# Patient Record
Sex: FEMALE | Race: WHITE
[De-identification: names, ages, dates, MRNs, and addresses within clinical notes are randomized per-mention and may not be internally consistent; named-entity substitution may affect disease eponyms.]

---

## 2017-04-24 PROBLEM — Z98.891 HISTORY OF C-SECTION: Status: ACTIVE | Noted: 2017-04-24

## 2017-04-24 PROBLEM — Z87.798 HISTORY OF CONGENITAL ABNORMALITY: Status: ACTIVE | Noted: 2017-04-24

## 2017-04-24 PROBLEM — F79 MENTAL DEFICIENCY: Chronic | Status: ACTIVE | Noted: 2017-04-24

## 2017-04-24 PROBLEM — O09.32 LATE PRENATAL CARE AFFECTING PREGNANCY IN SECOND TRIMESTER: Status: ACTIVE | Noted: 2017-04-24

## 2017-06-15 PROBLEM — O34.219 PREVIOUS CESAREAN DELIVERY AFFECTING PREGNANCY: Status: ACTIVE | Noted: 2017-04-24

## 2017-06-28 PROBLEM — O36.8130 DECREASED FETUS MOVEMENTS AFFECTING MANAGEMENT OF MOTHER IN THIRD TRIMESTER: Status: ACTIVE | Noted: 2017-06-28

## 2022-11-15 ENCOUNTER — HOSPITAL ENCOUNTER (EMERGENCY)
Dept: HOSPITAL 67 - ED | Age: 27
LOS: 1 days | Discharge: ANOTHER HOSPITAL | End: 2022-11-16
Payer: COMMERCIAL

## 2022-11-15 VITALS — WEIGHT: 210 LBS | BODY MASS INDEX: 35.85 KG/M2 | HEIGHT: 64 IN

## 2022-11-15 VITALS — TEMPERATURE: 98.2 F

## 2022-11-15 DIAGNOSIS — G40.901: Primary | ICD-10-CM

## 2022-11-15 LAB
APTT BLD: 29.5 SECONDS (ref 24.5–33.6)
PLATELET # BLD: 230 K/UL (ref 152–353)
POTASSIUM SERPL-SCNC: 3.8 MMOL/L (ref 3.6–5.2)
SODIUM SERPL-SCNC: 136 MMOL/L (ref 136–145)

## 2022-11-15 PROCEDURE — 93005 ELECTROCARDIOGRAM TRACING: CPT

## 2022-11-15 PROCEDURE — 0T9B70Z DRAINAGE OF BLADDER WITH DRAINAGE DEVICE, VIA NATURAL OR ARTIFICIAL OPENING: ICD-10-PCS | Performed by: EMERGENCY MEDICINE

## 2022-11-15 PROCEDURE — 36415 COLL VENOUS BLD VENIPUNCTURE: CPT

## 2022-11-15 PROCEDURE — 85610 PROTHROMBIN TIME: CPT

## 2022-11-15 PROCEDURE — 96376 TX/PRO/DX INJ SAME DRUG ADON: CPT

## 2022-11-15 PROCEDURE — 80320 DRUG SCREEN QUANTALCOHOLS: CPT

## 2022-11-15 PROCEDURE — 96365 THER/PROPH/DIAG IV INF INIT: CPT

## 2022-11-15 PROCEDURE — 85027 COMPLETE CBC AUTOMATED: CPT

## 2022-11-15 PROCEDURE — 96361 HYDRATE IV INFUSION ADD-ON: CPT

## 2022-11-15 PROCEDURE — 81000 URINALYSIS NONAUTO W/SCOPE: CPT

## 2022-11-15 PROCEDURE — 99285 EMERGENCY DEPT VISIT HI MDM: CPT

## 2022-11-15 PROCEDURE — 80053 COMPREHEN METABOLIC PANEL: CPT

## 2022-11-15 PROCEDURE — 96360 HYDRATION IV INFUSION INIT: CPT

## 2022-11-15 PROCEDURE — 85730 THROMBOPLASTIN TIME PARTIAL: CPT

## 2022-11-15 PROCEDURE — 51702 INSERT TEMP BLADDER CATH: CPT

## 2022-11-15 PROCEDURE — 96366 THER/PROPH/DIAG IV INF ADDON: CPT

## 2022-11-15 PROCEDURE — 96375 TX/PRO/DX INJ NEW DRUG ADDON: CPT

## 2022-11-15 PROCEDURE — 80307 DRUG TEST PRSMV CHEM ANLYZR: CPT

## 2022-11-16 VITALS — SYSTOLIC BLOOD PRESSURE: 121 MMHG | DIASTOLIC BLOOD PRESSURE: 60 MMHG

## 2023-01-06 ENCOUNTER — APPOINTMENT (OUTPATIENT)
Dept: GENERAL RADIOLOGY | Age: 28
End: 2023-01-06
Payer: COMMERCIAL

## 2023-01-06 ENCOUNTER — HOSPITAL ENCOUNTER (INPATIENT)
Age: 28
LOS: 8 days | Discharge: HOME | End: 2023-01-14
Attending: PEDIATRICS | Admitting: STUDENT IN AN ORGANIZED HEALTH CARE EDUCATION/TRAINING PROGRAM
Payer: COMMERCIAL

## 2023-01-06 ENCOUNTER — APPOINTMENT (OUTPATIENT)
Dept: CT IMAGING | Age: 28
End: 2023-01-06
Payer: COMMERCIAL

## 2023-01-06 DIAGNOSIS — G40.901 STATUS EPILEPTICUS (HCC): Primary | ICD-10-CM

## 2023-01-06 LAB
ALBUMIN SERPL-MCNC: 4.2 G/DL (ref 3.5–5.2)
ALP BLD-CCNC: 78 U/L (ref 35–104)
ALT SERPL-CCNC: 8 U/L (ref 5–33)
AMPHETAMINE SCREEN, URINE: NEGATIVE
ANION GAP SERPL CALCULATED.3IONS-SCNC: 9 MMOL/L (ref 7–19)
AST SERPL-CCNC: 12 U/L (ref 5–32)
BACTERIA: NEGATIVE /HPF
BARBITURATE SCREEN URINE: NEGATIVE
BASOPHILS ABSOLUTE: 0.1 K/UL (ref 0–0.2)
BASOPHILS RELATIVE PERCENT: 0.6 % (ref 0–1)
BENZODIAZEPINE SCREEN, URINE: NEGATIVE
BILIRUB SERPL-MCNC: <0.2 MG/DL (ref 0.2–1.2)
BILIRUBIN URINE: NEGATIVE
BLOOD, URINE: ABNORMAL
BUN BLDV-MCNC: 9 MG/DL (ref 6–20)
BUPRENORPHINE URINE: NEGATIVE
CALCIUM SERPL-MCNC: 9 MG/DL (ref 8.6–10)
CANNABINOID SCREEN URINE: NEGATIVE
CHLORIDE BLD-SCNC: 101 MMOL/L (ref 98–111)
CLARITY: CLEAR
CO2: 26 MMOL/L (ref 22–29)
COCAINE METABOLITE SCREEN URINE: NEGATIVE
COLOR: YELLOW
CREAT SERPL-MCNC: 0.7 MG/DL (ref 0.5–0.9)
CRYSTALS, UA: ABNORMAL /HPF
EOSINOPHILS ABSOLUTE: 0 K/UL (ref 0–0.6)
EOSINOPHILS RELATIVE PERCENT: 0.2 % (ref 0–5)
EPITHELIAL CELLS, UA: 1 /HPF (ref 0–5)
ETHANOL: <10 MG/DL (ref 0–0.08)
GFR SERPL CREATININE-BSD FRML MDRD: >60 ML/MIN/{1.73_M2}
GLUCOSE BLD-MCNC: 90 MG/DL (ref 74–109)
GLUCOSE URINE: NEGATIVE MG/DL
HCG QUALITATIVE: NEGATIVE
HCT VFR BLD CALC: 35.4 % (ref 37–47)
HEMOGLOBIN: 11 G/DL (ref 12–16)
HYALINE CASTS: 1 /HPF (ref 0–8)
IMMATURE GRANULOCYTES #: 0 K/UL
KETONES, URINE: NEGATIVE MG/DL
LEUKOCYTE ESTERASE, URINE: NEGATIVE
LYMPHOCYTES ABSOLUTE: 2 K/UL (ref 1.1–4.5)
LYMPHOCYTES RELATIVE PERCENT: 21.1 % (ref 20–40)
Lab: NORMAL
MAGNESIUM: 1.9 MG/DL (ref 1.6–2.6)
MAGNESIUM: 1.9 MG/DL (ref 1.6–2.6)
MCH RBC QN AUTO: 25.6 PG (ref 27–31)
MCHC RBC AUTO-ENTMCNC: 31.1 G/DL (ref 33–37)
MCV RBC AUTO: 82.5 FL (ref 81–99)
METHADONE SCREEN, URINE: NEGATIVE
METHAMPHETAMINE, URINE: NEGATIVE
MONOCYTES ABSOLUTE: 0.7 K/UL (ref 0–0.9)
MONOCYTES RELATIVE PERCENT: 7.1 % (ref 0–10)
NEUTROPHILS ABSOLUTE: 6.6 K/UL (ref 1.5–7.5)
NEUTROPHILS RELATIVE PERCENT: 70.6 % (ref 50–65)
NITRITE, URINE: NEGATIVE
OPIATE SCREEN URINE: NEGATIVE
OXYCODONE URINE: NEGATIVE
PDW BLD-RTO: 14.6 % (ref 11.5–14.5)
PH UA: 6.5 (ref 5–8)
PHENCYCLIDINE SCREEN URINE: NEGATIVE
PLATELET # BLD: 225 K/UL (ref 130–400)
PMV BLD AUTO: 12 FL (ref 9.4–12.3)
POTASSIUM SERPL-SCNC: 4 MMOL/L (ref 3.5–5)
PROLACTIN: 28.22 NG/ML (ref 4.79–23.3)
PROPOXYPHENE SCREEN: NEGATIVE
PROTEIN UA: NEGATIVE MG/DL
RBC # BLD: 4.29 M/UL (ref 4.2–5.4)
RBC UA: 53 /HPF (ref 0–4)
SARS-COV-2, NAAT: NOT DETECTED
SODIUM BLD-SCNC: 136 MMOL/L (ref 136–145)
SPECIFIC GRAVITY UA: 1.01 (ref 1–1.03)
TOTAL PROTEIN: 7.1 G/DL (ref 6.6–8.7)
TRICYCLIC, URINE: NEGATIVE
TROPONIN: <0.01 NG/ML (ref 0–0.03)
UROBILINOGEN, URINE: 0.2 E.U./DL
WBC # BLD: 9.3 K/UL (ref 4.8–10.8)
WBC UA: 0 /HPF (ref 0–5)

## 2023-01-06 PROCEDURE — 93005 ELECTROCARDIOGRAM TRACING: CPT | Performed by: PEDIATRICS

## 2023-01-06 PROCEDURE — 6360000002 HC RX W HCPCS: Performed by: HOSPITALIST

## 2023-01-06 PROCEDURE — 80306 DRUG TEST PRSMV INSTRMNT: CPT

## 2023-01-06 PROCEDURE — 85025 COMPLETE CBC W/AUTO DIFF WBC: CPT

## 2023-01-06 PROCEDURE — 2700000000 HC OXYGEN THERAPY PER DAY

## 2023-01-06 PROCEDURE — 71045 X-RAY EXAM CHEST 1 VIEW: CPT | Performed by: RADIOLOGY

## 2023-01-06 PROCEDURE — 81001 URINALYSIS AUTO W/SCOPE: CPT

## 2023-01-06 PROCEDURE — 99285 EMERGENCY DEPT VISIT HI MDM: CPT

## 2023-01-06 PROCEDURE — 71045 X-RAY EXAM CHEST 1 VIEW: CPT

## 2023-01-06 PROCEDURE — 6360000002 HC RX W HCPCS: Performed by: PEDIATRICS

## 2023-01-06 PROCEDURE — 84703 CHORIONIC GONADOTROPIN ASSAY: CPT

## 2023-01-06 PROCEDURE — 36415 COLL VENOUS BLD VENIPUNCTURE: CPT

## 2023-01-06 PROCEDURE — 70450 CT HEAD/BRAIN W/O DYE: CPT | Performed by: RADIOLOGY

## 2023-01-06 PROCEDURE — 6360000002 HC RX W HCPCS

## 2023-01-06 PROCEDURE — 87150 DNA/RNA AMPLIFIED PROBE: CPT

## 2023-01-06 PROCEDURE — 2580000003 HC RX 258: Performed by: HOSPITALIST

## 2023-01-06 PROCEDURE — 83735 ASSAY OF MAGNESIUM: CPT

## 2023-01-06 PROCEDURE — 87635 SARS-COV-2 COVID-19 AMP PRB: CPT

## 2023-01-06 PROCEDURE — 80053 COMPREHEN METABOLIC PANEL: CPT

## 2023-01-06 PROCEDURE — 87040 BLOOD CULTURE FOR BACTERIA: CPT

## 2023-01-06 PROCEDURE — 82077 ASSAY SPEC XCP UR&BREATH IA: CPT

## 2023-01-06 PROCEDURE — 84484 ASSAY OF TROPONIN QUANT: CPT

## 2023-01-06 PROCEDURE — 2100000000 HC CCU R&B

## 2023-01-06 PROCEDURE — 2580000003 HC RX 258: Performed by: PEDIATRICS

## 2023-01-06 PROCEDURE — 96374 THER/PROPH/DIAG INJ IV PUSH: CPT

## 2023-01-06 PROCEDURE — 84146 ASSAY OF PROLACTIN: CPT

## 2023-01-06 PROCEDURE — 80177 DRUG SCRN QUAN LEVETIRACETAM: CPT

## 2023-01-06 PROCEDURE — 2500000003 HC RX 250 WO HCPCS: Performed by: PEDIATRICS

## 2023-01-06 PROCEDURE — 70450 CT HEAD/BRAIN W/O DYE: CPT

## 2023-01-06 RX ORDER — ACETAMINOPHEN 325 MG/1
650 TABLET ORAL EVERY 4 HOURS PRN
Status: DISCONTINUED | OUTPATIENT
Start: 2023-01-06 | End: 2023-01-14 | Stop reason: HOSPADM

## 2023-01-06 RX ORDER — ACETAMINOPHEN 650 MG/1
650 SUPPOSITORY RECTAL EVERY 4 HOURS PRN
Status: DISCONTINUED | OUTPATIENT
Start: 2023-01-06 | End: 2023-01-14 | Stop reason: HOSPADM

## 2023-01-06 RX ORDER — LORAZEPAM 2 MG/ML
2 INJECTION INTRAMUSCULAR ONCE
Status: COMPLETED | OUTPATIENT
Start: 2023-01-06 | End: 2023-01-06

## 2023-01-06 RX ORDER — LEVETIRACETAM 500 MG/1
1500 TABLET ORAL 2 TIMES DAILY
Status: ON HOLD | COMMUNITY
End: 2023-01-10 | Stop reason: HOSPADM

## 2023-01-06 RX ORDER — METRONIDAZOLE 500 MG/100ML
500 INJECTION, SOLUTION INTRAVENOUS ONCE
Status: COMPLETED | OUTPATIENT
Start: 2023-01-06 | End: 2023-01-07

## 2023-01-06 RX ORDER — METRONIDAZOLE 500 MG/100ML
500 INJECTION, SOLUTION INTRAVENOUS EVERY 8 HOURS
Status: DISCONTINUED | OUTPATIENT
Start: 2023-01-06 | End: 2023-01-07

## 2023-01-06 RX ORDER — MECOBALAMIN 5000 MCG
5 TABLET,DISINTEGRATING ORAL NIGHTLY PRN
Status: DISCONTINUED | OUTPATIENT
Start: 2023-01-06 | End: 2023-01-14 | Stop reason: HOSPADM

## 2023-01-06 RX ORDER — ONDANSETRON 2 MG/ML
4 INJECTION INTRAMUSCULAR; INTRAVENOUS EVERY 6 HOURS PRN
Status: DISCONTINUED | OUTPATIENT
Start: 2023-01-06 | End: 2023-01-14 | Stop reason: HOSPADM

## 2023-01-06 RX ORDER — POTASSIUM CHLORIDE 29.8 MG/ML
20 INJECTION INTRAVENOUS PRN
Status: DISCONTINUED | OUTPATIENT
Start: 2023-01-06 | End: 2023-01-11

## 2023-01-06 RX ORDER — LEVOFLOXACIN 5 MG/ML
750 INJECTION, SOLUTION INTRAVENOUS ONCE
Status: COMPLETED | OUTPATIENT
Start: 2023-01-06 | End: 2023-01-07

## 2023-01-06 RX ORDER — FLUOXETINE HYDROCHLORIDE 40 MG/1
80 CAPSULE ORAL DAILY
COMMUNITY

## 2023-01-06 RX ORDER — ALBUTEROL SULFATE 90 UG/1
2 AEROSOL, METERED RESPIRATORY (INHALATION) EVERY 4 HOURS PRN
Status: DISCONTINUED | OUTPATIENT
Start: 2023-01-06 | End: 2023-01-14 | Stop reason: HOSPADM

## 2023-01-06 RX ORDER — LEVETIRACETAM 10 MG/ML
1000 INJECTION INTRAVASCULAR ONCE
Status: DISCONTINUED | OUTPATIENT
Start: 2023-01-06 | End: 2023-01-10 | Stop reason: ALTCHOICE

## 2023-01-06 RX ORDER — LAMOTRIGINE 100 MG/1
200 TABLET ORAL EVERY MORNING
Status: ON HOLD | COMMUNITY
End: 2023-01-10 | Stop reason: HOSPADM

## 2023-01-06 RX ORDER — ONDANSETRON 4 MG/1
4 TABLET, ORALLY DISINTEGRATING ORAL EVERY 8 HOURS PRN
Status: DISCONTINUED | OUTPATIENT
Start: 2023-01-06 | End: 2023-01-14 | Stop reason: HOSPADM

## 2023-01-06 RX ORDER — LORAZEPAM 2 MG/ML
1 INJECTION INTRAMUSCULAR ONCE
Status: COMPLETED | OUTPATIENT
Start: 2023-01-06 | End: 2023-01-06

## 2023-01-06 RX ORDER — MAGNESIUM SULFATE IN WATER 40 MG/ML
2000 INJECTION, SOLUTION INTRAVENOUS PRN
Status: DISCONTINUED | OUTPATIENT
Start: 2023-01-06 | End: 2023-01-14 | Stop reason: HOSPADM

## 2023-01-06 RX ORDER — ENOXAPARIN SODIUM 100 MG/ML
40 INJECTION SUBCUTANEOUS DAILY
Status: DISCONTINUED | OUTPATIENT
Start: 2023-01-07 | End: 2023-01-07

## 2023-01-06 RX ORDER — LORAZEPAM 2 MG/ML
1 INJECTION INTRAMUSCULAR EVERY 5 MIN PRN
Status: DISCONTINUED | OUTPATIENT
Start: 2023-01-06 | End: 2023-01-14 | Stop reason: HOSPADM

## 2023-01-06 RX ORDER — TRAZODONE HYDROCHLORIDE 150 MG/1
150 TABLET ORAL NIGHTLY
Status: ON HOLD | COMMUNITY
End: 2023-01-10 | Stop reason: HOSPADM

## 2023-01-06 RX ORDER — LEVETIRACETAM 10 MG/ML
1000 INJECTION INTRAVASCULAR ONCE
Status: COMPLETED | OUTPATIENT
Start: 2023-01-06 | End: 2023-01-06

## 2023-01-06 RX ORDER — CALCIUM CARBONATE 200(500)MG
500 TABLET,CHEWABLE ORAL 3 TIMES DAILY PRN
Status: DISCONTINUED | OUTPATIENT
Start: 2023-01-06 | End: 2023-01-14 | Stop reason: HOSPADM

## 2023-01-06 RX ORDER — SODIUM CHLORIDE 0.9 % (FLUSH) 0.9 %
5-40 SYRINGE (ML) INJECTION EVERY 12 HOURS SCHEDULED
Status: DISCONTINUED | OUTPATIENT
Start: 2023-01-06 | End: 2023-01-14 | Stop reason: HOSPADM

## 2023-01-06 RX ORDER — POLYETHYLENE GLYCOL 3350 17 G/17G
17 POWDER, FOR SOLUTION ORAL DAILY PRN
Status: DISCONTINUED | OUTPATIENT
Start: 2023-01-06 | End: 2023-01-14 | Stop reason: HOSPADM

## 2023-01-06 RX ORDER — LEVOFLOXACIN 5 MG/ML
750 INJECTION, SOLUTION INTRAVENOUS EVERY 24 HOURS
Status: DISCONTINUED | OUTPATIENT
Start: 2023-01-06 | End: 2023-01-07

## 2023-01-06 RX ORDER — SODIUM CHLORIDE 0.9 % (FLUSH) 0.9 %
5-40 SYRINGE (ML) INJECTION PRN
Status: DISCONTINUED | OUTPATIENT
Start: 2023-01-06 | End: 2023-01-14 | Stop reason: HOSPADM

## 2023-01-06 RX ORDER — LEVETIRACETAM 15 MG/ML
1500 INJECTION INTRAVASCULAR EVERY 12 HOURS
Status: DISCONTINUED | OUTPATIENT
Start: 2023-01-07 | End: 2023-01-10

## 2023-01-06 RX ORDER — LORAZEPAM 1 MG/1
1 TABLET ORAL ONCE
Status: DISCONTINUED | OUTPATIENT
Start: 2023-01-06 | End: 2023-01-06

## 2023-01-06 RX ORDER — SODIUM CHLORIDE 9 MG/ML
INJECTION, SOLUTION INTRAVENOUS PRN
Status: DISCONTINUED | OUTPATIENT
Start: 2023-01-06 | End: 2023-01-14 | Stop reason: HOSPADM

## 2023-01-06 RX ORDER — POTASSIUM CHLORIDE 7.45 MG/ML
10 INJECTION INTRAVENOUS PRN
Status: DISCONTINUED | OUTPATIENT
Start: 2023-01-06 | End: 2023-01-11

## 2023-01-06 RX ORDER — DIAZEPAM 5 MG/ML
5 INJECTION, SOLUTION INTRAMUSCULAR; INTRAVENOUS ONCE
Status: COMPLETED | OUTPATIENT
Start: 2023-01-06 | End: 2023-01-06

## 2023-01-06 RX ORDER — LORAZEPAM 2 MG/ML
INJECTION INTRAMUSCULAR
Status: COMPLETED
Start: 2023-01-06 | End: 2023-01-06

## 2023-01-06 RX ORDER — ALBUTEROL SULFATE 2.5 MG/3ML
2.5 SOLUTION RESPIRATORY (INHALATION) EVERY 4 HOURS PRN
Status: DISCONTINUED | OUTPATIENT
Start: 2023-01-06 | End: 2023-01-06 | Stop reason: CLARIF

## 2023-01-06 RX ADMIN — LORAZEPAM 1 MG: 2 INJECTION INTRAMUSCULAR; INTRAVENOUS at 19:46

## 2023-01-06 RX ADMIN — LEVETIRACETAM 1000 MG: 10 INJECTION INTRAVENOUS at 19:25

## 2023-01-06 RX ADMIN — LORAZEPAM 2 MG: 2 INJECTION INTRAMUSCULAR; INTRAVENOUS at 21:55

## 2023-01-06 RX ADMIN — METRONIDAZOLE 500 MG: 500 INJECTION, SOLUTION INTRAVENOUS at 23:38

## 2023-01-06 RX ADMIN — DIAZEPAM 5 MG: 5 INJECTION, SOLUTION INTRAMUSCULAR; INTRAVENOUS at 21:15

## 2023-01-06 RX ADMIN — LORAZEPAM 1 MG: 2 INJECTION INTRAMUSCULAR at 19:46

## 2023-01-06 RX ADMIN — LORAZEPAM 2 MG: 2 INJECTION INTRAMUSCULAR; INTRAVENOUS at 19:36

## 2023-01-06 RX ADMIN — DEXTROSE MONOHYDRATE 1540 MG PE: 50 INJECTION, SOLUTION INTRAVENOUS at 22:08

## 2023-01-06 RX ADMIN — SODIUM CHLORIDE, PRESERVATIVE FREE 10 ML: 5 INJECTION INTRAVENOUS at 23:22

## 2023-01-06 RX ADMIN — LORAZEPAM 2 MG: 2 INJECTION INTRAMUSCULAR; INTRAVENOUS at 21:48

## 2023-01-06 RX ADMIN — SODIUM CHLORIDE 3000 MG: 9 INJECTION, SOLUTION INTRAVENOUS at 21:40

## 2023-01-06 RX ADMIN — LORAZEPAM 1 MG: 2 INJECTION INTRAMUSCULAR; INTRAVENOUS at 23:16

## 2023-01-07 ENCOUNTER — APPOINTMENT (OUTPATIENT)
Dept: MRI IMAGING | Age: 28
End: 2023-01-07
Payer: COMMERCIAL

## 2023-01-07 LAB
ANION GAP SERPL CALCULATED.3IONS-SCNC: 9 MMOL/L (ref 7–19)
BASOPHILS ABSOLUTE: 0 K/UL (ref 0–0.2)
BASOPHILS RELATIVE PERCENT: 0.5 % (ref 0–1)
BUN BLDV-MCNC: 6 MG/DL (ref 6–20)
CALCIUM SERPL-MCNC: 8.6 MG/DL (ref 8.6–10)
CHLORIDE BLD-SCNC: 105 MMOL/L (ref 98–111)
CO2: 24 MMOL/L (ref 22–29)
CREAT SERPL-MCNC: 0.6 MG/DL (ref 0.5–0.9)
EOSINOPHILS ABSOLUTE: 0 K/UL (ref 0–0.6)
EOSINOPHILS RELATIVE PERCENT: 0.6 % (ref 0–5)
GFR SERPL CREATININE-BSD FRML MDRD: >60 ML/MIN/{1.73_M2}
GLUCOSE BLD-MCNC: 84 MG/DL (ref 74–109)
HCT VFR BLD CALC: 36.5 % (ref 37–47)
HEMOGLOBIN: 11.6 G/DL (ref 12–16)
IMMATURE GRANULOCYTES #: 0 K/UL
LYMPHOCYTES ABSOLUTE: 1.7 K/UL (ref 1.1–4.5)
LYMPHOCYTES RELATIVE PERCENT: 26 % (ref 20–40)
MCH RBC QN AUTO: 25.7 PG (ref 27–31)
MCHC RBC AUTO-ENTMCNC: 31.8 G/DL (ref 33–37)
MCV RBC AUTO: 80.8 FL (ref 81–99)
MONOCYTES ABSOLUTE: 0.6 K/UL (ref 0–0.9)
MONOCYTES RELATIVE PERCENT: 8.8 % (ref 0–10)
NEUTROPHILS ABSOLUTE: 4.2 K/UL (ref 1.5–7.5)
NEUTROPHILS RELATIVE PERCENT: 63.6 % (ref 50–65)
PDW BLD-RTO: 14.6 % (ref 11.5–14.5)
PHENYTOIN LEVEL: 14.7 UG/ML (ref 10–20)
PLATELET # BLD: 186 K/UL (ref 130–400)
PMV BLD AUTO: 11.8 FL (ref 9.4–12.3)
POTASSIUM REFLEX MAGNESIUM: 4 MMOL/L (ref 3.5–5)
RBC # BLD: 4.52 M/UL (ref 4.2–5.4)
SODIUM BLD-SCNC: 138 MMOL/L (ref 136–145)
WBC # BLD: 6.5 K/UL (ref 4.8–10.8)

## 2023-01-07 PROCEDURE — 2500000003 HC RX 250 WO HCPCS: Performed by: PEDIATRICS

## 2023-01-07 PROCEDURE — 94760 N-INVAS EAR/PLS OXIMETRY 1: CPT

## 2023-01-07 PROCEDURE — 70553 MRI BRAIN STEM W/O & W/DYE: CPT

## 2023-01-07 PROCEDURE — 80048 BASIC METABOLIC PNL TOTAL CA: CPT

## 2023-01-07 PROCEDURE — A9577 INJ MULTIHANCE: HCPCS | Performed by: PSYCHIATRY & NEUROLOGY

## 2023-01-07 PROCEDURE — 80185 ASSAY OF PHENYTOIN TOTAL: CPT

## 2023-01-07 PROCEDURE — 85025 COMPLETE CBC W/AUTO DIFF WBC: CPT

## 2023-01-07 PROCEDURE — 36415 COLL VENOUS BLD VENIPUNCTURE: CPT

## 2023-01-07 PROCEDURE — 2700000000 HC OXYGEN THERAPY PER DAY

## 2023-01-07 PROCEDURE — 70553 MRI BRAIN STEM W/O & W/DYE: CPT | Performed by: RADIOLOGY

## 2023-01-07 PROCEDURE — 95708 EEG WO VID EA 12-26HR UNMNTR: CPT

## 2023-01-07 PROCEDURE — 6360000002 HC RX W HCPCS: Performed by: HOSPITALIST

## 2023-01-07 PROCEDURE — 2100000000 HC CCU R&B

## 2023-01-07 PROCEDURE — 6370000000 HC RX 637 (ALT 250 FOR IP): Performed by: PSYCHIATRY & NEUROLOGY

## 2023-01-07 PROCEDURE — 99223 1ST HOSP IP/OBS HIGH 75: CPT | Performed by: PSYCHIATRY & NEUROLOGY

## 2023-01-07 PROCEDURE — 6360000002 HC RX W HCPCS: Performed by: STUDENT IN AN ORGANIZED HEALTH CARE EDUCATION/TRAINING PROGRAM

## 2023-01-07 PROCEDURE — 6360000002 HC RX W HCPCS: Performed by: PSYCHIATRY & NEUROLOGY

## 2023-01-07 PROCEDURE — 95718 EEG PHYS/QHP 2-12 HR W/VEEG: CPT | Performed by: PSYCHIATRY & NEUROLOGY

## 2023-01-07 PROCEDURE — 6360000002 HC RX W HCPCS: Performed by: PEDIATRICS

## 2023-01-07 PROCEDURE — 6360000004 HC RX CONTRAST MEDICATION: Performed by: PSYCHIATRY & NEUROLOGY

## 2023-01-07 PROCEDURE — 2580000003 HC RX 258: Performed by: HOSPITALIST

## 2023-01-07 RX ORDER — LORAZEPAM 2 MG/ML
2 INJECTION INTRAMUSCULAR ONCE
Status: COMPLETED | OUTPATIENT
Start: 2023-01-07 | End: 2023-01-07

## 2023-01-07 RX ORDER — LEVOFLOXACIN 5 MG/ML
750 INJECTION, SOLUTION INTRAVENOUS EVERY 24 HOURS
Status: DISCONTINUED | OUTPATIENT
Start: 2023-01-08 | End: 2023-01-07

## 2023-01-07 RX ORDER — LAMOTRIGINE 200 MG/1
200 TABLET ORAL DAILY
Status: DISCONTINUED | OUTPATIENT
Start: 2023-01-07 | End: 2023-01-14 | Stop reason: HOSPADM

## 2023-01-07 RX ORDER — FOSPHENYTOIN SODIUM 50 MG/ML
100 INJECTION, SOLUTION INTRAMUSCULAR; INTRAVENOUS EVERY 8 HOURS
Status: DISCONTINUED | OUTPATIENT
Start: 2023-01-07 | End: 2023-01-10

## 2023-01-07 RX ADMIN — LORAZEPAM 1 MG: 2 INJECTION INTRAMUSCULAR; INTRAVENOUS at 09:36

## 2023-01-07 RX ADMIN — ENOXAPARIN SODIUM 40 MG: 100 INJECTION SUBCUTANEOUS at 08:30

## 2023-01-07 RX ADMIN — LEVOFLOXACIN 750 MG: 5 INJECTION, SOLUTION INTRAVENOUS at 01:56

## 2023-01-07 RX ADMIN — LORAZEPAM 1 MG: 2 INJECTION INTRAMUSCULAR; INTRAVENOUS at 04:38

## 2023-01-07 RX ADMIN — GADOBENATE DIMEGLUMINE 16 ML: 529 INJECTION, SOLUTION INTRAVENOUS at 14:37

## 2023-01-07 RX ADMIN — LEVETIRACETAM 1500 MG: 15 INJECTION, SOLUTION INTRAVENOUS at 20:24

## 2023-01-07 RX ADMIN — LORAZEPAM 2 MG: 2 INJECTION INTRAMUSCULAR; INTRAVENOUS at 10:13

## 2023-01-07 RX ADMIN — FOSPHENYTOIN SODIUM 100 MG PE: 50 INJECTION, SOLUTION INTRAMUSCULAR; INTRAVENOUS at 19:47

## 2023-01-07 RX ADMIN — SODIUM CHLORIDE, PRESERVATIVE FREE 10 ML: 5 INJECTION INTRAVENOUS at 20:25

## 2023-01-07 RX ADMIN — SODIUM CHLORIDE: 9 INJECTION, SOLUTION INTRAVENOUS at 19:14

## 2023-01-07 RX ADMIN — LORAZEPAM 2 MG: 2 INJECTION INTRAMUSCULAR; INTRAVENOUS at 10:09

## 2023-01-07 RX ADMIN — LEVETIRACETAM 1500 MG: 15 INJECTION, SOLUTION INTRAVENOUS at 10:05

## 2023-01-07 RX ADMIN — METRONIDAZOLE 500 MG: 500 INJECTION, SOLUTION INTRAVENOUS at 08:30

## 2023-01-07 RX ADMIN — FOSPHENYTOIN SODIUM 100 MG PE: 50 INJECTION, SOLUTION INTRAMUSCULAR; INTRAVENOUS at 12:38

## 2023-01-07 RX ADMIN — LAMOTRIGINE 200 MG: 100 TABLET ORAL at 15:02

## 2023-01-07 NOTE — ED NOTES
Seizure activity improved, pt now tracking nurse with her eyes, purposeful movement noted     Kendy Rubio, RN  01/06/23 2052

## 2023-01-07 NOTE — PROGRESS NOTES
Patient had a seizure lasting approximately 5 minutes, lasting until 0941. Seizure event caused HR to increase into 130-150s, O2 sat decreased to 70's. Patient placed on NRB at 15L, with subsequent increase to %. Ativan administered per PRN orders. Patient now resting in bed, tearful concerned that she is going to be alone and have a seizure and no one knows about it. Dr. Coleman Section notified.   Electronically signed by Carol Fregoso RN on 1/7/2023 at 9:50 AM

## 2023-01-07 NOTE — PROGRESS NOTES
Seizure at 2340 for 29 sec. Seizure at 2342 fir 15 sec. Pt heart rate went up to 155 when she was seizing and came back right away.  Will continue to monitor

## 2023-01-07 NOTE — H&P
Manny Vilchis - History & Physical      PCP: None Provider    Date of Admission: 2023    Date of Service: 2023    Chief Complaint:  Seizure    History Of Present Illness: The patient is a 33 y.o. female who presented to [facility] ED for evaluation of seizure activity. Pt is said to be visiting family in area having  history of seizures with report of recently being out of keppra. History of present illness is otherwise limited. No family is currently present. Pt has had prolonged, continuous seizure activity since arrival per ED physician. Pt has received keppra 1g bolus, 3mg of ativan and has since been able to track nurse with eyes with purposeful movement noted. UDS negative, etoh negative, CT of head-Unremarkable CT scan of the head. Cxr-Subtle interstitial opacities in the right basilar region may represent atelectasis or aspiration. Sodium 136, creatinine 0.7/bun 9, glucose 90, troponin less than 0.01, pregnancy test negative, wbc 9k, hgb 11, platelets 224L. Pt is admitted to ICU for close observation with further evaluation and treatment. Past Medical History:        Diagnosis Date    Anxiety     Autism     Depression     Epilepsy ([facility]     Seizures ([facility] [facility]        Past Surgical History:        Procedure Laterality Date     SECTION      CHOLECYSTECTOMY         Home Medications:  Prior to Admission medications    Not on File       Allergies:    Patient has no known allergies. Social History:    The patient currently lives with family  Tobacco:   has no history on file for tobacco use. Alcohol:   has no history on file for alcohol use. Illicit Drugs: not known    Family History:  History reviewed. No pertinent family history. Review of Systems:   Review of Systems   Unable to perform ROS: Patient nonverbal      14 point review of systems is negative except as specifically addressed above.     Physical Examination:  /80   Pulse 82   Resp 26   Wt 170 lb (77.1 kg)   SpO2 100%   Physical Exam  Vitals reviewed. Constitutional:       General: She is in acute distress. Appearance: She is ill-appearing. Comments: 33 yr old female initially able to make eye contact however developed return of seizure activity, 100 NRB mask applied and patient was given valium   HENT:      Right Ear: External ear normal.      Left Ear: External ear normal.      Mouth/Throat:      Mouth: Mucous membranes are dry. Cardiovascular:      Rate and Rhythm: Regular rhythm. Tachycardia present. Pulmonary:      Effort: Pulmonary effort is normal.   Abdominal:      Tenderness: There is no abdominal tenderness. Musculoskeletal:      Cervical back: Neck supple. Right lower leg: No edema. Left lower leg: No edema. Skin:     General: Skin is warm and dry. Diagnostic Data:  CBC:  Recent Labs     01/06/23 1929   WBC 9.3   HGB 11.0*   HCT 35.4*        BMP:  Recent Labs     01/06/23 1929      K 4.0      CO2 26   BUN 9   CREATININE 0.7   CALCIUM 9.0     Recent Labs     01/06/23 1929   AST 12   ALT 8   BILITOT <0.2   ALKPHOS 78     Cardiac Enzymes:   Recent Labs     01/06/23  Parisa Lack <0.01     Urinalysis:  Lab Results   Component Value Date/Time    NITRU Negative 01/06/2023 07:35 PM    WBCUA 0 01/06/2023 07:35 PM    BACTERIA NEGATIVE 01/06/2023 07:35 PM    RBCUA 53 01/06/2023 07:35 PM    BLOODU LARGE 01/06/2023 07:35 PM    SPECGRAV 1.009 01/06/2023 07:35 PM    GLUCOSEU Negative 01/06/2023 07:35 PM     CT Head W/O Contrast    Result Date: 1/7/2023  PROCEDURE: CT HEAD WO CONTRAST CLINICAL INDICATION: Status epilepticus. COMPARISON: No relevant prior imaging is available for comparison. FINDINGS: There is no acute intracranial hemorrhage, extra-axial fluid collection, evidence of mass or mass effect. Gray-white matter differentiation is preserved. The ventricular system and cisternal spaces demonstrates normal configuration.  There is no hydrocephalus. The paranasal sinuses and mastoid air cells are clear. The soft tissues are unremarkable. The orbits are also unremarkable in appearance. There are no suspicious osseous lesions. Unremarkable CT scan of the head. XR CHEST PORTABLE    Result Date: 1/7/2023  PROCEDURE: XR CHEST PORTABLE CLINICAL INDICATION: Status epilepticus. COMPARISON: No prior similar images available for comparison. FINDINGS: Subtle interstitial opacities are identified in the right basilar region. There is no pneumonic consolidation. Heart is not enlarged. There are no pulmonary vascular congestion changes. There is no pneumothorax. Bilateral costophrenic angles are clear. Visualized osseous structures are intact. Subtle interstitial opacities in the right basilar region may represent atelectasis or aspiration. Assessment/Plan:  Principal Problem:    Status epilepticus ([address]  Resolved Problems:    * No resolved hospital problems. *     Principal Problem:    Status epilepticus   -ativan prn  -keppra 1g iv given in ED  -seizure precautions   -follow lytes/glucose  -keppra level  -seizure precautions  -neuro consult  -telemetry   -additional keppra bolus of 3g iv then  -keppra 1500mg iv q12hrs  -consider ett, propofol vs versed gtt vs pentobarbital, continuous eeg  Resolved Problems:    * No resolved hospital problems.  *  Signed:  MARBELLA Lemus - CNP, 1/6/2023 9:12 PM

## 2023-01-07 NOTE — PROGRESS NOTES
Gretchen Davalos arrived to room # 740. Presented with: seizure  Mental Status: Patient is oriented and alert. Vitals:    01/06/23 2320   BP:    Pulse: (!) 117   Resp: 23   Temp:    SpO2: 100%     Patient safety contract and falls prevention contract reviewed with patient Yes. Oriented Patient to room. Call light within reach. Yes. Needs, issues or concerns expressed at this time: yes.       Electronically signed by Jamari Jimenez RN on 1/6/2023 at 11:40 PM

## 2023-01-07 NOTE — PROGRESS NOTES
Daily Progress Note    Date:2023  Patient: Darian Hunt  : 1995  AYN:[id number]  CODE:Full Code No additional code details  PCP:None Provider    Admit Date: 2023  7:16 PM   LOS: 1 day       Subjective:   Patient seen and examined this AM at bedside with nurse. Patient having multiple seizures with generalized tonic-clonic activity, lasting minutes at a time, at least 1 episode lasting over 5 minutes. Patient given several doses of Ativan. Tachycardic, relatively hypotensive after sedative medications given. Otherwise has been responsive, arousable. She is anxious and tearful in between episodes. Summary: 60-year-old female with history of seizures since childhood, depression, anxiety, and autism. Patient/family recently moved here, previously followed by neurology in [facility]. Has normally been on Keppra and Lamictal.  Patient presented following seizures and reportedly recently out of [address]. She was noted to have prolonged continuous seizure activity concerning for status epilepticus, loaded with Keppra and fosphenytoin. Also received several doses of Ativan in the ED. Neurology was consulted. The following a.m. she again had multiple prolonged seizure episodes despite multiple doses of Ativan. She was subsequently maintained on Keppra and fosphenytoin. EEG obtained with neurology following.       Review of Systems  Unable to complete ROS due to acuity of condition        Objective:      Vital signs in last 24 hours:  Patient Vitals for the past 24 hrs:   BP Temp Temp src Pulse Resp SpO2 Weight   23 1400 -- -- -- -- -- 99 % --   23 1330 (!) 92/57 -- -- 68 19 98 % --   23 1300 (!) 107/59 -- -- 74 22 -- --   23 1231 (!) 88/54 -- -- 72 19 98 % --   23 1215 (!) 91/51 -- -- 70 19 98 % --   23 1200 (!) 93/58 -- -- 66 19 97 % --   23 1158 -- 98.4 °F (36.9 °C) Temporal -- -- -- --   23 1030 117/79 -- -- (!) 127 20 -- --   23 0930 (!) 109/53 -- -- (!) 108 18 -- --   01/07/23 0900 104/68 -- -- (!) 102 19 -- --   01/07/23 0830 (!) 89/53 -- -- 68 20 -- --   01/07/23 0807 (!) 90/50 98.2 °F (36.8 °C) Temporal 71 18 -- --   01/07/23 0800 (!) 90/50 -- -- 71 18 97 % --   01/07/23 0730 (!) 87/50 -- -- 71 21 -- --   01/07/23 0700 (!) 90/48 -- -- 64 17 -- --   01/07/23 0610 -- -- -- -- -- 97 % --   01/06/23 2320 -- -- -- (!) 117 23 100 % --   01/06/23 2245 -- 96.9 °F (36.1 °C) Temporal -- -- -- --   01/06/23 2232 (!) 104/52 -- -- 79 21 100 % --   01/06/23 2157 (!) 119/93 -- -- 96 (!) 39 100 % --   01/06/23 2151 (!) 121/100 -- -- (!) 112 23 90 % --   01/06/23 2116 124/81 -- -- (!) 110 22 100 % --   01/06/23 2007 -- -- -- 82 26 100 % --   01/06/23 2005 123/80 -- -- -- -- -- --   01/06/23 2002 -- -- -- 74 12 100 % --   01/06/23 1921 -- -- -- -- -- -- 170 lb (77.1 kg)   01/06/23 1918 -- -- -- 94 -- 100 % --     Physical exam    General: In distress having generalized tonic-clonic activity  Psych: Anxious and upset between seizure episodes  Neck: No swelling  HEENT: NC/AT, no scleral icterus  Cardiac: Tachycardic with regular rhythm, S1-S2 present  Respiratory: No wheezes rales or rhonchi, no use of accessory muscles  Abdomen: Soft, nontender, nondistended, bowel sounds present  Musculoskeletal: No deformities  Extremities: No clubbing, cyanosis or edema  Skin: Warm and dry  Neurologic: Observed generalized tonic-clonic seizure activity      Lab Review   Recent Results (from the past 24 hour(s))   CBC with Auto Differential    Collection Time: 01/06/23  7:29 PM   Result Value Ref Range    WBC 9.3 4.8 - 10.8 K/uL    RBC 4.29 4. 20 - 5.40 M/uL    Hemoglobin 11.0 (L) 12.0 - 16.0 g/dL    Hematocrit 35.4 (L) 37.0 - 47.0 %    MCV 82.5 81.0 - 99.0 fL    MCH 25.6 (L) 27.0 - 31.0 pg    MCHC 31.1 (L) 33.0 - 37.0 g/dL    RDW 14.6 (H) 11.5 - 14.5 %    Platelets 309 905 - 841 K/uL    MPV 12.0 9.4 - 12.3 fL    Neutrophils % 70.6 (H) 50.0 - 65.0 %    Lymphocytes % 21.1 20.0 - 40.0 %    Monocytes % 7.1 0.0 - 10.0 %    Eosinophils % 0.2 0.0 - 5.0 %    Basophils % 0.6 0.0 - 1.0 %    Neutrophils Absolute 6.6 1.5 - 7.5 K/uL    Immature Granulocytes # 0.0 K/uL    Lymphocytes Absolute 2.0 1.1 - 4.5 K/uL    Monocytes Absolute 0.70 0.00 - 0.90 K/uL    Eosinophils Absolute 0.00 0.00 - 0.60 K/uL    Basophils Absolute 0.10 0.00 - 0.20 K/uL   CMP    Collection Time: 01/06/23  7:29 PM   Result Value Ref Range    Sodium 136 136 - 145 mmol/L    Potassium 4.0 3.5 - 5.0 mmol/L    Chloride 101 98 - 111 mmol/L    CO2 26 22 - 29 mmol/L    Anion Gap 9 7 - 19 mmol/L    Glucose 90 74 - 109 mg/dL    BUN 9 6 - 20 mg/dL    Creatinine 0.7 0.5 - 0.9 mg/dL    Est, Glom Filt Rate >60 >60    Calcium 9.0 8.6 - 10.0 mg/dL    Total Protein 7.1 6.6 - 8.7 g/dL    Albumin 4.2 3.5 - 5.2 g/dL    Total Bilirubin <0.2 0.2 - 1.2 mg/dL    Alkaline Phosphatase 78 35 - 104 U/L    ALT 8 5 - 33 U/L    AST 12 5 - 32 U/L   COVID-19, Rapid    Collection Time: 01/06/23  7:29 PM    Specimen: Nasopharyngeal Swab   Result Value Ref Range    SARS-CoV-2, NAAT Not Detected Not Detected   HCG Qualitative, Serum    Collection Time: 01/06/23  7:29 PM   Result Value Ref Range    hCG Qual Negative    ETOH    Collection Time: 01/06/23  7:29 PM   Result Value Ref Range    Ethanol Lvl <10 mg/dL   Magnesium    Collection Time: 01/06/23  7:29 PM   Result Value Ref Range    Magnesium 1.9 1.6 - 2.6 mg/dL   Troponin    Collection Time: 01/06/23  7:29 PM   Result Value Ref Range    Troponin <0.01 0.00 - 0.03 ng/mL   Prolactin    Collection Time: 01/06/23  7:29 PM   Result Value Ref Range    Prolactin 28.22 (H) 4.79 - 23.30 ng/mL   Magnesium    Collection Time: 01/06/23  7:29 PM   Result Value Ref Range    Magnesium 1.9 1.6 - 2.6 mg/dL   Drug SCRN, Buprenorphine    Collection Time: 01/06/23  7:35 PM   Result Value Ref Range    Amphetamine Screen, Urine Negative Negative <500 ng/mL    Barbiturate Screen, Ur Negative Negative < 200 ng/mL    Benzodiazepine Screen, Urine Negative Negative <150 ng/mL    Cannabinoid Scrn, Ur Negative Negative <50 ng/mL    Cocaine Metabolite Screen, Urine Negative Negative <150 ng/mL    Opiate Scrn, Ur Negative Negative < 100 ng/mL    PCP Screen, Urine Negative Negative <25 ng/mL    Methadone Screen, Urine Negative Negative <200 ng/mL    Propoxyphene Scrn, Ur Negative Negative <172 ng/mL    Tricyclic Negative Negative <300 ng/mL    Oxycodone Urine Negative Negative <100 ng/mL    Buprenorphine Urine Negative Negative <10 ng/mL    Methamphetamine, Urine Negative Negative <500 ng/mL    Drug Screen Comment: see below    Urinalysis with Reflex to Culture    Collection Time: 01/06/23  7:35 PM    Specimen: Urine   Result Value Ref Range    Color, UA YELLOW Straw/Yellow    Clarity, UA Clear Clear    Glucose, Ur Negative Negative mg/dL    Bilirubin Urine Negative Negative    Ketones, Urine Negative Negative mg/dL    Specific Gravity, UA 1.009 1.005 - 1.030    Blood, Urine LARGE (A) Negative    pH, UA 6.5 5.0 - 8.0    Protein, UA Negative Negative mg/dL    Urobilinogen, Urine 0.2 <2.0 E.U./dL    Nitrite, Urine Negative Negative    Leukocyte Esterase, Urine Negative Negative   Microscopic Urinalysis    Collection Time: 01/06/23  7:35 PM   Result Value Ref Range    Bacteria, UA NEGATIVE (A) None Seen /HPF    Crystals, UA NEG (A) None Seen /HPF    Hyaline Casts, UA 1 0 - 8 /HPF    WBC, UA 0 0 - 5 /HPF    RBC, UA 53 (H) 0 - 4 /HPF    Epithelial Cells, UA 1 0 - 5 /HPF   Basic Metabolic Panel w/ Reflex to MG    Collection Time: 01/07/23  3:54 AM   Result Value Ref Range    Sodium 138 136 - 145 mmol/L    Potassium reflex Magnesium 4.0 3.5 - 5.0 mmol/L    Chloride 105 98 - 111 mmol/L    CO2 24 22 - 29 mmol/L    Anion Gap 9 7 - 19 mmol/L    Glucose 84 74 - 109 mg/dL    BUN 6 6 - 20 mg/dL    Creatinine 0.6 0.5 - 0.9 mg/dL    Est, Glom Filt Rate >60 >60    Calcium 8.6 8.6 - 10.0 mg/dL   CBC with Auto Differential    Collection Time: 01/07/23  3:54 AM   Result Value Ref Range    WBC 6.5 4.8 - 10.8 K/uL    RBC 4.52 4.20 - 5.40 M/uL    Hemoglobin 11.6 (L) 12.0 - 16.0 g/dL    Hematocrit 36.5 (L) 37.0 - 47.0 %    MCV 80.8 (L) 81.0 - 99.0 fL    MCH 25.7 (L) 27.0 - 31.0 pg    MCHC 31.8 (L) 33.0 - 37.0 g/dL    RDW 14.6 (H) 11.5 - 14.5 %    Platelets 500 979 - 590 K/uL    MPV 11.8 9.4 - 12.3 fL    Neutrophils % 63.6 50.0 - 65.0 %    Lymphocytes % 26.0 20.0 - 40.0 %    Monocytes % 8.8 0.0 - 10.0 %    Eosinophils % 0.6 0.0 - 5.0 %    Basophils % 0.5 0.0 - 1.0 %    Neutrophils Absolute 4.2 1.5 - 7.5 K/uL    Immature Granulocytes # 0.0 K/uL    Lymphocytes Absolute 1.7 1.1 - 4.5 K/uL    Monocytes Absolute 0.60 0.00 - 0.90 K/uL    Eosinophils Absolute 0.00 0.00 - 0.60 K/uL    Basophils Absolute 0.00 0.00 - 0.20 K/uL   Phenytoin Level, Total    Collection Time: 01/07/23  3:54 AM   Result Value Ref Range    Phenytoin Lvl 14.7 10.0 - 20.0 ug/mL       I/O last 3 completed shifts:  In: -   Out: 1400 [GXLMZ:3290]  I/O this shift:  In: 1975.1 [I.V.:1225; IV Piggyback:750.1]  Out: 1375 [Urine:1375]      Current Facility-Administered Medications:     fosphenytoin (CEREBYX) injection 100 mg PE, 100 mg PE, IntraVENous, Q8H, Koffi Crowell DO, 100 mg PE at 01/07/23 1238    lamoTRIgine (LAMICTAL) tablet 200 mg, 200 mg, Oral, Daily, Koffi Crowell DO    LORazepam (ATIVAN) injection 1 mg, 1 mg, IntraVENous, Q5 Min PRN, Jennyfer Wadsworth MD, 1 mg at 01/07/23 0936    sodium chloride flush 0.9 % injection 5-40 mL, 5-40 mL, IntraVENous, 2 times per day, Jennyfer Wadsworth MD, 10 mL at 01/06/23 2322    sodium chloride flush 0.9 % injection 5-40 mL, 5-40 mL, IntraVENous, PRN, Jennyfer Wadsworth MD    0.9 % sodium chloride infusion, , IntraVENous, PRN, Jennyfer Wadsworth MD, Last Rate: 250 mL/hr at 01/07/23 1000, Rate Change at 01/07/23 1000    ondansetron (ZOFRAN-ODT) disintegrating tablet 4 mg, 4 mg, Oral, Q8H PRN **OR** ondansetron (ZOFRAN) injection 4 mg, 4 mg, IntraVENous, Q6H PRN, Jennyfer Wadsworth MD    acetaminophen (TYLENOL) tablet 650 mg, 650 mg, Oral, Q4H PRN **OR** acetaminophen (TYLENOL) suppository 650 mg, 650 mg, Rectal, Q4H PRN, Odilon Meza MD    potassium chloride 20 mEq/50 mL IVPB (Central Line), 20 mEq, IntraVENous, PRN **OR** potassium chloride 10 mEq/100 mL IVPB (Peripheral Line), 10 mEq, IntraVENous, PRN, Odilon Meza MD    sodium phosphate 10 mmol in sodium chloride 0.9 % 250 mL IVPB, 10 mmol, IntraVENous, PRN **OR** sodium phosphate 15 mmol in sodium chloride 0.9 % 250 mL IVPB, 15 mmol, IntraVENous, PRN **OR** sodium phosphate 20 mmol in sodium chloride 0.9 % 500 mL IVPB, 20 mmol, IntraVENous, PRN, Odilon Meza MD    magnesium sulfate 2000 mg in 50 mL IVPB premix, 2,000 mg, IntraVENous, PRN, Odilon Meza MD    polyethylene glycol (GLYCOLAX) packet 17 g, 17 g, Oral, Daily PRN, Odilon Meza MD    melatonin disintegrating tablet 5 mg, 5 mg, Oral, Nightly PRN, Odilon Meza MD    calcium carbonate (TUMS) chewable tablet 500 mg, 500 mg, Oral, TID PRN, Odilon Meza MD    levETIRAcetam (KEPPRA) 1500 mg/100 mL IVPB, 1,500 mg, IntraVENous, Q12H, Odilon Meza MD, Stopped at 01/07/23 1020    levETIRAcetam (KEPPRA) 1000 mg/100 mL IVPB, 1,000 mg, IntraVENous, Once, Fiona Henley MD    albuterol sulfate HFA (PROVENTIL;VENTOLIN;PROAIR) 108 (90 Base) MCG/ACT inhaler 2 puff, 2 puff, Inhalation, Q4H PRN, Odilon Meza MD      Reviewed chart, history, vitals, labs, diagnostics and medications      Assessment/plan  Principal Problem:    Status epilepticus ([address]  Resolved Problems:    * No resolved hospital problems.  *    Status epilepticus  --CT head reviewed, no acute findings  - Neurology consult  - Continuous EEG monitoring  - MRI brain when stable  - Continue neurochecks  - Seizure precautions  --Continue IV Keppra and fosphenytoin, oral Lamictal  - Cautious use of additional as needed Ativan, high doses of Ativan given since admission, including this a.m., very high risk for respiratory depression, monitor GCS, continue monitoring in intensive care, could require intubation    Right basilar atelectasis versus possible aspiration pneumonitis on chest x-ray, reviewed chest x-ray  Follow clinically, doubt bacterial pneumonia, observe off antibiotics      Total critical care time of 53 minutes.   Time exclusive of any procedures    Mirna Gomes MD 1/7/2023 3:02 PM

## 2023-01-07 NOTE — PROGRESS NOTES
First seizre observed at 21  for 1 minute. Second seizure at 2319 for 30 sec. Third one at  for 32sec.

## 2023-01-07 NOTE — PROGRESS NOTES
Pt is alert and oriented. Pt states that she recently moved from [place] and pt mom takes care of her. Pt states that she is having seizure from the past 7 years and she has a hx of epilepsy. Pt also states that she recently  from her .

## 2023-01-07 NOTE — PROGRESS NOTES
4 Eyes Skin Assessment    Luke Nguyen is being assessed upon: Admission    I agree that I, Lauri Mac RN, along with Jack Cuellar (either 2 RN's or 1 LPN and 1 RN) have performed a thorough Head to Toe Skin Assessment on the patient. ALL assessment sites listed below have been assessed. Areas assessed by both nurses:     [x]   Head, Face, and Ears   [x]   Shoulders, Back, and Chest  [x]   Arms, Elbows, and Hands   [x]   Coccyx, Sacrum, and Ischium  [x]   Legs, Feet, and Heels    Does the Patient have Skin Breakdown?  No    Junior Prevention initiated: No  Wound Care Orders initiated: No    WOC nurse consulted for Pressure Injury (Stage 3,4, Unstageable, DTI, NWPT, and Complex wounds) and New or Established Ostomies: No        Primary Nurse eSignature: Lauri Mac RN on 1/6/2023 at 11:39 PM      Co-Signer eSignature: Electronically signed by Jack Cuellar RN on 1/7/23 at 12:07 AM CST Jack Cuellar

## 2023-01-07 NOTE — PROGRESS NOTES
Mustapha seized in [place]. On room air now satting in the mid 90s. Patient had attempted to climb OOB because \"the catheter was hurting\"    I gave her the option that we could remove the catheter but we would not be getting up to use the bathroom. Any kind of bodily elimination must be done using a bedpan. Instructed her that this was her optimal safety as to reduce the risk of an inadvertent fall.  She is on strict bedrest.    Electronically signed by Ninfa Rios RN on 1/7/2023 at 3:00 AM

## 2023-01-07 NOTE — ED NOTES
Contacted pt mother Shelby Engle ([phone]) and updated on pt status and plan of care.        Clair Lebron RN  01/06/23 2030

## 2023-01-07 NOTE — CONSULTS
[facility] Neurology Consult      Patient:   Rai Tirado  MR#:    [id number]  Account Number:                   [id number]      Room:    [facility]   YOB: 1995  Date of Progress Note: 1/7/2023  Time of Note                           10:39 AM  Attending Physician:  Kalie Encarnacion MD  Consulting Physician:  Chante Zamora DO       CHIEF COMPLAINT:  Seizures     HISTORY OF PRESENT ILLNESS:   This is a 33 y.o. female who was admitted with multiple seizures. She has a past medical history of depression, anxiety and autism. Family recently moved to this area from [facility]. She has been followed by neurology there. She states that her seizures started during childhood. She describes both staring episodes with a loss of awareness as well as generalized tonic-clonic events. She is currently on Keppra 1500 twice daily and Lamictal 200 mg daily. She had multiple seizures in the emergency department and with EMS yesterday. She was given Versed as well as loaded with Keppra and fosphenytoin. Her seizures have continued this morning though she has been able to regain consciousness in between events. I was able to witness an episode this morning. It was somewhat unclear clinically whether it was epileptic or nonepileptic. She denies traumatic brain injury meningitis or encephalitis. She does endorse family history of seizures. She denies drug or alcohol use. REVIEW OF SYSTEMS:  Constitutional - No fever or chills. HENT -  No Scalp tenderness. No tinnitus or significant hearing loss. No nose bleeding, no sore throat. Eyes - No sudden vision change or eye pain  Respiratory - No significant shortness of breath or cough  Cardiovascular - No chest pain. No palpitations or significant leg swelling  Gastrointestinal - No abdominal swelling or pain. Genitourinary - No difficulty urinating, dysuria  Musculoskeletal - No back pain or myalgia.   Skin - No color change or rash  Neurologic - Seizures. No lateralizing weakness. No numbness. Hematologic - No easy bruising or spontaneous bleeding. Psychiatric - Anxiety. PAST MEDICAL HISTORY:      Diagnosis Date    Anxiety     Autism     Depression     Epilepsy ([facility] [address]     Seizures ([facility] [address]        PAST SURGICAL HISTORY:      Procedure Laterality Date     SECTION      CHOLECYSTECTOMY         SOCIAL HISTORY:   TOBACCO:   has no history on file for tobacco use. ETOH:   reports no history of alcohol use. DRUG:    Social History     Substance and Sexual Activity   Drug Use Not on file       FAMILY HISTORY:   History reviewed. No pertinent family history.     MEDICATIONS:      Current Facility-Administered Medications:     LORazepam (ATIVAN) injection 1 mg, 1 mg, IntraVENous, Q5 Min PRN, Jennyfer Wadsworth MD, 1 mg at 23 0936    sodium chloride flush 0.9 % injection 5-40 mL, 5-40 mL, IntraVENous, 2 times per day, Jennyfer Wadsworth MD, 10 mL at 23 2322    sodium chloride flush 0.9 % injection 5-40 mL, 5-40 mL, IntraVENous, PRN, Jennyfer Wadsworth MD    0.9 % sodium chloride infusion, , IntraVENous, PRN, Jennyfer Wadsworth MD, Last Rate: 75 mL/hr at 23 0700, Rate Change at 23 0700    ondansetron (ZOFRAN-ODT) disintegrating tablet 4 mg, 4 mg, Oral, Q8H PRN **OR** ondansetron (ZOFRAN) injection 4 mg, 4 mg, IntraVENous, Q6H PRN, Jennyfer Wadsworth MD    acetaminophen (TYLENOL) tablet 650 mg, 650 mg, Oral, Q4H PRN **OR** acetaminophen (TYLENOL) suppository 650 mg, 650 mg, Rectal, Q4H PRN, Jennyfer Wadsworth MD    potassium chloride 20 mEq/50 mL IVPB (Central Line), 20 mEq, IntraVENous, PRN **OR** potassium chloride 10 mEq/100 mL IVPB (Peripheral Line), 10 mEq, IntraVENous, PRN, Jennyfer Wadsworth MD    sodium phosphate 10 mmol in sodium chloride 0.9 % 250 mL IVPB, 10 mmol, IntraVENous, PRN **OR** sodium phosphate 15 mmol in sodium chloride 0.9 % 250 mL IVPB, 15 mmol, IntraVENous, PRN **OR** sodium phosphate 20 mmol in sodium chloride 0.9 % 500 mL IVPB, 20 mmol, IntraVENous, PRN, Timothy Merlos MD    magnesium sulfate 2000 mg in 50 mL IVPB premix, 2,000 mg, IntraVENous, PRN, iTmothy Merlos MD    polyethylene glycol (GLYCOLAX) packet 17 g, 17 g, Oral, Daily PRN, Timothy Merlos MD    melatonin disintegrating tablet 5 mg, 5 mg, Oral, Nightly PRN, Timothy Merlos MD    calcium carbonate (TUMS) chewable tablet 500 mg, 500 mg, Oral, TID PRN, Timothy Merlos MD    levETIRAcetam (KEPPRA) 1500 mg/100 mL IVPB, 1,500 mg, IntraVENous, Q12H, Timothy Merlos MD, Last Rate: 400 mL/hr at 01/07/23 1005, 1,500 mg at 01/07/23 1005    levETIRAcetam (KEPPRA) 1000 mg/100 mL IVPB, 1,000 mg, IntraVENous, Once, Cheko Vasquez MD    albuterol sulfate HFA (PROVENTIL;VENTOLIN;PROAIR) 108 (90 Base) MCG/ACT inhaler 2 puff, 2 puff, Inhalation, Q4H PRN, Timothy Merlos MD    ALLERGIES:    Patient has no known allergies. PHYSICAL EXAM:    Constitutional -   BP (!) 109/53   Pulse (!) 108   Temp 98.2 °F (36.8 °C) (Temporal)   Resp 18   Wt 170 lb (77.1 kg)   SpO2 97%   General appearance: No acute distress   EYES -   Conjunctiva normal  Pupillary exam as below, see CN exam in the neurologic exam  ENT-    No scars, masses, or lesions over external nose or ears  Oropharynx without erythema, palate midline  Cardiovascular -   No clubbing, cyanosis, or edema   Pulmonary-   Good expansion, normal effort without use of accessory muscles  Musculoskeletal -   No significant wasting of muscles noted  Gait as below, see gait exam in the neurologic exam  Muscle strength, tone, stability as below see the motor exam in the neurologic exam.   No bony deformities  Skin -   Warm, dry, and intact to inspection and palpation.     No rash, erythema, or pallor  Psychiatric -   Mood, affect, and behavior appear normal    Memory as below see mental status examination in the neurologic exam      NEUROLOGICAL EXAM    Mental status   [x] Awake, alert, oriented   [x] Affect attention and concentration appear appropriate  [x] Recent and remote memory appears unremarkable  [x] Speech normal without dysarthria or aphasia, comprehension and repetition intact. COMMENTS:   Cranial Nerves [x] No VF deficit to confrontation  [x] PERRLA, EOMI, no nystagmus, conjugate eye movements, no ptosis  [x] Face symmetric  [x] Facial sensation intact  [x] Tongue midline no atrophy or fasciculations present  [x] Palate midline, hearing to finger rub normal  [x] Shoulder shrug and SCM testing normal  COMMENTS:   Motor   [x] 5/5 strength x 4 extremities  [x] Normal bulk and tone  [x] No tremor present  [x] No rigidity or bradykinesia noted  COMMENTS:   Sensory  [x] Sensation intact to light touch, pin prick, vibration, and proprioception BLE  [] Sensation intact to light touch, pin prick, vibration, and proprioception BUE  COMMENTS:   Coordination [x] FTN normal bilaterally   [] HTS normal bilaterally  [] YEVGENIY normal.   COMMENTS:   Reflexes  [x] Symmetric and non-pathological  [x] Toes downgoing bilaterally  [x] No clonus present  COMMENTS:   Gait                  [] Normal steady gait    [] Ataxic    [] Spastic     [] Magnetic     [] Shuffling  [x] Not assessed  COMMENTS:       LABS/IMAGING:    As below and per HPI    CT Head W/O Contrast    Result Date: 1/7/2023  PROCEDURE: CT HEAD WO CONTRAST CLINICAL INDICATION: Status epilepticus. COMPARISON: No relevant prior imaging is available for comparison. FINDINGS: There is no acute intracranial hemorrhage, extra-axial fluid collection, evidence of mass or mass effect. Gray-white matter differentiation is preserved. The ventricular system and cisternal spaces demonstrates normal configuration. There is no hydrocephalus. The paranasal sinuses and mastoid air cells are clear. The soft tissues are unremarkable. The orbits are also unremarkable in appearance. There are no suspicious osseous lesions. Unremarkable CT scan of the head.     XR CHEST PORTABLE    Result Date: 1/7/2023  PROCEDURE: XR CHEST PORTABLE CLINICAL INDICATION: Status epilepticus. COMPARISON: No prior similar images available for comparison. FINDINGS: Subtle interstitial opacities are identified in the right basilar region. There is no pneumonic consolidation. Heart is not enlarged. There are no pulmonary vascular congestion changes. There is no pneumothorax. Bilateral costophrenic angles are clear. Visualized osseous structures are intact. Subtle interstitial opacities in the right basilar region may represent atelectasis or aspiration. Recent Labs     01/06/23 1929 01/07/23  0354   WBC 9.3 6.5   HGB 11.0* 11.6*    186     Recent Labs     01/06/23 1929 01/07/23  0354    138   K 4.0 4.0    105   CO2 26 24   BUN 9 6   CREATININE 0.7 0.6   GLUCOSE 90 84     Recent Labs     01/06/23 1929   AST 12   ALT 8   BILITOT <0.2   ALKPHOS 66       ASSESSMENT:  33 y.o. admitted with multiple seizures. History of underlying possible autism, anxiety depression. Long history of seizure disorder. Currently on multiple antiepileptic medications. PLAN:   Continue Keppra 1500 mg q12 hours   Continue Lamictal 200 mg daily. Continue fosphenytoin at 100 mg q8h, check level. cEEG monitoring   MRI brain   Ativan prn, seizure precautions. Please feel free to call with any questions. [phone] (cell phone).     Yandel Phan DO  Board Certified Neurology

## 2023-01-07 NOTE — ED NOTES
Pt to CT with x2 nurses and suction ready in [facility] Myranda Wan RN  01/06/23 2004

## 2023-01-07 NOTE — ED PROVIDER NOTES
[facility]  eMERGENCY dEPARTMENT eNCOUnter      Pt Name: Sharon Schwartz  MRN: [id number]  [place] 1995  Date of evaluation: 2023  Provider: Ashley Walls MD    CHIEF COMPLAINT       Chief Complaint   Patient presents with    Seizures     4 seizures with EMS, 8mg versed given pta; pt has hx epilepsy and ran out of keppra yesterday         HISTORY OF PRESENT ILLNESS   (Location/Symptom, Timing/Onset,Context/Setting, Quality, Duration, Modifying Factors, Severity)  Note limiting factors. Sharon Schwartz is a 33 y.o. female with history of seizure disorder, depression, anxiety, and autism presents to the emergency department with seizures. Patient arrives per EMS with persistent seizures. Family has recently moved to this area and ran out of [employer] yesterday. Patient has had a history of longstanding seizure disorder and autism. Patient currently takes Keppra 1500 mg twice daily, lamotrigine 200 mg every morning, fluoxetine 80 mg once daily, and trazodone 150 mg at bedtime. Patient is unable to give history and history is provided by EMS and parent. EMS states that patient had 5 seizures per family before their arrival and had 4 seizures in the ambulance in route. Patient was given Versed 8 mg IV. Patient had been well the day prior and had no known illness. HPI    NursingNotes were reviewed. REVIEW OF SYSTEMS    (2-9 systems for level 4, 10 or more for level 5)     Review of Systems   Unable to perform ROS: Acuity of condition (Status epilepticus)   Neurological:  Positive for seizures.           PAST MEDICALHISTORY     Past Medical History:   Diagnosis Date    Anxiety     Autism     Depression     Epilepsy ([facility] [address]     Seizures ([facility] [address]          SURGICAL HISTORY       Past Surgical History:   Procedure Laterality Date     SECTION      CHOLECYSTECTOMY           CURRENT MEDICATIONS     Previous Medications    No medications on file       ALLERGIES     Patient has no known allergies. FAMILY HISTORY     History reviewed. No pertinent family history. SOCIAL HISTORY       Social History     Socioeconomic History    Marital status:      Spouse name: None    Number of children: None    Years of education: None    Highest education level: None   Tobacco Use    Smoking status: Unknown       SCREENINGS    Jose Coma Scale  Eye Opening: None  Best Verbal Response: None  Best Motor Response: None  Jose Coma Scale Score: 3        PHYSICAL EXAM    (up to 7 for level 4, 8 or more for level 5)     ED Triage Vitals   BP Temp Temp src Heart Rate Resp SpO2 Height Weight   01/06/23 2005 -- -- 01/06/23 1918 01/06/23 2002 01/06/23 1918 -- 01/06/23 1921   123/80   94 12 100 %  170 lb (77.1 kg)       Physical Exam  Vitals and nursing note reviewed. Constitutional:       Appearance: She is not diaphoretic. Comments: Patient has intermittent seizure activity but does not return to mental baseline in between. Patient seizes every few minutes. Patient's eyes are deviated to the left. HENT:      Head: Normocephalic and atraumatic. Right Ear: External ear normal.      Left Ear: External ear normal.      Nose: Nose normal. No congestion. Mouth/Throat:      Mouth: Mucous membranes are moist.      Pharynx: Oropharynx is clear. No oropharyngeal exudate. Eyes:      General: No scleral icterus. Right eye: No discharge. Left eye: No discharge. Conjunctiva/sclera: Conjunctivae normal.      Pupils: Pupils are equal, round, and reactive to light. Cardiovascular:      Rate and Rhythm: Regular rhythm. Tachycardia present. Pulses: Normal pulses. Heart sounds: Normal heart sounds. Pulmonary:      Effort: Pulmonary effort is normal. No respiratory distress. Breath sounds: Normal breath sounds. No stridor. No wheezing, rhonchi or rales. Abdominal:      General: Bowel sounds are normal. There is no distension. Palpations: Abdomen is soft. Tenderness: There is no abdominal tenderness. There is no guarding or rebound. Musculoskeletal:         General: No tenderness or deformity. Cervical back: Neck supple. No rigidity. Right lower leg: No edema. Left lower leg: No edema. Skin:     General: Skin is warm and dry. Capillary Refill: Capillary refill takes less than 2 seconds. Coloration: Skin is not jaundiced. Neurological:      Motor: Seizure activity present. Comments: Recurrent seizures without restoration to normal mental status. Saturations remain   Psychiatric:         Mood and Affect: Mood normal.         Behavior: Behavior normal.       DIAGNOSTIC RESULTS     EKG: All EKG's areinterpreted by the Emergency Department Physician who either signs or Co-signs this chart in the absence of a cardiologist.    EKG dated 1/7/2023: Normal sinus rhythm, rate 81. Artifact present. Poor R wave progression V1 through V3.   QRS 99 QTc 430. RADIOLOGY:  Non-plain film images such as CT, Ultrasound and MRI are read by the radiologist. Plain radiographic images are visualized and preliminarily interpreted bythe emergency physician with the below findings:      XR CHEST PORTABLE   Final Result   Subtle interstitial opacities in the right basilar region may represent   atelectasis or aspiration. CT Head W/O Contrast   Final Result   Unremarkable CT scan of the head.               LABS:  Labs Reviewed   CBC WITH AUTO DIFFERENTIAL - Abnormal; Notable for the following components:       Result Value    Hemoglobin 11.0 (*)     Hematocrit 35.4 (*)     MCH 25.6 (*)     MCHC 31.1 (*)     RDW 14.6 (*)     Neutrophils % 70.6 (*)     All other components within normal limits   URINALYSIS WITH REFLEX TO CULTURE - Abnormal; Notable for the following components:    Blood, Urine LARGE (*)     All other components within normal limits   MICROSCOPIC URINALYSIS - Abnormal; Notable for the following components:    Bacteria, UA NEGATIVE (*)     Crystals, UA NEG (*)     RBC, UA 53 (*)     All other components within normal limits   PROLACTIN - Abnormal; Notable for the following components:    Prolactin 28.22 (*)     All other components within normal limits   COVID-19, RAPID   COMPREHENSIVE METABOLIC PANEL   HCG, SERUM, QUALITATIVE   ETHANOL   DRUG SCRN, BUPRENORPHINE   MAGNESIUM   TROPONIN   MAGNESIUM   LEVETIRACETAM LEVEL       All other labs were within normal range or not returned as of this dictation. EMERGENCY DEPARTMENT COURSE and DIFFERENTIAL DIAGNOSIS/MDM:   Vitals:    Vitals:    01/06/23 2007 01/06/23 2116 01/06/23 2151 01/06/23 2157   BP:  124/81 (!) 121/100 (!) 119/93   Pulse: 82 (!) 110 (!) 112 96   Resp: 26 22 23 (!) 39   SpO2: 100% 100% 90% 100%   Weight:           MDM     Amount and/or Complexity of Data Reviewed  Clinical lab tests: reviewed  Tests in the radiology section of CPT®: reviewed    60-year-old female with history of epilepsy and autism presents to the emergency department in status epilepticus. Patient takes Keppra and Lamictal but ran out of [facility] yesterday. Patient is loaded with 1 g Keppra followed by second dose. Patient receives Ativan 8 mg total for persistent seizure activity. Patient is also loaded on fosphenytoin while in the emergency department. Ongoing discussions with Dr. Teodoro Rodríguez, neurologist, have aided in the treatment of this patient. Patient appears to be able to respond to nursing but is somnolent prior to going to the ICU. Dr. Jessica Fan, hospitalist accepts patient to the ICU for further evaluation treatment. Due to the immediate potential for life-threatening deterioration due to status epilepticus, I spent 60 minutes providing critical care. This time is excluding time spent performing procedures. CONSULTS:  IP CONSULT TO NEUROLOGY  IP CONSULT TO NEUROSURGERY    PROCEDURES:  Unless otherwise noted below, none     Procedures    FINAL IMPRESSION      1.  Status epilepticus [facility])          [address] Admitted 01/06/2023 08:49:37 PM           (Please note that portions of this note were completed with a voice recognition program.  Efforts were made to edit thedictations but occasionally words are mis-transcribed.)    Miguel Gonzalez MD (electronically signed)  Attending Emergency Physician          Miguel Gonzalez MD  01/07/23 9026

## 2023-01-07 NOTE — PROGRESS NOTES
Arrived on nonrebreather at 15L. Maintaining sats in high 90s. .    Electronically signed by Kapil Zeng RN on 1/7/2023 at 12:10 AM

## 2023-01-07 NOTE — ED NOTES
Attempted to call report, nurse unavailable for report at this time     Concha Payton RN  01/06/23 0908

## 2023-01-07 NOTE — ED NOTES
Advised to use Duoneb's  via nose.      Give ok to use mucinex OTC Seizure activity noted, Dr. Radha Fisher notified     Danuta Tejeda RN  01/06/23 2120

## 2023-01-07 NOTE — PROGRESS NOTES
Patient just had about a 4 minute, 45 second seizure. Starting at about 4:35am    Had been on RA because she had been seizure free for a couple of hours. High/Low monitor alarms sounded off and O2 was dropping into the 80's. NRB at 15L applied with subsequent rise in pt's O2. HR pre seizure was mid 90's and got as high as 145. Ativan was given as ordered and seizing eventually stopped. Patient now resting comfortably. HR in high 80's, RR 16-20.  O2 sats high 90's on RA (patient keeps self removing NRB)    Electronically signed by Diana Monet RN on 1/7/2023 at 4:50 AM

## 2023-01-07 NOTE — ED NOTES
Seizure activity occurring roughly every 1-2 minutes--during seizure activity heart rate elevates to 140s, sats remain 90%+, pt displays full body contractions with extensive arm and leg tremors.        Kenna Warren RN  01/06/23 2004

## 2023-01-08 LAB
ACINETOBACTER CALCOAC BAUMANNII COMPLEX BY PCR: NOT DETECTED
BACTEROIDES FRAGILIS BY PCR: NOT DETECTED
CANDIDA ALBICANS BY PCR: NOT DETECTED
CANDIDA AURIS BY PCR: NOT DETECTED
CANDIDA GLABRATA BY PCR: NOT DETECTED
CANDIDA KRUSEI BY PCR: NOT DETECTED
CANDIDA PARAPSILOSIS BY PCR: NOT DETECTED
CANDIDA TROPICALIS BY PCR: NOT DETECTED
CRYPTOCOCCUS NEOFORMANS/GATTII BY PCR: NOT DETECTED
ENTEROBACTER CLOACAE COMPLEX BY PCR: NOT DETECTED
ENTEROBACTERALES BY PCR: NOT DETECTED
ENTEROCOCCUS FAECALIS BY PCR: NOT DETECTED
ENTEROCOCCUS FAECIUM BY PCR: NOT DETECTED
ESCHERICHIA COLI BY PCR: NOT DETECTED
HAEMOPHILUS INFLUENZAE BY PCR: NOT DETECTED
KLEBSIELLA AEROGENES BY PCR: NOT DETECTED
KLEBSIELLA OXYTOCA BY PCR: NOT DETECTED
KLEBSIELLA PNEUMONIAE GROUP BY PCR: NOT DETECTED
LISTERIA MONOCYTOGENES BY PCR: NOT DETECTED
METHICILLIN RESISTANCE MECA/C  BY PCR: NOT DETECTED
NEISSERIA MENINGITIDIS BY PCR: NOT DETECTED
PHENYTOIN LEVEL: 14.9 UG/ML (ref 10–20)
PROTEUS SPECIES BY PCR: NOT DETECTED
PSEUDOMONAS AERUGINOSA BY PCR: NOT DETECTED
SALMONELLA SPECIES BY PCR: NOT DETECTED
SERRATIA MARCESCENS BY PCR: NOT DETECTED
STAPHYLOCOCCUS AUREUS BY PCR: NOT DETECTED
STAPHYLOCOCCUS EPIDERMIDIS BY PCR: DETECTED
STAPHYLOCOCCUS LUGDUNENSIS BY PCR: NOT DETECTED
STAPHYLOCOCCUS SPECIES BY PCR: DETECTED
STENOTROPHOMONAS MALTOPHILIA BY PCR: NOT DETECTED
STREPTOCOCCUS AGALACTIAE BY PCR: NOT DETECTED
STREPTOCOCCUS PNEUMONIAE BY PCR: NOT DETECTED
STREPTOCOCCUS PYOGENES  BY PCR: NOT DETECTED
STREPTOCOCCUS SPECIES BY PCR: NOT DETECTED

## 2023-01-08 PROCEDURE — 6360000002 HC RX W HCPCS: Performed by: HOSPITALIST

## 2023-01-08 PROCEDURE — 94760 N-INVAS EAR/PLS OXIMETRY 1: CPT

## 2023-01-08 PROCEDURE — 2580000003 HC RX 258: Performed by: HOSPITALIST

## 2023-01-08 PROCEDURE — 6360000002 HC RX W HCPCS: Performed by: PSYCHIATRY & NEUROLOGY

## 2023-01-08 PROCEDURE — 80185 ASSAY OF PHENYTOIN TOTAL: CPT

## 2023-01-08 PROCEDURE — 94150 VITAL CAPACITY TEST: CPT

## 2023-01-08 PROCEDURE — 2700000000 HC OXYGEN THERAPY PER DAY

## 2023-01-08 PROCEDURE — 95720 EEG PHY/QHP EA INCR W/VEEG: CPT | Performed by: PSYCHIATRY & NEUROLOGY

## 2023-01-08 PROCEDURE — 99232 SBSQ HOSP IP/OBS MODERATE 35: CPT | Performed by: PSYCHIATRY & NEUROLOGY

## 2023-01-08 PROCEDURE — 1210000000 HC MED SURG R&B

## 2023-01-08 PROCEDURE — 36415 COLL VENOUS BLD VENIPUNCTURE: CPT

## 2023-01-08 PROCEDURE — 6370000000 HC RX 637 (ALT 250 FOR IP): Performed by: PSYCHIATRY & NEUROLOGY

## 2023-01-08 RX ADMIN — LEVETIRACETAM 1500 MG: 15 INJECTION, SOLUTION INTRAVENOUS at 09:04

## 2023-01-08 RX ADMIN — FOSPHENYTOIN SODIUM 100 MG PE: 50 INJECTION, SOLUTION INTRAMUSCULAR; INTRAVENOUS at 20:17

## 2023-01-08 RX ADMIN — SODIUM CHLORIDE, PRESERVATIVE FREE 10 ML: 5 INJECTION INTRAVENOUS at 20:20

## 2023-01-08 RX ADMIN — SODIUM CHLORIDE, PRESERVATIVE FREE 10 ML: 5 INJECTION INTRAVENOUS at 09:05

## 2023-01-08 RX ADMIN — FOSPHENYTOIN SODIUM 100 MG PE: 50 INJECTION, SOLUTION INTRAMUSCULAR; INTRAVENOUS at 03:20

## 2023-01-08 RX ADMIN — FOSPHENYTOIN SODIUM 100 MG PE: 50 INJECTION, SOLUTION INTRAMUSCULAR; INTRAVENOUS at 12:36

## 2023-01-08 RX ADMIN — LEVETIRACETAM 1500 MG: 15 INJECTION, SOLUTION INTRAVENOUS at 21:21

## 2023-01-08 RX ADMIN — LAMOTRIGINE 200 MG: 100 TABLET ORAL at 09:05

## 2023-01-08 RX ADMIN — LORAZEPAM 1 MG: 2 INJECTION INTRAMUSCULAR; INTRAVENOUS at 23:14

## 2023-01-08 NOTE — PROGRESS NOTES
Patient had 2 more occurences of seizure activity this afternoon, lasting for approximately 20-30 seconds. Psychmotor symptoms include fearful behavior at times. Motor components include twitching, jerking, teeth and feet clenching. Postictal response was  tearful, somnolence, however able to follow commands. Will continue to monitor and administer Ativan PRN per orders. Dr. Filomena Knox notified.   Electronically signed by Carlo Bentley RN on 1/7/2023 at 6:45 PM

## 2023-01-08 NOTE — PROGRESS NOTES
Daily Progress Note    Date:2023  Patient: Rai Tirado  : 1995  [facility]:[id number]  CODE:Full Code No additional code details  PCP:None Provider    Admit Date: 2023  7:16 PM   LOS: 2 days       Subjective:   No acute events noted overnight. No further seizure activity noted. Patient resting comfortably. No complaints. Summary: 25-year-old female with history of seizures since childhood, depression, anxiety, and autism. Patient/family recently moved here, previously followed by neurology in [facility]. Has normally been on Keppra and Lamictal.  Patient presented following seizures and reportedly recently out of [employer]. She was noted to have prolonged continuous seizure activity concerning for status epilepticus, loaded with Keppra and fosphenytoin. Also received several doses of Ativan in the ED. Neurology was consulted. The following a.m. she again had multiple prolonged seizure episodes despite multiple doses of Ativan. She was subsequently maintained on Keppra and fosphenytoin. EEG obtained with neurology following. No overt epileptiform activity noted on continuous EEG.         Review of Systems  14 point review of systems completed and is negative except as otherwise noted        Objective:      Vital signs in last 24 hours:  Patient Vitals for the past 24 hrs:   BP Temp Temp src Pulse Resp SpO2 Weight   23 1400 (!) 118/58 -- -- 88 13 96 % --   23 1200 121/75 99.5 °F (37.5 °C) Temporal 90 20 95 % --   23 1000 118/71 -- -- 80 20 94 % --   23 0930 -- 99.8 °F (37.7 °C) Temporal -- -- -- --   23 0900 132/70 -- -- (!) 113 20 95 % --   23 0800 116/64 -- -- 71 18 94 % --   23 0700 (!) 118/58 -- -- 69 21 94 % --   23 0605 -- -- -- 70 21 94 % --   23 0600 109/60 -- -- 88 19 94 % 180 lb (81.6 kg)   23 0500 (!) 110/53 -- -- 68 20 96 % --   23 0302 115/68 -- -- 96 18 93 % --   23 0300 -- -- -- (!) 105 16 96 % --   23 0100 114/68 -- -- 90 16 94 % --   01/08/23 0000 105/63 98.1 °F (36.7 °C) -- 75 21 90 % --   01/07/23 2300 -- 98.3 °F (36.8 °C) Temporal -- -- -- --   01/07/23 2200 98/61 -- -- 74 21 94 % --   01/07/23 2100 (!) 101/54 -- -- 78 21 93 % --   01/07/23 2000 107/72 -- -- 69 19 94 % --   01/07/23 1900 (!) 98/55 -- -- 76 19 94 % --   01/07/23 1800 108/74 -- -- 71 19 94 % --   01/07/23 1730 99/65 -- -- 77 25 -- --   01/07/23 1700 116/73 -- -- 77 20 95 % --   01/07/23 1630 107/71 -- -- 68 19 -- --   01/07/23 1618 116/67 -- -- 68 21 (!) 84 % --   01/07/23 1600 96/61 99 °F (37.2 °C) Temporal 67 19 90 % --   01/07/23 1553 -- 99.8 °F (37.7 °C) Temporal -- -- -- --       Physical exam    General: Resting comfortably, no acute distress  Psych: Calm and cooperative  Neck: No swelling  HEENT: NC/AT, no scleral icterus  Cardiac: Normal rate, regular rhythm  Respiratory: No wheezes rales or rhonchi, no use of accessory muscles  Abdomen: Soft, nontender, nondistended, bowel sounds present  Musculoskeletal: No deformities  Extremities: No clubbing, cyanosis or edema  Skin: Warm and dry  Neurologic: Alert, follows commands, normal speech      Lab Review   Recent Results (from the past 24 hour(s))   Phenytoin Level, Total    Collection Time: 01/08/23  2:59 AM   Result Value Ref Range    Phenytoin Lvl 14.9 10.0 - 20.0 ug/mL       I/O last 3 completed shifts:   In: 2526.8 [I.V.:1676.7; IV Piggyback:850.1]  Out: 8034 [Urine:4615]  I/O this shift:  In: -   Out: 450 [Urine:450]      Current Facility-Administered Medications:     fosphenytoin (CEREBYX) injection 100 mg PE, 100 mg PE, IntraVENous, Q8H, Koffi Crowell DO, 100 mg PE at 01/08/23 1236    lamoTRIgine (LAMICTAL) tablet 200 mg, 200 mg, Oral, Daily, Koffi Crowell DO, 200 mg at 01/08/23 0905    LORazepam (ATIVAN) injection 1 mg, 1 mg, IntraVENous, Q5 Min PRN, Damion Gtz MD, 1 mg at 01/07/23 0936    sodium chloride flush 0.9 % injection 5-40 mL, 5-40 mL, IntraVENous, 2 times per day, Jenifer Velasquez MD, 10 mL at 01/08/23 0905    sodium chloride flush 0.9 % injection 5-40 mL, 5-40 mL, IntraVENous, PRN, Jenifer Velasquez MD    0.9 % sodium chloride infusion, , IntraVENous, PRN, Jenifer Velasquez MD, Last Rate: 20 mL/hr at 01/07/23 1914, New Bag at 01/07/23 1914    ondansetron (ZOFRAN-ODT) disintegrating tablet 4 mg, 4 mg, Oral, Q8H PRN **OR** ondansetron (ZOFRAN) injection 4 mg, 4 mg, IntraVENous, Q6H PRN, Jenifer Velasquez MD    acetaminophen (TYLENOL) tablet 650 mg, 650 mg, Oral, Q4H PRN **OR** acetaminophen (TYLENOL) suppository 650 mg, 650 mg, Rectal, Q4H PRN, Jenifer Velasquez MD    potassium chloride 20 mEq/50 mL IVPB (Central Line), 20 mEq, IntraVENous, PRN **OR** potassium chloride 10 mEq/100 mL IVPB (Peripheral Line), 10 mEq, IntraVENous, PRN, Jenifer Velasquez MD    sodium phosphate 10 mmol in sodium chloride 0.9 % 250 mL IVPB, 10 mmol, IntraVENous, PRN **OR** sodium phosphate 15 mmol in sodium chloride 0.9 % 250 mL IVPB, 15 mmol, IntraVENous, PRN **OR** sodium phosphate 20 mmol in sodium chloride 0.9 % 500 mL IVPB, 20 mmol, IntraVENous, PRN, Jenifer Velasquez MD    magnesium sulfate 2000 mg in 50 mL IVPB premix, 2,000 mg, IntraVENous, PRN, Jenifer Velasquez MD    polyethylene glycol (GLYCOLAX) packet 17 g, 17 g, Oral, Daily PRN, Jenifer Velasquez MD    melatonin disintegrating tablet 5 mg, 5 mg, Oral, Nightly PRN, Jenifer Velasquez MD    calcium carbonate (TUMS) chewable tablet 500 mg, 500 mg, Oral, TID PRN, Jenifer Velasquez MD    levETIRAcetam (KEPPRA) 1500 mg/100 mL IVPB, 1,500 mg, IntraVENous, Q12H, Jenifer Velasquez MD, Stopped at 01/08/23 0919    levETIRAcetam (KEPPRA) 1000 mg/100 mL IVPB, 1,000 mg, IntraVENous, Once, Warden Izzy MD    albuterol sulfate HFA (PROVENTIL;VENTOLIN;PROAIR) 108 (90 Base) MCG/ACT inhaler 2 puff, 2 puff, Inhalation, Q4H PRN, Jenifer Velasquez MD      Reviewed chart, history, vitals, labs, diagnostics and medications      Assessment/plan  Principal Problem:    Status epilepticus [facility])  Resolved Problems:    * No resolved hospital problems.  *    Status epilepticus--resolved  Multiple seizures  - Neurology following  - EEG monitoring  --MRI brain reviewed-negative  - Continue neurochecks  - Seizure precautions  --Continue Keppra, fosphenytoin, and Lamictal  --Follow up repeat phenytoin level  - As needed Ativan    Right basilar atelectasis versus possible aspiration pneumonitis   reviewed chest x-ray  --observe off antibiotics  --Incentive spirometry    1 out of 2 blood cultures positive for staph epidermidis  --Reviewed blood culture PCR  - Represents contaminant  - No need for antibiotics    Hematuria on urinalysis  - Follow clinically  - Follow-up outpatient      Homero Lawler MD 1/8/2023 3:44 PM

## 2023-01-08 NOTE — PROCEDURES
DAILY ADULT INPATIENT VIDEO-EEG EVENT MONITORING REPORT    Patient:   Marina Spatz  MR#:    [id number]  Room #:    INPATIENT   YOB: 1995  Study Date:    January 7, 2023  Referring Physician:   Robin Reynolds DO      CLINICAL INFORMATION:     This patient is a 33 y.o. female with a history of possible seizures. The specific reason we are doing this study is for event clarification and to evaluate for an underlying seizure focus. MEDICATIONS:     See MAR    RECORDING CONDITIONS:     Continuous video-EEG monitoring was performed with [employer] equiptment. The recorded period occurred on January 7, 2023. Electrodes were applied according to the International 10-20 System. Data were obtained, stored, and interpreted according to ACNS guidelines (J Clin Neurophysiol 2006;23(2):) utilizing referential montage recording, with reformatting to longitudinal, transverse bipolar, and referential montages as necessary for interpretation, along with digital/automated EEG analysis. Physician access and review of the EEG and Video data occurred actively during the recording. RECORDING DURATION:   6 hours, 13 min      DESCRIPTION OF BASELINE RECORD:    During the waking state, the predominant posterior resting frequency was rhythmic, symmetric, 9-10 Hz, 30-40 uV rhythm with reactivity to eye opening and closure demonstrated. Drowsiness was demonstrated by slow rolling eye movements followed by loss of background waking activities. Onset of Stage I sleep was demonstrated by gradual disappearance of background waking rhythms and the onset of gradual symmetric mixed-frequency 4-7 Hz slowing. Stage II sleep was characterized by vertex transients, sleep-spindles, and K-complexes, at times with shifting asymmetry demonstrated. Stage III and IV of sleep were characterized by progressive proportion of high voltage slowing in the delta frequency. Muscle, motion, and eye movement artifacts were occasionally noted. DESCRIPTION OF EVENTS:    January 7, 2023  The Interictal files were as described in the baseline recording. Digital spike and event analysis reviewed and showed no abnormalities. No events were recorded. E.E.G. INTERPRETATION:    1. Interictal Abnormalities: None. 2. Ictal Events: None. 3. Non-Epileptic Events: None. CLINICAL CORRELATION:    Continue video EEG monitoring.        Olya Lubin DO  Board Certified Neurologist

## 2023-01-08 NOTE — PROGRESS NOTES
[id number] [facility] Neurology Progress Note      Patient:   Felix Garcia  MR#:    [id number]   Room:    [address] [place] [address]   YOB: 1995  Date of Progress Note: 1/8/2023  Time of Note                           12:05 PM  Consulting Physician:  Sukh Merchant DO  Attending Physician:  Ruby Gallagher MD      INTERVAL HISTORY:  Doing about the same, no new focal complaints. Patient had a couple further events yesterday, none overnight. No overt epileptiform or electrographic seizure activity noted on cEEG. REVIEW OF SYSTEMS:  Constitutional: No fevers No chills  Neck:No stiffness  Respiratory: No shortness of breath  Cardiovascular: No chest pain No palpitations  Gastrointestinal: No abdominal pain    Genitourinary: No Dysuria  Neurological: No headache, no confusion      PHYSICAL EXAM:    Constitutional -   /71   Pulse 80   Temp 99.8 °F (37.7 °C) (Temporal)   Resp 20   Wt 180 lb (81.6 kg)   SpO2 94%   BMI 29.95 kg/m²   General appearance: No acute distress   EYES -   Conjunctiva normal  Pupillary exam as below, see CN exam in the neurologic exam  ENT-    No scars, masses, or lesions over external nose or ears  Hearing normal bilaterally to finger rub  Neck-   No neck masses noted  Thyroid normal   No jugular vein distension  Cardiovascular -   No clubbing, cyanosis, or edema   Pulmonary-   Good expansion, normal effort without use of accessory muscles  Musculoskeletal -   No significant wasting of muscles noted  Gait as below, see gait exam in the neurologic exam  Muscle strength, tone, stability as below see the motor exam in the neurologic exam.   No bony deformities  Skin -   Warm, dry, and intact to inspection and palpation.     No rash, erythema, or pallor  Psychiatric -   Mood, affect, and behavior appear normal    Memory as below see mental status examination in the neurologic exam      NEUROLOGICAL EXAM    Mental status   [x] Awake, alert, oriented   [x] Affect attention and concentration appear appropriate  [x] Recent and remote memory appears unremarkable  [x] Speech normal without dysarthria or aphasia, comprehension and repetition intact. COMMENTS:   Cranial Nerves [x] No VF deficit to confrontation  [x] PERRLA, EOMI, no nystagmus, conjugate eye movements, no ptosis  [x] Face symmetric  [x] Facial sensation intact  [x] Tongue midline no atrophy or fasciculations present  [x] Palate midline, hearing to finger rub normal  [x] Shoulder shrug and SCM testing normal  COMMENTS:   Motor   [x] 5/5 strength x 4 extremities  [x] Normal bulk and tone  [x] No tremor present  [x] No rigidity or bradykinesia noted  COMMENTS:   Sensory  [x] Sensation intact to light touch, pin prick, vibration, and proprioception BLE  [] Sensation intact to light touch, pin prick, vibration, and proprioception BUE  COMMENTS:   Coordination [x] FTN normal bilaterally   [] HTS normal bilaterally  [] YEVGENIY normal.   COMMENTS:   Reflexes  [x] Symmetric and non-pathological  [x] Toes downgoing bilaterally  [x] No clonus present  COMMENTS:   Gait                  [x] Normal steady gait    [] Ataxic    [] Spastic     [] Magnetic     [] Shuffling  [] Not assessed  COMMENTS:       LABS/IMAGING:    CT Head W/O Contrast    Result Date: 1/7/2023  PROCEDURE: CT HEAD WO CONTRAST CLINICAL INDICATION: Status epilepticus. COMPARISON: No relevant prior imaging is available for comparison. FINDINGS: There is no acute intracranial hemorrhage, extra-axial fluid collection, evidence of mass or mass effect. Gray-white matter differentiation is preserved. The ventricular system and cisternal spaces demonstrates normal configuration. There is no hydrocephalus. The paranasal sinuses and mastoid air cells are clear. The soft tissues are unremarkable. The orbits are also unremarkable in appearance. There are no suspicious osseous lesions. Unremarkable CT scan of the head.     XR CHEST PORTABLE    Result Date: 1/7/2023  PROCEDURE: XR CHEST PORTABLE CLINICAL INDICATION: Status epilepticus. COMPARISON: No prior similar images available for comparison. FINDINGS: Subtle interstitial opacities are identified in the right basilar region. There is no pneumonic consolidation. Heart is not enlarged. There are no pulmonary vascular congestion changes. There is no pneumothorax. Bilateral costophrenic angles are clear. Visualized osseous structures are intact. Subtle interstitial opacities in the right basilar region may represent atelectasis or aspiration. MRI BRAIN W WO CONTRAST    Result Date: 1/7/2023  PROCEDURE: MRI BRAIN W WO CONTRAST CLINICAL INDICATION: Seizures. COMPARISON: CT head of January 6, 2023. FINDINGS: There is motion artifact on many sequences limiting evaluation. Diffusion-weighted images demonstrate no restricted diffusion to suggest a recent infarction. There is no acute intracranial hemorrhage, extra-axial fluid collection, evidence of mass or mass effect. No abnormal gradient susceptibility is present. There is no abnormal ependymal, leptomeningeal or intraparenchymal enhancement. The ventricular system and cisternal spaces demonstrates normal configuration and size. There is no hydrocephalus. Vascular flow voids are preserved. Midline structures demonstrate normal morphology and symmetry. The paranasal sinuses and mastoid air cells are clear. The soft tissues are unremarkable as visualized. The orbits are also unremarkable in appearance. There is no abnormal marrow signal.    No MR evidence of acute intracranial finding; given limitations due to motion artifact.       Lab Results   Component Value Date    WBC 6.5 01/07/2023    HGB 11.6 (L) 01/07/2023    HCT 36.5 (L) 01/07/2023    MCV 80.8 (L) 01/07/2023     01/07/2023     Lab Results   Component Value Date     01/07/2023    K 4.0 01/07/2023     01/07/2023    CO2 24 01/07/2023    BUN 6 01/07/2023    CREATININE 0.6 01/07/2023    GLUCOSE 84 01/07/2023    CALCIUM 8.6 01/07/2023    PROT 7.1 01/06/2023    LABALBU 4.2 01/06/2023    BILITOT <0.2 01/06/2023    ALKPHOS 78 01/06/2023    AST 12 01/06/2023    ALT 8 01/06/2023    LABGLOM >60 01/07/2023   No results found for: INR, PROTIME    RECORD REVIEW:   Previous medical records, medications were reviewed at today's visit. Nursing/physician notes, imaging, labs and vitals reviewed. PT,OT and/or speech notes reviewed    ASSESSMENT:  33 y.o. admitted with multiple seizures. History of underlying possible autism, anxiety depression. Long history of seizure disorder. Currently on multiple antiepileptic medications. cEEG with nothing epileptiform, no seizures captured, hooked up after yesterdays events, none overnight. MRI negative. Dilantin level therapeutic. PLAN:   Continue Keppra 1500 mg  q12h    Continue Lamictal 200 mg daily. Continue fosphenytoin at 100 mg q8h, level therapeutic, re-check level in the   am.    Continue cEEG monitoring   Ativan prn, seizure precautions. Please feel free to call with any questions. [phone] (cell phone).       Marie Hoover DO  Board Certified Neurology

## 2023-01-09 LAB
CULTURE, BLOOD 2: ABNORMAL
EKG P AXIS: 65 DEGREES
EKG P-R INTERVAL: 136 MS
EKG Q-T INTERVAL: 376 MS
EKG QRS DURATION: 90 MS
EKG QTC CALCULATION (BAZETT): 411 MS
EKG T AXIS: 52 DEGREES
KEPPRA: 6 UG/ML (ref 10–40)
ORGANISM: ABNORMAL
PHENYTOIN LEVEL: 14.6 UG/ML (ref 10–20)

## 2023-01-09 PROCEDURE — 95714 VEEG EA 12-26 HR UNMNTR: CPT

## 2023-01-09 PROCEDURE — 36415 COLL VENOUS BLD VENIPUNCTURE: CPT

## 2023-01-09 PROCEDURE — 2580000003 HC RX 258: Performed by: HOSPITALIST

## 2023-01-09 PROCEDURE — 1210000000 HC MED SURG R&B

## 2023-01-09 PROCEDURE — 6360000002 HC RX W HCPCS: Performed by: PSYCHIATRY & NEUROLOGY

## 2023-01-09 PROCEDURE — 6360000002 HC RX W HCPCS: Performed by: HOSPITALIST

## 2023-01-09 PROCEDURE — 94760 N-INVAS EAR/PLS OXIMETRY 1: CPT

## 2023-01-09 PROCEDURE — 80185 ASSAY OF PHENYTOIN TOTAL: CPT

## 2023-01-09 PROCEDURE — 95720 EEG PHY/QHP EA INCR W/VEEG: CPT | Performed by: PSYCHIATRY & NEUROLOGY

## 2023-01-09 PROCEDURE — 93010 ELECTROCARDIOGRAM REPORT: CPT | Performed by: INTERNAL MEDICINE

## 2023-01-09 PROCEDURE — 99232 SBSQ HOSP IP/OBS MODERATE 35: CPT | Performed by: PSYCHIATRY & NEUROLOGY

## 2023-01-09 RX ORDER — ENOXAPARIN SODIUM 100 MG/ML
40 INJECTION SUBCUTANEOUS DAILY
Status: DISCONTINUED | OUTPATIENT
Start: 2023-01-09 | End: 2023-01-14 | Stop reason: HOSPADM

## 2023-01-09 RX ADMIN — LORAZEPAM 1 MG: 2 INJECTION INTRAMUSCULAR; INTRAVENOUS at 08:49

## 2023-01-09 RX ADMIN — SODIUM CHLORIDE, PRESERVATIVE FREE 10 ML: 5 INJECTION INTRAVENOUS at 21:34

## 2023-01-09 RX ADMIN — FOSPHENYTOIN SODIUM 100 MG PE: 50 INJECTION, SOLUTION INTRAMUSCULAR; INTRAVENOUS at 02:33

## 2023-01-09 RX ADMIN — LEVETIRACETAM 1500 MG: 15 INJECTION, SOLUTION INTRAVENOUS at 21:33

## 2023-01-09 RX ADMIN — FOSPHENYTOIN SODIUM 100 MG PE: 50 INJECTION, SOLUTION INTRAMUSCULAR; INTRAVENOUS at 13:42

## 2023-01-09 RX ADMIN — LEVETIRACETAM 1500 MG: 15 INJECTION, SOLUTION INTRAVENOUS at 08:35

## 2023-01-09 RX ADMIN — LORAZEPAM 1 MG: 2 INJECTION INTRAMUSCULAR; INTRAVENOUS at 02:33

## 2023-01-09 RX ADMIN — FOSPHENYTOIN SODIUM 100 MG PE: 50 INJECTION, SOLUTION INTRAMUSCULAR; INTRAVENOUS at 21:34

## 2023-01-09 RX ADMIN — SODIUM CHLORIDE, PRESERVATIVE FREE 10 ML: 5 INJECTION INTRAVENOUS at 09:26

## 2023-01-09 RX ADMIN — LORAZEPAM 1 MG: 2 INJECTION INTRAMUSCULAR; INTRAVENOUS at 08:42

## 2023-01-09 ASSESSMENT — ENCOUNTER SYMPTOMS
SHORTNESS OF BREATH: 0
COUGH: 0
ABDOMINAL PAIN: 0
NAUSEA: 0

## 2023-01-09 NOTE — PROGRESS NOTES
Last noc at shift change this pt refused telemetry and pulled EEG leads off. Began crying staes \"I can't stand those things on my head. \" Notified MD. This am night shift reported pt would call them and report she had an aura and then have a seizure. They would give her ativan and she would sleep. They said the siezure was stiffening, and slight shaking for 1-2 minutes then they would give her ativan.

## 2023-01-09 NOTE — PROGRESS NOTES
Patient laying with eyes closed. Respirations even non labored.  No further episodes of seizure activity at this time

## 2023-01-09 NOTE — PROGRESS NOTES
[facility] Neurology Progress Note      Patient:   Marsha Goncalves  MR#:    [id number]   Room:    [facility]/317[facility]02   YOB: 1995  Date of Progress Note: 1/9/2023  Time of Note                           11:16 AM  Consulting Physician:  Cornelia Alexandra DO  Attending Physician:  Cheri Gupta MD      INTERVAL HISTORY:  Doing about the same, no new focal complaints. Patient had a couple further events this am, off cEEG, had pulled off electrodes this am. No overt epileptiform or electrographic seizure activity noted on what we have captured on cEEG. REVIEW OF SYSTEMS:  Constitutional: No fevers No chills  Neck:No stiffness  Respiratory: No shortness of breath  Cardiovascular: No chest pain No palpitations  Gastrointestinal: No abdominal pain    Genitourinary: No Dysuria  Neurological: No headache, no confusion      PHYSICAL EXAM:    Constitutional -   /65   Pulse 88   Temp 98.8 °F (37.1 °C) (Oral)   Resp 16   Wt 186 lb 8 oz (84.6 kg)   SpO2 97%   BMI 31.04 kg/m²   General appearance: No acute distress   EYES -   Conjunctiva normal  Pupillary exam as below, see CN exam in the neurologic exam  ENT-    No scars, masses, or lesions over external nose or ears  Hearing normal bilaterally to finger rub  Neck-   No neck masses noted  Thyroid normal   No jugular vein distension  Cardiovascular -   No clubbing, cyanosis, or edema   Pulmonary-   Good expansion, normal effort without use of accessory muscles  Musculoskeletal -   No significant wasting of muscles noted  Gait as below, see gait exam in the neurologic exam  Muscle strength, tone, stability as below see the motor exam in the neurologic exam.   No bony deformities  Skin -   Warm, dry, and intact to inspection and palpation.     No rash, erythema, or pallor  Psychiatric -   Mood, affect, and behavior appear normal    Memory as below see mental status examination in the neurologic exam      NEUROLOGICAL EXAM    Mental status   [x] Awake, alert, oriented   [x] Affect attention and concentration appear appropriate  [x] Recent and remote memory appears unremarkable  [x] Speech normal without dysarthria or aphasia, comprehension and repetition intact. COMMENTS:   Cranial Nerves [x] No VF deficit to confrontation  [x] PERRLA, EOMI, no nystagmus, conjugate eye movements, no ptosis  [x] Face symmetric  [x] Facial sensation intact  [x] Tongue midline no atrophy or fasciculations present  [x] Palate midline, hearing to finger rub normal  [x] Shoulder shrug and SCM testing normal  COMMENTS:   Motor   [x] 5/5 strength x 4 extremities  [x] Normal bulk and tone  [x] No tremor present  [x] No rigidity or bradykinesia noted  COMMENTS:   Sensory  [x] Sensation intact to light touch, pin prick, vibration, and proprioception BLE  [] Sensation intact to light touch, pin prick, vibration, and proprioception BUE  COMMENTS:   Coordination [x] FTN normal bilaterally   [] HTS normal bilaterally  [] YEVGENIY normal.   COMMENTS:   Reflexes  [x] Symmetric and non-pathological  [x] Toes downgoing bilaterally  [x] No clonus present  COMMENTS:   Gait                  [x] Normal steady gait    [] Ataxic    [] Spastic     [] Magnetic     [] Shuffling  [] Not assessed  COMMENTS:       LABS/IMAGING:    CT Head W/O Contrast    Result Date: 1/7/2023  PROCEDURE: CT HEAD WO CONTRAST CLINICAL INDICATION: Status epilepticus. COMPARISON: No relevant prior imaging is available for comparison. FINDINGS: There is no acute intracranial hemorrhage, extra-axial fluid collection, evidence of mass or mass effect. Gray-white matter differentiation is preserved. The ventricular system and cisternal spaces demonstrates normal configuration. There is no hydrocephalus. The paranasal sinuses and mastoid air cells are clear. The soft tissues are unremarkable. The orbits are also unremarkable in appearance. There are no suspicious osseous lesions. Unremarkable CT scan of the head.     XR CHEST PORTABLE    Result Date: 1/7/2023  PROCEDURE: XR CHEST PORTABLE CLINICAL INDICATION: Status epilepticus. COMPARISON: No prior similar images available for comparison. FINDINGS: Subtle interstitial opacities are identified in the right basilar region. There is no pneumonic consolidation. Heart is not enlarged. There are no pulmonary vascular congestion changes. There is no pneumothorax. Bilateral costophrenic angles are clear. Visualized osseous structures are intact. Subtle interstitial opacities in the right basilar region may represent atelectasis or aspiration. MRI BRAIN W WO CONTRAST    Result Date: 1/7/2023  PROCEDURE: MRI BRAIN W WO CONTRAST CLINICAL INDICATION: Seizures. COMPARISON: CT head of January 6, 2023. FINDINGS: There is motion artifact on many sequences limiting evaluation. Diffusion-weighted images demonstrate no restricted diffusion to suggest a recent infarction. There is no acute intracranial hemorrhage, extra-axial fluid collection, evidence of mass or mass effect. No abnormal gradient susceptibility is present. There is no abnormal ependymal, leptomeningeal or intraparenchymal enhancement. The ventricular system and cisternal spaces demonstrates normal configuration and size. There is no hydrocephalus. Vascular flow voids are preserved. Midline structures demonstrate normal morphology and symmetry. The paranasal sinuses and mastoid air cells are clear. The soft tissues are unremarkable as visualized. The orbits are also unremarkable in appearance. There is no abnormal marrow signal.    No MR evidence of acute intracranial finding; given limitations due to motion artifact.       Lab Results   Component Value Date    WBC 6.5 01/07/2023    HGB 11.6 (L) 01/07/2023    HCT 36.5 (L) 01/07/2023    MCV 80.8 (L) 01/07/2023     01/07/2023     Lab Results   Component Value Date     01/07/2023    K 4.0 01/07/2023     01/07/2023    CO2 24 01/07/2023    BUN 6 01/07/2023    CREATININE 0.6 01/07/2023    GLUCOSE 84 01/07/2023    CALCIUM 8.6 01/07/2023    PROT 7.1 01/06/2023    LABALBU 4.2 01/06/2023    BILITOT <0.2 01/06/2023    ALKPHOS 78 01/06/2023    AST 12 01/06/2023    ALT 8 01/06/2023    LABGLOM >60 01/07/2023   No results found for: INR, PROTIME    RECORD REVIEW:   Previous medical records, medications were reviewed at today's visit. Nursing/physician notes, imaging, labs and vitals reviewed. PT,OT and/or speech notes reviewed    ASSESSMENT:  33 y.o. admitted with multiple seizures. History of underlying possible autism, anxiety depression. Long history of seizure disorder. Currently on multiple antiepileptic medications. cEEG with nothing epileptiform, no seizures captured, hooked up after yesterdays events, and had pulled leads off with this morning episodes. Noting clearly captured on cEEG. MRI negative. Dilantin level therapeutic. Continues to have events, suspect non-epileptic episodes. Will get back on cEEG. PLAN:   Continue Keppra 1500 mg  q12h    Continue Lamictal 200 mg daily. Continue fosphenytoin at 100 mg q8h, level therapeutic, re-check level in the   am.    Continue cEEG monitoring   Ativan prn, seizure precautions. Please feel free to call with any questions. [phone] (cell phone).       Henry Galan DO  Board Certified Neurology

## 2023-01-09 NOTE — PROGRESS NOTES
Pt rec'd to room 317. Talked with Pt. Very emotional. Calmed and asked for something to eat. Given several snacks. Denies any discomfort. No seizure activity noted. HR 82, Resp 18.

## 2023-01-09 NOTE — PROGRESS NOTES
Patient noted to have seizure lasting several minutes. Ativan given per doctors orders. Patient tolerated well.

## 2023-01-09 NOTE — PROGRESS NOTES
Daily Progress Note    Date:2023  Patient: Darian Hunt  : 1995  UKD:[id number]  CODE:Full Code No additional code details  PCP:None Provider    Admit Date: 2023  7:16 PM   LOS: 3 days     Chief Complaint   Patient presents with    Seizures     4 seizures with EMS, 8mg versed given pta; pt has hx epilepsy and ran out of keppra yesterday         Subjective: Had a couple episodes of seizure-like activity overnight that resolved with Ativan. She had removed electrodes prior to these episodes. Pt currently having EEG electrodes replaced. She denies any other acute issues or complaints. Hospital Summary: 79-year-old female with history of seizures since childhood, depression, anxiety, and autism. Patient/family recently moved here, previously followed by neurology in [facility]. Has normally been on Keppra and Lamictal.  Patient presented following seizures and reportedly recently out of [employer]. She was noted to have prolonged continuous seizure activity concerning for status epilepticus, loaded with Keppra and fosphenytoin. Also received several doses of Ativan in the ED. Neurology was consulted. The following a.m. she again had multiple prolonged seizure episodes despite multiple doses of Ativan. She was subsequently maintained on Keppra and fosphenytoin. EEG obtained with neurology following. No overt epileptiform activity noted on continuous EEG. Review of Systems   Constitutional:  Negative for chills and fever. Respiratory:  Negative for cough and shortness of breath. Cardiovascular:  Negative for chest pain and palpitations. Gastrointestinal:  Negative for abdominal pain and nausea.      Objective:      Vital signs in last 24 hours:  Patient Vitals for the past 24 hrs:   BP Temp Temp src Pulse Resp SpO2 Weight   23 1044 -- -- -- -- -- 97 % --   23 0530 109/65 98.8 °F (37.1 °C) Oral 88 16 99 % 186 lb 8 oz (84.6 kg)   23 0005 108/74 99.3 °F (37.4 °C) Oral 98 16 96 % --   01/08/23 1400 (!) 118/58 -- -- 88 13 96 % --   01/08/23 1200 121/75 99.5 °F (37.5 °C) Temporal 90 20 95 % --       I/O last 3 completed shifts: In: 591.7 [P.O.:240; I.V.:251.7; IV Piggyback:100]  Out: 3200 [Urine:3200]  I/O this shift:  In: 360 [P.O.:360]  Out: -     Physical Exam  Constitutional:       General: She is not in acute distress. Appearance: She is not toxic-appearing. Comments: Tired appearing, not fully cooperative with interview   Cardiovascular:      Rate and Rhythm: Normal rate and regular rhythm. Pulses: Normal pulses. Heart sounds: Normal heart sounds. Pulmonary:      Effort: Pulmonary effort is normal. No respiratory distress. Breath sounds: Normal breath sounds. Abdominal:      General: Abdomen is flat. There is no distension. Palpations: Abdomen is soft. Tenderness: There is no abdominal tenderness.            Lab Review   Recent Results (from the past 24 hour(s))   Phenytoin Level, Total    Collection Time: 01/09/23  1:58 AM   Result Value Ref Range    Phenytoin Lvl 14.6 10.0 - 20.0 ug/mL             Current Facility-Administered Medications:     fosphenytoin (CEREBYX) injection 100 mg PE, 100 mg PE, IntraVENous, Q8H, Koffi Crowell DO, 100 mg PE at 01/09/23 0233    lamoTRIgine (LAMICTAL) tablet 200 mg, 200 mg, Oral, Daily, Koffi Crowell DO, 200 mg at 01/08/23 0905    LORazepam (ATIVAN) injection 1 mg, 1 mg, IntraVENous, Q5 Min PRN, Victor M Medley MD, 1 mg at 01/09/23 0849    sodium chloride flush 0.9 % injection 5-40 mL, 5-40 mL, IntraVENous, 2 times per day, Victor M Medley MD, 10 mL at 01/09/23 0926    sodium chloride flush 0.9 % injection 5-40 mL, 5-40 mL, IntraVENous, PRN, Victor M Medley MD    0.9 % sodium chloride infusion, , IntraVENous, PRN, Victor M Medley MD, Last Rate: 20 mL/hr at 01/07/23 1914, New Bag at 01/07/23 1914    ondansetron (ZOFRAN-ODT) disintegrating tablet 4 mg, 4 mg, Oral, Q8H PRN **OR** ondansetron (ZOFRAN) injection 4 mg, 4 mg, IntraVENous, Q6H PRN, Katey Adams MD    acetaminophen (TYLENOL) tablet 650 mg, 650 mg, Oral, Q4H PRN **OR** acetaminophen (TYLENOL) suppository 650 mg, 650 mg, Rectal, Q4H PRN, Katey Adams MD    potassium chloride 20 mEq/50 mL IVPB (Central Line), 20 mEq, IntraVENous, PRN **OR** potassium chloride 10 mEq/100 mL IVPB (Peripheral Line), 10 mEq, IntraVENous, PRN, Katey Adams MD    sodium phosphate 10 mmol in sodium chloride 0.9 % 250 mL IVPB, 10 mmol, IntraVENous, PRN **OR** sodium phosphate 15 mmol in sodium chloride 0.9 % 250 mL IVPB, 15 mmol, IntraVENous, PRN **OR** sodium phosphate 20 mmol in sodium chloride 0.9 % 500 mL IVPB, 20 mmol, IntraVENous, PRN, Katey Adams MD    magnesium sulfate 2000 mg in 50 mL IVPB premix, 2,000 mg, IntraVENous, PRN, Katey Adams MD    polyethylene glycol (GLYCOLAX) packet 17 g, 17 g, Oral, Daily PRN, Katey Adams MD    melatonin disintegrating tablet 5 mg, 5 mg, Oral, Nightly PRN, Katey Adams MD    calcium carbonate (TUMS) chewable tablet 500 mg, 500 mg, Oral, TID PRN, Katey Adams MD    levETIRAcetam (KEPPRA) 1500 mg/100 mL IVPB, 1,500 mg, IntraVENous, Q12H, Katey Adams MD, Stopped at 01/09/23 0926    levETIRAcetam (KEPPRA) 1000 mg/100 mL IVPB, 1,000 mg, IntraVENous, Once, Ruiz Mullins MD    albuterol sulfate HFA (PROVENTIL;VENTOLIN;PROAIR) 108 (90 Base) MCG/ACT inhaler 2 puff, 2 puff, Inhalation, Q4H PRN, Katey Adams MD      Imaging:  CT Head W/O Contrast    Result Date: 1/7/2023  Unremarkable CT scan of the head. XR CHEST PORTABLE    Result Date: 1/7/2023  Subtle interstitial opacities in the right basilar region may represent atelectasis or aspiration. MRI BRAIN W WO CONTRAST    Result Date: 1/7/2023  No MR evidence of acute intracranial finding; given limitations due to motion artifact. Assessment/Plan  Principal Problem:    Status epilepticus ([address]  Resolved Problems:    * No resolved hospital problems.  * I have reviewed the patient's daily labs, including BMP and CBC with pertinent results discussed below.     Status epilepticus--resolved  Multiple seizure-like events  - Neurology following, progress and EEG note reviewed  - EEG monitoring, electrodes replaced this AM  --MRI brain reviewed-negative  - Continue neurochecks  - Seizure precautions  -- Continue Keppra, fosphenytoin, and Lamictal  -- Follow up repeat phenytoin level  - As needed Ativan  -- These recent recurrent episodes may be non-epileptic spells     Right basilar atelectasis versus possible aspiration pneumonitis   reviewed chest x-ray  -- observe off antibiotics  -- Incentive spirometry     1 out of 2 blood cultures positive for staph epidermidis  -- Reviewed blood culture PCR  - Represents contaminant  - No need for antibiotics     Hematuria on urinalysis  - Follow clinically  - Follow-up outpatient      DVT prophylaxis: Started Lovenox    DISPOSITION:  Home once stable for discharge from neurological standpoint    Full Code No additional code details        Suyapa Lord MD 1/9/2023 11:43 AM

## 2023-01-09 NOTE — PROCEDURES
DAILY ADULT INPATIENT VIDEO-EEG EVENT MONITORING REPORT    Patient:   Rissa Soria  MR#:    [id number]  Room #:    INPATIENT   YOB: 1995  Study Date:    January 8, 2023  Referring Physician:   Eva Aguayo DO      CLINICAL INFORMATION:     This patient is a 33 y.o. female with a history of possible seizures. The specific reason we are doing this study is for event clarification and to evaluate for an underlying seizure focus. MEDICATIONS:     See MAR    RECORDING CONDITIONS:     Continuous video-EEG monitoring was performed with [employer] equiptment. The recorded period occurred on January 8, 2023. Electrodes were applied according to the International 10-20 System. Data were obtained, stored, and interpreted according to ACNS guidelines (J Clin Neurophysiol 2006;23(2):) utilizing referential montage recording, with reformatting to longitudinal, transverse bipolar, and referential montages as necessary for interpretation, along with digital/automated EEG analysis. Physician access and review of the EEG and Video data occurred actively during the recording. RECORDING DURATION:   24 hours. DESCRIPTION OF BASELINE RECORD:    During the waking state, the predominant posterior resting frequency was rhythmic, symmetric, 9-10 Hz, 30-40 uV rhythm with reactivity to eye opening and closure demonstrated. Drowsiness was demonstrated by slow rolling eye movements followed by loss of background waking activities. Onset of Stage I sleep was demonstrated by gradual disappearance of background waking rhythms and the onset of gradual symmetric mixed-frequency 4-7 Hz slowing. Stage II sleep was characterized by vertex transients, sleep-spindles, and K-complexes, at times with shifting asymmetry demonstrated. Stage III and IV of sleep were characterized by progressive proportion of high voltage slowing in the delta frequency. Muscle, motion, and eye movement artifacts were occasionally noted. DESCRIPTION OF EVENTS:    January 8, 2023  The Interictal files were as described in the baseline recording. Digital spike and event analysis reviewed and showed no abnormalities. No events were recorded. Study limited as multiple electrodes lost due to patient pulling them off.       E.E.G. INTERPRETATION:    1. Interictal Abnormalities: None. 2. Ictal Events: None. 3. Non-Epileptic Events: None. CLINICAL CORRELATION:    Continue video EEG monitoring.        Dorian Huber DO  Board Certified Neurologist

## 2023-01-09 NOTE — PROGRESS NOTES
8983 at bedside giving IV medication and pt stated \"I'm having a Aura and held phone out to nurse to show her s/s os seizure. Pt stiffened and fell out of bed into nurses arms. Nurse lowered pt to floor, placed on left side with pillow to protect head. Called for assist. Monitored activity. Staff lifted pt back into bed. Pt clinched fists, stiffened body, head remained relaxed, feet relaxed. When pupils checked eyes rolled downward but at one check noted eyes flinched to quick movement. Administered several doses of Ativan. Dr Zachary Lancaster arrived and spoke with this nurse. EEG here to replace leads. Pt appears to be sleeping.

## 2023-01-09 NOTE — PROGRESS NOTES
Was called to patients room by patient. She stated that she felt like she was going to have a seizure. This was at [address] At 0220 patient started shaking stiffening arms an legs. Patient continued to shake after 2 minutes. Ativan was given at [address]. After 3 minutes patients shaking lessened then stopped.

## 2023-01-09 NOTE — PLAN OF CARE
Problem: Discharge Planning  Goal: Discharge to home or other facility with appropriate resources  1/9/2023 0136 by Gustavo Nunes LPN  Outcome: Progressing  1/9/2023 0136 by Gustavo Nunes LPN  Outcome: Saraiforrest Spivey (Taken 1/8/2023 2017)  Discharge to home or other facility with appropriate resources: Identify barriers to discharge with patient and caregiver     Problem: Safety - Adult  Goal: Free from fall injury  1/9/2023 0136 by Gustavo Nunes LPN  Outcome: Progressing  1/9/2023 0136 by Gustavo Nunes LPN  Outcome: Progressing  Flowsheets (Taken 1/9/2023 0134)  Free From Fall Injury: Instruct family/caregiver on patient safety     Problem: Chronic Conditions and Co-morbidities  Goal: Patient's chronic conditions and co-morbidity symptoms are monitored and maintained or improved  Outcome: Progressing

## 2023-01-10 ENCOUNTER — APPOINTMENT (OUTPATIENT)
Dept: GENERAL RADIOLOGY | Age: 28
End: 2023-01-10
Payer: COMMERCIAL

## 2023-01-10 LAB
ALLENS TEST: ABNORMAL
ANION GAP SERPL CALCULATED.3IONS-SCNC: 14 MMOL/L (ref 7–19)
ANION GAP SERPL CALCULATED.3IONS-SCNC: 14 MMOL/L (ref 7–19)
BASE EXCESS ARTERIAL: 2.7 MMOL/L (ref -2–2)
BASOPHILS ABSOLUTE: 0.1 K/UL (ref 0–0.2)
BASOPHILS RELATIVE PERCENT: 0.6 % (ref 0–1)
BUN BLDV-MCNC: 6 MG/DL (ref 6–20)
BUN BLDV-MCNC: 7 MG/DL (ref 6–20)
CALCIUM SERPL-MCNC: 8.9 MG/DL (ref 8.6–10)
CALCIUM SERPL-MCNC: 9.5 MG/DL (ref 8.6–10)
CARBOXYHEMOGLOBIN ARTERIAL: 1.8 % (ref 0–5)
CHLORIDE BLD-SCNC: 101 MMOL/L (ref 98–111)
CHLORIDE BLD-SCNC: 102 MMOL/L (ref 98–111)
CO2: 17 MMOL/L (ref 22–29)
CO2: 23 MMOL/L (ref 22–29)
CREAT SERPL-MCNC: 0.6 MG/DL (ref 0.5–0.9)
CREAT SERPL-MCNC: 0.7 MG/DL (ref 0.5–0.9)
EOSINOPHILS ABSOLUTE: 0.1 K/UL (ref 0–0.6)
EOSINOPHILS RELATIVE PERCENT: 0.6 % (ref 0–5)
FIO2: 40 %
GFR SERPL CREATININE-BSD FRML MDRD: >60 ML/MIN/{1.73_M2}
GFR SERPL CREATININE-BSD FRML MDRD: >60 ML/MIN/{1.73_M2}
GLUCOSE BLD-MCNC: 103 MG/DL (ref 74–109)
GLUCOSE BLD-MCNC: 92 MG/DL (ref 74–109)
HCO3 ARTERIAL: 25.9 MMOL/L (ref 22–26)
HCT VFR BLD CALC: 37 % (ref 37–47)
HEMOGLOBIN, ART, EXTENDED: 11.5 G/DL (ref 12–16)
HEMOGLOBIN: 11.9 G/DL (ref 12–16)
IMMATURE GRANULOCYTES #: 0 K/UL
LYMPHOCYTES ABSOLUTE: 2.4 K/UL (ref 1.1–4.5)
LYMPHOCYTES RELATIVE PERCENT: 22.2 % (ref 20–40)
MCH RBC QN AUTO: 25.8 PG (ref 27–31)
MCHC RBC AUTO-ENTMCNC: 32.2 G/DL (ref 33–37)
MCV RBC AUTO: 80.1 FL (ref 81–99)
MECHANICAL RATE IN BPM: 16
METHEMOGLOBIN ARTERIAL: 1 %
MODE: ABNORMAL
MONOCYTES ABSOLUTE: 0.9 K/UL (ref 0–0.9)
MONOCYTES RELATIVE PERCENT: 8.6 % (ref 0–10)
NEUTROPHILS ABSOLUTE: 7.3 K/UL (ref 1.5–7.5)
NEUTROPHILS RELATIVE PERCENT: 67.6 % (ref 50–65)
O2 CONTENT ARTERIAL: 15.8 ML/DL
O2 SAT, ARTERIAL: 96.8 %
O2 THERAPY: ABNORMAL
PCO2 ARTERIAL: 34 MMHG (ref 35–45)
PDW BLD-RTO: 14.8 % (ref 11.5–14.5)
PH ARTERIAL: 7.49 (ref 7.35–7.45)
PHENYTOIN LEVEL: 13 UG/ML (ref 10–20)
PLATELET # BLD: 251 K/UL (ref 130–400)
PMV BLD AUTO: 12.3 FL (ref 9.4–12.3)
PO2 ARTERIAL: 100 MMHG (ref 80–100)
POSITIVE END EXP PRESS: 5
POTASSIUM REFLEX MAGNESIUM: 3.7 MMOL/L (ref 3.5–5)
POTASSIUM REFLEX MAGNESIUM: 4.1 MMOL/L (ref 3.5–5)
POTASSIUM, WHOLE BLOOD: 3.5
PROLACTIN: 23.77 NG/ML (ref 4.79–23.3)
RBC # BLD: 4.62 M/UL (ref 4.2–5.4)
REASON FOR REJECTION: NORMAL
REJECTED TEST: NORMAL
SAMPLE SOURCE: ABNORMAL
SODIUM BLD-SCNC: 133 MMOL/L (ref 136–145)
SODIUM BLD-SCNC: 138 MMOL/L (ref 136–145)
VT MECHANICAL: 450 %
WBC # BLD: 10.8 K/UL (ref 4.8–10.8)

## 2023-01-10 PROCEDURE — 6360000002 HC RX W HCPCS: Performed by: STUDENT IN AN ORGANIZED HEALTH CARE EDUCATION/TRAINING PROGRAM

## 2023-01-10 PROCEDURE — 36600 WITHDRAWAL OF ARTERIAL BLOOD: CPT

## 2023-01-10 PROCEDURE — 36415 COLL VENOUS BLD VENIPUNCTURE: CPT

## 2023-01-10 PROCEDURE — C9254 INJECTION, LACOSAMIDE: HCPCS | Performed by: PSYCHIATRY & NEUROLOGY

## 2023-01-10 PROCEDURE — 99232 SBSQ HOSP IP/OBS MODERATE 35: CPT | Performed by: PSYCHIATRY & NEUROLOGY

## 2023-01-10 PROCEDURE — 80185 ASSAY OF PHENYTOIN TOTAL: CPT

## 2023-01-10 PROCEDURE — 6360000002 HC RX W HCPCS: Performed by: HOSPITALIST

## 2023-01-10 PROCEDURE — 84146 ASSAY OF PROLACTIN: CPT

## 2023-01-10 PROCEDURE — 95720 EEG PHY/QHP EA INCR W/VEEG: CPT | Performed by: PSYCHIATRY & NEUROLOGY

## 2023-01-10 PROCEDURE — 2580000003 HC RX 258: Performed by: PSYCHIATRY & NEUROLOGY

## 2023-01-10 PROCEDURE — 6360000002 HC RX W HCPCS: Performed by: PSYCHIATRY & NEUROLOGY

## 2023-01-10 PROCEDURE — 85025 COMPLETE CBC W/AUTO DIFF WBC: CPT

## 2023-01-10 PROCEDURE — 71045 X-RAY EXAM CHEST 1 VIEW: CPT

## 2023-01-10 PROCEDURE — 2000000000 HC ICU R&B

## 2023-01-10 PROCEDURE — 31500 INSERT EMERGENCY AIRWAY: CPT

## 2023-01-10 PROCEDURE — 2500000003 HC RX 250 WO HCPCS

## 2023-01-10 PROCEDURE — 2700000000 HC OXYGEN THERAPY PER DAY

## 2023-01-10 PROCEDURE — 6360000002 HC RX W HCPCS

## 2023-01-10 PROCEDURE — 6370000000 HC RX 637 (ALT 250 FOR IP): Performed by: PSYCHIATRY & NEUROLOGY

## 2023-01-10 PROCEDURE — 82803 BLOOD GASES ANY COMBINATION: CPT

## 2023-01-10 PROCEDURE — 2580000003 HC RX 258: Performed by: HOSPITALIST

## 2023-01-10 PROCEDURE — 95714 VEEG EA 12-26 HR UNMNTR: CPT

## 2023-01-10 PROCEDURE — 80048 BASIC METABOLIC PNL TOTAL CA: CPT

## 2023-01-10 PROCEDURE — 6370000000 HC RX 637 (ALT 250 FOR IP): Performed by: HOSPITALIST

## 2023-01-10 RX ORDER — LEVETIRACETAM 750 MG/1
1500 TABLET ORAL 2 TIMES DAILY
Qty: 60 TABLET | Refills: 3 | Status: SHIPPED | OUTPATIENT
Start: 2023-01-10 | End: 2023-01-14 | Stop reason: HOSPADM

## 2023-01-10 RX ORDER — DEXMEDETOMIDINE HYDROCHLORIDE 4 UG/ML
INJECTION, SOLUTION INTRAVENOUS
Status: COMPLETED
Start: 2023-01-10 | End: 2023-01-10

## 2023-01-10 RX ORDER — PHENYTOIN SODIUM 100 MG/1
100 CAPSULE, EXTENDED RELEASE ORAL 3 TIMES DAILY
Qty: 60 CAPSULE | Refills: 3 | Status: SHIPPED | OUTPATIENT
Start: 2023-01-10 | End: 2023-01-14 | Stop reason: HOSPADM

## 2023-01-10 RX ORDER — PROPOFOL 10 MG/ML
INJECTION, EMULSION INTRAVENOUS
Status: COMPLETED
Start: 2023-01-10 | End: 2023-01-10

## 2023-01-10 RX ORDER — LEVETIRACETAM 500 MG/1
1500 TABLET ORAL 2 TIMES DAILY
Status: DISCONTINUED | OUTPATIENT
Start: 2023-01-10 | End: 2023-01-11

## 2023-01-10 RX ORDER — PHENYTOIN SODIUM 100 MG/1
100 CAPSULE, EXTENDED RELEASE ORAL 3 TIMES DAILY
Status: DISCONTINUED | OUTPATIENT
Start: 2023-01-10 | End: 2023-01-11 | Stop reason: ALTCHOICE

## 2023-01-10 RX ORDER — LAMOTRIGINE 200 MG/1
200 TABLET ORAL DAILY
Qty: 30 TABLET | Refills: 3 | Status: SHIPPED | OUTPATIENT
Start: 2023-01-11 | End: 2023-01-14 | Stop reason: SDUPTHER

## 2023-01-10 RX ORDER — DEXMEDETOMIDINE HYDROCHLORIDE 4 UG/ML
.1-1.5 INJECTION, SOLUTION INTRAVENOUS CONTINUOUS
Status: DISCONTINUED | OUTPATIENT
Start: 2023-01-10 | End: 2023-01-13

## 2023-01-10 RX ORDER — PROPOFOL 10 MG/ML
5-50 INJECTION, EMULSION INTRAVENOUS CONTINUOUS
Status: DISCONTINUED | OUTPATIENT
Start: 2023-01-10 | End: 2023-01-13

## 2023-01-10 RX ORDER — MIDAZOLAM HYDROCHLORIDE 1 MG/ML
1-10 INJECTION, SOLUTION INTRAVENOUS CONTINUOUS
Status: DISCONTINUED | OUTPATIENT
Start: 2023-01-10 | End: 2023-01-13

## 2023-01-10 RX ADMIN — ACETAMINOPHEN 650 MG: 325 TABLET ORAL at 01:42

## 2023-01-10 RX ADMIN — LORAZEPAM 1 MG: 2 INJECTION INTRAMUSCULAR; INTRAVENOUS at 14:17

## 2023-01-10 RX ADMIN — LORAZEPAM 1 MG: 2 INJECTION INTRAMUSCULAR; INTRAVENOUS at 11:20

## 2023-01-10 RX ADMIN — LEVETIRACETAM 1500 MG: 15 INJECTION, SOLUTION INTRAVENOUS at 09:47

## 2023-01-10 RX ADMIN — DEXMEDETOMIDINE HYDROCHLORIDE IN 0.9% SODIUM CHLORIDE 0.2 MCG/KG/HR: 4 INJECTION INTRAVENOUS at 14:55

## 2023-01-10 RX ADMIN — LORAZEPAM 1 MG: 2 INJECTION INTRAMUSCULAR; INTRAVENOUS at 13:20

## 2023-01-10 RX ADMIN — PROPOFOL 40 MCG/KG/MIN: 10 INJECTION, EMULSION INTRAVENOUS at 17:44

## 2023-01-10 RX ADMIN — LEVETIRACETAM 1500 MG: 500 TABLET, FILM COATED ORAL at 20:11

## 2023-01-10 RX ADMIN — LORAZEPAM 1 MG: 2 INJECTION INTRAMUSCULAR; INTRAVENOUS at 00:45

## 2023-01-10 RX ADMIN — MIDAZOLAM HYDROCHLORIDE 2 MG/HR: 1 INJECTION, SOLUTION INTRAVENOUS at 15:46

## 2023-01-10 RX ADMIN — LORAZEPAM 1 MG: 2 INJECTION INTRAMUSCULAR; INTRAVENOUS at 13:35

## 2023-01-10 RX ADMIN — LORAZEPAM 1 MG: 2 INJECTION INTRAMUSCULAR; INTRAVENOUS at 11:28

## 2023-01-10 RX ADMIN — LORAZEPAM 1 MG: 2 INJECTION INTRAMUSCULAR; INTRAVENOUS at 11:15

## 2023-01-10 RX ADMIN — LORAZEPAM 1 MG: 2 INJECTION INTRAMUSCULAR; INTRAVENOUS at 13:30

## 2023-01-10 RX ADMIN — PHENYTOIN SODIUM 100 MG: 100 CAPSULE ORAL at 20:10

## 2023-01-10 RX ADMIN — LACOSAMIDE 100 MG: 10 INJECTION, SOLUTION INTRAVENOUS at 13:47

## 2023-01-10 RX ADMIN — LAMOTRIGINE 200 MG: 100 TABLET ORAL at 09:44

## 2023-01-10 RX ADMIN — FOSPHENYTOIN SODIUM 100 MG PE: 50 INJECTION, SOLUTION INTRAMUSCULAR; INTRAVENOUS at 04:02

## 2023-01-10 RX ADMIN — PROPOFOL 20 MCG/KG/MIN: 10 INJECTION, EMULSION INTRAVENOUS at 15:39

## 2023-01-10 RX ADMIN — Medication 5 MG: at 01:42

## 2023-01-10 RX ADMIN — LORAZEPAM 1 MG: 2 INJECTION INTRAMUSCULAR; INTRAVENOUS at 00:51

## 2023-01-10 RX ADMIN — LACOSAMIDE 100 MG: 10 INJECTION, SOLUTION INTRAVENOUS at 21:20

## 2023-01-10 RX ADMIN — LORAZEPAM 1 MG: 2 INJECTION INTRAMUSCULAR; INTRAVENOUS at 12:43

## 2023-01-10 RX ADMIN — PROPOFOL 40 MCG/KG/MIN: 10 INJECTION, EMULSION INTRAVENOUS at 22:19

## 2023-01-10 RX ADMIN — DEXMEDETOMIDINE HYDROCHLORIDE 0.2 MCG/KG/HR: 4 INJECTION, SOLUTION INTRAVENOUS at 14:55

## 2023-01-10 RX ADMIN — LORAZEPAM 1 MG: 2 INJECTION INTRAMUSCULAR; INTRAVENOUS at 12:48

## 2023-01-10 RX ADMIN — LORAZEPAM 1 MG: 2 INJECTION INTRAMUSCULAR; INTRAVENOUS at 13:15

## 2023-01-10 RX ADMIN — SODIUM CHLORIDE, PRESERVATIVE FREE 10 ML: 5 INJECTION INTRAVENOUS at 20:11

## 2023-01-10 RX ADMIN — ONDANSETRON 4 MG: 4 TABLET, ORALLY DISINTEGRATING ORAL at 01:42

## 2023-01-10 RX ADMIN — ENOXAPARIN SODIUM 40 MG: 100 INJECTION SUBCUTANEOUS at 09:43

## 2023-01-10 RX ADMIN — PHENYTOIN SODIUM 100 MG: 100 CAPSULE ORAL at 16:29

## 2023-01-10 ASSESSMENT — PULMONARY FUNCTION TESTS
PIF_VALUE: 18
PIF_VALUE: 17
PIF_VALUE: 19
PIF_VALUE: 21
PIF_VALUE: 21
PIF_VALUE: 17
PIF_VALUE: 18

## 2023-01-10 ASSESSMENT — PAIN - FUNCTIONAL ASSESSMENT: PAIN_FUNCTIONAL_ASSESSMENT: ACTIVITIES ARE NOT PREVENTED

## 2023-01-10 ASSESSMENT — PAIN DESCRIPTION - DESCRIPTORS: DESCRIPTORS: ACHING;CRAMPING

## 2023-01-10 ASSESSMENT — ENCOUNTER SYMPTOMS
ABDOMINAL PAIN: 0
COUGH: 0
NAUSEA: 0
SHORTNESS OF BREATH: 0

## 2023-01-10 ASSESSMENT — PAIN SCALES - GENERAL
PAINLEVEL_OUTOF10: 0
PAINLEVEL_OUTOF10: 8

## 2023-01-10 ASSESSMENT — PAIN DESCRIPTION - LOCATION: LOCATION: OTHER (COMMENT)

## 2023-01-10 ASSESSMENT — PAIN DESCRIPTION - ORIENTATION: ORIENTATION: OTHER (COMMENT)

## 2023-01-10 ASSESSMENT — PAIN SCALES - WONG BAKER: WONGBAKER_NUMERICALRESPONSE: 0

## 2023-01-10 NOTE — PROGRESS NOTES
Intubation not done, Donnell spoke with Mayte Lux, will continue to monitor    Electronically signed by Keesha Harrington RN on 1/10/2023 at 2:48 PM

## 2023-01-10 NOTE — PROGRESS NOTES
Copy made of patient's appointment of guardianship and placed in soft chart    Electronically signed by Nilo Vivar RN on 1/10/2023 at 4:04 PM

## 2023-01-10 NOTE — PLAN OF CARE
Problem: Discharge Planning  Goal: Discharge to home or other facility with appropriate resources  1/10/2023 1128 by Debi Brito RN  Outcome: Progressing  1/10/2023 0316 by Silvester Schaumann, RN  Outcome: Progressing  1/10/2023 0316 by Silvester Schaumann, RN  Outcome: Progressing     Problem: Safety - Adult  Goal: Free from fall injury  1/10/2023 1128 by Debi Brito RN  Outcome: Progressing  1/10/2023 0316 by Silvester Schaumann, RN  Outcome: Progressing  1/10/2023 0316 by Silvester Schaumann, RN  Outcome: Progressing     Problem: Chronic Conditions and Co-morbidities  Goal: Patient's chronic conditions and co-morbidity symptoms are monitored and maintained or improved  1/10/2023 1128 by Debi Brito RN  Outcome: Progressing  1/10/2023 0316 by Silvester Schaumann, RN  Outcome: Progressing  1/10/2023 0316 by Silvester Schaumann, RN  Outcome: Progressing     Problem: ABCDS Injury Assessment  Goal: Absence of physical injury  Outcome: Progressing

## 2023-01-10 NOTE — PROCEDURES
Endotracheal intubation      Indication: Status epilepticus, need for airway protection    Continuous seizures despite receiving total of 8 mg IV Ativan. Proceeded with rapid sequence intubation with respiratory therapy and nursing at bedside. Sedation with 20 mg of etomidate. Paralytic given with 100 mg of succinylcholine. Used video laryngoscopy. 7.5 ET tube passed through vocal cords. Placement confirmed with visualization, end-tidal CO2 colorimetry and good bilateral breath sounds. 22 cm at the lip.     Chest x-ray ordered for confirmation      Maurisio Hammonds MD

## 2023-01-10 NOTE — PLAN OF CARE
Problem: Discharge Planning  Goal: Discharge to home or other facility with appropriate resources  1/10/2023 0316 by Mildred Hoang RN  Outcome: Progressing  1/10/2023 0316 by Mildred Hoang RN  Outcome: Progressing     Problem: Safety - Adult  Goal: Free from fall injury  1/10/2023 0316 by Mildred Hoang RN  Outcome: Progressing  1/10/2023 0316 by Mildred Hoang RN  Outcome: Progressing     Problem: Chronic Conditions and Co-morbidities  Goal: Patient's chronic conditions and co-morbidity symptoms are monitored and maintained or improved  1/10/2023 0316 by Mlidred Hoang RN  Outcome: Progressing  1/10/2023 0316 by Mildred Hoang RN  Outcome: Progressing

## 2023-01-10 NOTE — PROGRESS NOTES
Patient convulsing again more severely than before. Incontinent again. Labored breathing. 2 mg total ativan given. Convulsions stopped post administration.  Electronically signed by Janessa Morales RN on 1/10/2023 at 12:47 PM

## 2023-01-10 NOTE — PROGRESS NOTES
Daily Progress Note    Date:1/10/2023  Patient: Gretchen Davalos  : 1995  SZR:[id number]  CODE:Full Code No additional code details  PCP:None Provider    Admit Date: 2023  7:16 PM   LOS: 4 days     Chief Complaint   Patient presents with    Seizures     4 seizures with EMS, 8mg versed given pta; pt has hx epilepsy and ran out of keppra yesterday         Subjective: No significant episodes of seizure-like activity documented overnight. Plan was for patient to be discharged home. Within 30 minutes of informing the patient she was going home, she had seizure-like activity w/ incontinence and foaming at the mouth lasting 10 minutes and received 3 mg of Ativan. She was able to speak in between episodes. Episodes became more and more frequent despite Ativan and she was transferred to the ICU. She was tachycardic during the episodes, but other vitals and respiratory status remained stable. She had several more episodes in the ICU despite Ativan and was eventually intubated and placed on a Propofol gtt. Hospital Summary: 78-year-old female with history of seizures since childhood, depression, anxiety, and autism. Patient/family recently moved here, previously followed by neurology in [facility]. Has normally been on Keppra and Lamictal.  Patient presented following seizures and reportedly recently out of [address]. She was noted to have prolonged continuous seizure activity concerning for status epilepticus, loaded with Keppra and fosphenytoin. Also received several doses of Ativan in the ED. Neurology was consulted. The following a.m. she again had multiple prolonged seizure episodes despite multiple doses of Ativan. She was subsequently maintained on Keppra and fosphenytoin. EEG obtained with neurology following. No overt epileptiform activity noted on continuous EEG. Review of Systems   Constitutional:  Negative for chills and fever. Respiratory:  Negative for cough and shortness of breath. Cardiovascular:  Negative for chest pain and palpitations. Gastrointestinal:  Negative for abdominal pain and nausea. Objective:      Vital signs in last 24 hours:  Patient Vitals for the past 24 hrs:   BP Temp Temp src Pulse Resp SpO2 Height Weight   01/10/23 1536 -- -- -- (!) 110 16 97 % -- --   01/10/23 1415 (!) 128/112 -- -- (!) 153 27 94 % -- --   01/10/23 1400 125/74 -- -- (!) 106 10 96 % -- --   01/10/23 1345 138/68 -- -- (!) 128 17 95 % -- --   01/10/23 1206 (!) 116/93 -- -- (!) 117 20 99 % -- --   01/10/23 0622 111/69 98.4 °F (36.9 °C) Oral 93 20 97 % -- --   01/10/23 0600 -- -- -- -- -- -- -- 186 lb (84.4 kg)   01/10/23 0057 122/81 -- -- (!) 149 -- 95 % -- --   01/10/23 0045 -- -- -- -- -- 96 % -- --   01/09/23 2157 110/71 99 °F (37.2 °C) Oral 97 20 96 % -- --   01/09/23 2007 -- -- -- -- -- -- 5' 5\" (1.651 m) --   01/09/23 1640 123/83 98.1 °F (36.7 °C) Oral 100 18 97 % -- --         I/O last 3 completed shifts: In: 1080 [P.O.:1080]  Out: 4785 [Urine:1550]  I/O this shift:  In: 360 [P.O.:360]  Out: -     Physical Exam  Constitutional:       General: She is not in acute distress. Appearance: She is not toxic-appearing. Comments: Tired appearing, not fully cooperative with interview   Cardiovascular:      Rate and Rhythm: Normal rate and regular rhythm. Pulses: Normal pulses. Heart sounds: Normal heart sounds. Pulmonary:      Effort: Pulmonary effort is normal. No respiratory distress. Breath sounds: Normal breath sounds. Abdominal:      General: Abdomen is flat. There is no distension. Palpations: Abdomen is soft. Tenderness: There is no abdominal tenderness.            Lab Review   Recent Results (from the past 24 hour(s))   Phenytoin Level, Total    Collection Time: 01/10/23  2:37 AM   Result Value Ref Range    Phenytoin Lvl 13.0 10.0 - 20.0 ug/mL   Basic Metabolic Panel w/ Reflex to MG    Collection Time: 01/10/23  2:37 AM   Result Value Ref Range    Sodium 133 (L) 136 - 145 mmol/L    Potassium reflex Magnesium 4.1 3.5 - 5.0 mmol/L    Chloride 102 98 - 111 mmol/L    CO2 17 (L) 22 - 29 mmol/L    Anion Gap 14 7 - 19 mmol/L    Glucose 92 74 - 109 mg/dL    BUN 7 6 - 20 mg/dL    Creatinine 0.6 0.5 - 0.9 mg/dL    Est, Glom Filt Rate >60 >60    Calcium 8.9 8.6 - 10.0 mg/dL   SPECIMEN REJECTION    Collection Time: 01/10/23  3:10 AM   Result Value Ref Range    Rejected Test CBCWD     Reason for Rejection see below    CBC with Auto Differential    Collection Time: 01/10/23  3:23 AM   Result Value Ref Range    WBC 10.8 4.8 - 10.8 K/uL    RBC 4.62 4.20 - 5.40 M/uL    Hemoglobin 11.9 (L) 12.0 - 16.0 g/dL    Hematocrit 37.0 37.0 - 47.0 %    MCV 80.1 (L) 81.0 - 99.0 fL    MCH 25.8 (L) 27.0 - 31.0 pg    MCHC 32.2 (L) 33.0 - 37.0 g/dL    RDW 14.8 (H) 11.5 - 14.5 %    Platelets 998 423 - 336 K/uL    MPV 12.3 9.4 - 12.3 fL    Neutrophils % 67.6 (H) 50.0 - 65.0 %    Lymphocytes % 22.2 20.0 - 40.0 %    Monocytes % 8.6 0.0 - 10.0 %    Eosinophils % 0.6 0.0 - 5.0 %    Basophils % 0.6 0.0 - 1.0 %    Neutrophils Absolute 7.3 1.5 - 7.5 K/uL    Immature Granulocytes # 0.0 K/uL    Lymphocytes Absolute 2.4 1.1 - 4.5 K/uL    Monocytes Absolute 0.90 0.00 - 0.90 K/uL    Eosinophils Absolute 0.10 0.00 - 0.60 K/uL    Basophils Absolute 0.10 0.00 - 0.20 K/uL   Prolactin    Collection Time: 01/10/23 12:08 PM   Result Value Ref Range    Prolactin 23.77 (H) 4.79 - 23.30 ng/mL   Basic Metabolic Panel w/ Reflex to MG    Collection Time: 01/10/23 12:08 PM   Result Value Ref Range    Sodium 138 136 - 145 mmol/L    Potassium reflex Magnesium 3.7 3.5 - 5.0 mmol/L    Chloride 101 98 - 111 mmol/L    CO2 23 22 - 29 mmol/L    Anion Gap 14 7 - 19 mmol/L    Glucose 103 74 - 109 mg/dL    BUN 6 6 - 20 mg/dL    Creatinine 0.7 0.5 - 0.9 mg/dL    Est, Glom Filt Rate >60 >60    Calcium 9.5 8.6 - 10.0 mg/dL             Current Facility-Administered Medications:     phenytoin (DILANTIN) ER capsule 100 mg, 100 mg, Oral, TID, Nura Jimenez MD    levETIRAcetam (KEPPRA) tablet 1,500 mg, 1,500 mg, Oral, BID, Nura Jimenez MD    lacosamide (VIMPAT) 100 mg in sodium chloride 0.9 % 60 mL IVPB, 100 mg, IntraVENous, BID, Nura Jimenez MD, Stopped at 01/10/23 1447    dexmedetomidine (PRECEDEX) 400 mcg in sodium chloride 0.9 % 100 mL infusion, 0.1-1.5 mcg/kg/hr, IntraVENous, Continuous, Nura Jimenez MD, Stopped at 01/10/23 1539    propofol injection, 5-50 mcg/kg/min, IntraVENous, Continuous, Nura Jimenez MD, Last Rate: 10.1 mL/hr at 01/10/23 1539, 20 mcg/kg/min at 01/10/23 1539    midazolam (VERSED) 1 mg/mL in NS infusion, 1-10 mg/hr, IntraVENous, Continuous, Sha Rosales MD    enoxaparin (LOVENOX) injection 40 mg, 40 mg, SubCUTAneous, Daily, Steven Morgan MD, 40 mg at 01/10/23 0943    lamoTRIgine (LAMICTAL) tablet 200 mg, 200 mg, Oral, Daily, Koffi Crowell DO, 200 mg at 01/10/23 0944    LORazepam (ATIVAN) injection 1 mg, 1 mg, IntraVENous, Q5 Min PRN, Smita Rivera MD, 1 mg at 01/10/23 1417    sodium chloride flush 0.9 % injection 5-40 mL, 5-40 mL, IntraVENous, 2 times per day, Smita Rivera MD, 10 mL at 01/09/23 2134    sodium chloride flush 0.9 % injection 5-40 mL, 5-40 mL, IntraVENous, PRN, Smita Rivera MD    0.9 % sodium chloride infusion, , IntraVENous, PRN, Smita Rivera MD, Last Rate: 20 mL/hr at 01/07/23 1914, New Bag at 01/07/23 1914    ondansetron (ZOFRAN-ODT) disintegrating tablet 4 mg, 4 mg, Oral, Q8H PRN, 4 mg at 01/10/23 0142 **OR** ondansetron (ZOFRAN) injection 4 mg, 4 mg, IntraVENous, Q6H PRN, Smita Rivera MD    acetaminophen (TYLENOL) tablet 650 mg, 650 mg, Oral, Q4H PRN, 650 mg at 01/10/23 0142 **OR** acetaminophen (TYLENOL) suppository 650 mg, 650 mg, Rectal, Q4H PRN, Smita Rivera MD    potassium chloride 20 mEq/50 mL IVPB (Central Line), 20 mEq, IntraVENous, PRN **OR** potassium chloride 10 mEq/100 mL IVPB (Peripheral Line), 10 mEq, IntraVENous, PRN, Smita Rivera MD    sodium phosphate 10 mmol in sodium chloride 0.9 % 250 mL IVPB, 10 mmol, IntraVENous, PRN **OR** sodium phosphate 15 mmol in sodium chloride 0.9 % 250 mL IVPB, 15 mmol, IntraVENous, PRN **OR** sodium phosphate 20 mmol in sodium chloride 0.9 % 500 mL IVPB, 20 mmol, IntraVENous, PRN, Smita Rivera MD    magnesium sulfate 2000 mg in 50 mL IVPB premix, 2,000 mg, IntraVENous, PRN, Smita Rivera MD    polyethylene glycol (GLYCOLAX) packet 17 g, 17 g, Oral, Daily PRN, Smita Rivera MD    melatonin disintegrating tablet 5 mg, 5 mg, Oral, Nightly PRN, Smita Rivera MD, 5 mg at 01/10/23 0142    calcium carbonate (TUMS) chewable tablet 500 mg, 500 mg, Oral, TID PRN, Smita Rivera MD    albuterol sulfate HFA (PROVENTIL;VENTOLIN;PROAIR) 108 (90 Base) MCG/ACT inhaler 2 puff, 2 puff, Inhalation, Q4H PRN, Smita Rivera MD      Imaging:  CT Head W/O Contrast    Result Date: 1/7/2023  Unremarkable CT scan of the head. XR CHEST PORTABLE    Result Date: 1/7/2023  Subtle interstitial opacities in the right basilar region may represent atelectasis or aspiration. MRI BRAIN W WO CONTRAST    Result Date: 1/7/2023  No MR evidence of acute intracranial finding; given limitations due to motion artifact. Assessment/Plan  Principal Problem:    Status epilepticus ([address]  Resolved Problems:    * No resolved hospital problems. *     I have reviewed the patient's daily labs, including BMP and CBC with pertinent results discussed below. Status epilepticus--resolved  Multiple seizure-like events  - Neurology following, progress and EEG note reviewed  -- MRI brain reviewed-negative  - Continue neurochecks  - Seizure precautions  -- Continue Keppra, fosphenytoin, and Lamictal  -- Phenytoin levels at goal  - As needed Ativan  -- 1/10 upgraded to ICU;  Intubated for airway protection and started on propofol gtt; BP dropped w/ induction but improved  -- Add Versed if seizures continue  -- Restart cEEG today 1/10  -- Minimal vent settings  -- Stat post intubation CXR  -- Will transition care to ICU hospitalist as today is my last day on service     Right basilar atelectasis versus possible aspiration pneumonitis   reviewed chest x-ray  -- observe off antibiotics     1 out of 2 blood cultures positive for staph epidermidis  -- Reviewed blood culture PCR  - Represents contaminant  - No need for antibiotics     Hematuria on urinalysis  - Follow clinically  - Follow-up outpatient      DVT prophylaxis: Lovenox    DISPOSITION:  TBD    Full Code No additional code details        Dung Powers MD 1/10/2023 3:41 PM

## 2023-01-10 NOTE — PROGRESS NOTES
Dano Josue arrived to room # 153. Presented with: in status  Mental Status: Patient is not alert, convulsing, foaming at mouth      Vitals:    01/10/23 1206   BP: (!) 116/93   Pulse: (!) 117   Resp: 20   Temp:    SpO2: 99%     Patient safety contract and falls prevention contract reviewed with patient No.  Oriented Patient to room. Call light within reach. Yes. Needs, issues or concerns expressed at this time: yes, needs seizure medication bc seizing.       Electronically signed by Yaz Richard RN on 1/10/2023 at 2:01 PM

## 2023-01-10 NOTE — PROGRESS NOTES
Patient convulsing again. Total of 2 mg of ativan given IV. Patient stiff, convulsing, and incontinent. Not responsive to verbal command.  Electronically signed by Wallace Lee RN on 1/10/2023 at 1:15 PM

## 2023-01-10 NOTE — PROGRESS NOTES
Patient convulsing. Hands clenched. Incontinent of bladder. Drooling at mouth. Notified Dr. Sam Gonzales and Dr. Salud Cook. Sitter ordered. PCA at bedside.  Electronically signed by Ely Blanco RN on 1/10/2023 at 12:42 PM

## 2023-01-10 NOTE — PROGRESS NOTES
[id number] [facility] Neurology Progress Note      Patient:   Liss Gamboa  MR#:    [id number]   Room:    [id number]   YOB: 1995  Date of Progress Note: 1/10/2023  Time of Note                           9:53 AM  Consulting Physician:  Pebbles Pederson DO  Attending Physician:  Shirley Welch MD      INTERVAL HISTORY:  Doing about the same, no new focal complaints. No further events. Patient looks baseline this morning. REVIEW OF SYSTEMS:  Constitutional: No fevers No chills  Neck:No stiffness  Respiratory: No shortness of breath  Cardiovascular: No chest pain No palpitations  Gastrointestinal: No abdominal pain    Genitourinary: No Dysuria  Neurological: No headache, no confusion      PHYSICAL EXAM:    Constitutional -   /69   Pulse 93   Temp 98.4 °F (36.9 °C) (Oral)   Resp 20   Ht 5' 5\" (1.651 m)   Wt 186 lb (84.4 kg)   SpO2 97%   BMI 30.95 kg/m²   General appearance: No acute distress   EYES -   Conjunctiva normal  Pupillary exam as below, see CN exam in the neurologic exam  ENT-    No scars, masses, or lesions over external nose or ears  Hearing normal bilaterally to finger rub  Neck-   No neck masses noted  Thyroid normal   No jugular vein distension  Cardiovascular -   No clubbing, cyanosis, or edema   Pulmonary-   Good expansion, normal effort without use of accessory muscles  Musculoskeletal -   No significant wasting of muscles noted  Gait as below, see gait exam in the neurologic exam  Muscle strength, tone, stability as below see the motor exam in the neurologic exam.   No bony deformities  Skin -   Warm, dry, and intact to inspection and palpation.     No rash, erythema, or pallor  Psychiatric -   Mood, affect, and behavior appear normal    Memory as below see mental status examination in the neurologic exam      NEUROLOGICAL EXAM    Mental status   [x] Awake, alert, oriented   [x] Affect attention and concentration appear appropriate  [x] Recent and remote memory appears unremarkable  [x] Speech normal without dysarthria or aphasia, comprehension and repetition intact. COMMENTS: Follows complex commands without hesitation   Cranial Nerves [x] No VF deficit to confrontation  [x] PERRLA, EOMI, no nystagmus, conjugate eye movements, no ptosis  [x] Face symmetric  [x] Facial sensation intact  [x] Tongue midline no atrophy or fasciculations present  [x] Palate midline, hearing to finger rub normal  [x] Shoulder shrug and SCM testing normal  COMMENTS:   Motor   [x] 5/5 strength x 4 extremities  [x] Normal bulk and tone  [x] No tremor present  [x] No rigidity or bradykinesia noted  COMMENTS:   Sensory  [x] Sensation intact to light touch, pin prick, vibration, and proprioception BLE  [] Sensation intact to light touch, pin prick, vibration, and proprioception BUE  COMMENTS:   Coordination [x] FTN normal bilaterally   [] HTS normal bilaterally  [] YEVGENIY normal.   COMMENTS:   Reflexes  [x] Symmetric and non-pathological  [x] Toes downgoing bilaterally  [x] No clonus present  COMMENTS:   Gait                  [x] Normal steady gait    [] Ataxic    [] Spastic     [] Magnetic     [] Shuffling  [] Not assessed  COMMENTS:       LABS/IMAGING:    CT Head W/O Contrast    Result Date: 1/7/2023  PROCEDURE: CT HEAD WO CONTRAST CLINICAL INDICATION: Status epilepticus. COMPARISON: No relevant prior imaging is available for comparison. FINDINGS: There is no acute intracranial hemorrhage, extra-axial fluid collection, evidence of mass or mass effect. Gray-white matter differentiation is preserved. The ventricular system and cisternal spaces demonstrates normal configuration. There is no hydrocephalus. The paranasal sinuses and mastoid air cells are clear. The soft tissues are unremarkable. The orbits are also unremarkable in appearance. There are no suspicious osseous lesions. Unremarkable CT scan of the head.     XR CHEST PORTABLE    Result Date: 1/7/2023  PROCEDURE: XR CHEST PORTABLE CLINICAL INDICATION: Status epilepticus. COMPARISON: No prior similar images available for comparison. FINDINGS: Subtle interstitial opacities are identified in the right basilar region. There is no pneumonic consolidation. Heart is not enlarged. There are no pulmonary vascular congestion changes. There is no pneumothorax. Bilateral costophrenic angles are clear. Visualized osseous structures are intact. Subtle interstitial opacities in the right basilar region may represent atelectasis or aspiration. MRI BRAIN W WO CONTRAST    Result Date: 1/7/2023  PROCEDURE: MRI BRAIN W WO CONTRAST CLINICAL INDICATION: Seizures. COMPARISON: CT head of January 6, 2023. FINDINGS: There is motion artifact on many sequences limiting evaluation. Diffusion-weighted images demonstrate no restricted diffusion to suggest a recent infarction. There is no acute intracranial hemorrhage, extra-axial fluid collection, evidence of mass or mass effect. No abnormal gradient susceptibility is present. There is no abnormal ependymal, leptomeningeal or intraparenchymal enhancement. The ventricular system and cisternal spaces demonstrates normal configuration and size. There is no hydrocephalus. Vascular flow voids are preserved. Midline structures demonstrate normal morphology and symmetry. The paranasal sinuses and mastoid air cells are clear. The soft tissues are unremarkable as visualized. The orbits are also unremarkable in appearance. There is no abnormal marrow signal.    No MR evidence of acute intracranial finding; given limitations due to motion artifact.       Lab Results   Component Value Date    WBC 10.8 01/10/2023    HGB 11.9 (L) 01/10/2023    HCT 37.0 01/10/2023    MCV 80.1 (L) 01/10/2023     01/10/2023     Lab Results   Component Value Date     (L) 01/10/2023    K 4.1 01/10/2023     01/10/2023    CO2 17 (L) 01/10/2023    BUN 7 01/10/2023    CREATININE 0.6 01/10/2023    GLUCOSE 92 01/10/2023    CALCIUM 8.9 01/10/2023    PROT 7.1 01/06/2023 LABALBU 4.2 01/06/2023    BILITOT <0.2 01/06/2023    ALKPHOS 78 01/06/2023    AST 12 01/06/2023    ALT 8 01/06/2023    LABGLOM >60 01/10/2023   No results found for: INR, PROTIME    RECORD REVIEW:   Previous medical records, medications were reviewed at today's visit. Nursing/physician notes, imaging, labs and vitals reviewed. PT,OT and/or speech notes reviewed    ASSESSMENT:  33 y.o. admitted with multiple seizures. History of underlying possible autism, anxiety depression. Long history of seizure disorder. Currently on multiple antiepileptic medications. cEEG with nothing epileptiform, no seizures captured, leads always pulled off when patient having clinical activity. Nothing clearly captured on cEEG. MRI negative. Dilantin level therapeutic. Nonepileptic events are highly suspected     PLAN:   Continue Keppra 1500 mg  q12h    Continue Lamictal 200 mg daily. Fosphenytoin changed to Dilantin oral   discontinue cEEG monitoring   Ativan prn, seizure precautions. Okay with neurology to go home when okay with others. Follow-up in office with Dr. Butler Leyden 1 to 2 weeks.       Shon Khan MD  Board Certified Neurology

## 2023-01-10 NOTE — PROGRESS NOTES
Updated patient's mother, she is unable to visit bc she is taking care of Riley's 3 children who are under 11years old. She states she has documentation that stating she is Sheri legal guardian so she will be the decision maker in her care.     Electronically signed by Franchesca Ferguson RN on 1/10/2023 at 3:57 PM

## 2023-01-10 NOTE — PROGRESS NOTES
Dr. Marita Felipe at bedside to intubate. 1517 20 mg etomidate  1518 1 mg versed  1518 100 mg succinylcholine  1520 1 mg ativan  1522 intubated with 7.5, 22 at the lip  Good color change on CO2 detector. Bilateral breath sounds. Order for stat chest xray placed.     Electronically signed by Carl Dubon RN on 1/10/2023 at 3:26 PM

## 2023-01-10 NOTE — PROGRESS NOTES
Witnessed patient having a seizure. Administered Ativan 1mg every 5 minutes for 3mg. Notified Hospitalist and Neuro.

## 2023-01-10 NOTE — PROGRESS NOTES
Decision made to intubate patient to protect airway and break seizures, patient has had 5 seizures in 35 minutes with the longest being 3.5 minutes, patient has received a total of 8mg Ativan IV.     Mc[place]aurine, respiratory, woeltz all aware    Electronically signed by Taj Ba RN on 1/10/2023 at 2:33 PM

## 2023-01-11 LAB
ANION GAP SERPL CALCULATED.3IONS-SCNC: 11 MMOL/L (ref 7–19)
BASOPHILS ABSOLUTE: 0.1 K/UL (ref 0–0.2)
BASOPHILS RELATIVE PERCENT: 0.7 % (ref 0–1)
BUN BLDV-MCNC: 7 MG/DL (ref 6–20)
CALCIUM SERPL-MCNC: 8.7 MG/DL (ref 8.6–10)
CHLORIDE BLD-SCNC: 103 MMOL/L (ref 98–111)
CO2: 23 MMOL/L (ref 22–29)
CREAT SERPL-MCNC: 0.7 MG/DL (ref 0.5–0.9)
EOSINOPHILS ABSOLUTE: 0.1 K/UL (ref 0–0.6)
EOSINOPHILS RELATIVE PERCENT: 1.2 % (ref 0–5)
GFR SERPL CREATININE-BSD FRML MDRD: >60 ML/MIN/{1.73_M2}
GLUCOSE BLD-MCNC: 85 MG/DL (ref 74–109)
HCT VFR BLD CALC: 33.8 % (ref 37–47)
HEMOGLOBIN: 10.9 G/DL (ref 12–16)
IMMATURE GRANULOCYTES #: 0 K/UL
LYMPHOCYTES ABSOLUTE: 2.7 K/UL (ref 1.1–4.5)
LYMPHOCYTES RELATIVE PERCENT: 32.9 % (ref 20–40)
MAGNESIUM: 2.1 MG/DL (ref 1.6–2.6)
MCH RBC QN AUTO: 25.5 PG (ref 27–31)
MCHC RBC AUTO-ENTMCNC: 32.2 G/DL (ref 33–37)
MCV RBC AUTO: 79 FL (ref 81–99)
MONOCYTES ABSOLUTE: 0.8 K/UL (ref 0–0.9)
MONOCYTES RELATIVE PERCENT: 9.4 % (ref 0–10)
NEUTROPHILS ABSOLUTE: 4.6 K/UL (ref 1.5–7.5)
NEUTROPHILS RELATIVE PERCENT: 55.4 % (ref 50–65)
PDW BLD-RTO: 15.4 % (ref 11.5–14.5)
PHENYTOIN LEVEL: 11.4 UG/ML (ref 10–20)
PLATELET # BLD: 211 K/UL (ref 130–400)
PMV BLD AUTO: 12.4 FL (ref 9.4–12.3)
POTASSIUM REFLEX MAGNESIUM: 3.4 MMOL/L (ref 3.5–5)
RBC # BLD: 4.28 M/UL (ref 4.2–5.4)
SODIUM BLD-SCNC: 137 MMOL/L (ref 136–145)
WBC # BLD: 8.3 K/UL (ref 4.8–10.8)

## 2023-01-11 PROCEDURE — 2700000000 HC OXYGEN THERAPY PER DAY

## 2023-01-11 PROCEDURE — 6370000000 HC RX 637 (ALT 250 FOR IP): Performed by: PSYCHIATRY & NEUROLOGY

## 2023-01-11 PROCEDURE — 2580000003 HC RX 258: Performed by: HOSPITALIST

## 2023-01-11 PROCEDURE — 83735 ASSAY OF MAGNESIUM: CPT

## 2023-01-11 PROCEDURE — 2580000003 HC RX 258: Performed by: PSYCHIATRY & NEUROLOGY

## 2023-01-11 PROCEDURE — 2500000003 HC RX 250 WO HCPCS: Performed by: PSYCHIATRY & NEUROLOGY

## 2023-01-11 PROCEDURE — 6360000002 HC RX W HCPCS: Performed by: PSYCHIATRY & NEUROLOGY

## 2023-01-11 PROCEDURE — 0BH17EZ INSERTION OF ENDOTRACHEAL AIRWAY INTO TRACHEA, VIA NATURAL OR ARTIFICIAL OPENING: ICD-10-PCS | Performed by: INTERNAL MEDICINE

## 2023-01-11 PROCEDURE — 6360000002 HC RX W HCPCS: Performed by: STUDENT IN AN ORGANIZED HEALTH CARE EDUCATION/TRAINING PROGRAM

## 2023-01-11 PROCEDURE — 94760 N-INVAS EAR/PLS OXIMETRY 1: CPT

## 2023-01-11 PROCEDURE — 6370000000 HC RX 637 (ALT 250 FOR IP): Performed by: HOSPITALIST

## 2023-01-11 PROCEDURE — 85025 COMPLETE CBC W/AUTO DIFF WBC: CPT

## 2023-01-11 PROCEDURE — 5A1935Z RESPIRATORY VENTILATION, LESS THAN 24 CONSECUTIVE HOURS: ICD-10-PCS | Performed by: INTERNAL MEDICINE

## 2023-01-11 PROCEDURE — 6360000002 HC RX W HCPCS: Performed by: HOSPITALIST

## 2023-01-11 PROCEDURE — 36415 COLL VENOUS BLD VENIPUNCTURE: CPT

## 2023-01-11 PROCEDURE — 80048 BASIC METABOLIC PNL TOTAL CA: CPT

## 2023-01-11 PROCEDURE — C9254 INJECTION, LACOSAMIDE: HCPCS | Performed by: PSYCHIATRY & NEUROLOGY

## 2023-01-11 PROCEDURE — 2000000000 HC ICU R&B

## 2023-01-11 PROCEDURE — 80185 ASSAY OF PHENYTOIN TOTAL: CPT

## 2023-01-11 PROCEDURE — 95714 VEEG EA 12-26 HR UNMNTR: CPT

## 2023-01-11 PROCEDURE — 94002 VENT MGMT INPAT INIT DAY: CPT

## 2023-01-11 PROCEDURE — 99233 SBSQ HOSP IP/OBS HIGH 50: CPT | Performed by: PSYCHIATRY & NEUROLOGY

## 2023-01-11 RX ORDER — POTASSIUM CHLORIDE 20 MEQ/1
40 TABLET, EXTENDED RELEASE ORAL PRN
Status: DISCONTINUED | OUTPATIENT
Start: 2023-01-11 | End: 2023-01-14 | Stop reason: HOSPADM

## 2023-01-11 RX ORDER — POTASSIUM CHLORIDE 7.45 MG/ML
10 INJECTION INTRAVENOUS PRN
Status: DISCONTINUED | OUTPATIENT
Start: 2023-01-11 | End: 2023-01-14 | Stop reason: HOSPADM

## 2023-01-11 RX ORDER — SODIUM CHLORIDE 9 MG/ML
INJECTION, SOLUTION INTRAVENOUS CONTINUOUS
Status: DISCONTINUED | OUTPATIENT
Start: 2023-01-11 | End: 2023-01-12

## 2023-01-11 RX ORDER — LEVETIRACETAM 15 MG/ML
1500 INJECTION INTRAVASCULAR EVERY 12 HOURS
Status: DISCONTINUED | OUTPATIENT
Start: 2023-01-11 | End: 2023-01-14

## 2023-01-11 RX ORDER — PHENYTOIN SODIUM 50 MG/ML
100 INJECTION, SOLUTION INTRAMUSCULAR; INTRAVENOUS EVERY 8 HOURS
Status: DISCONTINUED | OUTPATIENT
Start: 2023-01-11 | End: 2023-01-14

## 2023-01-11 RX ADMIN — SODIUM CHLORIDE, PRESERVATIVE FREE 10 ML: 5 INJECTION INTRAVENOUS at 07:24

## 2023-01-11 RX ADMIN — SODIUM CHLORIDE: 9 INJECTION, SOLUTION INTRAVENOUS at 09:00

## 2023-01-11 RX ADMIN — DEXMEDETOMIDINE HYDROCHLORIDE IN 0.9% SODIUM CHLORIDE 0.4 MCG/KG/HR: 4 INJECTION INTRAVENOUS at 13:10

## 2023-01-11 RX ADMIN — MIDAZOLAM HYDROCHLORIDE 5 MG/HR: 1 INJECTION, SOLUTION INTRAVENOUS at 12:22

## 2023-01-11 RX ADMIN — PROPOFOL 40 MCG/KG/MIN: 10 INJECTION, EMULSION INTRAVENOUS at 04:04

## 2023-01-11 RX ADMIN — PROPOFOL 30 MCG/KG/MIN: 10 INJECTION, EMULSION INTRAVENOUS at 17:27

## 2023-01-11 RX ADMIN — LACOSAMIDE 100 MG: 10 INJECTION, SOLUTION INTRAVENOUS at 20:00

## 2023-01-11 RX ADMIN — LACOSAMIDE 100 MG: 10 INJECTION, SOLUTION INTRAVENOUS at 09:06

## 2023-01-11 RX ADMIN — LAMOTRIGINE 200 MG: 100 TABLET ORAL at 07:23

## 2023-01-11 RX ADMIN — LEVETIRACETAM 1500 MG: 15 INJECTION INTRAVENOUS at 19:35

## 2023-01-11 RX ADMIN — POTASSIUM BICARBONATE 40 MEQ: 782 TABLET, EFFERVESCENT ORAL at 09:00

## 2023-01-11 RX ADMIN — PROPOFOL 50 MCG/KG/MIN: 10 INJECTION, EMULSION INTRAVENOUS at 07:22

## 2023-01-11 RX ADMIN — DEXMEDETOMIDINE HYDROCHLORIDE IN 0.9% SODIUM CHLORIDE 0.2 MCG/KG/HR: 4 INJECTION INTRAVENOUS at 23:47

## 2023-01-11 RX ADMIN — SODIUM CHLORIDE, PRESERVATIVE FREE 10 ML: 5 INJECTION INTRAVENOUS at 21:04

## 2023-01-11 RX ADMIN — PHENYTOIN SODIUM 100 MG: 50 INJECTION INTRAMUSCULAR; INTRAVENOUS at 21:06

## 2023-01-11 RX ADMIN — PHENYTOIN SODIUM 100 MG: 100 CAPSULE ORAL at 14:10

## 2023-01-11 RX ADMIN — PROPOFOL 50 MCG/KG/MIN: 10 INJECTION, EMULSION INTRAVENOUS at 10:31

## 2023-01-11 RX ADMIN — ENOXAPARIN SODIUM 40 MG: 100 INJECTION SUBCUTANEOUS at 07:23

## 2023-01-11 RX ADMIN — LEVETIRACETAM 1500 MG: 500 TABLET, FILM COATED ORAL at 07:23

## 2023-01-11 RX ADMIN — PHENYTOIN SODIUM 100 MG: 100 CAPSULE ORAL at 07:23

## 2023-01-11 ASSESSMENT — PULMONARY FUNCTION TESTS
PIF_VALUE: 26
PIF_VALUE: 26
PIF_VALUE: 20
PIF_VALUE: 23
PIF_VALUE: 18
PIF_VALUE: 18
PIF_VALUE: 19
PIF_VALUE: 50
PIF_VALUE: 18
PIF_VALUE: 21
PIF_VALUE: 19
PIF_VALUE: 21
PIF_VALUE: 19
PIF_VALUE: 19
PIF_VALUE: 21
PIF_VALUE: 21
PIF_VALUE: 17
PIF_VALUE: 18
PIF_VALUE: 22
PIF_VALUE: 18
PIF_VALUE: 19
PIF_VALUE: 23
PIF_VALUE: 19
PIF_VALUE: 18
PIF_VALUE: 19

## 2023-01-11 ASSESSMENT — PAIN SCALES - GENERAL
PAINLEVEL_OUTOF10: 0
PAINLEVEL_OUTOF10: 0

## 2023-01-11 NOTE — PROGRESS NOTES
[id number] [facility] Neurology Progress Note      Patient:   Maralyn Nissen  MR#:    [id number]   Room:    [address] [place] [address]   YOB: 1995  Date of Progress Note: 1/11/2023  Time of Note                           8:52 AM  Consulting Physician:  Melanie Barrios DO  Attending Physician:  Stephon Knox, *      INTERVAL HISTORY: After she heard she was going home yesterday she began to have numerous tonic-clonic episodes with foaming at the mouth. Intensive review of EEG shows no clear epileptiform activity however there is copious movement artifact. Patient was ultimately intubated and is now on a propofol drip. Continuous EEG ongoing    REVIEW OF SYSTEMS:  Unable to obtain as patient is sedated and intubated      PHYSICAL EXAM:    Constitutional -   BP 95/62   Pulse 65   Temp 97.2 °F (36.2 °C) (Temporal)   Resp 16   Ht 5' 5\" (1.651 m)   Wt 186 lb (84.4 kg)   SpO2 100%   BMI 30.95 kg/m²   General appearance: No acute distress   EYES -   Conjunctiva normal  Pupillary exam as below, see CN exam in the neurologic exam  ENT-    No scars, masses, or lesions over external nose or ears  Hearing normal bilaterally to finger rub  Neck-   No neck masses noted  Thyroid normal   No jugular vein distension  Cardiovascular -   No clubbing, cyanosis, or edema   Pulmonary-   Good expansion, normal effort without use of accessory muscles  Musculoskeletal -   No significant wasting of muscles noted  Gait as below, see gait exam in the neurologic exam  Muscle strength, tone, stability as below see the motor exam in the neurologic exam.   No bony deformities  Skin -   Warm, dry, and intact to inspection and palpation.     No rash, erythema, or pallor  Psychiatric -   Mood, affect, and behavior appear normal    Memory as below see mental status examination in the neurologic exam      NEUROLOGICAL EXAM    Mental status   [] Awake, alert, oriented   [] Affect attention and concentration appear appropriate  [] Recent and remote memory appears unremarkable  [] Speech normal without dysarthria or aphasia, comprehension and repetition intact. COMMENTS: Patient is sedated and intubated   Cranial Nerves [] No VF deficit to confrontation  [] PERRLA, EOMI, no nystagmus, conjugate eye movements, no ptosis  [] Face symmetric  [] Facial sensation intact  [] Tongue midline no atrophy or fasciculations present  [] Palate midline, hearing to finger rub normal  [] Shoulder shrug and SCM testing normal  COMMENTS: Patient is sedated and intubated   Motor   [x] 5/5 strength x 4 extremities  [x] Normal bulk and tone  [x] No tremor present  [x] No rigidity or bradykinesia noted  COMMENTS: Patient is sedated and intubated   Sensory  [] Sensation intact to light touch, pin prick, vibration, and proprioception BLE  [] Sensation intact to light touch, pin prick, vibration, and proprioception BUE  COMMENTS:   Coordination [] FTN normal bilaterally   [] HTS normal bilaterally  [] YEVGENIY normal.   COMMENTS: Unable to check   Reflexes  [x] Symmetric and non-pathological  [x] Toes downgoing bilaterally  [x] No clonus present  COMMENTS:   Gait                  [] Normal steady gait    [] Ataxic    [] Spastic     [] Magnetic     [] Shuffling  [] Not assessed  COMMENTS: Unable to assess       LABS/IMAGING:    CT Head W/O Contrast    Result Date: 1/7/2023  PROCEDURE: CT HEAD WO CONTRAST CLINICAL INDICATION: Status epilepticus. COMPARISON: No relevant prior imaging is available for comparison. FINDINGS: There is no acute intracranial hemorrhage, extra-axial fluid collection, evidence of mass or mass effect. Gray-white matter differentiation is preserved. The ventricular system and cisternal spaces demonstrates normal configuration. There is no hydrocephalus. The paranasal sinuses and mastoid air cells are clear. The soft tissues are unremarkable. The orbits are also unremarkable in appearance. There are no suspicious osseous lesions.     Unremarkable CT scan of the head.    XR CHEST PORTABLE    Result Date: 1/7/2023  PROCEDURE: XR CHEST PORTABLE CLINICAL INDICATION: Status epilepticus. COMPARISON: No prior similar images available for comparison. FINDINGS: Subtle interstitial opacities are identified in the right basilar region. There is no pneumonic consolidation. Heart is not enlarged. There are no pulmonary vascular congestion changes. There is no pneumothorax. Bilateral costophrenic angles are clear. Visualized osseous structures are intact. Subtle interstitial opacities in the right basilar region may represent atelectasis or aspiration. MRI BRAIN W WO CONTRAST    Result Date: 1/7/2023  PROCEDURE: MRI BRAIN W WO CONTRAST CLINICAL INDICATION: Seizures. COMPARISON: CT head of January 6, 2023. FINDINGS: There is motion artifact on many sequences limiting evaluation. Diffusion-weighted images demonstrate no restricted diffusion to suggest a recent infarction. There is no acute intracranial hemorrhage, extra-axial fluid collection, evidence of mass or mass effect. No abnormal gradient susceptibility is present. There is no abnormal ependymal, leptomeningeal or intraparenchymal enhancement. The ventricular system and cisternal spaces demonstrates normal configuration and size. There is no hydrocephalus. Vascular flow voids are preserved. Midline structures demonstrate normal morphology and symmetry. The paranasal sinuses and mastoid air cells are clear. The soft tissues are unremarkable as visualized. The orbits are also unremarkable in appearance. There is no abnormal marrow signal.    No MR evidence of acute intracranial finding; given limitations due to motion artifact.       Lab Results   Component Value Date    WBC 8.3 01/11/2023    HGB 10.9 (L) 01/11/2023    HCT 33.8 (L) 01/11/2023    MCV 79.0 (L) 01/11/2023     01/11/2023     Lab Results   Component Value Date     01/11/2023    K 3.4 (L) 01/11/2023     01/11/2023    CO2 23 01/11/2023    BUN 7 01/11/2023 CREATININE 0.7 01/11/2023    GLUCOSE 85 01/11/2023    CALCIUM 8.7 01/11/2023    PROT 7.1 01/06/2023    LABALBU 4.2 01/06/2023    BILITOT <0.2 01/06/2023    ALKPHOS 78 01/06/2023    AST 12 01/06/2023    ALT 8 01/06/2023    LABGLOM >60 01/11/2023   No results found for: INR, PROTIME    RECORD REVIEW:   Previous medical records, medications were reviewed at today's visit. Nursing/physician notes, imaging, labs and vitals reviewed. PT,OT and/or speech notes reviewed    ASSESSMENT:  33 y.o. admitted with multiple seizures. History of underlying possible autism, anxiety depression. Long history of seizure disorder. Currently on multiple antiepileptic medications. cEEG been normal interictally and the patient typically would pull off her leads prior to having a clinical episode. Episode caught 1/10 over a 10 to 15-minute. She did not start this activity until about 20 minutes after she was told she was going home. EEG showing artifact and no clear epileptic seizure. Patient having numerous episodes over several hours and ultimately was intubated and is now on propofol. Interictal EEG looks fine. Dilantin level therapeutic. Continue other anticonvulsants with propofol for at least another 24 hours and try to wean tomorrow. Suspicions are that many of these are nonepileptic but it is difficult to be certain given her persistence if that is the case. PLAN:   Continue Keppra 1500 mg  q12h    Continue Lamictal 200 mg daily. Fosphenytoin continued with therapeutic level   cEEG monitoring   Ativan prn, seizure precautions. More than 35 minutes were spent reviewing the patient's records, examination and and counseling and coordination of care.   More than 50% spent on the latter    Ken Hickman MD  Board Certified Neurology

## 2023-01-11 NOTE — CARE COORDINATION
BRYCE following for needs; Pt intubated today;   Electronically signed by LIBBY Walker on 1/10/2023 at 6:05 PM      01/10/23 1600  [facility]     Complete  Discontinue     Provider: (Not yet assigned)   Question: Reason for Consult?  Answer: 33 yr old vented patient who recently moved here, hx of seizures was unable to get medicine, now on vent

## 2023-01-11 NOTE — PROCEDURES
[facility] [address] [facility]                          ELECTROENCEPHALOGRAM REPORT    PATIENT NAME: Georgiana Foster                     :        1995  MED REC NO:   [id number]                              ROOM:       [facility]  ACCOUNT NO:   [de-identified]                           ADMIT DATE: 2023  PROVIDER:     Balwinder Rahman MD    DATE OF EE/10/2023    CONTINUOUS VIDEO EEG    Beginning 01/10 through . DURATION OF RECORDIN hours. INDICATION FOR TEST:  Seizure-like activity. DESCRIPTION:  The patient appears to be sedated throughout the tracing. Alpha rhythm is admixed theta and delta noted at times. No focal  slowing nor any epileptiform activity is seen. IMPRESSION:  Normal sleep/drug-induced continuous video EEG tracing. Correlate clinically.         Jesus sAh MD    D: 2023 13:26:33      T: 2023 13:30:34     [email or web address]  Job#: [id number]     Doc#: [id number]    CC:

## 2023-01-11 NOTE — PROGRESS NOTES
Patient transported by nurse to  from [facility]. Patient has clothing and shoes and phone with  at bedside. Bedside report given to Marcia Denton RN. Patients mother aware of transfer and updated on condition. Hospitalist and neurology aware. Patient began seizing once arrival to room and given another 2 mg of ativan for a total of 6 mg of ativan at this time. Patient continues to not respond to nurse.  Electronically signed by Daniel Gonzales RN on 1/10/2023 at 7:17 PM

## 2023-01-11 NOTE — CARE COORDINATION
Case Management Assessment  Initial Evaluation    Date/Time of Evaluation: 1/11/2023 5:49 PM  Assessment Completed by: LIBBY Pinto    If patient is discharged prior to next notation, then this note serves as note for discharge by case management. Patient Name: Sharon Schwartz                   YOB: 1995  Diagnosis: Status epilepticus [facility]) Jessenia Preez                   Date / Time: 1/6/2023  7:16 PM    Patient Admission Status: Inpatient   Readmission Risk (Low < 19, Mod (19-27), High > 27): Readmission Risk Score: 5.1    Current PCP: None Provider  PCP verified by CM?  (Pt's mother stated that she had a scheduled appt for today to est new PCP at the [facility] affiliated with [facility]; but is hospitalized, so this will have to be rescheduled;)    Chart Reviewed: Yes      History Provided by:    Patient Orientation: Sedated, Other (see comment) (ventilated/sedated)    Patient Cognition: Other (see comment) (unable to assess; Pt is intubated)    Hospitalization in the last 30 days (Readmission):  No    If yes, Readmission Assessment in CM Navigator will be completed. Advance Directives:      Code Status: Full Code   Patient's Primary Decision Maker is:  (Pt's mother is legal guardian; paperwork in soft chart)    Primary Decision Maker: [address] [place] [address] [phone]    Discharge Planning:    Patient lives with: Children, Family Members, Parent Type of Home: House  Primary Care Giver: Family (mother)  Patient Support Systems include: Parent   Current Financial resources: Other (Comment) (commercial insurance)  Current community resources: None  Current services prior to admission: None            Current DME:              Type of Home Care services:  None    ADLS  Prior functional level: Independent in ADLs/IADLs, Other (see comment) (mom stated Pt was mostly IND with ADLs and some IADLs; but needed some help/oversight r/t her autism)  Current functional level:  Other (see comment) (intubated/sedated)    PT AM-PAC:   /24  OT AM-PAC:   /24    Family can provide assistance at DC: Yes  Would you like Case Management to discuss the discharge plan with any other family members/significant others, and if so, who? Plans to Return to Present Housing: Unknown at present  Other Identified Issues/Barriers to RETURNING to current housing: no; as long as Pt is able to come off vent and progress  Potential Assistance needed at discharge:  (unknown as Pt on vent)            Potential DME:    Patient expects to discharge to: Unknown (likely home if recovers fully)  Plan for transportation at discharge: Family    Financial    Payor: BCBS / Plan: [address] [place] [address] / Product Type: *No Product type* /     Does insurance require precert for SNF: Yes    Potential assistance Purchasing Medications: Yes  Meds-to-Beds request: Not [address], [address] [facility] [phone]  [address] [place] [address]  Phone: [phone] Fax: [phone]      Notes:    Factors facilitating achievement of predicted outcomes: Family support    Barriers to discharge: Impulsivity, Limited safety awareness, and Limited insight into deficits    Additional Case Management Notes: SW placed call to Pt's mother/guardian re: d/c planning; guardianship paperwork provided by Pt's mother; placed into soft chart by Flor; Pt and spouse are in the process of possible divorce; Pt has autism which can also affect her abilities re: parenting and completing tasks; this is why parent/mother was appointed guardianship in 2014 and also has Pt's three children and 3 dogs; spouse is a ; family/Pt new to [facility] from [facility]; following to help with any needs; she also asked that we remove her spouse from her contacts.      The Plan for Transition of Care is related to the following treatment goals of Status epilepticus ([facility] [address] [B10.001]    IF APPLICABLE: The Patient and/or patient representative Ann Marie Sims and her family were provided with a choice of provider and agrees with the discharge plan. Freedom of choice list with basic dialogue that supports the patient's individualized plan of care/goals and shares the quality data associated with the providers was provided to:     Patient Representative Name:       The Patient and/or Patient Representative Agree with the Discharge Plan? Shaniqua Miranda [facility]  Case Management Department  Ph: [phone] Fax: [phone]  Electronically signed by LIBBY Iverson on 1/11/2023 at 5:54 PM        [place] Coma Scale  Eye Opening: To pain  Best Verbal Response: None  Best Motor Response:  Withdraws from pain  [place] Coma Scale Score: 7

## 2023-01-11 NOTE — PROGRESS NOTES
HOSPITAL MEDICINE  - PROGRESS NOTE    Admit Date: 1/6/2023         CC: seizure     Subjective: pt intubated and sedated     Objective: pt intubated, sedated, in no distress, good urine output, EEG in progress     Diet: Diet NPO  Pain is:None  Nausea:None  Bowel Movement/Flatus no    Data:   Scheduled Meds: Reviewed  Current Facility-Administered Medications   Medication Dose Route Frequency Provider Last Rate Last Admin    potassium chloride (KLOR-CON M) extended release tablet 40 mEq  40 mEq Oral PRN Annie Beach MD        Or    potassium bicarb-citric acid (EFFER-K) effervescent tablet 40 mEq  40 mEq Oral PRN Annie Beach MD   40 mEq at 01/11/23 0900    Or    potassium chloride 10 mEq/100 mL IVPB (Peripheral Line)  10 mEq IntraVENous PRN Annie Beach MD        potassium chloride 10 mEq/100 mL IVPB (Peripheral Line)  10 mEq IntraVENous PRN Annie Beach MD        0.9 % sodium chloride infusion   IntraVENous Continuous Annie Beach  mL/hr at 01/11/23 0900 New Bag at 01/11/23 0900    phenytoin (DILANTIN) injection 100 mg  100 mg IntraVENous Q8H Annie Beach MD        levETIRAcetam (KEPPRA) 1500 mg/100 mL IVPB  1,500 mg IntraVENous Q12H Annie Beach MD        lacosamide (VIMPAT) 100 mg in sodium chloride 0.9 % 60 mL IVPB  100 mg IntraVENous BID Judy Ron MD   Stopped at 01/11/23 1046    dexmedetomidine (PRECEDEX) 400 mcg in sodium chloride 0.9 % 100 mL infusion  0.1-1.5 mcg/kg/hr IntraVENous Continuous Judy Ron MD 8.4 mL/hr at 01/11/23 1310 0.4 mcg/kg/hr at 01/11/23 1310    propofol injection  5-50 mcg/kg/min IntraVENous Continuous Judy Ron MD 15.2 mL/hr at 01/11/23 1727 30 mcg/kg/min at 01/11/23 1727    midazolam (VERSED) 1 mg/mL in NS infusion  1-10 mg/hr IntraVENous Continuous Selina New MD 5 mL/hr at 01/11/23 1222 5 mg/hr at 01/11/23 1222    enoxaparin (LOVENOX) injection 40 mg  40 mg SubCUTAneous Daily Cheri Gupta MD   40 mg at 01/11/23 0723    lamoTRIgine (LAMICTAL) tablet 200 mg  200 mg Oral Daily Tru Cardona DO   200 mg at 01/11/23 0723    LORazepam (ATIVAN) injection 1 mg  1 mg IntraVENous Q5 Min PRN Annika Lopez MD   1 mg at 01/10/23 1417    sodium chloride flush 0.9 % injection 5-40 mL  5-40 mL IntraVENous 2 times per day Annika Lopez MD   10 mL at 01/11/23 0724    sodium chloride flush 0.9 % injection 5-40 mL  5-40 mL IntraVENous PRN Annika Lopez MD        0.9 % sodium chloride infusion   IntraVENous PRN Annika Lopez MD 20 mL/hr at 01/07/23 1914 New Bag at 01/07/23 1914    ondansetron (ZOFRAN-ODT) disintegrating tablet 4 mg  4 mg Oral Q8H PRN Annika Lopez MD   4 mg at 01/10/23 0142    Or    ondansetron (ZOFRAN) injection 4 mg  4 mg IntraVENous Q6H PRN Annika Lopez MD        acetaminophen (TYLENOL) tablet 650 mg  650 mg Oral Q4H PRN Annika Lopez MD   650 mg at 01/10/23 0142    Or    acetaminophen (TYLENOL) suppository 650 mg  650 mg Rectal Q4H PRN Annika Lopez MD        sodium phosphate 10 mmol in sodium chloride 0.9 % 250 mL IVPB  10 mmol IntraVENous PRDAVID Lopez MD        Or    sodium phosphate 15 mmol in sodium chloride 0.9 % 250 mL IVPB  15 mmol IntraVENous PRN Annika Lopez MD        Or    sodium phosphate 20 mmol in sodium chloride 0.9 % 500 mL IVPB  20 mmol IntraVENous PRN Annika Lopez MD        magnesium sulfate 2000 mg in 50 mL IVPB premix  2,000 mg IntraVENous PRN Annika Lopez MD        polyethylene glycol (GLYCOLAX) packet 17 g  17 g Oral Daily PRN Annika Lopez MD        melatonin disintegrating tablet 5 mg  5 mg Oral Nightly PRN Annika Lopez MD   5 mg at 01/10/23 0142    calcium carbonate (TUMS) chewable tablet 500 mg  500 mg Oral TID PRN Annika Lopez MD        albuterol sulfate HFA (PROVENTIL;VENTOLIN;PROAIR) 108 (90 Base) MCG/ACT inhaler 2 puff  2 puff Inhalation Q4H PRN Annika Lopez MD         DVT Prophylaxis: Lovenox 40 mg sq daily    Continuous Infusions:   sodium chloride 125 mL/hr at 01/11/23 0900    dexmedetomidine HCl in NaCl 0.4 mcg/kg/hr (01/11/23 1310)    propofol 30 mcg/kg/min (01/11/23 1727)    midazolam 5 mg/hr (01/11/23 1222)    sodium chloride 20 mL/hr at 01/07/23 1914       Intake/Output Summary (Last 24 hours) at 1/11/2023 1749  Last data filed at 1/11/2023 1600  Gross per 24 hour   Intake 1989.98 ml   Output 1395 ml   Net 594.98 ml     CBC:   Recent Labs     01/10/23  0323 01/11/23  0128   WBC 10.8 8.3   HGB 11.9* 10.9*    211     BMP:  Recent Labs     01/10/23  0237 01/10/23  1208 01/10/23  1658 01/11/23  0128   * 138  --  137   K 4.1 3.7 3.5 3.4*    101  --  103   CO2 17* 23  --  23   BUN 7 6  --  7   CREATININE 0.6 0.7  --  0.7   GLUCOSE 92 103  --  85     ABGs:   Lab Results   Component Value Date/Time    PHART 7.490 01/10/2023 04:58 PM    PO2ART 100.0 01/10/2023 04:58 PM    HIU9YAT 34.0 01/10/2023 04:58 PM     INR: No results for input(s): INR in the last 72 hours. Objective:   Vitals: /66   Pulse 60   Temp 97.4 °F (36.3 °C) (Temporal)   Resp 16   Ht 5' 5\" (1.651 m)   Wt 186 lb (84.4 kg)   SpO2 100%   BMI 30.95 kg/m²   General appearance: intubated and sedated   Skin: Skin color, texture, turgor normal.   HEENT: Head: Normocephalic, no lesions, without obvious abnormality.   Neck: no adenopathy, no carotid bruit, no JVD, and supple, symmetrical, trachea midline  Lungs: clear to auscultation bilaterally  Heart: regular rate and rhythm, S1, S2 normal, no murmur, click, rub or gallop  Abdomen: soft, non-tender; bowel sounds normal; no masses,  no organomegaly  Extremities: extremities normal, atraumatic, no cyanosis or edema  Lymphatic: No significant lymph node enlargement papable  Neurologic: Mental status: sedated        Assessment & Plan:    Status epilepticus--EEG in progress, cont current meds- pt intubated currently - on propofol    Disposition: TBD    Salo Roberts MD

## 2023-01-11 NOTE — PROGRESS NOTES
Comprehensive Nutrition Assessment    Type and Reason for Visit:  Initial    Nutrition Recommendations/Plan:   Continue POC. Consider initiating EN to meet nutritional needs. Malnutrition Assessment:  Malnutrition Status:  No malnutrition (01/11/23 0908)    Context:  Acute Illness     Findings of the 6 clinical characteristics of malnutrition:  Energy Intake:  No significant decrease in energy intake  Weight Loss:  No significant weight loss     Body Fat Loss:  No significant body fat loss     Muscle Mass Loss:  No significant muscle mass loss    Fluid Accumulation:  No significant fluid accumulation     Strength:  Not Performed    Nutrition Assessment:    Pt is nutritionally compromised AEB mechanical ventilation and NPO. Propofol currently provides 27.83 non-protein kcals/day. Please consider initiating alternate means of nutrition if pt is unable to extubate and progress to an oral diet within the next 24 hrs. Nutrition Related Findings:      Wound Type: None       Current Nutrition Intake & Therapies:    Average Meal Intake: NPO  Diet NPO    Anthropometric Measures:  Height: 5' 5\" (165.1 cm)  Ideal Body Weight (IBW): 125 lbs (57 kg)    Current Body Weight: 186 lb (84.4 kg)  Current BMI (kg/m2): 31  BMI Categories: Obese Class 1 (BMI 30.0-34. 9)    Estimated Daily Nutrient Needs:  Energy Requirements Based On: Kcal/kg  Weight Used for Energy Requirements: Current  Energy (kcal/day): 320-6935 kcals/day  Weight Used for Protein Requirements: Ideal  Protein (g/day): 114 g/protein/day  Method Used for Fluid Requirements: 1 ml/kcal  Fluid (ml/day): 924-1176 mL/day    Nutrition Diagnosis:   Inadequate oral intake related to impaired respiratory function as evidenced by NPO or clear liquid status due to medical condition, intubation    Nutrition Interventions:   Food and/or Nutrient Delivery: Continue NPO  Coordination of Nutrition Care: Continue to monitor while inpatient    Goals:  Goals: Initiate PO diet    Nutrition Monitoring and Evaluation:   Food/Nutrient Intake Outcomes: Diet Advancement/Tolerance  Physical Signs/Symptoms Outcomes: Biochemical Data, Nutrition Focused Physical Findings, Weight, Fluid Status or Edema    Rachel Marquez, MS, RD, LD  Contact: [phone]

## 2023-01-11 NOTE — PROCEDURES
STEVEN [facility] [employer] [facility] [address], [address] [facility]                          ELECTROENCEPHALOGRAM REPORT    PATIENT NAME: Scott Hawkins                     :        1995  MED REC NO:   [id number]                              ROOM:       [facility]  ACCOUNT NO:   [de-identified]                           ADMIT DATE: 2023  PROVIDER:     Bere Hinds MD    DATE OF EE2023    CONTINUOUS VIDEO EEG    DURATION:  25 hours. INDICATION FOR TEST:  Possible seizure. DESCRIPTION:  The waking background consists of a rhythmic and symmetric  well-formed and well-regulated posterior dominant 8 to 9 Hz activity. During drowsiness, background frequency decreased by 2 to 3 Hz. Stage  II sleep is characterized by vertex sharp transients and sleep spindles. There is much movement artifact throughout the tracing as the patient is  active on her cell phone. Towards the end of the tracing on 01/10, the  patient began to have clinical activity with stiffening of the arms and  legs and flossing at the mouth. Movement artifact is seen throughout  the majority of this with no clear epileptiform activity noted. Between  these episodes, there appears to be a normal background even though the  patient was given Ativan. IMPRESSION:  No clear epileptiform activity noted including at the end  of the tracing when the patient is having clinical seizure-like  activity, although, background obscured by movement artifact for the  most part. Correlate clinically and consider followup testing if  clinically indicated.         Derrell Anderson MD    D: 2023 13:22:50      T: 2023 13:26:35     [email or web address]  Job#: [id number]     Doc#: [id number]    CC:

## 2023-01-11 NOTE — CARE COORDINATION
01/11/23 1379   Service Assessment   Patient Orientation Sedated; Other (see comment)  (ventilated/sedated)   Cognition Other (see comment)  (unable to assess; Pt is intubated)   Primary Caregiver Family  (mother)   Owen Mas is:   (Pt's mother is legal guardian; paperwork in soft chart)   PCP Verified by CM   (Pt's mother stated that she had a scheduled appt for today to est new PCP at the [facility] with [facility]; but is hospitalized, so this will have to be rescheduled;)   Prior Functional Level Independent in ADLs/IADLs; Other (see comment)  (mom stated Pt was mostly IND with ADLs and some IADLs; but needed some help/oversight r/t her autism)   Current Functional Level Other (see comment)  (intubated/sedated)   Can patient return to prior living arrangement Unknown at present   Ability to make needs known: Unable   Family able to assist with home care needs: Yes   Financial Resources Other (Comment)  (commercial insurance)   Community Resources None   CM/SW Referral No PCP; Other (see comment)  (discharge planning; Pt and family new to area from [place]; has insurance; but her doctors in [place] wouldn't write scripts for her medications since she was crossing state lines/moving and wouldn't be their Pt any longer)   Social/Functional History   Lives With Parent;Son;Daughter;Family   Type of [address] [place] [address] One level   Bathroom Shower/Tub Tub/Shower unit   [employer] None   Receives Help From Family   ADL Assistance Independent  (mostly IND per parent prior to admission)   [address] Needs assistance   Ambulation Assistance Independent   Transfer Assistance Independent   Active  No   Discharge Planning   Type of [facility] Children;Family Members;Parent   Current Services Prior To Admission None   Potential Assistance Needed   (unknown as Pt on vent)   [facility] Medications Yes   Patient expects to be discharged to: Unknown  (likely home if recovers fully)   One/Two Story Residence One story   History of falls?  0   Services At/After Discharge   Services At/After Discharge Community Resources   Mode of Transport at Discharge Other (see comment)  (family)   Confirm Follow Up Transport Family   Electronically signed by LIBBY Hull on 1/11/2023 at 5:48 PM

## 2023-01-12 LAB
ANION GAP SERPL CALCULATED.3IONS-SCNC: 16 MMOL/L (ref 7–19)
BASOPHILS ABSOLUTE: 0 K/UL (ref 0–0.2)
BASOPHILS RELATIVE PERCENT: 0.4 % (ref 0–1)
BLOOD CULTURE, ROUTINE: NORMAL
BUN BLDV-MCNC: 4 MG/DL (ref 6–20)
CALCIUM SERPL-MCNC: 8.8 MG/DL (ref 8.6–10)
CHLORIDE BLD-SCNC: 106 MMOL/L (ref 98–111)
CO2: 15 MMOL/L (ref 22–29)
CREAT SERPL-MCNC: 0.5 MG/DL (ref 0.5–0.9)
EOSINOPHILS ABSOLUTE: 0.3 K/UL (ref 0–0.6)
EOSINOPHILS RELATIVE PERCENT: 3.5 % (ref 0–5)
FOLATE: 8.1 NG/ML (ref 4.8–37.3)
GFR SERPL CREATININE-BSD FRML MDRD: >60 ML/MIN/{1.73_M2}
GLUCOSE BLD-MCNC: 71 MG/DL (ref 70–99)
GLUCOSE BLD-MCNC: 72 MG/DL (ref 74–109)
HCT VFR BLD CALC: 42.6 % (ref 37–47)
HEMOGLOBIN: 13.1 G/DL (ref 12–16)
IMMATURE GRANULOCYTES #: 0 K/UL
LACTIC ACID: 1.1 MMOL/L (ref 0.5–1.9)
LYMPHOCYTES ABSOLUTE: 2.4 K/UL (ref 1.1–4.5)
LYMPHOCYTES RELATIVE PERCENT: 33.8 % (ref 20–40)
MCH RBC QN AUTO: 25.2 PG (ref 27–31)
MCHC RBC AUTO-ENTMCNC: 30.8 G/DL (ref 33–37)
MCV RBC AUTO: 81.9 FL (ref 81–99)
MONOCYTES ABSOLUTE: 0.7 K/UL (ref 0–0.9)
MONOCYTES RELATIVE PERCENT: 9 % (ref 0–10)
NEUTROPHILS ABSOLUTE: 3.8 K/UL (ref 1.5–7.5)
NEUTROPHILS RELATIVE PERCENT: 53.2 % (ref 50–65)
PDW BLD-RTO: 15.5 % (ref 11.5–14.5)
PERFORMED ON: NORMAL
PHENYTOIN LEVEL: 13.2 UG/ML (ref 10–20)
PLATELET # BLD: 164 K/UL (ref 130–400)
PMV BLD AUTO: 11.6 FL (ref 9.4–12.3)
POTASSIUM REFLEX MAGNESIUM: 3.8 MMOL/L (ref 3.5–5)
RBC # BLD: 5.2 M/UL (ref 4.2–5.4)
SODIUM BLD-SCNC: 137 MMOL/L (ref 136–145)
VITAMIN B-12: 680 PG/ML (ref 211–946)
WBC # BLD: 7.2 K/UL (ref 4.8–10.8)

## 2023-01-12 PROCEDURE — 6360000002 HC RX W HCPCS: Performed by: PSYCHIATRY & NEUROLOGY

## 2023-01-12 PROCEDURE — 2700000000 HC OXYGEN THERAPY PER DAY

## 2023-01-12 PROCEDURE — 36415 COLL VENOUS BLD VENIPUNCTURE: CPT

## 2023-01-12 PROCEDURE — 2500000003 HC RX 250 WO HCPCS: Performed by: PSYCHIATRY & NEUROLOGY

## 2023-01-12 PROCEDURE — 6360000002 HC RX W HCPCS: Performed by: HOSPITALIST

## 2023-01-12 PROCEDURE — 6360000002 HC RX W HCPCS: Performed by: STUDENT IN AN ORGANIZED HEALTH CARE EDUCATION/TRAINING PROGRAM

## 2023-01-12 PROCEDURE — 2000000000 HC ICU R&B

## 2023-01-12 PROCEDURE — 2580000003 HC RX 258: Performed by: PSYCHIATRY & NEUROLOGY

## 2023-01-12 PROCEDURE — 2500000003 HC RX 250 WO HCPCS: Performed by: HOSPITALIST

## 2023-01-12 PROCEDURE — 95714 VEEG EA 12-26 HR UNMNTR: CPT

## 2023-01-12 PROCEDURE — 82947 ASSAY GLUCOSE BLOOD QUANT: CPT

## 2023-01-12 PROCEDURE — 99233 SBSQ HOSP IP/OBS HIGH 50: CPT | Performed by: PSYCHIATRY & NEUROLOGY

## 2023-01-12 PROCEDURE — 2580000003 HC RX 258: Performed by: HOSPITALIST

## 2023-01-12 PROCEDURE — C9254 INJECTION, LACOSAMIDE: HCPCS | Performed by: PSYCHIATRY & NEUROLOGY

## 2023-01-12 PROCEDURE — 82746 ASSAY OF FOLIC ACID SERUM: CPT

## 2023-01-12 PROCEDURE — 80048 BASIC METABOLIC PNL TOTAL CA: CPT

## 2023-01-12 PROCEDURE — 85025 COMPLETE CBC W/AUTO DIFF WBC: CPT

## 2023-01-12 PROCEDURE — C1751 CATH, INF, PER/CENT/MIDLINE: HCPCS

## 2023-01-12 PROCEDURE — 83605 ASSAY OF LACTIC ACID: CPT

## 2023-01-12 PROCEDURE — 82607 VITAMIN B-12: CPT

## 2023-01-12 PROCEDURE — 80185 ASSAY OF PHENYTOIN TOTAL: CPT

## 2023-01-12 PROCEDURE — 95720 EEG PHY/QHP EA INCR W/VEEG: CPT | Performed by: PSYCHIATRY & NEUROLOGY

## 2023-01-12 PROCEDURE — 36410 VNPNXR 3YR/> PHY/QHP DX/THER: CPT

## 2023-01-12 PROCEDURE — 94003 VENT MGMT INPAT SUBQ DAY: CPT

## 2023-01-12 PROCEDURE — 6370000000 HC RX 637 (ALT 250 FOR IP): Performed by: PSYCHIATRY & NEUROLOGY

## 2023-01-12 PROCEDURE — 76937 US GUIDE VASCULAR ACCESS: CPT

## 2023-01-12 RX ADMIN — PROPOFOL 45 MCG/KG/MIN: 10 INJECTION, EMULSION INTRAVENOUS at 16:34

## 2023-01-12 RX ADMIN — PROPOFOL 45 MCG/KG/MIN: 10 INJECTION, EMULSION INTRAVENOUS at 20:25

## 2023-01-12 RX ADMIN — DEXMEDETOMIDINE HYDROCHLORIDE IN 0.9% SODIUM CHLORIDE 0.4 MCG/KG/HR: 4 INJECTION INTRAVENOUS at 13:03

## 2023-01-12 RX ADMIN — ENOXAPARIN SODIUM 40 MG: 100 INJECTION SUBCUTANEOUS at 08:40

## 2023-01-12 RX ADMIN — PROPOFOL 45 MCG/KG/MIN: 10 INJECTION, EMULSION INTRAVENOUS at 01:45

## 2023-01-12 RX ADMIN — SODIUM CHLORIDE: 9 INJECTION, SOLUTION INTRAVENOUS at 05:27

## 2023-01-12 RX ADMIN — PROPOFOL 45 MCG/KG/MIN: 10 INJECTION, EMULSION INTRAVENOUS at 09:25

## 2023-01-12 RX ADMIN — LACOSAMIDE 100 MG: 10 INJECTION, SOLUTION INTRAVENOUS at 21:04

## 2023-01-12 RX ADMIN — LEVETIRACETAM 1500 MG: 15 INJECTION INTRAVENOUS at 19:37

## 2023-01-12 RX ADMIN — PHENYTOIN SODIUM 100 MG: 50 INJECTION INTRAMUSCULAR; INTRAVENOUS at 15:10

## 2023-01-12 RX ADMIN — PHENYTOIN SODIUM 100 MG: 50 INJECTION INTRAMUSCULAR; INTRAVENOUS at 20:54

## 2023-01-12 RX ADMIN — LEVETIRACETAM 1500 MG: 15 INJECTION INTRAVENOUS at 08:38

## 2023-01-12 RX ADMIN — SODIUM CHLORIDE, PRESERVATIVE FREE 10 ML: 5 INJECTION INTRAVENOUS at 08:41

## 2023-01-12 RX ADMIN — PROPOFOL 45 MCG/KG/MIN: 10 INJECTION, EMULSION INTRAVENOUS at 05:27

## 2023-01-12 RX ADMIN — PROPOFOL 45 MCG/KG/MIN: 10 INJECTION, EMULSION INTRAVENOUS at 13:44

## 2023-01-12 RX ADMIN — LAMOTRIGINE 200 MG: 100 TABLET ORAL at 08:41

## 2023-01-12 RX ADMIN — MIDAZOLAM HYDROCHLORIDE 8 MG/HR: 1 INJECTION, SOLUTION INTRAVENOUS at 13:04

## 2023-01-12 RX ADMIN — LACOSAMIDE 100 MG: 10 INJECTION, SOLUTION INTRAVENOUS at 08:28

## 2023-01-12 RX ADMIN — SODIUM BICARBONATE: 84 INJECTION, SOLUTION INTRAVENOUS at 23:03

## 2023-01-12 RX ADMIN — SODIUM BICARBONATE: 84 INJECTION, SOLUTION INTRAVENOUS at 09:49

## 2023-01-12 ASSESSMENT — PULMONARY FUNCTION TESTS
PIF_VALUE: 18
PIF_VALUE: 17
PIF_VALUE: 17
PIF_VALUE: 18
PIF_VALUE: 18
PIF_VALUE: 17
PIF_VALUE: 18
PIF_VALUE: 18
PIF_VALUE: 17
PIF_VALUE: 19
PIF_VALUE: 17
PIF_VALUE: 18
PIF_VALUE: 17
PIF_VALUE: 18
PIF_VALUE: 17
PIF_VALUE: 18
PIF_VALUE: 18
PIF_VALUE: 19
PIF_VALUE: 23
PIF_VALUE: 17
PIF_VALUE: 17
PIF_VALUE: 21

## 2023-01-12 ASSESSMENT — PAIN SCALES - WONG BAKER: WONGBAKER_NUMERICALRESPONSE: 0

## 2023-01-12 ASSESSMENT — PAIN SCALES - GENERAL: PAINLEVEL_OUTOF10: 0

## 2023-01-12 NOTE — CONSULTS
Comprehensive Nutrition Assessment    Type and Reason for Visit:  Reassess    Nutrition Recommendations/Plan:   Start Vital AF @ 6ml/hr with 4 Proteinex as tf flush and 20ml free water flush. Adjust TF rate as Propofol's rate is modified. Malnutrition Assessment:  Malnutrition Status: At risk for malnutrition (Comment) (01/12/23 6981)    Context:  Acute Illness     Findings of the 6 clinical characteristics of malnutrition:  Energy Intake:  Mild decrease in energy intake (Comment)  Weight Loss:  No significant weight loss     Body Fat Loss:  No significant body fat loss     Muscle Mass Loss:  No significant muscle mass loss    Fluid Accumulation:  No significant fluid accumulation Extremities   Strength:  Not Performed    Nutrition Assessment:    Received consult for TF orders and management. To start Vital AF @ 6ml with 4 Proteinex and 20ml free water flush. No new wt    Nutrition Related Findings:    on vent. Wound Type: None       Current Nutrition Intake & Therapies:    Average Meal Intake: NPO  Average Supplements Intake: NPO  Current Tube Feeding (TF) Orders:  Feeding Route: Orogastric  Formula: Peptide AF 1.2  Schedule: Continuous  Feeding Regimen: Vital AF 1.2Goal rate 6ml/hr with 4 Proteinex as tf flush  Additives/Modulars: Protein  Water Flushes: 20ml hourly  Current TF & Flush Orders Provides: 0  Goal TF & Flush Orders Provides: Vital AF 1.2 @ 6 ml/hr = 172 kcals with 10g protein, 15g CHO and 116ml free water. 480ml free water from flush. Proteinex adds another 416 kcals with 104g protein. Propofol adds 601 NP kcals    Anthropometric Measures:  Height: 5' 5\" (165.1 cm)  Ideal Body Weight (IBW): 125 lbs (57 kg)       Current Body Weight: 186 lb (84.4 kg),   IBW. Current BMI (kg/m2): 31  BMI Categories: Obese Class 1 (BMI 30.0-34. 9)    Estimated Daily Nutrient Needs:  Energy Requirements Based On: Kcal/kg  Weight Used for Energy Requirements: Current  Energy (kcal/day): 548-9952 kcals/day  Weight Used for Protein Requirements: Ideal  Protein (g/day): 114 g/protein/day  Method Used for Fluid Requirements: 1 ml/kcal  Fluid (ml/day): 924-1176 mL/day    Nutrition Diagnosis:   Inadequate oral intake related to acute injury/trauma, impaired respiratory function as evidenced by NPO or clear liquid status due to medical condition, intubation, nutrition support - enteral nutrition    Nutrition Interventions:   Food and/or Nutrient Delivery: Continue NPO, Start Tube Feeding  Nutrition Education/Counseling: No recommendation at this time  Coordination of Nutrition Care: Continue to monitor while inpatient  Plan of Care discussed with: nursing    Goals:  Previous Goal Met: No Progress toward Goal(s)  Goals: Meet at least 75% of estimated needs       Nutrition Monitoring and Evaluation:   Behavioral-Environmental Outcomes: None Identified  Food/Nutrient Intake Outcomes: Enteral Nutrition Intake/Tolerance  Physical Signs/Symptoms Outcomes: Biochemical Data, Fluid Status or Edema, Weight, Skin    Discharge Planning:     Too soon to determine     Orin Patel MS, RD, LD  Contact: [phone]

## 2023-01-12 NOTE — PROGRESS NOTES
Upon coming on to shift, pt was awake with current level of sedation and attempting to pull et tube despite being in restraints. Pt was moving about frequently, sedation increase over the next hour to keep patient comfortable.

## 2023-01-12 NOTE — CONSULTS
[facility] Vascular Access Team:  Consult Note    Patient: Shaquille Nichols  YOB: 1995   MRN: [id number]  Room: [address] [place] [address]     Attending Physician: Henry Aldridge, *  Ordering Physician: Henry Aldridge, *    Diagnosis:   Status epilepticus ([facility] [G40.901]    Active LDAs:  Midline Double Lumen 95/37/15 Left Basilic (Active)   Number of days: 0       Peripheral IV 01/10/23 Left;Ventral Hand (Active)   Number of days: 2       Peripheral IV 01/10/23 Distal;Right;Ventral Forearm (Active)   Number of days: 1       Urinary Catheter 01/10/23 (Active)   Number of days: 1       Reason for Consult:  [facility] vascular access team consulted for placement of reliable IV access. Indication(s):  Shaquille Nichols is a 33 y.o. female admitted to [address] [place] [address] for Status epilepticus ([facility] Shaquille Nichols currently has one functioning IV that has several critically important IV medications infusing. Needs reliable IV access to deliver meds safely and obtain labwork. Findings:  A 5.5 Citizen of [place] Dual Lumen Midline was determined to be the most appropriate line following MAGIC guidelines for labwork and medication delivery. With patient's poor vasculature, there were no suitable veins in forearms. Veins too deep in upper arm for traditional IV. See procedural note. Past Medical History:   Diagnosis Date    Anxiety     Autism     Depression     Epilepsy ([facility]     Seizures ([facility]        Recent Labs     Units 01/12/23  0205   WBC K/uL 7.2   PLT K/uL 164   CREATININE mg/dL 0.5       Impression/Plan:  1. Midline is ready to be used    Thank you for your time and consult.      Electronically Signed By: Eleno Cuba RN on 1/12/2023 at 2:30 PM

## 2023-01-12 NOTE — PROCEDURES
[facility] Vascular Access Team:  MidLine Insertion Procedure Note      Patient: Bruno Gleason  YOB: 1995   MRN: [id number]  Room: [address] [place] [address]     Attending Physician - George Noel, *      Diagnosis:   Status epilepticus ([facility] [F14.314]       Procedure(s): Insertion of 5.5 [place] Dual Lumen Power Midline    Indication(s):   Poor Venous Access    Date of Procedure: 1/12/2023   Start Time: 1340  End Time: 1415    Proceduralist: Imer Urbina RN    Anesthesia: Local Injection <=5 mL 1% Lidocaine without Epinephrine    Estimated Blood Loss (mL): Minimal    Complications: None    Midline Double Lumen 24/39/12 Left Basilic (Active)   Criteria for Appropriate Use Limited/no vessel suitable for conventional peripheral access 01/12/23 1415   Site Assessment Clean, dry & intact 01/12/23 1415   Phlebitis Assessment No symptoms 01/12/23 1415   Infiltration Assessment 0 01/12/23 1415   External Catheter Length (cm) 1 cm 01/12/23 1415   Proximal Lumen Color/Status White;Flushed;Normal saline locked; Blood return noted; Alcohol cap applied 01/12/23 1415   Distal Lumen Color/Status Yellow; Flushed;Normal saline locked; Blood return noted; Alcohol cap applied 01/12/23 1415   Alcohol Cap Used Yes 01/12/23 1415   Date of Last Dressing Change 01/12/23 01/12/23 1415   Dressing Type Transparent; Antimicrobial;Securing device 01/12/23 1415   Dressing Status New dressing applied;Clean, dry & intact 01/12/23 1415   Dressing Intervention New 01/12/23 1415         Findings:   1. Successful insertion of Midline. 2. Midline is ready to be used. Please change tubing prior to using the new Midline. Make sure to label, date and use alcohol caps on new tubing and alcohol caps on unused ports. Detailed Description of Procedure:   Patient lost IV access and only has one PIV left for multiple IV drips/medications. Difficult venous access as phlebotomy is unable to obtain labwork and nursing is unable to establish new IV. Asked to start Midline for lab draws and access for medications as soon as possible. The Left Basilic vein was visualized using the ultrasound and deemed a suitable vessel. The area was prepped with Chlorhexidine and draped in sterile fashion using full max barrier draping. The area was anesthetized with 3 cc's of 1% Lidocaine. The vein was accessed using US guidance. The guidewire was advanced through the needle to secure the vein. The needle was removed and a peel-a-way Sheath was placed over the guidewire. Guidewire was removed with tip intact. The 5.5 [place] Dual Lumen Power Midline was left untrimmed at 15 cm and inserted into the Sheath. The peel-a-way sheath was removed. Approximately 1 cm exposed. All lumens had brisk blood return and was flushed with 30 cc of NS. The Midline was secured using a securement device and a Biopatch was placed over the insertion site. A Tegaderm was placed over the securement device and insertion site. Dressing was dated and initialed with external measurement marked. Patient did tolerate procedure well.         Electronically signed by Eleno Cuba RN on 1/12/2023 at 2:26 PM

## 2023-01-12 NOTE — PROGRESS NOTES
[facility] Neurology Progress Note      Patient:   Odilia Liang  MR#:    [id number]   Room:    9548/709-46   YOB: 1995  Date of Progress Note: 1/12/2023  Time of Note                           12:11 PM  Consulting Physician:  Quinton Casillas DO  Attending Physician:  Mirian Flaherty, *      INTERVAL HISTORY: Patient agitated at times pulling at her tubes. Sedation was increased overnight. Long talk with nurse. Continuous EEG ongoing    REVIEW OF SYSTEMS:  Unable to obtain as patient is sedated and intubated      PHYSICAL EXAM:    Constitutional -   /80   Pulse 54   Temp 97.2 °F (36.2 °C) (Rectal)   Resp 16   Ht 5' 5\" (1.651 m)   Wt 186 lb (84.4 kg)   SpO2 98%   BMI 30.95 kg/m²   General appearance: No acute distress   EYES -   Conjunctiva normal  Pupillary exam as below, see CN exam in the neurologic exam  ENT-    No scars, masses, or lesions over external nose or ears  Hearing normal bilaterally to finger rub  Neck-   No neck masses noted  Thyroid normal   No jugular vein distension  Cardiovascular -   No clubbing, cyanosis, or edema   Pulmonary-   Good expansion, normal effort without use of accessory muscles  Musculoskeletal -   No significant wasting of muscles noted  Gait as below, see gait exam in the neurologic exam  Muscle strength, tone, stability as below see the motor exam in the neurologic exam.   No bony deformities  Skin -   Warm, dry, and intact to inspection and palpation. No rash, erythema, or pallor  Psychiatric -   Mood, affect, and behavior appear normal    Memory as below see mental status examination in the neurologic exam      NEUROLOGICAL EXAM    Mental status   [] Awake, alert, oriented   [] Affect attention and concentration appear appropriate  [] Recent and remote memory appears unremarkable  [] Speech normal without dysarthria or aphasia, comprehension and repetition intact.    COMMENTS: Patient is sedated and intubated   Cranial Nerves [] No VF deficit to confrontation  [] PERRLA, EOMI, no nystagmus, conjugate eye movements, no ptosis  [] Face symmetric  [] Facial sensation intact  [] Tongue midline no atrophy or fasciculations present  [] Palate midline, hearing to finger rub normal  [] Shoulder shrug and SCM testing normal  COMMENTS: Patient is sedated and intubated   Motor   [x] 5/5 strength x 4 extremities  [x] Normal bulk and tone  [x] No tremor present  [x] No rigidity or bradykinesia noted  COMMENTS: Patient is sedated and intubated   Sensory  [] Sensation intact to light touch, pin prick, vibration, and proprioception BLE  [] Sensation intact to light touch, pin prick, vibration, and proprioception BUE  COMMENTS:   Coordination [] FTN normal bilaterally   [] HTS normal bilaterally  [] YEVGENIY normal.   COMMENTS: Unable to check   Reflexes  [x] Symmetric and non-pathological  [x] Toes downgoing bilaterally  [x] No clonus present  COMMENTS:   Gait                  [] Normal steady gait    [] Ataxic    [] Spastic     [] Magnetic     [] Shuffling  [] Not assessed  COMMENTS: Unable to assess       LABS/IMAGING:    CT Head W/O Contrast    Result Date: 1/7/2023  PROCEDURE: CT HEAD WO CONTRAST CLINICAL INDICATION: Status epilepticus. COMPARISON: No relevant prior imaging is available for comparison. FINDINGS: There is no acute intracranial hemorrhage, extra-axial fluid collection, evidence of mass or mass effect. Gray-white matter differentiation is preserved. The ventricular system and cisternal spaces demonstrates normal configuration. There is no hydrocephalus. The paranasal sinuses and mastoid air cells are clear. The soft tissues are unremarkable. The orbits are also unremarkable in appearance. There are no suspicious osseous lesions. Unremarkable CT scan of the head. XR CHEST PORTABLE    Result Date: 1/7/2023  PROCEDURE: XR CHEST PORTABLE CLINICAL INDICATION: Status epilepticus. COMPARISON: No prior similar images available for comparison.  FINDINGS: Subtle interstitial opacities are identified in the right basilar region. There is no pneumonic consolidation. Heart is not enlarged. There are no pulmonary vascular congestion changes. There is no pneumothorax. Bilateral costophrenic angles are clear. Visualized osseous structures are intact. Subtle interstitial opacities in the right basilar region may represent atelectasis or aspiration. MRI BRAIN W WO CONTRAST    Result Date: 1/7/2023  PROCEDURE: MRI BRAIN W WO CONTRAST CLINICAL INDICATION: Seizures. COMPARISON: CT head of January 6, 2023. FINDINGS: There is motion artifact on many sequences limiting evaluation. Diffusion-weighted images demonstrate no restricted diffusion to suggest a recent infarction. There is no acute intracranial hemorrhage, extra-axial fluid collection, evidence of mass or mass effect. No abnormal gradient susceptibility is present. There is no abnormal ependymal, leptomeningeal or intraparenchymal enhancement. The ventricular system and cisternal spaces demonstrates normal configuration and size. There is no hydrocephalus. Vascular flow voids are preserved. Midline structures demonstrate normal morphology and symmetry. The paranasal sinuses and mastoid air cells are clear. The soft tissues are unremarkable as visualized. The orbits are also unremarkable in appearance. There is no abnormal marrow signal.    No MR evidence of acute intracranial finding; given limitations due to motion artifact.       Lab Results   Component Value Date    WBC 7.2 01/12/2023    HGB 13.1 01/12/2023    HCT 42.6 01/12/2023    MCV 81.9 01/12/2023     01/12/2023     Lab Results   Component Value Date     01/12/2023    K 3.8 01/12/2023     01/12/2023    CO2 15 (L) 01/12/2023    BUN 4 (L) 01/12/2023    CREATININE 0.5 01/12/2023    GLUCOSE 72 (L) 01/12/2023    CALCIUM 8.8 01/12/2023    PROT 7.1 01/06/2023    LABALBU 4.2 01/06/2023    BILITOT <0.2 01/06/2023    ALKPHOS 78 01/06/2023    AST 12 01/06/2023 ALT 8 01/06/2023    LABGLOM >60 01/12/2023   No results found for: INR, PROTIME    RECORD REVIEW:   Previous medical records, medications were reviewed at today's visit. Nursing/physician notes, imaging, labs and vitals reviewed. PT,OT and/or speech notes reviewed    ASSESSMENT:  33 y.o. admitted with multiple seizures. History of underlying possible autism, anxiety depression. Long history of seizure disorder. Currently on multiple antiepileptic medications. cEEG been normal interictally and the patient typically would pull off her leads prior to having a clinical episode. Episode caught 1/10 over a 10 to 15-minute. She did not start this activity until about 20 minutes after she was told she was going home. EEG showing artifact and no clear epileptic seizure. Patient having numerous episodes over several hours and ultimately was intubated and is now on propofol. Interictal EEG looks fine. Dilantin level therapeutic. Suspicions are that many of these are nonepileptic but it is difficult to be certain given her persistence if that is the case. Normal-appearing EEG over the past 24 hours     PLAN:   Continue Keppra 1500 mg  q12h    Continue Lamictal 200 mg daily. Fosphenytoin continued with therapeutic level   cEEG monitoring with consideration for withdrawing sedation and extubation when able. Ativan prn, seizure precautions. More than 35 minutes were spent reviewing the patient's records, examination and and counseling and coordination of care.   More than 50% spent on the latter    Brandan Brown MD  Board Certified Neurology

## 2023-01-12 NOTE — PROCEDURES
[facility] [facility] [address], [address] [facility]                          ELECTROENCEPHALOGRAM REPORT    PATIENT NAME: Laura Argueta                     :        1995  MED REC NO:   [id number]                              ROOM:       [facility]  ACCOUNT NO:   [de-identified]                           ADMIT DATE: 2023  PROVIDER:     Steffany Stark MD    DATE OF EE2023    CONTINUOUS VIDEO EEG    REASON FOR TEST:  Recent seizures. DESCRIPTION:  This is a continuous video EEG beginning on 01/10/2023 and  ending on 2023. During the majority of the tracing, the patient  is asleep. Sleep spindles with vertex sharp transients and anterior  beta activity are noted. No focal slowing nor any epileptiform activity  is noted. IMPRESSION:  Normal asleep/sedation-appearing tracing. Correlate  clinically.         Pastor Ramos MD    D: 2023 13:20:56      T: 2023 14:31:20     [id number]  Job#: [id number]     Doc#: [id number]    CC:

## 2023-01-12 NOTE — CARE COORDINATION
[facility] for medical records at 10:00. Rn Jeannene Riedel called after lunch to inquire and was given a number for an outside agency to call about records. I called [phone] to again check on progress of records. I was told the facility had not sent a request for the records and I could check back on Monday. I told them it was emergent that we receive the records. I was told to call Neurology Specialist and tell them records are needed stat. [facility] was called back and I was given the same 21  number and told that is who handles medical records requests. I told the gentleman I needed records stat and I had already called this number and was told it would be a week before they could get me the records and that I should contact the Neurology Specialists. He replied that is not how they do things and I could speak to his supervisor. I was transferred to a .

## 2023-01-13 LAB
ALLENS TEST: ABNORMAL
ALLENS TEST: ABNORMAL
ANION GAP SERPL CALCULATED.3IONS-SCNC: 10 MMOL/L (ref 7–19)
BASE EXCESS ARTERIAL: 1.5 MMOL/L (ref -2–2)
BASE EXCESS ARTERIAL: 2.1 MMOL/L (ref -2–2)
BUN BLDV-MCNC: 3 MG/DL (ref 6–20)
CALCIUM SERPL-MCNC: 8.7 MG/DL (ref 8.6–10)
CARBOXYHEMOGLOBIN ARTERIAL: 1.7 % (ref 0–5)
CARBOXYHEMOGLOBIN ARTERIAL: 1.7 % (ref 0–5)
CHLORIDE BLD-SCNC: 105 MMOL/L (ref 98–111)
CO2: 26 MMOL/L (ref 22–29)
CREAT SERPL-MCNC: 0.6 MG/DL (ref 0.5–0.9)
FERRITIN: 11.8 NG/ML (ref 13–150)
FIO2: 30 %
GFR SERPL CREATININE-BSD FRML MDRD: >60 ML/MIN/{1.73_M2}
GLUCOSE BLD-MCNC: 110 MG/DL (ref 74–109)
HCO3 ARTERIAL: 24.7 MMOL/L (ref 22–26)
HCO3 ARTERIAL: 25.9 MMOL/L (ref 22–26)
HCT VFR BLD CALC: 33.3 % (ref 37–47)
HEMOGLOBIN, ART, EXTENDED: 10.7 G/DL (ref 12–16)
HEMOGLOBIN, ART, EXTENDED: 10.8 G/DL (ref 12–16)
HEMOGLOBIN: 10.5 G/DL (ref 12–16)
IRON SATURATION: 6 % (ref 14–50)
IRON: 18 UG/DL (ref 37–145)
MAGNESIUM: 2 MG/DL (ref 1.6–2.6)
MCH RBC QN AUTO: 25.1 PG (ref 27–31)
MCHC RBC AUTO-ENTMCNC: 31.5 G/DL (ref 33–37)
MCV RBC AUTO: 79.5 FL (ref 81–99)
MECHANICAL RATE IN BPM: 16
METHEMOGLOBIN ARTERIAL: 1 %
METHEMOGLOBIN ARTERIAL: 1.2 %
MODE: ABNORMAL
O2 CONTENT ARTERIAL: 14.8 ML/DL
O2 CONTENT ARTERIAL: 14.9 ML/DL
O2 DELIVERY DEVICE: ABNORMAL
O2 SAT, ARTERIAL: 96.6 %
O2 SAT, ARTERIAL: 97 %
O2 THERAPY: ABNORMAL
O2 THERAPY: ABNORMAL
OXYGEN FLOW: 4
PCO2 ARTERIAL: 31 MMHG (ref 35–45)
PCO2 ARTERIAL: 39 MMHG (ref 35–45)
PDW BLD-RTO: 15.3 % (ref 11.5–14.5)
PH ARTERIAL: 7.43 (ref 7.35–7.45)
PH ARTERIAL: 7.51 (ref 7.35–7.45)
PHENYTOIN LEVEL: 11.2 UG/ML (ref 10–20)
PLATELET # BLD: 180 K/UL (ref 130–400)
PMV BLD AUTO: 12.4 FL (ref 9.4–12.3)
PO2 ARTERIAL: 139 MMHG (ref 80–100)
PO2 ARTERIAL: 99 MMHG (ref 80–100)
POSITIVE END EXP PRESS: 5
POTASSIUM SERPL-SCNC: 2.9 MMOL/L (ref 3.5–5)
POTASSIUM, WHOLE BLOOD: 3.2
POTASSIUM, WHOLE BLOOD: 3.7
RBC # BLD: 4.19 M/UL (ref 4.2–5.4)
SAMPLE SOURCE: ABNORMAL
SAMPLE SOURCE: ABNORMAL
SODIUM BLD-SCNC: 141 MMOL/L (ref 136–145)
TOTAL IRON BINDING CAPACITY: 284 UG/DL (ref 250–400)
VT MECHANICAL: 450 %
WBC # BLD: 6.2 K/UL (ref 4.8–10.8)

## 2023-01-13 PROCEDURE — 6360000002 HC RX W HCPCS: Performed by: STUDENT IN AN ORGANIZED HEALTH CARE EDUCATION/TRAINING PROGRAM

## 2023-01-13 PROCEDURE — 6360000002 HC RX W HCPCS: Performed by: HOSPITALIST

## 2023-01-13 PROCEDURE — 2500000003 HC RX 250 WO HCPCS: Performed by: PSYCHIATRY & NEUROLOGY

## 2023-01-13 PROCEDURE — 2000000000 HC ICU R&B

## 2023-01-13 PROCEDURE — 83735 ASSAY OF MAGNESIUM: CPT

## 2023-01-13 PROCEDURE — 2700000000 HC OXYGEN THERAPY PER DAY

## 2023-01-13 PROCEDURE — 94760 N-INVAS EAR/PLS OXIMETRY 1: CPT

## 2023-01-13 PROCEDURE — 80048 BASIC METABOLIC PNL TOTAL CA: CPT

## 2023-01-13 PROCEDURE — 95708 EEG WO VID EA 12-26HR UNMNTR: CPT

## 2023-01-13 PROCEDURE — 2500000003 HC RX 250 WO HCPCS: Performed by: HOSPITALIST

## 2023-01-13 PROCEDURE — 2580000003 HC RX 258: Performed by: PSYCHIATRY & NEUROLOGY

## 2023-01-13 PROCEDURE — C9254 INJECTION, LACOSAMIDE: HCPCS | Performed by: PSYCHIATRY & NEUROLOGY

## 2023-01-13 PROCEDURE — 6370000000 HC RX 637 (ALT 250 FOR IP): Performed by: PSYCHIATRY & NEUROLOGY

## 2023-01-13 PROCEDURE — 83540 ASSAY OF IRON: CPT

## 2023-01-13 PROCEDURE — 6360000002 HC RX W HCPCS: Performed by: PSYCHIATRY & NEUROLOGY

## 2023-01-13 PROCEDURE — 6370000000 HC RX 637 (ALT 250 FOR IP): Performed by: HOSPITALIST

## 2023-01-13 PROCEDURE — 99232 SBSQ HOSP IP/OBS MODERATE 35: CPT | Performed by: PSYCHIATRY & NEUROLOGY

## 2023-01-13 PROCEDURE — 83550 IRON BINDING TEST: CPT

## 2023-01-13 PROCEDURE — 36600 WITHDRAWAL OF ARTERIAL BLOOD: CPT

## 2023-01-13 PROCEDURE — 2580000003 HC RX 258: Performed by: HOSPITALIST

## 2023-01-13 PROCEDURE — 99291 CRITICAL CARE FIRST HOUR: CPT | Performed by: INTERNAL MEDICINE

## 2023-01-13 PROCEDURE — 80185 ASSAY OF PHENYTOIN TOTAL: CPT

## 2023-01-13 PROCEDURE — 94003 VENT MGMT INPAT SUBQ DAY: CPT

## 2023-01-13 PROCEDURE — 82728 ASSAY OF FERRITIN: CPT

## 2023-01-13 PROCEDURE — 36415 COLL VENOUS BLD VENIPUNCTURE: CPT

## 2023-01-13 PROCEDURE — 85027 COMPLETE CBC AUTOMATED: CPT

## 2023-01-13 PROCEDURE — 82803 BLOOD GASES ANY COMBINATION: CPT

## 2023-01-13 RX ORDER — LORAZEPAM 1 MG/1
1 TABLET ORAL EVERY 6 HOURS PRN
Status: DISCONTINUED | OUTPATIENT
Start: 2023-01-13 | End: 2023-01-13

## 2023-01-13 RX ORDER — LEVETIRACETAM 500 MG/1
500 TABLET ORAL 3 TIMES DAILY
Status: ON HOLD | COMMUNITY
End: 2023-01-14 | Stop reason: HOSPADM

## 2023-01-13 RX ORDER — FLUOXETINE HYDROCHLORIDE 20 MG/1
80 CAPSULE ORAL DAILY
Status: DISCONTINUED | OUTPATIENT
Start: 2023-01-13 | End: 2023-01-14 | Stop reason: HOSPADM

## 2023-01-13 RX ORDER — LAMOTRIGINE 100 MG/1
200 TABLET ORAL DAILY
Status: ON HOLD | COMMUNITY
End: 2023-01-14 | Stop reason: HOSPADM

## 2023-01-13 RX ORDER — LORAZEPAM 2 MG/ML
1 INJECTION INTRAMUSCULAR EVERY 4 HOURS PRN
Status: DISCONTINUED | OUTPATIENT
Start: 2023-01-13 | End: 2023-01-14 | Stop reason: HOSPADM

## 2023-01-13 RX ORDER — 0.9 % SODIUM CHLORIDE 0.9 %
500 INTRAVENOUS SOLUTION INTRAVENOUS ONCE
Status: COMPLETED | OUTPATIENT
Start: 2023-01-13 | End: 2023-01-13

## 2023-01-13 RX ORDER — TRAZODONE HYDROCHLORIDE 150 MG/1
150 TABLET ORAL NIGHTLY
Status: ON HOLD | COMMUNITY
End: 2023-01-14 | Stop reason: HOSPADM

## 2023-01-13 RX ADMIN — PROPOFOL 45 MCG/KG/MIN: 10 INJECTION, EMULSION INTRAVENOUS at 00:39

## 2023-01-13 RX ADMIN — PROPOFOL 50 MCG/KG/MIN: 10 INJECTION, EMULSION INTRAVENOUS at 05:00

## 2023-01-13 RX ADMIN — LACOSAMIDE 100 MG: 10 INJECTION, SOLUTION INTRAVENOUS at 20:29

## 2023-01-13 RX ADMIN — SODIUM BICARBONATE: 84 INJECTION, SOLUTION INTRAVENOUS at 08:02

## 2023-01-13 RX ADMIN — DEXMEDETOMIDINE HYDROCHLORIDE IN 0.9% SODIUM CHLORIDE 0.8 MCG/KG/HR: 4 INJECTION INTRAVENOUS at 18:02

## 2023-01-13 RX ADMIN — POTASSIUM CHLORIDE 10 MEQ: 7.46 INJECTION, SOLUTION INTRAVENOUS at 15:45

## 2023-01-13 RX ADMIN — DEXMEDETOMIDINE HYDROCHLORIDE IN 0.9% SODIUM CHLORIDE 0.4 MCG/KG/HR: 4 INJECTION INTRAVENOUS at 00:38

## 2023-01-13 RX ADMIN — TRAZODONE HYDROCHLORIDE 150 MG: 50 TABLET ORAL at 19:52

## 2023-01-13 RX ADMIN — POTASSIUM CHLORIDE 10 MEQ: 7.46 INJECTION, SOLUTION INTRAVENOUS at 14:41

## 2023-01-13 RX ADMIN — SODIUM CHLORIDE 500 ML: 9 INJECTION, SOLUTION INTRAVENOUS at 10:26

## 2023-01-13 RX ADMIN — LEVETIRACETAM 1500 MG: 15 INJECTION INTRAVENOUS at 19:53

## 2023-01-13 RX ADMIN — PHENYTOIN SODIUM 100 MG: 50 INJECTION INTRAMUSCULAR; INTRAVENOUS at 13:18

## 2023-01-13 RX ADMIN — LACOSAMIDE 100 MG: 10 INJECTION, SOLUTION INTRAVENOUS at 09:15

## 2023-01-13 RX ADMIN — LEVETIRACETAM 1500 MG: 15 INJECTION INTRAVENOUS at 08:06

## 2023-01-13 RX ADMIN — MIDAZOLAM HYDROCHLORIDE 8 MG/HR: 1 INJECTION, SOLUTION INTRAVENOUS at 00:37

## 2023-01-13 RX ADMIN — PHENYTOIN SODIUM 100 MG: 50 INJECTION INTRAMUSCULAR; INTRAVENOUS at 05:28

## 2023-01-13 RX ADMIN — DEXMEDETOMIDINE HYDROCHLORIDE IN 0.9% SODIUM CHLORIDE 0.8 MCG/KG/HR: 4 INJECTION INTRAVENOUS at 06:28

## 2023-01-13 RX ADMIN — LAMOTRIGINE 200 MG: 100 TABLET ORAL at 08:33

## 2023-01-13 RX ADMIN — POTASSIUM CHLORIDE 10 MEQ: 7.46 INJECTION, SOLUTION INTRAVENOUS at 11:28

## 2023-01-13 RX ADMIN — FLUOXETINE HYDROCHLORIDE 80 MG: 20 CAPSULE ORAL at 19:52

## 2023-01-13 RX ADMIN — LORAZEPAM 1 MG: 2 INJECTION INTRAMUSCULAR; INTRAVENOUS at 19:52

## 2023-01-13 RX ADMIN — ONDANSETRON 4 MG: 2 INJECTION INTRAMUSCULAR; INTRAVENOUS at 12:24

## 2023-01-13 RX ADMIN — SODIUM BICARBONATE: 84 INJECTION, SOLUTION INTRAVENOUS at 18:02

## 2023-01-13 RX ADMIN — DEXMEDETOMIDINE HYDROCHLORIDE IN 0.9% SODIUM CHLORIDE 1 MCG/KG/HR: 4 INJECTION INTRAVENOUS at 13:19

## 2023-01-13 RX ADMIN — ENOXAPARIN SODIUM 40 MG: 100 INJECTION SUBCUTANEOUS at 08:33

## 2023-01-13 RX ADMIN — POTASSIUM CHLORIDE 10 MEQ: 7.46 INJECTION, SOLUTION INTRAVENOUS at 13:36

## 2023-01-13 RX ADMIN — PROPOFOL 40 MCG/KG/MIN: 10 INJECTION, EMULSION INTRAVENOUS at 09:13

## 2023-01-13 RX ADMIN — POTASSIUM CHLORIDE 10 MEQ: 7.46 INJECTION, SOLUTION INTRAVENOUS at 12:25

## 2023-01-13 RX ADMIN — POTASSIUM CHLORIDE 10 MEQ: 7.46 INJECTION, SOLUTION INTRAVENOUS at 10:35

## 2023-01-13 RX ADMIN — PHENYTOIN SODIUM 100 MG: 50 INJECTION INTRAMUSCULAR; INTRAVENOUS at 22:01

## 2023-01-13 RX ADMIN — SODIUM CHLORIDE, PRESERVATIVE FREE 10 ML: 5 INJECTION INTRAVENOUS at 19:53

## 2023-01-13 ASSESSMENT — PULMONARY FUNCTION TESTS
PIF_VALUE: 16
PIF_VALUE: 17
PIF_VALUE: 22
PIF_VALUE: 17
PIF_VALUE: 17
PIF_VALUE: 11
PIF_VALUE: 17
PIF_VALUE: 18
PIF_VALUE: 16
PIF_VALUE: 24
PIF_VALUE: 21
PIF_VALUE: 18
PIF_VALUE: 17
PIF_VALUE: 17
PIF_VALUE: 18
PIF_VALUE: 18
PIF_VALUE: 23

## 2023-01-13 ASSESSMENT — PAIN SCALES - GENERAL
PAINLEVEL_OUTOF10: 0

## 2023-01-13 ASSESSMENT — PAIN SCALES - WONG BAKER
WONGBAKER_NUMERICALRESPONSE: 0
WONGBAKER_NUMERICALRESPONSE: 0

## 2023-01-13 NOTE — PROGRESS NOTES
HOSPITAL MEDICINE  - PROGRESS NOTE    Admit Date: 1/6/2023         CC: seizure     Subjective:  very anxious     Objective: pt extubated, very anxious, screening, does not answer any questions, seems confused, in moderate distress    Diet: Diet NPO  ADULT TUBE FEEDING; Orogastric; Peptide Based; Continuous; 6; No; 20; Q 1 hour; Protein; 4 Proteinex as tf flush in 24 hours  Pain is:None  Nausea:None  Bowel Movement/Flatus yes    Data:   Scheduled Meds: Reviewed  Current Facility-Administered Medications   Medication Dose Route Frequency Provider Last Rate Last Admin    sodium bicarbonate 50 mEq in dextrose 5 % 1,000 mL infusion   IntraVENous Continuous John Olivo MD   Paused at 01/12/23 1230    potassium chloride (KLOR-CON M) extended release tablet 40 mEq  40 mEq Oral PRN John Olivo MD        Or    potassium bicarb-citric acid (EFFER-K) effervescent tablet 40 mEq  40 mEq Oral PRN John Olivo MD   40 mEq at 01/11/23 0900    Or    potassium chloride 10 mEq/100 mL IVPB (Peripheral Line)  10 mEq IntraVENous PRN John Olivo MD        potassium chloride 10 mEq/100 mL IVPB (Peripheral Line)  10 mEq IntraVENous PRN John Olivo MD        phenytoin (DILANTIN) injection 100 mg  100 mg IntraVENous Q8H John Olivo MD   100 mg at 01/12/23 1510    levETIRAcetam (KEPPRA) 1500 mg/100 mL IVPB  1,500 mg IntraVENous Q12H John Olivo MD   Stopped at 01/12/23 0853    lacosamide (VIMPAT) 100 mg in sodium chloride 0.9 % 60 mL IVPB  100 mg IntraVENous BID Paula Zimmer MD   Stopped at 01/12/23 0858    dexmedetomidine (PRECEDEX) 400 mcg in sodium chloride 0.9 % 100 mL infusion  0.1-1.5 mcg/kg/hr IntraVENous Continuous Paula Zimmer MD 8.4 mL/hr at 01/12/23 1303 0.4 mcg/kg/hr at 01/12/23 1303    propofol injection  5-50 mcg/kg/min IntraVENous Continuous Paula Zimmer MD 22.8 mL/hr at 01/12/23 1634 45 mcg/kg/min at 01/12/23 1634    midazolam (VERSED) 1 mg/mL in NS infusion  1-10 mg/hr IntraVENous Continuous Evcathryn Encarnacion MD 8 mL/hr at 01/12/23 1304 8 mg/hr at 01/12/23 1304    enoxaparin (LOVENOX) injection 40 mg  40 mg SubCUTAneous Daily Robert Parker MD   40 mg at 01/12/23 0840    lamoTRIgine (LAMICTAL) tablet 200 mg  200 mg Oral Daily Koffi Crowell DO   200 mg at 01/12/23 0841    LORazepam (ATIVAN) injection 1 mg  1 mg IntraVENous Q5 Min PRN Jennyfer Wadsworth MD   1 mg at 01/10/23 1417    sodium chloride flush 0.9 % injection 5-40 mL  5-40 mL IntraVENous 2 times per day Jennyfer Wadsworth MD   10 mL at 01/12/23 0841    sodium chloride flush 0.9 % injection 5-40 mL  5-40 mL IntraVENous PRN Jennyfer Wadsworth MD        0.9 % sodium chloride infusion   IntraVENous PRN Jennyfer Wadsworth MD 20 mL/hr at 01/07/23 1914 New Bag at 01/07/23 1914    ondansetron (ZOFRAN-ODT) disintegrating tablet 4 mg  4 mg Oral Q8H PRN Jennyfer Wadsworth MD   4 mg at 01/10/23 0142    Or    ondansetron (ZOFRAN) injection 4 mg  4 mg IntraVENous Q6H PRN Jennyfer Wadsworth MD        acetaminophen (TYLENOL) tablet 650 mg  650 mg Oral Q4H PRN Jennyfer Wadsworth MD   650 mg at 01/10/23 0142    Or    acetaminophen (TYLENOL) suppository 650 mg  650 mg Rectal Q4H PRN Jennyfer Wadsworth MD        sodium phosphate 10 mmol in sodium chloride 0.9 % 250 mL IVPB  10 mmol IntraVENous PRN Jennyfer Wadsworth MD        Or    sodium phosphate 15 mmol in sodium chloride 0.9 % 250 mL IVPB  15 mmol IntraVENous PRN Jennyfer Wadsworth MD        Or    sodium phosphate 20 mmol in sodium chloride 0.9 % 500 mL IVPB  20 mmol IntraVENous PRN Jennyfer Wadsworth MD        magnesium sulfate 2000 mg in 50 mL IVPB premix  2,000 mg IntraVENous PRN Jennyfer Wadsworth MD        polyethylene glycol (GLYCOLAX) packet 17 g  17 g Oral Daily PRN Jennyfer Wadsworth MD        melatonin disintegrating tablet 5 mg  5 mg Oral Nightly PRN Jennyfer Wadsworth MD   5 mg at 01/10/23 0142    calcium carbonate (TUMS) chewable tablet 500 mg 500 mg Oral TID PRN Yaneth Austin MD        albuterol sulfate HFA (PROVENTIL;VENTOLIN;PROAIR) 108 (90 Base) MCG/ACT inhaler 2 puff  2 puff Inhalation Q4H PRN Yaneth Austin MD         DVT Prophylaxis: Lovenox 40 mg sq daily    Continuous Infusions:   IV infusion builder Stopped (01/12/23 1230)    dexmedetomidine HCl in NaCl 0.4 mcg/kg/hr (01/12/23 1303)    propofol 45 mcg/kg/min (01/12/23 1634)    midazolam 8 mg/hr (01/12/23 1304)    sodium chloride 20 mL/hr at 01/07/23 1914       Intake/Output Summary (Last 24 hours) at 1/12/2023 1809  Last data filed at 1/12/2023 1512  Gross per 24 hour   Intake 2412.26 ml   Output 2830 ml   Net -417.74 ml       CBC:   Recent Labs     01/10/23  0323 01/11/23  0128 01/12/23  0205   WBC 10.8 8.3 7.2   HGB 11.9* 10.9* 13.1    211 164       BMP:  Recent Labs     01/10/23  1208 01/10/23  1658 01/11/23  0128 01/12/23  0205     --  137 137   K 3.7 3.5 3.4* 3.8     --  103 106   CO2 23  --  23 15*   BUN 6  --  7 4*   CREATININE 0.7  --  0.7 0.5   GLUCOSE 103  --  85 72*       ABGs:   Lab Results   Component Value Date/Time    PHART 7.490 01/10/2023 04:58 PM    PO2ART 100.0 01/10/2023 04:58 PM    ZLA5OMP 34.0 01/10/2023 04:58 PM     INR: No results for input(s): INR in the last 72 hours. Objective:   Vitals: /80   Pulse 59   Temp 97 °F (36.1 °C) (Temporal)   Resp 16   Ht 5' 5\" (1.651 m)   Wt 186 lb (84.4 kg)   SpO2 99%   BMI 30.95 kg/m²   General appearance: intubated and sedated   Skin: Skin color, texture, turgor normal.   HEENT: Head: Normocephalic, no lesions, without obvious abnormality.   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

## 2023-01-13 NOTE — PROCEDURES
STEVEN PALACIOS [facility] ESTHER [address], [address] [facility]                          ELECTROENCEPHALOGRAM REPORT    PATIENT NAME: Juan Pratt                     :        1995  MED REC NO:   [id number]                              ROOM:       [facility]  ACCOUNT NO:   [de-identified]                           ADMIT DATE: 2023  PROVIDER:     Serenity Khoury MD    DATE OF EE2023    CONTINUOUS VIDEO EEG    RECORDING TIME:  17 hours. INDICATION FOR TEST:  Seizure activity. DESCRIPTION:  The patient is asleep throughout the tracing characterized  by vertex sharp transients and sleep spindles. Admixed delta and theta  noted for the most part. No epileptiform activity noted. Correlate  clinically.         Lavonne Parikh MD    D: 2023 14:03:33      T: 2023 14:38:41     [email or web address]  Job#: [id number]     Doc#: [id number]    CC:

## 2023-01-13 NOTE — PROGRESS NOTES
ABG's done. Sent to Dr. Eliza Urbina. Placed patient on cpap at 1153. Volumes appropriate ranging from 500-700. SBI < 100. VSS. Order to extubate at [phone].      Electronically signed by Maxime Lyman RN on 1/13/2023 at 12:33 PM

## 2023-01-13 NOTE — CONSULTS
Pulmonary and Critical Care Consult Note    THE [facility] Duane Harman    MRN# [id number]    Acct# [de-identified]  2023   12:53 PM CST    Referring Brandan Ravi, *      Chief Complaint: Respiratory failure on mechanical ventilation    Requesting physician: Dr. Patrick Baires. Reason for consult: mechanical vent. HPI: We have been consulted to see this 33y.o. year old female born on 1995. The patient presented to the hospital with the manifestations of possible seizure. 2 days ago the patient developed what was perceived as status epilepticus. She was foaming at the mouth and her nose. She was intubated prophylactically for airway protection. The patient is currently awake while on the ventilator following commands. She had a spontaneous breathing trial and she did very well. No underlying pulmonary disease. I was asked to see her regarding the above. Past Medical History      Past Medical History:   Diagnosis Date    Anxiety     Autism     Depression     Epilepsy ([facility] [address]     Seizures ([facility] [address]      SurgicalHistory  Past Surgical History:   Procedure Laterality Date     SECTION      CHOLECYSTECTOMY       Allergies  No Known Allergies  Medications    phenytoin, 100 mg, IntraVENous, Q8H    levETIRAcetam, 1,500 mg, IntraVENous, Q12H    lacosamide (VIMPAT) IVPB, 100 mg, IntraVENous, BID    enoxaparin, 40 mg, SubCUTAneous, Daily    lamoTRIgine, 200 mg, Oral, Daily    sodium chloride flush, 5-40 mL, IntraVENous, 2 times per day   Social History   reports that she does not drink alcohol. Family History  family history is not on file.     Review of Systems:  12 point review of systems is negative except as below:  Review of systems not obtained due to patient factors - intubation    Physical Exam:  BP 98/60   Pulse 74   Temp 98 °F (36.7 °C) (Temporal)   Resp 15    5' 5\" (1.651 m)   Wt 186 lb (84.4 kg)   SpO2 100%   BMI 30.95 kg/m²   Intake/Output Summary (Last 24 hours) at 1/13/2023 1253  Last data filed at 1/13/2023 1000  Gross per 24 hour   Intake 5170.98 ml   Output 2830 ml   Net 2340.98 ml       General appearance: Young female in no distress intubated on mechanical ventilation   HEENT: Normocephalic atraumatic  Heart: S1-S2 distant sounds no murmurs  Lungs: Diminished bilaterally no rubs or tenderness on dullness to percussion  Abdomen: Soft nontender no organomegalies normal bowel sounds  Extremities: No clubbing cyanosis or edema  Neuro: No focal findings  Skin: Intact        Labs:  Recent Labs     01/11/23 0128 01/12/23  0205 01/13/23  0950   WBC 8.3 7.2 6.2   RBC 4.28 5.20 4.19*   HGB 10.9* 13.1 10.5*   HCT 33.8* 42.6 33.3*    164 180   MCV 79.0* 81.9 79.5*   MCH 25.5* 25.2* 25.1*   MCHC 32.2* 30.8* 31.5*   RDW 15.4* 15.5* 15.3*      Recent Labs     01/10/23  1658 01/11/23  0128 01/12/23  0205 01/13/23  0950 01/13/23  1147   NA  --  137 137 141  --    K 3.5 3.4* 3.8 2.9* 3.2   CL  --  103 106 105  --    CO2  --  23 15* 26  --    BUN  --  7 4* 3*  --    CREATININE  --  0.7 0.5 0.6  --    CALCIUM  --  8.7 8.8 8.7  --    GLUCOSE  --  85 72* 110*  --       Recent Labs     01/10/23  1658 01/13/23  1147   PHART 7.490* 7.510*   [id number] 34.0* 31.0*   PO2ART 100.0 99.0   [id number] 25.9 24.7   J9SQUIJI 96.8 96.6   BEART 2.7* 2.1*     Recent Labs     01/12/23  1139 01/13/23  0150 01/13/23  0950   MG  --  2.0  --    CALCIUM  --   --  8.7   LACTA 1.1  --   --      No results for input(s): BC, LABGRAM, CULTRESP, BFCX in the last 72 hours. Radiograph: CT Head W/O Contrast    Result Date: 1/7/2023  Unremarkable CT scan of the head. XR CHEST PORTABLE    Result Date: 1/10/2023  No acute abnormality. Support devices as above.     XR CHEST PORTABLE    Result Date: 1/7/2023  Subtle interstitial opacities in the right basilar region may represent atelectasis or aspiration. MRI BRAIN W WO CONTRAST    Result Date: 1/7/2023  No MR evidence of acute intracranial finding; given limitations due to motion artifact. My radiograph interpretation/independent review of imaging:     Problem list generated by [facility]:  Hospital Problems             Last Modified POA    * (Principal) Status epilepticus ([facility] [address] 1/6/2023 Yes          Pulmonary Assessment/plan:    Respiratory failure on mechanical ventilation for prophylactic airway protection. Patient tolerated spontaneous breathing trial.  We will extubate. Possible status epilepticus. Neurology following. DVT and GI prophylaxis. Discussed with attending physician and nursing staff at the bedside    Critical care time 36 min       Nayana Campos MD, FCCP, [facility]    The above note was generated using voice recognition software. Inadvertent typographical errors in transcription may have occurred.     Electronically signed by Berto Rouse MD on 01/13/23 at 12:53 PM

## 2023-01-13 NOTE — PROGRESS NOTES
[facility] Neurology Progress Note      Patient:   Marsha Goncalves  MR#:    [id number]   Room:    96/316-47   YOB: 1995  Date of Progress Note: 1/13/2023  Time of Note                           8:11 AM  Consulting Physician:  Tru Cardona DO  Attending Physician:  Jessica Vargas, *      INTERVAL HISTORY: No changes noted in the past 24 hours. She remains sedated and intubated. No seizures noted. Continuous EEG ongoing    REVIEW OF SYSTEMS:  Unable to obtain as patient is sedated and intubated      PHYSICAL EXAM:    Constitutional -   BP (!) 90/52   Pulse 67   Temp 98 °F (36.7 °C) (Temporal)   Resp 16   Ht 5' 5\" (1.651 m)   Wt 186 lb (84.4 kg)   SpO2 97%   BMI 30.95 kg/m²   General appearance: No acute distress   EYES -   Conjunctiva normal  Pupillary exam as below, see CN exam in the neurologic exam  ENT-    No scars, masses, or lesions over external nose or ears  Hearing normal bilaterally to finger rub  Neck-   No neck masses noted  Thyroid normal   No jugular vein distension  Cardiovascular -   No clubbing, cyanosis, or edema   Pulmonary-   Good expansion, normal effort without use of accessory muscles  Musculoskeletal -   No significant wasting of muscles noted  Gait as below, see gait exam in the neurologic exam  Muscle strength, tone, stability as below see the motor exam in the neurologic exam.   No bony deformities  Skin -   Warm, dry, and intact to inspection and palpation. No rash, erythema, or pallor  Psychiatric -   Mood, affect, and behavior appear normal    Memory as below see mental status examination in the neurologic exam      NEUROLOGICAL EXAM    Mental status   [] Awake, alert, oriented   [] Affect attention and concentration appear appropriate  [] Recent and remote memory appears unremarkable  [] Speech normal without dysarthria or aphasia, comprehension and repetition intact.    COMMENTS: Patient is sedated and intubated   Cranial Nerves [] No VF deficit to confrontation  [] PERRLA, EOMI, no nystagmus, conjugate eye movements, no ptosis  [] Face symmetric  [] Facial sensation intact  [] Tongue midline no atrophy or fasciculations present  [] Palate midline, hearing to finger rub normal  [] Shoulder shrug and SCM testing normal  COMMENTS: Patient is sedated and intubated   Motor   [x] 5/5 strength x 4 extremities  [x] Normal bulk and tone  [x] No tremor present  [x] No rigidity or bradykinesia noted  COMMENTS: Patient is sedated and intubated   Sensory  [] Sensation intact to light touch, pin prick, vibration, and proprioception BLE  [] Sensation intact to light touch, pin prick, vibration, and proprioception BUE  COMMENTS:   Coordination [] FTN normal bilaterally   [] HTS normal bilaterally  [] YEVGENIY normal.   COMMENTS: Unable to check   Reflexes  [x] Symmetric and non-pathological  [x] Toes downgoing bilaterally  [x] No clonus present  COMMENTS:   Gait                  [] Normal steady gait    [] Ataxic    [] Spastic     [] Magnetic     [] Shuffling  [] Not assessed  COMMENTS: Unable to assess       LABS/IMAGING:    CT Head W/O Contrast    Result Date: 1/7/2023  PROCEDURE: CT HEAD WO CONTRAST CLINICAL INDICATION: Status epilepticus. COMPARISON: No relevant prior imaging is available for comparison. FINDINGS: There is no acute intracranial hemorrhage, extra-axial fluid collection, evidence of mass or mass effect. Gray-white matter differentiation is preserved. The ventricular system and cisternal spaces demonstrates normal configuration. There is no hydrocephalus. The paranasal sinuses and mastoid air cells are clear. The soft tissues are unremarkable. The orbits are also unremarkable in appearance. There are no suspicious osseous lesions. Unremarkable CT scan of the head. XR CHEST PORTABLE    Result Date: 1/7/2023  PROCEDURE: XR CHEST PORTABLE CLINICAL INDICATION: Status epilepticus. COMPARISON: No prior similar images available for comparison.  FINDINGS: Subtle interstitial opacities are identified in the right basilar region. There is no pneumonic consolidation. Heart is not enlarged. There are no pulmonary vascular congestion changes. There is no pneumothorax. Bilateral costophrenic angles are clear. Visualized osseous structures are intact. Subtle interstitial opacities in the right basilar region may represent atelectasis or aspiration. MRI BRAIN W WO CONTRAST    Result Date: 1/7/2023  PROCEDURE: MRI BRAIN W WO CONTRAST CLINICAL INDICATION: Seizures. COMPARISON: CT head of January 6, 2023. FINDINGS: There is motion artifact on many sequences limiting evaluation. Diffusion-weighted images demonstrate no restricted diffusion to suggest a recent infarction. There is no acute intracranial hemorrhage, extra-axial fluid collection, evidence of mass or mass effect. No abnormal gradient susceptibility is present. There is no abnormal ependymal, leptomeningeal or intraparenchymal enhancement. The ventricular system and cisternal spaces demonstrates normal configuration and size. There is no hydrocephalus. Vascular flow voids are preserved. Midline structures demonstrate normal morphology and symmetry. The paranasal sinuses and mastoid air cells are clear. The soft tissues are unremarkable as visualized. The orbits are also unremarkable in appearance. There is no abnormal marrow signal.    No MR evidence of acute intracranial finding; given limitations due to motion artifact.       Lab Results   Component Value Date    WBC 7.2 01/12/2023    HGB 13.1 01/12/2023    HCT 42.6 01/12/2023    MCV 81.9 01/12/2023     01/12/2023     Lab Results   Component Value Date     01/12/2023    K 3.8 01/12/2023     01/12/2023    CO2 15 (L) 01/12/2023    BUN 4 (L) 01/12/2023    CREATININE 0.5 01/12/2023    GLUCOSE 72 (L) 01/12/2023    CALCIUM 8.8 01/12/2023    PROT 7.1 01/06/2023    LABALBU 4.2 01/06/2023    BILITOT <0.2 01/06/2023    ALKPHOS 78 01/06/2023    AST 12 01/06/2023    ALT 8 01/06/2023    LABGLOM >60 01/12/2023   No results found for: INR, PROTIME    RECORD REVIEW:   Previous medical records, medications were reviewed at today's visit. Nursing/physician notes, imaging, labs and vitals reviewed. PT,OT and/or speech notes reviewed    ASSESSMENT:  33 y.o. admitted with multiple seizures. History of underlying possible autism, anxiety depression. Long history of seizure disorder. No old records available. Currently on multiple antiepileptic medications. cEEG been normal interictally and the patient typically would pull off her leads prior to having a clinical episode. Episode caught 1/10 over a 10 to 15-minute. She did not start this activity until about 20 minutes after she was told she was going home. EEG showing artifact and no clear epileptic seizure. Patient having numerous episodes over several hours and ultimately was intubated and is now on propofol. Interictal EEG looks fine. Dilantin level therapeutic. Suspicions are that many of these are nonepileptic but it is difficult to be certain given her persistence if that is the case. Normal-appearing EEG over the past 24 hours. Long discussion with charge nurse. Try to extubate today. PLAN:   Continue Keppra 1500 mg  q12h    Continue Lamictal 200 mg daily. Fosphenytoin continued with therapeutic level   cEEG monitoring and will try to discontinue sedation and extubate today. Ativan prn, seizure precautions. More than 35 minutes were spent reviewing the patient's records, examination and and counseling and coordination of care.   More than 50% spent on the latter    Miroslava Fay MD  Board Certified Neurology

## 2023-01-13 NOTE — PROGRESS NOTES
Comprehensive Nutrition Assessment    Type and Reason for Visit:  Reassess    Nutrition Recommendations/Plan:   Follow for advancing diet or alternte source of nutrition     Malnutrition Assessment:  Malnutrition Status: At risk for malnutrition (Comment) (01/13/23 9754)    Context:  Acute Illness     Findings of the 6 clinical characteristics of malnutrition:  Energy Intake:  Mild decrease in energy intake (Comment)  Weight Loss:  No significant weight loss     Body Fat Loss:  No significant body fat loss     Muscle Mass Loss:  No significant muscle mass loss    Fluid Accumulation:  No significant fluid accumulation Extremities   Strength:  Not Performed    Nutrition Assessment:    Pt has been extubated. TF has been discontinued    Nutrition Related Findings:    extubated Wound Type: None       Current Nutrition Intake & Therapies:    Average Meal Intake: NPO  Average Supplements Intake: NPO  Diet NPO    Anthropometric Measures:  Height: 5' 5\" (165.1 cm)  Ideal Body Weight (IBW): 125 lbs (57 kg)       Current Body Weight: 186 lb (84.4 kg),   IBW. Current BMI (kg/m2): 31  BMI Categories: Obese Class 1 (BMI 30.0-34. 9)    Estimated Daily Nutrient Needs:  Energy Requirements Based On: Kcal/kg  Weight Used for Energy Requirements: Current  Energy (kcal/day): 272-6298 kcals/day  Weight Used for Protein Requirements: Ideal  Protein (g/day): 114 g/protein/day  Method Used for Fluid Requirements: 1 ml/kcal  Fluid (ml/day): 924-1176 mL/day    Nutrition Diagnosis:   Inadequate oral intake related to acute injury/trauma as evidenced by NPO or clear liquid status due to medical condition    Nutrition Interventions:   Food and/or Nutrient Delivery: Continue NPO  Nutrition Education/Counseling: No recommendation at this time  Coordination of Nutrition Care: Continue to monitor while inpatient  Plan of Care discussed with: nursing    Goals:  Previous Goal Met: No Progress toward Goal(s)  Goals: Meet at least 75% of estimated needs       Nutrition Monitoring and Evaluation:   Behavioral-Environmental Outcomes: None Identified  Food/Nutrient Intake Outcomes: Diet Advancement/Tolerance  Physical Signs/Symptoms Outcomes: Biochemical Data, Fluid Status or Edema, Weight, Skin    Discharge Planning:     Too soon to determine     Jacklyn Dorsey MS, RD, LD  Contact: [phone]

## 2023-01-13 NOTE — PLAN OF CARE
Problem: Discharge Planning  Goal: Discharge to home or other facility with appropriate resources  Outcome: Not Progressing     Problem: Safety - Adult  Goal: Free from fall injury  Outcome: Not Progressing     Problem: Chronic Conditions and Co-morbidities  Goal: Patient's chronic conditions and co-morbidity symptoms are monitored and maintained or improved  Outcome: Not Progressing     Problem: ABCDS Injury Assessment  Goal: Absence of physical injury  Outcome: Not Progressing     Problem: Nutrition Deficit:  Goal: Optimize nutritional status  Outcome: Not Progressing     Problem: Pain  Goal: Verbalizes/displays adequate comfort level or baseline comfort level  Outcome: Not Progressing     Problem: Skin/Tissue Integrity  Goal: Absence of new skin breakdown  Description: 1. Monitor for areas of redness and/or skin breakdown  2. Assess vascular access sites hourly  3. Every 4-6 hours minimum:  Change oxygen saturation probe site  4. Every 4-6 hours:  If on nasal continuous positive airway pressure, respiratory therapy assess nares and determine need for appliance change or resting period. Outcome: Not Progressing     Problem: Safety - Medical Restraint  Goal: Remains free of injury from restraints (Restraint for Interference with Medical Device)  Description: INTERVENTIONS:  1. Determine that other, less restrictive measures have been tried or would not be effective before applying the restraint  2. Evaluate the patient's condition at the time of restraint application  3. Inform patient/family regarding the reason for restraint  4.  Q2H: Monitor safety, psychosocial status, comfort, nutrition and hydration  Outcome: Not Progressing

## 2023-01-14 VITALS
TEMPERATURE: 98.8 F | DIASTOLIC BLOOD PRESSURE: 76 MMHG | SYSTOLIC BLOOD PRESSURE: 106 MMHG | WEIGHT: 152 LBS | RESPIRATION RATE: 18 BRPM | HEIGHT: 65 IN | BODY MASS INDEX: 25.33 KG/M2 | OXYGEN SATURATION: 95 % | HEART RATE: 98 BPM

## 2023-01-14 LAB
ANION GAP SERPL CALCULATED.3IONS-SCNC: 11 MMOL/L (ref 7–19)
BUN BLDV-MCNC: 1 MG/DL (ref 6–20)
CALCIUM SERPL-MCNC: 8.4 MG/DL (ref 8.6–10)
CHLORIDE BLD-SCNC: 103 MMOL/L (ref 98–111)
CO2: 25 MMOL/L (ref 22–29)
CREAT SERPL-MCNC: 0.5 MG/DL (ref 0.5–0.9)
GFR SERPL CREATININE-BSD FRML MDRD: >60 ML/MIN/{1.73_M2}
GLUCOSE BLD-MCNC: 95 MG/DL (ref 74–109)
HCT VFR BLD CALC: 32.8 % (ref 37–47)
HEMOGLOBIN: 10.5 G/DL (ref 12–16)
MCH RBC QN AUTO: 25.4 PG (ref 27–31)
MCHC RBC AUTO-ENTMCNC: 32 G/DL (ref 33–37)
MCV RBC AUTO: 79.2 FL (ref 81–99)
PDW BLD-RTO: 15.1 % (ref 11.5–14.5)
PLATELET # BLD: 186 K/UL (ref 130–400)
PMV BLD AUTO: 12.2 FL (ref 9.4–12.3)
POTASSIUM SERPL-SCNC: 3.5 MMOL/L (ref 3.5–5)
PRO-BNP: 479 PG/ML (ref 0–450)
RBC # BLD: 4.14 M/UL (ref 4.2–5.4)
SODIUM BLD-SCNC: 139 MMOL/L (ref 136–145)
WBC # BLD: 7.6 K/UL (ref 4.8–10.8)

## 2023-01-14 PROCEDURE — 94761 N-INVAS EAR/PLS OXIMETRY MLT: CPT

## 2023-01-14 PROCEDURE — 99233 SBSQ HOSP IP/OBS HIGH 50: CPT | Performed by: PSYCHIATRY & NEUROLOGY

## 2023-01-14 PROCEDURE — 6370000000 HC RX 637 (ALT 250 FOR IP): Performed by: PSYCHIATRY & NEUROLOGY

## 2023-01-14 PROCEDURE — 2580000003 HC RX 258: Performed by: PSYCHIATRY & NEUROLOGY

## 2023-01-14 PROCEDURE — 6360000002 HC RX W HCPCS: Performed by: HOSPITALIST

## 2023-01-14 PROCEDURE — 2500000003 HC RX 250 WO HCPCS: Performed by: HOSPITALIST

## 2023-01-14 PROCEDURE — 80048 BASIC METABOLIC PNL TOTAL CA: CPT

## 2023-01-14 PROCEDURE — 2580000003 HC RX 258: Performed by: HOSPITALIST

## 2023-01-14 PROCEDURE — 94760 N-INVAS EAR/PLS OXIMETRY 1: CPT

## 2023-01-14 PROCEDURE — 83880 ASSAY OF NATRIURETIC PEPTIDE: CPT

## 2023-01-14 PROCEDURE — 6370000000 HC RX 637 (ALT 250 FOR IP): Performed by: HOSPITALIST

## 2023-01-14 PROCEDURE — 6360000002 HC RX W HCPCS: Performed by: STUDENT IN AN ORGANIZED HEALTH CARE EDUCATION/TRAINING PROGRAM

## 2023-01-14 PROCEDURE — 2700000000 HC OXYGEN THERAPY PER DAY

## 2023-01-14 PROCEDURE — 36415 COLL VENOUS BLD VENIPUNCTURE: CPT

## 2023-01-14 PROCEDURE — 85027 COMPLETE CBC AUTOMATED: CPT

## 2023-01-14 PROCEDURE — C9254 INJECTION, LACOSAMIDE: HCPCS | Performed by: PSYCHIATRY & NEUROLOGY

## 2023-01-14 PROCEDURE — 6360000002 HC RX W HCPCS: Performed by: PSYCHIATRY & NEUROLOGY

## 2023-01-14 RX ORDER — LEVETIRACETAM 750 MG/1
1500 TABLET ORAL 2 TIMES DAILY
Qty: 120 TABLET | Refills: 0 | Status: SHIPPED | OUTPATIENT
Start: 2023-01-14 | End: 2023-02-13

## 2023-01-14 RX ORDER — PHENYTOIN SODIUM 100 MG/1
100 CAPSULE, EXTENDED RELEASE ORAL 3 TIMES DAILY
Status: DISCONTINUED | OUTPATIENT
Start: 2023-01-14 | End: 2023-01-14 | Stop reason: HOSPADM

## 2023-01-14 RX ORDER — POTASSIUM CHLORIDE 20 MEQ/1
40 TABLET, EXTENDED RELEASE ORAL ONCE
Status: DISCONTINUED | OUTPATIENT
Start: 2023-01-14 | End: 2023-01-14 | Stop reason: HOSPADM

## 2023-01-14 RX ORDER — LACOSAMIDE 50 MG/1
100 TABLET ORAL 2 TIMES DAILY
Status: DISCONTINUED | OUTPATIENT
Start: 2023-01-14 | End: 2023-01-14 | Stop reason: HOSPADM

## 2023-01-14 RX ORDER — FERROUS SULFATE 325(65) MG
325 TABLET ORAL 2 TIMES DAILY WITH MEALS
Qty: 30 TABLET | Refills: 3 | Status: SHIPPED | OUTPATIENT
Start: 2023-01-14

## 2023-01-14 RX ORDER — FERROUS SULFATE 325(65) MG
325 TABLET ORAL 2 TIMES DAILY WITH MEALS
Status: DISCONTINUED | OUTPATIENT
Start: 2023-01-14 | End: 2023-01-14 | Stop reason: HOSPADM

## 2023-01-14 RX ORDER — PHENYTOIN SODIUM 100 MG/1
100 CAPSULE, EXTENDED RELEASE ORAL 3 TIMES DAILY
Qty: 90 CAPSULE | Refills: 0 | Status: SHIPPED | OUTPATIENT
Start: 2023-01-14 | End: 2023-02-13

## 2023-01-14 RX ORDER — LAMOTRIGINE 200 MG/1
200 TABLET ORAL DAILY
Qty: 30 TABLET | Refills: 0 | Status: SHIPPED | OUTPATIENT
Start: 2023-01-14

## 2023-01-14 RX ORDER — LEVETIRACETAM 500 MG/1
1500 TABLET ORAL 2 TIMES DAILY
Status: DISCONTINUED | OUTPATIENT
Start: 2023-01-14 | End: 2023-01-14 | Stop reason: HOSPADM

## 2023-01-14 RX ORDER — FUROSEMIDE 20 MG/1
20 TABLET ORAL DAILY
Status: DISCONTINUED | OUTPATIENT
Start: 2023-01-14 | End: 2023-01-14 | Stop reason: HOSPADM

## 2023-01-14 RX ORDER — LACOSAMIDE 100 MG/1
100 TABLET ORAL 2 TIMES DAILY
Qty: 60 TABLET | Refills: 0 | Status: SHIPPED | OUTPATIENT
Start: 2023-01-14 | End: 2023-02-13

## 2023-01-14 RX ADMIN — PHENYTOIN SODIUM 100 MG: 50 INJECTION INTRAMUSCULAR; INTRAVENOUS at 05:52

## 2023-01-14 RX ADMIN — ENOXAPARIN SODIUM 40 MG: 100 INJECTION SUBCUTANEOUS at 07:28

## 2023-01-14 RX ADMIN — ONDANSETRON 4 MG: 2 INJECTION INTRAMUSCULAR; INTRAVENOUS at 07:55

## 2023-01-14 RX ADMIN — IRON SUCROSE 200 MG: 20 INJECTION, SOLUTION INTRAVENOUS at 08:52

## 2023-01-14 RX ADMIN — SODIUM CHLORIDE, PRESERVATIVE FREE 10 ML: 5 INJECTION INTRAVENOUS at 07:55

## 2023-01-14 RX ADMIN — LEVETIRACETAM 1500 MG: 15 INJECTION INTRAVENOUS at 07:21

## 2023-01-14 RX ADMIN — LORAZEPAM 1 MG: 2 INJECTION INTRAMUSCULAR; INTRAVENOUS at 05:56

## 2023-01-14 RX ADMIN — SODIUM BICARBONATE: 84 INJECTION, SOLUTION INTRAVENOUS at 07:23

## 2023-01-14 RX ADMIN — ONDANSETRON 4 MG: 2 INJECTION INTRAMUSCULAR; INTRAVENOUS at 00:15

## 2023-01-14 RX ADMIN — ACETAMINOPHEN 650 MG: 325 TABLET ORAL at 12:32

## 2023-01-14 RX ADMIN — PHENYTOIN SODIUM 100 MG: 100 CAPSULE ORAL at 15:06

## 2023-01-14 RX ADMIN — LACOSAMIDE 100 MG: 10 INJECTION, SOLUTION INTRAVENOUS at 11:09

## 2023-01-14 RX ADMIN — ACETAMINOPHEN 650 MG: 325 TABLET ORAL at 07:28

## 2023-01-14 RX ADMIN — FLUOXETINE HYDROCHLORIDE 80 MG: 20 CAPSULE ORAL at 07:27

## 2023-01-14 RX ADMIN — LAMOTRIGINE 200 MG: 100 TABLET ORAL at 07:28

## 2023-01-14 ASSESSMENT — PAIN DESCRIPTION - LOCATION
LOCATION: HEAD
LOCATION: HEAD

## 2023-01-14 ASSESSMENT — PAIN SCALES - GENERAL: PAINLEVEL_OUTOF10: 3

## 2023-01-14 ASSESSMENT — PAIN DESCRIPTION - DESCRIPTORS: DESCRIPTORS: ACHING

## 2023-01-14 NOTE — PROGRESS NOTES
Patients home medications are in her personal belongings bag. Updated the list in the computer. Notified Dr. Rad Paz that the list has been updated. Patient is crying, anxious, and un-consolable at times. Have updated patients mom via phone today on her condition. Sitter is at bedside.      Electronically signed by Renny Masters RN on 1/13/2023 at 6:19 PM

## 2023-01-14 NOTE — PROGRESS NOTES
The patients midline IV has been removed and is intact. Petroleum gauze as well as 2 4 x 4 gauze have been applied to the insertion site. Pressure has been held for 5 minutes and a tegaderm has been applied over the gauze. The patient has been assisted dressing and her discharge instructions have been provided. The patient remains in bed waiting for transport to take her to the main entrance where her mother will be picking her up.

## 2023-01-14 NOTE — DISCHARGE INSTRUCTIONS
Follow up with your primary doctor in 3 days. Call on Monday to schedule a follow-up appointment with Dr. Anny Nieves within one week. Take medications as directed. Resume activity as tolerated.

## 2023-01-14 NOTE — PROGRESS NOTES
Alex cath pulled at 0920. Patient due to void. Asking when she can go home.     Electronically signed by Trini Sims RN on 1/14/2023 at 9:25 AM

## 2023-01-14 NOTE — DISCHARGE SUMMARY
Physician Discharge Summary     Patient ID:  Luke Nguyen  [id number]  83 y.o.  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 She was taking Keppra 1500 mg BID and Lamictal 200 mg daily before this admission. She had several seizures in the ER and had been treated with IV Versed and Fosphenytoin load. UDS negative, etoh negative, CT of head unremarkable, Cxr subtle interstitial opacities in the right basilar region may represent atelectasis or aspiration. Sodium 136, creatinine 0.7/bun 9, glucose 90, troponin less than 0.01, pregnancy test negative, wbc 9k, hgb 11, platelets 710Y. Pt was admitted to ICU. EEGs and continuous video EEGs have failed to identify epileptiform abnormalities. She had recurrent episodes on 1/10 requiring Versed and eventually propofol and intubation. Vimpat was started. Again, no epileptiform EEG abnormalities have been recorded despite recording multiple convulsive episodes. She was extubated yesterday 1/13 and moved to the 5th floor with a sitter. Today pt is on RA, she had no further episodes of seizures, she is cleared for DC home by neurology on Keppra, Lamictal and in addition lacosamide and phenytoin. Consults: neurology    Significant Diagnostic Studies:     XR CHEST PORTABLE [[id number]] Resulted: 01/10/23 2233      Order Status: Completed Updated: 01/10/23 1636     Narrative:       CHEST X-RAY, 1 view   INDICATION: SOB   COMPARISON: None.    TECHNIQUE: Single frontal view of the chest. FINDINGS:   Endotracheal tube terminates 3 to 4 cm above the thuy. Enteric tube tip   terminates in the region of the stomach. The lungs are clear. There is no pleural effusion. No pneumothorax. Cardiomediastinal silhouette is normal in size. No significant osseous findings. Impression:       No acute abnormality. Support devices as above. MRI BRAIN W WO CONTRAST [[id number]] Resulted: 01/07/23 2229     Order Status: Completed Updated: 01/07/23 1631     Narrative:       PROCEDURE: MRI BRAIN W WO CONTRAST   CLINICAL INDICATION: Seizures. COMPARISON: CT head of January 6, 2023. FINDINGS:   There is motion artifact on many sequences limiting evaluation. Diffusion-weighted images demonstrate no restricted diffusion to suggest a   recent infarction. There is no acute intracranial hemorrhage, extra-axial fluid collection,   evidence of mass or mass effect. No abnormal gradient susceptibility is present. There is no abnormal ependymal, leptomeningeal or intraparenchymal enhancement. The ventricular system and cisternal spaces demonstrates normal configuration   and size. There is no hydrocephalus. Vascular flow voids are preserved. Midline   structures demonstrate normal morphology and symmetry. The paranasal sinuses and mastoid air cells are clear. The soft tissues are   unremarkable as visualized. The orbits are also unremarkable in appearance. There is no abnormal marrow signal.     Impression:       No MR evidence of acute intracranial finding; given limitations due to motion   artifact. XR CHEST PORTABLE [[id number]] Resulted: 01/07/23 0233     Order Status: Completed Updated: 01/06/23 2036     Narrative:       PROCEDURE: XR CHEST PORTABLE   CLINICAL INDICATION: Status epilepticus. COMPARISON: No prior similar images available for comparison. FINDINGS:   Subtle interstitial opacities are identified in the right basilar region. There   is no pneumonic consolidation. Heart is not enlarged. There are no pulmonary   vascular congestion changes. There is no pneumothorax. Bilateral costophrenic angles are clear. Visualized osseous structures are intact. Impression:       Subtle interstitial opacities in the right basilar region may represent   atelectasis or aspiration. CT Head W/O Contrast [[id number]] Resulted: 01/07/23 0216     Order Status: Completed Updated: 01/06/23 2019     Narrative:       PROCEDURE: CT HEAD WO CONTRAST   CLINICAL INDICATION: Status epilepticus. COMPARISON: No relevant prior imaging is available for comparison. FINDINGS:   There is no acute intracranial hemorrhage, extra-axial fluid collection,   evidence of mass or mass effect. Gray-white matter differentiation is preserved. The ventricular system and cisternal spaces demonstrates normal configuration. There is no hydrocephalus. The paranasal sinuses and mastoid air cells are clear. The soft tissues are   unremarkable. The orbits are also unremarkable in appearance. There are no suspicious osseous lesions. Impression:       Unremarkable CT scan of the head.            Discharge Exam:  /76   Pulse 98   Temp 98.8 °F (37.1 °C) (Temporal)   Resp 18   Ht 5' 5\" (1.651 m)   Wt 152 lb (68.9 kg)   SpO2 95%   BMI 25.29 kg/m²     General Appearance:    Alert, cooperative, no distress, appears stated age               Nose:   Nares normal, septum midline, mucosa normal, no drainage    or sinus tenderness       Neck:   Supple, symmetrical, trachea midline, no adenopathy;     thyroid:  no enlargement/tenderness/nodules; no carotid    bruit or JVD       Lungs:     Clear to auscultation bilaterally, respirations unlabored        Heart:    Regular rate and rhythm, S1 and S2 normal, no murmur, rub   or gallop       Abdomen:     Soft, non-tender, bowel sounds active all four quadrants,     no masses, no organomegaly           Extremities:   Extremities normal, atraumatic, no cyanosis or edema       Skin: Skin color, texture, turgor normal, no rashes or lesions       Neurologic:   Normal strength, sensation and reflexes     throughout       Discharge Medications:         Medication List        START taking these medications      ferrous sulfate 325 (65 Fe) MG tablet  Commonly known as: IRON 325  Take 1 tablet by mouth 2 times daily (with meals)     lacosamide 100 MG Tabs tablet  Commonly known as: VIMPAT  Take 1 tablet by mouth 2 times daily for 30 days. Max Daily Amount: 200 mg     phenytoin 100 MG ER capsule  Commonly known as: DILANTIN  Take 1 capsule by mouth 3 times daily            CHANGE how you take these medications      lamoTRIgine 200 MG tablet  Commonly known as: LAMICTAL  Take 1 tablet by mouth daily  What changed:   medication strength  when to take this  Another medication with the same name was removed. Continue taking this medication, and follow the directions you see here. levETIRAcetam 750 MG tablet  Commonly known as: KEPPRA  Take 2 tablets by mouth 2 times daily  What changed:   medication strength  Another medication with the same name was removed. Continue taking this medication, and follow the directions you see here. CONTINUE taking these medications      FLUoxetine 40 MG capsule  Commonly known as: PROzac            STOP taking these medications      traZODone 150 MG tablet  Commonly known as: DESYREL               Where to Get Your Medications        These medications were sent to [id number] [place] [address], [address] [place] [address]      Phone: [phone]   ferrous sulfate 325 (65 Fe) MG tablet  lacosamide 100 MG Tabs tablet  lamoTRIgine 200 MG tablet  levETIRAcetam 750 MG tablet  phenytoin 100 MG ER capsule           Discharge Instructions: Follow up with primary provider in 3 days, with Dr Gely Connors in one week. Take medications as directed. Resume activity as tolerated.     Diet: ADULT DIET; Regular     Disposition: Patient is medically stable and will be discharged home      Dc time 31 min

## 2023-01-14 NOTE — PROGRESS NOTES
[address], [address]  [facility], [facility]  Phone [phone]     Neurology Progress Note  2023 1:01 PM  Information:   Patient Name: Theo Cleaning  :   1995  Age:   33 y.o. MRN:   [id number]  Account #:  [de-identified]  Admit Date:   2023  Today:  23     ADMIT DX:   Status epilepticus ([facility]    Subjective:     Theo Cleaning is a 32 YO woman with autism, depression, anxiety, and seizures who was admitted  with recurrent seizures. She was taking Keppra 1500 mg BID and Lamictal 200 mg daily on admission. She had several seizures in the ER and had been treated with IV Versed and Fosphenytoin load. EEGs and continuous video EEGs have failed to identify epileptiform abnormalities. She had recurrent episodes on 1/10 requiring Versed and eventually propofol and intubation. Vimpat was started. Again, no epileptiform EEG abnormalities have been recorded despite recording multiple convulsive episodes. She was extubated yesterday and moved to the 5th floor with a sitter. Interval History:   She is drowsy but wakens to voice. She slowly answers questions in a childish manner and follows simple commands. Objective:     Past Medical History:  Past Medical History:   Diagnosis Date    Anxiety     Autism     Depression     Epilepsy ([facility]     Seizures ([facility]        Past Surgical History:   Procedure Laterality Date     SECTION      CHOLECYSTECTOMY         History reviewed. No pertinent family history.     Social History     Socioeconomic History    Marital status: Legally      Spouse name: Not on file    Number of children: Not on file    Years of education: Not on file    Highest education level: Not on file   Occupational History    Not on file   Tobacco Use    Smoking status: Unknown    Smokeless tobacco: Not on file   Substance and Sexual Activity    Alcohol use: Never    Drug use: Not on file    Sexual activity: Not on file   Other Topics Concern Not on file   Social History Narrative    Not on file     Social Determinants of Health     Financial Resource Strain: Not on file   Food Insecurity: Not on file   Transportation Needs: Not on file   Physical Activity: Not on file   Stress: Not on file   Social Connections: Not on file   Intimate Partner Violence: Not on file   Housing Stability: Not on file       Medications:   IV infusion builder 100 mL/hr at 01/14/23 0723    sodium chloride 20 mL/hr at 01/07/23 1914      iron sucrose  200 mg IntraVENous Daily    lacosamide  100 mg Oral BID    levETIRAcetam  1,500 mg Oral BID    phenytoin  100 mg Oral TID    FLUoxetine  80 mg Oral Daily    traZODone  150 mg Oral Nightly    enoxaparin  40 mg SubCUTAneous Daily    lamoTRIgine  200 mg Oral Daily    sodium chloride flush  5-40 mL IntraVENous 2 times per day       Diagnostic Studies:  Reviewed all labs/diagnositcs since last 24hrs:  Recent Results (from the past 24 hour(s))   Blood Gas, Arterial    Collection Time: 01/13/23  2:16 PM   Result Value Ref Range    pH, Arterial 7.430 7.350 - 7.450    pCO2, Arterial 39.0 35.0 - 45.0 mmHg    pO2, Arterial 139.0 (H) 80.0 - 100.0 mmHg    HCO3, Arterial 25.9 22.0 - 26.0 mmol/L    Base Excess, Arterial 1.5 -2.0 - 2.0 mmol/L    Hemoglobin, Art, Extended 10.7 (L) 12.0 - 16.0 g/dL    O2 Sat, Arterial 97.0 >92 %    Carboxyhgb, Arterial 1.7 0.0 - 5.0 %    Methemoglobin, Arterial 1.2 <1.5 %    O2 Content, Arterial 14.9 Not Established mL/dL    O2 Therapy Unknown     Oxygen Flow 4.0     O2 Delivery Device Cannula     Farrukh Test POS     Sample Source RR     Potassium, Whole Blood 3.7    Basic Metabolic Panel    Collection Time: 01/14/23  2:45 AM   Result Value Ref Range    Sodium 139 136 - 145 mmol/L    Potassium 3.5 3.5 - 5.0 mmol/L    Chloride 103 98 - 111 mmol/L    CO2 25 22 - 29 mmol/L    Anion Gap 11 7 - 19 mmol/L    Glucose 95 74 - 109 mg/dL    BUN 1 (L) 6 - 20 mg/dL    Creatinine 0.5 0.5 - 0.9 mg/dL    Est, Glom Filt Rate >60 >60 Calcium 8.4 (L) 8.6 - 10.0 mg/dL   CBC    Collection Time: 01/14/23  2:45 AM   Result Value Ref Range    WBC 7.6 4.8 - 10.8 K/uL    RBC 4.14 (L) 4.20 - 5.40 M/uL    Hemoglobin 10.5 (L) 12.0 - 16.0 g/dL    Hematocrit 32.8 (L) 37.0 - 47.0 %    MCV 79.2 (L) 81.0 - 99.0 fL    MCH 25.4 (L) 27.0 - 31.0 pg    MCHC 32.0 (L) 33.0 - 37.0 g/dL    RDW 15.1 (H) 11.5 - 14.5 %    Platelets 776 405 - 760 K/uL    MPV 12.2 9.4 - 12.3 fL   Brain Natriuretic Peptide    Collection Time: 01/14/23 10:05 AM   Result Value Ref Range    Pro- (H) 0 - 450 pg/mL       Diet:  ADULT DIET; Regular    Examination:  Vitals: /76   Pulse 98   Temp 98.8 °F (37.1 °C) (Temporal)   Resp 18   Ht 5' 5\" (1.651 m)   Wt 152 lb (68.9 kg)   SpO2 95%   BMI 25.29 kg/m²    Intake/Output Summary (Last 24 hours) at 1/14/2023 1301  Last data filed at 1/14/2023 0900  Gross per 24 hour   Intake 1921.92 ml   Output 3625 ml   Net -1703.08 ml     General appearance: She is drowsy but in no distress and cooperative  HEENT: Exam; heent: Head: Normal, normocephalic, atraumatic. Lungs: clear to auscultation bilaterally  Heart: regular rate and rhythm, S1, S2 normal, no murmur, click, rub or gallop  Abdomen: soft, non-tender; bowel sounds normal; no masses,  no organomegaly  Extremities: extremities normal, atraumatic, no cyanosis or edema  Neurologic:  Drowsy, wakens to voice. mild dysarthria. She does not talk enough to determine fluency. PERRL. She blinks to threat. No facial weakness. Limb strength is grossly normal.  No pronator drift. Assessment:   1. Status epilepticus. At least some of the events, if not all appear to be pseudoseizures  2. Baseline static encephalopathy with autism, depression, anxiety      Plan:   1. Continue Keppra 1500 mg PO BID, Lamictal 200 mg q am, Dilantin 100 mg TID, Vimpat 100 mg BID  2. OK neurologically to go home with mother  3.  Follow up with Dr. Timmy Miguel in 1 to 2 weeks    Discharge planning:   Home    I spent 45 total minutes in face to face interaction with patient and coordination of care.    I spent > 50% of the total time discussing the diagnoses, evaluation, test results, treatment options, plans; counseling and advising with the patient and/or family and/or caregiver as well as with the care team.    Electronically signed by Shana Forrest M.D.

## 2023-01-14 NOTE — PROGRESS NOTES
Felix Olesther transferred to [phone] from [phone] via wheelchair. Reason for transfer: lower level of care   Explained reason for transfer to Patient and Family. Belongings:  2 bags with personal belongings (clothes, medications) cell phone and . with patient at bedside . Soft chart transferred with patient: Yes. Telemetry box number N/A transferred with patient: N/A. Report given to: Ruby Gallagher RN, via telephone.       Electronically signed by López Russ RN on 1/14/2023 at 7:51 AM

## 2023-01-14 NOTE — PLAN OF CARE
Problem: Nutrition Deficit:  Goal: Optimize nutritional status  Outcome: Progressing  Flowsheets (Taken 1/14/2023 3126)  Nutrient intake appropriate for improving, restoring, or maintaining nutritional needs:   Assess nutritional status and recommend course of action   Monitor oral intake, labs, and treatment plans   Recommend appropriate diets, oral nutritional supplements, and vitamin/mineral supplements

## 2023-01-14 NOTE — PROGRESS NOTES
The patient called out for pain medication stating that she had a headache. Upon entering the room the patient appeared to be asleep in bed. I asked the patient if she was having a headache and she stated yes. Approximately 1-2 minutes later the patients arms folded to the level of her head at her elbows and her legs began to shake. The patient was observed to shake for approximately 2 minutes. Upon completion of shaking the patient was alert and able to answer simple yes or no questions. The patients vitals were assessed and were as follows; /76 HR 98 Temp 98.8 Resp 18 and Oxygen saturation 95% on room air. The patient is resting in bed with her call light in reach. I have notified Dr. Wilmer Girard in person.

## 2023-01-16 ENCOUNTER — HOSPITAL ENCOUNTER (EMERGENCY)
Age: 28
Discharge: ANOTHER HOSPITAL | End: 2023-01-17
Payer: COMMERCIAL

## 2023-01-16 ENCOUNTER — APPOINTMENT (OUTPATIENT)
Dept: CT IMAGING | Age: 28
End: 2023-01-16
Payer: COMMERCIAL

## 2023-01-16 DIAGNOSIS — R56.9 SEIZURE (HCC): Primary | ICD-10-CM

## 2023-01-16 LAB
ALBUMIN SERPL-MCNC: 4.4 G/DL (ref 3.5–5.2)
ALP BLD-CCNC: 92 U/L (ref 35–104)
ALT SERPL-CCNC: 14 U/L (ref 5–33)
AMPHETAMINE SCREEN, URINE: NEGATIVE
ANION GAP SERPL CALCULATED.3IONS-SCNC: 14 MMOL/L (ref 7–19)
AST SERPL-CCNC: 15 U/L (ref 5–32)
BACTERIA: NEGATIVE /HPF
BARBITURATE SCREEN URINE: NEGATIVE
BASOPHILS ABSOLUTE: 0.1 K/UL (ref 0–0.2)
BASOPHILS RELATIVE PERCENT: 0.7 % (ref 0–1)
BENZODIAZEPINE SCREEN, URINE: POSITIVE
BILIRUB SERPL-MCNC: <0.2 MG/DL (ref 0.2–1.2)
BILIRUBIN URINE: NEGATIVE
BLOOD, URINE: ABNORMAL
BUN BLDV-MCNC: 8 MG/DL (ref 6–20)
BUPRENORPHINE URINE: NEGATIVE
CALCIUM SERPL-MCNC: 9.8 MG/DL (ref 8.6–10)
CANNABINOID SCREEN URINE: POSITIVE
CHLORIDE BLD-SCNC: 99 MMOL/L (ref 98–111)
CLARITY: ABNORMAL
CO2: 23 MMOL/L (ref 22–29)
COCAINE METABOLITE SCREEN URINE: NEGATIVE
COLOR: YELLOW
CREAT SERPL-MCNC: 0.6 MG/DL (ref 0.5–0.9)
CRYSTALS, UA: ABNORMAL /HPF
EOSINOPHILS ABSOLUTE: 0 K/UL (ref 0–0.6)
EOSINOPHILS RELATIVE PERCENT: 0.3 % (ref 0–5)
EPITHELIAL CELLS, UA: 3 /HPF (ref 0–5)
ETHANOL: <10 MG/DL (ref 0–0.08)
GFR SERPL CREATININE-BSD FRML MDRD: >60 ML/MIN/{1.73_M2}
GLUCOSE BLD-MCNC: 97 MG/DL (ref 74–109)
GLUCOSE URINE: NEGATIVE MG/DL
HCT VFR BLD CALC: 45.2 % (ref 37–47)
HEMOGLOBIN: 13.3 G/DL (ref 12–16)
HYALINE CASTS: 10 /HPF (ref 0–8)
IMMATURE GRANULOCYTES #: 0.1 K/UL
KETONES, URINE: NEGATIVE MG/DL
LEUKOCYTE ESTERASE, URINE: ABNORMAL
LYMPHOCYTES ABSOLUTE: 2.5 K/UL (ref 1.1–4.5)
LYMPHOCYTES RELATIVE PERCENT: 20 % (ref 20–40)
Lab: ABNORMAL
MAGNESIUM: 2 MG/DL (ref 1.6–2.6)
MCH RBC QN AUTO: 24.8 PG (ref 27–31)
MCHC RBC AUTO-ENTMCNC: 29.4 G/DL (ref 33–37)
MCV RBC AUTO: 84.3 FL (ref 81–99)
METHADONE SCREEN, URINE: NEGATIVE
METHAMPHETAMINE, URINE: NEGATIVE
MONOCYTES ABSOLUTE: 0.9 K/UL (ref 0–0.9)
MONOCYTES RELATIVE PERCENT: 7.3 % (ref 0–10)
NEUTROPHILS ABSOLUTE: 8.7 K/UL (ref 1.5–7.5)
NEUTROPHILS RELATIVE PERCENT: 71.2 % (ref 50–65)
NITRITE, URINE: NEGATIVE
OPIATE SCREEN URINE: POSITIVE
OXYCODONE URINE: NEGATIVE
PDW BLD-RTO: 15.7 % (ref 11.5–14.5)
PH UA: 5.5 (ref 5–8)
PHENCYCLIDINE SCREEN URINE: NEGATIVE
PHENYTOIN LEVEL: 8 UG/ML (ref 10–20)
PLATELET # BLD: 235 K/UL (ref 130–400)
PMV BLD AUTO: 12.5 FL (ref 9.4–12.3)
POTASSIUM SERPL-SCNC: 3.7 MMOL/L (ref 3.5–5)
PROPOXYPHENE SCREEN: NEGATIVE
PROTEIN UA: 30 MG/DL
RBC # BLD: 5.36 M/UL (ref 4.2–5.4)
RBC UA: 2 /HPF (ref 0–4)
SARS-COV-2, NAAT: NOT DETECTED
SODIUM BLD-SCNC: 136 MMOL/L (ref 136–145)
SPECIFIC GRAVITY UA: 1.01 (ref 1–1.03)
T4 FREE: 1.18 NG/DL (ref 0.93–1.7)
TOTAL PROTEIN: 7.7 G/DL (ref 6.6–8.7)
TRICYCLIC, URINE: POSITIVE
TSH SERPL DL<=0.05 MIU/L-ACNC: 1.34 UIU/ML (ref 0.27–4.2)
UROBILINOGEN, URINE: 0.2 E.U./DL
WBC # BLD: 12.2 K/UL (ref 4.8–10.8)
WBC UA: 4 /HPF (ref 0–5)

## 2023-01-16 PROCEDURE — 87635 SARS-COV-2 COVID-19 AMP PRB: CPT

## 2023-01-16 PROCEDURE — 96375 TX/PRO/DX INJ NEW DRUG ADDON: CPT

## 2023-01-16 PROCEDURE — 80185 ASSAY OF PHENYTOIN TOTAL: CPT

## 2023-01-16 PROCEDURE — 99285 EMERGENCY DEPT VISIT HI MDM: CPT

## 2023-01-16 PROCEDURE — 36415 COLL VENOUS BLD VENIPUNCTURE: CPT

## 2023-01-16 PROCEDURE — 80053 COMPREHEN METABOLIC PANEL: CPT

## 2023-01-16 PROCEDURE — 85025 COMPLETE CBC W/AUTO DIFF WBC: CPT

## 2023-01-16 PROCEDURE — 84439 ASSAY OF FREE THYROXINE: CPT

## 2023-01-16 PROCEDURE — 83735 ASSAY OF MAGNESIUM: CPT

## 2023-01-16 PROCEDURE — 70450 CT HEAD/BRAIN W/O DYE: CPT

## 2023-01-16 PROCEDURE — 96376 TX/PRO/DX INJ SAME DRUG ADON: CPT

## 2023-01-16 PROCEDURE — 2580000003 HC RX 258: Performed by: PHYSICIAN ASSISTANT

## 2023-01-16 PROCEDURE — 84443 ASSAY THYROID STIM HORMONE: CPT

## 2023-01-16 PROCEDURE — 6360000002 HC RX W HCPCS

## 2023-01-16 PROCEDURE — 70450 CT HEAD/BRAIN W/O DYE: CPT | Performed by: RADIOLOGY

## 2023-01-16 PROCEDURE — 93005 ELECTROCARDIOGRAM TRACING: CPT | Performed by: PHYSICIAN ASSISTANT

## 2023-01-16 PROCEDURE — 82077 ASSAY SPEC XCP UR&BREATH IA: CPT

## 2023-01-16 PROCEDURE — 81001 URINALYSIS AUTO W/SCOPE: CPT

## 2023-01-16 PROCEDURE — 96361 HYDRATE IV INFUSION ADD-ON: CPT

## 2023-01-16 PROCEDURE — 96374 THER/PROPH/DIAG INJ IV PUSH: CPT

## 2023-01-16 PROCEDURE — 80177 DRUG SCRN QUAN LEVETIRACETAM: CPT

## 2023-01-16 PROCEDURE — 6360000002 HC RX W HCPCS: Performed by: PHYSICIAN ASSISTANT

## 2023-01-16 PROCEDURE — 80306 DRUG TEST PRSMV INSTRMNT: CPT

## 2023-01-16 RX ORDER — MIDAZOLAM HYDROCHLORIDE 1 MG/ML
INJECTION INTRAMUSCULAR; INTRAVENOUS
Status: COMPLETED
Start: 2023-01-16 | End: 2023-01-16

## 2023-01-16 RX ORDER — LORAZEPAM 2 MG/ML
1 INJECTION INTRAMUSCULAR ONCE
Status: COMPLETED | OUTPATIENT
Start: 2023-01-16 | End: 2023-01-16

## 2023-01-16 RX ORDER — ONDANSETRON 2 MG/ML
4 INJECTION INTRAMUSCULAR; INTRAVENOUS ONCE
Status: COMPLETED | OUTPATIENT
Start: 2023-01-16 | End: 2023-01-16

## 2023-01-16 RX ORDER — 0.9 % SODIUM CHLORIDE 0.9 %
1000 INTRAVENOUS SOLUTION INTRAVENOUS ONCE
Status: COMPLETED | OUTPATIENT
Start: 2023-01-16 | End: 2023-01-16

## 2023-01-16 RX ORDER — MIDAZOLAM HYDROCHLORIDE 2 MG/2ML
2 INJECTION, SOLUTION INTRAMUSCULAR; INTRAVENOUS ONCE
Status: COMPLETED | OUTPATIENT
Start: 2023-01-16 | End: 2023-01-16

## 2023-01-16 RX ORDER — LORAZEPAM 2 MG/ML
INJECTION INTRAMUSCULAR
Status: DISPENSED
Start: 2023-01-16 | End: 2023-01-17

## 2023-01-16 RX ORDER — ONDANSETRON 2 MG/ML
INJECTION INTRAMUSCULAR; INTRAVENOUS
Status: COMPLETED
Start: 2023-01-16 | End: 2023-01-16

## 2023-01-16 RX ADMIN — LORAZEPAM 1 MG: 2 INJECTION INTRAMUSCULAR; INTRAVENOUS at 17:47

## 2023-01-16 RX ADMIN — MIDAZOLAM 2 MG: 1 INJECTION INTRAMUSCULAR; INTRAVENOUS at 19:06

## 2023-01-16 RX ADMIN — SODIUM CHLORIDE 1000 ML: 9 INJECTION, SOLUTION INTRAVENOUS at 18:18

## 2023-01-16 RX ADMIN — ONDANSETRON 4 MG: 2 INJECTION INTRAMUSCULAR; INTRAVENOUS at 18:45

## 2023-01-16 RX ADMIN — MIDAZOLAM 2 MG: 1 INJECTION INTRAMUSCULAR; INTRAVENOUS at 18:30

## 2023-01-16 NOTE — ED NOTES
Patient began having active seizure during triage.  Amelie Lopez at bedside, new orders obtained     Vladimir Benson RN  01/16/23 0205

## 2023-01-16 NOTE — ED PROVIDER NOTES
[facility] EMERGENCY DEPT  EMERGENCY DEPARTMENT ENCOUNTER      Pt Name: Cheri Tapia  MRN: [id number]  [place] 1995  Date of evaluation: 1/16/2023  Provider: Astrid Galdamez PA-C    CHIEF COMPLAINT       Chief Complaint   Patient presents with    Seizures         HISTORY OF PRESENT ILLNESS   (Location/Symptom, Timing/Onset, Context/Setting, Quality, Duration, Modifying Factors, Severity)  Note limiting factors. HPI      Cheri Tapia is a 33 y.o. female who presents to the emergency department via EMS with seizures. PMH significant for seizure disorder, autism, epilepsy, anxiety, Long-term use Vimpat, Lamictal, Keppra, Dilantin. Per EMS patient was seen at this facility 3 days ago for breakthrough seizures and today EMS was contacted when family reported patient had witnessed seizure events. Upon initial arrival patient is a poor historian as she is actively seizing.  is en route per EMS to provide additional history.  arrived later at bedside stating that since patient was discharged from the hospital she had been doing well.  states that they have been at  for couple hours outside and patient was well-appearing however in the car ride home she started to become unresponsive.  waited approximately 5 minutes when patient started seizing and after no improvement contacted EMS.  denies administering any medications until EMS arrived and gave patient 5 of diazepam.    Records reviewed show patient was seen in the ED on 1/6/2023 and admitted until 1/14/2023 for status epilepticus, seizure disorder, static encephalopathy, autism, depression, anxiety, iron deficiency anemia, hypokalemia. EEG using continuous video failed to identify epileptiform abnormalities and recorded multiple convulsive episodes. Patient was initially intubated and extubated on 1/13 and returned to her baseline at discharge. Nursing Notes were reviewed.     REVIEW OF SYSTEMS    (2-9 systems for level 4, 10 or more for level 5)     Review of Systems   Reason unable to perform ROS: Unable to obtain ROS due to patient's medical condition and active seizing.   Neurological:  Positive for seizures.     Except as noted above the remainder of the review of systems was reviewed and negative.       PAST MEDICAL HISTORY     Past Medical History:   Diagnosis Date    Anxiety     Autism     Depression     Epilepsy (HCC)     Seizures (HCC)          SURGICAL HISTORY       Past Surgical History:   Procedure Laterality Date     SECTION      CHOLECYSTECTOMY           CURRENT MEDICATIONS       Previous Medications    FERROUS SULFATE (IRON 325) 325 (65 FE) MG TABLET    Take 1 tablet by mouth 2 times daily (with meals)    FLUOXETINE (PROZAC) 40 MG CAPSULE    Take 80 mg by mouth daily    LACOSAMIDE (VIMPAT) 100 MG TABS TABLET    Take 1 tablet by mouth 2 times daily for 30 days. Max Daily Amount: 200 mg    LAMOTRIGINE (LAMICTAL) 200 MG TABLET    Take 1 tablet by mouth daily    LEVETIRACETAM (KEPPRA) 750 MG TABLET    Take 2 tablets by mouth 2 times daily    PHENYTOIN (DILANTIN) 100 MG ER CAPSULE    Take 1 capsule by mouth 3 times daily       ALLERGIES     Patient has no known allergies.    FAMILY HISTORY     History reviewed. No pertinent family history.       SOCIAL HISTORY       Social History     Socioeconomic History    Marital status:      Spouse name: None    Number of children: None    Years of education: None    Highest education level: None   Tobacco Use    Smoking status: Unknown   Substance and Sexual Activity    Alcohol use: Never       SCREENINGS                               [facility] Assessment  BP: 101/76  Heart Rate: (!) 115                 PHYSICAL EXAM    (up to 7 for level 4, 8 or more for level 5)     ED Triage Vitals   BP Temp Temp src Pulse Resp SpO2 Height Weight   -- -- -- -- -- -- -- --       Physical Exam  Vitals and nursing note reviewed.   Constitutional:       General: She is in acute  distress. Appearance: Normal appearance. She is well-developed, well-groomed and overweight. Comments: Patient actively seizing upon initial examination which resolved in under 1 minute and she is postictal   HENT:      Head: Normocephalic and atraumatic. Mouth/Throat:      Mouth: Mucous membranes are moist.      Pharynx: Oropharynx is clear. Comments: No tongue abnormalities  Eyes:      Pupils: Pupils are equal, round, and reactive to light. Cardiovascular:      Rate and Rhythm: Tachycardia present. Pulmonary:      Effort: Pulmonary effort is normal.      Breath sounds: Normal breath sounds. Abdominal:      Palpations: Abdomen is soft. Musculoskeletal:         General: No signs of injury. Normal range of motion. Cervical back: Neck supple. Skin:     General: Skin is warm and dry. Findings: No signs of injury. Neurological:      Mental Status: She is confused. Motor: Seizure activity present. DIAGNOSTIC RESULTS     EKG: All EKG's are interpreted by the Emergency Department Physician who either signs or Co-signs this chart in the absence of a cardiologist.        RADIOLOGY:   Non-plain film images such as CT, Ultrasound and MRI are read by the radiologist. Plain radiographic images are visualized and preliminarily interpreted by the emergency physician with the below findings:        Interpretation per the Radiologist below, if available at the time of this note:    [address] ([place] for new onset seizures or head trauma)   Final Result   1. No acute intracranial abnormality. Recommendation: Follow up as clinically indicated. All CT scans at this facility utilize dose modulation, iterative reconstruction, and/or weight based dosing when appropriate to reduce radiation dose to as low as reasonably achievable.    Dictated and Electronically Signed by Varun Singh MD, SA CERTIFIED at [id number] 09:19:35 PM EST                     LABS:  Labs Reviewed CBC WITH AUTO DIFFERENTIAL - Abnormal; Notable for the following components:       Result Value    WBC 12.2 (*)     MCH 24.8 (*)     MCHC 29.4 (*)     RDW 15.7 (*)     MPV 12.5 (*)     Neutrophils % 71.2 (*)     Neutrophils Absolute 8.7 (*)     All other components within normal limits   PHENYTOIN LEVEL, TOTAL - Abnormal; Notable for the following components:    Phenytoin Lvl 8.0 (*)     All other components within normal limits   COVID-19, RAPID   COMPREHENSIVE METABOLIC PANEL   MAGNESIUM   ETHANOL   T4, FREE   TSH   URINALYSIS   DRUG SCRN, BUPRENORPHINE   LEVETIRACETAM LEVEL       All other labs were within normal range or not returned as of this dictation. EMERGENCY DEPARTMENT COURSE and DIFFERENTIAL DIAGNOSIS/MDM:   Vitals:    Vitals:    01/16/23 1845 01/16/23 1900 01/16/23 1927 01/16/23 1935   BP: 104/74 117/86 94/65 101/76   Pulse: (!) 112 (!) 115 (!) 120 (!) 115   Resp: 17 26 21 (!) 34   Temp:       SpO2: 99% 97% 97% 96%   Weight:       Height:               MDM     Amount and/or Complexity of Data Reviewed  Clinical lab tests: ordered and reviewed  Tests in the radiology section of CPT®: reviewed and ordered  Tests in the medicine section of CPT®: reviewed and ordered  Decide to obtain previous medical records or to obtain history from someone other than the patient: yes  Obtain history from someone other than the patient: yes ()  Review and summarize past medical records: yes  Discuss the patient with other providers: yes (Dr. Osman Wang (attending)  Mrs. Guido Oliver PA-C)          [address]     ED Course as of 01/16/23 2026 Mon Jan 16, 2023   1824 Patient persistently with breakthrough seizures despite Ativan, will administer 2 of Versed. Case has been discussed with Dr. Osman Wang who is in agreement with continued treatment plan. [JS]   1900 Patient unable to break from seizures. Will order two more of versed. [JS]   1906 Phone discussion with Dr. Justo Kwon, neurology.   [facility] that during patient's recent admission she required to prevent an intubation for 2 days during which time she had no convulsions or seizure events and when she was extubated she was fine. Concerned that there is an underlying behavioral component. States no further recommendation at this time for medication management and feels that patient will require transfer to a tertiary facility for higher level of care. [JS]   2026 Case discussed at this time with Mrs. Slaughter VANDA Zimmerman who will be assuming care of patient. Pending completion of urinalysis and head CT Mrs. Isabella Mustafa will reevaluate and disposition or transfer patient accordingly. Please see Mrs. Baeza's note below for further ED course as well as final diagnosis. Working diagnosis: Seizures. [JS]      ED Course User Index  [JS] Denton Pretty PA-C       CONSULTS:  None    PROCEDURES:  Unless otherwise noted below, none     EKG 12 Lead    Date/Time: 1/16/2023 5:54 PM  Performed by: Denton Pretty PA-C  Authorized by: Denton Pretty PA-C     ECG reviewed by ED Physician in the absence of a cardiologist: yes (Dr. James Murguia (attending))    Rate:     ECG rate:  115    ECG rate assessment: tachycardic    Ectopy:     Ectopy: PAC    Other findings:     Other findings: ALPESH    Comments:      No STEMI  No acute changes            FINAL IMPRESSION      1. Seizure ([address]          DISPOSITION/PLAN   DISPOSITION        PATIENT REFERRED TO:  No follow-up provider specified. DISCHARGE MEDICATIONS:  New Prescriptions    No medications on file     Controlled Substances Monitoring:     No flowsheet data found.     (Please note that portions of this note were completed with a voice recognition program.  Efforts were made to edit the dictations but occasionally words are mis-transcribed.)    Denton Pretty PA-C (electronically signed)           Denton Pretty PA-C  01/16/23 2026

## 2023-01-17 VITALS
TEMPERATURE: 97.8 F | BODY MASS INDEX: 25.83 KG/M2 | OXYGEN SATURATION: 99 % | WEIGHT: 155 LBS | HEART RATE: 111 BPM | HEIGHT: 65 IN | RESPIRATION RATE: 18 BRPM | DIASTOLIC BLOOD PRESSURE: 74 MMHG | SYSTOLIC BLOOD PRESSURE: 114 MMHG

## 2023-01-17 LAB
EKG P AXIS: 62 DEGREES
EKG P-R INTERVAL: 126 MS
EKG Q-T INTERVAL: 304 MS
EKG QRS DURATION: 94 MS
EKG QTC CALCULATION (BAZETT): 402 MS
EKG T AXIS: 46 DEGREES

## 2023-01-17 ASSESSMENT — PAIN - FUNCTIONAL ASSESSMENT: PAIN_FUNCTIONAL_ASSESSMENT: NONE - DENIES PAIN

## 2023-01-17 NOTE — ED NOTES
Called [employer]. [employer] declined due to weather and said to check back at 0600.      Ronni Babb  01/17/23 9643

## 2023-01-17 NOTE — ED NOTES
Report given to Tere Perez at [facility] ICU   Report #[phone]     glenn [facility]  01/17/23 3414

## 2023-01-17 NOTE — ED NOTES
Updated Tawanna Morillo on plan of care       Randell Diaz [place], [facility]  01/17/23 2982

## 2023-01-17 NOTE — ED NOTES
Called [employer] for transport. [facility] declined due to weather in [facility]. They said to check back at 0600.      Petra South  01/17/23 5687

## 2023-01-17 NOTE — ED NOTES
PT accepted at [facility] in [place] [address] [facility]  01/16/23 9949

## 2023-01-17 NOTE — ED NOTES
PT had seizure-like activity x 2, approx 5 min apart, alsting approx 30 sec each      Gricel Vela RN  01/16/23 4391

## 2023-01-17 NOTE — ED NOTES
Report to [facility].       Leonid Zeng RN  01/17/23 0801       Leonid Zeng RN  01/17/23 7405

## 2023-01-17 NOTE — ED PROVIDER NOTES
[facility] EMERGENCY DEPT  eMERGENCYdEPARTMENT eNCOUnter      Pt Name: Dwayne Calero  MRN: [id number]  [place] [birth year]LH evaluation: 1/16/2023  [address] [facility], [address] [place] [address]    Emergency Department care of this patient was assumed at 2030 from [place], [place]. We have discussed the case and the plan of care. I have seen and evaluated patient and reviewed ED course. CHIEF COMPLAINT       Chief Complaint   Patient presents with    Seizures         PHYSICAL EXAM    (up to 7 for level 4, 8 or more for level 5)     ED Triage Vitals [01/16/23 1737]   BP Temp Temp src Heart Rate Resp SpO2 Height Weight   125/66 97.8 °F (36.6 °C) -- (!) 112 18 100 % 5' 5\" (1.651 m) 155 lb (70.3 kg)       DIAGNOSTIC RESULTS     RADIOLOGY:   Non-plain film imagessuch as CT, Ultrasound and MRI are read by the radiologist. Plain radiographic images are visualized and preliminarily interpreted by the emergency physician with the below findings:      [address] ([place] for new onset seizures or head trauma)   Final Result   1. No acute intracranial abnormality. Recommendation: Follow up as clinically indicated. All CT scans at this facility utilize dose modulation, iterative reconstruction, and/or weight based dosing when appropriate to reduce radiation dose to as low as reasonably achievable.    Dictated and Electronically Signed by Yeimi Kenny MD, MQSA CERTIFIED at [id number] 09:19:35 PM EST                       LABS:  Labs Reviewed   CBC WITH AUTO DIFFERENTIAL - Abnormal; Notable for the following components:       Result Value    WBC 12.2 (*)     MCH 24.8 (*)     MCHC 29.4 (*)     RDW 15.7 (*)     MPV 12.5 (*)     Neutrophils % 71.2 (*)     Neutrophils Absolute 8.7 (*)     All other components within normal limits   URINALYSIS - Abnormal; Notable for the following components:    Clarity, UA CLOUDY (*)     Blood, Urine SMALL (*)     Protein, UA 30 (*)     Leukocyte Esterase, Urine TRACE (*)     All other components within normal limits   DRUG SCRN, BUPRENORPHINE - Abnormal; Notable for the following components:    Benzodiazepine Screen, Urine POSITIVE (*)     Cannabinoid Scrn, Ur POSITIVE (*)     Opiate Scrn, Ur POSITIVE (*)     Tricyclic POSITIVE (*)     All other components within normal limits   PHENYTOIN LEVEL, TOTAL - Abnormal; Notable for the following components:    Phenytoin Lvl 8.0 (*)     All other components within normal limits   MICROSCOPIC URINALYSIS - Abnormal; Notable for the following components:    Bacteria, UA NEGATIVE (*)     Crystals, UA NEG (*)     Hyaline Casts, UA 10 (*)     All other components within normal limits   COVID-19, RAPID   COMPREHENSIVE METABOLIC PANEL   MAGNESIUM   ETHANOL   T4, FREE   TSH   LEVETIRACETAM LEVEL       All other labs were within normal range or not returned as of this dictation. EMERGENCY DEPARTMENT COURSE and DIFFERENTIAL DIAGNOSIS/MDM:   :    Vitals:    01/16/23 2056 01/16/23 2156 01/16/23 2211 01/16/23 2221   BP: 101/63 100/61 104/66 102/64   Pulse: 85 79 70 72   Resp: 21 21 21 19   Temp:       SpO2: 96% 97% 96% 96%   Weight:       Height:           MDM  Patient is a 67-year-old female who presented to the ER with seizures. Patient's had multiple breakthrough seizures. Please see note from Lainey Jacobs PA-C as she was the original provider to see and start work-up on patient. When I took over the patient's care, plan was to follow-up on imaging and then try to transfer the patient. Imaging was negative for any signs of acute intracranial process. Isabelle Apaircio had already spoken with Dr. Waqas Mane, neurologist regarding the patient. He is concerned that she may have some behavioral component to the seizure activity. He felt that he had exhausted his options here at [facility] and felt that she would benefit from tertiary level of care. I spoke with hospitalist at [facility] who is agreeable to this transfer.   Since taking over the patient's care, she has had no further seizure activity. She will continue to be monitored in the ER until transfer to [facility]. CONSULTS:  Dr Azra Spain, Hospitalist [place]    FINAL IMPRESSION      1.  Seizure [facility])          DISPOSITION/PLAN   DISPOSITION Decision To Transfer    (Please note that portions ofthis note were completed with a voice recognition program.  Efforts were made to edit the dictations but occasionally words are mis-transcribed.)    HEMANT Daniels(electronically signed)     Yulia Gavin, [address]  01/17/23 7095

## 2023-01-17 NOTE — ED NOTES
PT noted to have seizure-like activity lasting approx 30 seconds     [facility]  01/16/23 1935

## 2023-01-17 NOTE — ED NOTES
PT noted to have seizure-like activity for approx 15 seconds, Madie Villanueva NP aware     Burke Dunn RN  01/16/23 2502

## 2023-01-17 NOTE — ED NOTES
PT noted to have seizure-like activity again- verbal order from Mara Blake NP for 2 mg versed IV       Marlene Hollins RN  01/16/23 0647

## 2023-01-17 NOTE — ED NOTES
[facility] EMS states can take PT in approx 1.5 hours      Travis Kendy, [facility]  01/17/23 5319

## 2023-01-18 LAB — KEPPRA: 6 UG/ML (ref 10–40)

## 2023-01-20 LAB
EKG P AXIS: 41 DEGREES
EKG P-R INTERVAL: 148 MS
EKG Q-T INTERVAL: 340 MS
EKG QRS DURATION: 86 MS
EKG QTC CALCULATION (BAZETT): 392 MS
EKG T AXIS: 31 DEGREES

## 2023-01-24 ENCOUNTER — HOSPITAL ENCOUNTER (INPATIENT)
Facility: HOSPITAL | Age: 28
LOS: 1 days | Discharge: HOME | DRG: 101 | End: 2023-01-25
Attending: INTERNAL MEDICINE | Admitting: FAMILY MEDICINE

## 2023-01-24 ENCOUNTER — APPOINTMENT (OUTPATIENT)
Dept: CT IMAGING | Facility: HOSPITAL | Age: 28
DRG: 101 | End: 2023-01-24

## 2023-01-24 ENCOUNTER — APPOINTMENT (OUTPATIENT)
Dept: GENERAL RADIOLOGY | Facility: HOSPITAL | Age: 28
DRG: 101 | End: 2023-01-24

## 2023-01-24 DIAGNOSIS — R56.9 SEIZURE: Primary | ICD-10-CM

## 2023-01-24 LAB
ALBUMIN SERPL-MCNC: 4 G/DL (ref 3.5–5.2)
ALBUMIN/GLOB SERPL: 1.3 G/DL
ALP SERPL-CCNC: 77 U/L (ref 39–117)
ALT SERPL W P-5'-P-CCNC: 38 U/L (ref 1–33)
ANION GAP SERPL CALCULATED.3IONS-SCNC: 11 MMOL/L (ref 5–15)
ARTERIAL PATENCY WRIST A: ABNORMAL
AST SERPL-CCNC: 22 U/L (ref 1–32)
ATMOSPHERIC PRESS: 747 MMHG
BASE EXCESS BLDA CALC-SCNC: -0.4 MMOL/L (ref 0–2)
BASOPHILS # BLD AUTO: 0.06 10*3/MM3 (ref 0–0.2)
BASOPHILS NFR BLD AUTO: 0.6 % (ref 0–1.5)
BDY SITE: ABNORMAL
BILIRUB SERPL-MCNC: 0.3 MG/DL (ref 0–1.2)
BODY TEMPERATURE: 37 C
BUN SERPL-MCNC: 7 MG/DL (ref 6–20)
BUN/CREAT SERPL: 15.6 (ref 7–25)
CALCIUM SPEC-SCNC: 8.2 MG/DL (ref 8.6–10.5)
CHLORIDE SERPL-SCNC: 105 MMOL/L (ref 98–107)
CK SERPL-CCNC: 42 U/L (ref 20–180)
CO2 SERPL-SCNC: 22 MMOL/L (ref 22–29)
CREAT SERPL-MCNC: 0.45 MG/DL (ref 0.57–1)
DEPRECATED RDW RBC AUTO: 51.8 FL (ref 37–54)
EGFRCR SERPLBLD CKD-EPI 2021: 136.3 ML/MIN/1.73
EOSINOPHIL # BLD AUTO: 0.04 10*3/MM3 (ref 0–0.4)
EOSINOPHIL NFR BLD AUTO: 0.4 % (ref 0.3–6.2)
ERYTHROCYTE [DISTWIDTH] IN BLOOD BY AUTOMATED COUNT: 18 % (ref 12.3–15.4)
GLOBULIN UR ELPH-MCNC: 3 GM/DL
GLUCOSE SERPL-MCNC: 100 MG/DL (ref 65–99)
HCO3 BLDA-SCNC: 24.6 MMOL/L (ref 20–26)
HCT VFR BLD AUTO: 37.8 % (ref 34–46.6)
HGB BLD-MCNC: 11.4 G/DL (ref 12–15.9)
IMM GRANULOCYTES # BLD AUTO: 0.04 10*3/MM3 (ref 0–0.05)
IMM GRANULOCYTES NFR BLD AUTO: 0.4 % (ref 0–0.5)
INHALED O2 CONCENTRATION: 40 %
LYMPHOCYTES # BLD AUTO: 1.72 10*3/MM3 (ref 0.7–3.1)
LYMPHOCYTES NFR BLD AUTO: 16.5 % (ref 19.6–45.3)
Lab: ABNORMAL
MCH RBC QN AUTO: 24.8 PG (ref 26.6–33)
MCHC RBC AUTO-ENTMCNC: 30.2 G/DL (ref 31.5–35.7)
MCV RBC AUTO: 82.4 FL (ref 79–97)
MODALITY: ABNORMAL
MONOCYTES # BLD AUTO: 0.71 10*3/MM3 (ref 0.1–0.9)
MONOCYTES NFR BLD AUTO: 6.8 % (ref 5–12)
NEUTROPHILS NFR BLD AUTO: 7.85 10*3/MM3 (ref 1.7–7)
NEUTROPHILS NFR BLD AUTO: 75.3 % (ref 42.7–76)
NRBC BLD AUTO-RTO: 0 /100 WBC (ref 0–0.2)
PCO2 BLDA: 40.9 MM HG (ref 35–45)
PCO2 TEMP ADJ BLD: 40.9 MM HG (ref 35–45)
PEEP RESPIRATORY: 5 CM[H2O]
PH BLDA: 7.39 PH UNITS (ref 7.35–7.45)
PH, TEMP CORRECTED: 7.39 PH UNITS (ref 7.35–7.45)
PHENYTOIN SERPL-MCNC: 3.7 MCG/ML (ref 10–20)
PLATELET # BLD AUTO: 256 10*3/MM3 (ref 140–450)
PMV BLD AUTO: 12.5 FL (ref 6–12)
PO2 BLDA: 190 MM HG (ref 83–108)
PO2 TEMP ADJ BLD: 190 MM HG (ref 83–108)
POTASSIUM SERPL-SCNC: 3.8 MMOL/L (ref 3.5–5.2)
PROT SERPL-MCNC: 7 G/DL (ref 6–8.5)
RBC # BLD AUTO: 4.59 10*6/MM3 (ref 3.77–5.28)
SAO2 % BLDCOA: >100.1 % (ref 94–99)
SET MECH RESP RATE: 16
SODIUM SERPL-SCNC: 138 MMOL/L (ref 136–145)
VENTILATOR MODE: AC
VT ON VENT VENT: 450 ML
WBC NRBC COR # BLD: 10.42 10*3/MM3 (ref 3.4–10.8)

## 2023-01-24 PROCEDURE — 72125 CT NECK SPINE W/O DYE: CPT

## 2023-01-24 PROCEDURE — 36600 WITHDRAWAL OF ARTERIAL BLOOD: CPT

## 2023-01-24 PROCEDURE — 99291 CRITICAL CARE FIRST HOUR: CPT

## 2023-01-24 PROCEDURE — 80053 COMPREHEN METABOLIC PANEL: CPT | Performed by: INTERNAL MEDICINE

## 2023-01-24 PROCEDURE — 71045 X-RAY EXAM CHEST 1 VIEW: CPT

## 2023-01-24 PROCEDURE — 85025 COMPLETE CBC W/AUTO DIFF WBC: CPT | Performed by: INTERNAL MEDICINE

## 2023-01-24 PROCEDURE — 25010000002 LORAZEPAM PER 2 MG

## 2023-01-24 PROCEDURE — 0BH17EZ INSERTION OF ENDOTRACHEAL AIRWAY INTO TRACHEA, VIA NATURAL OR ARTIFICIAL OPENING: ICD-10-PCS | Performed by: INTERNAL MEDICINE

## 2023-01-24 PROCEDURE — 5A1935Z RESPIRATORY VENTILATION, LESS THAN 24 CONSECUTIVE HOURS: ICD-10-PCS | Performed by: INTERNAL MEDICINE

## 2023-01-24 PROCEDURE — 25010000002 FOSPHENYTOIN 500 MG PE/10ML SOLUTION 10 ML VIAL: Performed by: INTERNAL MEDICINE

## 2023-01-24 PROCEDURE — 94799 UNLISTED PULMONARY SVC/PX: CPT

## 2023-01-24 PROCEDURE — 94002 VENT MGMT INPAT INIT DAY: CPT

## 2023-01-24 PROCEDURE — 70450 CT HEAD/BRAIN W/O DYE: CPT

## 2023-01-24 PROCEDURE — 25010000002 LORAZEPAM PER 2 MG: Performed by: INTERNAL MEDICINE

## 2023-01-24 PROCEDURE — 82550 ASSAY OF CK (CPK): CPT | Performed by: EMERGENCY MEDICINE

## 2023-01-24 PROCEDURE — 25010000002 ONDANSETRON PER 1 MG

## 2023-01-24 PROCEDURE — 25010000002 SUCCINYLCHOLINE PER 20 MG: Performed by: INTERNAL MEDICINE

## 2023-01-24 PROCEDURE — 25010000002 PROPOFOL 1000 MG/100ML EMULSION

## 2023-01-24 PROCEDURE — 25010000002 PROPOFOL 10 MG/ML EMULSION: Performed by: INTERNAL MEDICINE

## 2023-01-24 PROCEDURE — 0 LEVETIRACETAM IN NACL 0.54% 1500 MG/100ML SOLUTION: Performed by: INTERNAL MEDICINE

## 2023-01-24 PROCEDURE — 25010000002 MIDAZOLAM 50 MG/10ML SOLUTION 10 ML VIAL: Performed by: INTERNAL MEDICINE

## 2023-01-24 PROCEDURE — 31500 INSERT EMERGENCY AIRWAY: CPT

## 2023-01-24 PROCEDURE — 80185 ASSAY OF PHENYTOIN TOTAL: CPT | Performed by: EMERGENCY MEDICINE

## 2023-01-24 PROCEDURE — 82803 BLOOD GASES ANY COMBINATION: CPT

## 2023-01-24 RX ORDER — ACETAMINOPHEN 325 MG/1
650 TABLET ORAL EVERY 6 HOURS PRN
Status: DISCONTINUED | OUTPATIENT
Start: 2023-01-24 | End: 2023-01-25 | Stop reason: HOSPADM

## 2023-01-24 RX ORDER — PROPOFOL 10 MG/ML
40 VIAL (ML) INTRAVENOUS ONCE
Status: COMPLETED | OUTPATIENT
Start: 2023-01-24 | End: 2023-01-24

## 2023-01-24 RX ORDER — ONDANSETRON 2 MG/ML
4 INJECTION INTRAMUSCULAR; INTRAVENOUS EVERY 6 HOURS PRN
Status: DISCONTINUED | OUTPATIENT
Start: 2023-01-24 | End: 2023-01-25 | Stop reason: HOSPADM

## 2023-01-24 RX ORDER — ETOMIDATE 2 MG/ML
24 INJECTION INTRAVENOUS ONCE
Status: COMPLETED | OUTPATIENT
Start: 2023-01-24 | End: 2023-01-24

## 2023-01-24 RX ORDER — LORAZEPAM 2 MG/ML
INJECTION INTRAMUSCULAR
Status: COMPLETED
Start: 2023-01-24 | End: 2023-01-24

## 2023-01-24 RX ORDER — LORAZEPAM 2 MG/ML
2 INJECTION INTRAMUSCULAR ONCE
Status: COMPLETED | OUTPATIENT
Start: 2023-01-24 | End: 2023-01-24

## 2023-01-24 RX ORDER — PANTOPRAZOLE SODIUM 40 MG/10ML
40 INJECTION, POWDER, LYOPHILIZED, FOR SOLUTION INTRAVENOUS
Status: DISCONTINUED | OUTPATIENT
Start: 2023-01-25 | End: 2023-01-25 | Stop reason: HOSPADM

## 2023-01-24 RX ORDER — CHLORHEXIDINE GLUCONATE 0.12 MG/ML
15 RINSE ORAL EVERY 12 HOURS SCHEDULED
Status: DISCONTINUED | OUTPATIENT
Start: 2023-01-25 | End: 2023-01-25 | Stop reason: HOSPADM

## 2023-01-24 RX ORDER — LEVETIRACETAM 15 MG/ML
1500 INJECTION INTRAVASCULAR ONCE
Status: COMPLETED | OUTPATIENT
Start: 2023-01-24 | End: 2023-01-24

## 2023-01-24 RX ORDER — LEVETIRACETAM 10 MG/ML
1000 INJECTION INTRAVASCULAR ONCE
Status: DISCONTINUED | OUTPATIENT
Start: 2023-01-24 | End: 2023-01-24

## 2023-01-24 RX ORDER — PROPOFOL 10 MG/ML
INJECTION, EMULSION INTRAVENOUS
Status: COMPLETED
Start: 2023-01-24 | End: 2023-01-24

## 2023-01-24 RX ORDER — SODIUM CHLORIDE 9 MG/ML
75 INJECTION, SOLUTION INTRAVENOUS CONTINUOUS
Status: DISCONTINUED | OUTPATIENT
Start: 2023-01-25 | End: 2023-01-25 | Stop reason: HOSPADM

## 2023-01-24 RX ORDER — SUCCINYLCHOLINE CHLORIDE 20 MG/ML
60 INJECTION INTRAMUSCULAR; INTRAVENOUS ONCE
Status: COMPLETED | OUTPATIENT
Start: 2023-01-24 | End: 2023-01-24

## 2023-01-24 RX ORDER — ONDANSETRON 2 MG/ML
4 INJECTION INTRAMUSCULAR; INTRAVENOUS ONCE
Status: COMPLETED | OUTPATIENT
Start: 2023-01-24 | End: 2023-01-24

## 2023-01-24 RX ORDER — ONDANSETRON 2 MG/ML
INJECTION INTRAMUSCULAR; INTRAVENOUS
Status: COMPLETED
Start: 2023-01-24 | End: 2023-01-24

## 2023-01-24 RX ORDER — ROCURONIUM BROMIDE 10 MG/ML
96 INJECTION, SOLUTION INTRAVENOUS ONCE
Status: COMPLETED | OUTPATIENT
Start: 2023-01-24 | End: 2023-01-24

## 2023-01-24 RX ADMIN — MIDAZOLAM 0.04 MG/KG/HR: 5 INJECTION INTRAMUSCULAR; INTRAVENOUS at 23:00

## 2023-01-24 RX ADMIN — LORAZEPAM 2 MG: 2 INJECTION INTRAMUSCULAR at 21:01

## 2023-01-24 RX ADMIN — ONDANSETRON 4 MG: 2 INJECTION INTRAMUSCULAR; INTRAVENOUS at 22:19

## 2023-01-24 RX ADMIN — LORAZEPAM 2 MG: 2 INJECTION INTRAMUSCULAR at 22:20

## 2023-01-24 RX ADMIN — LORAZEPAM 2 MG: 2 INJECTION INTRAMUSCULAR; INTRAVENOUS at 21:01

## 2023-01-24 RX ADMIN — PROPOFOL 10 MCG/KG/MIN: 10 INJECTION, EMULSION INTRAVENOUS at 21:19

## 2023-01-24 RX ADMIN — LORAZEPAM 2 MG: 2 INJECTION INTRAMUSCULAR; INTRAVENOUS at 21:06

## 2023-01-24 RX ADMIN — LEVETIRACETAM 1500 MG: 15 INJECTION INTRAVENOUS at 21:36

## 2023-01-24 RX ADMIN — ETOMIDATE 24 MG: 2 INJECTION, SOLUTION INTRAVENOUS at 20:58

## 2023-01-24 RX ADMIN — LORAZEPAM 2 MG: 2 INJECTION INTRAMUSCULAR; INTRAVENOUS at 22:20

## 2023-01-24 RX ADMIN — SODIUM CHLORIDE 1470 MG PE: 9 INJECTION, SOLUTION INTRAVENOUS at 21:50

## 2023-01-24 RX ADMIN — ROCURONIUM BROMIDE 96 MG: 10 INJECTION INTRAVENOUS at 21:08

## 2023-01-24 RX ADMIN — PROPOFOL 40 MG: 10 INJECTION, EMULSION INTRAVENOUS at 21:18

## 2023-01-24 RX ADMIN — SUCCINYLCHOLINE CHLORIDE 60 MG: 20 INJECTION, SOLUTION INTRAMUSCULAR; INTRAVENOUS at 20:58

## 2023-01-25 ENCOUNTER — APPOINTMENT (OUTPATIENT)
Dept: GENERAL RADIOLOGY | Facility: HOSPITAL | Age: 28
DRG: 101 | End: 2023-01-25

## 2023-01-25 VITALS
DIASTOLIC BLOOD PRESSURE: 70 MMHG | OXYGEN SATURATION: 100 % | TEMPERATURE: 97.6 F | HEART RATE: 79 BPM | HEIGHT: 68 IN | BODY MASS INDEX: 28 KG/M2 | WEIGHT: 184.75 LBS | SYSTOLIC BLOOD PRESSURE: 100 MMHG | RESPIRATION RATE: 19 BRPM

## 2023-01-25 PROBLEM — F44.5 PSEUDOSEIZURES: Status: ACTIVE | Noted: 2023-01-24

## 2023-01-25 LAB
ANION GAP SERPL CALCULATED.3IONS-SCNC: 11 MMOL/L (ref 5–15)
ARTERIAL PATENCY WRIST A: POSITIVE
ATMOSPHERIC PRESS: 738 MMHG
BASE EXCESS BLDA CALC-SCNC: 2 MMOL/L (ref 0–2)
BASOPHILS # BLD AUTO: 0.05 10*3/MM3 (ref 0–0.2)
BASOPHILS NFR BLD AUTO: 0.7 % (ref 0–1.5)
BDY SITE: ABNORMAL
BODY TEMPERATURE: 37 C
BUN SERPL-MCNC: 6 MG/DL (ref 6–20)
BUN/CREAT SERPL: 12.8 (ref 7–25)
CALCIUM SPEC-SCNC: 8 MG/DL (ref 8.6–10.5)
CHLORIDE SERPL-SCNC: 105 MMOL/L (ref 98–107)
CO2 SERPL-SCNC: 22 MMOL/L (ref 22–29)
CREAT SERPL-MCNC: 0.47 MG/DL (ref 0.57–1)
DEPRECATED RDW RBC AUTO: 50.2 FL (ref 37–54)
EGFRCR SERPLBLD CKD-EPI 2021: 134.8 ML/MIN/1.73
EOSINOPHIL # BLD AUTO: 0.09 10*3/MM3 (ref 0–0.4)
EOSINOPHIL NFR BLD AUTO: 1.3 % (ref 0.3–6.2)
ERYTHROCYTE [DISTWIDTH] IN BLOOD BY AUTOMATED COUNT: 17.9 % (ref 12.3–15.4)
GLUCOSE SERPL-MCNC: 83 MG/DL (ref 65–99)
HCO3 BLDA-SCNC: 26 MMOL/L (ref 20–26)
HCT VFR BLD AUTO: 32 % (ref 34–46.6)
HGB BLD-MCNC: 10 G/DL (ref 12–15.9)
IMM GRANULOCYTES # BLD AUTO: 0.06 10*3/MM3 (ref 0–0.05)
IMM GRANULOCYTES NFR BLD AUTO: 0.8 % (ref 0–0.5)
INHALED O2 CONCENTRATION: 30 %
LYMPHOCYTES # BLD AUTO: 2.69 10*3/MM3 (ref 0.7–3.1)
LYMPHOCYTES NFR BLD AUTO: 37.6 % (ref 19.6–45.3)
Lab: ABNORMAL
MCH RBC QN AUTO: 25.1 PG (ref 26.6–33)
MCHC RBC AUTO-ENTMCNC: 31.3 G/DL (ref 31.5–35.7)
MCV RBC AUTO: 80.4 FL (ref 79–97)
MODALITY: ABNORMAL
MONOCYTES # BLD AUTO: 0.64 10*3/MM3 (ref 0.1–0.9)
MONOCYTES NFR BLD AUTO: 8.9 % (ref 5–12)
NEUTROPHILS NFR BLD AUTO: 3.63 10*3/MM3 (ref 1.7–7)
NEUTROPHILS NFR BLD AUTO: 50.7 % (ref 42.7–76)
NRBC BLD AUTO-RTO: 0 /100 WBC (ref 0–0.2)
PCO2 BLDA: 37 MM HG (ref 35–45)
PCO2 TEMP ADJ BLD: 37 MM HG (ref 35–45)
PEEP RESPIRATORY: 5 CM[H2O]
PH BLDA: 7.45 PH UNITS (ref 7.35–7.45)
PH, TEMP CORRECTED: 7.45 PH UNITS (ref 7.35–7.45)
PLATELET # BLD AUTO: 203 10*3/MM3 (ref 140–450)
PMV BLD AUTO: 13 FL (ref 6–12)
PO2 BLDA: 134 MM HG (ref 83–108)
PO2 TEMP ADJ BLD: 134 MM HG (ref 83–108)
POTASSIUM SERPL-SCNC: 3.1 MMOL/L (ref 3.5–5.2)
RBC # BLD AUTO: 3.98 10*6/MM3 (ref 3.77–5.28)
SAO2 % BLDCOA: 99.7 % (ref 94–99)
SODIUM SERPL-SCNC: 138 MMOL/L (ref 136–145)
VENTILATOR MODE: AC
VT ON VENT VENT: 450 ML
WBC NRBC COR # BLD: 7.16 10*3/MM3 (ref 3.4–10.8)

## 2023-01-25 PROCEDURE — 74018 RADEX ABDOMEN 1 VIEW: CPT

## 2023-01-25 PROCEDURE — 94003 VENT MGMT INPAT SUBQ DAY: CPT

## 2023-01-25 PROCEDURE — 94799 UNLISTED PULMONARY SVC/PX: CPT

## 2023-01-25 PROCEDURE — 99291 CRITICAL CARE FIRST HOUR: CPT | Performed by: PSYCHIATRY & NEUROLOGY

## 2023-01-25 PROCEDURE — 25010000002 ONDANSETRON PER 1 MG: Performed by: FAMILY MEDICINE

## 2023-01-25 PROCEDURE — 25010000002 LORAZEPAM PER 2 MG

## 2023-01-25 PROCEDURE — 80048 BASIC METABOLIC PNL TOTAL CA: CPT | Performed by: INTERNAL MEDICINE

## 2023-01-25 PROCEDURE — 85025 COMPLETE CBC W/AUTO DIFF WBC: CPT | Performed by: INTERNAL MEDICINE

## 2023-01-25 PROCEDURE — 25010000002 PROPOFOL 10 MG/ML EMULSION: Performed by: INTERNAL MEDICINE

## 2023-01-25 PROCEDURE — 36600 WITHDRAWAL OF ARTERIAL BLOOD: CPT

## 2023-01-25 PROCEDURE — 82803 BLOOD GASES ANY COMBINATION: CPT

## 2023-01-25 PROCEDURE — 71045 X-RAY EXAM CHEST 1 VIEW: CPT

## 2023-01-25 RX ORDER — LEVETIRACETAM 500 MG/1
1500 TABLET ORAL 2 TIMES DAILY
Status: DISCONTINUED | OUTPATIENT
Start: 2023-01-25 | End: 2023-01-25 | Stop reason: HOSPADM

## 2023-01-25 RX ORDER — PHENYTOIN SODIUM 100 MG/1
100 CAPSULE, EXTENDED RELEASE ORAL 3 TIMES DAILY
COMMUNITY

## 2023-01-25 RX ORDER — LAMOTRIGINE 100 MG/1
200 TABLET ORAL DAILY
Status: DISCONTINUED | OUTPATIENT
Start: 2023-01-25 | End: 2023-01-25 | Stop reason: HOSPADM

## 2023-01-25 RX ORDER — CLONAZEPAM 0.5 MG/1
0.5 TABLET ORAL 2 TIMES DAILY PRN
Status: ON HOLD | COMMUNITY
End: 2023-02-01

## 2023-01-25 RX ORDER — CLONAZEPAM 0.5 MG/1
0.5 TABLET ORAL 2 TIMES DAILY PRN
Status: DISCONTINUED | OUTPATIENT
Start: 2023-01-25 | End: 2023-01-25 | Stop reason: HOSPADM

## 2023-01-25 RX ORDER — LAMOTRIGINE 200 MG/1
200 TABLET ORAL DAILY
COMMUNITY

## 2023-01-25 RX ORDER — LORAZEPAM 2 MG/ML
INJECTION INTRAMUSCULAR
Status: COMPLETED
Start: 2023-01-25 | End: 2023-01-25

## 2023-01-25 RX ORDER — PHENYTOIN SODIUM 100 MG/1
100 CAPSULE, EXTENDED RELEASE ORAL EVERY 8 HOURS SCHEDULED
Status: DISCONTINUED | OUTPATIENT
Start: 2023-01-25 | End: 2023-01-25 | Stop reason: HOSPADM

## 2023-01-25 RX ORDER — PHENYTOIN 125 MG/5ML
100 SUSPENSION ORAL 3 TIMES DAILY
Status: DISCONTINUED | OUTPATIENT
Start: 2023-01-25 | End: 2023-01-25

## 2023-01-25 RX ORDER — FERROUS SULFATE 325(65) MG
325 TABLET ORAL 2 TIMES DAILY
COMMUNITY

## 2023-01-25 RX ORDER — LACOSAMIDE 50 MG/1
100 TABLET ORAL 2 TIMES DAILY
Status: DISCONTINUED | OUTPATIENT
Start: 2023-01-25 | End: 2023-01-25 | Stop reason: HOSPADM

## 2023-01-25 RX ORDER — FLUOXETINE HYDROCHLORIDE 40 MG/1
80 CAPSULE ORAL DAILY
Status: ON HOLD | COMMUNITY
End: 2023-02-01

## 2023-01-25 RX ORDER — FLUOXETINE HYDROCHLORIDE 20 MG/1
80 CAPSULE ORAL DAILY
Status: DISCONTINUED | OUTPATIENT
Start: 2023-01-25 | End: 2023-01-25 | Stop reason: HOSPADM

## 2023-01-25 RX ORDER — POTASSIUM CHLORIDE 750 MG/1
40 CAPSULE, EXTENDED RELEASE ORAL AS NEEDED
Status: DISCONTINUED | OUTPATIENT
Start: 2023-01-25 | End: 2023-01-25 | Stop reason: HOSPADM

## 2023-01-25 RX ORDER — LEVETIRACETAM 750 MG/1
1500 TABLET ORAL 2 TIMES DAILY
COMMUNITY

## 2023-01-25 RX ORDER — POTASSIUM CHLORIDE 7.45 MG/ML
10 INJECTION INTRAVENOUS
Status: DISCONTINUED | OUTPATIENT
Start: 2023-01-25 | End: 2023-01-25 | Stop reason: HOSPADM

## 2023-01-25 RX ORDER — LORAZEPAM 2 MG/ML
2 INJECTION INTRAMUSCULAR ONCE
Status: COMPLETED | OUTPATIENT
Start: 2023-01-25 | End: 2023-01-25

## 2023-01-25 RX ORDER — FERROUS SULFATE 325(65) MG
325 TABLET ORAL 2 TIMES DAILY
Status: DISCONTINUED | OUTPATIENT
Start: 2023-01-25 | End: 2023-01-25 | Stop reason: HOSPADM

## 2023-01-25 RX ORDER — LACOSAMIDE 50 MG/1
100 TABLET ORAL 2 TIMES DAILY
Status: DISCONTINUED | OUTPATIENT
Start: 2023-01-25 | End: 2023-01-25

## 2023-01-25 RX ORDER — LEVETIRACETAM 500 MG/1
1500 TABLET ORAL 2 TIMES DAILY
Status: DISCONTINUED | OUTPATIENT
Start: 2023-01-25 | End: 2023-01-25

## 2023-01-25 RX ORDER — POTASSIUM CHLORIDE 1.5 G/1.77G
40 POWDER, FOR SOLUTION ORAL AS NEEDED
Status: DISCONTINUED | OUTPATIENT
Start: 2023-01-25 | End: 2023-01-25 | Stop reason: HOSPADM

## 2023-01-25 RX ORDER — LACOSAMIDE 100 MG/1
100 TABLET ORAL 2 TIMES DAILY
COMMUNITY

## 2023-01-25 RX ORDER — LAMOTRIGINE 100 MG/1
200 TABLET ORAL DAILY
Status: DISCONTINUED | OUTPATIENT
Start: 2023-01-25 | End: 2023-01-25

## 2023-01-25 RX ORDER — FERROUS SULFATE 325(65) MG
325 TABLET ORAL 2 TIMES DAILY
Status: DISCONTINUED | OUTPATIENT
Start: 2023-01-25 | End: 2023-01-25

## 2023-01-25 RX ADMIN — LORAZEPAM 2 MG: 2 INJECTION INTRAMUSCULAR at 13:05

## 2023-01-25 RX ADMIN — PROPOFOL 50 MCG/KG/MIN: 10 INJECTION, EMULSION INTRAVENOUS at 09:15

## 2023-01-25 RX ADMIN — POTASSIUM CHLORIDE 40 MEQ: 1.5 POWDER, FOR SOLUTION ORAL at 08:49

## 2023-01-25 RX ADMIN — PROPOFOL 50 MCG/KG/MIN: 10 INJECTION, EMULSION INTRAVENOUS at 00:38

## 2023-01-25 RX ADMIN — LACOSAMIDE 100 MG: 50 TABLET, FILM COATED ORAL at 12:31

## 2023-01-25 RX ADMIN — LAMOTRIGINE 200 MG: 100 TABLET ORAL at 12:32

## 2023-01-25 RX ADMIN — SODIUM CHLORIDE 75 ML/HR: 9 INJECTION, SOLUTION INTRAVENOUS at 13:39

## 2023-01-25 RX ADMIN — CHLORHEXIDINE GLUCONATE 15 ML: 1.2 RINSE ORAL at 00:14

## 2023-01-25 RX ADMIN — CHLORHEXIDINE GLUCONATE 15 ML: 1.2 RINSE ORAL at 08:00

## 2023-01-25 RX ADMIN — PANTOPRAZOLE SODIUM 40 MG: 40 INJECTION, POWDER, FOR SOLUTION INTRAVENOUS at 08:00

## 2023-01-25 RX ADMIN — FERROUS SULFATE TAB 325 MG (65 MG ELEMENTAL FE) 325 MG: 325 (65 FE) TAB at 12:32

## 2023-01-25 RX ADMIN — ONDANSETRON 4 MG: 2 INJECTION INTRAMUSCULAR; INTRAVENOUS at 10:49

## 2023-01-25 RX ADMIN — PROPOFOL 50 MCG/KG/MIN: 10 INJECTION, EMULSION INTRAVENOUS at 04:38

## 2023-01-25 RX ADMIN — LORAZEPAM 2 MG: 2 INJECTION INTRAMUSCULAR; INTRAVENOUS at 13:05

## 2023-01-25 RX ADMIN — POTASSIUM CHLORIDE 40 MEQ: 10 CAPSULE, COATED, EXTENDED RELEASE ORAL at 12:49

## 2023-01-25 RX ADMIN — LEVETIRACETAM 1500 MG: 500 TABLET, FILM COATED ORAL at 12:28

## 2023-01-25 RX ADMIN — FLUOXETINE HYDROCHLORIDE 80 MG: 20 CAPSULE ORAL at 12:31

## 2023-01-25 RX ADMIN — PHENYTOIN SODIUM 100 MG: 100 CAPSULE ORAL at 15:56

## 2023-01-25 RX ADMIN — SODIUM CHLORIDE 75 ML/HR: 9 INJECTION, SOLUTION INTRAVENOUS at 00:13

## 2023-01-26 ENCOUNTER — TELEPHONE (OUTPATIENT)
Dept: NEUROLOGY | Age: 28
End: 2023-01-26

## 2023-01-26 PROBLEM — F84.0 AUTISM: Status: ACTIVE | Noted: 2023-01-26

## 2023-01-26 PROBLEM — F41.9 ANXIETY DISORDER: Status: ACTIVE | Noted: 2023-01-26

## 2023-01-26 PROBLEM — E87.6 HYPOKALEMIA: Status: ACTIVE | Noted: 2023-01-26

## 2023-01-26 PROBLEM — F32.A DEPRESSION: Status: ACTIVE | Noted: 2023-01-26

## 2023-01-26 NOTE — TELEPHONE ENCOUNTER
Patient called to schedule a NP HFU with dr Chris rico. Patient was admitted to [facility]) on 01/06/2023 and discharged 01/16/23 for seizures. She was then admitted to the hospital in [address] [place] [address] for same diagnosis. [facility] instructed patient to follow up with dr Chris rico within a week. Please return patient's call.      Thank you

## 2023-02-01 ENCOUNTER — HOSPITAL ENCOUNTER (OUTPATIENT)
Facility: HOSPITAL | Age: 28
Setting detail: OBSERVATION
Discharge: HOME | End: 2023-02-01
Attending: INTERNAL MEDICINE | Admitting: FAMILY MEDICINE

## 2023-02-01 ENCOUNTER — APPOINTMENT (OUTPATIENT)
Dept: GENERAL RADIOLOGY | Facility: HOSPITAL | Age: 28
End: 2023-02-01

## 2023-02-01 VITALS
DIASTOLIC BLOOD PRESSURE: 70 MMHG | BODY MASS INDEX: 27.87 KG/M2 | OXYGEN SATURATION: 93 % | WEIGHT: 183.9 LBS | TEMPERATURE: 98.1 F | RESPIRATION RATE: 14 BRPM | HEIGHT: 68 IN | SYSTOLIC BLOOD PRESSURE: 120 MMHG | HEART RATE: 88 BPM

## 2023-02-01 PROBLEM — F44.5 PSEUDOSEIZURE: Status: ACTIVE | Noted: 2023-02-01

## 2023-02-01 LAB
ALBUMIN SERPL-MCNC: 4 G/DL (ref 3.5–5.2)
ALBUMIN/GLOB SERPL: 1.5 G/DL
ALP SERPL-CCNC: 82 U/L (ref 39–117)
ALT SERPL W P-5'-P-CCNC: 23 U/L (ref 1–33)
ANION GAP SERPL CALCULATED.3IONS-SCNC: 16 MMOL/L (ref 5–15)
ARTERIAL PATENCY WRIST A: POSITIVE
AST SERPL-CCNC: 24 U/L (ref 1–32)
ATMOSPHERIC PRESS: 759 MMHG
BASE EXCESS BLDA CALC-SCNC: 0.2 MMOL/L (ref 0–2)
BASOPHILS # BLD AUTO: 0.05 10*3/MM3 (ref 0–0.2)
BASOPHILS NFR BLD AUTO: 0.7 % (ref 0–1.5)
BDY SITE: ABNORMAL
BILIRUB SERPL-MCNC: 0.2 MG/DL (ref 0–1.2)
BODY TEMPERATURE: 37 C
BUN SERPL-MCNC: 5 MG/DL (ref 6–20)
BUN/CREAT SERPL: 10.6 (ref 7–25)
CALCIUM SPEC-SCNC: 8.7 MG/DL (ref 8.6–10.5)
CHLORIDE SERPL-SCNC: 108 MMOL/L (ref 98–107)
CO2 SERPL-SCNC: 18 MMOL/L (ref 22–29)
CREAT SERPL-MCNC: 0.47 MG/DL (ref 0.57–1)
DEPRECATED RDW RBC AUTO: 58.4 FL (ref 37–54)
EGFRCR SERPLBLD CKD-EPI 2021: 134.8 ML/MIN/1.73
EOSINOPHIL # BLD AUTO: 0.09 10*3/MM3 (ref 0–0.4)
EOSINOPHIL NFR BLD AUTO: 1.2 % (ref 0.3–6.2)
ERYTHROCYTE [DISTWIDTH] IN BLOOD BY AUTOMATED COUNT: 19.9 % (ref 12.3–15.4)
GLOBULIN UR ELPH-MCNC: 2.7 GM/DL
GLUCOSE SERPL-MCNC: 81 MG/DL (ref 65–99)
HCO3 BLDA-SCNC: 24.4 MMOL/L (ref 20–26)
HCT VFR BLD AUTO: 36.9 % (ref 34–46.6)
HGB BLD-MCNC: 11.4 G/DL (ref 12–15.9)
IMM GRANULOCYTES # BLD AUTO: 0.05 10*3/MM3 (ref 0–0.05)
IMM GRANULOCYTES NFR BLD AUTO: 0.7 % (ref 0–0.5)
INHALED O2 CONCENTRATION: 40 %
LYMPHOCYTES # BLD AUTO: 1.92 10*3/MM3 (ref 0.7–3.1)
LYMPHOCYTES NFR BLD AUTO: 26.4 % (ref 19.6–45.3)
Lab: ABNORMAL
MCH RBC QN AUTO: 25.6 PG (ref 26.6–33)
MCHC RBC AUTO-ENTMCNC: 30.9 G/DL (ref 31.5–35.7)
MCV RBC AUTO: 82.9 FL (ref 79–97)
MODALITY: ABNORMAL
MONOCYTES # BLD AUTO: 0.61 10*3/MM3 (ref 0.1–0.9)
MONOCYTES NFR BLD AUTO: 8.4 % (ref 5–12)
NEUTROPHILS NFR BLD AUTO: 4.55 10*3/MM3 (ref 1.7–7)
NEUTROPHILS NFR BLD AUTO: 62.6 % (ref 42.7–76)
NRBC BLD AUTO-RTO: 0 /100 WBC (ref 0–0.2)
PCO2 BLDA: 37.1 MM HG (ref 35–45)
PCO2 TEMP ADJ BLD: 37.1 MM HG (ref 35–45)
PEEP RESPIRATORY: 5 CM[H2O]
PH BLDA: 7.43 PH UNITS (ref 7.35–7.45)
PH, TEMP CORRECTED: 7.43 PH UNITS (ref 7.35–7.45)
PLATELET # BLD AUTO: 224 10*3/MM3 (ref 140–450)
PMV BLD AUTO: 12.6 FL (ref 6–12)
PO2 BLDA: 191 MM HG (ref 83–108)
PO2 TEMP ADJ BLD: 191 MM HG (ref 83–108)
POTASSIUM SERPL-SCNC: 5 MMOL/L (ref 3.5–5.2)
PROT SERPL-MCNC: 6.7 G/DL (ref 6–8.5)
RBC # BLD AUTO: 4.45 10*6/MM3 (ref 3.77–5.28)
SAO2 % BLDCOA: >100.1 % (ref 94–99)
SET MECH RESP RATE: 16
SODIUM SERPL-SCNC: 142 MMOL/L (ref 136–145)
VENTILATOR MODE: AC
VT ON VENT VENT: 480 ML
WBC NRBC COR # BLD: 7.27 10*3/MM3 (ref 3.4–10.8)

## 2023-02-01 PROCEDURE — G0378 HOSPITAL OBSERVATION PER HR: HCPCS

## 2023-02-01 PROCEDURE — 99214 OFFICE O/P EST MOD 30 MIN: CPT | Performed by: PSYCHIATRY & NEUROLOGY

## 2023-02-01 PROCEDURE — 36600 WITHDRAWAL OF ARTERIAL BLOOD: CPT

## 2023-02-01 PROCEDURE — 94799 UNLISTED PULMONARY SVC/PX: CPT

## 2023-02-01 PROCEDURE — G0379 DIRECT REFER HOSPITAL OBSERV: HCPCS

## 2023-02-01 PROCEDURE — 82803 BLOOD GASES ANY COMBINATION: CPT

## 2023-02-01 PROCEDURE — 25010000002 DIAZEPAM PER 5 MG: Performed by: INTERNAL MEDICINE

## 2023-02-01 PROCEDURE — 96365 THER/PROPH/DIAG IV INF INIT: CPT

## 2023-02-01 PROCEDURE — 85025 COMPLETE CBC W/AUTO DIFF WBC: CPT | Performed by: INTERNAL MEDICINE

## 2023-02-01 PROCEDURE — 94761 N-INVAS EAR/PLS OXIMETRY MLT: CPT

## 2023-02-01 PROCEDURE — 71045 X-RAY EXAM CHEST 1 VIEW: CPT

## 2023-02-01 PROCEDURE — 94002 VENT MGMT INPAT INIT DAY: CPT

## 2023-02-01 PROCEDURE — 80053 COMPREHEN METABOLIC PANEL: CPT | Performed by: INTERNAL MEDICINE

## 2023-02-01 PROCEDURE — 25010000002 PROPOFOL 10 MG/ML EMULSION: Performed by: PHYSICIAN ASSISTANT

## 2023-02-01 PROCEDURE — 96366 THER/PROPH/DIAG IV INF ADDON: CPT

## 2023-02-01 PROCEDURE — 96368 THER/DIAG CONCURRENT INF: CPT

## 2023-02-01 PROCEDURE — 96375 TX/PRO/DX INJ NEW DRUG ADDON: CPT

## 2023-02-01 PROCEDURE — 94003 VENT MGMT INPAT SUBQ DAY: CPT

## 2023-02-01 PROCEDURE — 25010000002 MIDAZOLAM 50 MG/10ML SOLUTION 10 ML VIAL: Performed by: PHYSICIAN ASSISTANT

## 2023-02-01 RX ORDER — FAMOTIDINE 10 MG/ML
20 INJECTION, SOLUTION INTRAVENOUS 2 TIMES DAILY
Status: DISCONTINUED | OUTPATIENT
Start: 2023-02-01 | End: 2023-02-01 | Stop reason: HOSPADM

## 2023-02-01 RX ORDER — CHLORHEXIDINE GLUCONATE 500 MG/1
1 CLOTH TOPICAL ONCE
Status: COMPLETED | OUTPATIENT
Start: 2023-02-01 | End: 2023-02-01

## 2023-02-01 RX ORDER — ACETAMINOPHEN 650 MG/1
650 SUPPOSITORY RECTAL EVERY 4 HOURS PRN
Status: DISCONTINUED | OUTPATIENT
Start: 2023-02-01 | End: 2023-02-01 | Stop reason: HOSPADM

## 2023-02-01 RX ORDER — DIAZEPAM 5 MG/ML
5 INJECTION, SOLUTION INTRAMUSCULAR; INTRAVENOUS EVERY 4 HOURS PRN
Status: DISCONTINUED | OUTPATIENT
Start: 2023-02-01 | End: 2023-02-01 | Stop reason: HOSPADM

## 2023-02-01 RX ORDER — LEVETIRACETAM 15 MG/ML
1500 INJECTION INTRAVASCULAR EVERY 12 HOURS SCHEDULED
Status: DISCONTINUED | OUTPATIENT
Start: 2023-02-01 | End: 2023-02-01

## 2023-02-01 RX ORDER — SODIUM CHLORIDE 9 MG/ML
40 INJECTION, SOLUTION INTRAVENOUS AS NEEDED
Status: DISCONTINUED | OUTPATIENT
Start: 2023-02-01 | End: 2023-02-01 | Stop reason: HOSPADM

## 2023-02-01 RX ORDER — CHLORHEXIDINE GLUCONATE 0.12 MG/ML
15 RINSE ORAL EVERY 12 HOURS SCHEDULED
Status: DISCONTINUED | OUTPATIENT
Start: 2023-02-01 | End: 2023-02-01 | Stop reason: HOSPADM

## 2023-02-01 RX ORDER — SODIUM CHLORIDE 0.9 % (FLUSH) 0.9 %
10 SYRINGE (ML) INJECTION EVERY 12 HOURS SCHEDULED
Status: DISCONTINUED | OUTPATIENT
Start: 2023-02-01 | End: 2023-02-01 | Stop reason: HOSPADM

## 2023-02-01 RX ORDER — CHLORHEXIDINE GLUCONATE 500 MG/1
1 CLOTH TOPICAL EVERY 24 HOURS
Status: DISCONTINUED | OUTPATIENT
Start: 2023-02-02 | End: 2023-02-01 | Stop reason: HOSPADM

## 2023-02-01 RX ORDER — LEVETIRACETAM 10 MG/ML
1000 INJECTION INTRAVASCULAR EVERY 12 HOURS SCHEDULED
Status: DISCONTINUED | OUTPATIENT
Start: 2023-02-01 | End: 2023-02-01 | Stop reason: HOSPADM

## 2023-02-01 RX ORDER — ONDANSETRON 2 MG/ML
4 INJECTION INTRAMUSCULAR; INTRAVENOUS EVERY 6 HOURS PRN
Status: DISCONTINUED | OUTPATIENT
Start: 2023-02-01 | End: 2023-02-01 | Stop reason: HOSPADM

## 2023-02-01 RX ORDER — ACETAMINOPHEN 325 MG/1
650 TABLET ORAL EVERY 4 HOURS PRN
Status: DISCONTINUED | OUTPATIENT
Start: 2023-02-01 | End: 2023-02-01 | Stop reason: HOSPADM

## 2023-02-01 RX ORDER — SODIUM CHLORIDE 0.9 % (FLUSH) 0.9 %
10 SYRINGE (ML) INJECTION AS NEEDED
Status: DISCONTINUED | OUTPATIENT
Start: 2023-02-01 | End: 2023-02-01 | Stop reason: HOSPADM

## 2023-02-01 RX ADMIN — MIDAZOLAM 10 MG/HR: 5 INJECTION INTRAMUSCULAR; INTRAVENOUS at 01:47

## 2023-02-01 RX ADMIN — PROPOFOL 50 MCG/KG/MIN: 10 INJECTION, EMULSION INTRAVENOUS at 01:19

## 2023-02-01 RX ADMIN — DIAZEPAM 5 MG: 5 INJECTION, SOLUTION INTRAMUSCULAR; INTRAVENOUS at 03:36

## 2023-02-01 RX ADMIN — CHLORHEXIDINE GLUCONATE 1 APPLICATION: 500 CLOTH TOPICAL at 04:00

## 2023-02-01 RX ADMIN — FAMOTIDINE 20 MG: 10 INJECTION, SOLUTION INTRAVENOUS at 09:20

## 2023-02-01 RX ADMIN — CHLORHEXIDINE GLUCONATE 15 ML: 1.2 SOLUTION ORAL at 09:21

## 2023-02-01 RX ADMIN — Medication 1 APPLICATION: at 05:07

## 2023-02-01 RX ADMIN — Medication 1 APPLICATION: at 09:21

## 2023-02-01 RX ADMIN — Medication 10 ML: at 09:23

## 2023-02-01 RX ADMIN — PROPOFOL 50 MCG/KG/MIN: 10 INJECTION, EMULSION INTRAVENOUS at 08:33

## 2023-02-01 RX ADMIN — PROPOFOL 50 MCG/KG/MIN: 10 INJECTION, EMULSION INTRAVENOUS at 04:52

## 2023-02-01 NOTE — H&P
[facility] Medicine Services  HISTORY AND PHYSICAL    Date of Admission: 2/1/2023  Primary Care Physician: Rosie Olvera MD    Subjective   Primary Historian: Transferring physician    Chief Complaint: Seizure    History of Present Illness  27-year-old female who was recently admitted to our facility through the emergency department for seizure disorder.  The patient has subsequently been seen at [facility].  She was seizing at the time.  She was given 30 mg of Valium and 10 mg doses.  Keppra, Versed, ketamine.  She was intubated.  She is on a propofol drip.  Patient is intubated on my exam and sedated, no one is present to give history and the patient is unable to give history.    Transferring lactic was 1.3 pH is 7.4 PCO2 is 40 PO2 is 110 bicarb is 25 O2 saturation 98% ammonia level was 11 blood cell count 8.9 hemoglobin 12.6 hematocrit 40.8 platelets 311 phenytoin level was 7.2 sodium 141 potassium 4.0 chloride 103 CO2 27.6 BUN 7 creatinine 0.77 alcohol less than 3.0 magnesium 2.1 TSH 2.2 Free T4 6.8 C-reactive protein was 1.4 D-dimer was negative salicylate negative urine drug screen was positive for benzodiazepine and barbiturate.    Prior admission a discharge diagnosis:  Date of Admission: 1/24/2023  Date of Discharge:  1/25/23  Primary Care Physician: Rosie Olvera MD     Presenting Problem/History of Present Illness:  Seizure-like activity     Final Discharge Diagnoses:       Active Hospital Problems     Diagnosis     • **Pseudoseizures (HCC)     • Anxiety disorder     • Depression     • Autism     • Hypokalemia           Consults:   1.  Neurology      Review of Systems   Otherwise complete ROS reviewed and negative except as mentioned in the HPI.    Past Medical History:   Past Medical History:   Diagnosis Date   • Seizures (HCC)      Past Surgical History:No past surgical history on file.     Social History:  She has 3  "children    Family History: Unable to obtain    Allergies:  No Known Allergies    Medications:  Prior to Admission medications    Medication Sig Start Date End Date Taking? Authorizing Provider   clonazePAM (KlonoPIN) 0.5 MG tablet Take 0.5 mg by mouth 2 (Two) Times a Day As Needed for Anxiety.    Rayray Jean MD   ferrous sulfate 325 (65 FE) MG tablet Take 325 mg by mouth 2 (Two) Times a Day.    Rayray Jean MD   FLUoxetine (PROzac) 40 MG capsule Take 80 mg by mouth Daily.    Rayray Jean MD   lacosamide (VIMPAT) 100 MG tablet tablet Take 100 mg by mouth 2 (Two) Times a Day.    Rayray Jean MD   lamoTRIgine (LaMICtal) 200 MG tablet Take 200 mg by mouth Daily.    Rayray Jean MD   levETIRAcetam (KEPPRA) 750 MG tablet Take 1,500 mg by mouth 2 (Two) Times a Day.    Rayray Jean MD   phenytoin ER (DILANTIN) 100 MG capsule Take 100 mg by mouth 3 (Three) Times a Day.    Rayray Jean MD     I have utilized all available immediate resources to obtain, update, or review the patient's current medications (including all prescriptions, over-the-counter products, herbals, cannabis/cannabidiol products, and vitamin/mineral/dietary (nutritional) supplements).    Objective     Vital Signs: /72   Pulse 53   Temp 97.8 °F (36.6 °C) (Axillary)   Ht 172.7 cm (68\")   Wt 83.4 kg (183 lb 14.4 oz)   LMP  (LMP Unknown)   SpO2 100%   BMI 27.96 kg/m²   Physical Exam  Vitals reviewed.   Constitutional:       Comments: Intubated and sedated   HENT:      Head: Normocephalic and atraumatic.      Right Ear: External ear normal.      Left Ear: External ear normal.      Nose: Nose normal.      Mouth/Throat:      Comments: ET tube in place  Eyes:      General: No scleral icterus.     Conjunctiva/sclera: Conjunctivae normal.   Cardiovascular:      Rate and Rhythm: Normal rate and regular rhythm.      Heart sounds: Normal heart sounds.   Pulmonary:      Effort: Pulmonary effort " is normal.      Breath sounds: Normal breath sounds. No stridor. No rhonchi.   Abdominal:      General: Bowel sounds are normal.      Palpations: Abdomen is soft.   Musculoskeletal:         General: No swelling or tenderness.      Cervical back: Normal range of motion and neck supple.   Skin:     General: Skin is warm and dry.       Results Reviewed:  Lab Results (last 24 hours)     ** No results found for the last 24 hours. **        Imaging Results (Last 24 Hours)     ** No results found for the last 24 hours. **        I have personally reviewed and interpreted the radiology studies and ECG obtained at time of admission.     Assessment / Plan   Assessment:   1.  Pseudoseizure  2.  Anxiety  3.  Depression  4.  Autism    Treatment Plan  1.  Admit to the critical care  2.  Vent orders  3.  Seizure precautions   4.  Consult neurology   5.  Follow-up labs in the morning       The patient will be admitted to my service here at [facility].  Primary team to take over the morning    Medical Decision Making  Number and Complexity of problems: Greater than 3, high  Differential Diagnosis: Seizure disorder    Conditions and Status        Condition is unchanged.     [facility] Data  External documents reviewed: Labs reviewed  Cardiac tracing (EKG, telemetry) interpretation: None  Radiology interpretation: None  Labs reviewed: All from transferring facility  Any tests that were considered but not ordered: None     Decision rules/scores evaluated (example [id number], [place], etc): None     Discussed with: Transiting physician and CCU staff     Care Planning  Shared decision making: Transfer physician and CCU staff  Code status and discussions: Full    Disposition  Social Determinants of Health that impact treatment or disposition: None  Estimated length of stay is overnight.     I confirmed that the patient's advanced care plan is present, code status is documented, and a surrogate decision maker is listed in the  patient's medical record.     The patient's surrogate decision maker is family.     The patient was seen and examined by me on 2/1/2023 at seen after minute.    Electronically signed by Josefa Wang DO, 02/01/23, 02:51 CST.

## 2023-02-01 NOTE — SIGNIFICANT NOTE
02/01/23 0616   Readings   PEEP Intrinsic (cm H2O) 4.8 cm H2O   Plateau Pressure (cm H2O) 15 cm H2O   Driving Pressure (cm H2O) 10 cm H2O   Static Compliance (L/cm H2O) 52   Dynamic Compliance (L/cm H2O) 59 L/cm H2O

## 2023-02-01 NOTE — PROGRESS NOTES
RT EQUIPMENT DEVICE RELATED - SKIN ASSESSMENT    RT Medical Equipment/Device:     ETT [place]/[employer]fast    Skin Assessment:      Cheek:  Intact  Neck:  Intact  Mouth:  Intact    Device Skin Pressure Protection:  Skin-to-device areas padded:  [employer]fast

## 2023-02-01 NOTE — PLAN OF CARE
Goal Outcome Evaluation:  Plan of Care Reviewed With: patient, mother        Progress: no change  Outcome Evaluation: pt signs of distress, agitation, restlessness. arrived on 50 mcg propofol and 15 mg versed drip. attempted to wean off propfol and versed, pt fought the vent and became very agitated and restless. propofol currently 50 mcg and versed 6 mg. pt in restraints, alert pts mother. mother stated that they were in the car driving from [place] to [place] when she had seizure episode, pulled over and called ems, stated pt had about 6 episodes before ems showed up. pt tj to nsr through shift. good uop. vss. pt resting comfortably

## 2023-02-01 NOTE — DISCHARGE SUMMARY
[facility] Medicine Services  DISCHARGE SUMMARY       Date of Admission: 2/1/2023  Date of Discharge:  2/1/2023  Primary Care Physician: Rosie Olvera MD    Presenting Problem/History of Present Illness:    Final Discharge Diagnoses:  Active Hospital Problems    Diagnosis    • **Pseudoseizure        Consults: Neurology    Pertinent Test Results:       Imaging Results (All)     Procedure Component Value Units Date/Time    XR Chest 1 View [[id number]] Collected: 02/01/23 0623     Updated: 02/01/23 0628    Narrative:      EXAMINATION: XR CHEST 1 VW-     2/1/2023 3:25 AM CST     HISTORY: ett placement     A frontal projection of the chest is compared with the previous study  dated 01/25/2023.     The lungs are poorly expanded.     There are ill-defined linear opacities in the lungs bilaterally probably  represent areas of discoid atelectasis.     There is no pleural effusion, pulmonary congestion or pneumothorax.     Heart size is not optimally evaluated due to AP projection.     No acute bony abnormality.     An endotracheal tube is seen with distal end 2.5 cm above the tracheal  bifurcation.       Impression:      1. Poor lung expansion. Atelectatic changes.  2. Endotracheal tube in place.  This report was finalized on 02/01/2023 06:25 by Dr. Medina Braun MD.        LAB RESULTS:      Lab 02/01/23  0440   WBC 7.27   HEMOGLOBIN 11.4*   HEMATOCRIT 36.9   PLATELETS 224   NEUTROS ABS 4.55   IMMATURE GRANS (ABS) 0.05   LYMPHS ABS 1.92   MONOS ABS 0.61   EOS ABS 0.09   MCV 82.9         Lab 02/01/23  0440   SODIUM 142   POTASSIUM 5.0   CHLORIDE 108*   CO2 18.0*   ANION GAP 16.0*   BUN 5*   CREATININE 0.47*   EGFR 134.8   GLUCOSE 81   CALCIUM 8.7         Lab 02/01/23  0440   TOTAL PROTEIN 6.7   ALBUMIN 4.0   GLOBULIN 2.7   ALT (SGPT) 23   AST (SGOT) 24   BILIRUBIN 0.2   ALK PHOS 82                     Lab 02/01/23  0304   PH, ARTERIAL 7.427   PCO2, ARTERIAL 37.1   PO2  .0*   O2 SATURATION ART >100.1*   FIO2 40   HCO3 ART 24.4   BASE EXCESS ART 0.2     Brief Urine Lab Results  (Last result in the past 365 days)      Color   Clarity   Blood   Leuk Est   Nitrite   Protein   CREAT   Urine HCG        01/16/23 2030 YELLOW   CLOUDY   SMALL   TRACE  Comment: Culture Urine under CMS guidelines and criteria will auto reflex on a sole  criteria that is based on manual microscopic count of more than 10/hpf for  WBC. If Culture Urine is warranted aside from this criteria, place a  requisition or order within 24 hours of collection.   Negative                 Microbiology Results (last 10 days)     ** No results found for the last 240 hours. **      HPI.  27-year-old female who was recently admitted to our facility through the emergency department for seizure disorder.  The patient has subsequently been seen at [facility].  She was seizing at the time.  She was given 30 mg of Valium and 10 mg doses.  Keppra, Versed, ketamine.  She was intubated.  She is on a propofol drip.  Patient is intubated on my exam and sedated, no one is present to give history and the patient is unable to give history.     Transferring lactic was 1.3 pH is 7.4 PCO2 is 40 PO2 is 110 bicarb is 25 O2 saturation 98% ammonia level was 11 blood cell count 8.9 hemoglobin 12.6 hematocrit 40.8 platelets 311 phenytoin level was 7.2 sodium 141 potassium 4.0 chloride 103 CO2 27.6 BUN 7 creatinine 0.77 alcohol less than 3.0 magnesium 2.1 TSH 2.2 Free T4 6.8 C-reactive protein was 1.4 D-dimer was negative salicylate negative urine drug screen was positive for benzodiazepine and barbiturate.    Hospital Course:     Intubated to protect airway/respiratory failure.    Chest x-ray-Poor lung expansion, Atelectatic changes, Endotracheal tube in place.  Ventilator-assist-control, FiO2 throughout 40, tidal volume 480, rate 16, PEEP 5.  Extubate this morning.  Patient's: Room air     Seizure disorder/pseudo seizure.  Sent  "from [facility].  Given 30 mg of Valium total at [facility] . patient was also given Keppra, Versed, ketamine there.  Neurology consult.  This discussed with neurologist, history of pseudoseizure.  Neurology has discussed this with family member.  This is patient's third intubation for pseudoseizure.  Neurology signed off, patient be discharged today.  Recommendation from neurology to follow-up with her neurologist at [facility] and start weaning off some of the seizure medications already on .     Anxiety/depression/autism.    Vital signs stable, afebrile.  Plan to discharge patient home today.  Patient follow-up PCP in 1 week.  Patient follow-up with her neurologist at [facility] as soon as able.     Physical Exam on Discharge:  BP 99/68   Pulse 82   Temp 98.1 °F (36.7 °C) (Axillary)   Resp 14   Ht 172.7 cm (68\")   Wt 83.4 kg (183 lb 14.4 oz)   LMP  (LMP Unknown)   SpO2 100%   BMI 27.96 kg/m²   Physical Exam  Vitals and nursing note reviewed.   HENT:      Head: Normocephalic.   Eyes:      Conjunctiva/sclera: Conjunctivae normal.      Pupils: Pupils are equal, round, and reactive to light.   Neck:      Vascular: No JVD.   Cardiovascular:      Rate and Rhythm: Normal rate and regular rhythm.      Heart sounds: Normal heart sounds.   Pulmonary:      Effort: No respiratory distress.      Breath sounds: No wheezing or rales.      Comments: Diminished breath sound bilateral, clear, on room air.  Chest:      Chest wall: No tenderness.   Abdominal:      General: Bowel sounds are normal. There is no distension.      Palpations: Abdomen is soft.      Tenderness: There is no abdominal tenderness.   Musculoskeletal:         General: No tenderness or deformity. Normal range of motion.      Cervical back: Neck supple.   Skin:     General: Skin is warm and dry.      Capillary Refill: Capillary refill takes 2 to 3 seconds.      Findings: No rash.   Neurological:      Mental Status: She is alert and " oriented to person, place, and time.      Cranial Nerves: No cranial nerve deficit.      Motor: Weakness present. No abnormal muscle tone.      Deep Tendon Reflexes: Reflexes normal.   Psychiatric:         Mood and Affect: Mood normal.         Behavior: Behavior normal.       Condition on Discharge: Stable.    Discharge Disposition: Home with family  Home or Self Care    Discharge Medications:     Discharge Medications      Continue These Medications      Instructions Start Date   ferrous sulfate 325 (65 FE) MG tablet   325 mg, Oral, 2 Times Daily      lacosamide 100 MG tablet tablet  Commonly known as: VIMPAT   100 mg, Oral, 2 Times Daily      lamoTRIgine 200 MG tablet  Commonly known as: LaMICtal   200 mg, Oral, Daily      levETIRAcetam 750 MG tablet  Commonly known as: KEPPRA   1,500 mg, Oral, 2 Times Daily      phenytoin  MG capsule  Commonly known as: DILANTIN   100 mg, Oral, 3 Times Daily             Discharge Diet:   Diet Instructions     Advance Diet As Tolerated            Activity at Discharge:   Activity Instructions     Activity as Tolerated            Follow-up Appointments:   Follow-up with PCP 1 week.  Follow with neurologist at [facility] soon as able    Electronically signed by Mesfin Hood MD, 02/01/23, 09:53 CST.    Time: Greater then 30  minutes.

## 2023-02-01 NOTE — PROGRESS NOTES
RT EQUIPMENT DEVICE RELATED - SKIN ASSESSMENT    RT Medical Equipment/Device:     ETT [place]/Anchorfast    Skin Assessment:      Cheek:  Intact    Device Skin Pressure Protection:  Skin-to-device areas padded:  Anchorfast

## 2023-02-01 NOTE — CASE MANAGEMENT/SOCIAL WORK
Continued Stay Note   Delia     Patient Name: Duane Cooley  MRN: [id number]  Today's Date: 2/1/2023    Admit Date: 2/1/2023    Plan: Home   Discharge Plan     Row Name 02/01/23 1126       Plan    Plan Home    Plan Comments Received consult to see patient regarding discharge planning. Patient has already discharged home before pt was seen.    Final Discharge Disposition Code 01 - home or self-care               Discharge Codes    No documentation.               Expected Discharge Date and Time     Expected Discharge Date Expected Discharge Time    Feb 1, 2023             Adrienne Rodriguez RN

## 2023-02-01 NOTE — SIGNIFICANT NOTE
02/01/23 0922   Readiness to Wean Daily Screen   Daily Screen Complete Y   Daily Screen Outcome pass   Spontaneous Breathing Trial   $ SBT Readiness to Wean yes   Vt Spontaneous (mL) 705 mL   Vital Capacity (mL) 1165   Effort (VC) fair   RSBI 31   Negative Inspiratory Force (cm H2O) -41   Effort (NIF) fair   Resp Rate (Obs) 20     Passing leak test able to hold head up off pillow and follow commands.

## 2023-02-01 NOTE — PLAN OF CARE
Goal Outcome Evaluation:      Pt awake alert. Vss. Plans for discharge home mother on her way updated per dr ojeda

## 2023-02-01 NOTE — CONSULTS
Neurology Consultation Note    Referring Provider:   Mesfin Hood MD    Reason for Consultation:    Pseudoseizure    Subjective     History of Present Illness:  This is a 27-year-old female was transferred from [facility] yesterday evening with complaints of status epilepticus.  The patient was apparently discovered potentially on the side of the road somewhere and was transported to the hospital via EMS.  In route, patient received extensive doses of Valium and Versed.  The emergency room physician contacted me through the transfer center yesterday evening, stating that she had a special needs patient with known history of seizure disorder who routinely takes Dilantin and Keppra and was requiring a Versed drip and still seizing despite this.  They made the decision to intubate the patient.  I requested that they be loaded with fosphenytoin prior to transfer.    On arrival, it is discovered that the patient was just seen on 1/25/2023 in consultation by neurology.  At that time, the patient was found to have pseudoseizures that also had resulted in intubation.  Indication was undertaken with [facility] neurology, who also had encountered similar difficulties with her earlier in the month where she was in possible status epilepticus and received extensive amounts of sedative and antiepileptic medication and despite this, still was felt to be having breakthrough seizures.  That hospitalization also resulted in intubation.  She was then transferred to [facility]note 3[facility] in [place] where she was found to have negative EEGs except for some bifrontal slowing and was evaluated by psychiatry with a final diagnosis of pseudoseizure.    This morning, the patient is sedated on the ventilator.  Versed has been discontinued.  We have held propofol.  Patient is now awake and very rapidly and following commands.  There is no focal neurologic deficit on examination.  Plans are being made to extubate  the patient this morning.      Past Medical History:   Diagnosis Date   • Seizures (HCC)        No Known Allergies  No current facility-administered medications on file prior to encounter.     Current Outpatient Medications on File Prior to Encounter   Medication Sig   • ferrous sulfate 325 (65 FE) MG tablet Take 325 mg by mouth 2 (Two) Times a Day.   • lacosamide (VIMPAT) 100 MG tablet tablet Take 100 mg by mouth 2 (Two) Times a Day.   • lamoTRIgine (LaMICtal) 200 MG tablet Take 200 mg by mouth Daily.   • levETIRAcetam (KEPPRA) 750 MG tablet Take 1,500 mg by mouth 2 (Two) Times a Day.   • phenytoin ER (DILANTIN) 100 MG capsule Take 100 mg by mouth 3 (Three) Times a Day.   • [DISCONTINUED] clonazePAM (KlonoPIN) 0.5 MG tablet Take 0.5 mg by mouth 2 (Two) Times a Day As Needed for Anxiety. (Patient not taking: Reported on 2/1/2023)   • [DISCONTINUED] FLUoxetine (PROzac) 40 MG capsule Take 80 mg by mouth Daily. (Patient not taking: Reported on 2/1/2023)       Social History     Socioeconomic History   • Marital status:    Tobacco Use   • Smoking status: Unknown   Vaping Use   • Vaping Use: Unknown   Substance and Sexual Activity   • Alcohol use: Defer   • Drug use: Defer   • Sexual activity: Defer     History reviewed. No pertinent family history.    Objective      Vital Signs  Temp:  [97.8 °F (36.6 °C)-98.1 °F (36.7 °C)] 98.1 °F (36.7 °C)  Heart Rate:  [50-78] 60  Resp:  [14-17] 14  BP: ()/(66-83) 99/68  FiO2 (%):  [30 %-40 %] 30 %    General Exam:  Head:  Normocephalic, atraumatic  HEENT:  Neck supple  CVS:  Regular rate and rhythm.    Lungs:  Clear.  No audible wheeze.  Abdomen:  Non-tender, Non-distended  Extremities:  No signs of peripheral edema  Skin:  No rashes      Neurologic Exam:  Mental Status:    -Sedation withheld.  Patient readily awakes.  She follows commands.  She remains intubated at the moment.  -Follows simple commands.     CN II:  Full visual fields with confrontation.  Pupils equally  reactive to light.  CN III, IV, VI:  Extraocular muscles function intact with no nystagmus.  CN V:  Facial sensory is symmetric.  CN VII:  Facial motor symmetric.  CN VIII:  Gross hearing intact bilaterally.  CN IX/X:  Palate elevates symmetrically.  CN XI:  Shoulder shrug symmetric.  CN XII:  Tongue is midline on protrusion.     Motor: (strength out of 5:  1= minimal movement, 2 = movement in plane of gravity, 3 = movement against gravity, 4 = movement against some resistance, 5 = full strength)     -Right Upper Ext: Proximal: 5 Distal: 5  -Left Upper Ext: Proximal: 5 Distal: 5    -Right Lower Ext: Proximal: 5 Distal: 5  -Left Lower Ext: Proximal: 5 Distal: 5     Deep Tendon Reflexes:  -Right              Biceps: 2+         Triceps: 2+      Brachioradialis: 2+              Patella: 2+       Ankle: 2+         Babinski:  negative  -Left              Biceps: 2+         Triceps: 2+      Brachioradialis: 2+              Patella: 2+       Ankle: 2+         Babinski:  negative    Tone (Modified Analilia Scale):  No appreciable increase in tone or rigidity noted.     Sensory:  -Intact to light touch, pinprick BUE (C5-T1) and BLE (L2-S1).     Coordination:  -Finger to nose intact BUEs  -Heel to shin intact BLEs  -No ataxia      Results Review:  Lab Results (last 24 hours)     Procedure Component Value Units Date/Time    Comprehensive Metabolic Panel [[id number]]  (Abnormal) Collected: 02/01/23 0440    Specimen: Blood Updated: 02/01/23 0537     Glucose 81 mg/dL      BUN 5 mg/dL      Creatinine 0.47 mg/dL      Sodium 142 mmol/L      Potassium 5.0 mmol/L      Comment: Slight hemolysis detected by analyzer. Results may be affected.        Chloride 108 mmol/L      CO2 18.0 mmol/L      Calcium 8.7 mg/dL      Total Protein 6.7 g/dL      Albumin 4.0 g/dL      ALT (SGPT) 23 U/L      AST (SGOT) 24 U/L      Comment: Slight hemolysis detected by analyzer. Results may be affected.        Alkaline Phosphatase 82 U/L      Total Bilirubin  0.2 mg/dL      Globulin 2.7 gm/dL      A/G Ratio 1.5 g/dL      BUN/Creatinine Ratio 10.6     Anion Gap 16.0 mmol/L      eGFR 134.8 mL/min/1.73     Narrative:      GFR Normal >60  Chronic Kidney Disease <60  Kidney Failure <15      CBC Auto Differential [[id number]]  (Abnormal) Collected: 02/01/23 0440    Specimen: Blood Updated: 02/01/23 0505     WBC 7.27 10*3/mm3      RBC 4.45 10*6/mm3      Hemoglobin 11.4 g/dL      Hematocrit 36.9 %      MCV 82.9 fL      MCH 25.6 pg      MCHC 30.9 g/dL      RDW 19.9 %      RDW-SD 58.4 fl      MPV 12.6 fL      Platelets 224 10*3/mm3      Neutrophil % 62.6 %      Lymphocyte % 26.4 %      Monocyte % 8.4 %      Eosinophil % 1.2 %      Basophil % 0.7 %      Immature Grans % 0.7 %      Neutrophils, Absolute 4.55 10*3/mm3      Lymphocytes, Absolute 1.92 10*3/mm3      Monocytes, Absolute 0.61 10*3/mm3      Eosinophils, Absolute 0.09 10*3/mm3      Basophils, Absolute 0.05 10*3/mm3      Immature Grans, Absolute 0.05 10*3/mm3      nRBC 0.0 /100 WBC     Blood Gas, Arterial - [[id number]]  (Abnormal) Collected: 02/01/23 0304    Specimen: Arterial Blood Updated: 02/01/23 0306     Site Left Radial     Ricky's Test Positive     pH, Arterial 7.427 pH units      pCO2, Arterial 37.1 mm Hg      pO2, Arterial 191.0 mm Hg      Comment: 83 Value above reference range        HCO3, Arterial 24.4 mmol/L      Base Excess, Arterial 0.2 mmol/L      O2 Saturation, Arterial >100.1 %      Comment: 93 Value above reportable range > 100.1        Temperature 37.0 C      Barometric Pressure for Blood Gas 759 mmHg      Modality Ventilator     FIO2 40 %      Ventilator Mode AC     Set Tidal Volume 480     Set Mech Resp Rate 16.0     PEEP 5.0     Collected by [id number]     Comment: Meter: [id number]     :  MAYUR        pCO2, Temperature Corrected 37.1 mm Hg      pH, Temp Corrected 7.427 pH Units      pO2, Temperature Corrected 191 mm Hg           .  Imaging Results (Last 24 Hours)     Procedure Component  "Value Units Date/Time    XR Chest 1 View [[id number]] Collected: 02/01/23 0623     Updated: 02/01/23 0628    Narrative:      EXAMINATION: XR CHEST 1 VW-     2/1/2023 3:25 AM CST     HISTORY: ett placement     A frontal projection of the chest is compared with the previous study  dated 01/25/2023.     The lungs are poorly expanded.     There are ill-defined linear opacities in the lungs bilaterally probably  represent areas of discoid atelectasis.     There is no pleural effusion, pulmonary congestion or pneumothorax.     Heart size is not optimally evaluated due to AP projection.     No acute bony abnormality.     An endotracheal tube is seen with distal end 2.5 cm above the tracheal  bifurcation.       Impression:      1. Poor lung expansion. Atelectatic changes.  2. Endotracheal tube in place.  This report was finalized on 02/01/2023 06:25 by Dr. Medina Braun MD.            Assessment/Plan     Hospital Problem List      Pseudoseizure      Impression    • Pseudoseizures  • History of mood disorder  • History of autism  • History of \"encephalopathy\"    Plan    • Hold sedation and make efforts to extubate as soon as possible.  • Cancel EEG and MRI there is no clinical indication for this.  • Telephone call with the patient's brother to discuss the risks of intubation.  Discussed pseudoseizure and how this does not pose any imminent risk to the patient.  Arrange for outpatient follow-up with epileptologist.  She has been scheduled previously with Declan Lira MD, epileptologist, at [facility].  Would recommend outpatient surveillance with her.  • Involve  to assist with the psychosocial aspects of discharge to assure close follow-up as an outpatient and to avoid further emergency room presentation in order admission with intubation.  • Dr. Martínez has recommended that she obtain a medical bracelet that lists pseudoseizures and avoid this if she presents elsewhere.  Her mother is going to work on " this.  • Neurology signing off.      Thank you, Dr. Hood, for the consultation and the opportunity to participate in the care of your patient.          Zachary Murry PA-C  02/01/23  09:14 CST      Medical Decision Making     Number/Complexity of Problems  1. Moderate  ? 1 undiagnosed new problem with uncertain prognosis -   ? 1 acute illness with systemic symptoms -   2. High  ? 1 acute or chronic illness that pose a threat to life/body function -   Potentially life-threatening and disabling seizure/status epilepticus.  Psychogenic overlay.  Pseudoseizure with intubation at outside hospital.     MDM Data  • Moderate - 1/3 categories  • Extensive - 2/3 categories     1. Category 1: 3 of the following  ? Review of external notes  ? Review of results  ? Ordering of each unique test  ? Independent historian  2. Category 2:  Independent interpretation of test (ex: imaging)  3. Category 3:  Discussion of management with another provider  I have reviewed outside records in Care Everywhere.     I have reviewed past medical records from [facility] in [place], [place].  From 01/17/2023.    Records from [facility] emergency room reviewed.    Diagnosis of pseudoseizure.  Coordination with hospitalist and  for disposition planning.  Coordination with nursing and respiratory therapy for extubation.    Dr. Martínez telephoned her mother to discuss her plan of care and to help assist with psychosocial needs/planning.     Treatment Plan  1. Moderate - Prescription Drug management  2. High  ? Drug therapy requiring intensive monitoring for toxicity  ? Decision regarding hospitalization or escalation of care  ? De-escalate care/DNR decisions  Decision to remove sedation and extubate and de-escalate care.

## 2023-02-14 ENCOUNTER — OFFICE VISIT (OUTPATIENT)
Dept: NEUROLOGY | Age: 28
End: 2023-02-14
Payer: COMMERCIAL

## 2023-02-14 VITALS
OXYGEN SATURATION: 98 % | HEIGHT: 65 IN | DIASTOLIC BLOOD PRESSURE: 66 MMHG | HEART RATE: 103 BPM | BODY MASS INDEX: 25.83 KG/M2 | SYSTOLIC BLOOD PRESSURE: 118 MMHG | WEIGHT: 155 LBS

## 2023-02-14 DIAGNOSIS — G40.909 SEIZURE DISORDER (HCC): Primary | ICD-10-CM

## 2023-02-14 PROBLEM — F98.8 ATTENTION DEFICIT DISORDER: Status: ACTIVE | Noted: 2023-02-14

## 2023-02-14 PROBLEM — R87.610 ATYPICAL SQUAMOUS CELLS OF UNDETERMINED SIGNIFICANCE (ASCUS) ON PAPANICOLAOU SMEAR OF CERVIX: Status: ACTIVE | Noted: 2023-02-14

## 2023-02-14 PROBLEM — L70.9 ACNE: Status: ACTIVE | Noted: 2023-02-14

## 2023-02-14 PROBLEM — N94.6 DYSMENORRHEA: Status: ACTIVE | Noted: 2023-02-14

## 2023-02-14 PROBLEM — F41.9 ANXIETY DISORDER: Status: ACTIVE | Noted: 2023-01-26

## 2023-02-14 PROBLEM — F84.0 ACTIVE AUTISTIC DISORDER: Status: ACTIVE | Noted: 2023-01-26

## 2023-02-14 PROBLEM — E87.6 HYPOKALEMIA: Status: ACTIVE | Noted: 2023-01-26

## 2023-02-14 PROBLEM — F88 SENSORY PROCESSING DIFFICULTY: Status: ACTIVE | Noted: 2023-02-14

## 2023-02-14 PROCEDURE — 99214 OFFICE O/P EST MOD 30 MIN: CPT | Performed by: PSYCHIATRY & NEUROLOGY

## 2023-02-14 RX ORDER — CLONAZEPAM 0.5 MG/1
1 TABLET ORAL 3 TIMES DAILY
Status: ON HOLD | COMMUNITY
Start: 2023-02-13

## 2023-02-16 ENCOUNTER — APPOINTMENT (OUTPATIENT)
Dept: GENERAL RADIOLOGY | Age: 28
DRG: 101 | End: 2023-02-16

## 2023-02-16 ENCOUNTER — HOSPITAL ENCOUNTER (INPATIENT)
Age: 28
LOS: 7 days | Discharge: HOME | DRG: 101 | End: 2023-02-27
Attending: EMERGENCY MEDICINE | Admitting: INTERNAL MEDICINE
Payer: COMMERCIAL

## 2023-02-16 ENCOUNTER — APPOINTMENT (OUTPATIENT)
Dept: CT IMAGING | Age: 28
DRG: 101 | End: 2023-02-16

## 2023-02-16 DIAGNOSIS — R56.9 SEIZURE-LIKE ACTIVITY (HCC): Primary | ICD-10-CM

## 2023-02-16 LAB
ACETAMINOPHEN LEVEL: <5 UG/ML (ref 10–30)
ALBUMIN SERPL-MCNC: 4.2 G/DL (ref 3.5–5.2)
ALP BLD-CCNC: 90 U/L (ref 35–104)
ALT SERPL-CCNC: 17 U/L (ref 5–33)
AMPHETAMINE SCREEN, URINE: NEGATIVE
ANION GAP SERPL CALCULATED.3IONS-SCNC: 11 MMOL/L (ref 7–19)
AST SERPL-CCNC: 15 U/L (ref 5–32)
BACTERIA: NEGATIVE /HPF
BARBITURATE SCREEN URINE: POSITIVE
BASOPHILS ABSOLUTE: 0.1 K/UL (ref 0–0.2)
BASOPHILS RELATIVE PERCENT: 0.7 % (ref 0–1)
BENZODIAZEPINE SCREEN, URINE: POSITIVE
BILIRUB SERPL-MCNC: <0.2 MG/DL (ref 0.2–1.2)
BILIRUBIN URINE: NEGATIVE
BLOOD, URINE: NEGATIVE
BUN BLDV-MCNC: 7 MG/DL (ref 6–20)
BUPRENORPHINE URINE: NEGATIVE
CALCIUM SERPL-MCNC: 9.4 MG/DL (ref 8.6–10)
CANNABINOID SCREEN URINE: POSITIVE
CHLORIDE BLD-SCNC: 102 MMOL/L (ref 98–111)
CLARITY: CLEAR
CO2: 22 MMOL/L (ref 22–29)
COCAINE METABOLITE SCREEN URINE: NEGATIVE
COLOR: YELLOW
CREAT SERPL-MCNC: 0.6 MG/DL (ref 0.5–0.9)
CRYSTALS, UA: ABNORMAL /HPF
EOSINOPHILS ABSOLUTE: 0 K/UL (ref 0–0.6)
EOSINOPHILS RELATIVE PERCENT: 0.2 % (ref 0–5)
EPITHELIAL CELLS, UA: 1 /HPF (ref 0–5)
ETHANOL: <10 MG/DL (ref 0–0.08)
GFR SERPL CREATININE-BSD FRML MDRD: >60 ML/MIN/{1.73_M2}
GLUCOSE BLD-MCNC: 97 MG/DL (ref 74–109)
GLUCOSE URINE: NEGATIVE MG/DL
HCG QUALITATIVE: NEGATIVE
HCT VFR BLD CALC: 41.7 % (ref 37–47)
HEMOGLOBIN: 13.1 G/DL (ref 12–16)
HYALINE CASTS: 3 /HPF (ref 0–8)
IMMATURE GRANULOCYTES #: 0 K/UL
KETONES, URINE: NEGATIVE MG/DL
LEUKOCYTE ESTERASE, URINE: NEGATIVE
LYMPHOCYTES ABSOLUTE: 2 K/UL (ref 1.1–4.5)
LYMPHOCYTES RELATIVE PERCENT: 21.9 % (ref 20–40)
Lab: ABNORMAL
MAGNESIUM: 2.2 MG/DL (ref 1.6–2.6)
MCH RBC QN AUTO: 27 PG (ref 27–31)
MCHC RBC AUTO-ENTMCNC: 31.4 G/DL (ref 33–37)
MCV RBC AUTO: 86 FL (ref 81–99)
METHADONE SCREEN, URINE: NEGATIVE
METHAMPHETAMINE, URINE: NEGATIVE
MONOCYTES ABSOLUTE: 0.7 K/UL (ref 0–0.9)
MONOCYTES RELATIVE PERCENT: 7.5 % (ref 0–10)
NEUTROPHILS ABSOLUTE: 6.2 K/UL (ref 1.5–7.5)
NEUTROPHILS RELATIVE PERCENT: 69.5 % (ref 50–65)
NITRITE, URINE: NEGATIVE
OPIATE SCREEN URINE: NEGATIVE
OXYCODONE URINE: NEGATIVE
PDW BLD-RTO: 20.4 % (ref 11.5–14.5)
PH UA: 7.5 (ref 5–8)
PHENCYCLIDINE SCREEN URINE: NEGATIVE
PHENYTOIN LEVEL: 1.7 UG/ML (ref 10–20)
PLATELET # BLD: 181 K/UL (ref 130–400)
PMV BLD AUTO: 11.7 FL (ref 9.4–12.3)
POTASSIUM SERPL-SCNC: 4.1 MMOL/L (ref 3.5–5)
PROPOXYPHENE SCREEN: NEGATIVE
PROTEIN UA: NEGATIVE MG/DL
RBC # BLD: 4.85 M/UL (ref 4.2–5.4)
RBC UA: 1 /HPF (ref 0–4)
SALICYLATE, SERUM: <0.3 MG/DL (ref 3–10)
SARS-COV-2, NAAT: NOT DETECTED
SODIUM BLD-SCNC: 135 MMOL/L (ref 136–145)
SPECIFIC GRAVITY UA: 1.01 (ref 1–1.03)
TOTAL PROTEIN: 7.1 G/DL (ref 6.6–8.7)
TRICYCLIC, URINE: NEGATIVE
TROPONIN: <0.01 NG/ML (ref 0–0.03)
UROBILINOGEN, URINE: 0.2 E.U./DL
WBC # BLD: 8.9 K/UL (ref 4.8–10.8)
WBC UA: 0 /HPF (ref 0–5)

## 2023-02-16 PROCEDURE — 82077 ASSAY SPEC XCP UR&BREATH IA: CPT

## 2023-02-16 PROCEDURE — 96374 THER/PROPH/DIAG INJ IV PUSH: CPT

## 2023-02-16 PROCEDURE — 85025 COMPLETE CBC W/AUTO DIFF WBC: CPT

## 2023-02-16 PROCEDURE — 93005 ELECTROCARDIOGRAM TRACING: CPT | Performed by: NURSE PRACTITIONER

## 2023-02-16 PROCEDURE — 80175 DRUG SCREEN QUAN LAMOTRIGINE: CPT

## 2023-02-16 PROCEDURE — 6360000002 HC RX W HCPCS

## 2023-02-16 PROCEDURE — 80179 DRUG ASSAY SALICYLATE: CPT

## 2023-02-16 PROCEDURE — 83735 ASSAY OF MAGNESIUM: CPT

## 2023-02-16 PROCEDURE — 80053 COMPREHEN METABOLIC PANEL: CPT

## 2023-02-16 PROCEDURE — 80143 DRUG ASSAY ACETAMINOPHEN: CPT

## 2023-02-16 PROCEDURE — 87635 SARS-COV-2 COVID-19 AMP PRB: CPT

## 2023-02-16 PROCEDURE — 6360000002 HC RX W HCPCS: Performed by: NURSE PRACTITIONER

## 2023-02-16 PROCEDURE — 80185 ASSAY OF PHENYTOIN TOTAL: CPT

## 2023-02-16 PROCEDURE — 84703 CHORIONIC GONADOTROPIN ASSAY: CPT

## 2023-02-16 PROCEDURE — 96376 TX/PRO/DX INJ SAME DRUG ADON: CPT

## 2023-02-16 PROCEDURE — 80306 DRUG TEST PRSMV INSTRMNT: CPT

## 2023-02-16 PROCEDURE — 96375 TX/PRO/DX INJ NEW DRUG ADDON: CPT

## 2023-02-16 PROCEDURE — G0378 HOSPITAL OBSERVATION PER HR: HCPCS

## 2023-02-16 PROCEDURE — 71045 X-RAY EXAM CHEST 1 VIEW: CPT

## 2023-02-16 PROCEDURE — 70450 CT HEAD/BRAIN W/O DYE: CPT

## 2023-02-16 PROCEDURE — 99285 EMERGENCY DEPT VISIT HI MDM: CPT

## 2023-02-16 PROCEDURE — 84484 ASSAY OF TROPONIN QUANT: CPT

## 2023-02-16 PROCEDURE — 80177 DRUG SCRN QUAN LEVETIRACETAM: CPT

## 2023-02-16 PROCEDURE — 36415 COLL VENOUS BLD VENIPUNCTURE: CPT

## 2023-02-16 PROCEDURE — 84146 ASSAY OF PROLACTIN: CPT

## 2023-02-16 PROCEDURE — 81001 URINALYSIS AUTO W/SCOPE: CPT

## 2023-02-16 PROCEDURE — 6360000002 HC RX W HCPCS: Performed by: EMERGENCY MEDICINE

## 2023-02-16 RX ORDER — FLUOXETINE HYDROCHLORIDE 20 MG/1
80 CAPSULE ORAL DAILY
Status: DISCONTINUED | OUTPATIENT
Start: 2023-02-17 | End: 2023-02-27 | Stop reason: HOSPADM

## 2023-02-16 RX ORDER — LEVETIRACETAM 10 MG/ML
1000 INJECTION INTRAVASCULAR ONCE
Status: COMPLETED | OUTPATIENT
Start: 2023-02-16 | End: 2023-02-16

## 2023-02-16 RX ORDER — LORAZEPAM 2 MG/ML
INJECTION INTRAMUSCULAR
Status: COMPLETED
Start: 2023-02-16 | End: 2023-02-16

## 2023-02-16 RX ORDER — FERROUS SULFATE 325(65) MG
325 TABLET ORAL 2 TIMES DAILY WITH MEALS
Status: DISCONTINUED | OUTPATIENT
Start: 2023-02-17 | End: 2023-02-27 | Stop reason: HOSPADM

## 2023-02-16 RX ORDER — LORAZEPAM 2 MG/ML
INJECTION INTRAMUSCULAR
Status: DISPENSED
Start: 2023-02-16 | End: 2023-02-17

## 2023-02-16 RX ORDER — ASCORBIC ACID 500 MG
500 TABLET ORAL 2 TIMES DAILY WITH MEALS
Status: DISCONTINUED | OUTPATIENT
Start: 2023-02-17 | End: 2023-02-27 | Stop reason: HOSPADM

## 2023-02-16 RX ORDER — LORAZEPAM 2 MG/ML
2 INJECTION INTRAMUSCULAR ONCE
Status: COMPLETED | OUTPATIENT
Start: 2023-02-16 | End: 2023-02-16

## 2023-02-16 RX ORDER — LORAZEPAM 2 MG/ML
1 INJECTION INTRAMUSCULAR ONCE
Status: COMPLETED | OUTPATIENT
Start: 2023-02-16 | End: 2023-02-16

## 2023-02-16 RX ADMIN — LORAZEPAM 2 MG: 2 INJECTION INTRAMUSCULAR; INTRAVENOUS at 22:11

## 2023-02-16 RX ADMIN — LORAZEPAM 2 MG: 2 INJECTION INTRAMUSCULAR; INTRAVENOUS at 20:19

## 2023-02-16 RX ADMIN — LEVETIRACETAM 1000 MG: 10 INJECTION INTRAVENOUS at 20:32

## 2023-02-16 RX ADMIN — LORAZEPAM 1 MG: 2 INJECTION INTRAMUSCULAR at 21:01

## 2023-02-16 RX ADMIN — LORAZEPAM 1 MG: 2 INJECTION INTRAMUSCULAR; INTRAVENOUS at 21:01

## 2023-02-16 ASSESSMENT — PAIN - FUNCTIONAL ASSESSMENT: PAIN_FUNCTIONAL_ASSESSMENT: NONE - DENIES PAIN

## 2023-02-17 ENCOUNTER — APPOINTMENT (OUTPATIENT)
Dept: CT IMAGING | Age: 28
DRG: 101 | End: 2023-02-17
Payer: COMMERCIAL

## 2023-02-17 ENCOUNTER — APPOINTMENT (OUTPATIENT)
Dept: GENERAL RADIOLOGY | Age: 28
DRG: 101 | End: 2023-02-17

## 2023-02-17 PROBLEM — G93.49 CHRONIC STATIC ENCEPHALOPATHY: Status: ACTIVE | Noted: 2023-02-17

## 2023-02-17 PROBLEM — G40.919 INTRACTABLE SEIZURE DISORDER (HCC): Status: ACTIVE | Noted: 2023-02-17

## 2023-02-17 PROBLEM — F84.0 AUTISM: Status: ACTIVE | Noted: 2023-02-17

## 2023-02-17 LAB — PROLACTIN: 20.75 NG/ML (ref 4.79–23.3)

## 2023-02-17 PROCEDURE — 99223 1ST HOSP IP/OBS HIGH 75: CPT | Performed by: PSYCHIATRY & NEUROLOGY

## 2023-02-17 PROCEDURE — 95819 EEG AWAKE AND ASLEEP: CPT | Performed by: PSYCHIATRY & NEUROLOGY

## 2023-02-17 PROCEDURE — G0378 HOSPITAL OBSERVATION PER HR: HCPCS

## 2023-02-17 PROCEDURE — 71250 CT THORAX DX C-: CPT

## 2023-02-17 PROCEDURE — 95816 EEG AWAKE AND DROWSY: CPT

## 2023-02-17 PROCEDURE — 6360000002 HC RX W HCPCS: Performed by: HOSPITALIST

## 2023-02-17 PROCEDURE — 96372 THER/PROPH/DIAG INJ SC/IM: CPT

## 2023-02-17 PROCEDURE — 70490 CT SOFT TISSUE NECK W/O DYE: CPT

## 2023-02-17 PROCEDURE — 70490 CT SOFT TISSUE NECK W/O DYE: CPT | Performed by: RADIOLOGY

## 2023-02-17 PROCEDURE — 93005 ELECTROCARDIOGRAM TRACING: CPT | Performed by: INTERNAL MEDICINE

## 2023-02-17 PROCEDURE — 6370000000 HC RX 637 (ALT 250 FOR IP): Performed by: HOSPITALIST

## 2023-02-17 PROCEDURE — 2580000003 HC RX 258: Performed by: HOSPITALIST

## 2023-02-17 PROCEDURE — 6370000000 HC RX 637 (ALT 250 FOR IP): Performed by: PSYCHIATRY & NEUROLOGY

## 2023-02-17 PROCEDURE — 74176 CT ABD & PELVIS W/O CONTRAST: CPT

## 2023-02-17 PROCEDURE — 6360000002 HC RX W HCPCS

## 2023-02-17 PROCEDURE — 71250 CT THORAX DX C-: CPT | Performed by: RADIOLOGY

## 2023-02-17 PROCEDURE — 74176 CT ABD & PELVIS W/O CONTRAST: CPT | Performed by: RADIOLOGY

## 2023-02-17 RX ORDER — SODIUM CHLORIDE 9 MG/ML
INJECTION, SOLUTION INTRAVENOUS PRN
Status: DISCONTINUED | OUTPATIENT
Start: 2023-02-17 | End: 2023-02-21 | Stop reason: SDUPTHER

## 2023-02-17 RX ORDER — LACOSAMIDE 50 MG/1
100 TABLET ORAL 2 TIMES DAILY
Status: DISCONTINUED | OUTPATIENT
Start: 2023-02-17 | End: 2023-02-18

## 2023-02-17 RX ORDER — DIPHENHYDRAMINE HYDROCHLORIDE 50 MG/ML
50 INJECTION INTRAMUSCULAR; INTRAVENOUS ONCE
Status: COMPLETED | OUTPATIENT
Start: 2023-02-17 | End: 2023-02-17

## 2023-02-17 RX ORDER — PHENYTOIN SODIUM 100 MG/1
100 CAPSULE, EXTENDED RELEASE ORAL 2 TIMES DAILY
Status: DISCONTINUED | OUTPATIENT
Start: 2023-02-17 | End: 2023-02-18

## 2023-02-17 RX ORDER — ONDANSETRON 2 MG/ML
4 INJECTION INTRAMUSCULAR; INTRAVENOUS EVERY 6 HOURS PRN
Status: DISCONTINUED | OUTPATIENT
Start: 2023-02-17 | End: 2023-02-27 | Stop reason: HOSPADM

## 2023-02-17 RX ORDER — ENOXAPARIN SODIUM 100 MG/ML
40 INJECTION SUBCUTANEOUS DAILY
Status: DISCONTINUED | OUTPATIENT
Start: 2023-02-17 | End: 2023-02-27 | Stop reason: HOSPADM

## 2023-02-17 RX ORDER — POLYETHYLENE GLYCOL 3350 17 G/17G
17 POWDER, FOR SOLUTION ORAL DAILY PRN
Status: DISCONTINUED | OUTPATIENT
Start: 2023-02-17 | End: 2023-02-27 | Stop reason: HOSPADM

## 2023-02-17 RX ORDER — LEVETIRACETAM 500 MG/1
1500 TABLET ORAL 2 TIMES DAILY
Status: DISCONTINUED | OUTPATIENT
Start: 2023-02-17 | End: 2023-02-23

## 2023-02-17 RX ORDER — ACETAMINOPHEN 325 MG/1
650 TABLET ORAL EVERY 4 HOURS PRN
Status: DISCONTINUED | OUTPATIENT
Start: 2023-02-17 | End: 2023-02-27 | Stop reason: HOSPADM

## 2023-02-17 RX ORDER — MAGNESIUM SULFATE IN WATER 40 MG/ML
2000 INJECTION, SOLUTION INTRAVENOUS PRN
Status: DISCONTINUED | OUTPATIENT
Start: 2023-02-17 | End: 2023-02-27 | Stop reason: HOSPADM

## 2023-02-17 RX ORDER — CALCIUM CARBONATE 200(500)MG
500 TABLET,CHEWABLE ORAL 3 TIMES DAILY PRN
Status: DISCONTINUED | OUTPATIENT
Start: 2023-02-17 | End: 2023-02-27 | Stop reason: HOSPADM

## 2023-02-17 RX ORDER — SODIUM CHLORIDE 0.9 % (FLUSH) 0.9 %
5-40 SYRINGE (ML) INJECTION EVERY 12 HOURS SCHEDULED
Status: DISCONTINUED | OUTPATIENT
Start: 2023-02-17 | End: 2023-02-21 | Stop reason: SDUPTHER

## 2023-02-17 RX ORDER — PROMETHAZINE HYDROCHLORIDE 25 MG/ML
25 INJECTION, SOLUTION INTRAMUSCULAR; INTRAVENOUS ONCE
Status: DISCONTINUED | OUTPATIENT
Start: 2023-02-18 | End: 2023-02-18 | Stop reason: SDUPTHER

## 2023-02-17 RX ORDER — POTASSIUM CHLORIDE 20 MEQ/1
40 TABLET, EXTENDED RELEASE ORAL PRN
Status: DISCONTINUED | OUTPATIENT
Start: 2023-02-17 | End: 2023-02-27 | Stop reason: HOSPADM

## 2023-02-17 RX ORDER — MECOBALAMIN 5000 MCG
5 TABLET,DISINTEGRATING ORAL NIGHTLY PRN
Status: DISCONTINUED | OUTPATIENT
Start: 2023-02-17 | End: 2023-02-27 | Stop reason: HOSPADM

## 2023-02-17 RX ORDER — CLONAZEPAM 1 MG/1
0.5 TABLET ORAL EVERY 12 HOURS
Status: DISCONTINUED | OUTPATIENT
Start: 2023-02-17 | End: 2023-02-27 | Stop reason: HOSPADM

## 2023-02-17 RX ORDER — POTASSIUM CHLORIDE 7.45 MG/ML
10 INJECTION INTRAVENOUS PRN
Status: DISCONTINUED | OUTPATIENT
Start: 2023-02-17 | End: 2023-02-27 | Stop reason: HOSPADM

## 2023-02-17 RX ORDER — ONDANSETRON 4 MG/1
4 TABLET, ORALLY DISINTEGRATING ORAL EVERY 8 HOURS PRN
Status: DISCONTINUED | OUTPATIENT
Start: 2023-02-17 | End: 2023-02-27 | Stop reason: HOSPADM

## 2023-02-17 RX ORDER — SODIUM CHLORIDE 0.9 % (FLUSH) 0.9 %
5-40 SYRINGE (ML) INJECTION PRN
Status: DISCONTINUED | OUTPATIENT
Start: 2023-02-17 | End: 2023-02-21 | Stop reason: SDUPTHER

## 2023-02-17 RX ORDER — PROMETHAZINE HYDROCHLORIDE 25 MG/ML
25 INJECTION, SOLUTION INTRAMUSCULAR; INTRAVENOUS EVERY 6 HOURS PRN
Status: DISCONTINUED | OUTPATIENT
Start: 2023-02-17 | End: 2023-02-17

## 2023-02-17 RX ORDER — ACETAMINOPHEN 650 MG/1
650 SUPPOSITORY RECTAL EVERY 4 HOURS PRN
Status: DISCONTINUED | OUTPATIENT
Start: 2023-02-17 | End: 2023-02-27 | Stop reason: HOSPADM

## 2023-02-17 RX ORDER — DIPHENHYDRAMINE HYDROCHLORIDE 50 MG/ML
INJECTION INTRAMUSCULAR; INTRAVENOUS
Status: COMPLETED
Start: 2023-02-17 | End: 2023-02-17

## 2023-02-17 RX ORDER — LAMOTRIGINE 100 MG/1
200 TABLET ORAL DAILY
Status: DISCONTINUED | OUTPATIENT
Start: 2023-02-17 | End: 2023-02-19

## 2023-02-17 RX ADMIN — LAMOTRIGINE 200 MG: 100 TABLET ORAL at 09:40

## 2023-02-17 RX ADMIN — DIPHENHYDRAMINE HYDROCHLORIDE 50 MG: 50 INJECTION, SOLUTION INTRAMUSCULAR; INTRAVENOUS at 23:22

## 2023-02-17 RX ADMIN — LEVETIRACETAM 1500 MG: 500 TABLET, FILM COATED ORAL at 09:40

## 2023-02-17 RX ADMIN — OXYCODONE HYDROCHLORIDE AND ACETAMINOPHEN 500 MG: 500 TABLET ORAL at 17:18

## 2023-02-17 RX ADMIN — LACOSAMIDE 100 MG: 50 TABLET, FILM COATED ORAL at 09:40

## 2023-02-17 RX ADMIN — DIPHENHYDRAMINE HYDROCHLORIDE 50 MG: 50 INJECTION INTRAMUSCULAR; INTRAVENOUS at 23:22

## 2023-02-17 RX ADMIN — ZIPRASIDONE MESYLATE 10 MG: 20 INJECTION, POWDER, LYOPHILIZED, FOR SOLUTION INTRAMUSCULAR at 23:21

## 2023-02-17 RX ADMIN — FERROUS SULFATE TAB 325 MG (65 MG ELEMENTAL FE) 325 MG: 325 (65 FE) TAB at 17:18

## 2023-02-17 RX ADMIN — PROMETHAZINE HYDROCHLORIDE 25 MG: 25 INJECTION INTRAMUSCULAR; INTRAVENOUS at 23:22

## 2023-02-17 RX ADMIN — OXYCODONE HYDROCHLORIDE AND ACETAMINOPHEN 500 MG: 500 TABLET ORAL at 09:40

## 2023-02-17 RX ADMIN — SODIUM CHLORIDE, PRESERVATIVE FREE 5 ML: 5 INJECTION INTRAVENOUS at 09:40

## 2023-02-17 RX ADMIN — ZIPRASIDONE MESYLATE 10 MG: 20 INJECTION, POWDER, LYOPHILIZED, FOR SOLUTION INTRAMUSCULAR at 23:06

## 2023-02-17 RX ADMIN — FLUOXETINE 80 MG: 20 CAPSULE ORAL at 09:40

## 2023-02-17 RX ADMIN — CLONAZEPAM 0.5 MG: 1 TABLET ORAL at 09:39

## 2023-02-17 RX ADMIN — PHENYTOIN SODIUM 100 MG: 100 CAPSULE ORAL at 09:39

## 2023-02-17 RX ADMIN — ENOXAPARIN SODIUM 40 MG: 100 INJECTION SUBCUTANEOUS at 09:40

## 2023-02-17 RX ADMIN — FERROUS SULFATE TAB 325 MG (65 MG ELEMENTAL FE) 325 MG: 325 (65 FE) TAB at 09:39

## 2023-02-17 ASSESSMENT — ENCOUNTER SYMPTOMS
COUGH: 0
PHOTOPHOBIA: 0
NAUSEA: 0
BACK PAIN: 0
SHORTNESS OF BREATH: 0
WHEEZING: 0
VOMITING: 0

## 2023-02-17 NOTE — ED PROVIDER NOTES
[facility] EMERGENCY DEPT  eMERGENCYdEPARTMENT eNCOUnter      Pt Name: Davi Montero  MRN: [id number]  [place] 1995  Date of evaluation: 2/16/2023  Samson Capellan MD    CHIEF COMPLAINT       Chief Complaint   Patient presents with    Seizures     2 seizures prior to EMS arrival 4 seizures with EMS     Patient's 67-year-old female presents due to seizures. Multiple seizures in route per EMS. Significant other arrived soon after patient's arrival and said the patient was sitting in the car began having seizure. Soon after had another seizure he called EMS. When told she was given Versed by EMS. Soon after arrival she had another seizure was given Ativan here. When I was examining the patient CC to have seizure like activity and was given additional Ativan. Immediately after seizure which lasted about 45 seconds patient returned to baseline and was not postictal.  Communicating by shaking her head up and down to indicate yes or no but otherwise is nonverbal.  She will follow commands. Again, she did not seem postictal at all right after her seizure. Her significant other said that she had no symptoms of any kind prior to the seizure she had tonight. No fevers chills or other constitutional symptoms. No complaints of any kind. He tells me she has longstanding history of seizures. PHYSICAL EXAM    (up to 7 for level 4, 8 or more for level 5)     ED Triage Vitals [02/16/23 1940]   BP Temp Temp src Heart Rate Resp SpO2 Height Weight   122/87 98.3 °F (36.8 °C) -- 100 20 97 % 5' 5\" (1.651 m) 155 lb (70.3 kg)       Physical Exam  Vitals reviewed. Constitutional:       General: She is not in acute distress. Appearance: She is well-developed. HENT:      Head: Normocephalic and atraumatic. Mouth/Throat:      Mouth: Mucous membranes are moist.      Pharynx: Oropharynx is clear. Eyes:      General: No scleral icterus. Pupils: Pupils are equal, round, and reactive to light.    Neck: Vascular: No JVD. Cardiovascular:      Rate and Rhythm: Normal rate and regular rhythm. Pulses: Normal pulses. Heart sounds: Normal heart sounds. Pulmonary:      Effort: Pulmonary effort is normal. No respiratory distress. Breath sounds: Normal breath sounds. Abdominal:      General: There is no distension. Palpations: Abdomen is soft. Tenderness: There is no abdominal tenderness. There is no guarding or rebound. Musculoskeletal:         General: No tenderness. Cervical back: Normal range of motion and neck supple. No rigidity. Right lower leg: No edema. Left lower leg: No edema. Lymphadenopathy:      Cervical: No cervical adenopathy. Skin:     General: Skin is warm and dry. Capillary Refill: Capillary refill takes less than 2 seconds. Neurological:      Mental Status: She is alert. GCS: GCS eye subscore is 4. GCS verbal subscore is 1. GCS motor subscore is 6. Cranial Nerves: Cranial nerves 2-12 are intact. Sensory: Sensation is intact. Coordination: Coordination is intact. Comments: Following her seizure did neuro exam and patient seemed to have some very mildly decreased  strength on the left but reexamine soon after and this was normal.  Questionable pronator drift on the left. Psychiatric:         Behavior: Behavior normal.       DIAGNOSTIC RESULTS     EKG: All EKG's are interpreted by the Emergency Department Physician who either signs orCo-signs this chart in the absence of a cardiologist.    Normal sinus rhythm. Normal QT. No signs of acute ischemia    RADIOLOGY:   Non-plain film images such as CT, Ultrasound and MRI are read by the radiologist. Plain radiographic images are visualized and preliminarily interpreted by the emergency physician with the below findings:          CT Head WO Contrast   Final Result   No acute intracranial process evident on noncontrast CT of the brain.       XR CHEST PORTABLE   Final Result No acute cardiopulmonary process identified. LABS:  Labs Reviewed   CBC WITH AUTO DIFFERENTIAL - Abnormal; Notable for the following components:       Result Value    MCHC 31.4 (*)     RDW 20.4 (*)     Neutrophils % 69.5 (*)     All other components within normal limits   COMPREHENSIVE METABOLIC PANEL - Abnormal; Notable for the following components:    Sodium 135 (*)     All other components within normal limits   PHENYTOIN LEVEL, TOTAL - Abnormal; Notable for the following components:    Phenytoin Lvl 1.7 (*)     All other components within normal limits   URINALYSIS WITH MICROSCOPIC - Abnormal; Notable for the following components:    Bacteria, UA NEGATIVE (*)     Crystals, UA NEG (*)     All other components within normal limits   DRUG SCRN, BUPRENORPHINE - Abnormal; Notable for the following components:    Barbiturate Screen, Ur POSITIVE (*)     Benzodiazepine Screen, Urine POSITIVE (*)     Cannabinoid Scrn, Ur POSITIVE (*)     All other components within normal limits   ACETAMINOPHEN LEVEL - Abnormal; Notable for the following components:    Acetaminophen Level <5 (*)     All other components within normal limits   SALICYLATE LEVEL - Abnormal; Notable for the following components:    Salicylate, Serum <1.1 (*)     All other components within normal limits   COVID-19, RAPID   TROPONIN   MAGNESIUM   HCG, SERUM, QUALITATIVE   ETHANOL   LEVETIRACETAM LEVEL   LAMOTRIGINE LEVEL   PROLACTIN       All other labs were within normal range or not returned as of this dictation. EMERGENCY DEPARTMENT COURSE and DIFFERENTIALDIAGNOSIS/MDM:   Vitals:    Vitals:    02/16/23 1940   BP: 122/87   Pulse: 100   Resp: 20   Temp: 98.3 °F (36.8 °C)   SpO2: 97%   Weight: 155 lb (70.3 kg)   Height: 5' 5\" (1.651 m)       MDM    Reviewed patient's results. In the ER patient has had several brief episodes of seizure-like activity. Typically lasting 30 seconds to a minute and then spontaneously resolve.   Patient does not seem postictal at all following these. Reviewed patient's note from neurology clinic visit with Dr. Lico Perrin 2 days ago. At that time his plan was to wean her Dilantin and then plan to wean her Vimpat in the near future. Dr. Ailyn Wilson note indicates the patient has had multiple admissions recently for seizure-like activity with negative work-ups including long-term EEG monitoring and MRI and suspects that patient symptoms are probably due to nonepileptic events and that there may be some behavioral component to this. I discussed patient with Dr. Crystal Shannon. He will see in consult. Discussed patient having slightly decreased  strength on the left following her seizure and my question of her possibly having some mild pronator drift on the left. He does not feel there is any indication for CTA for this. He recommend admission to the hospitalist and to try to avoid any further medications if possible. Patient currently resting comfortably at this time without any further seizure like activity. Patient's case discussed with Dr. Priscila Akhtar, hospitalist, agreeable plan of care and admission. CONSULTS:  IP CONSULT TO NEUROLOGY    PROCEDURES:  Unlessotherwise noted below, none      Procedures    FINAL IMPRESSION      1. Seizure-like activity ([address]          DISPOSITION/PLAN   DISPOSITION Admitted    PATIENT REFERRED TO:  No follow-up provider specified.     DISCHARGE MEDICATIONS:  New Prescriptions    No medications on file          (Please note that portions of this note were completed with a voice recognition program.  Efforts were made to edit the dictations but occasionally words are mis-transcribed.)    Sarah Bingham MD (electronically signed)  Attending Emergency Physician              Sarah Bingham MD  02/17/23 0040

## 2023-02-17 NOTE — PROGRESS NOTES
Patient noted to have seizure like activity, twitching and stiffness of upper extremities lasted 45 sec to 1 min.  Electronically signed by Hayley Martínez RN on 2/17/2023 at 2:01 AM

## 2023-02-17 NOTE — PROGRESS NOTES
Patient uses ipad to communicate as she is nonverbal at this time, pt's personal ipad is at bedside. Significant other reports pt will occasionally speak.  Electronically signed by Erika Richter RN on 2/17/2023 at 5:40 AM

## 2023-02-17 NOTE — PROGRESS NOTES
While in another room with a patient I was informed that the patient was having seizure like activity. Upon entering the room the patient was lying in bed with slight rhythmic movement all over her body as well as facial twitching and unable to respond. The patients  is at bedside and stated that she had been like that for roughly a minute. The patient was observed through the whole episode which lasted roughly 7 minutes. The patient is now resting in bed with her  at bedside. I have notified Dr. Dell Silverman.

## 2023-02-17 NOTE — PROGRESS NOTES
The patients  called out and stated that the patient is having another episode. Upon entering the room the patient was in bed with her service dog and  at bedside. The patient is twitching all over her body. I have notified Dr. Sophia Robles of the patients condition. The EEG tech is going into the room to place the monitor on the patient.

## 2023-02-17 NOTE — ED PROVIDER NOTES
[facility] EMERGENCY DEPT  EMERGENCY DEPARTMENT ENCOUNTER      Pt Name: Nicole Prince  MRN: [id number]  [place] 1995  Date of evaluation: 3/98/0003  Provider: MARBELLA Apple    CHIEF COMPLAINT       Chief Complaint   Patient presents with    Seizures     2 seizures prior to EMS arrival 4 seizures with EMS         HISTORY OF PRESENT ILLNESS   (Location/Symptom, Timing/Onset, Context/Setting, Quality, Duration, Modifying Factors, Severity)  Note limiting factors. Nicole Prince is a 33 y.o. female who presents to the emergency department via EMS for seizures x 4 en route and 2 prior. She has history of autism and epilepsy. Patient of Dr Barrett Marie. She had recent medication adjustment: dilantin and vimpat were being weaned; she was to remain on keppra/lamictal/klonopin. Change initiated 2 days ago per Dr Barrett Marie. Patient non-verbal.   She has had extensive work-up for seizure disorder with recent hospitalizations. She has had negative EEGs and MRI. She was given valium 2.5 mg IM x 3 doses via EMS. HPI    Nursing Notes were reviewed. REVIEW OF SYSTEMS    (2-9 systems for level 4, 10 or more for level 5)     Review of Systems    Except as noted above the remainder of the review of systems was reviewed and negative. PAST MEDICAL HISTORY     Past Medical History:   Diagnosis Date    Anxiety     Autism     Depression     Epile[address] ([facility] [address]     Seizures ([facility] [address]          SURGICAL HISTORY       Past Surgical History:   Procedure Laterality Date     SECTION      CHOLECYSTECTOMY           CURRENT MEDICATIONS       Previous Medications    CLONAZEPAM (KLONOPIN) 0.5 MG TABLET    Take 1 tablet by mouth three times daily. FERROUS SULFATE (IRON 325) 325 (65 FE) MG TABLET    Take 1 tablet by mouth 2 times daily (with meals)    FLUOXETINE (PROZAC) 40 MG CAPSULE    Take 80 mg by mouth daily    LACOSAMIDE (VIMPAT) 100 MG TABS TABLET    Take 1 tablet by mouth 2 times daily for 30 days.  Max Daily Amount: 200 mg    LAMOTRIGINE (LAMICTAL) 200 MG TABLET    Take 1 tablet by mouth daily    LEVETIRACETAM (KEPPRA) 750 MG TABLET    Take 2 tablets by mouth 2 times daily    PHENYTOIN (DILANTIN) 100 MG ER CAPSULE    Take 1 capsule by mouth 3 times daily       ALLERGIES     Patient has no known allergies. FAMILY HISTORY     History reviewed. No pertinent family history.        SOCIAL HISTORY       Social History     Socioeconomic History    Marital status:      Spouse name: None    Number of children: None    Years of education: None    Highest education level: None   Tobacco Use    Smoking status: Never    Smokeless tobacco: Never   Vaping Use    Vaping Use: Never used   Substance and Sexual Activity    Alcohol use: Never    Drug use: Yes     Types: Marijuana (Weed)     Comment: Delta 8    Sexual activity: Yes       SCREENINGS         Jose Coma Scale  Eye Opening: Spontaneous  Best Verbal Response: Inappropriate words  Best Motor Response: Localizes pain  Jose Coma Scale Score: 12                     CIWA Assessment  BP: 122/87  Heart Rate: 100                 PHYSICAL EXAM    (up to 7 for level 4, 8 or more for level 5)     ED Triage Vitals [02/16/23 1940]   BP Temp Temp src Heart Rate Resp SpO2 Height Weight   122/87 98.3 °F (36.8 °C) -- 100 20 97 % 5' 5\" (1.651 m) 155 lb (70.3 kg)       Physical Exam    DIAGNOSTIC RESULTS     EKG: All EKG's are interpreted by the Emergency Department Physician who either signs or Co-signs this chart in the absence of a cardiologist.    ***    RADIOLOGY:   Non-plain film images such as CT, Ultrasound and MRI are read by the radiologist. Plain radiographic images are visualized and preliminarily interpreted by the emergency physician with the below findings:    ***    Interpretation per the Radiologist below, if available at the time of this note:    CT Head WO Contrast    (Results Pending)   XR CHEST PORTABLE    (Results Pending)         ED BEDSIDE ULTRASOUND:   Performed by ED Physician - none    LABS:  Labs Reviewed   CBC WITH AUTO DIFFERENTIAL - Abnormal; Notable for the following components:       Result Value    MCHC 31.4 (*)     RDW 20.4 (*)     Neutrophils % 69.5 (*)     All other components within normal limits   COMPREHENSIVE METABOLIC PANEL - Abnormal; Notable for the following components:    Sodium 135 (*)     All other components within normal limits   PHENYTOIN LEVEL, TOTAL - Abnormal; Notable for the following components:    Phenytoin Lvl 1.7 (*)     All other components within normal limits   LEVETIRACETAM LEVEL   URINALYSIS WITH MICROSCOPIC   LAMOTRIGINE LEVEL   TROPONIN   MAGNESIUM   DRUG SCRN, BUPRENORPHINE       All other labs were within normal range or not returned as of this dictation.    EMERGENCY DEPARTMENT COURSE and DIFFERENTIAL DIAGNOSIS/MDM:   Vitals:    Vitals:    02/16/23 1940   BP: 122/87   Pulse: 100   Resp: 20   Temp: 98.3 °F (36.8 °C)   SpO2: 97%   Weight: 155 lb (70.3 kg)   Height: 5' 5\" (1.651 m)       ***    Medical Decision Making  Amount and/or Complexity of Data Reviewed  Labs: ordered.  Radiology: ordered.  ECG/medicine tests: ordered.    Risk  Prescription drug management.  Decision regarding hospitalization.            REASSESSMENT          CRITICAL CARE TIME   Total Critical Care time was *** minutes, excluding separately reportable procedures.  There was a high probability of clinically significant/life threatening deterioration in the patient's condition which required my urgent intervention.  ***    CONSULTS:  None    PROCEDURES:  Unless otherwise noted below, none     Procedures    No LOS Charge filed ***    FINAL IMPRESSION    No diagnosis found.      DISPOSITION/PLAN   DISPOSITION        PATIENT REFERRED TO:  No follow-up provider specified.    DISCHARGE MEDICATIONS:  New Prescriptions    No medications on file     Controlled Substances Monitoring:     No flowsheet data found.    (Please note that portions of this note  were completed with a voice recognition program.  Efforts were made to edit the dictations but occasionally words are mis-transcribed.)    MARBELLA Gibson (electronically signed)  Attending Emergency Physician

## 2023-02-17 NOTE — PROGRESS NOTES
The patients heart rate has been elevated in the 140's to 150's. The patient is in her room and is about to go on a walk. I have notified Dr. Krystina Benton and he has ordered a stat EKG. The patient is currently walking around the floor.

## 2023-02-17 NOTE — PROGRESS NOTES
[facility]    Patient:  Jackeline Alberto  YOB: 1995  Date of Service: 2/17/2023  MRN: [id number]   Acct: [de-identified]   Primary Care Physician: Dayana Castañeda  Advance Directive: Full Code  Admit Date: 2/16/2023       Hospital Day: 0        CHIEF COMPLAINT:     Chief Complaint   Patient presents with    Seizures     2 seizures prior to EMS arrival 4 seizures with EMS       2/17/2023 3:09 PM  Subjective / Interval History:   02/17/2023  No acute changes or acute overnight event reported. Patient seen and examined. No new complaints. No further seizure activities at this time. Denies any dizziness, lightheadedness or palpitations. Endorses some nausea but no vomiting. Laying comfortably in bed in no acute distress. Review of Systems:   Review of Systems  ROS: 14 point review of systems is negative except as specifically addressed above. ADULT DIET;  Regular    Intake/Output Summary (Last 24 hours) at 2/17/2023 1509  Last data filed at 2/16/2023 2047  Gross per 24 hour   Intake 99.89 ml   Output --   Net 99.89 ml       Medications:   sodium chloride       Current Facility-Administered Medications   Medication Dose Route Frequency Provider Last Rate Last Admin    sodium chloride flush 0.9 % injection 5-40 mL  5-40 mL IntraVENous 2 times per day Sebas Encarnacion MD   5 mL at 02/17/23 0940    sodium chloride flush 0.9 % injection 5-40 mL  5-40 mL IntraVENous PRN Sebas Encarnacion MD        0.9 % sodium chloride infusion   IntraVENous PRN Sebas Encarnacion MD        enoxaparin (LOVENOX) injection 40 mg  40 mg SubCUTAneous Daily Sebas Encarnacion MD   40 mg at 02/17/23 0940    ondansetron (ZOFRAN-ODT) disintegrating tablet 4 mg  4 mg Oral Q8H PRN Sebas Encarnacion MD        Or    ondansetron [facility]) injection 4 mg  4 mg IntraVENous Q6H PRN Sebas Encarnacion MD        acetaminophen (TYLENOL) tablet 650 mg  650 mg Oral Q4H PRN Sebas Encarnacion MD        Or    acetaminophen (TYLENOL) suppository 650 mg  650 mg Rectal Q4H PRN Ahmet Aguilar MD        potassium chloride (KLOR-CON M) extended release tablet 40 mEq  40 mEq Oral PRN Ahmet Aguilar MD        Or    potassium bicarb-citric acid (EFFER-K) effervescent tablet 40 mEq  40 mEq Oral PRN Ahmet Aguilar MD        Or    potassium chloride 10 mEq/100 mL IVPB (Peripheral Line)  10 mEq IntraVENous PRN Ahmet Aguilar MD        magnesium sulfate 2000 mg in 50 mL IVPB premix  2,000 mg IntraVENous PRN Ahmet Aguilar MD        sodium phosphate 22.5 mmol in sodium chloride 0.9 % 250 mL IVPB  0.32 mmol/kg IntraVENous PRN Ahmet Aguilar MD        polyethylene glycol (GLYCOLAX) packet 17 g  17 g Oral Daily PRN Ahmet Aguilar MD        melatonin disintegrating tablet 5 mg  5 mg Oral Nightly PRN Ahmet Aguilar MD        calcium carbonate (TUMS) chewable tablet 500 mg  500 mg Oral TID PRN Ahmet Aguilar MD        levETIRAcetam (KEPPRA) tablet 1,500 mg  1,500 mg Oral BID Heidi Rosado MD   1,500 mg at 02/17/23 0940    lamoTRIgine (LAMICTAL) tablet 200 mg  200 mg Oral Daily Heidi Rosado MD   200 mg at 02/17/23 0940    lacosamide (VIMPAT) tablet 100 mg  100 mg Oral BID Heidi Rosado MD   100 mg at 02/17/23 0940    phenytoin (DILANTIN) ER capsule 100 mg  100 mg Oral BID Heidi Rosado MD   100 mg at 02/17/23 0939    clonazePAM (KLONOPIN) tablet 0.5 mg  0.5 mg Oral Q12H Heidi Rosado MD   0.5 mg at 02/17/23 9885    ferrous sulfate (IRON 325) tablet 325 mg  325 mg Oral BID WC Ahmet Aguilar MD   325 mg at 02/17/23 0939    FLUoxetine (PROZAC) capsule 80 mg  80 mg Oral Daily Ahmet Aguilar MD   80 mg at 02/17/23 0940    ascorbic acid (VITAMIN C) tablet 500 mg  500 mg Oral BID with meals Ahmet Aguilar MD   500 mg at 02/17/23 0940         sodium chloride        sodium chloride flush  5-40 mL IntraVENous 2 times per day    enoxaparin  40 mg SubCUTAneous Daily    levETIRAcetam  1,500 mg Oral BID    lamoTRIgine  200 mg Oral Daily lacosamide  100 mg Oral BID    phenytoin  100 mg Oral BID    clonazePAM  0.5 mg Oral Q12H    ferrous sulfate  325 mg Oral BID WC    FLUoxetine  80 mg Oral Daily    ascorbic acid  500 mg Oral BID with meals     sodium chloride flush, sodium chloride, ondansetron **OR** ondansetron, acetaminophen **OR** acetaminophen, potassium chloride **OR** potassium alternative oral replacement **OR** potassium chloride, magnesium sulfate, sodium phosphate IVPB, polyethylene glycol, melatonin, calcium carbonate  ADULT DIET; Regular       Labs:   CBC with DIFF:  Recent Labs     02/16/23 1952   WBC 8.9   RBC 4.85   HGB 13.1   HCT 41.7   MCV 86.0   MCH 27.0   MCHC 31.4*   RDW 20.4*      MPV 11.7   NEUTOPHILPCT 69.5*   LYMPHOPCT 21.9   MONOPCT 7.5   EOSRELPCT 0.2   BASOPCT 0.7   NEUTROABS 6.2   LYMPHSABS 2.0   MONOSABS 0.70   EOSABS 0.00   BASOSABS 0.10       CMP/BMP:  Recent Labs     02/16/23 1952   *   K 4.1      CO2 22   ANIONGAP 11   GLUCOSE 97   BUN 7   CREATININE 0.6   LABGLOM >60   CALCIUM 9.4   PROT 7.1   LABALBU 4.2   BILITOT <0.2   ALKPHOS 90   ALT 17   AST 15         CRP:  No results for input(s): CRP in the last 72 hours. Sed Rate:  No results for input(s): SEDRATE in the last 72 hours. HgBA1c:  No components found for: HGBA1C  FLP:  No results found for: TRIG, HDL, LDLCALC, LDLDIRECT, LABVLDL  TSH:    Lab Results   Component Value Date/Time    TSH 1.340 01/16/2023 06:17 PM     Troponin T:   Recent Labs     02/16/23 2015   Adelene Abts <0.01     Pro-BNP: No results for input(s): BNP in the last 72 hours. INR: No results for input(s): INR in the last 72 hours.   ABGs:   Lab Results   Component Value Date/Time    PHART 7.430 01/13/2023 02:16 PM    PO2ART 139.0 01/13/2023 02:16 PM    ZCX4PKX 39.0 01/13/2023 02:16 PM     UA:  Recent Labs     02/16/23  2145   COLORU YELLOW   PHUR 7.5   WBCUA 0   RBCUA 1   BACTERIA NEGATIVE*   CLARITYU Clear   SPECGRAV 1.007   LEUKOCYTESUR Negative   UROBILINOGEN 0.2 BILIRUBINUR Negative   BLOODU Negative   GLUCOSEU Negative         Culture Results:    No results for input(s): CXSURG in the last 720 hours. Blood Culture Recent: No results for input(s): BC in the last 720 hours. No results for input(s): BC, BLOODCULT2, ORG in the last 72 hours. Cultures:   No results for input(s): CULTURE in the last 72 hours. No results for input(s): BC, Doyce Banner in the last 72 hours. No results for input(s): CXSURG in the last 72 hours. Recent Labs     02/16/23 2015   MG 2.2     Recent Labs     02/16/23 1952   AST 15   ALT 17   BILITOT <0.2   ALKPHOS 90         RAD:   CT Head WO Contrast    Result Date: 2/17/2023  Exam: Noncontrast CT of the brain Technique: Axial noncontrast images were obtained from the skull base to the vertex. Coronal and sagittal reconstructions were performed. Radiation dose optimization technique was utilized. Clinical information: Seizure Comparison: Noncontrast CT of the brain performed on January 16, 2023 Findings: No evidence of acute hemorrhage, mass, mass effect, or midline shift. The ventricular system and basal cisterns are within normal limits for the patients age. Gray-white differentiation is maintained. There is no evidence of an intra-axial or extra-axial fluid collection. Bone windows reveal no evidence of acute fracture. The visualized paranasal sinuses are within normal limits. The orbits are grossly unremarkable. No acute intracranial process evident on noncontrast CT of the brain. XR CHEST PORTABLE    Result Date: 2/17/2023  CLINICAL HISTORY: Seizure TECHNIQUE: Single AP view of the chest COMPARISON: CXR from 1/10/2023 FINDINGS: Lines and tubes: None. Cardiomediastinal: Normal size and configuration of the cardiomediastinal silhouette. No pneumomediastinum. Lungs and pleura: The lungs are grossly unremarkable. No pleural effusion. No pneumothorax. Musculoskeletal: No acute osseous abnormalities. Other: None.     No acute cardiopulmonary process identified. Objective:   Vitals:   /72   Pulse 95   Temp 99.1 °F (37.3 °C) (Temporal)   Resp 18   Ht 5' 5\" (1.651 m)   Wt 155 lb (70.3 kg)   SpO2 97%   BMI 25.79 kg/m²     Patient Vitals for the past 24 hrs:   BP Temp Temp src Pulse Resp SpO2 Height Weight   02/17/23 0837 120/72 99.1 °F (37.3 °C) Temporal 95 18 97 % -- --   02/17/23 0531 110/70 97.9 °F (36.6 °C) Temporal 77 18 97 % -- --   02/17/23 0106 103/70 97.2 °F (36.2 °C) Temporal 92 18 98 % -- --   02/17/23 0047 108/72 98.1 °F (36.7 °C) Temporal 78 20 95 % -- --   02/16/23 1940 122/87 98.3 °F (36.8 °C) -- 100 20 97 % 5' 5\" (1.651 m) 155 lb (70.3 kg)       24HR INTAKE/OUTPUT:    Intake/Output Summary (Last 24 hours) at 2/17/2023 1509  Last data filed at 2/16/2023 2047  Gross per 24 hour   Intake 99.89 ml   Output --   Net 99.89 ml       Physical Exam  Vitals and nursing note reviewed. Constitutional:       General: She is not in acute distress. Appearance: Normal appearance. She is not ill-appearing, toxic-appearing or diaphoretic. HENT:      Head: Normocephalic and atraumatic. Right Ear: External ear normal.      Left Ear: External ear normal.      Nose: Nose normal. No congestion or rhinorrhea. Mouth/Throat:      Mouth: Mucous membranes are moist.      Pharynx: Oropharynx is clear. No oropharyngeal exudate or posterior oropharyngeal erythema. Eyes:      General: No scleral icterus. Right eye: No discharge. Left eye: No discharge. Extraocular Movements: Extraocular movements intact. Conjunctiva/sclera: Conjunctivae normal.      Pupils: Pupils are equal, round, and reactive to light. Cardiovascular:      Rate and Rhythm: Normal rate and regular rhythm. Pulses: Normal pulses. Heart sounds: Normal heart sounds. No murmur heard. No friction rub. No gallop. Pulmonary:      Effort: Pulmonary effort is normal. No respiratory distress.       Breath sounds: Normal breath sounds. No stridor. No wheezing, rhonchi or rales. Chest:      Chest wall: No tenderness. Abdominal:      General: Bowel sounds are normal. There is no distension. Palpations: Abdomen is soft. Tenderness: There is no abdominal tenderness. There is no guarding or rebound. Musculoskeletal:         General: No swelling, tenderness, deformity or signs of injury. Normal range of motion. Cervical back: Normal range of motion and neck supple. No rigidity. No muscular tenderness. Right lower leg: No edema. Left lower leg: No edema. Skin:     General: Skin is warm and dry. Capillary Refill: Capillary refill takes less than 2 seconds. Coloration: Skin is not jaundiced or pale. Findings: No bruising, erythema, lesion or rash. Neurological:      General: No focal deficit present. Mental Status: She is alert and oriented to person, place, and time. Cranial Nerves: No cranial nerve deficit. Sensory: No sensory deficit. Motor: No weakness. Coordination: Coordination normal.   Psychiatric:         Mood and Affect: Mood normal.         Behavior: Behavior normal.         Thought Content:  Thought content normal.         Judgment: Judgment normal.         Assessment/plan:     Patient Active Problem List    Diagnosis Date Noted    Seizure-like activity ([facility] [address] 02/16/2023     Priority: Medium    Acne 02/14/2023     Priority: Medium    Attention deficit disorder 02/14/2023     Priority: Medium    Atypical squamous cells of undetermined significance (ASCUS) on Papanicolaou smear of cervix 02/14/2023     Priority: Medium    Dysmenorrhea 02/14/2023     Priority: Medium    Sensory processing difficulty 02/14/2023     Priority: Medium    Active autistic disorder 01/26/2023     Priority: Medium    Anxiety disorder 01/26/2023     Priority: Medium    Hypokalemia 01/26/2023     Priority: Medium    Status epilepticus ([facility] [address] 01/06/2023     Priority: Medium       Hospital Problems             Last Modified POA    * (Principal) Seizure-like activity (HCC) 2/16/2023 Yes       Principal Problem:    Seizure-like activity (HCC)  Resolved Problems:    * No resolved hospital problems. *          Brief History/Summary:    As per Initial admission HPI 2/16/2023, quoted below;  \"Mrs.Kylia Del Castillo is a pleasant 27 year old lady from home. She follows with Dr Crowell. She has had some of her medications decreased/tapered recently. She has a history of epilepsy with onset at age 24 years. She had family present at the bedside. The phenytoin and Vimpat were being tapered. She has had multiple seizures tonight here in the hospital. She had reportedly had but a single seizure after her last visit (end of January) prior to her medications being reduced last week.   She also has a history of Autism and is at times mute, but will still freely answer with head gestures.\"      Seizures  Suspected pseudoseizures  Seizure precautions  Aspiration precautions  Neuro check every 4 hours  Neurology consulted on admission  EEG (02/17/2023)  Deferred antiepileptic regiment to neurology if indicated.      Continue management of other chronic medical conditions - see above and orders.          Advance Directive: Full Code    ADULT DIET; Regular         Consults Made:   IP CONSULT TO NEUROLOGY      DVT prophylaxis: Enoxaparin        Discharge planning: tbd    Time Spent is  35 mins  in the examination, evaluation, counseling and review of medications, assessment and plan.     Electronically signed by   Eleno Kumar MD, MPH, MD,   Internal Medicine Hospitalist   2/17/2023 3:09 PM

## 2023-02-17 NOTE — H&P
[facility] History and Physical    Patient Information:  Patient: Narcisa Roa  MRN: [id number]   Acct: [de-identified]  YOB: 1995  Admit Date: 2/16/2023      Primary Care Physician: Idalia Avila  Advance Directive: Full Code      SUBJECTIVE:    Chief Complaint   Patient presents with    Seizures     2 seizures prior to EMS arrival 4 seizures with EMS     EP Sign Out:  Known psuedo seizures  Dr Rangel Yun stated he wants to see in AM  Multiple seizures in ED but not post ictal  Got versed from EMS  Had 5mg ativan in ED  -  Had another event in ED  Looked more typical with tongue biting, was given ativan  Post-ictal after (but had just got ativan)    HPI:  David Tomlinson is a pleasant 33year old lady from home. She follows with Dr Samuel Ro. She has had some of her medications decreased/tapered recently. She has a history of epilepsy with onset at age 22 years. She had family present at the bedside. The phenytoin and Vimpat were being tapered. She has had multiple seizures tonight here in the hospital. She had reportedly had but a single seizure after her last visit (end of January) prior to her medications being reduced last week. She also has a history of Autism and is at times mute, but will still freely answer with head gestures. Review of Systems:   Review of Systems   Constitutional:  Negative for chills, diaphoresis, fatigue and fever. Denied malaise   Respiratory:  Negative for shortness of breath and wheezing. Cardiovascular:  Positive for palpitations (sometimes). Negative for chest pain and leg swelling. Denied chest pressure   Gastrointestinal:  Negative for nausea. Neurological:  Negative for weakness and light-headedness. The following subjective sections, other than allergies & family history, are as per chart review in [facility].     Past Medical History:   Diagnosis Date    Anxiety     Autism     Depression     Epilepsy ([facility] [address]     Seizures [facility])      Past Psychiatric History:  As per above    Past Surgical History:   Procedure Laterality Date     SECTION      CHOLECYSTECTOMY       Social History       Tobacco History       Smoking Status  Never      Smokeless Tobacco Use  Never              Alcohol History       Alcohol Use Status  Never              Drug Use       Drug Use Status  Yes Types  Marijuana (Weed) Comment  Delta 8              Sexual Activity       Sexually Active  Yes             Family History   Problem Relation Age of Onset    Diabetes Mother     Migraines Mother     Alcohol Abuse Father     Drug Abuse Father     Migraines Brother     Other Son         hydrocephalus    Autism Son     Autism Son      Allergies:   No Known Allergies    Home Medications:  Prior to Admission medications    Medication Sig Start Date End Date Taking? Authorizing Provider   clonazePAM (KLONOPIN) 0.5 MG tablet Take 1 tablet by mouth three times daily. 23   Historical Provider, MD   lacosamide (VIMPAT) 100 MG TABS tablet Take 1 tablet by mouth 2 times daily for 30 days.  Max Daily Amount: 200 mg 23  Zuleima Conde MD   lamoTRIgine (LAMICTAL) 200 MG tablet Take 1 tablet by mouth daily 23   Zuleima Conde MD   levETIRAcetam (KEPPRA) 750 MG tablet Take 2 tablets by mouth 2 times daily 23  Zuleima Conde MD   phenytoin (DILANTIN) 100 MG ER capsule Take 1 capsule by mouth 3 times daily 23  Zuleima Conde MD   ferrous sulfate (IRON 325) 325 (65 Fe) MG tablet Take 1 tablet by mouth 2 times daily (with meals) 23   Zuleima Conde MD   FLUoxetine (PROZAC) 40 MG capsule Take 80 mg by mouth daily    Historical Provider, MD         OBJECTIVE:    Vitals:    23 0106   BP: 103/70   Pulse: 92   Resp: 18   Temp: 97.2 °F (36.2 °C)   SpO2: 98%     /70   Pulse 92   Temp 97.2 °F (36.2 °C) (Temporal)   Resp 18   Ht 5' 5\" (1.651 m)   Wt 155 lb (70.3 kg)   SpO2 98% BMI 25.79 kg/m²       Intake/Output Summary (Last 24 hours) at 2/17/2023 0516  Last data filed at 2/16/2023 2047  Gross per 24 hour   Intake 99.89 ml   Output --   Net 99.89 ml     Physical Exam  Vitals reviewed. Constitutional:       General: She is not in acute distress. Appearance: Normal appearance. She is normal weight. She is not ill-appearing or toxic-appearing. HENT:      Head: Normocephalic and atraumatic. Nose: No congestion or rhinorrhea. Eyes:      General:         Right eye: No discharge. Left eye: No discharge. Neck:      Comments: Supple, trache appears midline  Cardiovascular:      Rate and Rhythm: Normal rate and regular rhythm. Heart sounds: No murmur heard. No friction rub. No gallop. Pulmonary:      Effort: No respiratory distress. Breath sounds: No stridor. No wheezing, rhonchi or rales. Chest:      Chest wall: No tenderness. Abdominal:      Tenderness: There is no abdominal tenderness. There is no guarding or rebound. Skin:     General: Skin is warm. Comments: nondiaphoretic   Neurological:      Mental Status: She is alert. Motor: No tremor or seizure activity. Psychiatric:         Mood and Affect: Mood normal.         Behavior: Behavior normal. Behavior is cooperative.       Comments: Cooperative & polite but Does not speak with us        LABORATORY DATA:    CBC:   Recent Labs     02/16/23 1952   WBC 8.9   HGB 13.1   HCT 41.7        BMP:   Recent Labs     02/16/23 1952   *   K 4.1      CO2 22   BUN 7   CREATININE 0.6   CALCIUM 9.4     Hepatic Profile:   Recent Labs     02/16/23 1952   AST 15   ALT 17   BILITOT <0.2   ALKPHOS 90     Cardiac Enzymes:   Recent Labs     02/16/23 2015   TROPONINI <0.01     Urinalysis:   Lab Results   Component Value Date/Time    NITRU Negative 02/16/2023 09:45 PM    WBCUA 0 02/16/2023 09:45 PM    BACTERIA NEGATIVE 02/16/2023 09:45 PM    RBCUA 1 02/16/2023 09:45 PM    BLOODU Negative 02/16/2023 09:45 PM    SPECGRAV 1.007 02/16/2023 09:45 PM    GLUCOSEU Negative 02/16/2023 09:45 PM     IMAGING:  CT Head WO Contrast  Result Date: 2/17/2023  No acute intracranial process evident on noncontrast CT of the brain. XR CHEST PORTABLE  Result Date: 2/17/2023  No acute cardiopulmonary process identified. ASESSMENTS & PLANS:    Pseudo Seizures:  Place to medical tele  Neuro consultant already on case  Neuro said no more meds that might limit exam (unless absolutely needed)  Seiure precautions  Stat prolactin level ordered after last event in ED    Chronic Medical Problems:  Continue Home Regimen as indicated  ferrous sulfate  325 mg Oral BID WC   FLUoxetine  80 mg Oral Daily   ascorbic acid  500 mg Oral BID with meals     Supportive and Prophylactic Txx:  DVT PPx: Lovenox SQ  GI (PUD) PPx: not indicated  PT: not indicated  Diet NPO Exceptions are: Sips of Water with Meds  sodium chloride flush, sodium chloride, ondansetron **OR** ondansetron, acetaminophen **OR** acetaminophen, potassium chloride **OR** potassium alternative oral replacement **OR** potassium chloride, magnesium sulfate, sodium phosphate IVPB, polyethylene glycol, melatonin, calcium carbonate      Care time of >35 minutes  Pt seen/examined and placed to OBServation status. Inpatient status is used for patients with an expected LOS extending past two midnights due to medical therapy and or critical care needs, otherwise patients are placed to OBServation status. Signed:  Electronically signed by Mich Moy MD on 2/17/23 at 5:28 AM CST.

## 2023-02-17 NOTE — PROGRESS NOTES
Telemetry just called and stated that the patient is having episodes of tachycardia in the 140's. They also stated that the patients rhythm is sinus arrhythmia. I have notified Dr. Car Velázquez.

## 2023-02-17 NOTE — CONSULTS
[facility]  [address] [place] [address] [address], [address]  [place], [place] [address]  Phone [phone]     Neurology Consultation     Date of Admission: 2023  7:37 PM  Date of Consultation: 23    Attending Provider: Marybel Nguyen MD  Consulting Provider: Alfredo Restrepo M.D. Patient: Nicole Prince  :  1995  Age:  33 y.o. MRN:  [id number]    CHIEF COMPLAINT:  Recurrent seizures    History Source: History obtained from chart review, the patient, and mother. PCP: Sarah Leija    HISTORY OF PRESENT ILLNESS:   Nicole Prince is a 34 YO woman with autism, depression, anxiety, and seizures who was admitted  with recurrent seizures. She was admitted here 2023 to 2023 for seizures that resulted in sedation and intubation. Two days of continuous EEGs with numerous recorded spells showed no epileptic abnormalities. She came back to the ER the same days as discharged with recurrent seizures. She was transferred to [facility] in [place] where no medication changes were made. She then presented to [facility] ER with seizures where she was again treated with IV Ativan to the point of requiring intubation then transferred to \[facility]\"" 2023. EEG again was negative and she was extubated and sent home the following day. She saw Dr. Barrett Marie in the office on  and her Dilantin was started to taper. Yesterday, she had recurrent periods of loss of consciousness, staring variably associated with convulsions lasting 30 to 40 seconds. She was brought to the ER where she had several others. She was given Ativan and Versed but it did not seem to prevent her seizures. She seemed not to have post ictal periods. Since admission last night, she has had a few further seizures. Presently she is awake and alert.       PAST MEDICAL HISTORY:    Medical History:      Diagnosis Date    Anxiety     Autism     Depression     Epilepsy ([facility] [address]     Seizures ([facility] [address]        Surgical History:      Procedure Laterality Date     SECTION      CHOLECYSTECTOMY         Medications Prior to Admission:    Prior to Admission medications    Medication Sig Start Date End Date Taking? Authorizing Provider   clonazePAM (KLONOPIN) 0.5 MG tablet Take 1 tablet by mouth three times daily. 23   Historical Provider, MD   lacosamide (VIMPAT) 100 MG TABS tablet Take 1 tablet by mouth 2 times daily for 30 days.  Max Daily Amount: 200 mg 23  Aaliyah Wong MD   lamoTRIgine (LAMICTAL) 200 MG tablet Take 1 tablet by mouth daily 23   Aaliyah Wong MD   levETIRAcetam (KEPPRA) 750 MG tablet Take 2 tablets by mouth 2 times daily 23  Aaliyah Wong MD   phenytoin (DILANTIN) 100 MG ER capsule Take 1 capsule by mouth 3 times daily 23  Aaliyah Wong MD   ferrous sulfate (IRON 325) 325 (65 Fe) MG tablet Take 1 tablet by mouth 2 times daily (with meals) 23   Aaliyah Wong MD   FLUoxetine (PROZAC) 40 MG capsule Take 80 mg by mouth daily    Historical Provider, MD       Current Medications:  Current Facility-Administered Medications: sodium chloride flush 0.9 % injection 5-40 mL, 5-40 mL, IntraVENous, 2 times per day  sodium chloride flush 0.9 % injection 5-40 mL, 5-40 mL, IntraVENous, PRN  0.9 % sodium chloride infusion, , IntraVENous, PRN  enoxaparin (LOVENOX) injection 40 mg, 40 mg, SubCUTAneous, Daily  ondansetron (ZOFRAN-ODT) disintegrating tablet 4 mg, 4 mg, Oral, Q8H PRN **OR** ondansetron (ZOFRAN) injection 4 mg, 4 mg, IntraVENous, Q6H PRN  acetaminophen (TYLENOL) tablet 650 mg, 650 mg, Oral, Q4H PRN **OR** acetaminophen (TYLENOL) suppository 650 mg, 650 mg, Rectal, Q4H PRN  potassium chloride (KLOR-CON M) extended release tablet 40 mEq, 40 mEq, Oral, PRN **OR** potassium bicarb-citric acid (EFFER-K) effervescent tablet 40 mEq, 40 mEq, Oral, PRN **OR** potassium chloride 10 mEq/100 mL IVPB (Peripheral Line), 10 mEq, IntraVENous, PRN  magnesium sulfate 2000 mg in 50 mL IVPB premix, 2,000 mg, IntraVENous, PRN  sodium phosphate 22.5 mmol in sodium chloride 0.9 % 250 mL IVPB, 0.32 mmol/kg, IntraVENous, PRN  polyethylene glycol (GLYCOLAX) packet 17 g, 17 g, Oral, Daily PRN  melatonin disintegrating tablet 5 mg, 5 mg, Oral, Nightly PRN  calcium carbonate (TUMS) chewable tablet 500 mg, 500 mg, Oral, TID PRN  levETIRAcetam (KEPPRA) tablet 1,500 mg, 1,500 mg, Oral, BID  lamoTRIgine (LAMICTAL) tablet 200 mg, 200 mg, Oral, Daily  lacosamide (VIMPAT) tablet 100 mg, 100 mg, Oral, BID  phenytoin (DILANTIN) ER capsule 100 mg, 100 mg, Oral, BID  clonazePAM (KLONOPIN) tablet 0.5 mg, 0.5 mg, Oral, Q12H  ferrous sulfate (IRON 325) tablet 325 mg, 325 mg, Oral, BID WC  FLUoxetine (PROZAC) capsule 80 mg, 80 mg, Oral, Daily  ascorbic acid (VITAMIN C) tablet 500 mg, 500 mg, Oral, BID with meals    Allergies:  Patient has no known allergies. Habits:  TOBACCO:   reports that she has never smoked. She has never used smokeless tobacco.  ETOH:   reports no history of alcohol use. DRUGS:    Social History     Substance and Sexual Activity   Drug Use Yes    Types: Marijuana Pearly Daisha)    Comment: Delta 8       SOCIAL HISTORY:   Lionel Tay is , lives in [place], [place], and is unemployed. FAMILY HISTORY:       Problem Relation Age of Onset    Diabetes Mother     Migraines Mother     Alcohol Abuse Father     Drug Abuse Father     Migraines Brother     Other Son         hydrocephalus    Autism Son     Autism Son        REVIEW OF SYSTEMS:  Review of Systems   Constitutional:  Negative for chills and fever. HENT:  Negative for hearing loss and tinnitus. Eyes:  Negative for photophobia and visual disturbance. Respiratory:  Negative for cough and shortness of breath. Cardiovascular:  Negative for chest pain and palpitations. Gastrointestinal:  Negative for nausea and vomiting. Endocrine: Negative for polydipsia and polyuria. Genitourinary:  Negative for frequency and urgency. Musculoskeletal:  Negative for arthralgias and back pain. Skin:  Negative for rash and wound. Allergic/Immunologic: Negative for environmental allergies and food allergies. Neurological:  Positive for seizures. Negative for speech difficulty, weakness and headaches. Hematological:  Negative for adenopathy. Does not bruise/bleed easily. Psychiatric/Behavioral:  Positive for dysphoric mood. The patient is nervous/anxious. PHYSICAL EXAMINATION:  Vitals: /72   Pulse 95   Temp 99.1 °F (37.3 °C) (Temporal)   Resp 18   Ht 5' 5\" (1.651 m)   Wt 155 lb (70.3 kg)   SpO2 97%   BMI 25.79 kg/m²   General appearance: alert, appears stated age and cooperative  Skin: Skin color, texture, turgor normal. No rashes or lesions  HEENT: Head: Normal, normocephalic, atraumatic. Neck:no adenopathy, no carotid bruit, no JVD, supple, symmetrical, trachea midline, and thyroid not enlarged, symmetric, no tenderness/mass/nodules  Lungs: clear to auscultation bilaterally  Heart: regular rate and rhythm, S1, S2 normal, no murmur, click, rub or gallop  Abdomen: soft, non-tender; bowel sounds normal; no masses,  no organomegaly  Extremities: extremities normal, atraumatic, no cyanosis or edema      NEUROLOGIC EXAMINATION:  Neurologic Exam     Mental Status   Oriented to person, place, and time. Speech: speech is normal   Level of consciousness: alert  Speech is fluent with some stuttering. Cranial Nerves     CN II   Visual fields full to confrontation. CN III, IV, VI   Pupils are equal, round, and reactive to light. Extraocular motions are normal.     CN VII   Facial expression full, symmetric.      CN VIII   Hearing: intact    CN IX, X   Palate: symmetric    CN XI   Right sternocleidomastoid strength: normal  Left sternocleidomastoid strength: normal  Right trapezius strength: normal  Left trapezius strength: normal    CN XII   Tongue: not atrophic  Fasciculations: absent  Tongue deviation: none    Motor Exam   Muscle bulk: normal  Overall muscle tone: normal  Right arm pronator drift: absent  Left arm pronator drift: absent    Strength   Right neck flexion: 5/5  Left neck flexion: 5/5  Right neck extension: 5/5  Left neck extension: 5/5  Right deltoid: 5/5  Left deltoid: 5/5  Right biceps: 5/5  Left biceps: 5/5  Right triceps: 5/5  Left triceps: 5/5  Right wrist flexion: 5/5  Left wrist flexion: 5/5  Right wrist extension: 5/5  Left wrist extension: 5/5  Right interossei: 5/5  Left interossei: 5/5  Right iliopsoas: 5/5  Left iliopsoas: 5/5  Right quadriceps: 5/5  Left quadriceps: 5/5  Right glutei: 5/5  Left glutei: 5/5  Right anterior tibial: 5/5  Left anterior tibial: 5/5  Right posterior tibial: 5/5  Left posterior tibial: 5/5  Right peroneal: 5/5  Left peroneal: 5/5  Right gastroc: 5/5  Left gastroc: 5/5    Sensory Exam   Light touch normal.   Vibration normal.     Gait, Coordination, and Reflexes     Coordination   Finger to nose coordination: normal    Tremor   Resting tremor: absent  Intention tremor: absent  Action tremor: absent    Reflexes   Right brachioradialis: 2+  Left brachioradialis: 2+  Right biceps: 2+  Left biceps: 2+  Right triceps: 2+  Left triceps: 2+  Right patellar: 2+  Left patellar: 2+  Right achilles: 2+  Left achilles: 2+  Right plantar: normal  Left plantar: normal  Right Munson: absent  Left Munson: absent  Right ankle clonus: absent  Left ankle clonus: absent  Rapid alternating movements were normal.     ADDITIONAL REVIEW:  CBC:    Recent Labs     02/16/23 1952   WBC 8.9   HGB 13.1        BMP:     Recent Labs     02/16/23 1952   *   K 4.1      CO2 22   BUN 7   CREATININE 0.6   GLUCOSE 97     Hepatic:  Recent Labs     02/16/23 1952   AST 15   ALT 17   BILITOT <0.2   ALKPHOS 90     Troponin T:    Recent Labs     02/16/23 2015   TROPONINI <0.01       ABGs:    Lab Results   Component Value Date/Time    PHART 7.430 01/13/2023 02:16 PM    PO2ART 139.0 01/13/2023 02:16 PM    YSL5DMW 39.0 01/13/2023 02:16 PM     Narrative   Exam:   Noncontrast CT of the brain   Technique:   Axial noncontrast images were obtained from the skull base to the vertex. Coronal   and sagittal reconstructions were performed. Radiation dose optimization   technique was utilized. Clinical information:   Seizure   Comparison:   Noncontrast CT of the brain performed on January 16, 2023   Findings:   No evidence of acute hemorrhage, mass, mass effect, or midline shift. The   ventricular system and basal cisterns are within normal limits for the patients   age. Gray-white differentiation is maintained. There is no evidence of an   intra-axial or extra-axial fluid collection. Bone windows reveal no evidence of acute fracture. The visualized paranasal   sinuses are within normal limits. The orbits are grossly unremarkable. Impression   No acute intracranial process evident on noncontrast CT of the brain. IMPRESSION:  1. Intractable seizures. She has failed a great number of anticonvulsants. Evaluation at several places has not disclosed epileptic seizure. 2. Baseline static encephalopathy with autism, depression, anxiety  Unfortunately, her poor insight limit psychiatric/psychologic help. PLAN:  1. EEG  2. Continue present medication and plans for taper of Dilantin then Vimpat per Dr. Jadiel Amezcua. She is to follow up with Dr. Jadiel Amezcua as planned or sooner. 3. OK neurologically for her to go home later today after the EEG or tomorrow. Timmy Coker M.D. I spent 75 total minutes in face to face interaction with patient and coordination of care.    I spent > 50% of the total time discussing the diagnoses, evaluation, test results, treatment options, plans; counseling and advising with the patient and/or family and/or caregiver as well as with the care team.    Electronically signed by Timmy Coker M.D.

## 2023-02-17 NOTE — PROGRESS NOTES
The patient had another episode that lasted roughly 5 minutes. Her heart rate increased into the 150's. Her  is at bedside and assisted her back to bed once the episode resolved. The patient was on the couch in the room but has returned to bed.

## 2023-02-17 NOTE — PROGRESS NOTES
Kirsty Buitrago arrived to room # 520. Presented with: seizure-like activity. Mental Status: Patient is alert and nonverbal, will follow commands. Vitals:    02/17/23 0106   BP: 103/70   Pulse: 92   Resp: 18   Temp: 97.2 °F (36.2 °C)   SpO2: 98%     Seizure precautions initiated, padded side rails and suction at bedside. Patient safety contract and falls prevention contract reviewed with patient Yes. Oriented Patient and Family to room. Call light within reach. Yes.   Needs, issues or concerns expressed at this time: no.      Electronically signed by Javier Youssef RN on 2/17/2023 at 2:44 AM

## 2023-02-17 NOTE — PROGRESS NOTES
4 Eyes Skin Assessment    Natalia Dobson is being assessed upon: Admission    I agree that I, Eva Aparicio RN, along with Irean Dubin, RN (either 2 RN's or 1 LPN and 1 RN) have performed a thorough Head to Toe Skin Assessment on the patient. ALL assessment sites listed below have been assessed. Areas assessed by both nurses:     [x]   Head, Face, and Ears   [x]   Shoulders, Back, and Chest  [x]   Arms, Elbows, and Hands   [x]   Coccyx, Sacrum, and Ischium  [x]   Legs, Feet, and Heels    Does the Patient have Skin Breakdown?  No    Junior Prevention initiated: Yes  Wound Care Orders initiated: NA    [facility] nurse consulted for Pressure Injury (Stage 3,4, Unstageable, DTI, NWPT, and Complex wounds) and New or Established Ostomies: NA        Primary Nurse eSignature: Eva Aparicio RN on 2/17/2023 at 2:44 AM      Co-Signer eSignature: Electronically signed by Irean Dubin, RN on 2/17/23 at 5:35 AM CST

## 2023-02-17 NOTE — ED NOTES
Pt has had multiple seizure like activity that lasts between 20-45 seconds.      Joens Pelletier RN  02/16/23 7622

## 2023-02-18 ENCOUNTER — ANESTHESIA (OUTPATIENT)
Dept: ENDOSCOPY | Age: 28
End: 2023-02-18
Payer: COMMERCIAL

## 2023-02-18 ENCOUNTER — ANESTHESIA EVENT (OUTPATIENT)
Dept: ENDOSCOPY | Age: 28
End: 2023-02-18
Payer: COMMERCIAL

## 2023-02-18 ENCOUNTER — APPOINTMENT (OUTPATIENT)
Dept: GENERAL RADIOLOGY | Age: 28
DRG: 101 | End: 2023-02-18
Payer: COMMERCIAL

## 2023-02-18 PROBLEM — T18.9XXA SWALLOWED FOREIGN BODY: Status: ACTIVE | Noted: 2023-02-16

## 2023-02-18 PROBLEM — K26.9 DUODENAL EROSION: Status: ACTIVE | Noted: 2023-02-18

## 2023-02-18 PROBLEM — K25.9 GASTRIC EROSION: Status: ACTIVE | Noted: 2023-02-18

## 2023-02-18 LAB
ANION GAP SERPL CALCULATED.3IONS-SCNC: 12 MMOL/L (ref 7–19)
BASOPHILS ABSOLUTE: 0 K/UL (ref 0–0.2)
BASOPHILS RELATIVE PERCENT: 0.5 % (ref 0–1)
BUN BLDV-MCNC: 6 MG/DL (ref 6–20)
CALCIUM SERPL-MCNC: 8.7 MG/DL (ref 8.6–10)
CHLORIDE BLD-SCNC: 104 MMOL/L (ref 98–111)
CO2: 23 MMOL/L (ref 22–29)
CREAT SERPL-MCNC: 0.7 MG/DL (ref 0.5–0.9)
EKG P AXIS: 56 DEGREES
EKG P AXIS: 69 DEGREES
EKG P-R INTERVAL: 142 MS
EKG P-R INTERVAL: 146 MS
EKG Q-T INTERVAL: 358 MS
EKG Q-T INTERVAL: 368 MS
EKG QRS DURATION: 88 MS
EKG QRS DURATION: 90 MS
EKG QTC CALCULATION (BAZETT): 396 MS
EKG QTC CALCULATION (BAZETT): 417 MS
EKG T AXIS: 49 DEGREES
EKG T AXIS: 61 DEGREES
EOSINOPHILS ABSOLUTE: 0 K/UL (ref 0–0.6)
EOSINOPHILS RELATIVE PERCENT: 0.4 % (ref 0–5)
GFR SERPL CREATININE-BSD FRML MDRD: >60 ML/MIN/{1.73_M2}
GLUCOSE BLD-MCNC: 84 MG/DL (ref 74–109)
HCT VFR BLD CALC: 38.3 % (ref 37–47)
HEMOGLOBIN: 12.3 G/DL (ref 12–16)
IMMATURE GRANULOCYTES #: 0 K/UL
KEPPRA: 21 UG/ML (ref 10–40)
LAMOTRIGINE LEVEL: 2.1 UG/ML (ref 3–15)
LYMPHOCYTES ABSOLUTE: 2.4 K/UL (ref 1.1–4.5)
LYMPHOCYTES RELATIVE PERCENT: 30.2 % (ref 20–40)
MCH RBC QN AUTO: 27.7 PG (ref 27–31)
MCHC RBC AUTO-ENTMCNC: 32.1 G/DL (ref 33–37)
MCV RBC AUTO: 86.3 FL (ref 81–99)
MONOCYTES ABSOLUTE: 0.7 K/UL (ref 0–0.9)
MONOCYTES RELATIVE PERCENT: 8.5 % (ref 0–10)
NEUTROPHILS ABSOLUTE: 4.7 K/UL (ref 1.5–7.5)
NEUTROPHILS RELATIVE PERCENT: 60.1 % (ref 50–65)
PDW BLD-RTO: 20.1 % (ref 11.5–14.5)
PLATELET # BLD: 177 K/UL (ref 130–400)
PMV BLD AUTO: 11.9 FL (ref 9.4–12.3)
POTASSIUM REFLEX MAGNESIUM: 3.7 MMOL/L (ref 3.5–5)
RBC # BLD: 4.44 M/UL (ref 4.2–5.4)
SODIUM BLD-SCNC: 139 MMOL/L (ref 136–145)
WBC # BLD: 7.8 K/UL (ref 4.8–10.8)

## 2023-02-18 PROCEDURE — 80048 BASIC METABOLIC PNL TOTAL CA: CPT

## 2023-02-18 PROCEDURE — 0DJ08ZZ INSPECTION OF UPPER INTESTINAL TRACT, VIA NATURAL OR ARTIFICIAL OPENING ENDOSCOPIC: ICD-10-PCS | Performed by: SPECIALIST

## 2023-02-18 PROCEDURE — 36415 COLL VENOUS BLD VENIPUNCTURE: CPT

## 2023-02-18 PROCEDURE — 6370000000 HC RX 637 (ALT 250 FOR IP): Performed by: PSYCHIATRY & NEUROLOGY

## 2023-02-18 PROCEDURE — G0378 HOSPITAL OBSERVATION PER HR: HCPCS

## 2023-02-18 PROCEDURE — 44360 SMALL BOWEL ENDOSCOPY: CPT | Performed by: SPECIALIST

## 2023-02-18 PROCEDURE — 3700000001 HC ADD 15 MINUTES (ANESTHESIA): Performed by: SPECIALIST

## 2023-02-18 PROCEDURE — 74018 RADEX ABDOMEN 1 VIEW: CPT | Performed by: RADIOLOGY

## 2023-02-18 PROCEDURE — 6370000000 HC RX 637 (ALT 250 FOR IP): Performed by: HOSPITALIST

## 2023-02-18 PROCEDURE — 3700000000 HC ANESTHESIA ATTENDED CARE: Performed by: SPECIALIST

## 2023-02-18 PROCEDURE — 6360000002 HC RX W HCPCS: Performed by: HOSPITALIST

## 2023-02-18 PROCEDURE — 99233 SBSQ HOSP IP/OBS HIGH 50: CPT | Performed by: PSYCHIATRY & NEUROLOGY

## 2023-02-18 PROCEDURE — 2720000010 HC SURG SUPPLY STERILE: Performed by: SPECIALIST

## 2023-02-18 PROCEDURE — 2500000003 HC RX 250 WO HCPCS: Performed by: ANESTHESIOLOGY

## 2023-02-18 PROCEDURE — 3609012900 HC EGD FOREIGN BODY REMOVAL: Performed by: SPECIALIST

## 2023-02-18 PROCEDURE — 6360000002 HC RX W HCPCS: Performed by: ANESTHESIOLOGY

## 2023-02-18 PROCEDURE — 74018 RADEX ABDOMEN 1 VIEW: CPT

## 2023-02-18 PROCEDURE — 2709999900 HC NON-CHARGEABLE SUPPLY: Performed by: SPECIALIST

## 2023-02-18 PROCEDURE — 6360000002 HC RX W HCPCS

## 2023-02-18 PROCEDURE — 2580000003 HC RX 258: Performed by: HOSPITALIST

## 2023-02-18 PROCEDURE — 7100000001 HC PACU RECOVERY - ADDTL 15 MIN: Performed by: SPECIALIST

## 2023-02-18 PROCEDURE — 2100000000 HC CCU R&B

## 2023-02-18 PROCEDURE — 99221 1ST HOSP IP/OBS SF/LOW 40: CPT | Performed by: SPECIALIST

## 2023-02-18 PROCEDURE — 93010 ELECTROCARDIOGRAM REPORT: CPT | Performed by: INTERNAL MEDICINE

## 2023-02-18 PROCEDURE — 94760 N-INVAS EAR/PLS OXIMETRY 1: CPT

## 2023-02-18 PROCEDURE — 2580000003 HC RX 258: Performed by: ANESTHESIOLOGY

## 2023-02-18 PROCEDURE — 7100000000 HC PACU RECOVERY - FIRST 15 MIN: Performed by: SPECIALIST

## 2023-02-18 PROCEDURE — 85025 COMPLETE CBC W/AUTO DIFF WBC: CPT

## 2023-02-18 RX ORDER — HYDROMORPHONE HYDROCHLORIDE 1 MG/ML
0.5 INJECTION, SOLUTION INTRAMUSCULAR; INTRAVENOUS; SUBCUTANEOUS EVERY 5 MIN PRN
Status: DISCONTINUED | OUTPATIENT
Start: 2023-02-18 | End: 2023-02-23

## 2023-02-18 RX ORDER — SODIUM CHLORIDE, SODIUM LACTATE, POTASSIUM CHLORIDE, CALCIUM CHLORIDE 600; 310; 30; 20 MG/100ML; MG/100ML; MG/100ML; MG/100ML
INJECTION, SOLUTION INTRAVENOUS CONTINUOUS PRN
Status: DISCONTINUED | OUTPATIENT
Start: 2023-02-18 | End: 2023-02-18 | Stop reason: SDUPTHER

## 2023-02-18 RX ORDER — SODIUM CHLORIDE 0.9 % (FLUSH) 0.9 %
5-40 SYRINGE (ML) INJECTION PRN
Status: DISCONTINUED | OUTPATIENT
Start: 2023-02-18 | End: 2023-02-27 | Stop reason: HOSPADM

## 2023-02-18 RX ORDER — SUCCINYLCHOLINE CHLORIDE 20 MG/ML
INJECTION INTRAMUSCULAR; INTRAVENOUS PRN
Status: DISCONTINUED | OUTPATIENT
Start: 2023-02-18 | End: 2023-02-18 | Stop reason: SDUPTHER

## 2023-02-18 RX ORDER — LIDOCAINE HYDROCHLORIDE 10 MG/ML
INJECTION, SOLUTION INFILTRATION; PERINEURAL PRN
Status: DISCONTINUED | OUTPATIENT
Start: 2023-02-18 | End: 2023-02-18 | Stop reason: SDUPTHER

## 2023-02-18 RX ORDER — PROPOFOL 10 MG/ML
INJECTION, EMULSION INTRAVENOUS PRN
Status: DISCONTINUED | OUTPATIENT
Start: 2023-02-18 | End: 2023-02-18 | Stop reason: SDUPTHER

## 2023-02-18 RX ORDER — FENTANYL CITRATE 50 UG/ML
INJECTION, SOLUTION INTRAMUSCULAR; INTRAVENOUS PRN
Status: DISCONTINUED | OUTPATIENT
Start: 2023-02-18 | End: 2023-02-18 | Stop reason: SDUPTHER

## 2023-02-18 RX ORDER — LACOSAMIDE 50 MG/1
50 TABLET ORAL 2 TIMES DAILY
Status: DISCONTINUED | OUTPATIENT
Start: 2023-02-18 | End: 2023-02-23

## 2023-02-18 RX ORDER — SODIUM CHLORIDE 9 MG/ML
INJECTION, SOLUTION INTRAVENOUS PRN
Status: DISCONTINUED | OUTPATIENT
Start: 2023-02-18 | End: 2023-02-27 | Stop reason: HOSPADM

## 2023-02-18 RX ORDER — METOCLOPRAMIDE HYDROCHLORIDE 5 MG/ML
10 INJECTION INTRAMUSCULAR; INTRAVENOUS
Status: ACTIVE | OUTPATIENT
Start: 2023-02-18 | End: 2023-02-19

## 2023-02-18 RX ORDER — LORAZEPAM 2 MG/ML
2 INJECTION INTRAMUSCULAR ONCE
Status: COMPLETED | OUTPATIENT
Start: 2023-02-18 | End: 2023-02-18

## 2023-02-18 RX ORDER — HYDROMORPHONE HYDROCHLORIDE 1 MG/ML
0.25 INJECTION, SOLUTION INTRAMUSCULAR; INTRAVENOUS; SUBCUTANEOUS EVERY 5 MIN PRN
Status: DISCONTINUED | OUTPATIENT
Start: 2023-02-18 | End: 2023-02-23

## 2023-02-18 RX ORDER — ONDANSETRON 2 MG/ML
INJECTION INTRAMUSCULAR; INTRAVENOUS PRN
Status: DISCONTINUED | OUTPATIENT
Start: 2023-02-18 | End: 2023-02-18 | Stop reason: SDUPTHER

## 2023-02-18 RX ORDER — LORAZEPAM 2 MG/ML
INJECTION INTRAMUSCULAR
Status: COMPLETED
Start: 2023-02-18 | End: 2023-02-18

## 2023-02-18 RX ORDER — ROCURONIUM BROMIDE 10 MG/ML
INJECTION, SOLUTION INTRAVENOUS PRN
Status: DISCONTINUED | OUTPATIENT
Start: 2023-02-18 | End: 2023-02-18 | Stop reason: SDUPTHER

## 2023-02-18 RX ORDER — SODIUM CHLORIDE 0.9 % (FLUSH) 0.9 %
5-40 SYRINGE (ML) INJECTION EVERY 12 HOURS SCHEDULED
Status: DISCONTINUED | OUTPATIENT
Start: 2023-02-18 | End: 2023-02-27 | Stop reason: HOSPADM

## 2023-02-18 RX ORDER — DIPHENHYDRAMINE HYDROCHLORIDE 50 MG/ML
12.5 INJECTION INTRAMUSCULAR; INTRAVENOUS
Status: ACTIVE | OUTPATIENT
Start: 2023-02-18 | End: 2023-02-19

## 2023-02-18 RX ADMIN — SUCCINYLCHOLINE CHLORIDE 100 MG: 20 INJECTION, SOLUTION INTRAMUSCULAR; INTRAVENOUS at 03:07

## 2023-02-18 RX ADMIN — LAMOTRIGINE 200 MG: 100 TABLET ORAL at 09:45

## 2023-02-18 RX ADMIN — LORAZEPAM 2 MG: 2 INJECTION INTRAMUSCULAR at 16:07

## 2023-02-18 RX ADMIN — SUGAMMADEX 200 MG: 100 INJECTION, SOLUTION INTRAVENOUS at 03:31

## 2023-02-18 RX ADMIN — LIDOCAINE HYDROCHLORIDE 5 ML: 10 INJECTION, SOLUTION INFILTRATION; PERINEURAL at 03:07

## 2023-02-18 RX ADMIN — FERROUS SULFATE TAB 325 MG (65 MG ELEMENTAL FE) 325 MG: 325 (65 FE) TAB at 17:28

## 2023-02-18 RX ADMIN — OXYCODONE HYDROCHLORIDE AND ACETAMINOPHEN 500 MG: 500 TABLET ORAL at 17:28

## 2023-02-18 RX ADMIN — LACOSAMIDE 50 MG: 50 TABLET, FILM COATED ORAL at 20:14

## 2023-02-18 RX ADMIN — SODIUM CHLORIDE, PRESERVATIVE FREE 10 ML: 5 INJECTION INTRAVENOUS at 09:45

## 2023-02-18 RX ADMIN — CLONAZEPAM 0.5 MG: 1 TABLET ORAL at 09:44

## 2023-02-18 RX ADMIN — ENOXAPARIN SODIUM 40 MG: 100 INJECTION SUBCUTANEOUS at 09:45

## 2023-02-18 RX ADMIN — SODIUM CHLORIDE, SODIUM LACTATE, POTASSIUM CHLORIDE, AND CALCIUM CHLORIDE: 600; 310; 30; 20 INJECTION, SOLUTION INTRAVENOUS at 03:04

## 2023-02-18 RX ADMIN — ONDANSETRON 4 MG: 2 INJECTION INTRAMUSCULAR; INTRAVENOUS at 03:35

## 2023-02-18 RX ADMIN — LEVETIRACETAM 1500 MG: 500 TABLET, FILM COATED ORAL at 09:44

## 2023-02-18 RX ADMIN — ROCURONIUM BROMIDE 20 MG: 10 INJECTION, SOLUTION INTRAVENOUS at 03:22

## 2023-02-18 RX ADMIN — CLONAZEPAM 0.5 MG: 1 TABLET ORAL at 19:46

## 2023-02-18 RX ADMIN — FLUOXETINE 80 MG: 20 CAPSULE ORAL at 09:44

## 2023-02-18 RX ADMIN — FERROUS SULFATE TAB 325 MG (65 MG ELEMENTAL FE) 325 MG: 325 (65 FE) TAB at 09:45

## 2023-02-18 RX ADMIN — LEVETIRACETAM 1500 MG: 500 TABLET, FILM COATED ORAL at 19:46

## 2023-02-18 RX ADMIN — PHENYTOIN SODIUM 100 MG: 100 CAPSULE ORAL at 09:44

## 2023-02-18 RX ADMIN — FENTANYL CITRATE 50 MCG: 50 INJECTION INTRAMUSCULAR; INTRAVENOUS at 03:22

## 2023-02-18 RX ADMIN — LACOSAMIDE 100 MG: 50 TABLET, FILM COATED ORAL at 09:44

## 2023-02-18 RX ADMIN — OXYCODONE HYDROCHLORIDE AND ACETAMINOPHEN 500 MG: 500 TABLET ORAL at 09:44

## 2023-02-18 RX ADMIN — PROPOFOL 200 MG: 10 INJECTION, EMULSION INTRAVENOUS at 03:07

## 2023-02-18 ASSESSMENT — LIFESTYLE VARIABLES: SMOKING_STATUS: 0

## 2023-02-18 NOTE — SIGNIFICANT EVENT
SUBJECTIVE:    Contacted that pt behavior is agitated & aggressive @ 22:20    Geodon 10mg IM x 2 doses  Benadryl 50mg IM  And as she c/o nausea Phenergan 25mg IM x1 dose was administered     As per her size & agitation level took her to CT scan with her RN team       OBJECTIVE:    /78   Pulse 89   Temp 98.4 °F (36.9 °C) (Temporal)   Resp 18   Ht 5' 5\" (1.651 m)   Wt 155 lb (70.3 kg)   SpO2 97%   BMI 25.79 kg/m²     Physical Exam:  VS as per above  GA: Agitated at first then lightly sedated responding readily to stimuli then passing back out, NAD physically apparent on exam  Head: NC, AT  Neck: Supple, Trachea appears midline  PUL: CTA anterolaterally, No W/R/R nor rubs  COR: RRR, No M/R/G  ABD: Normal Bowel Sounds, No G/R/T  MSK: no wasting of fat or muscle stores, no pretibial edema  Skin: Warm, nondiaphoretic  PSY: agitated and anxious initially, lethargic afterwards    CT my read on standard definition monitor:  Batteries seen in distal stomach near pylorus    CT ABDOMEN PELVIS WO CONTRAST Additional Contrast? None [[id number]]    Resulted: 02/17/23 2351    Updated: 02/18/23 0052    Narrative:     Patient: Alma Rosales  Time Out: 00:51Exam(s): CT ABDOMEN + PELVIS Without Contrast EXAM:  CT Abdomen and Pelvis Without Intravenous ContrastCLINICAL HISTORY:   Reason for exam: abdomen. TECHNIQUE:  Axial computed tomography images of the abdomen and   pelvis without intravenous contrast.  CTDI is 26.55 mGy and DLP is 1882.36 mGy-cm. COMPARISON:  No relevant prior studies available. FINDINGS:  Lung bases:  Unremarkable. No mass. No consolidation. ABDOMEN:  Liver:  Unremarkable. Gallbladder and bile   ducts: Status post cholecystectomy. No ductal dilation. Pancreas:  Unremarkable. No ductal dilation. Spleen:  Unremarkable. No splenomegaly. Adrenals:  Unremarkable. No mass. Kidneys and ureters:  Unremarkable. No obstructing stones. No   hydronephrosis. Stomach and bowel:  There are 2 metallic foreign bodies within the stomach. No obstruction. No mucosal thickening. PELVIS:  Appendix:  No findings to suggest acute appendicitis. Bladder:  Unremarkable. No stones. Reproductive:     Unremarkable as visualized. ABDOMEN and PELVIS:  Intraperitoneal space:  Unremarkable. No free air. No significant fluid collection. Bones/joints:  No acute fracture. No dislocation. Soft tissues: There is a 6.5 mm fat-containing umbilical hernia. There is a small focus of subcutaneous air overlying the right anterior abdominal wall. Vasculature:  Unremarkable. No abdominal aortic aneurysm. Lymph nodes:  Unremarkable. No enlarged lymph nodes. Impression:     No evidence of acute intra-abdominal pathology. There are 2 metallic foreign bodies within the stomach. Status post cholecystectomy. Electronically signed by Garrick Davis MD on 02-18-23 at 0051   CT SOFT TISSUE NECK [address] [[id number]]    Resulted: 02/17/23 2344    Updated: 02/18/23 0046    Narrative:     Patient: Baldwin Pool  Time Out: 00:44Exam(s): CT NECK Without Contrast EXAM:  CT Neck Without Intravenous ContrastCLINICAL HISTORY:   Reason for exam: batteries. TECHNIQUE:  Axial computed tomography images of the neck without intravenous contrast.     CTDI is 53 mGy and DLP is 2651.76 mGy-cm. COMPARISON:  No relevant prior studies available. FINDINGS:  Oropharynx:  Unremarkable. No significant tonsillar enlargement. Hypopharynx:  Unremarkable. Larynx:  Unremarkable. Normal epiglottis. Trachea:     Unremarkable. Retropharyngeal space:  Unremarkable. Submandibular/parotid glands:  Unremarkable. Glands are normal in size. Thyroid:  Unremarkable. No enlarged or calcified nodules. Bones/joints:  No acute fracture. Soft tissues:  Unremarkable. Vasculature:  No acute findings. Lymph nodes: Prominent cervical lymph nodes. Lung apices:  Unremarkable as visualized.    Impression:     No evidence of acute cervical soft tissue pathology. Findings concerning for bronchitis, which may be infectious or inflammatory etiologies. Electronically signed by Vaughn Geiger MD on 02-18-23 at [address] [[id number]]    Resulted: 02/17/23 2317    Updated: 02/18/23 0018    Narrative:     Patient: Jose M Oliveira  Time Out: 00:17Exam(s): CT CHEST Without Contrast EXAM:  CT Chest Without Intravenous ContrastCLINICAL HISTORY:   Reason for exam: batteries. TECHNIQUE:  Axial computed tomography images of the chest without intravenous contrast.     CTDI is 26.55 mGy and DLP is 769.4 mGy-cm. COMPARISON:  No relevant prior studies available. FINDINGS:  Lungs:  Unremarkable. No airspace consolidation, pulmonary parenchymal mass, or central endobronchial lesion. Pleural space:  Unremarkable. No   pleural effusion or pneumothorax. Heart:  Cardiomegaly. No pericardial effusion. No significant coronary artery calcifications. Bones/joints:  Unremarkable. No acute fracture or dislocation. Soft tissues:  Unremarkable. Vasculature:  Thoracic   aorta and main pulmonary artery are normal in caliber. Lymph nodes:  Unremarkable. No adenopathy.    Impression:     No acute findings in the chest.Electronically signed by Opal Ny M.D. on 02-18-23 at [address]:    Swallowed foreign bodies: 2 disc batteries  GI called for advice and to notify now of regular priority consult - recommendations appreciated as well as accepted & acted upon  GI also concerned about disc batteries, so Now for FB removal, Endo team being contacted by GI via supervisor  Sitter 1:1  Removal of belongings (excepting communicative devices as they are too large for her to swallow, phone & iPad)  Offered to intubate and place central line in preparation for EGD, GI wishes to defer on this to Anesthesia - i will defer to the specialists      Critical Care time of >80 minutes

## 2023-02-18 NOTE — PROCEDURES
Kirsty Buitrago is a 33 y.o. female patient. 1. Seizure-like activity ([facility] [address]      Past Medical History:   Diagnosis Date    Anxiety     Autism     Depression     Epilepsy ([facility] [address]     Seizures ([facility] [address]      Blood pressure 110/68, pulse 76, temperature 97.3 °F (36.3 °C), temperature source Temporal, resp. rate 18, height 5' 5\" (1.651 m), weight 155 lb (70.3 kg), SpO2 96 %. Procedures          EGD Procedure Note    Patient: Kirsty Buitrago : 1995    Med Rec#: [id number] Acc#: [id number]     Primary Care Provider Haylie Morales    Referring Provider: Hospitalist.    Endoscopist: Billy Mccallum. Mario Alberto Hodgson MD    Service Date: 2023    Procedure Date:  2023    Procedure:  EGD and push  enteroscopy    Preoperative diagnosis:     Ingested 2 batteries. Postoperative diagnosis:    No batteries  found in the esophagus, stomach, duodenum and jejunum. We did  EGD and push enteroscopy. Few erosions in the gastric body, second part duodenum. Plan:    KUB in the morning. Check stools for batteries. Findings:    Past did EGD with GI after scope. Then we did push enteroscopy with PCF scope. Inserted 120 cm of PCF scope. The tip of PCF scope reached the jejunum. Esophagus: Normal.    Stomach: Few erosions in the gastric body. Duodenum: Few erosions in the second part duodenum. Exam Extent: Jejunum. We inserted PCF up to 120 cm. Estimated blood loss: None. Immediate complications:  None. Anesthesia / Sedation:   MAC  was administered by anesthesia who monitored the patient during the procedure. Procedure:     Consent was obtained prior to procedure. We discussed the procedure itself, and discussed the risks of EGD (including-- but not limited to-- pain, discomfort, bleeding potentially requiring second endoscopic procedure and/or blood transfusion, organ perforation requiring operative repair, damage to adjacent organs, infection, aspiration, cardiopulmonary/allergic reaction). Additionally, we discussed options other than EGD. The patient / health care client expressed understanding. The questions that were asked, were answered. See separate documentation about informed consent in the chart. A time out was performed. The patient was assessed again immediately before the procedure, the patient was placed in the left lateral position. After reviewing the patient's chart and obtaining informed consent, the patient was placed in the left lateral decubitus position. A forward-viewing lubricated endoscope was lubricated and inserted through the mouth into the oropharynx. Under direct visualization, the upper esophagus was intubated. The scope was advanced to the extent mentioned. Scope was slowly withdrawn into the stomach with careful inspection of the mucosal surfaces. The scope was retroflexed for inspection of the gastric body, incisura, gastric fundus and cardia. Findings and maneuvers are listed. The scope was then straightened out. The scope was then gradually withdrawn while examining the GE junction, esophagus and throat. Secretions were suctioned to prevent aspiration. The scope was then completely withdrawn from the patient. The patient tolerated the procedure well. There were no immediate complications.        Dennise Bosch MD    2/18/2023    3:03 AM        Disclaimer speech recognition software       (Please note that portions of this note were completed with a voice recognition program. Efforts were made to edit the dictations but occasionally words are mis-transcribed.)      Dennise Bosch MD  2/18/2023

## 2023-02-18 NOTE — PLAN OF CARE
Problem: Discharge Planning  Goal: Discharge to home or other facility with appropriate resources  Outcome: Progressing  Flowsheets (Taken 2/18/2023 0949)  Discharge to home or other facility with appropriate resources: Identify barriers to discharge with patient and caregiver     Problem: Safety - Adult  Goal: Free from fall injury  Outcome: Progressing     Problem: Skin/Tissue Integrity  Goal: Absence of new skin breakdown  Description: 1. Monitor for areas of redness and/or skin breakdown  2. Assess vascular access sites hourly  3. Every 4-6 hours minimum:  Change oxygen saturation probe site  4. Every 4-6 hours:  If on nasal continuous positive airway pressure, respiratory therapy assess nares and determine need for appliance change or resting period. Outcome: Progressing     Problem: ABCDS Injury Assessment  Goal: Absence of physical injury  Outcome: Progressing     Problem: Neurosensory - Adult  Goal: Absence of seizures  Outcome: Progressing  Flowsheets (Taken 2/18/2023 0949)  Absence of seizures: Monitor for seizure activity. If seizure occurs, document type and location of movements and any associated apnea  Goal: Remains free of injury related to seizures activity  Outcome: Progressing     Problem: Decision Making  Goal: Pt/Family able to effectively weigh alternatives and participate in decision making related to treatment and care  Description: INTERVENTIONS:  1. Determine when there are differences between patient's view, family's view, and healthcare provider's view of condition  2. Facilitate patient and family articulation of goals for care  3. Help patient and family identify pros/cons of alternative solutions  4. Provide information as requested by patient/family  5. Respect patient/family right to receive or not to receive information  6. Serve as a liaison between patient and family and health care team  7.  Initiate Consults from Ethics, Palliative Care or initiate [facility] as is appropriate  Outcome: Progressing     Problem: Musculoskeletal - Adult  Goal: Return mobility to safest level of function  Outcome: Progressing  Goal: Maintain proper alignment of affected body part  Outcome: Progressing  Goal: Return ADL status to a safe level of function  Outcome: Progressing     Problem: Genitourinary - Adult  Goal: Absence of urinary retention  Outcome: Progressing  Goal: Urinary catheter remains patent  Outcome: Progressing

## 2023-02-18 NOTE — PROGRESS NOTES
Staff assist call light pressed by sitter. Writer went into room and witnessed patient covering her mouth with her hands and sitter backed into corner behind her. Patient had gotten into sensory bucket and broke a toy and placed piece into mouth. Patient was placed into bed and writer with 3 other nurses encouraged patient to remove piece from mouth. Attempted to use suction to remove. Patient began thrashing around in bed while attempting to remove piece. Patient began grabbing writer and PCA attempting to push off of bed. Patient eventually removed piece from mouth and gave to Nevarez PCA. Nevarez placed piece in sensory bucket. Reinforced to patient that it is inappropriate to become physically aggressive with staff. Patients belongings (sensory bucket, chargers, emergency backpack, ect) were removed from room and kept at nurses station at this time. I Pad and patients shoes remain in room. Primary RN notified Dr. Jacquelyn Loredo @2908. Nelli Cintron notified @6677. Dicussed \"stripping\" patients room regarding belongings due to safety concerns. Patients guardian agrees with this and states,\" I would rather her be safe\".

## 2023-02-18 NOTE — PROGRESS NOTES
Called to pt room by sitter regarding seizure-like activity at 60 [phone], event lasted for 2 min. Pt then began responding. Vitals stable. Hamzah Perry MD notified. Called back into room at 1041 for another episode of seizure-like activity. Vitals remain stable. Event lasted for 3 min then pt began to respond. Now resting quietly in bed. 1:1 sitter remains at bedside. Hamzah Perry MD notified of this event as well.  Continue to monitor per MD.

## 2023-02-18 NOTE — ANESTHESIA PRE PROCEDURE
Department of Anesthesiology  Preprocedure Note       Name:  Chiquis Jackson   Age:  33 y.o.  :  1995                                          MRN:  [id number]         Date:  2023      Surgeon: Malika España):  Berto Gould MD    Procedure: Procedure(s):  EGD FOREIGN BODY REMOVAL    Medications prior to admission:   Prior to Admission medications    Medication Sig Start Date End Date Taking? Authorizing Provider   clonazePAM (KLONOPIN) 0.5 MG tablet Take 1 tablet by mouth three times daily. 23   Historical Provider, MD   lacosamide (VIMPAT) 100 MG TABS tablet Take 1 tablet by mouth 2 times daily for 30 days.  Max Daily Amount: 200 mg 23  Taqueria Samayoa MD   lamoTRIgine (LAMICTAL) 200 MG tablet Take 1 tablet by mouth daily 23   Taqueria Samayoa MD   levETIRAcetam (KEPPRA) 750 MG tablet Take 2 tablets by mouth 2 times daily 23  Taqueria Samayoa MD   phenytoin (DILANTIN) 100 MG ER capsule Take 1 capsule by mouth 3 times daily 23  Taqueria Samayoa MD   ferrous sulfate (IRON 325) 325 (65 Fe) MG tablet Take 1 tablet by mouth 2 times daily (with meals) 23   Taqueria Samayoa MD   FLUoxetine (PROZAC) 40 MG capsule Take 80 mg by mouth daily    Historical Provider, MD       Current medications:    Current Facility-Administered Medications   Medication Dose Route Frequency Provider Last Rate Last Admin    sodium chloride flush 0.9 % injection 5-40 mL  5-40 mL IntraVENous 2 times per day Zelda Davis MD   5 mL at 23 0940    sodium chloride flush 0.9 % injection 5-40 mL  5-40 mL IntraVENous PRN Zelda Davis MD        0.9 % sodium chloride infusion   IntraVENous PRN Zelda Davis MD        enoxaparin (LOVENOX) injection 40 mg  40 mg SubCUTAneous Daily Zelda Davis MD   40 mg at 23 0940    ondansetron (ZOFRAN-ODT) disintegrating tablet 4 mg  4 mg Oral Q8H PRN Zelda Davis MD        Or    ondansetron [facility] injection 4 mg  4 mg IntraVENous Q6H PRN Poly Avila MD        acetaminophen (TYLENOL) tablet 650 mg  650 mg Oral Q4H PRN Poly Avila MD        Or    acetaminophen (TYLENOL) suppository 650 mg  650 mg Rectal Q4H PRN Poly Avila MD        potassium chloride (KLOR-CON M) extended release tablet 40 mEq  40 mEq Oral PRN Poly Avial MD        Or    potassium bicarb-citric acid (EFFER-K) effervescent tablet 40 mEq  40 mEq Oral PRN Poly Avila MD        Or    potassium chloride 10 mEq/100 mL IVPB (Peripheral Line)  10 mEq IntraVENous PRN Poly Avila MD        magnesium sulfate 2000 mg in 50 mL IVPB premix  2,000 mg IntraVENous PRN Poly Avila MD        sodium phosphate 22.5 mmol in sodium chloride 0.9 % 250 mL IVPB  0.32 mmol/kg IntraVENous PRN Poly Avila MD        polyethylene glycol [facility]) packet 17 g  17 g Oral Daily PRN Poly Avila MD        melatonin disintegrating tablet 5 mg  5 mg Oral Nightly PRN Poly Avila MD        calcium carbonate (TUMS) chewable tablet 500 mg  500 mg Oral TID PRN Poly Avila MD        levETIRAcetam (KEPPRA) tablet 1,500 mg  1,500 mg Oral BID Aris Wray MD   1,500 mg at 02/17/23 0940    lamoTRIgine (LAMICTAL) tablet 200 mg  200 mg Oral Daily Aris Wray MD   200 mg at 02/17/23 0940    lacosamide (VIMPAT) tablet 100 mg  100 mg Oral BID Aris Wray MD   100 mg at 02/17/23 0940    phenytoin (DILANTIN) ER capsule 100 mg  100 mg Oral BID Aris Wray MD   100 mg at 02/17/23 3852    clonazePAM (KLONOPIN) tablet 0.5 mg  0.5 mg Oral Q12H Aris Wray MD   0.5 mg at 02/17/23 6980    ferrous sulfate (IRON 325) tablet 325 mg  325 mg Oral BID  Poly Avila MD   325 mg at 02/17/23 1718    FLUoxetine (PROZAC) capsule 80 mg  80 mg Oral Daily Poly Avila MD   80 mg at 02/17/23 0940    ascorbic acid (VITAMIN C) tablet 500 mg  500 mg Oral BID with meals Poly Avila MD   500 mg at 02/17/23 9028 Allergies:  No Known Allergies    Problem List:    Patient Active Problem List   Diagnosis Code    Status epilepticus ([facility] [address] G40.901    Acne L70.9    Active autistic disorder F84.0    Attention deficit disorder F98.8    Anxiety disorder F41.9    Atypical squamous cells of undetermined significance (ASCUS) on Papanicolaou smear of cervix R87.610    Dysmenorrhea N94.6    Hypokalemia E87.6    Sensory processing difficulty F88    Seizure-like activity (HCC) R56.9    Intractable seizure disorder (HCC) G40.919    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 9XXA       Past Medical History:        Diagnosis Date    Anxiety     Autism     Depression     Epilepsy ([facility] [address]     Seizures ([facility] [address]        Past Surgical History:        Procedure Laterality Date     SECTION      CHOLECYSTECTOMY         Social History:    Social History     Tobacco Use    Smoking status: Never    Smokeless tobacco: Never   Substance Use Topics    Alcohol use: Never                                Counseling given: Not Answered      Vital Signs (Current):   Vitals:    23 0531 23 0837 23 2134 23 0209   BP: 110/70 120/72 129/78 110/68   Pulse: 77 95 89 76   Resp: 18  18   Temp: 97.9 °F (36.6 °C) 99.1 °F (37.3 °C) 98.4 °F (36.9 °C) 97.3 °F (36.3 °C)   TempSrc: Temporal Temporal Temporal Temporal   SpO2: 97% 97% 97% 96%   Weight:       Height:                                                  BP Readings from Last 3 Encounters:   23 110/68   23 118/66   23 114/74       NPO Status:                                                                                 BMI:   Wt Readings from Last 3 Encounters:   23 155 lb (70.3 kg)   23 155 lb (70.3 kg)   23 155 lb (70.3 kg)     Body mass index is 25.79 kg/m².     CBC:   Lab Results   Component Value Date/Time    WBC 8.9 2023 07:52 PM    RBC 4.85 2023 07:52 PM    HGB 13.1 02/16/2023 07:52 PM    HCT 41.7 02/16/2023 07:52 PM    MCV 86.0 02/16/2023 07:52 PM    RDW 20.4 02/16/2023 07:52 PM     02/16/2023 07:52 PM       CMP:   Lab Results   Component Value Date/Time     02/16/2023 07:52 PM    K 4.1 02/16/2023 07:52 PM    K 3.8 01/12/2023 02:05 AM     02/16/2023 07:52 PM    CO2 22 02/16/2023 07:52 PM    BUN 7 02/16/2023 07:52 PM    CREATININE 0.6 02/16/2023 07:52 PM    LABGLOM >60 02/16/2023 07:52 PM    GLUCOSE 97 02/16/2023 07:52 PM    PROT 7.1 02/16/2023 07:52 PM    CALCIUM 9.4 02/16/2023 07:52 PM    BILITOT <0.2 02/16/2023 07:52 PM    ALKPHOS 90 02/16/2023 07:52 PM    AST 15 02/16/2023 07:52 PM    ALT 17 02/16/2023 07:52 PM       POC Tests: No results for input(s): POCGLU, POCNA, POCK, POCCL, POCBUN, POCHEMO, POCHCT in the last 72 hours.     Coags: No results found for: PROTIME, INR, APTT    HCG (If Applicable): No results found for: PREGTESTUR, PREGSERUM, HCG, HCGQUANT     ABGs:   Lab Results   Component Value Date/Time    PHART 7.430 01/13/2023 02:16 PM    PO2ART 139.0 01/13/2023 02:16 PM    XRM1WJH 39.0 01/13/2023 02:16 PM    QTZ7YAT 25.9 01/13/2023 02:16 PM    BEART 1.5 01/13/2023 02:16 PM    J5RKTNSL 97.0 01/13/2023 02:16 PM        Type & Screen (If Applicable):  No results found for: LABABO, LABRH    Drug/Infectious Status (If Applicable):  No results found for: HIV, HEPCAB    COVID-19 Screening (If Applicable):   Lab Results   Component Value Date/Time    COVID19 Not Detected 02/16/2023 09:52 PM           Anesthesia Evaluation  Patient summary reviewed and Nursing notes reviewed no history of anesthetic complications:   Airway: Mallampati: Unable to assess / NA          Dental:          Pulmonary:normal exam        (-) not a current smoker                           Cardiovascular:Negative CV ROS                      Neuro/Psych:   (+) seizures:, psychiatric history: stable with treatmentdepression/anxiety              ROS comment: Nonverbal, autistic GI/Hepatic/Renal: Neg GI/Hepatic/Renal ROS            Endo/Other: Negative Endo/Other ROS                    Abdominal:             Vascular: Other Findings:           Anesthesia Plan      general     ASA 3 - emergent       Induction: intravenous. Anesthetic plan and risks discussed with patient.                         Car Seth MD   2/18/2023

## 2023-02-18 NOTE — PROGRESS NOTES
[facility] Progress Note    Patient:  Kelli Emmanuel  YOB: 1995  Date of Service: 2/18/2023  MRN: [id number]   Acct: [de-identified]   Primary Care Physician: Dickson Sharma  Advance Directive: Full Code  Admit Date: 2/16/2023       Hospital Day: 0        CHIEF COMPLAINT:     Chief Complaint   Patient presents with    Seizures     2 seizures prior to EMS arrival 4 seizures with EMS       2/18/2023 11:41 AM  Subjective / Interval History:   02/18/2023  Patient seen and examined this AM.  Laying comfortably in bed in no acute distress. No new complaints. One-to-one sitter at bedside for safety. Patient reportedly with another episode of seizures lasting about 5 minutes, with heart rates rising up to 150s yesterday. Patient reportedly with agitation and aggressiveness around 22:20 PM yesterday, requiring Geodon as well as diphenhydramine IM. Patient was reportedly swallowed some foreign bodies (2 disc batteries). GI subsequently consulted for foreign body removal via endoscopy. 02/17/2023  No acute changes or acute overnight event reported. Patient seen and examined. No new complaints. No further seizure activities at this time. Denies any dizziness, lightheadedness or palpitations. Endorses some nausea but no vomiting. Laying comfortably in bed in no acute distress. Review of Systems:   Review of Systems  ROS: 14 point review of systems is negative except as specifically addressed above. ADULT DIET;  Regular  No intake or output data in the 24 hours ending 02/18/23 1141      Medications:   sodium chloride      sodium chloride       Current Facility-Administered Medications   Medication Dose Route Frequency Provider Last Rate Last Admin    sodium chloride flush 0.9 % injection 5-40 mL  5-40 mL IntraVENous 2 times per day Cheryl Diggs MD        sodium chloride flush 0.9 % injection 5-40 mL  5-40 mL IntraVENous PRN Cheryl Diggs MD        0.9 % sodium chloride infusion   IntraVENous PRN Christophe Villa MD        HYDROmorphone HCl PF (DILAUDID) injection 0.25 mg  0.25 mg IntraVENous Q5 Min PRN Christophe Villa MD        HYDROmorphone HCl PF (DILAUDID) injection 0.5 mg  0.5 mg IntraVENous Q5 Min PRN Christophe Villa MD        metoclopramide (REGLAN) injection 10 mg  10 mg IntraVENous Once PRN Christophe Villa MD        diphenhydrAMINE (BENADRYL) injection 12.5 mg  12.5 mg IntraVENous Once PRN Christophe Villa MD        sodium chloride flush 0.9 % injection 5-40 mL  5-40 mL IntraVENous 2 times per day William Bowling MD   10 mL at 02/18/23 0945    sodium chloride flush 0.9 % injection 5-40 mL  5-40 mL IntraVENous PRN William Bowling MD        0.9 % sodium chloride infusion   IntraVENous PRN William Bowling MD        enoxaparin (LOVENOX) injection 40 mg  40 mg SubCUTAneous Daily William Bowling MD   40 mg at 02/18/23 0945    ondansetron (ZOFRAN-ODT) disintegrating tablet 4 mg  4 mg Oral Q8H PRN William Bowling MD        Or    ondansetron [facility]) injection 4 mg  4 mg IntraVENous Q6H PRN William Bowling MD        acetaminophen (TYLENOL) tablet 650 mg  650 mg Oral Q4H PRN William Bowling MD        Or    acetaminophen (TYLENOL) suppository 650 mg  650 mg Rectal Q4H PRN William Bowling MD        potassium chloride (KLOR-CON M) extended release tablet 40 mEq  40 mEq Oral PRN William Bowling MD        Or    potassium bicarb-citric acid (EFFER-K) effervescent tablet 40 mEq  40 mEq Oral PRN William Bowling MD        Or    potassium chloride 10 mEq/100 mL IVPB (Peripheral Line)  10 mEq IntraVENous PRN William Bowling MD        magnesium sulfate 2000 mg in 50 mL IVPB premix  2,000 mg IntraVENous PRN William Bowling MD        sodium phosphate 22.5 mmol in sodium chloride 0.9 % 250 mL IVPB  0.32 mmol/kg IntraVENous PRN William Bowling MD        polyethylene glycol (GLYCOLAX) packet 17 g  17 g Oral Daily PRN William Bowling MD        melatonin disintegrating tablet 5 mg  5 mg Oral Nightly PRN Renetta Raymundo MD        calcium carbonate (TUMS) chewable tablet 500 mg  500 mg Oral TID PRN Renetta Raymundo MD        levETIRAcetam (KEPPRA) tablet 1,500 mg  1,500 mg Oral BID Castillo Hurtado MD   1,500 mg at 02/18/23 0944    lamoTRIgine (LAMICTAL) tablet 200 mg  200 mg Oral Daily Castillo Hurtado MD   200 mg at 02/18/23 0945    lacosamide (VIMPAT) tablet 100 mg  100 mg Oral BID Castillo Hurtado MD   100 mg at 02/18/23 0944    phenytoin (DILANTIN) ER capsule 100 mg  100 mg Oral BID Castillo Hurtado MD   100 mg at 02/18/23 0944    clonazePAM (KLONOPIN) tablet 0.5 mg  0.5 mg Oral Q12H Castillo Hurtado MD   0.5 mg at 02/18/23 0944    ferrous sulfate (IRON 325) tablet 325 mg  325 mg Oral BID  Renetta Raymundo MD   325 mg at 02/18/23 0945    FLUoxetine (PROZAC) capsule 80 mg  80 mg Oral Daily Renetta Raymundo MD   80 mg at 02/18/23 4984    ascorbic acid (VITAMIN C) tablet 500 mg  500 mg Oral BID with meals Renetta Raymundo MD   500 mg at 02/18/23 0944         sodium chloride      sodium chloride        sodium chloride flush  5-40 mL IntraVENous 2 times per day    sodium chloride flush  5-40 mL IntraVENous 2 times per day    enoxaparin  40 mg SubCUTAneous Daily    levETIRAcetam  1,500 mg Oral BID    lamoTRIgine  200 mg Oral Daily    lacosamide  100 mg Oral BID    phenytoin  100 mg Oral BID    clonazePAM  0.5 mg Oral Q12H    ferrous sulfate  325 mg Oral BID     FLUoxetine  80 mg Oral Daily    ascorbic acid  500 mg Oral BID with meals     sodium chloride flush, sodium chloride, HYDROmorphone, HYDROmorphone, metoclopramide, diphenhydrAMINE, sodium chloride flush, sodium chloride, ondansetron **OR** ondansetron, acetaminophen **OR** acetaminophen, potassium chloride **OR** potassium alternative oral replacement **OR** potassium chloride, magnesium sulfate, sodium phosphate IVPB, polyethylene glycol, melatonin, calcium carbonate  ADULT DIET;  Regular       Labs:   CBC with DIFF:  Recent Labs 02/16/23 1952 02/18/23 0419   WBC 8.9 7.8   RBC 4.85 4.44   HGB 13.1 12.3   HCT 41.7 38.3   MCV 86.0 86.3   MCH 27.0 27.7   MCHC 31.4* 32.1*   RDW 20.4* 20.1*    177   MPV 11.7 11.9   NEUTOPHILPCT 69.5* 60.1   LYMPHOPCT 21.9 30.2   MONOPCT 7.5 8.5   EOSRELPCT 0.2 0.4   BASOPCT 0.7 0.5   NEUTROABS 6.2 4.7   LYMPHSABS 2.0 2.4   MONOSABS 0.70 0.70   EOSABS 0.00 0.00   BASOSABS 0.10 0.00       CMP/BMP:  Recent Labs     02/16/23 1952 02/18/23 0419   * 139   K 4.1 3.7    104   CO2 22 23   ANIONGAP 11 12   GLUCOSE 97 84   BUN 7 6   CREATININE 0.6 0.7   LABGLOM >60 >60   CALCIUM 9.4 8.7   PROT 7.1  --    LABALBU 4.2  --    BILITOT <0.2  --    ALKPHOS 90  --    ALT 17  --    AST 15  --          CRP:  No results for input(s): CRP in the last 72 hours. Sed Rate:  No results for input(s): SEDRATE in the last 72 hours. HgBA1c:  No components found for: HGBA1C  FLP:  No results found for: TRIG, HDL, LDLCALC, LDLDIRECT, LABVLDL  TSH:    Lab Results   Component Value Date/Time    TSH 1.340 01/16/2023 06:17 PM     Troponin T:   Recent Labs     02/16/23  2015   Ethan Lizarraga <0.01     Pro-BNP: No results for input(s): BNP in the last 72 hours. INR: No results for input(s): INR in the last 72 hours. ABGs:   Lab Results   Component Value Date/Time    PHART 7.430 01/13/2023 02:16 PM    PO2ART 139.0 01/13/2023 02:16 PM    RWS2ZDW 39.0 01/13/2023 02:16 PM     UA:  Recent Labs     02/16/23  2145   COLORU YELLOW   PHUR 7.5   WBCUA 0   RBCUA 1   BACTERIA NEGATIVE*   CLARITYU Clear   SPECGRAV 1.007   LEUKOCYTESUR Negative   UROBILINOGEN 0.2   BILIRUBINUR Negative   BLOODU Negative   GLUCOSEU Negative         Culture Results:    No results for input(s): CXSURG in the last 720 hours. Blood Culture Recent: No results for input(s): BC in the last 720 hours. No results for input(s): BC, BLOODCULT2, ORG in the last 72 hours. Cultures:   No results for input(s): CULTURE in the last 72 hours.   No results for input(s): BC, BLOODCULT2 in the last 72 hours. No results for input(s): CXSURG in the last 72 hours. Recent Labs     02/16/23 2015   MG 2.2     Recent Labs     02/16/23 1952   AST 15   ALT 17   BILITOT <0.2   ALKPHOS 90         RAD:   CT Head WO Contrast    Result Date: 2/17/2023  Exam: Noncontrast CT of the brain Technique: Axial noncontrast images were obtained from the skull base to the vertex. Coronal and sagittal reconstructions were performed. Radiation dose optimization technique was utilized. Clinical information: Seizure Comparison: Noncontrast CT of the brain performed on January 16, 2023 Findings: No evidence of acute hemorrhage, mass, mass effect, or midline shift. The ventricular system and basal cisterns are within normal limits for the patients age. Gray-white differentiation is maintained. There is no evidence of an intra-axial or extra-axial fluid collection. Bone windows reveal no evidence of acute fracture. The visualized paranasal sinuses are within normal limits. The orbits are grossly unremarkable. No acute intracranial process evident on noncontrast CT of the brain. XR CHEST PORTABLE    Result Date: 2/17/2023  CLINICAL HISTORY: Seizure TECHNIQUE: Single AP view of the chest COMPARISON: CXR from 1/10/2023 FINDINGS: Lines and tubes: None. Cardiomediastinal: Normal size and configuration of the cardiomediastinal silhouette. No pneumomediastinum. Lungs and pleura: The lungs are grossly unremarkable. No pleural effusion. No pneumothorax. Musculoskeletal: No acute osseous abnormalities. Other: None. No acute cardiopulmonary process identified.           Objective:   Vitals:   BP (!) 141/74   Pulse (!) 105   Temp 97.7 °F (36.5 °C) (Temporal)   Resp 17   Ht 5' 5\" (1.651 m)   Wt 155 lb (70.3 kg)   SpO2 99%   BMI 25.79 kg/m²     Patient Vitals for the past 24 hrs:   BP Temp Temp src Pulse Resp SpO2   02/18/23 1046 (!) 141/74 97.7 °F (36.5 °C) Temporal (!) 105 -- 99 %   02/18/23 1029 105/65 -- -- (!) 101 17 97 %   02/18/23 0818 97/63 97.5 °F (36.4 °C) Temporal 75 -- 97 %   02/18/23 0649 -- -- -- -- -- 98 %   02/18/23 0511 (!) 102/54 97.7 °F (36.5 °C) Temporal 66 18 98 %   02/18/23 0426 114/67 97.3 °F (36.3 °C) -- 77 16 99 %   02/18/23 0405 -- -- -- 72 -- --   02/18/23 0355 93/63 -- -- 76 17 90 %   02/18/23 0354 93/63 97.4 °F (36.3 °C) -- 72 17 90 %   02/18/23 0350 96/62 97.4 °F (36.3 °C) Temporal 70 14 90 %   02/18/23 0209 110/68 97.3 °F (36.3 °C) Temporal 76 18 96 %   02/17/23 2134 129/78 98.4 °F (36.9 °C) Temporal 89 18 97 %       24HR INTAKE/OUTPUT:  No intake or output data in the 24 hours ending 02/18/23 1141      Physical Exam  Vitals and nursing note reviewed. Constitutional:       General: She is not in acute distress. Appearance: Normal appearance. She is not ill-appearing, toxic-appearing or diaphoretic. HENT:      Head: Normocephalic and atraumatic. Right Ear: External ear normal.      Left Ear: External ear normal.      Nose: Nose normal. No congestion or rhinorrhea. Mouth/Throat:      Mouth: Mucous membranes are moist.      Pharynx: Oropharynx is clear. No oropharyngeal exudate or posterior oropharyngeal erythema. Eyes:      General: No scleral icterus. Right eye: No discharge. Left eye: No discharge. Extraocular Movements: Extraocular movements intact. Conjunctiva/sclera: Conjunctivae normal.      Pupils: Pupils are equal, round, and reactive to light. Cardiovascular:      Rate and Rhythm: Normal rate and regular rhythm. Pulses: Normal pulses. Heart sounds: Normal heart sounds. No murmur heard. No friction rub. No gallop. Pulmonary:      Effort: Pulmonary effort is normal. No respiratory distress. Breath sounds: Normal breath sounds. No stridor. No wheezing, rhonchi or rales. Chest:      Chest wall: No tenderness. Abdominal:      General: Bowel sounds are normal. There is no distension.       Palpations: Abdomen is soft.      Tenderness: There is no abdominal tenderness. There is no guarding or rebound. Musculoskeletal:         General: No swelling, tenderness, deformity or signs of injury. Normal range of motion. Cervical back: Normal range of motion and neck supple. No rigidity. No muscular tenderness. Right lower leg: No edema. Left lower leg: No edema. Skin:     General: Skin is warm and dry. Capillary Refill: Capillary refill takes less than 2 seconds. Coloration: Skin is not jaundiced or pale. Findings: No bruising, erythema, lesion or rash. Neurological:      General: No focal deficit present. Mental Status: She is alert and oriented to person, place, and time. Cranial Nerves: No cranial nerve deficit. Sensory: No sensory deficit. Motor: No weakness. Coordination: Coordination normal.   Psychiatric:         Mood and Affect: Mood normal.         Behavior: Behavior normal.         Thought Content:  Thought content normal.         Judgment: Judgment normal.         Assessment/plan:     Patient Active Problem List    Diagnosis Date Noted    Gastric erosion 02/18/2023     Priority: Medium    Duodenal erosion 02/18/2023     Priority: Medium    Intractable seizure disorder ([facility] [address] 02/17/2023     Priority: Medium    Chronic static encephalopathy 02/17/2023     Priority: Medium    Autism 02/17/2023     Priority: Medium    Seizure-like activity ([facility] [address] 02/16/2023     Priority: Medium    Acne 02/14/2023     Priority: Medium    Attention deficit disorder 02/14/2023     Priority: Medium    Atypical squamous cells of undetermined significance (ASCUS) on Papanicolaou smear of cervix 02/14/2023     Priority: Medium    Dysmenorrhea 02/14/2023     Priority: Medium    Sensory processing difficulty 02/14/2023     Priority: Medium    Active autistic disorder 01/26/2023     Priority: Medium    Anxiety disorder 01/26/2023     Priority: Medium    Hypokalemia 01/26/2023     Priority: Medium    Status epilepticus ([address] 01/06/2023     Priority: Medium    Swallowed foreign body 02/16/2023     Added automatically from request for surgery [id number]         Hospital Problems             Last Modified POA    * (Principal) Seizure-like activity ([address] 2/16/2023 Yes    Intractable seizure disorder ([address] 2/17/2023 Yes    Chronic static encephalopathy 2/17/2023 Yes    Autism 2/17/2023 Yes    Gastric erosion 2/18/2023 Yes    Duodenal erosion 2/18/2023 Yes    Swallowed foreign body 2/18/2023 Unknown    Overview Signed 2/18/2023  2:27 AM by Parveen Simental MD     Added automatically from request for surgery [id number]          Principal Problem:    Seizure-like activity ([address]  Active Problems:    Intractable seizure disorder ([address]    Chronic static encephalopathy    Autism    Gastric erosion    Duodenal erosion    Swallowed foreign body  Resolved Problems:    * No resolved hospital problems. *          Brief History/Summary:    As per Initial admission HPI 2/16/2023, quoted below;  \"Mrs. Joan Weathers is a pleasant 33year old lady from home. She follows with Dr Arnulfo Berry. She has had some of her medications decreased/tapered recently. She has a history of epilepsy with onset at age 22 years. She had family present at the bedside. The phenytoin and Vimpat were being tapered. She has had multiple seizures tonight here in the hospital. She had reportedly had but a single seizure after her last visit (end of January) prior to her medications being reduced last week. She also has a history of Autism and is at times mute, but will still freely answer with head gestures. \"      Seizures  Agitation and aggressiveness  Suspected pseudoseizures  Seizure precautions  Aspiration precautions  Neuro check every 4 hours  Neurology consulted on admission  Normal awake and asleep EEG (02/17/2023)  Deferred antiepileptic regiment to neurology if indicated. Autism   Foreign body ingestion - ?   Intentional versus unintentional  MATT (02/18/2023): Impression: There are 2 round metallic foreign bodies in the small bowel of the left mid abdomen  GI consulted for removal of battery (02/18/2023)  One-to-one sitter at bedside for safety  Psych consult        Continue management of other chronic medical conditions - see above and orders. Advance Directive: Full Code    ADULT DIET; Regular         Consults Made:   IP CONSULT TO NEUROLOGY  IP CONSULT TO GI  IP CONSULT TO CASE MANAGEMENT  IP CONSULT TO PSYCHIATRY      DVT prophylaxis: Enoxaparin        Discharge planning:   Continue current management    Time Spent is  35 mins  in the examination, evaluation, counseling and review of medications, assessment and plan.      Electronically signed by   Leah Ingram MD, MPH, MD,   Internal Medicine Hospitalist   2/18/2023 11:41 AM

## 2023-02-18 NOTE — PROCEDURES
[facility]  Neurophysiology Department  [address] [place] [address]  Phone [phone]  Fax [phone]     ELECTROENCEPHALOGRAM  Patient Data  Patient Name Narcisa Roa Referring Provider Alberto Rosales M.D. Account Number [de-identified] Interpreting physician GIO Cage 1995 Technologist Sixto Bone   Age 27 Test Electroencephalography   Indications for the test intractable seizures Date of test 2/17/2023       HISTORY:   Narcisa Roa is a 33year old woman with a history of seizures who has had intractable seizures. SUMMARY OF THE RECORDING:   A symmetrical background rhythm of 11 Hertz was present in the posterior regions. This attenuates with eye opening. Occasional beta activity is noted. During the recording, the patient becomes drowsy and sleep is attained. Photic stimulation does not elicit a driving response. No lateralizing or epileptiform abnormalities were identified. IMPRESSION:   Normal awake and asleep EEG.                              Alberto Rosales M.D.

## 2023-02-18 NOTE — CONSULTS
Pt Name: Kami Rodríguez  MRN: [id number]  [id number]  YOB: 1995  Admit Date: 2/16/2023  7:37 PM  Date of evaluation: 2/18/2023  Primary Care Physician: Pantera Daley   [id number]       Requesting Provider: Hospitalist service. GI Consult chaperones patient's nurse Tova. Reason for consult: Swallowed 2 batteries      Assessment:    Patient swallowed 2 batteries. Batteries are in the stomach on the CAT scan and need to be removed emergently. On enoxaparin. Last dose yesterday at 9:40 AM.    Plan:    EGD  and removal of batteries from the stomach. Patient unable to give consent. Telephone consent obtained from patient's mother and caretaker. The mother would like us to proceed with EGD and removal of the batteries from the stomach. Opportunity is given to patient's mother to ask questions. The question that she asked were answered. She does not have any further questions and she would allow us to proceed with EGD and removal of varices from the stomach. GI History:    The patient is a 33 y.o. female. Patient swallowed 2 batteries. On CT scan batteries on the stomach and need to be removed emergently. I have contacted, house supervisor, Susie Altman to arrange endoscopy team and anesthesia. The patient  swallowed 2 flashlight batteries around 10:10 PM.  CT scan reveals the batteries are in the stomach. Patient has a history of autism. She is sedated. She is noncommunicative. She does not voice any complaints. She received Geodon, Phenergan and Benadryl. Last EGD: Unable to find any EGD report in chart. Last Colonoscopy: Unable to find an EGD report in the chart. 2/16/2023: Sodium 135. Other electrolytes and kidney function normal.  LFTs normal.    2/16/2023: Urine toxicology screen positive for barbiturates, benzodiazepines and cannabinoids.     1/16/2023: Urine toxicology screen positive for benzodiazepines, cannabinoids, opiates and tricyclics. 2023: WBC 8.9, hemoglobin 13.1, MCV 86, platelet count 945,040.    2023: CT head nothing acute. 2023: CT neck: Finding concerning for bronchitis. 2023: CT chest without contrast: No acute findings. 2023: CT abdomen and pelvis without IV contrast: 2 metallic foreign bodies within the stomach. Status post cholecystectomy. 2022: Hospitalist HPI: Chief complaint seizures. EP signed out: Known seizures. Got worse from EMS. Had 5 mg of Ativan. Had another event in ED. Looked more typical with tongue biting. HPI: 81-year-old lady from home.,  Other medications decrease/tapered recently. History of epilepsy at age 22. Family at bedside. Review of systems positive for palpitations sometimes. Current and plan was pseudoseizures. Chronic medical problems. Plan was observation. Diagnosis Date    Anxiety     Autism     Depression     Epilepsy ([facility] [address]     Seizures ([facility] [address]        Past Surgical History:            Procedure Laterality Date     SECTION      CHOLECYSTECTOMY           Allergies:  Patient has no known allergies. Prior to Admission medications    Medication Sig Start Date End Date Taking? Authorizing Provider   clonazePAM (KLONOPIN) 0.5 MG tablet Take 1 tablet by mouth three times daily. 23   Historical Provider, MD   lacosamide (VIMPAT) 100 MG TABS tablet Take 1 tablet by mouth 2 times daily for 30 days.  Max Daily Amount: 200 mg 23  Tong Turner MD   lamoTRIgine (LAMICTAL) 200 MG tablet Take 1 tablet by mouth daily 23   Tong Turner MD   levETIRAcetam (KEPPRA) 750 MG tablet Take 2 tablets by mouth 2 times daily 23  Tong Turner MD   phenytoin (DILANTIN) 100 MG ER capsule Take 1 capsule by mouth 3 times daily 23  Tong Turner MD   ferrous sulfate (IRON 325) 325 (65 Fe) MG tablet Take 1 tablet by mouth 2 times daily (with meals) 1/14/23   Alejandro Acosta MD   FLUoxetine (PROZAC) 40 MG capsule Take 80 mg by mouth daily    Historical Provider, MD   As above. Current Meds:           sodium chloride flush  5-40 mL IntraVENous 2 times per day    enoxaparin  40 mg SubCUTAneous Daily    levETIRAcetam  1,500 mg Oral BID    lamoTRIgine  200 mg Oral Daily    lacosamide  100 mg Oral BID    phenytoin  100 mg Oral BID    clonazePAM  0.5 mg Oral Q12H    ferrous sulfate  325 mg Oral BID WC    FLUoxetine  80 mg Oral Daily    ascorbic acid  500 mg Oral BID with meals   As above.   Enoxaparin.       sodium chloride           PRN Meds:      sodium chloride flush, sodium chloride, ondansetron **OR** ondansetron, acetaminophen **OR** acetaminophen, potassium chloride **OR** potassium alternative oral replacement **OR** potassium chloride, magnesium sulfate, sodium phosphate IVPB, polyethylene glycol, melatonin, calcium carbonate      Social History     Socioeconomic History    Marital status:      Spouse name: Not on file    Number of children: Not on file    Years of education: Not on file    Highest education level: Not on file   Occupational History    Not on file   Tobacco Use    Smoking status: Never    Smokeless tobacco: Never   Vaping Use    Vaping Use: Never used   Substance and Sexual Activity    Alcohol use: Never    Drug use: Yes     Types: Marijuana Sadaf Nuno     Comment: [employer] 8    Sexual activity: Yes   Other Topics Concern    Not on file   Social History Narrative    Not on file     Social Determinants of Health     Financial Resource Strain: Not on file   Food Insecurity: Not on file   Transportation Needs: Not on file   Physical Activity: Not on file   Stress: Not on file   Social Connections: Not on file   Intimate Partner Violence: Not on file   Housing Stability: Not on file         Family History   Problem Relation Age of Onset    Diabetes Mother     Migraines Mother     Alcohol Abuse Father     Drug Abuse Father     Migraines Brother     Other Son         hydrocephalus    Autism Son     Autism Son        ROS:     A pertinent review of all body system was done. Review of systems is as per HPI. Rest of the review of systems either negative, not pertinent or patient was unable to provide review of systems. Physical Exam:      Vitals:    02/17/23 0106 02/17/23 0531 02/17/23 0837 02/17/23 2134   BP: 103/70 110/70 120/72 129/78   Pulse: 92 77 95 89   Resp: 18 18 18 18   Temp: 97.2 °F (36.2 °C) 97.9 °F (36.6 °C) 99.1 °F (37.3 °C) 98.4 °F (36.9 °C)   TempSrc: Temporal Temporal Temporal Temporal   SpO2: 98% 97% 97% 97%   Weight:       Height:             General appearance: Sedated. Opens her eyes. In no acute physical distress. Head: normocephalic. No jaundice. Neck: Supple. Abdomen: Soft. Nontender. No gross organomegaly. No gross masses. No ascites. Extremities: No leg edema. Skin: No jaundice. Neurologic: Opens her eyes. Noncommunicative. Unable to cooperate with neuro exam.    Labs:       Recent Labs     02/16/23 1952   WBC 8.9   RBC 4.85   HGB 13.1   HCT 41.7   MCV 86.0   MCH 27.0   MCHC 31.4*          @LABRCNT(NA:3,K:3,ANIONGAP:3,CL:3,CO2:3,BUN:3,CREATININE:3,GLUCOSE:3,    GFR:3,CALCIUM:3)@    Recent Labs     02/16/23 2015   MG 2.2       Recent Labs     02/16/23 1952   AST 15   ALT 17   BILITOT <0.2   ALKPHOS 90       HgBA1c:  No components found for: HGBA1C    FLP:  No results found for: TRIG, HDL, LDLCALC, LDLDIRECT, LABVLDL    TSH:    Lab Results   Component Value Date/Time    TSH 1.340 01/16/2023 06:17 PM       Troponin T:   Recent Labs     02/16/23 2015   Niecy Torres <0.01       INR: No results for input(s): INR in the last 72 hours. No results for input(s): LIPASE, AMYLASE in the last 72 hours. Radiology:    Reviewed available results of the pertinent imaging studies. EGD consent / shared decision making :    Consent obtained from: Mother. Telephone consent. Mother would like us to proceed with EGD and removal of foreign bodies. Witness (es): Poli Waters RN. Obtained consent in layperson's language, previously and immediately prior to.procedure. Explained the need, benefits, risks, alternatives and limitations of the procedure. Need and benefits: Make diagnosis, provide therapy and plan treatment including feeding into the stomach. Risks: Bleeding, perforation of esophagus, stomach, duodenum, splenic injury / laceration, liver injury / laceration, aspiration pneumonia, respiratory arrest, cardiac arrhythmia, cardiac arrest, death. infection and peritoneum, infection in blood. Tumor seeding: In case of oral, laryngeal, esophageal malignancy and gastric malignancy: The tumor may seed into esophagus, stomach, gastrostomy site in the stomach and gastrostomy site on the skin    Alternatives: Barium swallow, upper GI, SBFT, CT chest, CT abdomen, no testing, wait and watch, colonoscopy, obtain second opinion. Missed diagnosis and limitations: Missed diagnosis, including that of varices, gastric ulcer, duodenal ulcer, cancer. Also inability to complete the procedure due to various reasons-technical including equipment failure and non-technical.    Complications of sedation/analgesia: Including aspiration pneumonia, respiratory arrest, cardiac arrhythmia, cardiac arrest, hypotension, hypertension, paralysis. Opportunity was given for questions. The questions that were asked, were answered. The healthcare client verbalized understanding. Joao Fraser MD  2/18/2023  2:27 AM      Disclaimer speech recognition software       (Please note that portions of this note were completed with a voice recognition program. Efforts were made to edit the dictations but occasionally words are mis-transcribed.)      Thank you for the consultation and allowing me to participate in this patient's care alongside with you!     Joao Fraser MD    2/18/2023

## 2023-02-18 NOTE — PROGRESS NOTES
1601: Called to pt room for seizure-like activity by sitter. This RN, Kaylah BRUMFIELD & Sera PCA to pt room. Pt noted to be laying on side visibly shaking. Per sitter pt was sitting up on side of bed having a conversation when she became silent, slumped over and fell to the ground. RN sitter unaware if pt hit head during fall.   1602: HR and BP obtained. 149/96, 84. Unable to obtain O2 sat at this time due to BUE & BLE contractions. Attempt made to arouse pt but unable at this time.   1604: Pt repositioned, non-rebreather applied at 15L and mouth suctioned. Continue unable to arouse pt.   1606: OCTAVIANO Tobin to room, states will contact Dr. Kumar. Dr. Flores in to consult on pt as well, witnessed seizure-like activity. Ativan 2mg IM ordered. OCTAVIANO Nixon also at bedside for assistance.  1607: OCTAVIANO Tobin administered Ativan 2 mg IM. CARROLL Mcdermott at bedside and contacted House Supervisor for possible unit bed for transfer.   1608: Pt BUE & BLE then began to relax, pt then rolled & transferred back into bed by multiple staff members. First call attempted to attending MD.   1609: Second attempt to attending MD. Pt began to clinch BUE & BLE and visibly shake again. This event lasted approx. 2 min.   1610: Third attempt to attending & overhead page.   1611: OCTAVIANO Tobin received call back from MD José to notify of pt status. Orders obtained to move pt to unit and notify neurology.   1612: Bed obtained in CCU, room 704. CARROLL Mcdermott notified MD Socrates with no new orders. Pt transferred to room 704, bedside report provided.   1647: Pt mother & legal guardian, Brittany, notified of event.

## 2023-02-18 NOTE — PROGRESS NOTES
Patient resting quietly at this time. Respirations even and unlabored. Patient briefly opens eyes to name. Turns self to right side. No s/s of discomfort noted at this time. I-pad with patient and sent to room with her.

## 2023-02-18 NOTE — ANESTHESIA POSTPROCEDURE EVALUATION
Department of Anesthesiology  Postprocedure Note    Patient: Lamar Zambrano  MRN: [id number]  YOB: 1995  Date of evaluation: 2/18/2023      Procedure Summary     Date: 02/18/23 Room / Location: [address]    Anesthesia Start: 0246 Anesthesia Stop: 0140    Procedure: EGD FOREIGN BODY REMOVAL Diagnosis:       Swallowed foreign body, initial encounter      (Ingestion of batteries. Batteries are in the stomach)    Surgeons: Favian Gonzales MD Responsible Provider: Coral Shen MD    Anesthesia Type: general ASA Status: 3 - Emergent          Anesthesia Type: No value filed.     Randy Phase I:      Randy Phase II:        Anesthesia Post Evaluation    Patient location during evaluation: PACU  Patient participation: waiting for patient participation  Level of consciousness: responsive to light touch  Pain score: 0  Airway patency: patent  Nausea & Vomiting: no nausea and no vomiting  Complications: no  Cardiovascular status: blood pressure returned to baseline  Respiratory status: acceptable  Hydration status: stable

## 2023-02-18 NOTE — PROGRESS NOTES
At approximately 2210 this nurse was notified by another staff member that the patient had swallowed 2 small batteries from her flashlight. Dr. Sanaz Harrington was notified. STAT chest x-ray and KUB ordered but unable to complete due to patient refusal. Sitter was ordered and room cleared of all items patient could potentially swallow. A GI consult was placed, and Dr. Dipti Cobb was called. CT of the neck, chest, and abdomen were ordered. This nurse, Chanelle Graves, Dr. Sanaz Harrington and 2 other staff members went to the patient's room and informed the patient she was going to have to get some scans done. Pt shook her head no and refused to cooperate with staff. IM medications were administered to safely take pt to get CT scans. While attempting to calm down pt, she typed on her phone that she \"didn't want to go because it would make Marifer mad. \" She also stated \"he doesn't let her have meds sometimes. \" She also said \"he told her to not talk to us. \" Provider and nursing supervisor were notified and case management consult was ordered.

## 2023-02-18 NOTE — PROGRESS NOTES
[facility] Neurology Progress Note      Patient:   Jurgen Avitia  MR#:    [id number]   Room:    20/520-01   YOB: 1995  Date of Progress Note: 2/18/2023  Time of Note                           1:47 PM  Consulting Physician:  Brooke Watson DO  Attending Physician:  Valeria Mcintosh MD      INTERVAL HISTORY:  Continues to have events, felt non-epileptic, patient swallowed 2 small batteries yesterday    REVIEW OF SYSTEMS:  Constitutional: No fevers No chills  Neck:No stiffness  Respiratory: No shortness of breath  Cardiovascular: No chest pain No palpitations  Gastrointestinal: No abdominal pain    Genitourinary: No Dysuria  Neurological: No headache, no confusion    PHYSICAL EXAM:    Constitutional -   BP (!) 141/74   Pulse (!) 105   Temp 97.7 °F (36.5 °C) (Temporal)   Resp 17   Ht 5' 5\" (1.651 m)   Wt 155 lb (70.3 kg)   SpO2 99%   BMI 25.79 kg/m²   General appearance: No acute distress   EYES -   Conjunctiva normal  Pupillary exam as below, see CN exam in the neurologic exam  ENT-    No scars, masses, or lesions over external nose or ears  Hearing normal bilaterally to finger rub  Neck-   No neck masses noted  Thyroid normal   No jugular vein distension  Cardiovascular -   No clubbing, cyanosis, or edema   Pulmonary-   Good expansion, normal effort without use of accessory muscles  Musculoskeletal -   No significant wasting of muscles noted  Gait as below, see gait exam in the neurologic exam  Muscle strength, tone, stability as below see the motor exam in the neurologic exam.   No bony deformities  Skin -   Warm, dry, and intact to inspection and palpation.     No rash, erythema, or pallor  Psychiatric -   Mood, affect, and behavior appear normal    Memory as below see mental status examination in the neurologic exam      NEUROLOGICAL EXAM    Mental status   [x] Awake, alert, oriented   [x] Affect attention and concentration appear appropriate  [x] Recent and remote memory appears unremarkable  [x] Speech normal without dysarthria or aphasia, comprehension and repetition intact. COMMENTS:   Cranial Nerves [x] No VF deficit to confrontation  [x] PERRLA, EOMI, no nystagmus, conjugate eye movements, no ptosis  [x] Face symmetric  [x] Facial sensation intact  [x] Tongue midline no atrophy or fasciculations present  [x] Palate midline, hearing to finger rub normal  [x] Shoulder shrug and SCM testing normal  COMMENTS:   Motor   [x] 5/5 strength x 4 extremities  [x] Normal bulk and tone  [x] No tremor present  [x] No rigidity or bradykinesia noted  COMMENTS:   Sensory  [x] Sensation intact to light touch, pin prick, vibration, and proprioception BLE  [] Sensation intact to light touch, pin prick, vibration, and proprioception BUE  COMMENTS:   Coordination [x] FTN normal bilaterally   [] HTS normal bilaterally  [] YEVGENIY normal.   COMMENTS:   Reflexes  [x] Symmetric and non-pathological  [] Toes downgoing bilaterally  [] No clonus present  COMMENTS:   Gait                  [] Normal steady gait    [] Ataxic    [] Spastic     [] Magnetic     [] Shuffling  [x] Not assessed  COMMENTS:       LABS/IMAGING:    CT Head WO Contrast    Result Date: 2/17/2023  Exam: Noncontrast CT of the brain Technique: Axial noncontrast images were obtained from the skull base to the vertex. Coronal and sagittal reconstructions were performed. Radiation dose optimization technique was utilized. Clinical information: Seizure Comparison: Noncontrast CT of the brain performed on January 16, 2023 Findings: No evidence of acute hemorrhage, mass, mass effect, or midline shift. The ventricular system and basal cisterns are within normal limits for the patients age. Gray-white differentiation is maintained. There is no evidence of an intra-axial or extra-axial fluid collection. Bone windows reveal no evidence of acute fracture. The visualized paranasal sinuses are within normal limits. The orbits are grossly unremarkable.     No acute intracranial process evident on noncontrast CT of the brain. XR CHEST PORTABLE    Result Date: 2/17/2023  CLINICAL HISTORY: Seizure TECHNIQUE: Single AP view of the chest COMPARISON: CXR from 1/10/2023 FINDINGS: Lines and tubes: None. Cardiomediastinal: Normal size and configuration of the cardiomediastinal silhouette. No pneumomediastinum. Lungs and pleura: The lungs are grossly unremarkable. No pleural effusion. No pneumothorax. Musculoskeletal: No acute osseous abnormalities. Other: None. No acute cardiopulmonary process identified. Lab Results   Component Value Date    WBC 7.8 02/18/2023    HGB 12.3 02/18/2023    HCT 38.3 02/18/2023    MCV 86.3 02/18/2023     02/18/2023     Lab Results   Component Value Date     02/18/2023    K 3.7 02/18/2023     02/18/2023    CO2 23 02/18/2023    BUN 6 02/18/2023    CREATININE 0.7 02/18/2023    GLUCOSE 84 02/18/2023    CALCIUM 8.7 02/18/2023    PROT 7.1 02/16/2023    LABALBU 4.2 02/16/2023    BILITOT <0.2 02/16/2023    ALKPHOS 90 02/16/2023    AST 15 02/16/2023    ALT 17 02/16/2023    LABGLOM >60 02/18/2023   No results found for: INR, PROTIME    RECORD REVIEW:   Previous medical records, medications were reviewed at today's visit. Nursing/physician notes, imaging, labs and vitals reviewed. PT,OT and/or speech notes reviewed      ASSESSMENT:   33 y.o. admitted with seizure like episodes. She has had a extensive recent work-up including long-term EEG monitoring both here and at [facility].  No overt epileptiform abnormalities were seen. Her events are felt nonepileptic. Yesterday she swallowed 2 small batteries and GI has been consulted. Prior MRI and repeat EEG from yesterday normal.     PLAN:    Psych evaluation    Will start to wean AEDs, will stop dilantin, start to wean vimpat, continue keppra for now and likely wean it next. Continue lamcital may help with mood stabilization.     GI consulted    Ativan prn, event precautions      Please feel free to call with any questions. [phone] (cell phone).       Phil Bernstein DO  Board Certified Neurology

## 2023-02-19 ENCOUNTER — APPOINTMENT (OUTPATIENT)
Dept: GENERAL RADIOLOGY | Age: 28
DRG: 101 | End: 2023-02-19

## 2023-02-19 ENCOUNTER — APPOINTMENT (OUTPATIENT)
Dept: CT IMAGING | Age: 28
DRG: 101 | End: 2023-02-19

## 2023-02-19 ENCOUNTER — APPOINTMENT (OUTPATIENT)
Dept: GENERAL RADIOLOGY | Age: 28
DRG: 101 | End: 2023-02-19
Payer: COMMERCIAL

## 2023-02-19 PROBLEM — F32.A DEPRESSION: Status: ACTIVE | Noted: 2023-02-19

## 2023-02-19 PROBLEM — F43.10 PTSD (POST-TRAUMATIC STRESS DISORDER): Status: ACTIVE | Noted: 2023-01-26

## 2023-02-19 PROCEDURE — G0378 HOSPITAL OBSERVATION PER HR: HCPCS

## 2023-02-19 PROCEDURE — 74018 RADEX ABDOMEN 1 VIEW: CPT

## 2023-02-19 PROCEDURE — 6370000000 HC RX 637 (ALT 250 FOR IP): Performed by: PSYCHIATRY & NEUROLOGY

## 2023-02-19 PROCEDURE — 99222 1ST HOSP IP/OBS MODERATE 55: CPT | Performed by: SURGERY

## 2023-02-19 PROCEDURE — 2580000003 HC RX 258: Performed by: HOSPITALIST

## 2023-02-19 PROCEDURE — 96372 THER/PROPH/DIAG INJ SC/IM: CPT

## 2023-02-19 PROCEDURE — 94760 N-INVAS EAR/PLS OXIMETRY 1: CPT

## 2023-02-19 PROCEDURE — 6360000002 HC RX W HCPCS: Performed by: HOSPITALIST

## 2023-02-19 PROCEDURE — 6360000002 HC RX W HCPCS

## 2023-02-19 PROCEDURE — 6370000000 HC RX 637 (ALT 250 FOR IP): Performed by: HOSPITALIST

## 2023-02-19 PROCEDURE — 99233 SBSQ HOSP IP/OBS HIGH 50: CPT | Performed by: PSYCHIATRY & NEUROLOGY

## 2023-02-19 PROCEDURE — 6360000002 HC RX W HCPCS: Performed by: PSYCHIATRY & NEUROLOGY

## 2023-02-19 PROCEDURE — 99232 SBSQ HOSP IP/OBS MODERATE 35: CPT | Performed by: SPECIALIST

## 2023-02-19 PROCEDURE — 74018 RADEX ABDOMEN 1 VIEW: CPT | Performed by: RADIOLOGY

## 2023-02-19 RX ORDER — MECOBALAMIN 5000 MCG
5 TABLET,DISINTEGRATING ORAL NIGHTLY
Status: DISCONTINUED | OUTPATIENT
Start: 2023-02-19 | End: 2023-02-27 | Stop reason: HOSPADM

## 2023-02-19 RX ORDER — RISPERIDONE 1 MG/1
2 TABLET, ORALLY DISINTEGRATING ORAL EVERY 6 HOURS PRN
Status: DISCONTINUED | OUTPATIENT
Start: 2023-02-19 | End: 2023-02-20

## 2023-02-19 RX ORDER — RISPERIDONE 0.25 MG/1
1 TABLET ORAL NIGHTLY
Status: DISCONTINUED | OUTPATIENT
Start: 2023-02-19 | End: 2023-02-20

## 2023-02-19 RX ORDER — LORAZEPAM 2 MG/ML
1 INJECTION INTRAMUSCULAR ONCE
Status: COMPLETED | OUTPATIENT
Start: 2023-02-19 | End: 2023-02-19

## 2023-02-19 RX ORDER — LORAZEPAM 2 MG/ML
INJECTION INTRAMUSCULAR
Status: COMPLETED
Start: 2023-02-19 | End: 2023-02-19

## 2023-02-19 RX ORDER — HALOPERIDOL 5 MG/ML
5 INJECTION INTRAMUSCULAR EVERY 6 HOURS PRN
Status: DISCONTINUED | OUTPATIENT
Start: 2023-02-19 | End: 2023-02-27 | Stop reason: HOSPADM

## 2023-02-19 RX ORDER — PRAZOSIN HYDROCHLORIDE 1 MG/1
1 CAPSULE ORAL NIGHTLY
Status: DISCONTINUED | OUTPATIENT
Start: 2023-02-19 | End: 2023-02-27 | Stop reason: HOSPADM

## 2023-02-19 RX ORDER — HALOPERIDOL 5 MG/ML
5 INJECTION INTRAMUSCULAR ONCE
Status: COMPLETED | OUTPATIENT
Start: 2023-02-19 | End: 2023-02-19

## 2023-02-19 RX ADMIN — FERROUS SULFATE TAB 325 MG (65 MG ELEMENTAL FE) 325 MG: 325 (65 FE) TAB at 07:31

## 2023-02-19 RX ADMIN — PRAZOSIN HYDROCHLORIDE 1 MG: 1 CAPSULE ORAL at 20:07

## 2023-02-19 RX ADMIN — HALOPERIDOL 5 MG: 5 INJECTION INTRAMUSCULAR at 16:12

## 2023-02-19 RX ADMIN — OXYCODONE HYDROCHLORIDE AND ACETAMINOPHEN 500 MG: 500 TABLET ORAL at 20:06

## 2023-02-19 RX ADMIN — LACOSAMIDE 50 MG: 50 TABLET, FILM COATED ORAL at 20:07

## 2023-02-19 RX ADMIN — LEVETIRACETAM 1500 MG: 500 TABLET, FILM COATED ORAL at 20:06

## 2023-02-19 RX ADMIN — CLONAZEPAM 0.5 MG: 1 TABLET ORAL at 20:07

## 2023-02-19 RX ADMIN — FLUOXETINE 80 MG: 20 CAPSULE ORAL at 07:30

## 2023-02-19 RX ADMIN — Medication 5 MG: at 20:07

## 2023-02-19 RX ADMIN — LAMOTRIGINE 200 MG: 100 TABLET ORAL at 07:31

## 2023-02-19 RX ADMIN — CLONAZEPAM 0.5 MG: 1 TABLET ORAL at 08:25

## 2023-02-19 RX ADMIN — LORAZEPAM 1 MG: 2 INJECTION INTRAMUSCULAR at 16:12

## 2023-02-19 RX ADMIN — RISPERIDONE 1 MG: 0.25 TABLET ORAL at 20:07

## 2023-02-19 RX ADMIN — LEVETIRACETAM 1500 MG: 500 TABLET, FILM COATED ORAL at 07:31

## 2023-02-19 RX ADMIN — OXYCODONE HYDROCHLORIDE AND ACETAMINOPHEN 500 MG: 500 TABLET ORAL at 07:32

## 2023-02-19 RX ADMIN — LACOSAMIDE 50 MG: 50 TABLET, FILM COATED ORAL at 07:31

## 2023-02-19 RX ADMIN — ZIPRASIDONE MESYLATE 20 MG: 20 INJECTION, POWDER, LYOPHILIZED, FOR SOLUTION INTRAMUSCULAR at 00:16

## 2023-02-19 NOTE — PROGRESS NOTES
GI  - PROGRESS NOTE    Subjective:   Admit Date: 2/16/2023  PCP: Kristie Cunningham    Patient being seen for: 2/18/2023: Patient ingested 2 batteries yesterday. Had EGD and push enteroscopy after the jejunum yesterday. No varices were found. There were erosion in the stomach and in the duodenum. 24hr events/Interval History: Patient's nurse at bedside. Patient communicates by typing on iPad. Talked with patient's nurse she denies patient having abdominal pain, nausea, vomiting, fever, chills. Abdominal films reveal foreign bodies are still on the right side of the abdomen. Radiologist does not mention whether they are in the small intestine or in the colon. Radiologist has mentioned 2 round density in the right mid abdomen and the right upper pelvis. No evidence of SBO.      Medications:    Scheduled Meds:   sodium chloride flush  5-40 mL IntraVENous 2 times per day    lacosamide  50 mg Oral BID    sodium chloride flush  5-40 mL IntraVENous 2 times per day    enoxaparin  40 mg SubCUTAneous Daily    levETIRAcetam  1,500 mg Oral BID    lamoTRIgine  200 mg Oral Daily    clonazePAM  0.5 mg Oral Q12H    ferrous sulfate  325 mg Oral BID WC    FLUoxetine  80 mg Oral Daily    ascorbic acid  500 mg Oral BID with meals       Continuous Infusions:   sodium chloride      sodium chloride         PRN Meds:.sodium chloride flush, sodium chloride, HYDROmorphone, HYDROmorphone, sodium chloride flush, sodium chloride, ondansetron **OR** ondansetron, acetaminophen **OR** acetaminophen, potassium chloride **OR** potassium alternative oral replacement **OR** potassium chloride, magnesium sulfate, sodium phosphate IVPB, polyethylene glycol, melatonin, calcium carbonate      Labs:     Recent Labs     02/16/23 1952 02/18/23  0419   WBC 8.9 7.8   RBC 4.85 4.44   HGB 13.1 12.3   HCT 41.7 38.3   MCV 86.0 86.3   MCH 27.0 27.7   MCHC 31.4* 32.1*    177       Recent Labs 02/16/23 1952 02/18/23  0419   * 139   K 4.1 3.7   ANIONGAP 11 12    104   CO2 22 23   BUN 7 6   CREATININE 0.6 0.7   GLUCOSE 97 84   CALCIUM 9.4 8.7       Recent Labs     02/16/23 2015   MG 2.2       Recent Labs     02/16/23 1952   AST 15   ALT 17   BILITOT <0.2   ALKPHOS 90       HgBA1c:  No components found for: HGBA1C    FLP:  No results found for: TRIG, HDL, LDLCALC, LDLDIRECT, LABVLDL    TSH:    Lab Results   Component Value Date/Time    TSH 1.340 01/16/2023 06:17 PM       Troponin T:   Recent Labs     02/16/23 2015   Dean Crews <0.01       INR: No results for input(s): INR in the last 72 hours. No results for input(s): LIPASE in the last 72 hours. -----------------------------------------------------------------      Physical Exam:     Vitals:    02/18/23 1900 02/18/23 2000 02/18/23 2100 02/19/23 0813   BP: 118/61  (!) 119/102 (!) 143/81   Pulse: 91 81 (!) 106    Resp: 21 20 21 22   Temp: 98.4 °F (36.9 °C)   98.6 °F (37 °C)   TempSrc: Temporal   Temporal   SpO2: 96% 98% 98%    Weight:       Height:           24HR INTAKE/OUTPUT:    Intake/Output Summary (Last 24 hours) at 2/19/2023 1047  Last data filed at 2/18/2023 1429  Gross per 24 hour   Intake 240 ml   Output --   Net 240 ml       General appearance: Alert. Does not talk. Appears stated age. Head: normal cephalic. No jaundice. Neck: Supple. Abdomen: Obese. Soft. Nontender. No gross organomegaly. No tense ascites. No peritoneal signs. Extremities: No leg edema. Skin: No jaundice. Neurologic: Alert. Does not talk. Impression:       Patient has not clear the batteries. Battery still in the right mid abdomen and right side of the pelvis. This foreign body ingestion. Needs CT scan of the abdomen and surgery consult. Plan:     Surgery consult. CT abdomen and pelvis to better localize the batteries    Discussed plan with patient and her nurse.     Sheela Chisholm MD    2/19/2023    10:47 AM        Disclaimer speech recognition software       (Please note that portions of this note were completed with a voice recognition program. Efforts were made to edit the dictations but occasionally words are mis-transcribed.)

## 2023-02-19 NOTE — PROGRESS NOTES
Notified [address] to obtain sitter, no sitters available at this time. Pt has reported to nurse she likes to chew and swallow things and has been doing it all day. Items removed that can easily be swallowed. Pt given some soft popits to chew on safely d/t her autism. POC reviewed with pt and family at 1400.   Family verbalized understanding.   Questions and concerns addressed.   See flowsheets for full assessment and VS info.     Trach/PEG tomorrow.  NPO.  LR discontinued.  Fentanyl gtt titrated for sedation.   PRN haldol for agitation.

## 2023-02-19 NOTE — PROGRESS NOTES
Sitter at bed side attempted to give patient a drink of 7 up when pt took the straw and bit pieces off and attempted to swallow. Was able to get straw away from pt and pieces out of mouth.

## 2023-02-19 NOTE — PROGRESS NOTES
Pt arrived to unit chewing on piece of her wrist band. Sitrivera Thomasmarissa Mcgraw confirmed pt had this in mouth on previous unit and was transported to our unit with chewing on piece.

## 2023-02-19 NOTE — PROGRESS NOTES
Called to patiño by monitor tech to assist sitter with patient. Sitter reported that while up walking in halls with patient, patient reached out and grabbed 2 thumbtacks from wall display, placed them in her mouth and began chewing them. Patient is very agitated and required diversional conversation and chewing substitution to remove thumbtacks from mouth. She was cooperative in returning back to room. She returned one thumbtack to nurse at bedside but then took the second and began scratching sharp point across her L forearm leaving superficial scratches to skin. At this time Patience Later RN from [facility] had entered room to bring activities for patient and he was able to talk her into stopping and relinquishing the second thumbtack to him. Both pins disposed of in a sharps bin outside of room. Patient remains agitated with strong rocking motions. She becomes aggressive towards herself, hitting face and chewing hand, at the mention of Dr. Keny Chacon name. Will reach out to see if can be seen by his partner per Dr. Deng Minor suggestion. Have attempted to complete CT of abdomen but unable to at this time due to continued agitation. Verified ok to give lunch with CT tech.

## 2023-02-19 NOTE — PROGRESS NOTES
When going to check on pt, pt found laying in floor on her right side. No \"fall\" sound heard when at the desk out side patients room. Pt picked up and placed in chair, pt was stiff all over and clinching hands. Pt stated she got up to look out window and had a \"pseudo\" and laid in floor. When attempting to get pt back to bed, discovered that bed was not working properly which is why bed alarm did not trigger. Personal alarm placed on pt for now. Pt has no signs of obvious injuries. Clinical house as well as physician notified of episode.

## 2023-02-19 NOTE — PROGRESS NOTES
Report called to 5th floor by [facility] RN.  Belongings collected and sent with patient and sitter to room 520

## 2023-02-19 NOTE — PROGRESS NOTES
[id number] [facility] Neurology Progress Note      Patient:   Joan Weathers  MR#:    [id number]   Room:    [address] [place] [address]   YOB: 1995  Date of Progress Note: 2/19/2023  Time of Note                           1:42 PM  Consulting Physician:  Mainor July, DO  Attending Physician:  Nabil Castro MD      INTERVAL HISTORY:  Continues to have events, still felt non-epileptic, patient swallowed 2 small batteries, GI following. REVIEW OF SYSTEMS:  Constitutional: No fevers No chills  Neck:No stiffness  Respiratory: No shortness of breath  Cardiovascular: No chest pain No palpitations  Gastrointestinal: No abdominal pain    Genitourinary: No Dysuria  Neurological: No headache    PHYSICAL EXAM:    Constitutional -   BP (!) 143/81   Pulse (!) 106 Comment: during anxiety time  Temp 98.6 °F (37 °C) (Temporal)   Resp 22   Ht 5' 5\" (1.651 m)   Wt 155 lb (70.3 kg)   SpO2 98%   BMI 25.79 kg/m²   General appearance: No acute distress   EYES -   Conjunctiva normal  Pupillary exam as below, see CN exam in the neurologic exam  ENT-    No scars, masses, or lesions over external nose or ears  Hearing normal bilaterally to finger rub  Neck-   No neck masses noted  Thyroid normal   No jugular vein distension  Cardiovascular -   No clubbing, cyanosis, or edema   Pulmonary-   Good expansion, normal effort without use of accessory muscles  Musculoskeletal -   No significant wasting of muscles noted  Gait as below, see gait exam in the neurologic exam  Muscle strength, tone, stability as below see the motor exam in the neurologic exam.   No bony deformities  Skin -   Warm, dry, and intact to inspection and palpation.     No rash, erythema, or pallor  Psychiatric -   Mood, affect, and behavior appear normal    Memory as below see mental status examination in the neurologic exam      NEUROLOGICAL EXAM    Mental status   [x] Awake, alert, oriented   [x] Affect attention and concentration appear appropriate  [x] Recent and remote memory appears unremarkable  [] Speech normal without dysarthria or aphasia, comprehension and repetition intact. COMMENTS: Speech limited    Cranial Nerves [x] No VF deficit to confrontation  [x] PERRLA, EOMI, no nystagmus, conjugate eye movements, no ptosis  [x] Face symmetric  [x] Facial sensation intact  [x] Tongue midline no atrophy or fasciculations present  [x] Palate midline, hearing to finger rub normal  [x] Shoulder shrug and SCM testing normal  COMMENTS:   Motor   [x] 5/5 strength x 4 extremities  [x] Normal bulk and tone  [x] No tremor present  [x] No rigidity or bradykinesia noted  COMMENTS:   Sensory  [x] Sensation intact to light touch, pin prick, vibration, and proprioception BLE  [] Sensation intact to light touch, pin prick, vibration, and proprioception BUE  COMMENTS:   Coordination [x] FTN normal bilaterally   [] HTS normal bilaterally  [] YEVGENIY normal.   COMMENTS:   Reflexes  [x] Symmetric and non-pathological  [] Toes downgoing bilaterally  [] No clonus present  COMMENTS:   Gait                  [] Normal steady gait    [] Ataxic    [] Spastic     [] Magnetic     [] Shuffling  [x] Not assessed  COMMENTS:       LABS/IMAGING:    CT Head WO Contrast    Result Date: 2/17/2023  Exam: Noncontrast CT of the brain Technique: Axial noncontrast images were obtained from the skull base to the vertex. Coronal and sagittal reconstructions were performed. Radiation dose optimization technique was utilized. Clinical information: Seizure Comparison: Noncontrast CT of the brain performed on January 16, 2023 Findings: No evidence of acute hemorrhage, mass, mass effect, or midline shift. The ventricular system and basal cisterns are within normal limits for the patients age. Gray-white differentiation is maintained. There is no evidence of an intra-axial or extra-axial fluid collection. Bone windows reveal no evidence of acute fracture. The visualized paranasal sinuses are within normal limits. The orbits are grossly unremarkable. No acute intracranial process evident on noncontrast CT of the brain. XR CHEST PORTABLE    Result Date: 2/17/2023  CLINICAL HISTORY: Seizure TECHNIQUE: Single AP view of the chest COMPARISON: CXR from 1/10/2023 FINDINGS: Lines and tubes: None. Cardiomediastinal: Normal size and configuration of the cardiomediastinal silhouette. No pneumomediastinum. Lungs and pleura: The lungs are grossly unremarkable. No pleural effusion. No pneumothorax. Musculoskeletal: No acute osseous abnormalities. Other: None. No acute cardiopulmonary process identified. Lab Results   Component Value Date    WBC 7.8 02/18/2023    HGB 12.3 02/18/2023    HCT 38.3 02/18/2023    MCV 86.3 02/18/2023     02/18/2023     Lab Results   Component Value Date     02/18/2023    K 3.7 02/18/2023     02/18/2023    CO2 23 02/18/2023    BUN 6 02/18/2023    CREATININE 0.7 02/18/2023    GLUCOSE 84 02/18/2023    CALCIUM 8.7 02/18/2023    PROT 7.1 02/16/2023    LABALBU 4.2 02/16/2023    BILITOT <0.2 02/16/2023    ALKPHOS 90 02/16/2023    AST 15 02/16/2023    ALT 17 02/16/2023    LABGLOM >60 02/18/2023   No results found for: INR, PROTIME    RECORD REVIEW:   Previous medical records, medications were reviewed at today's visit. Nursing/physician notes, imaging, labs and vitals reviewed. PT,OT and/or speech notes reviewed      ASSESSMENT:   33 y.o. admitted with seizure like episodes. She has had a extensive recent work-up including long-term EEG monitoring both here and at [facility].  No overt epileptiform abnormalities were seen. Her events are felt nonepileptic. Previously swallowed 2 small batteries and GI has been following. Prior MRI and repeat EEG from here normal.     PLAN:    Awaiting Psych evaluation    Starting to wean AEDs, stopped dilantin, started to wean vimpat, continue keppra for now and likely wean it next. Will continue lamcital may help with mood stabilization.     GI following     Ativan prn, event precautions      Please feel free to call with any questions. [phone] (cell phone).       Ravi Flannery DO  Board Certified Neurology

## 2023-02-19 NOTE — PROGRESS NOTES
Pt climbed out of bed and was wanting to walk. To keep pt calm and not increase agitation, I walked with pt and sitter around the unit and back patiño. We came back to her room and pt got into her bed and sit up. Pt is more calm now. Sitter at doorway to room.  Electronically signed by Esmeralda Spatz, RN on 2/19/2023 at 6:13 PM

## 2023-02-19 NOTE — PROGRESS NOTES
Talked with patient for a while about her medical condition and anxiety. Pt calmed down and resting well in bed.  Alarm set

## 2023-02-19 NOTE — PROGRESS NOTES
Pat CCU RN notified on face to face pt care handoff that obtained restraint order has not been placed in computer yet due to immediate transport of pt to CCU. RN would update documentation upon return to floor.    RN updated Francis in CCU order placed in flowsheets.

## 2023-02-19 NOTE — CONSULTS
Ms. Nicolle Stinson is a 33year old female who was admitted with seizure activity. General surgery is consulted for a complaint of ingested batteries. The patient had been admitted for seizures and then apparently started chewing on a flashlight in her hospital room, and swallowed two coin batteries. She was taken by GI for endoscopy, and the batteries were not visualized in the stomach or duodenum. KUB was done today showing the batteries on the right side of the abdomen. The patient has a history of autism diagnosis, and is mostly non-verbal. When I came to the room, the patient was sitting up in bed talking to the sitter, and as soon as I introduced myself, she stopped talking and started typing on a tablet. She replied that she did have some nausea yesterday, no further nausea today. She has had some mild pain around her umbilicus. Gastro ordered a follow up CT today to check progress of the batteries. However, on their way to radiology, the patient grabbed two pushpins off the wall and shoved them in her mouth. They were able to get them back before she swallowed them. The patient just ate some of a lunch tray with no difficulty.     Past Medical History:   Diagnosis Date    Anxiety     Autism     Depression     Epilepsy ([facility] [address]     Seizures ([facility] [address]      Past Surgical History:   Procedure Laterality Date     SECTION      CHOLECYSTECTOMY       Current Facility-Administered Medications   Medication Dose Route Frequency Provider Last Rate Last Admin    prazosin (MINIPRESS) capsule 1 mg  1 mg Oral Nightly Luis Gongora MD        risperiDONE (RISPERDAL) tablet 1 mg  1 mg Oral Nightly Luis Gongora MD        [START ON 2023] lamoTRIgine (LAMICTAL) tablet 225 mg  225 mg Oral Daily Luis Gongora MD        haloperidol lactate (HALDOL) injection 5 mg  5 mg IntraMUSCular Q6H PRN Luis Gongora MD        risperiDONE (RISPERDAL M-TABS) disintegrating tablet 2 mg  2 mg Oral Q6H PRN Luis Gongora MD melatonin disintegrating tablet 5 mg  5 mg Oral Nightly Tosha Flores MD        sodium chloride flush 0.9 % injection 5-40 mL  5-40 mL IntraVENous 2 times per day Parker Szymanski MD        sodium chloride flush 0.9 % injection 5-40 mL  5-40 mL IntraVENous PRN Parker Szymanski MD        0.9 % sodium chloride infusion   IntraVENous PRN Parker Szymanski MD        HYDROmorphone HCl PF (DILAUDID) injection 0.25 mg  0.25 mg IntraVENous Q5 Min PRN Parker Szymanski MD        HYDROmorphone HCl PF (DILAUDID) injection 0.5 mg  0.5 mg IntraVENous Q5 Min PRN Parker Szymanski MD        lacosamide (VIMPAT) tablet 50 mg  50 mg Oral BID Koffi Crowell DO   50 mg at 02/19/23 0731    sodium chloride flush 0.9 % injection 5-40 mL  5-40 mL IntraVENous 2 times per day Bello Watkins MD   10 mL at 02/18/23 0945    sodium chloride flush 0.9 % injection 5-40 mL  5-40 mL IntraVENous PRN Bello Watkins MD        0.9 % sodium chloride infusion   IntraVENous PRN Bello Watkins MD        enoxaparin (LOVENOX) injection 40 mg  40 mg SubCUTAneous Daily Bello Watkins MD   40 mg at 02/18/23 0945    ondansetron (ZOFRAN-ODT) disintegrating tablet 4 mg  4 mg Oral Q8H PRDAVID Watkins MD        Or    ondansetron [facility]) injection 4 mg  4 mg IntraVENous Q6H PRDAVID Watkins MD        acetaminophen (TYLENOL) tablet 650 mg  650 mg Oral Q4H BUCKY Watkins MD        Or    acetaminophen (TYLENOL) suppository 650 mg  650 mg Rectal Q4H PRDAVID Watkins MD        potassium chloride (KLOR-CON M) extended release tablet 40 mEq  40 mEq Oral BUCKY Watkins MD        Or    potassium bicarb-citric acid (EFFER-K) effervescent tablet 40 mEq  40 mEq Oral BUCKY Watkins MD        Or    potassium chloride 10 mEq/100 mL IVPB (Peripheral Line)  10 mEq IntraVENous PRDAVID Watkins MD        magnesium sulfate 2000 mg in 50 mL IVPB premix  2,000 mg IntraVENous PRDAVID Watkins MD        sodium phosphate 22.5 mmol in sodium chloride 0.9 % 250 mL IVPB 0.32 mmol/kg IntraVENous PRN Yuriy Bonilla MD        polyethylene glycol [facility]) packet 17 g  17 g Oral Daily PRN Yuriy Bonilla MD        melatonin disintegrating tablet 5 mg  5 mg Oral Nightly PRN Yuriy Bonilla MD        calcium carbonate (TUMS) chewable tablet 500 mg  500 mg Oral TID PRN Yuriy Bonilla MD        levETIRAcetam (KEPPRA) tablet 1,500 mg  1,500 mg Oral BID Fernando Mosher MD   1,500 mg at 02/19/23 0731    clonazePAM (KLONOPIN) tablet 0.5 mg  0.5 mg Oral Q12H Fernando Mosher MD   0.5 mg at 02/19/23 0825    ferrous sulfate (IRON 325) tablet 325 mg  325 mg Oral BID WC Yuriy Bonilla MD   325 mg at 02/19/23 0731    FLUoxetine (PROZAC) capsule 80 mg  80 mg Oral Daily Yuriy Bonilla MD   80 mg at 02/19/23 0730    ascorbic acid (VITAMIN C) tablet 500 mg  500 mg Oral BID with meals Yuriy Bonilla MD   500 mg at 02/19/23 0732     Allergies: Patient has no known allergies. Family History   Problem Relation Age of Onset    Diabetes Mother     Migraines Mother     Alcohol Abuse Father     Drug Abuse Father     Migraines Brother     Other Son         hydrocephalus    Autism Son     Autism Son        Social History     Tobacco Use    Smoking status: Never    Smokeless tobacco: Never   Substance Use Topics    Alcohol use: Never       Review of Systems   Unable to perform ROS: Patient nonverbal     Physical Exam  Vitals reviewed. Exam conducted with a chaperone present. Constitutional:       General: She is not in acute distress. Appearance: Normal appearance. HENT:      Head: Normocephalic. Nose: Nose normal.   Eyes:      General: No scleral icterus. Pupils: Pupils are equal, round, and reactive to light. Cardiovascular:      Rate and Rhythm: Normal rate and regular rhythm. Pulmonary:      Effort: No respiratory distress. Abdominal:      General: There is no distension. Palpations: Abdomen is soft. There is no mass. Tenderness:  There is abdominal tenderness (mild periumbilical). There is no guarding or rebound.      Hernia: No hernia is present.   Musculoskeletal:         General: No swelling or deformity.      Cervical back: Neck supple. No rigidity.   Skin:     General: Skin is warm and dry.   Neurological:      General: No focal deficit present.      Mental Status: She is alert. Mental status is at baseline.   Psychiatric:         Mood and Affect: Mood normal.         Behavior: Behavior normal.     Exam:X-RAY OF THE ABDOMEN, KUB   Clinical data:Follow-up on progression of two batteries and small bowel.   Technique: Single view of the abdomen is submitted.   Prior studies: No prior studies submitted.   Findings: Air in nondistended loops of small bowel and colon as well as the rectum, with a nonspecific nonobstructed bowel gas pattern.  Round densities in the right mid abdomen and the right upper pelvis, nonspecific but potentially smaller batteries.     Additional larger radiopaque densities project over the upper abdomen, likely external to the patient.. No abnormal calcifications or organomegaly are present.The soft tissues and bony structures are unremarkable.   Impression:     Round densities in the right mid abdomen and the right upper pelvis could represent the swallowed batteries.  No evidence of small-bowel obstruction.   Recommendation:  Follow-up as clinically warranted.   Dictated and Electronically Signed by MARGARITO SPARKS MD at 19-Feb-2023 11:25:11    KUB as well as previous CT images personally reviewed by me. KUB would be consistent with batteries in the small bowel, less likely the colon.    CBC:   Lab Results   Component Value Date/Time    WBC 7.8 02/18/2023 04:19 AM    RBC 4.44 02/18/2023 04:19 AM    HGB 12.3 02/18/2023 04:19 AM    HCT 38.3 02/18/2023 04:19 AM    MCV 86.3 02/18/2023 04:19 AM    MCH 27.7 02/18/2023 04:19 AM    MCHC 32.1 02/18/2023 04:19 AM    RDW 20.1 02/18/2023 04:19 AM     02/18/2023 04:19 AM    MPV 11.9 02/18/2023 04:19 AM  BMP:    Lab Results   Component Value Date/Time     02/18/2023 04:19 AM    K 3.7 02/18/2023 04:19 AM     02/18/2023 04:19 AM    CO2 23 02/18/2023 04:19 AM    BUN 6 02/18/2023 04:19 AM    LABALBU 4.2 02/16/2023 07:52 PM    CREATININE 0.7 02/18/2023 04:19 AM    CALCIUM 8.7 02/18/2023 04:19 AM    LABGLOM >60 02/18/2023 04:19 AM    GLUCOSE 84 02/18/2023 04:19 AM       Assessment and plan:  33year old female with autism and seizure disorder, with intentional foreign body ingestion  Patient has benign abdomen. Based on EGD and KUB, batteries have passed the stomach. With benign abdomen, normal WBC, no evidence of free air on KUB, would recommend continuing to monitor. Continue serial abdominal exams, okay for PO intake at this point. Other cares per the medicine teams and psych. Please call for any acute changes.     Shantal Flores MD  2/19/2023  1:47 PM

## 2023-02-19 NOTE — CARE COORDINATION
SW met with Pt nurse to discuss consult for \"Patient Autistic, making statements about \"not wanting to make her  mad. \" Which was from Pt nurse on the early morning of 2/18/2023. Pt nurse was unaware of the need for consult and as to why the consult was placed. Pt nurse was going to reach out to the nurse who put the consult in. If Pt nurse at the time felt like there was concerns at home then she needs to call APS and make the report.      Electronically signed by Mansoor Hurt on 2/19/2023 at 11:26 AM
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
## 2023-02-19 NOTE — PROGRESS NOTES
Pt having some anxiety, placed weighted blanket around pts shoulders and back as well as gave her safe chewable sensory toy to play with.
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
## 2023-02-19 NOTE — PROGRESS NOTES
Pt from 5th floor with security and RNs from 5th. Pt on bed, very agitated , rocking back and forth. Within 5 min of arrival pt had broke a soft wrist restraint off and was taking off the other wrist restraint. Both restraints were removed from room for safety. Sitter arrived with pt and is at bedside. I attempted to placed weighted blanket on pt and calm her , as we had had a good rapport. Pt was tearfull and kept repeating that she hated the nurse who took all her things away. I asked pt if I could put her weighted vest on to help calm her. She agreed and vest is placed. Pt also wants her beany and I will get from security. Pt was given her I-Pad , phone and glasses. Will let pt calm before too much more stimulation.  Electronically signed by Jd Leary RN on 2/19/2023 at 5:57 PM Jamaal is a 17mo male with motor and speech delays, and hx poor weight gain, admitted due to new onset diabetes as evidenced by his random Dstick of 370 mg/dL, HbA1c 11.4%, and his symptoms of polyuria, polydipsia, and weight loss.     Diabetes is a serious chronic disease that impairs the body's ability to use food for energy. The goal of effective diabetes management is to control blood glucose levels by keeping them within a target range which is determined for each child on an individual basis. Optimal blood glucose control helps to promote normal growth and development. Effective diabetes management is needed on an ongoing daily basis to prevent the immediate dangers of hypoglycemia and the long-term complications than can be delayed by preventing extended periods of hyperglycemia. The key to optimal blood glucose control is to carefully balance food,exercise, and insulin or medication. The type of patient's diabetes is not fully known at this time, however, type 1 is suspected.  DM related Ab levels will help determine the diagnosis but due to his significantly elevated HbA1c and mild DKA insulin is necessary at this time.  Reviewed basics of diabetes and gave introduction into what is expected of the patient and family in regards to Diabetes care.    Explained that he will need diluted Humlaog for correction and carb coverage. He should be continued on Lantus 2U in AM, CR 1:110, and CF 1:400, with a target of 150mg/dL. Blood sugars should be checked before meals and bedtime. He should only have corrections prior to meals (no sooner than every 3 hours) and can receive carbohydrate coverage for his snacks if enough carbohydrates consumed. Diabetes nursing and nutrition met with family while inpatient for diabetes education and survival skills. We explained to parents that they should be giving insulin and checking blood sugars while inpatient. Jamaal is a 17mo male with motor and speech delays, and hx poor weight gain, admitted due to new onset diabetes as evidenced by his random Dstick of 370 mg/dL, HbA1c 11.4%, and his symptoms of polyuria, polydipsia, and weight loss.     Diabetes is a serious chronic disease that impairs the body's ability to use food for energy. The goal of effective diabetes management is to control blood glucose levels by keeping them within a target range which is determined for each child on an individual basis. Optimal blood glucose control helps to promote normal growth and development. Effective diabetes management is needed on an ongoing daily basis to prevent the immediate dangers of hypoglycemia and the long-term complications than can be delayed by preventing extended periods of hyperglycemia. The key to optimal blood glucose control is to carefully balance food,exercise, and insulin or medication. The type of patient's diabetes is not fully known at this time, however, type 1 is suspected.  DM related Ab levels will help determine the diagnosis but due to his significantly elevated HbA1c and mild DKA insulin is necessary at this time.  Reviewed basics of diabetes and gave introduction into what is expected of the patient and family in regards to Diabetes care.    Explained that he will need diluted Humlaog for correction and carb coverage. He should be continued on Lantus 2U in AM, CR 1:110, and CF 1:400, with a target of 150mg/dL. Blood sugars should be checked before meals and bedtime. He should only have corrections prior to meals (no sooner than every 3 hours) and can receive carbohydrate coverage for his snacks if enough carbohydrates consumed. Advised not to give insulin at bedtime. Diabetes nursing and nutrition met with family while inpatient for diabetes education and survival skills. We explained to parents that they should be giving insulin and checking blood sugars while inpatient. Jamala is a 17mo male with motor and speech delays, and hx poor weight gain, admitted due to new onset diabetes as evidenced by his random Dstick of 370 mg/dL, HbA1c 11.4%, and his symptoms of polyuria, polydipsia, and weight loss.     Diabetes is a serious chronic disease that impairs the body's ability to use food for energy. The goal of effective diabetes management is to control blood glucose levels by keeping them within a target range which is determined for each child on an individual basis. Optimal blood glucose control helps to promote normal growth and development. Effective diabetes management is needed on an ongoing daily basis to prevent the immediate dangers of hypoglycemia and the long-term complications than can be delayed by preventing extended periods of hyperglycemia. The key to optimal blood glucose control is to carefully balance food,exercise, and insulin or medication. The type of patient's diabetes is not fully known at this time, however, type 1 is suspected.  DM related Ab levels will help determine the diagnosis but due to his significantly elevated HbA1c and mild DKA insulin is necessary at this time.  Reviewed basics of diabetes and gave introduction into what is expected of the patient and family in regards to Diabetes care.    Explained that he will need diluted Humlaog for correction and carb coverage. He should be continued on Lantus 2U in AM, CR 1:110, and CF 1:400, with a target of 150mg/dL. Blood sugars should be checked before meals and bedtime. He should only receive humalog with 'breakfast' , 'lunch' and 'dinner'.  We will not cover his snacks at this time. Diabetes nursing and nutrition met with family while inpatient for diabetes education and survival skills. We explained to parents that they should be giving insulin and checking blood sugars while inpatient.

## 2023-02-19 NOTE — PROGRESS NOTES
Pt told nurse why she gets so upset is that she is hearing voices and seeing things that are not there again. She said she did this as a kid but it went a way for a while but they are back now.

## 2023-02-20 ENCOUNTER — APPOINTMENT (OUTPATIENT)
Dept: GENERAL RADIOLOGY | Age: 28
DRG: 101 | End: 2023-02-20

## 2023-02-20 PROBLEM — R10.30 LOWER ABDOMINAL PAIN: Status: ACTIVE | Noted: 2023-02-20

## 2023-02-20 PROBLEM — R56.9 SEIZURE (HCC): Status: ACTIVE | Noted: 2023-02-20

## 2023-02-20 PROCEDURE — 2100000000 HC CCU R&B

## 2023-02-20 PROCEDURE — 6370000000 HC RX 637 (ALT 250 FOR IP): Performed by: HOSPITALIST

## 2023-02-20 PROCEDURE — 6370000000 HC RX 637 (ALT 250 FOR IP): Performed by: SURGERY

## 2023-02-20 PROCEDURE — 6370000000 HC RX 637 (ALT 250 FOR IP): Performed by: PSYCHIATRY & NEUROLOGY

## 2023-02-20 PROCEDURE — 74018 RADEX ABDOMEN 1 VIEW: CPT

## 2023-02-20 PROCEDURE — 99233 SBSQ HOSP IP/OBS HIGH 50: CPT | Performed by: PSYCHIATRY & NEUROLOGY

## 2023-02-20 PROCEDURE — 2140000000 HC CCU INTERMEDIATE R&B

## 2023-02-20 PROCEDURE — 99232 SBSQ HOSP IP/OBS MODERATE 35: CPT | Performed by: INTERNAL MEDICINE

## 2023-02-20 RX ORDER — NALTREXONE HYDROCHLORIDE 50 MG/1
50 TABLET, FILM COATED ORAL
Status: DISCONTINUED | OUTPATIENT
Start: 2023-02-20 | End: 2023-02-27 | Stop reason: HOSPADM

## 2023-02-20 RX ORDER — RISPERIDONE 1 MG/1
1 TABLET ORAL 2 TIMES DAILY
Status: DISCONTINUED | OUTPATIENT
Start: 2023-02-20 | End: 2023-02-21

## 2023-02-20 RX ORDER — RISPERIDONE 1 MG/1
2 TABLET ORAL EVERY 6 HOURS PRN
Status: DISCONTINUED | OUTPATIENT
Start: 2023-02-20 | End: 2023-02-27 | Stop reason: HOSPADM

## 2023-02-20 RX ORDER — NALTREXONE HYDROCHLORIDE 50 MG/1
50 TABLET, FILM COATED ORAL
Status: DISCONTINUED | OUTPATIENT
Start: 2023-02-21 | End: 2023-02-20

## 2023-02-20 RX ORDER — POLYETHYLENE GLYCOL 3350 17 G/17G
17 POWDER, FOR SOLUTION ORAL 2 TIMES DAILY
Status: DISCONTINUED | OUTPATIENT
Start: 2023-02-20 | End: 2023-02-27 | Stop reason: HOSPADM

## 2023-02-20 RX ADMIN — CLONAZEPAM 0.5 MG: 1 TABLET ORAL at 22:07

## 2023-02-20 RX ADMIN — POLYETHYLENE GLYCOL 3350 17 G: 17 POWDER, FOR SOLUTION ORAL at 22:08

## 2023-02-20 RX ADMIN — ACETAMINOPHEN 650 MG: 325 TABLET ORAL at 17:31

## 2023-02-20 RX ADMIN — LAMOTRIGINE 225 MG: 200 TABLET ORAL at 08:00

## 2023-02-20 RX ADMIN — CLONAZEPAM 0.5 MG: 1 TABLET ORAL at 08:00

## 2023-02-20 RX ADMIN — LEVETIRACETAM 1500 MG: 500 TABLET, FILM COATED ORAL at 08:00

## 2023-02-20 RX ADMIN — OXYCODONE HYDROCHLORIDE AND ACETAMINOPHEN 500 MG: 500 TABLET ORAL at 18:06

## 2023-02-20 RX ADMIN — RISPERIDONE 1 MG: 0.25 TABLET ORAL at 08:59

## 2023-02-20 RX ADMIN — RISPERIDONE 2 MG: 1 TABLET ORAL at 15:05

## 2023-02-20 RX ADMIN — NALTREXONE HYDROCHLORIDE 50 MG: 50 TABLET, FILM COATED ORAL at 16:07

## 2023-02-20 RX ADMIN — OXYCODONE HYDROCHLORIDE AND ACETAMINOPHEN 500 MG: 500 TABLET ORAL at 08:00

## 2023-02-20 RX ADMIN — FERROUS SULFATE TAB 325 MG (65 MG ELEMENTAL FE) 325 MG: 325 (65 FE) TAB at 17:18

## 2023-02-20 RX ADMIN — LACOSAMIDE 50 MG: 50 TABLET, FILM COATED ORAL at 08:00

## 2023-02-20 RX ADMIN — PRAZOSIN HYDROCHLORIDE 1 MG: 1 CAPSULE ORAL at 22:08

## 2023-02-20 RX ADMIN — ACETAMINOPHEN 650 MG: 325 TABLET ORAL at 08:00

## 2023-02-20 RX ADMIN — LACOSAMIDE 50 MG: 50 TABLET, FILM COATED ORAL at 22:07

## 2023-02-20 RX ADMIN — FERROUS SULFATE TAB 325 MG (65 MG ELEMENTAL FE) 325 MG: 325 (65 FE) TAB at 08:00

## 2023-02-20 RX ADMIN — FLUOXETINE 80 MG: 20 CAPSULE ORAL at 08:00

## 2023-02-20 RX ADMIN — RISPERIDONE 1 MG: 1 TABLET ORAL at 22:10

## 2023-02-20 RX ADMIN — LEVETIRACETAM 1500 MG: 500 TABLET, FILM COATED ORAL at 22:07

## 2023-02-20 RX ADMIN — Medication 5 MG: at 22:07

## 2023-02-20 RX ADMIN — POLYETHYLENE GLYCOL 3350 17 G: 17 POWDER, FOR SOLUTION ORAL at 15:23

## 2023-02-20 ASSESSMENT — PAIN SCALES - GENERAL
PAINLEVEL_OUTOF10: 0
PAINLEVEL_OUTOF10: 8
PAINLEVEL_OUTOF10: 0

## 2023-02-20 ASSESSMENT — PAIN - FUNCTIONAL ASSESSMENT: PAIN_FUNCTIONAL_ASSESSMENT: PREVENTS OR INTERFERES SOME ACTIVE ACTIVITIES AND ADLS

## 2023-02-20 ASSESSMENT — PAIN DESCRIPTION - LOCATION
LOCATION: HEAD
LOCATION: HEAD

## 2023-02-20 ASSESSMENT — PAIN DESCRIPTION - DESCRIPTORS: DESCRIPTORS: POUNDING;NAGGING

## 2023-02-20 ASSESSMENT — PAIN DESCRIPTION - ORIENTATION: ORIENTATION: OTHER (COMMENT)

## 2023-02-20 NOTE — PROGRESS NOTES
Pt's family here in hospital to visit with patient. RN talked with patient to determine if she felt comfortable with  visiting. Pt states that her mom isnt able to take care of all the kids and the dog by herself, adding that he would have to come, only if he agrees to bring them (mom and children) since he would be the one transporting them here. Pt nodded that she is agreeable for visitors at this time. Family members escorted to room for visit with patient, this RN remained in doorway as sitter. Family stayed for 15-20 mins and then left. Shortly after family left, pt continued to stay \"walk, need to walk\" and was persistent to leave unit on a walk. This RN and charge nurse talking to pt at door to unit; Dr. Beena Stephens came through door to unit to see patient. Dr Beena Stephens began to walk and talk with patient; was able to retrieve thumbtack successfully.

## 2023-02-20 NOTE — PROGRESS NOTES
I had pt call her mother to make sure she knew she was back in room 704. Partly to distract her and also so she could hear her mother's voice and this might possibly calm her. I have also spoken with pt's mother to update her on the situation. Pt's mother states she does not want her daughter to have Crista back as a nurse if she leaves [facility]. I will pass this on in shift report. Electronically signed by Neena Ang RN on 2/19/2023 at 6:19 PM

## 2023-02-20 NOTE — PROGRESS NOTES
GI  - PROGRESS NOTE    Subjective:   Admit Date: 2/16/2023  PCP: Marilia Hernandez    Patient being seen for: Foreign body ingestion. HPI: Patient with psychiatric history admitted to swallowed 2 batteries. Upper endoscopy did not see batteries in the upper GI tract. Plain x-ray of the abdomen shows that it is migrated to small bowel. She is awaiting CAT scan to see the location. She denies any acute GI symptoms at the time. Her pain is better now. She has mild pain. She is able to tolerate her diet. He did not have any bowel movement. No nausea no vomiting. No fever chills. No chest pain or palpitation.       Medications:  Scheduled Meds:   prazosin  1 mg Oral Nightly    risperiDONE  1 mg Oral Nightly    lamoTRIgine  225 mg Oral Daily    melatonin  5 mg Oral Nightly    sodium chloride flush  5-40 mL IntraVENous 2 times per day    lacosamide  50 mg Oral BID    sodium chloride flush  5-40 mL IntraVENous 2 times per day    enoxaparin  40 mg SubCUTAneous Daily    levETIRAcetam  1,500 mg Oral BID    clonazePAM  0.5 mg Oral Q12H    ferrous sulfate  325 mg Oral BID WC    FLUoxetine  80 mg Oral Daily    ascorbic acid  500 mg Oral BID with meals       Continuous Infusions:   sodium chloride      sodium chloride         PRN Meds:.haloperidol lactate, risperiDONE, sodium chloride flush, sodium chloride, HYDROmorphone, HYDROmorphone, sodium chloride flush, sodium chloride, ondansetron **OR** ondansetron, acetaminophen **OR** acetaminophen, potassium chloride **OR** potassium alternative oral replacement **OR** potassium chloride, magnesium sulfate, sodium phosphate IVPB, polyethylene glycol, melatonin, calcium carbonate      Labs:     Recent Labs     02/18/23 0419   WBC 7.8   RBC 4.44   HGB 12.3   HCT 38.3   MCV 86.3   MCH 27.7   MCHC 32.1*        Recent Labs     02/18/23 0419      K 3.7   ANIONGAP 12      CO2 23   BUN 6   CREATININE 0.7   GLUCOSE 84 CALCIUM 8.7     No results for input(s): MG, PHOS in the last 72 hours. No results for input(s): AST, ALT, ALB, BILITOT, ALKPHOS, ALB in the last 72 hours. HgBA1c:  No components found for: HGBA1C  FLP:  No results found for: TRIG, HDL, LDLCALC, LDLDIRECT, LABVLDL  TSH:    Lab Results   Component Value Date/Time    TSH 1.340 01/16/2023 06:17 PM     Troponin T: No results for input(s): TROPONINI in the last 72 hours. INR: No results for input(s): INR in the last 72 hours. No results for input(s): LIPASE in the last 72 hours. -----------------------------------------------------------------  RAD:     XR ABDOMEN (KUB) (SINGLE AP VIEW)    Result Date: 2/19/2023  Exam:X-RAY OF THE ABDOMEN, KUB Clinical data: Follow-up on progression of two batteries and small bowel. Technique: Single view of the abdomen is submitted. Prior studies: No prior studies submitted. Findings: Air in nondistended loops of small bowel and colon as well as the rectum, with a nonspecific nonobstructed bowel gas pattern. Round densities in the right mid abdomen and the right upper pelvis, nonspecific but potentially smaller batteries. Additional larger radiopaque densities project over the upper abdomen, likely external to the patient. . No abnormal calcifications or organomegaly are present. The soft tissues and bony structures are unremarkable. Round densities in the right mid abdomen and the right upper pelvis could represent the swallowed batteries. No evidence of small-bowel obstruction. Recommendation:  Follow-up as clinically warranted.  Dictated and Electronically Signed by Latasha Armas MD at 19-Feb-2023 11:25:11 AM EST                   Physical Exam:     /71   Pulse (!) 105   Temp 98.1 °F (36.7 °C) (Temporal)   Resp 18   Ht 5' 5\" (1.651 m)   Wt 199 lb 9.6 oz (90.5 kg)   SpO2 96%   BMI 33.22 kg/m²     General appearance: alert and cooperative with exam, appears stated age, no acute distress   Head: normal cephalic, atraumatic. EOMI bilaterally, no neck lymphadenopathy appreciated, no carotid bruits noted  Lungs: Clear to percussion and auscultation. No wheezing or rales. Heart: S1 S2 are audible. No added sound. Abdomen: Soft, non distended and tender. No hepatosplenomegaly. Bowel sounds are audible. No masses palpable. Asssessment:   Patient mid to the hospital with ingestion of the batteries. She has mild abdominal pain. Patient is eager to go home. She is awaiting a CAT scan. Impression:   1. Foreign body ingestion. 2.  Abdominal pain. Plan:   1. Abdominal pain was briefly discussed. The consequence of swallowing better. Because of the alkaline nature was discussed. Chances of perforation is still there. 2.  I will wait for CAT scan. 3.  Surgery consult appreciated and seems like there is no surgical plan regardless of the location of the batteries. 4.  If the CT scan does not show any significant pathology and the batteries are progressing to the small bowel that she can be discharged home. 5.  She needs to be follow-up with GI clinic as soon as possible for her. Plain x-ray of the abdomen and if they are stuck in the terminal ileum or the rectal area then it needs to be removed surgically or endoscopy clinic. 6.  She understood and agreed with the plan.       Lorraine Clemons MD  2/20/2023, 11:02 AM

## 2023-02-20 NOTE — PROGRESS NOTES
Received call from pt's mother, Nicky Mendoza, for update on pt's condition; also asked about status of ingested batteries. Nicky Mendoza then asked this nurse if patient was allowed to have visitors. Nurse discussed with Nicky Mendoza about pt's family and service dog possibly coming to visit today; both nurse and mother felt that patient would benefit from visit.

## 2023-02-20 NOTE — PROGRESS NOTES
Pt escorted by this RN to go for a walk around unit and nearby hallway on 2x occasions after breakfast this morning. No issues noted. Pt allowed to walk the unit with this RN after finishing w/ lunch. During walk, pt reveals a thumbtack; she then puts tack in mouth and removes it; intermittently messing with tack in her pocket/ placing in and out of mouth. When nurse asks or attempts to retrieve the tack, pt puts thumbtack in her mouth so that staff is not able to take tack away. Pt asks RN 'what will happen if I swallow this tack?' Nurse explained that patient's safety is the priority, and that we do not want her to have anything that could potentially be used to self harm. Pt states \"but I always hurt myself. \"   Pt then immediately begins typing on ipad; shows this nurse the screen that reads \"it relieves the pressure. What else can I do instead? \" RN offered to get pt ice chips, candy, something to chew on instead of items that can be harmful. Pt declined, places tack back in pocket.

## 2023-02-20 NOTE — PROGRESS NOTES
Pt has remained calm and cooperative. Sitter remains at bedside. Will cont to monitor closely. Pt continues to wear her weighted vest. Pt has not needed her weighted blanket. Pt has been conversing with the sitter.  Electronically signed by Isabell Sosa RN on 2/19/2023 at 6:29 PM

## 2023-02-20 NOTE — PROGRESS NOTES
[facility] Hospitalists Progress Note    Patient:  Kendra Lora  YOB: 1995  Date of Service: 2/20/2023  MRN: [id number]   Acct: [de-identified]   Primary Care Physician: Mindi Padilla  Advance Directive: Full Code  Admit Date: 2/16/2023       Hospital Day: 0        CHIEF COMPLAINT:     Chief Complaint   Patient presents with    Seizures     2 seizures prior to EMS arrival 4 seizures with EMS       2/20/2023 8:41 AM  Subjective / Interval History:   02/20/2023  No acute changes or acute overnight event reported. Patient seen and examined. Continues to be intermittently agitated, requiring one-to-one sitter in the CCU. Laying comfortably in bed however no apparent acute distress. 02/19/2023  Patient seen and examined. Sitting comfortably in bed in no acute distress. Denies any acute complaints or distress at this time. Patient transferred to CCU yesterday, for persistent seizure activities. No acute overnight event reported. 02/18/2023  Patient seen and examined this AM.  Ellie Forman comfortably in bed in no acute distress. No new complaints. One-to-one sitter at bedside for safety. Patient reportedly with another episode of seizures lasting about 5 minutes, with heart rates rising up to 150s yesterday. Patient reportedly with agitation and aggressiveness around 22:20 PM yesterday, requiring Geodon as well as diphenhydramine IM. Patient was reportedly swallowed some foreign bodies (2 disc batteries). GI subsequently consulted for foreign body removal via endoscopy. 02/17/2023  No acute changes or acute overnight event reported. Patient seen and examined. No new complaints. No further seizure activities at this time. Denies any dizziness, lightheadedness or palpitations. Endorses some nausea but no vomiting. Laying comfortably in bed in no acute distress.           Review of Systems:   Review of Systems  ROS: 14 point review of systems is negative except as specifically addressed above. ADULT DIET;  Regular; Safety Tray; Safety Tray (Disposables No Utensils)    Intake/Output Summary (Last 24 hours) at 2/20/2023 0841  Last data filed at 2/20/2023 0200  Gross per 24 hour   Intake 200 ml   Output 0 ml   Net 200 ml           Medications:   sodium chloride      sodium chloride       Current Facility-Administered Medications   Medication Dose Route Frequency Provider Last Rate Last Admin    prazosin (MINIPRESS) capsule 1 mg  1 mg Oral Nightly Sol Neumann MD   1 mg at 02/19/23 2007    risperiDONE (RISPERDAL) tablet 1 mg  1 mg Oral Nightly Sol Neumann MD   1 mg at 02/19/23 2007    lamoTRIgine (LAMICTAL) tablet 225 mg  225 mg Oral Daily Sol Neumann MD   225 mg at 02/20/23 0800    haloperidol lactate (HALDOL) injection 5 mg  5 mg IntraMUSCular Q6H PRN Sol Neumann MD        risperiDONE (RISPERDAL M-TABS) disintegrating tablet 2 mg  2 mg Oral Q6H PRN Sol Neumann MD        melatonin disintegrating tablet 5 mg  5 mg Oral Nightly Sol Neumann MD   5 mg at 02/19/23 2007    sodium chloride flush 0.9 % injection 5-40 mL  5-40 mL IntraVENous 2 times per day Belen Guerra MD        sodium chloride flush 0.9 % injection 5-40 mL  5-40 mL IntraVENous PRN MD Sal[place] Knott.9 % sodium chloride infusion   IntraVENous PRN Belen Guerra MD        HYDROmorphone HCl PF (DILAUDID) injection 0.25 mg  0.25 mg IntraVENous Q5 Min PRN Belen Guerra MD        HYDROmorphone HCl PF (DILAUDID) injection 0.5 mg  0.5 mg IntraVENous Q5 Min PRN Belen Guerra MD        lacosamide (VIMPAT) tablet 50 mg  50 mg Oral BID Koffi Crowell DO   50 mg at 02/20/23 0800    sodium chloride flush 0.9 % injection 5-40 mL  5-40 mL IntraVENous 2 times per day Renetta Raymundo MD   10 mL at 02/18/23 0945    sodium chloride flush 0.9 % injection 5-40 mL  5-40 mL IntraVENous PRN Renetta Raymundo MD        0.9 % sodium chloride infusion   IntraVENous PRN Renetta Raymundo MD        enoxaparin (LOVENOX) injection 40 mg  40 mg SubCUTAneous Daily Basil Melton MD   40 mg at 02/18/23 0945    ondansetron (ZOFRAN-ODT) disintegrating tablet 4 mg  4 mg Oral Q8H PRDAVID Melton MD        Or    ondansetron [facility]) injection 4 mg  4 mg IntraVENous Q6H PRDAVID Melton MD        acetaminophen (TYLENOL) tablet 650 mg  650 mg Oral Q4H PRN Basil Melton MD   650 mg at 02/20/23 0800    Or    acetaminophen (TYLENOL) suppository 650 mg  650 mg Rectal Q4H PRN Basil Melton MD        potassium chloride (KLOR-CON M) extended release tablet 40 mEq  40 mEq Oral PRN Basil Melton MD        Or    potassium bicarb-citric acid (EFFER-K) effervescent tablet 40 mEq  40 mEq Oral PRN Basil Melton MD        Or    potassium chloride 10 mEq/100 mL IVPB (Peripheral Line)  10 mEq IntraVENous PRDAVID Melton MD        magnesium sulfate 2000 mg in 50 mL IVPB premix  2,000 mg IntraVENous PRN Basil Melton MD        sodium phosphate 22.5 mmol in sodium chloride 0.9 % 250 mL IVPB  0.32 mmol/kg IntraVENous PRN Basil Melton MD        polyethylene glycol (GLYCOLAX) packet 17 g  17 g Oral Daily PRDAVID Melton MD        melatonin disintegrating tablet 5 mg  5 mg Oral Nightly PRDAVID Melton MD        calcium carbonate (TUMS) chewable tablet 500 mg  500 mg Oral TID PRDAVID Melton MD        levETIRAcetam (KEPPRA) tablet 1,500 mg  1,500 mg Oral BID Millie Epley, MD   1,500 mg at 02/20/23 0800    clonazePAM (KLONOPIN) tablet 0.5 mg  0.5 mg Oral Q12H Millie Epley, MD   0.5 mg at 02/20/23 0800    ferrous sulfate (IRON 325) tablet 325 mg  325 mg Oral BID WC Basil Melton MD   325 mg at 02/20/23 0800    FLUoxetine (PROZAC) capsule 80 mg  80 mg Oral Daily Basil Melton MD   80 mg at 02/20/23 0800    ascorbic acid (VITAMIN C) tablet 500 mg  500 mg Oral BID with meals Basil Melton MD   500 mg at 02/20/23 0800         sodium chloride      sodium chloride        prazosin  1 mg Oral Nightly    risperiDONE  1 mg Oral Nightly    lamoTRIgine  225 mg Oral Daily    melatonin  5 mg Oral Nightly    sodium chloride flush  5-40 mL IntraVENous 2 times per day    lacosamide  50 mg Oral BID    sodium chloride flush  5-40 mL IntraVENous 2 times per day    enoxaparin  40 mg SubCUTAneous Daily    levETIRAcetam  1,500 mg Oral BID    clonazePAM  0.5 mg Oral Q12H    ferrous sulfate  325 mg Oral BID WC    FLUoxetine  80 mg Oral Daily    ascorbic acid  500 mg Oral BID with meals     haloperidol lactate, risperiDONE, sodium chloride flush, sodium chloride, HYDROmorphone, HYDROmorphone, sodium chloride flush, sodium chloride, ondansetron **OR** ondansetron, acetaminophen **OR** acetaminophen, potassium chloride **OR** potassium alternative oral replacement **OR** potassium chloride, magnesium sulfate, sodium phosphate IVPB, polyethylene glycol, melatonin, calcium carbonate  ADULT DIET; Regular; Safety Tray; Safety Tray (Disposables No Utensils)       Labs:   CBC with DIFF:  Recent Labs     02/18/23  0419   WBC 7.8   RBC 4.44   HGB 12.3   HCT 38.3   MCV 86.3   MCH 27.7   MCHC 32.1*   RDW 20.1*      MPV 11.9   NEUTOPHILPCT 60.1   LYMPHOPCT 30.2   MONOPCT 8.5   EOSRELPCT 0.4   BASOPCT 0.5   NEUTROABS 4.7   LYMPHSABS 2.4   MONOSABS 0.70   EOSABS 0.00   BASOSABS 0.00         CMP/BMP:  Recent Labs     02/18/23  0419      K 3.7      CO2 23   ANIONGAP 12   GLUCOSE 84   BUN 6   CREATININE 0.7   LABGLOM >60   CALCIUM 8.7           CRP:  No results for input(s): CRP in the last 72 hours. Sed Rate:  No results for input(s): SEDRATE in the last 72 hours. HgBA1c:  No components found for: HGBA1C  FLP:  No results found for: TRIG, HDL, LDLCALC, LDLDIRECT, LABVLDL  TSH:    Lab Results   Component Value Date/Time    TSH 1.340 01/16/2023 06:17 PM     Troponin T:   No results for input(s): TROPONINI in the last 72 hours. Pro-BNP: No results for input(s): BNP in the last 72 hours.   INR: No results for input(s): INR in the last 72 hours.  ABGs:   Lab Results   Component Value Date/Time    PHART 7.430 01/13/2023 02:16 PM    PO2ART 139.0 01/13/2023 02:16 PM    PZH5ZRX 39.0 01/13/2023 02:16 PM     UA:  No results for input(s): NITRITE, COLORU, PHUR, LABCAST, WBCUA, RBCUA, MUCUS, TRICHOMONAS, YEAST, BACTERIA, CLARITYU, SPECGRAV, LEUKOCYTESUR, UROBILINOGEN, BILIRUBINUR, BLOODU, GLUCOSEU, AMORPHOUS in the last 72 hours. Invalid input(s): Orval Li        Culture Results:    No results for input(s): CXSURG in the last 720 hours. Blood Culture Recent: No results for input(s): BC in the last 720 hours. No results for input(s): BC, BLOODCULT2, ORG in the last 72 hours. Cultures:   No results for input(s): CULTURE in the last 72 hours. No results for input(s): BC, Mary Beth Stef in the last 72 hours. No results for input(s): CXSURG in the last 72 hours. No results for input(s): MG, PHOS in the last 72 hours. No results for input(s): AST, ALT, ALB, BILITOT, ALKPHOS, ALB in the last 72 hours. RAD:   CT Head WO Contrast    Result Date: 2/17/2023  Exam: Noncontrast CT of the brain Technique: Axial noncontrast images were obtained from the skull base to the vertex. Coronal and sagittal reconstructions were performed. Radiation dose optimization technique was utilized. Clinical information: Seizure Comparison: Noncontrast CT of the brain performed on January 16, 2023 Findings: No evidence of acute hemorrhage, mass, mass effect, or midline shift. The ventricular system and basal cisterns are within normal limits for the patients age. Gray-white differentiation is maintained. There is no evidence of an intra-axial or extra-axial fluid collection. Bone windows reveal no evidence of acute fracture. The visualized paranasal sinuses are within normal limits. The orbits are grossly unremarkable. No acute intracranial process evident on noncontrast CT of the brain.     XR CHEST PORTABLE    Result Date: 2/17/2023  CLINICAL HISTORY: Seizure TECHNIQUE: Single AP view of the chest COMPARISON: CXR from 1/10/2023 FINDINGS: Lines and tubes: None. Cardiomediastinal: Normal size and configuration of the cardiomediastinal silhouette. No pneumomediastinum. Lungs and pleura: The lungs are grossly unremarkable. No pleural effusion. No pneumothorax. Musculoskeletal: No acute osseous abnormalities. Other: None. No acute cardiopulmonary process identified. Objective:   Vitals:   /71   Pulse (!) 105   Temp 98.1 °F (36.7 °C) (Temporal)   Resp 18   Ht 5' 5\" (1.651 m)   Wt 199 lb 9.6 oz (90.5 kg)   SpO2 96%   BMI 33.22 kg/m²     Patient Vitals for the past 24 hrs:   BP Temp Temp src Pulse Resp SpO2 Weight   02/20/23 0615 -- -- -- -- -- -- 199 lb 9.6 oz (90.5 kg)   02/20/23 0400 111/71 98.1 °F (36.7 °C) Temporal (!) 105 18 96 % --   02/19/23 1700 121/72 99.9 °F (37.7 °C) Temporal 95 20 96 % --   02/19/23 1513 -- -- -- -- -- 95 % --   02/19/23 1448 119/82 98.4 °F (36.9 °C) Temporal 90 16 98 % --         24HR INTAKE/OUTPUT:    Intake/Output Summary (Last 24 hours) at 2/20/2023 0841  Last data filed at 2/20/2023 0200  Gross per 24 hour   Intake 200 ml   Output 0 ml   Net 200 ml           Physical Exam  Vitals and nursing note reviewed. Constitutional:       General: She is not in acute distress. Appearance: Normal appearance. She is not ill-appearing, toxic-appearing or diaphoretic. HENT:      Head: Normocephalic and atraumatic. Right Ear: External ear normal.      Left Ear: External ear normal.      Nose: Nose normal. No congestion or rhinorrhea. Mouth/Throat:      Mouth: Mucous membranes are moist.      Pharynx: Oropharynx is clear. No oropharyngeal exudate or posterior oropharyngeal erythema. Eyes:      General: No scleral icterus. Right eye: No discharge. Left eye: No discharge. Extraocular Movements: Extraocular movements intact.       Conjunctiva/sclera: Conjunctivae normal.      Pupils: Pupils are equal, round, and reactive to light. Cardiovascular:      Rate and Rhythm: Normal rate and regular rhythm. Pulses: Normal pulses. Heart sounds: Normal heart sounds. No murmur heard. No friction rub. No gallop. Pulmonary:      Effort: Pulmonary effort is normal. No respiratory distress. Breath sounds: Normal breath sounds. No stridor. No wheezing, rhonchi or rales. Chest:      Chest wall: No tenderness. Abdominal:      General: Bowel sounds are normal. There is no distension. Palpations: Abdomen is soft. Tenderness: There is no abdominal tenderness. There is no guarding or rebound. Musculoskeletal:         General: No swelling, tenderness, deformity or signs of injury. Normal range of motion. Cervical back: Normal range of motion and neck supple. No rigidity. No muscular tenderness. Right lower leg: No edema. Left lower leg: No edema. Skin:     General: Skin is warm and dry. Capillary Refill: Capillary refill takes less than 2 seconds. Coloration: Skin is not jaundiced or pale. Findings: No bruising, erythema, lesion or rash. Neurological:      General: No focal deficit present. Mental Status: She is alert. Mental status is at baseline. Cranial Nerves: No cranial nerve deficit. Sensory: No sensory deficit. Motor: No weakness. Coordination: Coordination normal.   Psychiatric:         Mood and Affect: Mood normal.         Behavior: Behavior normal.         Thought Content:  Thought content normal.         Judgment: Judgment normal.         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     Priority: Medium    Intractable seizure disorder ([facility] 02/17/2023     Priority: Medium    Chronic static encephalopathy 02/17/2023     Priority: Medium    Autism 02/17/2023     Priority: Medium    Seizure-like activity ([address] 02/16/2023     Priority: Medium    Acne 02/14/2023     Priority: Medium    Attention deficit disorder 02/14/2023     Priority: Medium    Atypical squamous cells of undetermined significance (ASCUS) on Papanicolaou smear of cervix 02/14/2023     Priority: Medium    Dysmenorrhea 02/14/2023     Priority: Medium    Sensory processing difficulty 02/14/2023     Priority: Medium    Active autistic disorder 01/26/2023     Priority: Medium    Anxiety disorder 01/26/2023     Priority: Medium    Hypokalemia 01/26/2023     Priority: Medium    Status epilepticus ([address] 01/06/2023     Priority: Medium    Swallowed foreign body 02/16/2023     Added automatically from request for surgery [id number]         Hospital Problems             Last Modified POA    * (Principal) Seizure-like activity ([address] 2/16/2023 Yes    Anxiety disorder 2/19/2023 Yes    Intractable seizure disorder ([address] 2/17/2023 Yes    Chronic static encephalopathy 2/17/2023 Yes    Autism 2/17/2023 Yes    Gastric erosion 2/18/2023 Yes    Duodenal erosion 2/18/2023 Yes    Depression 2/19/2023 Yes    Seizure ([address] 2/20/2023 Yes    Swallowed foreign body 2/18/2023 Unknown    Overview Signed 2/18/2023  2:27 AM by Bryon Lucero MD     Added automatically from request for surgery [id number]        Principal Problem:    Seizure-like activity ([address]  Active Problems:    Anxiety disorder    Intractable seizure disorder ([address]    Chronic static encephalopathy    Autism    Gastric erosion    Duodenal erosion    Depression    Seizure ([address]    Swallowed foreign body  Resolved Problems:    * No resolved hospital problems. *          Brief History/Summary:    As per Initial admission HPI 2/16/2023, quoted below;  \"Mrs. Juan Elder is a pleasant 33year old lady from home. She follows with Dr Leary Habermann. She has had some of her medications decreased/tapered recently.  She has a history of epilepsy with onset at age 24 years. She had family present at the bedside. The phenytoin and Vimpat were being tapered. She has had multiple seizures tonight here in the hospital. She had reportedly had but a single seizure after her last visit (end of January) prior to her medications being reduced last week. She also has a history of Autism and is at times mute, but will still freely answer with head gestures. \"      Seizures  Seizure precautions  Aspiration precautions  Neuro check every 4 hours  Neurology consulted on admission  Normal awake and asleep EEG (02/17/2023)  Lamotrigine 225 mg p.o. daily  Levetiracetam 1,500 mg p.o. twice daily  Deferred further antiepileptic regiment to Neurology        Foreign body ingestion - ? Intentional versus unintentional  KUB (02/18/2023): Impression: There are 2 round metallic foreign bodies in the small bowel of the left mid abdomen  GI consulted for removal of battery (02/18/2023)  General surgery on board, however no emergent or urgent surgical intervention recommended at this time. Continue serial imaging  CT abdomen/pelvis-pending    Autism   Agitation and aggressiveness  One-to-one sitter at bedside for safety  Psych on board        Continue management of other chronic medical conditions - see above and orders. Advance Directive: Full Code    ADULT DIET; Regular; Safety Tray; Safety Tray (Disposables No Utensils)         Consults Made:   IP CONSULT TO NEUROLOGY  IP CONSULT TO GI  IP CONSULT TO CASE MANAGEMENT  IP CONSULT TO PSYCHIATRY  IP CONSULT TO GENERAL SURGERY      DVT prophylaxis: Enoxaparin        Discharge planning:   Continue current management, including one-to-one sitter at bedside  Continue management in the CCU  SW and psych on board, for possible placement versus inpatient psych admission, when medically stable    Time Spent is  35 mins  in the examination, evaluation, counseling and review of medications, assessment and plan.      Electronically signed by   Kaci Gandara Danika Headley MD, MPH, MD,   Internal Medicine Hospitalist   2/20/2023 8:41 AM

## 2023-02-20 NOTE — PROGRESS NOTES
Pt typed nurse a note on ipad that read \"I want to go home. \" Explained to pt that I would share that with the doctors, but that they would have to determine when a discharge would be safe. Pt walked throughout unit, nurse walked with pt in hallway; escorted pt as she got onto elevator and pushed button for first floor. Pt stopped on first floor and typed another note that read \"I go home today. Where is the door to outside? \"  Nurse escorted pt back to her room so that pt was present when doctors make morning rounds. Nurse remains at bedside.      Electronically signed by Deep Bass RN on 2/20/2023 at 8:27 AM

## 2023-02-20 NOTE — PROGRESS NOTES
15:30 This RN called MD Jonnie Stephens due to pt safety concerns, violent threats made toward staff, elopement activity, attempts to ingest foreign objects and attempts of self harm. See orders from MD Jonnie Stephens. 15:56 RN notified Tereza Osorio in Clinical house of event. RN received instructed to transfer pt back to CCU.       16:16 RN attempted to phone calls to pt mother at number noted in [address] Both calls went to . RN left  asking for return call so RN could update family on event and pt transfer back to CCU.

## 2023-02-20 NOTE — PROGRESS NOTES
Pt arrived from 5th floor with security and staff from 5th floor. Pt is on a floor bed. Very agitated. Not speaking at this time.  Electronically signed by Alessandro Tavarez RN on 2/19/2023 at 6:20 PM
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
## 2023-02-20 NOTE — PROGRESS NOTES
[facility] Neurology Progress Note      Patient:   Hi Menezes  MR#:    [id number]   Room:    [facility]226-84   YOB: 1995  Date of Progress Note: 2/20/2023  Time of Note                           10:13 AM  Consulting Physician:  Obie Jeans, DO  Attending Physician:  Norman Cleaning MD      INTERVAL HISTORY:  Events are better, though more agitation noted, events still felt non-epileptic, patient swallowed 2 small batteries, GI / surgery following. REVIEW OF SYSTEMS:  Constitutional: No fevers No chills  Neck:No stiffness  Respiratory: No shortness of breath  Cardiovascular: No chest pain No palpitations  Gastrointestinal: No abdominal pain    Genitourinary: No Dysuria  Neurological: No headache    PHYSICAL EXAM:    Constitutional -   /71   Pulse (!) 105   Temp 98.1 °F (36.7 °C) (Temporal)   Resp 18   Ht 5' 5\" (1.651 m)   Wt 199 lb 9.6 oz (90.5 kg)   SpO2 96%   BMI 33.22 kg/m²   General appearance: No acute distress   EYES -   Conjunctiva normal  Pupillary exam as below, see CN exam in the neurologic exam  ENT-    No scars, masses, or lesions over external nose or ears  Hearing normal bilaterally to finger rub  Neck-   No neck masses noted  Thyroid normal   No jugular vein distension  Cardiovascular -   No clubbing, cyanosis, or edema   Pulmonary-   Good expansion, normal effort without use of accessory muscles  Musculoskeletal -   No significant wasting of muscles noted  Gait as below, see gait exam in the neurologic exam  Muscle strength, tone, stability as below see the motor exam in the neurologic exam.   No bony deformities  Skin -   Warm, dry, and intact to inspection and palpation.     No rash, erythema, or pallor  Psychiatric -   Mood, affect, and behavior appear normal    Memory as below see mental status examination in the neurologic exam      NEUROLOGICAL EXAM    Mental status   [x] Awake, alert, oriented   [x] Affect attention and concentration appear appropriate  [x] Recent and remote memory appears unremarkable  [] Speech normal without dysarthria or aphasia, comprehension and repetition intact. COMMENTS: Speech limited    Cranial Nerves [x] No VF deficit to confrontation  [x] PERRLA, EOMI, no nystagmus, conjugate eye movements, no ptosis  [x] Face symmetric  [x] Facial sensation intact  [x] Tongue midline no atrophy or fasciculations present  [x] Palate midline, hearing to finger rub normal  [x] Shoulder shrug and SCM testing normal  COMMENTS:   Motor   [x] 5/5 strength x 4 extremities  [x] Normal bulk and tone  [x] No tremor present  [x] No rigidity or bradykinesia noted  COMMENTS:   Sensory  [x] Sensation intact to light touch, pin prick, vibration, and proprioception BLE  [] Sensation intact to light touch, pin prick, vibration, and proprioception BUE  COMMENTS:   Coordination [x] FTN normal bilaterally   [] HTS normal bilaterally  [] YEVGENIY normal.   COMMENTS:   Reflexes  [x] Symmetric and non-pathological  [] Toes downgoing bilaterally  [] No clonus present  COMMENTS:   Gait                  [] Normal steady gait    [] Ataxic    [] Spastic     [] Magnetic     [] Shuffling  [x] Not assessed  COMMENTS:       LABS/IMAGING:    CT Head WO Contrast    Result Date: 2/17/2023  Exam: Noncontrast CT of the brain Technique: Axial noncontrast images were obtained from the skull base to the vertex. Coronal and sagittal reconstructions were performed. Radiation dose optimization technique was utilized. Clinical information: Seizure Comparison: Noncontrast CT of the brain performed on January 16, 2023 Findings: No evidence of acute hemorrhage, mass, mass effect, or midline shift. The ventricular system and basal cisterns are within normal limits for the patients age. Gray-white differentiation is maintained. There is no evidence of an intra-axial or extra-axial fluid collection. Bone windows reveal no evidence of acute fracture. The visualized paranasal sinuses are within normal limits. The orbits are grossly unremarkable. No acute intracranial process evident on noncontrast CT of the brain. XR CHEST PORTABLE    Result Date: 2/17/2023  CLINICAL HISTORY: Seizure TECHNIQUE: Single AP view of the chest COMPARISON: CXR from 1/10/2023 FINDINGS: Lines and tubes: None. Cardiomediastinal: Normal size and configuration of the cardiomediastinal silhouette. No pneumomediastinum. Lungs and pleura: The lungs are grossly unremarkable. No pleural effusion. No pneumothorax. Musculoskeletal: No acute osseous abnormalities. Other: None. No acute cardiopulmonary process identified. Lab Results   Component Value Date    WBC 7.8 02/18/2023    HGB 12.3 02/18/2023    HCT 38.3 02/18/2023    MCV 86.3 02/18/2023     02/18/2023     Lab Results   Component Value Date     02/18/2023    K 3.7 02/18/2023     02/18/2023    CO2 23 02/18/2023    BUN 6 02/18/2023    CREATININE 0.7 02/18/2023    GLUCOSE 84 02/18/2023    CALCIUM 8.7 02/18/2023    PROT 7.1 02/16/2023    LABALBU 4.2 02/16/2023    BILITOT <0.2 02/16/2023    ALKPHOS 90 02/16/2023    AST 15 02/16/2023    ALT 17 02/16/2023    LABGLOM >60 02/18/2023   No results found for: INR, PROTIME    RECORD REVIEW:   Previous medical records, medications were reviewed at today's visit. Nursing/physician notes, imaging, labs and vitals reviewed. PT,OT and/or speech notes reviewed      ASSESSMENT:   33 y.o. admitted with seizure like episodes. She has had a extensive recent work-up including long-term EEG monitoring both here and at [facility].  No overt epileptiform abnormalities were seen. Her events are felt nonepileptic. Previously swallowed 2 small batteries,  GI and surgery have been following. Prior MRI and repeat EEG from here normal.     PLAN:    Psych following    Starting to wean AEDs, stopped dilantin, started to wean vimpat, continue keppra for now and likely wean it next. Will continue lamcital may help with mood stabilization. GI / surgery following     Ativan prn, event precautions      Please feel free to call with any questions. [phone] (cell phone).       Jose Watts DO  Board Certified Neurology

## 2023-02-20 NOTE — PROGRESS NOTES
Sitter notified nurse that patient had a tack in her mouth. Patient unwilling to give up the tack initially. Nurse explained that if swallowed, could cause harm. Nurse offered to find a safe alternative if she needed to chew on something. After talking with patient, we were able to switch out thumb tack for a hospital bite block. Pt willing to use this instead. Nurse removed tack from patient's room. Sitter remains at bedside.      Electronically signed by Narinder Hernández RN on 2/19/2023 at 8:55 PM

## 2023-02-20 NOTE — PROGRESS NOTES
Subjective:  Patient was taken for a walk earlier on the floor. Apparently grabbed another thumb tack and it is currently in her mouth. She shakes or nods her head a little to questions. She does report some periumbilical abdominal pain, but has been eating okay. Objective:  /71   Pulse (!) 105   Temp 98.1 °F (36.7 °C) (Temporal)   Resp 18   Ht 5' 5\" (1.651 m)   Wt 199 lb 9.6 oz (90.5 kg)   SpO2 96%   BMI 33.22 kg/m²   General: NAD, awake and alert  Chest: regular, non-labored  Abdomen: Soft, ND, minimal TTP periumbilical    Assessment and plan:  33year old female with autism s/p ingestion of two coin batteries, now with a thumbtack in her mouth  She was not able to get Ct yesterday due to having no IV access and not being cooperative. I will go ahead and order a KUB today to see if we can tell if there has been any movement of the batteries. Hopefully she will give the nurses the thumbtack. With this behavior- the patient likely needs more intensive psych care, if she continues will need restraints. No plan for surgical intervention unless there is evidence of abdominal pain or perforation. Continue with diet, will add miralax. Other cares per the neurology, hospitalist, psych, and GI.

## 2023-02-20 NOTE — PROGRESS NOTES
Spoke with radiology about pending CT abdomen that had been placed on hold from yesterday. Explained that pt is not agreeable to leave room/ go for any imaging at this time. MD had placed order for KUB to assess batteries. RN called to ask to attempt a portable KUB. Xray present in unit, patient did initially refuse portable but was agreeable shortly thereafter. KUB completed. Pt still not agreeable to participate in any imaging outside of pt's room. Discussed with CT; still unable to complete order for CT abdomen. Pt remains without IV access.

## 2023-02-21 PROCEDURE — 6360000002 HC RX W HCPCS: Performed by: PSYCHIATRY & NEUROLOGY

## 2023-02-21 PROCEDURE — 6370000000 HC RX 637 (ALT 250 FOR IP): Performed by: PSYCHIATRY & NEUROLOGY

## 2023-02-21 PROCEDURE — 99233 SBSQ HOSP IP/OBS HIGH 50: CPT | Performed by: PSYCHIATRY & NEUROLOGY

## 2023-02-21 PROCEDURE — 2100000000 HC CCU R&B

## 2023-02-21 PROCEDURE — 6370000000 HC RX 637 (ALT 250 FOR IP): Performed by: HOSPITALIST

## 2023-02-21 PROCEDURE — 2140000000 HC CCU INTERMEDIATE R&B

## 2023-02-21 PROCEDURE — 94760 N-INVAS EAR/PLS OXIMETRY 1: CPT

## 2023-02-21 PROCEDURE — 99232 SBSQ HOSP IP/OBS MODERATE 35: CPT | Performed by: INTERNAL MEDICINE

## 2023-02-21 RX ORDER — RISPERIDONE 1 MG/1
2 TABLET ORAL NIGHTLY
Status: DISCONTINUED | OUTPATIENT
Start: 2023-02-22 | End: 2023-02-27 | Stop reason: HOSPADM

## 2023-02-21 RX ORDER — CHLORPROMAZINE HYDROCHLORIDE 25 MG/ML
10 INJECTION INTRAMUSCULAR ONCE
Status: DISCONTINUED | OUTPATIENT
Start: 2023-02-21 | End: 2023-02-27 | Stop reason: ALTCHOICE

## 2023-02-21 RX ADMIN — LEVETIRACETAM 1500 MG: 500 TABLET, FILM COATED ORAL at 20:38

## 2023-02-21 RX ADMIN — PRAZOSIN HYDROCHLORIDE 1 MG: 1 CAPSULE ORAL at 20:40

## 2023-02-21 RX ADMIN — LEVETIRACETAM 1500 MG: 500 TABLET, FILM COATED ORAL at 08:00

## 2023-02-21 RX ADMIN — FLUOXETINE 80 MG: 20 CAPSULE ORAL at 11:32

## 2023-02-21 RX ADMIN — LACOSAMIDE 50 MG: 50 TABLET, FILM COATED ORAL at 09:28

## 2023-02-21 RX ADMIN — NALTREXONE HYDROCHLORIDE 50 MG: 50 TABLET, FILM COATED ORAL at 14:52

## 2023-02-21 RX ADMIN — LAMOTRIGINE 225 MG: 200 TABLET ORAL at 09:28

## 2023-02-21 RX ADMIN — CLONAZEPAM 0.5 MG: 1 TABLET ORAL at 20:38

## 2023-02-21 RX ADMIN — HALOPERIDOL LACTATE 5 MG: 5 INJECTION, SOLUTION INTRAMUSCULAR at 14:15

## 2023-02-21 RX ADMIN — LACOSAMIDE 50 MG: 50 TABLET, FILM COATED ORAL at 20:39

## 2023-02-21 RX ADMIN — Medication 5 MG: at 20:39

## 2023-02-21 NOTE — PROGRESS NOTES
At 11:40 patient dropped object in hand and went stiff all over, hands clinching, eyes pointed towards the right and drooling. Dr. Eloy Stapleton notified, no new orders at this time. Vital signs stable. Patient became responsive at 11:48.  Patient now alert and resting comfortably in bed    Electronically signed by Suad Brownlee RN on 2/21/2023 at 12:16 PM

## 2023-02-21 NOTE — PROGRESS NOTES
Patient anxious and wanting to walk around floor. Patient educated that she cannot walk around floor per psychiatry NP orders/ request due to safety. Patient proceeded to move past staff and ambulate in halls, security called to help assist patient back to room, Haldol IM given to help with agitation. Dr. Carmella John notified about patient agitation and unable to re-orientate. Psych notified of patient agitation levels increasing, psych signed off at this time.      Electronically signed by Hari Brown RN on 2/21/2023 at 2:42 PM

## 2023-02-21 NOTE — PROGRESS NOTES
GI  - PROGRESS NOTE    Subjective:   Admit Date: 2/16/2023  PCP: Rebecca Monge    Patient being seen for: Foreign body ingestion. Abdominal pain. HPI: Patient is feeling fine. Apparently she swallowed a thumbtack yesterday morning after I saw her. She is still having some lower abdominal pain. She is able to tolerate her diet. No nausea or vomiting. 1 bowel movement yesterday. No hematochezia or melanotic stool. No fever or chills. Medications:  Scheduled Meds:   risperiDONE  1 mg Oral BID    naltrexone  50 mg Oral Daily with breakfast    polyethylene glycol  17 g Oral BID    prazosin  1 mg Oral Nightly    lamoTRIgine  225 mg Oral Daily    melatonin  5 mg Oral Nightly    sodium chloride flush  5-40 mL IntraVENous 2 times per day    lacosamide  50 mg Oral BID    enoxaparin  40 mg SubCUTAneous Daily    levETIRAcetam  1,500 mg Oral BID    clonazePAM  0.5 mg Oral Q12H    ferrous sulfate  325 mg Oral BID WC    FLUoxetine  80 mg Oral Daily    ascorbic acid  500 mg Oral BID with meals       Continuous Infusions:   sodium chloride         PRN Meds:.risperiDONE, haloperidol lactate, sodium chloride flush, sodium chloride, HYDROmorphone, HYDROmorphone, ondansetron **OR** ondansetron, acetaminophen **OR** acetaminophen, potassium chloride **OR** potassium alternative oral replacement **OR** potassium chloride, magnesium sulfate, sodium phosphate IVPB, polyethylene glycol, melatonin, calcium carbonate      Labs:   No results for input(s): WBC, RBC, HGB, HCT, MCV, MCH, MCHC, PLT in the last 72 hours. No results for input(s): NA, K, ANIONGAP, CL, CO2, BUN, CREATININE, GLUCOSE, GFR, CALCIUM in the last 72 hours. No results for input(s): MG, PHOS in the last 72 hours. No results for input(s): AST, ALT, ALB, BILITOT, ALKPHOS, ALB in the last 72 hours.   HgBA1c:  No components found for: HGBA1C  FLP:  No results found for: TRIG, HDL, LDLCALC, LDLDIRECT, LABVLDL  TSH: Lab Results   Component Value Date/Time    TSH 1.340 01/16/2023 06:17 PM     Troponin T: No results for input(s): TROPONINI in the last 72 hours. INR: No results for input(s): INR in the last 72 hours. No results for input(s): LIPASE in the last 72 hours. -----------------------------------------------------------------  RAD:     XR ABDOMEN (KUB) (SINGLE AP VIEW)    Result Date: 2/21/2023  Exam: Abdominal radiographs Technique: Supine AP abdominal radiograph was obtained. Clinical information: Progress of ingested batteries. Comparison: Radiographs of the abdomen performed on February 19, 2023 Findings: Nonspecific bowel gas pattern is noted. No free intraperitoneal air is visualized. Ingested batteries are visualized in the right mid abdomen, not significantly changed in position when compared to prior study. Osseous structures demonstrate no evidence of acute process. Nonobstructive bowel gas pattern. Ingested batteries are visualized in the right mid abdomen, not significantly changed in position when compared to prior study. Close follow-up recommended. Physical Exam:     /83   Pulse (!) 117   Temp 98.8 °F (37.1 °C)   Resp 14   Ht 5' 5\" (1.651 m)   Wt 199 lb 9.6 oz (90.5 kg)   SpO2 96%   BMI 33.22 kg/m²     General appearance: alert and cooperative with exam, appears stated age, no acute distress   Head: normal cephalic, atraumatic. EOMI bilaterally, no neck lymphadenopathy appreciated, no carotid bruits noted  Lungs: Clear to percussion and auscultation. No wheezing or rales. Heart: S1 S2 are audible. No added sound. Abdomen: Soft, non distended and non tender. No hepatosplenomegaly. Bowel sounds are audible. No masses palpable. Asssessment:   Patient with a history of foreign body ingestion admitted with abdominal pain after swallowing batteries. She also swallowed a thumbtack yesterday. CT scan still not done.   KUB yesterday revealed the same position of her batteries and the mid abdomen area. No thumbtacks were noted. She is doing reasonably well. Impression:   1. Foreign body ingestion. 2.  Abdominal pain. 3.  Psychiatric disorder. Plan:   1.  I again highly recommended that she should not swallow any foreign object. 2.  Repeat KUB in few days time. 3.  I will sign off the patient, please reconsult if needed.     Nabil Santoro MD  2/21/2023, 11:53 AM

## 2023-02-21 NOTE — PROGRESS NOTES
[facility] Progress Note    Patient:  Mt Yeager  YOB: 1995  Date of Service: 2/21/2023  MRN: [id number]   Acct: [de-identified]   Primary Care Physician: Navarro Malin  Advance Directive: Full Code  Admit Date: 2/16/2023       Hospital Day: 1        CHIEF COMPLAINT:     Chief Complaint   Patient presents with    Seizures     2 seizures prior to EMS arrival 4 seizures with EMS       2/21/2023 8:54 AM  Subjective / Interval History:   02/21/2023  Patient seen and examined this AM.  Intermittent agitation and aggressiveness. Currently laying comfortably in bed in no apparent acute distress. No further seizure episodes reported. 02/20/2023  No acute changes or acute overnight event reported. Patient seen and examined. Continues to be intermittently agitated, requiring one-to-one sitter in the CCU. Laying comfortably in bed however no apparent acute distress. 02/19/2023  Patient seen and examined. Sitting comfortably in bed in no acute distress. Denies any acute complaints or distress at this time. Patient transferred to CCU yesterday, for persistent seizure activities. No acute overnight event reported. 02/18/2023  Patient seen and examined this AM.  Shanda Prost comfortably in bed in no acute distress. No new complaints. One-to-one sitter at bedside for safety. Patient reportedly with another episode of seizures lasting about 5 minutes, with heart rates rising up to 150s yesterday. Patient reportedly with agitation and aggressiveness around 22:20 PM yesterday, requiring Geodon as well as diphenhydramine IM. Patient was reportedly swallowed some foreign bodies (2 disc batteries). GI subsequently consulted for foreign body removal via endoscopy. 02/17/2023  No acute changes or acute overnight event reported. Patient seen and examined. No new complaints. No further seizure activities at this time. Denies any dizziness, lightheadedness or palpitations. Endorses some nausea but no vomiting. Laying comfortably in bed in no acute distress. Review of Systems:   Review of Systems  ROS: 14 point review of systems is negative except as specifically addressed above. ADULT DIET;  Regular; Safety Tray; Safety Tray (Disposables No Utensils)    Intake/Output Summary (Last 24 hours) at 2/21/2023 0854  Last data filed at 2/20/2023 1815  Gross per 24 hour   Intake 240 ml   Output --   Net 240 ml           Medications:   sodium chloride       Current Facility-Administered Medications   Medication Dose Route Frequency Provider Last Rate Last Admin    risperiDONE (RISPERDAL) tablet 1 mg  1 mg Oral BID Hernandez Camejo MD   1 mg at 02/20/23 2210    risperiDONE (RISPERDAL) tablet 2 mg  2 mg Oral Q6H PRN Hernandez Camejo MD   2 mg at 02/20/23 1505    naltrexone (DEPADE) tablet 50 mg  50 mg Oral Daily with breakfast Hernandez Camejo MD   50 mg at 02/20/23 1607    polyethylene glycol (GLYCOLAX) packet 17 g  17 g Oral BID Rivas Baumann MD   17 g at 02/20/23 2208    prazosin (MINIPRESS) capsule 1 mg  1 mg Oral Nightly Hernandez Camejo MD   1 mg at 02/20/23 2208    lamoTRIgine (LAMICTAL) tablet 225 mg  225 mg Oral Daily Hernandez Camejo MD   225 mg at 02/20/23 0800    haloperidol lactate (HALDOL) injection 5 mg  5 mg IntraMUSCular Q6H PRN Hernandez Camejo MD        melatonin disintegrating tablet 5 mg  5 mg Oral Nightly Hernandez Camejo MD   5 mg at 02/20/23 2207    sodium chloride flush 0.9 % injection 5-40 mL  5-40 mL IntraVENous 2 times per day Misty Parikh MD        sodium chloride flush 0.9 % injection 5-40 mL  5-40 mL IntraVENous PRN Misty Parikh MD        0.9 % sodium chloride infusion   IntraVENous PRN Misty Parikh MD        HYDROmorphone HCl PF (DILAUDID) injection 0.25 mg  0.25 mg IntraVENous Q5 Min PRN Misty Parikh MD        HYDROmorphone HCl PF (DILAUDID) injection 0.5 mg  0.5 mg IntraVENous Q5 Min PRN Misty Parikh MD        lacosamide (VIMPAT) tablet 50 mg  50 mg Oral BID Vera Forester, DO   50 mg at 02/20/23 2207    enoxaparin (LOVENOX) injection 40 mg  40 mg SubCUTAneous Daily Sebas Encarnacion MD   40 mg at 02/18/23 0945    ondansetron (ZOFRAN-ODT) disintegrating tablet 4 mg  4 mg Oral Q8H PRN Sebas Encarnacion MD        Or    ondansetron [facility]) injection 4 mg  4 mg IntraVENous Q6H PRN Sebas Encarnacion MD        acetaminophen (TYLENOL) tablet 650 mg  650 mg Oral Q4H PRN Sebas Encarnacion MD   650 mg at 02/20/23 1731    Or    acetaminophen (TYLENOL) suppository 650 mg  650 mg Rectal Q4H PRN Sebas Encarnacion MD        potassium chloride (KLOR-CON M) extended release tablet 40 mEq  40 mEq Oral PRN Sebas Encarnacion MD        Or    potassium bicarb-citric acid (EFFER-K) effervescent tablet 40 mEq  40 mEq Oral PRN Sebas Encarnacion MD        Or    potassium chloride 10 mEq/100 mL IVPB (Peripheral Line)  10 mEq IntraVENous PRN Sebas Encarnacion MD        magnesium sulfate 2000 mg in 50 mL IVPB premix  2,000 mg IntraVENous PRN Sebas Encarnacion MD        sodium phosphate 22.5 mmol in sodium chloride 0.9 % 250 mL IVPB  0.32 mmol/kg IntraVENous PRN Sebas Encarnacion MD        polyethylene glycol (GLYCOLAX) packet 17 g  17 g Oral Daily PRN Sebas Encarnacion MD   17 g at 02/20/23 1523    melatonin disintegrating tablet 5 mg  5 mg Oral Nightly PRN Sebas Encarnacion MD        calcium carbonate (TUMS) chewable tablet 500 mg  500 mg Oral TID PRN Sebas Encarnacion MD        levETIRAcetam (KEPPRA) tablet 1,500 mg  1,500 mg Oral BID Zach Fitch MD   1,500 mg at 02/21/23 0800    clonazePAM (KLONOPIN) tablet 0.5 mg  0.5 mg Oral Q12H Zach Fitch MD   0.5 mg at 02/20/23 2207    ferrous sulfate (IRON 325) tablet 325 mg  325 mg Oral BID  Sebas Encarnacion MD   325 mg at 02/20/23 1718    FLUoxetine (PROZAC) capsule 80 mg  80 mg Oral Daily Sebas Encarnacion MD   80 mg at 02/20/23 0800    ascorbic acid (VITAMIN C) tablet 500 mg  500 mg Oral BID with meals Sebas Encarnacion MD   500 mg at 02/20/23 3710 sodium chloride        risperiDONE  1 mg Oral BID    naltrexone  50 mg Oral Daily with breakfast    polyethylene glycol  17 g Oral BID    prazosin  1 mg Oral Nightly    lamoTRIgine  225 mg Oral Daily    melatonin  5 mg Oral Nightly    sodium chloride flush  5-40 mL IntraVENous 2 times per day    lacosamide  50 mg Oral BID    enoxaparin  40 mg SubCUTAneous Daily    levETIRAcetam  1,500 mg Oral BID    clonazePAM  0.5 mg Oral Q12H    ferrous sulfate  325 mg Oral BID WC    FLUoxetine  80 mg Oral Daily    ascorbic acid  500 mg Oral BID with meals     risperiDONE, haloperidol lactate, sodium chloride flush, sodium chloride, HYDROmorphone, HYDROmorphone, ondansetron **OR** ondansetron, acetaminophen **OR** acetaminophen, potassium chloride **OR** potassium alternative oral replacement **OR** potassium chloride, magnesium sulfate, sodium phosphate IVPB, polyethylene glycol, melatonin, calcium carbonate  ADULT DIET; Regular; Safety Tray; Safety Tray (Disposables No Utensils)       Labs:   CBC with DIFF:  No results for input(s): WBC, RBC, HGB, HCT, MCV, MCH, MCHC, RDW, PLT, MPV, NEUTOPHILPCT, LYMPHOPCT, MONOPCT, EOSRELPCT, BASOPCT, NEUTROABS, LYMPHSABS, MONOSABS, EOSABS, BASOSABS in the last 72 hours. CMP/BMP:  No results for input(s): NA, K, CL, CO2, ANIONGAP, GLUCOSE, BUN, CREATININE, LABGLOM, CALCIUM, PROT, LABALBU, BILITOT, ALKPHOS, ALT, AST, GLOB in the last 72 hours. CRP:  No results for input(s): CRP in the last 72 hours. Sed Rate:  No results for input(s): SEDRATE in the last 72 hours. HgBA1c:  No components found for: HGBA1C  FLP:  No results found for: TRIG, HDL, LDLCALC, LDLDIRECT, LABVLDL  TSH:    Lab Results   Component Value Date/Time    TSH 1.340 01/16/2023 06:17 PM     Troponin T:   No results for input(s): TROPONINI in the last 72 hours. Pro-BNP: No results for input(s): BNP in the last 72 hours. INR: No results for input(s): INR in the last 72 hours.   ABGs:   Lab Results Component Value Date/Time    PHART 7.430 01/13/2023 02:16 PM    PO2ART 139.0 01/13/2023 02:16 PM    NKN0CXZ 39.0 01/13/2023 02:16 PM     UA:  No results for input(s): NITRITE, COLORU, PHUR, LABCAST, WBCUA, RBCUA, MUCUS, TRICHOMONAS, YEAST, BACTERIA, CLARITYU, SPECGRAV, LEUKOCYTESUR, UROBILINOGEN, BILIRUBINUR, BLOODU, GLUCOSEU, AMORPHOUS in the last 72 hours. Invalid input(s): Resendiz Espinoza        Culture Results:    No results for input(s): CXSURG in the last 720 hours. Blood Culture Recent: No results for input(s): BC in the last 720 hours. No results for input(s): BC, BLOODCULT2, ORG in the last 72 hours. Cultures:   No results for input(s): CULTURE in the last 72 hours. No results for input(s): BC, Vianey Carls in the last 72 hours. No results for input(s): CXSURG in the last 72 hours. No results for input(s): MG, PHOS in the last 72 hours. No results for input(s): AST, ALT, ALB, BILITOT, ALKPHOS, ALB in the last 72 hours. RAD:   CT Head WO Contrast    Result Date: 2/17/2023  Exam: Noncontrast CT of the brain Technique: Axial noncontrast images were obtained from the skull base to the vertex. Coronal and sagittal reconstructions were performed. Radiation dose optimization technique was utilized. Clinical information: Seizure Comparison: Noncontrast CT of the brain performed on January 16, 2023 Findings: No evidence of acute hemorrhage, mass, mass effect, or midline shift. The ventricular system and basal cisterns are within normal limits for the patients age. Gray-white differentiation is maintained. There is no evidence of an intra-axial or extra-axial fluid collection. Bone windows reveal no evidence of acute fracture. The visualized paranasal sinuses are within normal limits. The orbits are grossly unremarkable. No acute intracranial process evident on noncontrast CT of the brain.     XR CHEST PORTABLE    Result Date: 2/17/2023  CLINICAL HISTORY: Seizure TECHNIQUE: Single AP view of the chest COMPARISON: CXR from 1/10/2023 FINDINGS: Lines and tubes: None. Cardiomediastinal: Normal size and configuration of the cardiomediastinal silhouette. No pneumomediastinum. Lungs and pleura: The lungs are grossly unremarkable. No pleural effusion. No pneumothorax. Musculoskeletal: No acute osseous abnormalities. Other: None. No acute cardiopulmonary process identified. Objective:   Vitals:   /75   Pulse (!) 117   Temp 99.9 °F (37.7 °C) (Temporal)   Resp 14   Ht 5' 5\" (1.651 m)   Wt 199 lb 9.6 oz (90.5 kg)   SpO2 96%   BMI 33.22 kg/m²     Patient Vitals for the past 24 hrs:   BP Temp Temp src Pulse Resp SpO2   02/20/23 2200 105/75 -- -- -- -- --   02/20/23 1832 116/69 -- -- -- -- --   02/20/23 1723 95/60 99.9 °F (37.7 °C) Temporal (!) 117 14 96 %         24HR INTAKE/OUTPUT:    Intake/Output Summary (Last 24 hours) at 2/21/2023 0854  Last data filed at 2/20/2023 1815  Gross per 24 hour   Intake 240 ml   Output --   Net 240 ml           Physical Exam  Vitals and nursing note reviewed. Constitutional:       General: She is not in acute distress. Appearance: Normal appearance. She is not ill-appearing, toxic-appearing or diaphoretic. HENT:      Head: Normocephalic and atraumatic. Right Ear: External ear normal.      Left Ear: External ear normal.      Nose: Nose normal. No congestion or rhinorrhea. Mouth/Throat:      Mouth: Mucous membranes are moist.      Pharynx: Oropharynx is clear. No oropharyngeal exudate or posterior oropharyngeal erythema. Eyes:      General: No scleral icterus. Right eye: No discharge. Left eye: No discharge. Extraocular Movements: Extraocular movements intact. Conjunctiva/sclera: Conjunctivae normal.      Pupils: Pupils are equal, round, and reactive to light. Cardiovascular:      Rate and Rhythm: Normal rate and regular rhythm. Pulses: Normal pulses. Heart sounds: Normal heart sounds. No murmur heard.     No friction rub. No gallop. Pulmonary:      Effort: Pulmonary effort is normal. No respiratory distress. Breath sounds: Normal breath sounds. No stridor. No wheezing, rhonchi or rales. Chest:      Chest wall: No tenderness. Abdominal:      General: Bowel sounds are normal. There is no distension. Palpations: Abdomen is soft. Tenderness: There is no abdominal tenderness. There is no guarding or rebound. Musculoskeletal:         General: No swelling, tenderness, deformity or signs of injury. Normal range of motion. Cervical back: Normal range of motion and neck supple. No rigidity. No muscular tenderness. Right lower leg: No edema. Left lower leg: No edema. Skin:     General: Skin is warm and dry. Capillary Refill: Capillary refill takes less than 2 seconds. Coloration: Skin is not jaundiced or pale. Findings: No bruising, erythema, lesion or rash. Neurological:      General: No focal deficit present. Mental Status: She is alert. Mental status is at baseline. Cranial Nerves: No cranial nerve deficit. Sensory: No sensory deficit. Motor: No weakness. Coordination: Coordination normal.   Psychiatric:         Mood and Affect: Mood normal.         Behavior: Behavior normal.         Thought Content:  Thought content normal.         Judgment: Judgment normal.         Assessment/plan:     Patient Active Problem List    Diagnosis Date Noted    Seizure ([facility] 02/20/2023     Priority: Medium    Lower abdominal pain 02/20/2023     Priority: Medium    Depression 02/19/2023     Priority: Medium    Gastric erosion 02/18/2023     Priority: Medium    Duodenal erosion 02/18/2023     Priority: Medium    Intractable seizure disorder ([facility] 02/17/2023     Priority: Medium    Chronic static encephalopathy 02/17/2023     Priority: Medium    Autism 02/17/2023     Priority: Medium    Seiz[facility] activity ([facility] 02/16/2023     Priority: Medium    Acne 02/14/2023 Priority: Medium    Attention deficit disorder 02/14/2023     Priority: Medium    Atypical squamous cells of undetermined significance (ASCUS) on Papanicolaou smear of cervix 02/14/2023     Priority: Medium    Dysmenorrhea 02/14/2023     Priority: Medium    Sensory processing difficulty 02/14/2023     Priority: Medium    Active autistic disorder 01/26/2023     Priority: Medium    PTSD (post-traumatic stress disorder) 01/26/2023     Priority: Medium    Hypokalemia 01/26/2023     Priority: Medium    Status epilepticus ([address] 01/06/2023     Priority: Medium    Swallowed foreign body 02/16/2023     Added automatically from request for surgery [id number]         Hospital Problems             Last Modified POA    * (Principal) Seizure-like activity ([address] 2/16/2023 Yes    PTSD (post-traumatic stress disorder) 2/20/2023 Yes    Intractable seizure disorder ([address] 2/17/2023 Yes    Chronic static encephalopathy 2/17/2023 Yes    Autism 2/17/2023 Yes    Gastric erosion 2/18/2023 Yes    Duodenal erosion 2/18/2023 Yes    Depression 2/19/2023 Yes    Seizure ([address] 2/20/2023 Yes    Lower abdominal pain 2/20/2023 Yes    Swallowed foreign body 2/18/2023 Unknown    Overview Signed 2/18/2023  2:27 AM by Bhavna Yang MD     Added automatically from request for surgery [id number]        Principal Problem:    Seizure-like activity ([address]  Active Problems:    PTSD (post-traumatic stress disorder)    Intractable seizure disorder ([address]    Chronic static encephalopathy    Autism    Gastric erosion    Duodenal erosion    Depression    Seizure ([address]    Lower abdominal pain    Swallowed foreign body  Resolved Problems:    * No resolved hospital problems. *          Brief History/Summary:   As per Initial admission HPI 2/16/2023, quoted below;  \"Ms. Debi Canales is a pleasant 33year old lady from home. She follows with Dr Justyna Garcia. She has had some of her medications decreased/tapered recently. She has a history of epilepsy with onset at age [age] [facility] years.  She had family present at the bedside. The phenytoin and Vimpat were being tapered. She has had multiple seizures tonight here in the hospital. She had reportedly had but a single seizure after her last visit (end of January) prior to her medications being reduced last week. She also has a history of Autism and is at times mute, but will still freely answer with head gestures. \"    Further hospital course as per problem list below; Autism   Agitation and aggressiveness  One-to-one sitter at bedside for safety  Psych on board  Haloperidol 5 mg IM every 6 hours/ PRN for agitation and aggression  Risperidone 1 mg p.o. twice daily  Risperidone 2 mg p.o. every 6 hours as needed for agitation    Seizures  Seizure precautions  Aspiration precautions  Neuro check every 4 hours  Neurology consulted on admission  Normal awake and asleep EEG (02/17/2023)  Lamotrigine 225 mg p.o. daily  Levetiracetam 1,500 mg p.o. twice daily  Defer further antiepileptic regiment to Neurology        Foreign body ingestion - ? Intentional versus unintentional  KUB (02/18/2023): Impression: There are 2 round metallic foreign bodies in the small bowel of the left mid abdomen  GI consulted for removal of battery (02/18/2023)  General surgery on board, however no emergent or urgent surgical intervention recommended at this time. Continue serial imaging  KUB (02/18/2023): Impression: Nonobstructive bowel gas pattern. Ingested batteries are visualized in the right mid abdomen, not significantly changed in position when compared to prior study. Close follow-up recommended. CT Abdomen/Pelvis WO Con -pending          Continue management of other chronic medical conditions - see above and orders. Advance Directive: Full Code    ADULT DIET;  Regular; Safety Tray; Safety Tray (Disposables No Utensils)         Consults Made:   IP CONSULT TO NEUROLOGY  IP CONSULT TO GI  IP CONSULT TO CASE MANAGEMENT  IP CONSULT TO PSYCHIATRY  IP CONSULT TO GENERAL SURGERY      DVT prophylaxis: Enoxaparin        Discharge planning:   Continue current management, including one-to-one sitter at bedside and serial imaging  Continue management in the CCU  SW and Psych on board, for possible placement versus inpatient psych admission, when medically stable    Time Spent is  35 mins  in the examination, evaluation, counseling and review of medications, assessment and plan.      Electronically signed by   Nabil Castro MD, MD,   Internal Medicine Hospitalist   2/21/2023 8:54 AM

## 2023-02-21 NOTE — PROGRESS NOTES
[facility] Neurology Progress Note      Patient:   Kami Rodríguez  MR#:    [id number]   Room:    [facility]329-12   YOB: 1995  Date of Progress Note: 2/21/2023  Time of Note                           9:24 AM  Consulting Physician:  Riley Adams DO  Attending Physician:  Erika Gustafson MD      INTERVAL HISTORY:  Events remain improved, events still felt non-epileptic, patient swallowed 2 small batteries, GI / surgery following. REVIEW OF SYSTEMS:  Constitutional: No fevers No chills  Neck:No stiffness  Respiratory: No shortness of breath  Cardiovascular: No chest pain No palpitations  Gastrointestinal: No abdominal pain    Genitourinary: No Dysuria  Neurological: No headache    PHYSICAL EXAM:    Constitutional -   /75   Pulse (!) 117   Temp 99.9 °F (37.7 °C) (Temporal)   Resp 14   Ht 5' 5\" (1.651 m)   Wt 199 lb 9.6 oz (90.5 kg)   SpO2 96%   BMI 33.22 kg/m²   General appearance: No acute distress   EYES -   Conjunctiva normal  Pupillary exam as below, see CN exam in the neurologic exam  ENT-    No scars, masses, or lesions over external nose or ears  Hearing normal bilaterally to finger rub  Neck-   No neck masses noted  Thyroid normal   No jugular vein distension  Cardiovascular -   No clubbing, cyanosis, or edema   Pulmonary-   Good expansion, normal effort without use of accessory muscles  Musculoskeletal -   No significant wasting of muscles noted  Gait as below, see gait exam in the neurologic exam  Muscle strength, tone, stability as below see the motor exam in the neurologic exam.   No bony deformities  Skin -   Warm, dry, and intact to inspection and palpation.     No rash, erythema, or pallor  Psychiatric -   Mood, affect, and behavior appear normal    Memory as below see mental status examination in the neurologic exam      NEUROLOGICAL EXAM    Mental status   [x] Awake, alert, oriented   [x] Affect attention and concentration appear appropriate  [x] Recent and remote memory appears unremarkable  [] Speech normal without dysarthria or aphasia, comprehension and repetition intact. COMMENTS: Speech limited    Cranial Nerves [x] No VF deficit to confrontation  [x] PERRLA, EOMI, no nystagmus, conjugate eye movements, no ptosis  [x] Face symmetric  [x] Facial sensation intact  [x] Tongue midline no atrophy or fasciculations present  [x] Palate midline, hearing to finger rub normal  [x] Shoulder shrug and SCM testing normal  COMMENTS:   Motor   [x] 5/5 strength x 4 extremities  [x] Normal bulk and tone  [x] No tremor present  [x] No rigidity or bradykinesia noted  COMMENTS:   Sensory  [x] Sensation intact to light touch, pin prick, vibration, and proprioception BLE  [] Sensation intact to light touch, pin prick, vibration, and proprioception BUE  COMMENTS:   Coordination [x] FTN normal bilaterally   [] HTS normal bilaterally  [] YEVGENIY normal.   COMMENTS:   Reflexes  [x] Symmetric and non-pathological  [] Toes downgoing bilaterally  [] No clonus present  COMMENTS:   Gait                  [] Normal steady gait    [] Ataxic    [] Spastic     [] Magnetic     [] Shuffling  [x] Not assessed  COMMENTS:       LABS/IMAGING:    CT Head WO Contrast    Result Date: 2/17/2023  Exam: Noncontrast CT of the brain Technique: Axial noncontrast images were obtained from the skull base to the vertex. Coronal and sagittal reconstructions were performed. Radiation dose optimization technique was utilized. Clinical information: Seizure Comparison: Noncontrast CT of the brain performed on January 16, 2023 Findings: No evidence of acute hemorrhage, mass, mass effect, or midline shift. The ventricular system and basal cisterns are within normal limits for the patients age. Gray-white differentiation is maintained. There is no evidence of an intra-axial or extra-axial fluid collection. Bone windows reveal no evidence of acute fracture. The visualized paranasal sinuses are within normal limits. The orbits are grossly unremarkable. No acute intracranial process evident on noncontrast CT of the brain. XR CHEST PORTABLE    Result Date: 2/17/2023  CLINICAL HISTORY: Seizure TECHNIQUE: Single AP view of the chest COMPARISON: CXR from 1/10/2023 FINDINGS: Lines and tubes: None. Cardiomediastinal: Normal size and configuration of the cardiomediastinal silhouette. No pneumomediastinum. Lungs and pleura: The lungs are grossly unremarkable. No pleural effusion. No pneumothorax. Musculoskeletal: No acute osseous abnormalities. Other: None. No acute cardiopulmonary process identified. Lab Results   Component Value Date    WBC 7.8 02/18/2023    HGB 12.3 02/18/2023    HCT 38.3 02/18/2023    MCV 86.3 02/18/2023     02/18/2023     Lab Results   Component Value Date     02/18/2023    K 3.7 02/18/2023     02/18/2023    CO2 23 02/18/2023    BUN 6 02/18/2023    CREATININE 0.7 02/18/2023    GLUCOSE 84 02/18/2023    CALCIUM 8.7 02/18/2023    PROT 7.1 02/16/2023    LABALBU 4.2 02/16/2023    BILITOT <0.2 02/16/2023    ALKPHOS 90 02/16/2023    AST 15 02/16/2023    ALT 17 02/16/2023    LABGLOM >60 02/18/2023   No results found for: INR, PROTIME    RECORD REVIEW:   Previous medical records, medications were reviewed at today's visit. Nursing/physician notes, imaging, labs and vitals reviewed. PT,OT and/or speech notes reviewed      ASSESSMENT:   33 y.o. admitted with seizure like episodes. She has had a extensive recent work-up including long-term EEG monitoring both here and at [facility].  No overt epileptiform abnormalities were seen. Her events are felt nonepileptic. Previously swallowed 2 small batteries,  GI and surgery have been following. Prior MRI and repeat EEG from here normal.     PLAN:    Psych following    Starting to wean AEDs, stopped dilantin, started to wean vimpat, continue keppra for now and likely wean it next. Will continue lamcital may help with mood stabilization.     GI / surgery following     Ativan prn, event precautions      Please feel free to call with any questions. [phone] (cell phone).       Sherry Millan DO  Board Certified Neurology

## 2023-02-21 NOTE — PROGRESS NOTES
Patient was feeling anxious and wanted to go for a walk this morning at 0815. This nurse and patient went to walk when patient grabbed tack off wall and put it in her mouth. Patient brought back to room and educated on risks for tack in mouth. Patient gave this nurse the tac. This nurse disposed of tac properly.  Will continue to monitor patient     Electronically signed by Gilles Pruitt RN on 2/21/2023 at 12:48 PM

## 2023-02-21 NOTE — PROGRESS NOTES
Pt typed on her ipad that she was about to lose it and needed to go for a walk. While walking with the nurse she darted toward the bulletin board and took a tac off of the wall and put it in her mouth. Nurses unable to pry tac out of her mouth so it is unknown if she swallowed tac. Contacted inpatient psych and outpatient psych in an attempt to notify Dr. Jorden Saunders and seek guidance. Persons who answered the phone at both places stated they would pass the message to the physician. Attempting to calm pt at this time.

## 2023-02-21 NOTE — PROGRESS NOTES
Came to round on patient. Discussion with nursing, has been a very difficult day with the patient trying to take things to swallow again, having to call security, and agitation. Patient is currently sleeping, and nursing requests Into wake her- which I completely agree with. Review of the patient's chart shows that her vitals are stable. She refused multiple meds this morning, including miralax. No acute abdominal pain. General surgery literature would support that with foreign body, including coin batteries, having passed in to the small bowel, there is no indication for surgery except for acute abdomen or evidence of perforation, that these should pass on their own with time. That patient's can be discharged with outpatient follow up. She needs to take miralax as she is constipated. General surgery will sign off and follow peripherally.

## 2023-02-21 NOTE — CONSULTS
[address] [place] [address]      Psychiatric Consult Note    Name:  Morena Alcazar  Date:  2/21/2023  Age:  33 y.o. Sex:  female  Ethnicity: [employer] 39 American   Primary Care Physician:  Zuhair Taveras   Patient Care Team:  Patient Care Team:  Caroline Viveros as PCP - General (Nurse Practitioner)  Yeni Avila DO as Consulting Physician (Neurology)  Chief Complaint: \" I am fine\"        Historian:patient    Today patient is seated upright in her bed. She is on her cell phone and has her bite block in her mouth. Her nurse is outside of her room and is her 1:1. Nursing staff reported that she took the patient on a walk today and the patient grabbed another thumb tack and placed it in her mouth. The nurse was able to retrieve the tack from the patient. Patient told the nurse that she is hearing voices telling her to harm herself. Patient is currently sitting upright in her bed. She makes no eye contact and is nonverbal at this time. She communicates via typing on her cell phone. Today she endorses suicidal ideations however will not elaborate on her thoughts. She also endorses homicidal ideations towards her  Leopold Antes. When asked why she had those feeling towards her  she writes, \"I don't want to talk about it. \" She continues to allow her  to visit her. She endorses auditory and visual hallucinations and again does not elaborate on those. She typed its  \"too much to discuss. \" She has been noncompliant with her daily dose of Risperdal related to it causing her to be \"too sleepy\" during the day. She has agreed for the dose to be switched to nightly.              Previous Psychiatric/Substance Use History      Medical History:  Past Medical History:   Diagnosis Date    Anxiety     Autism     Depression     Epilepsy ([address]     Seizures ([address]         BOOKER History:   Social History     Substance and Sexual Activity   Alcohol Use Never         Social History     Substance and Sexual Activity   Drug Use Yes    Types: Marijuana Georgina [place]    Comment: [place] [address]        Social History     Tobacco Use   Smoking Status Never   Smokeless Tobacco Never        Family History:     Family History   Problem Relation Age of Onset    Diabetes Mother     Migraines Mother     Alcohol Abuse Father     Drug Abuse Father     Migraines Brother     Other Son         hydrocephalus    Autism Son     Autism Son          Vital Signs:  Last set of tests and vitals:  Vitals:    02/21/23 1145   BP: 134/86   Pulse:    Resp:    Temp:    SpO2: 100%          Mental Status:  Level of consciousness:   Moderate dysattention (reduced clarity of awareness with impaired ability to focus, sustain, or shift attention)  Appearance:  well-appearing, street clothes, seated in bed, good grooming, and good hygiene  Behavior/Motor:  no abnormalities noted  Attitude toward examiner:  poor eye contact, guarded, and withdrawn  Speech:  pt used her phone to talk  Mood:  \" I am fine\" -she typed this on her phone  Affect:  blunted and flat  Thought processes:  GAVINO- Pt nonverbal at this time  Thought content:  Homocidal ideation : reports HI towards her  Isabelle Castro  Suicidal Ideation:  active  Delusions:  no evidence of delusions  Perceptual Disturbance:  auditory and visual- would not elaborate on this   Cognition:  GAVINO  Concentration : GOOD  Memory intact for recent and remote  Fund of knowledge:  below average  Abstract thinking: poor  Insight:  poor  Judgment:  poor     risperiDONE  1 mg Oral BID    naltrexone  50 mg Oral Daily with breakfast    polyethylene glycol  17 g Oral BID    prazosin  1 mg Oral Nightly    lamoTRIgine  225 mg Oral Daily    melatonin  5 mg Oral Nightly    sodium chloride flush  5-40 mL IntraVENous 2 times per day    lacosamide  50 mg Oral BID    enoxaparin  40 mg SubCUTAneous Daily    levETIRAcetam  1,500 mg Oral BID    clonazePAM  0.5 mg Oral Q12H    ferrous sulfate  325 mg Oral BID WC    FLUoxetine  80 mg Oral Daily    ascorbic acid  500 mg Oral BID with meals       Current Medications:  Current Facility-Administered Medications   Medication Dose Route Frequency Provider Last Rate Last Admin    risperiDONE (RISPERDAL) tablet 1 mg  1 mg Oral BID Lo Sánchez MD   1 mg at 02/20/23 2210    risperiDONE (RISPERDAL) tablet 2 mg  2 mg Oral Q6H PRN Lo Sánchez MD   2 mg at 02/20/23 1505    naltrexone (DEPADE) tablet 50 mg  50 mg Oral Daily with breakfast Lo Sánchez MD   50 mg at 02/20/23 1607    polyethylene glycol (GLYCOLAX) packet 17 g  17 g Oral BID Faustina Giordano MD   17 g at 02/20/23 2208    prazosin (MINIPRESS) capsule 1 mg  1 mg Oral Nightly Lo Sánchez MD   1 mg at 02/20/23 2208    lamoTRIgine (LAMICTAL) tablet 225 mg  225 mg Oral Daily Lo Sánchez MD   225 mg at 02/21/23 8056    haloperidol lactate (HALDOL) injection 5 mg  5 mg IntraMUSCular Q6H PRN Lo Sánchez MD        melatonin disintegrating tablet 5 mg  5 mg Oral Nightly Lo Sánchez MD   5 mg at 02/20/23 2207    sodium chloride flush 0.9 % injection 5-40 mL  5-40 mL IntraVENous 2 times per day Antione Scales MD        sodium chloride flush 0.9 % injection 5-40 mL  5-40 mL IntraVENous PRN Antione Scales MD        0.9 % sodium chloride infusion   IntraVENous PRN Antione Scales MD        HYDROmorphone HCl PF (DILAUDID) injection 0.25 mg  0.25 mg IntraVENous Q5 Min PRN Antione Scales MD        HYDROmorphone HCl PF (DILAUDID) injection 0.5 mg  0.5 mg IntraVENous Q5 Min PRN Antione Scales MD        lacosamide (VIMPAT) tablet 50 mg  50 mg Oral BID Koffi Crowell DO   50 mg at 02/21/23 0928    enoxaparin (LOVENOX) injection 40 mg  40 mg SubCUTAneous Daily Zoë Villagomez MD   40 mg at 02/18/23 0945    ondansetron (ZOFRAN-ODT) disintegrating tablet 4 mg  4 mg Oral Q8H PRN Zoë Villagomez MD        Or    [facility]ansetron [facility] injection 4 mg  4 mg IntraVENous Q6H PRN Zoë Villagomez MD        acetaminophen (TYLENOL) tablet 650 mg  650 mg Oral Q4H PRN Ricardo Lang Len Vega MD   650 mg at 02/20/23 1731    Or    acetaminophen (TYLENOL) suppository 650 mg  650 mg Rectal Q4H PRN Dawna Chi MD        potassium chloride (KLOR-CON M) extended release tablet 40 mEq  40 mEq Oral PRN Dawna Chi MD        Or    potassium bicarb-citric acid (EFFER-K) effervescent tablet 40 mEq  40 mEq Oral PRN Dawna Chi MD        Or    potassium chloride 10 mEq/100 mL IVPB (Peripheral Line)  10 mEq IntraVENous PRN Dawna Chi MD        magnesium sulfate 2000 mg in 50 mL IVPB premix  2,000 mg IntraVENous PRN Dawna Chi MD        sodium phosphate 22.5 mmol in sodium chloride 0.9 % 250 mL IVPB  0.32 mmol/kg IntraVENous PRN Dawna Chi MD        polyethylene glycol (GLYCOLAX) packet 17 g  17 g Oral Daily PRN Dawna Chi MD   17 g at 02/20/23 1523    melatonin disintegrating tablet 5 mg  5 mg Oral Nightly PRN Dawna Chi MD        calcium carbonate (TUMS) chewable tablet 500 mg  500 mg Oral TID PRN Dawna Chi MD        levETIRAcetam (KEPPRA) tablet 1,500 mg  1,500 mg Oral BID Willa Gonzales MD   1,500 mg at 02/21/23 0800    clonazePAM (KLONOPIN) tablet 0.5 mg  0.5 mg Oral Q12H Willa Gonzales MD   0.5 mg at 02/20/23 2207    ferrous sulfate (IRON 325) tablet 325 mg  325 mg Oral BID WC Dawna hCi MD   325 mg at 02/20/23 1718    FLUoxetine (PROZAC) capsule 80 mg  80 mg Oral Daily Dawna Chi MD   80 mg at 02/21/23 1132    ascorbic acid (VITAMIN C) tablet 500 mg  500 mg Oral BID with meals Dawna Chi MD   500 mg at 02/20/23 1806       Psychotherapy:   SUPPORTIVE    DSM V Diagnoses:    Depression unspecified  Anxiety unspecified  PTSD  Autism  History of self-harm  Status post foreign body ingestion  Epilepsy          Plan:     Will change Risperdal 2 mg po nightly  Psych to sign off  Please re-consult  psychiatry when patient is free from swallowing foreign objects to assist with transfer patient to a higher level of care [facility] AT [facility])  Continue 1:1 -Called clinical house and spoke to Yaya about getting the patient a 1:1 that was not the patients nurse  Duty to warn to be completed- patient having homicidal thoughts towards her      Amount of time spent with patient:  15 minutes with greater than 50% of the time spent in counseling and collaboration of care.     Jada Hinojosa, [id number], FNP-C   Clinician Signature: signed electronically

## 2023-02-22 PROCEDURE — 6370000000 HC RX 637 (ALT 250 FOR IP): Performed by: SURGERY

## 2023-02-22 PROCEDURE — 6370000000 HC RX 637 (ALT 250 FOR IP): Performed by: HOSPITALIST

## 2023-02-22 PROCEDURE — 6370000000 HC RX 637 (ALT 250 FOR IP): Performed by: PSYCHIATRY & NEUROLOGY

## 2023-02-22 PROCEDURE — 1210000000 HC MED SURG R&B

## 2023-02-22 PROCEDURE — 6370000000 HC RX 637 (ALT 250 FOR IP): Performed by: NURSE PRACTITIONER

## 2023-02-22 PROCEDURE — 99233 SBSQ HOSP IP/OBS HIGH 50: CPT | Performed by: PSYCHIATRY & NEUROLOGY

## 2023-02-22 PROCEDURE — 99231 SBSQ HOSP IP/OBS SF/LOW 25: CPT | Performed by: INTERNAL MEDICINE

## 2023-02-22 PROCEDURE — 6360000002 HC RX W HCPCS: Performed by: HOSPITALIST

## 2023-02-22 RX ADMIN — CLONAZEPAM 0.5 MG: 1 TABLET ORAL at 20:31

## 2023-02-22 RX ADMIN — FERROUS SULFATE TAB 325 MG (65 MG ELEMENTAL FE) 325 MG: 325 (65 FE) TAB at 16:45

## 2023-02-22 RX ADMIN — NALTREXONE HYDROCHLORIDE 50 MG: 50 TABLET, FILM COATED ORAL at 08:35

## 2023-02-22 RX ADMIN — ENOXAPARIN SODIUM 40 MG: 100 INJECTION SUBCUTANEOUS at 08:44

## 2023-02-22 RX ADMIN — OXYCODONE HYDROCHLORIDE AND ACETAMINOPHEN 500 MG: 500 TABLET ORAL at 16:45

## 2023-02-22 RX ADMIN — LEVETIRACETAM 1500 MG: 500 TABLET, FILM COATED ORAL at 08:35

## 2023-02-22 RX ADMIN — LACOSAMIDE 50 MG: 50 TABLET, FILM COATED ORAL at 20:32

## 2023-02-22 RX ADMIN — RISPERIDONE 2 MG: 1 TABLET ORAL at 20:31

## 2023-02-22 RX ADMIN — FLUOXETINE 80 MG: 20 CAPSULE ORAL at 08:34

## 2023-02-22 RX ADMIN — Medication 5 MG: at 20:32

## 2023-02-22 RX ADMIN — FERROUS SULFATE TAB 325 MG (65 MG ELEMENTAL FE) 325 MG: 325 (65 FE) TAB at 08:35

## 2023-02-22 RX ADMIN — ACETAMINOPHEN 650 MG: 325 TABLET ORAL at 18:37

## 2023-02-22 RX ADMIN — OXYCODONE HYDROCHLORIDE AND ACETAMINOPHEN 500 MG: 500 TABLET ORAL at 08:36

## 2023-02-22 RX ADMIN — POLYETHYLENE GLYCOL 3350 17 G: 17 POWDER, FOR SOLUTION ORAL at 20:32

## 2023-02-22 RX ADMIN — PRAZOSIN HYDROCHLORIDE 1 MG: 1 CAPSULE ORAL at 20:31

## 2023-02-22 RX ADMIN — LACOSAMIDE 50 MG: 50 TABLET, FILM COATED ORAL at 08:35

## 2023-02-22 RX ADMIN — CLONAZEPAM 0.5 MG: 1 TABLET ORAL at 08:34

## 2023-02-22 RX ADMIN — LAMOTRIGINE 225 MG: 200 TABLET ORAL at 08:34

## 2023-02-22 RX ADMIN — RISPERIDONE 2 MG: 1 TABLET ORAL at 09:28

## 2023-02-22 RX ADMIN — LEVETIRACETAM 1500 MG: 500 TABLET, FILM COATED ORAL at 20:31

## 2023-02-22 ASSESSMENT — PAIN DESCRIPTION - LOCATION: LOCATION: ABDOMEN

## 2023-02-22 ASSESSMENT — PAIN - FUNCTIONAL ASSESSMENT: PAIN_FUNCTIONAL_ASSESSMENT: ACTIVITIES ARE NOT PREVENTED

## 2023-02-22 ASSESSMENT — PAIN DESCRIPTION - ORIENTATION: ORIENTATION: LOWER;MID

## 2023-02-22 ASSESSMENT — PAIN DESCRIPTION - DESCRIPTORS: DESCRIPTORS: ACHING

## 2023-02-22 ASSESSMENT — PAIN SCALES - GENERAL: PAINLEVEL_OUTOF10: 8

## 2023-02-22 NOTE — PROGRESS NOTES
Report to [facility] on 3rd floor. Pt to transfer to [facility]. Pt eating lunch. Will transfer after she has finished lunch.  Electronically signed by Jd Leary RN on 2/22/2023 at 12:36 PM

## 2023-02-22 NOTE — PROGRESS NOTES
GI  - PROGRESS NOTE    Subjective:   Admit Date: 2/16/2023  PCP: Kristie Cunningham    Patient being seen for: Feeding difficulties. Status post PEG insertion. HPI: Patient is awake, responds to simple questions. Sitting comfortably. Denies any abdominal pain. No other symptoms. Medications:  Scheduled Meds:   risperiDONE  2 mg Oral Nightly    chlorproMAZINE  10 mg IntraMUSCular Once    naltrexone  50 mg Oral Daily with breakfast    polyethylene glycol  17 g Oral BID    prazosin  1 mg Oral Nightly    lamoTRIgine  225 mg Oral Daily    melatonin  5 mg Oral Nightly    sodium chloride flush  5-40 mL IntraVENous 2 times per day    lacosamide  50 mg Oral BID    enoxaparin  40 mg SubCUTAneous Daily    levETIRAcetam  1,500 mg Oral BID    clonazePAM  0.5 mg Oral Q12H    ferrous sulfate  325 mg Oral BID WC    FLUoxetine  80 mg Oral Daily    ascorbic acid  500 mg Oral BID with meals       Continuous Infusions:   sodium chloride         PRN Meds:.risperiDONE, haloperidol lactate, sodium chloride flush, sodium chloride, HYDROmorphone, HYDROmorphone, ondansetron **OR** ondansetron, acetaminophen **OR** acetaminophen, potassium chloride **OR** potassium alternative oral replacement **OR** potassium chloride, magnesium sulfate, sodium phosphate IVPB, polyethylene glycol, melatonin, calcium carbonate      Labs:   No results for input(s): WBC, RBC, HGB, HCT, MCV, MCH, MCHC, PLT in the last 72 hours. No results for input(s): NA, K, ANIONGAP, CL, CO2, BUN, CREATININE, GLUCOSE, GFR, CALCIUM in the last 72 hours. No results for input(s): MG, PHOS in the last 72 hours. No results for input(s): AST, ALT, ALB, BILITOT, ALKPHOS, ALB in the last 72 hours.   HgBA1c:  No components found for: HGBA1C  FLP:  No results found for: TRIG, HDL, LDLCALC, LDLDIRECT, LABVLDL  TSH:    Lab Results   Component Value Date/Time    TSH 1.340 01/16/2023 06:17 PM     Troponin T: No results for input(s): TROPONINI in the last 72 hours. INR: No results for input(s): INR in the last 72 hours. No results for input(s): LIPASE in the last 72 hours. -----------------------------------------------------------------  RAD:     XR ABDOMEN (KUB) (SINGLE AP VIEW)    Result Date: 2/21/2023  Exam: Abdominal radiographs Technique: Supine AP abdominal radiograph was obtained. Clinical information: Progress of ingested batteries. Comparison: Radiographs of the abdomen performed on February 19, 2023 Findings: Nonspecific bowel gas pattern is noted. No free intraperitoneal air is visualized. Ingested batteries are visualized in the right mid abdomen, not significantly changed in position when compared to prior study. Osseous structures demonstrate no evidence of acute process. Nonobstructive bowel gas pattern. Ingested batteries are visualized in the right mid abdomen, not significantly changed in position when compared to prior study. Close follow-up recommended. Physical Exam:     /66   Pulse 95   Temp 97.8 °F (36.6 °C) (Temporal)   Resp 18   Ht 5' 5\" (1.651 m)   Wt 199 lb 9.6 oz (90.5 kg)   SpO2 95%   BMI 33.22 kg/m²     General appearance: alert and cooperative with exam, appears stated age, no acute distress   Head: normal cephalic, atraumatic. EOMI bilaterally, no neck lymphadenopathy appreciated, no carotid bruits noted  Lungs: Clear to percussion and auscultation. No wheezing or rales. Heart: S1 S2 are audible. No added sound. Abdomen: Soft, non distended and non tender. No hepatosplenomegaly. Bowel sounds are audible. No masses palpable. Abdominal binder and PEG tube dressing were removed. The site appears to be clean and dry. No further dressing was done. Asssessment:   Patient with multiple medical problems and then feeding difficulties underwent PEG tube yesterday. No new complaints. Impression:   1. Feeding difficulties. Status post PEG insertion. Plan:   1.   Continue supportive treatment. 2.  PEG can be used for feeding purposes. 3.  I will follow patient up on as-needed basis.     Carlos Bryan MD  2/22/2023, 12:28 PM

## 2023-02-22 NOTE — PROGRESS NOTES
Pt had told me after I sent a message to Dr Monster Sinclair that she wanted to go home , that she did not want to go home.now. Pt is crying typing she does not want her kids to see her like this. [place] officer, is at bedside still and when he asked how did she handle it before at home, pt typed she would go in her bedroom and cut herself. Pt still has sharp hard piece of plastic in her mouth, chewing on it. I will notify Dr Monster Sinclair.  Electronically signed by Genesis Yeager RN on 2/22/2023 at 11:12 AM

## 2023-02-22 NOTE — PROGRESS NOTES
[facility] Neurology Progress Note      Patient:   Avani Mclain  MR#:    [id number]   Room:    0143/750-55   YOB: 1995  Date of Progress Note: 2/22/2023  Time of Note                           9:19 AM  Consulting Physician:  Christina Mancera DO  Attending Physician:  Rentae Ramirez MD      INTERVAL HISTORY:  Events remain improved overall, felt non-epileptic, patient swallowed 2 small batteries, GI / surgery following. REVIEW OF SYSTEMS:  Constitutional: No fevers No chills  Neck:No stiffness  Respiratory: No shortness of breath  Cardiovascular: No chest pain No palpitations  Gastrointestinal: No abdominal pain    Genitourinary: No Dysuria  Neurological: No headache    PHYSICAL EXAM:    Constitutional -   /66   Pulse 90   Temp 97.8 °F (36.6 °C) (Temporal)   Resp 18   Ht 5' 5\" (1.651 m)   Wt 199 lb 9.6 oz (90.5 kg)   SpO2 95%   BMI 33.22 kg/m²   General appearance: No acute distress   EYES -   Conjunctiva normal  Pupillary exam as below, see CN exam in the neurologic exam  ENT-    No scars, masses, or lesions over external nose or ears  Hearing normal bilaterally to finger rub  Neck-   No neck masses noted  Thyroid normal   No jugular vein distension  Cardiovascular -   No clubbing, cyanosis, or edema   Pulmonary-   Good expansion, normal effort without use of accessory muscles  Musculoskeletal -   No significant wasting of muscles noted  Gait as below, see gait exam in the neurologic exam  Muscle strength, tone, stability as below see the motor exam in the neurologic exam.   No bony deformities  Skin -   Warm, dry, and intact to inspection and palpation.     No rash, erythema, or pallor  Psychiatric -   Mood, affect, and behavior appear normal    Memory as below see mental status examination in the neurologic exam      NEUROLOGICAL EXAM    Mental status   [x] Awake, alert, oriented   [x] Affect attention and concentration appear appropriate  [x] Recent and remote memory appears unremarkable  [] Speech normal without dysarthria or aphasia, comprehension and repetition intact. COMMENTS: Speech limited    Cranial Nerves [x] No VF deficit to confrontation  [x] PERRLA, EOMI, no nystagmus, conjugate eye movements, no ptosis  [x] Face symmetric  [x] Facial sensation intact  [x] Tongue midline no atrophy or fasciculations present  [x] Palate midline, hearing to finger rub normal  [x] Shoulder shrug and SCM testing normal  COMMENTS:   Motor   [x] 5/5 strength x 4 extremities  [x] Normal bulk and tone  [x] No tremor present  [x] No rigidity or bradykinesia noted  COMMENTS:   Sensory  [x] Sensation intact to light touch, pin prick, vibration, and proprioception BLE  [] Sensation intact to light touch, pin prick, vibration, and proprioception BUE  COMMENTS:   Coordination [x] FTN normal bilaterally   [] HTS normal bilaterally  [] YEVGENIY normal.   COMMENTS:   Reflexes  [x] Symmetric and non-pathological  [] Toes downgoing bilaterally  [] No clonus present  COMMENTS:   Gait                  [] Normal steady gait    [] Ataxic    [] Spastic     [] Magnetic     [] Shuffling  [x] Not assessed  COMMENTS:       LABS/IMAGING:    CT Head WO Contrast    Result Date: 2/17/2023  Exam: Noncontrast CT of the brain Technique: Axial noncontrast images were obtained from the skull base to the vertex. Coronal and sagittal reconstructions were performed. Radiation dose optimization technique was utilized. Clinical information: Seizure Comparison: Noncontrast CT of the brain performed on January 16, 2023 Findings: No evidence of acute hemorrhage, mass, mass effect, or midline shift. The ventricular system and basal cisterns are within normal limits for the patients age. Gray-white differentiation is maintained. There is no evidence of an intra-axial or extra-axial fluid collection. Bone windows reveal no evidence of acute fracture. The visualized paranasal sinuses are within normal limits. The orbits are grossly unremarkable. No acute intracranial process evident on noncontrast CT of the brain. XR CHEST PORTABLE    Result Date: 2/17/2023  CLINICAL HISTORY: Seizure TECHNIQUE: Single AP view of the chest COMPARISON: CXR from 1/10/2023 FINDINGS: Lines and tubes: None. Cardiomediastinal: Normal size and configuration of the cardiomediastinal silhouette. No pneumomediastinum. Lungs and pleura: The lungs are grossly unremarkable. No pleural effusion. No pneumothorax. Musculoskeletal: No acute osseous abnormalities. Other: None. No acute cardiopulmonary process identified. Lab Results   Component Value Date    WBC 7.8 02/18/2023    HGB 12.3 02/18/2023    HCT 38.3 02/18/2023    MCV 86.3 02/18/2023     02/18/2023     Lab Results   Component Value Date     02/18/2023    K 3.7 02/18/2023     02/18/2023    CO2 23 02/18/2023    BUN 6 02/18/2023    CREATININE 0.7 02/18/2023    GLUCOSE 84 02/18/2023    CALCIUM 8.7 02/18/2023    PROT 7.1 02/16/2023    LABALBU 4.2 02/16/2023    BILITOT <0.2 02/16/2023    ALKPHOS 90 02/16/2023    AST 15 02/16/2023    ALT 17 02/16/2023    LABGLOM >60 02/18/2023   No results found for: INR, PROTIME    RECORD REVIEW:   Previous medical records, medications were reviewed at today's visit. Nursing/physician notes, imaging, labs and vitals reviewed. PT,OT and/or speech notes reviewed      ASSESSMENT:   33 y.o. admitted with seizure like episodes. She has had a extensive recent work-up including long-term EEG monitoring both here and at [facility].  No overt epileptiform abnormalities were seen. Her events are felt nonepileptic. Previously swallowed 2 small batteries,  GI and surgery have been following. Prior MRI and repeat EEG from here normal.     PLAN:    Psych has evaluated    Starting to wean AEDs, stopped dilantin, started to wean vimpat, continue keppra for now and likely wean it next. Will continue lamcital may help with mood stabilization.     GI / surgery following Ativan prn, event precautions      Please feel free to call with any questions. [phone] (cell phone).       Carrol Watts DO  Board Certified Neurology

## 2023-02-22 NOTE — PROGRESS NOTES
Pt had her fidget poppits in the room. Macrina called me to room . Lluviater had taken away 2 of her 3 poppits, because pt was pushing out the button part of the poppits and eating them. Pt had eaten 4 and had 1 in of her fidget toys in her hand and wash chewing on it. I attempted to get her to give it to me and she started to put back in her mouth at which time I grabbed it out of her hands. Pt then had a piece of her milk carton in her hand and she put it her mouth and started chewing it. Pt is sitting up in her bed. Mostly non-verbal this morning and writing on her tablet to communicate.  Electronically signed by Isabell Sosa RN on 2/22/2023 at 11:00 AM

## 2023-02-22 NOTE — PROGRESS NOTES
Dr Sen Espinoza came and spoke with pt. Assured pt she was not being discharged home. She typed message to Dr Sen Espinoza that she needed the piece of plastic cup to cut herself. She refused to give the plastic to Dr Sen Espinoza also.  Electronically signed by Ras Parks RN on 2/22/2023 at 11:26 AM

## 2023-02-22 NOTE — PROGRESS NOTES
No seizure-like activity noted this shift.  Constant observer present this shift.  At beginning of shift patient did quickly rip tab off lid of hospital-provided drinking cup and put it in her mouth during medication administration.  After speaking with patient, she agreed to swap tab for sensory toy.  Otherwise patient has been calm and cooperative with care, safety maintained.

## 2023-02-22 NOTE — PROGRESS NOTES
[facility] Progress Note    Patient:  Juan Elder  YOB: 1995  Date of Service: 2/22/2023  MRN: [id number]   Acct: [de-identified]   Primary Care Physician: Pari Rivera  Advance Directive: Full Code  Admit Date: 2/16/2023       Hospital Day: 2        CHIEF COMPLAINT:     Chief Complaint   Patient presents with    Seizures     2 seizures prior to EMS arrival 4 seizures with EMS       2/22/2023 8:06 AM  Subjective / Interval History:   02/22/2023  No acute changes or acute overnight event reported. Patient seen and examined. Doing well. No new complaints. Continues to be intermittently agitated. Patient sitting in bed, teary. Responds to questions but typing on her iPad.      02/21/2023  Patient seen and examined this AM.  Intermittent agitation and aggressiveness. Currently laying comfortably in bed in no apparent acute distress. No further seizure episodes reported. 02/20/2023  No acute changes or acute overnight event reported. Patient seen and examined. Continues to be intermittently agitated, requiring one-to-one sitter in the CCU. Laying comfortably in bed however no apparent acute distress. 02/19/2023  Patient seen and examined. Sitting comfortably in bed in no acute distress. Denies any acute complaints or distress at this time. Patient transferred to CCU yesterday, for persistent seizure activities. No acute overnight event reported. 02/18/2023  Patient seen and examined this AM.  Kreg Notice comfortably in bed in no acute distress. No new complaints. One-to-one sitter at bedside for safety. Patient reportedly with another episode of seizures lasting about 5 minutes, with heart rates rising up to 150s yesterday. Patient reportedly with agitation and aggressiveness around 22:20 PM yesterday, requiring Geodon as well as diphenhydramine IM. Patient was reportedly swallowed some foreign bodies (2 disc batteries).   GI subsequently consulted for foreign body removal via endoscopy. 02/17/2023  No acute changes or acute overnight event reported. Patient seen and examined. No new complaints. No further seizure activities at this time. Denies any dizziness, lightheadedness or palpitations. Endorses some nausea but no vomiting. Laying comfortably in bed in no acute distress. Review of Systems:   Review of Systems  ROS: 14 point review of systems is negative except as specifically addressed above. ADULT DIET;  Regular; Safety Tray; Safety Tray (Disposables No Utensils)  No intake or output data in the 24 hours ending 02/22/23 0806        Medications:   sodium chloride       Current Facility-Administered Medications   Medication Dose Route Frequency Provider Last Rate Last Admin    risperiDONE (RISPERDAL) tablet 2 mg  2 mg Oral Nightly MARBELLA Saavedra        chlorproMAZINE (THORAZINE) injection 10 mg  10 mg IntraMUSCular Once Rodrigo Harry MD        risperiDONE (RISPERDAL) tablet 2 mg  2 mg Oral Q6H PRN Bria Landeros MD   2 mg at 02/20/23 1505    naltrexone (DEPADE) tablet 50 mg  50 mg Oral Daily with breakfast Bria Landeros MD   50 mg at 02/21/23 1452    polyethylene glycol (GLYCOLAX) packet 17 g  17 g Oral BID Bob Paul MD   17 g at 02/20/23 2208    prazosin (MINIPRESS) capsule 1 mg  1 mg Oral Nightly Bria Landeros MD   1 mg at 02/21/23 2040    lamoTRIgine (LAMICTAL) tablet 225 mg  225 mg Oral Daily Bria Landeros MD   225 mg at 02/21/23 4161    haloperidol lactate (HALDOL) injection 5 mg  5 mg IntraMUSCular Q6H PRN Bria Landeros MD   5 mg at 02/21/23 1415    melatonin disintegrating tablet 5 mg  5 mg Oral Nightly Bria Landeros MD   5 mg at 02/21/23 2039    sodium chloride flush 0.9 % injection 5-40 mL  5-40 mL IntraVENous 2 times per day Jeanine Roblero MD        sodium chloride flush 0.9 % injection 5-40 mL  5-40 mL IntraVENous PRN Jeanine Roblero MD        0.9 % sodium chloride infusion   IntraVENous PRN Lyle Iverson MD        HYDROmorphone HCl PF (DILAUDID) injection 0.25 mg  0.25 mg IntraVENous Q5 Min PRN Lyle Iverson MD        HYDROmorphone HCl PF (DILAUDID) injection 0.5 mg  0.5 mg IntraVENous Q5 Min PRN Lyle Iverson MD        lacosamide (VIMPAT) tablet 50 mg  50 mg Oral BID Tennis Sans, DO   50 mg at 02/21/23 2039    enoxaparin (LOVENOX) injection 40 mg  40 mg SubCUTAneous Daily Dawna Chi MD   40 mg at 02/18/23 0945    ondansetron (ZOFRAN-ODT) disintegrating tablet 4 mg  4 mg Oral Q8H PRN Dawna Chi MD        Or    [facility]ron [facility] injection 4 mg  4 mg IntraVENous Q6H PRN Dawna Chi MD        acetaminophen (TYLENOL) tablet 650 mg  650 mg Oral Q4H PRN Dawna Chi MD   650 mg at 02/20/23 1731    Or    acetaminophen (TYLENOL) suppository 650 mg  650 mg Rectal Q4H PRN Dawna Chi MD        potassium chloride (KLOR-CON M) extended release tablet 40 mEq  40 mEq Oral PRN Dawna Chi MD        Or    potassium bicarb-citric acid (EFFER-K) effervescent tablet 40 mEq  40 mEq Oral PRN Dawna Chi MD        Or    potassium chloride 10 mEq/100 mL IVPB (Peripheral Line)  10 mEq IntraVENous PRN Dawna Chi MD        magnesium sulfate 2000 mg in 50 mL IVPB premix  2,000 mg IntraVENous PRN Dawna Chi MD        sodium phosphate 22.5 mmol in sodium chloride 0.9 % 250 mL IVPB  0.32 mmol/kg IntraVENous PRN Dawna Chi MD        polyethylene glycol (GLYCOLAX) packet 17 g  17 g Oral Daily PRN Dawna Chi MD   17 g at 02/20/23 1523    melatonin disintegrating tablet 5 mg  5 mg Oral Nightly PRN Dawna Chi MD        calcium carbonate (TUMS) chewable tablet 500 mg  500 mg Oral TID PRN Dawna Chi MD        levETIRAcetam (KEPPRA) tablet 1,500 mg  1,500 mg Oral BID Willa Gonzales MD   1,500 mg at 02/21/23 2038    clonazePAM (KLONOPIN) tablet 0.5 mg  0.5 mg Oral Q12H Willa Gonzales MD   0.5 mg at 02/21/23 2038    ferrous sulfate (IRON 325) tablet 325 mg  325 mg Oral BID  Alex Ramirez MD   325 mg at 02/20/23 1718    FLUoxetine (PROZAC) capsule 80 mg  80 mg Oral Daily Alex Ramirez MD   80 mg at 02/21/23 1132    ascorbic acid (VITAMIN C) tablet 500 mg  500 mg Oral BID with meals Alex Ramirez MD   500 mg at 02/20/23 1806         sodium chloride        risperiDONE  2 mg Oral Nightly    chlorproMAZINE  10 mg IntraMUSCular Once    naltrexone  50 mg Oral Daily with breakfast    polyethylene glycol  17 g Oral BID    prazosin  1 mg Oral Nightly    lamoTRIgine  225 mg Oral Daily    melatonin  5 mg Oral Nightly    sodium chloride flush  5-40 mL IntraVENous 2 times per day    lacosamide  50 mg Oral BID    enoxaparin  40 mg SubCUTAneous Daily    levETIRAcetam  1,500 mg Oral BID    clonazePAM  0.5 mg Oral Q12H    ferrous sulfate  325 mg Oral BID     FLUoxetine  80 mg Oral Daily    ascorbic acid  500 mg Oral BID with meals     risperiDONE, haloperidol lactate, sodium chloride flush, sodium chloride, HYDROmorphone, HYDROmorphone, ondansetron **OR** ondansetron, acetaminophen **OR** acetaminophen, potassium chloride **OR** potassium alternative oral replacement **OR** potassium chloride, magnesium sulfate, sodium phosphate IVPB, polyethylene glycol, melatonin, calcium carbonate  ADULT DIET; Regular; Safety Tray; Safety Tray (Disposables No Utensils)       Labs:   CBC with DIFF:  No results for input(s): WBC, RBC, HGB, HCT, MCV, MCH, MCHC, RDW, PLT, MPV, NEUTOPHILPCT, LYMPHOPCT, MONOPCT, EOSRELPCT, BASOPCT, NEUTROABS, LYMPHSABS, MONOSABS, EOSABS, BASOSABS in the last 72 hours.      CMP/BMP:  No results for input(s): NA, K, CL, CO2, ANIONGAP, GLUCOSE, BUN, CREATININE, LABGLOM, CALCIUM, PROT, LABALBU, BILITOT, ALKPHOS, ALT, AST, GLOB in the last 72 hours.        CRP:  No results for input(s): CRP in the last 72 hours.  Sed Rate:  No results for input(s): SEDRATE in the last 72 hours.      HgBA1c:  No components found for: HGBA1C  FLP:  No results found for: TRIG, HDL, LDLCALC, LDLDIRECT,  LABVLDL  TSH:    Lab Results   Component Value Date/Time    TSH 1.340 01/16/2023 06:17 PM     Troponin T:   No results for input(s): TROPONINI in the last 72 hours. Pro-BNP: No results for input(s): BNP in the last 72 hours. INR: No results for input(s): INR in the last 72 hours. ABGs:   Lab Results   Component Value Date/Time    PHART 7.430 01/13/2023 02:16 PM    PO2ART 139.0 01/13/2023 02:16 PM    JMI6ONX 39.0 01/13/2023 02:16 PM     UA:  No results for input(s): NITRITE, COLORU, PHUR, LABCAST, WBCUA, RBCUA, MUCUS, TRICHOMONAS, YEAST, BACTERIA, CLARITYU, SPECGRAV, LEUKOCYTESUR, UROBILINOGEN, BILIRUBINUR, BLOODU, GLUCOSEU, AMORPHOUS in the last 72 hours. Invalid input(s): Blas Corpus        Culture Results:    No results for input(s): CXSURG in the last 720 hours. Blood Culture Recent: No results for input(s): BC in the last 720 hours. No results for input(s): BC, BLOODCULT2, ORG in the last 72 hours. Cultures:   No results for input(s): CULTURE in the last 72 hours. No results for input(s): BC, Doyce Banner in the last 72 hours. No results for input(s): CXSURG in the last 72 hours. No results for input(s): MG, PHOS in the last 72 hours. No results for input(s): AST, ALT, ALB, BILITOT, ALKPHOS, ALB in the last 72 hours. RAD:   CT Head WO Contrast    Result Date: 2/17/2023  Exam: Noncontrast CT of the brain Technique: Axial noncontrast images were obtained from the skull base to the vertex. Coronal and sagittal reconstructions were performed. Radiation dose optimization technique was utilized. Clinical information: Seizure Comparison: Noncontrast CT of the brain performed on January 16, 2023 Findings: No evidence of acute hemorrhage, mass, mass effect, or midline shift. The ventricular system and basal cisterns are within normal limits for the patients age. Gray-white differentiation is maintained. There is no evidence of an intra-axial or extra-axial fluid collection.  Bone windows reveal no evidence of acute fracture. The visualized paranasal sinuses are within normal limits. The orbits are grossly unremarkable. No acute intracranial process evident on noncontrast CT of the brain. XR CHEST PORTABLE    Result Date: 2/17/2023  CLINICAL HISTORY: Seizure TECHNIQUE: Single AP view of the chest COMPARISON: CXR from 1/10/2023 FINDINGS: Lines and tubes: None. Cardiomediastinal: Normal size and configuration of the cardiomediastinal silhouette. No pneumomediastinum. Lungs and pleura: The lungs are grossly unremarkable. No pleural effusion. No pneumothorax. Musculoskeletal: No acute osseous abnormalities. Other: None. No acute cardiopulmonary process identified. Objective:   Vitals:   /66   Pulse 90   Temp 97.8 °F (36.6 °C) (Temporal)   Resp 18   Ht 5' 5\" (1.651 m)   Wt 199 lb 9.6 oz (90.5 kg)   SpO2 95%   BMI 33.22 kg/m²     Patient Vitals for the past 24 hrs:   BP Temp Temp src Pulse Resp SpO2   02/22/23 0300 108/66 97.8 °F (36.6 °C) Temporal 90 18 95 %   02/21/23 2040 112/76 98.4 °F (36.9 °C) Temporal 96 16 100 %   02/21/23 1412 -- -- -- (!) 118 15 99 %   02/21/23 1145 134/86 -- -- -- -- 100 %         24HR INTAKE/OUTPUT:  No intake or output data in the 24 hours ending 02/22/23 0806        Physical Exam  Vitals and nursing note reviewed. Constitutional:       General: She is not in acute distress. Appearance: Normal appearance. She is not ill-appearing, toxic-appearing or diaphoretic. HENT:      Head: Normocephalic and atraumatic. Right Ear: External ear normal.      Left Ear: External ear normal.      Nose: Nose normal. No congestion or rhinorrhea. Mouth/Throat:      Mouth: Mucous membranes are moist.      Pharynx: Oropharynx is clear. No oropharyngeal exudate or posterior oropharyngeal erythema. Eyes:      General: No scleral icterus. Right eye: No discharge. Left eye: No discharge.       Extraocular Movements: Extraocular movements intact. Conjunctiva/sclera: Conjunctivae normal.      Pupils: Pupils are equal, round, and reactive to light. Cardiovascular:      Rate and Rhythm: Normal rate and regular rhythm. Pulses: Normal pulses. Heart sounds: Normal heart sounds. No murmur heard. No friction rub. No gallop. Pulmonary:      Effort: Pulmonary effort is normal. No respiratory distress. Breath sounds: Normal breath sounds. No stridor. No wheezing, rhonchi or rales. Chest:      Chest wall: No tenderness. Abdominal:      General: Bowel sounds are normal. There is no distension. Palpations: Abdomen is soft. Tenderness: There is no abdominal tenderness. There is no guarding or rebound. Musculoskeletal:         General: No swelling, tenderness, deformity or signs of injury. Normal range of motion. Cervical back: Normal range of motion and neck supple. No rigidity. No muscular tenderness. Right lower leg: No edema. Left lower leg: No edema. Skin:     General: Skin is warm and dry. Capillary Refill: Capillary refill takes less than 2 seconds. Coloration: Skin is not jaundiced or pale. Findings: No bruising, erythema, lesion or rash. Neurological:      General: No focal deficit present. Mental Status: She is alert. Mental status is at baseline. Cranial Nerves: No cranial nerve deficit. Sensory: No sensory deficit. Motor: No weakness. Coordination: Coordination normal.   Psychiatric:         Mood and Affect: Mood normal.         Behavior: Behavior normal.         Thought Content:  Thought content normal.         Judgment: Judgment normal.         Assessment/plan:     Patient Active Problem List    Diagnosis Date Noted    [facility] 02/20/2023     Priority: Medium    Lower abdominal pain 02/20/2023     Priority: Medium    Depression 02/19/2023     Priority: Medium    Gastric erosion 02/18/2023     Priority: Medium    Duodenal erosion 02/18/2023 Priority: Medium    Intractable seizure disorder ([address] 02/17/2023     Priority: Medium    Chronic static encephalopathy 02/17/2023     Priority: Medium    Autism 02/17/2023     Priority: Medium    Seizure-like activity ([address] 02/16/2023     Priority: Medium    Acne 02/14/2023     Priority: Medium    Attention deficit disorder 02/14/2023     Priority: Medium    Atypical squamous cells of undetermined significance (ASCUS) on Papanicolaou smear of cervix 02/14/2023     Priority: Medium    Dysmenorrhea 02/14/2023     Priority: Medium    Sensory processing difficulty 02/14/2023     Priority: Medium    Active autistic disorder 01/26/2023     Priority: Medium    PTSD (post-traumatic stress disorder) 01/26/2023     Priority: Medium    Hypokalemia 01/26/2023     Priority: Medium    Status epilepticus ([address] 01/06/2023     Priority: Medium    Swallowed foreign body 02/16/2023     Added automatically from request for surgery [id number]         Hospital Problems             Last Modified POA    * (Principal) Seizure-like activity ([address] 2/16/2023 Yes    PTSD (post-traumatic stress disorder) 2/20/2023 Yes    Intractable seizure disorder ([address] 2/17/2023 Yes    Chronic static encephalopathy 2/17/2023 Yes    Autism 2/17/2023 Yes    Gastric erosion 2/18/2023 Yes    Duodenal erosion 2/18/2023 Yes    Depression 2/19/2023 Yes    Seizure ([address] 2/20/2023 Yes    Lower abdominal pain 2/20/2023 Yes    Swallowed foreign body 2/18/2023 Unknown    Overview Signed 2/18/2023  2:27 AM by Taylor Bran MD     Added automatically from request for surgery [id number]        Principal Problem:    Seizure-like activity ([address]  Active Problems:    PTSD (post-traumatic stress disorder)    Intractable seizure disorder ([address]    Chronic static encephalopathy    Autism    Gastric erosion    Duodenal erosion    Depression    Seizure ([address]    Lower abdominal pain    Swallowed foreign body  Resolved Problems:    * No resolved hospital problems.  *          Brief History/Summary:   As per Initial admission HPI 2/16/2023, quoted below;  \"Ms. Vicky Zuniga is a pleasant 33year old lady from home. She follows with Dr Erika Andrews. She has had some of her medications decreased/tapered recently. She has a history of epilepsy with onset at age 22 years. She had family present at the bedside. The phenytoin and Vimpat were being tapered. She has had multiple seizures tonight here in the hospital. She had reportedly had but a single seizure after her last visit (end of January) prior to her medications being reduced last week. She also has a history of Autism and is at times mute, but will still freely answer with head gestures. \"    Further hospital course as per problem list below; Autism   Agitation and aggressiveness  One-to-one sitter at bedside for safety  Psych on board  Haloperidol 5 mg IM every 6 hours/ PRN for agitation and aggression  Risperidone 1 mg p.o. twice daily  Risperidone 2 mg p.o. every 6 hours as needed for agitation    Seizures  Seizure precautions  Aspiration precautions  Neuro check every 4 hours  Neurology consulted on admission  Normal awake and asleep EEG (02/17/2023)  Lamotrigine 225 mg p.o. daily  Levetiracetam 1,500 mg p.o. twice daily  Defer further antiepileptic regiment to Neurology        Foreign body (bilaterally) ingestion   KUB (02/18/2023): Impression: There are 2 round metallic foreign bodies in the small bowel of the left mid abdomen  GI consulted for removal of battery (02/18/2023)  General surgery on board, however no emergent or urgent surgical intervention recommended at this time. Continue serial imaging  KUB (02/18/2023): Impression: Nonobstructive bowel gas pattern. Ingested batteries are visualized in the right mid abdomen, not significantly changed in position when compared to prior study. Close follow-up recommended. KUB (02/19/2023):  Impression: Round densities in the right mid abdomen and the right upper pelvis could represent the swallowed batteries. No evidence of small-bowel obstruction  KUB (02/20/2023): Impression: Nonobstructive bowel gas pattern. Ingested batteries are visualized in the right mid abdomen, not significantly changed in position when compared to prior study    CT Abdomen/Pelvis WO Con -pending          Continue management of other chronic medical conditions - see above and orders. Advance Directive: Full Code    ADULT DIET; Regular; Safety Tray; Safety Tray (Disposables No Utensils)         Consults Made:   IP CONSULT TO NEUROLOGY  IP CONSULT TO GI  IP CONSULT TO CASE MANAGEMENT  IP CONSULT TO PSYCHIATRY  IP CONSULT TO GENERAL SURGERY      DVT prophylaxis: Enoxaparin        Discharge planning:   Continue current management, including one-to-one sitter at bedside and serial imaging  Continue management   SW and Psych on board, for possible placement versus inpatient psych admission, when medically stable    Time Spent is  35 mins  in the examination, evaluation, counseling and review of medications, assessment and plan.      Electronically signed by   Erika Gustafson MD, MD,   Internal Medicine Hospitalist   2/22/2023 8:06 AM

## 2023-02-22 NOTE — PROGRESS NOTES
Pt came out of room and wanted to walk in the unit. Pt pushed the exit button the wall on both ends, but I kept her from walking to the door. Sitter was ambulating with me also. Pt was in back hallway of the unit and would not walk any further. Security was called and came up and pt was eventually guided back to her room. Pt had broken a piece of the clear plastic cup she had for water and put it in her mouth. Attempts to get her to give to us were unsuccessful. Pt typing on her I-Pad that she wanted to go home. I have notified Dr Monica Pressley.  Electronically signed by Farnaz Beck RN on 2/22/2023 at 11:07 AM

## 2023-02-23 ENCOUNTER — APPOINTMENT (OUTPATIENT)
Dept: GENERAL RADIOLOGY | Age: 28
DRG: 101 | End: 2023-02-23

## 2023-02-23 LAB
ALBUMIN SERPL-MCNC: 4.4 G/DL (ref 3.5–5.2)
ALP BLD-CCNC: 89 U/L (ref 35–104)
ALT SERPL-CCNC: 14 U/L (ref 5–33)
ANION GAP SERPL CALCULATED.3IONS-SCNC: 16 MMOL/L (ref 7–19)
AST SERPL-CCNC: 15 U/L (ref 5–32)
BACTERIA: NEGATIVE /HPF
BILIRUB SERPL-MCNC: <0.2 MG/DL (ref 0.2–1.2)
BILIRUBIN URINE: NEGATIVE
BLOOD, URINE: ABNORMAL
BUN BLDV-MCNC: 8 MG/DL (ref 6–20)
C-REACTIVE PROTEIN: 2.79 MG/DL (ref 0–0.5)
CALCIUM SERPL-MCNC: 9.6 MG/DL (ref 8.6–10)
CHLORIDE BLD-SCNC: 101 MMOL/L (ref 98–111)
CLARITY: ABNORMAL
CO2: 24 MMOL/L (ref 22–29)
COLOR: YELLOW
CREAT SERPL-MCNC: 0.7 MG/DL (ref 0.5–0.9)
CRYSTALS, UA: ABNORMAL /HPF
EPITHELIAL CELLS, UA: 1 /HPF (ref 0–5)
GFR SERPL CREATININE-BSD FRML MDRD: >60 ML/MIN/{1.73_M2}
GLUCOSE BLD-MCNC: 109 MG/DL (ref 74–109)
GLUCOSE URINE: NEGATIVE MG/DL
HCT VFR BLD CALC: 45 % (ref 37–47)
HEMOGLOBIN: 13.5 G/DL (ref 12–16)
HYALINE CASTS: 8 /HPF (ref 0–8)
KETONES, URINE: ABNORMAL MG/DL
LACTIC ACID: 0.9 MMOL/L (ref 0.5–1.9)
LACTIC ACID: 4.5 MMOL/L (ref 0.5–1.9)
LEUKOCYTE ESTERASE, URINE: NEGATIVE
MCH RBC QN AUTO: 27.4 PG (ref 27–31)
MCHC RBC AUTO-ENTMCNC: 30 G/DL (ref 33–37)
MCV RBC AUTO: 91.3 FL (ref 81–99)
NITRITE, URINE: NEGATIVE
PDW BLD-RTO: 20.8 % (ref 11.5–14.5)
PH UA: 6.5 (ref 5–8)
PLATELET # BLD: 212 K/UL (ref 130–400)
PMV BLD AUTO: 12.8 FL (ref 9.4–12.3)
POTASSIUM REFLEX MAGNESIUM: 4 MMOL/L (ref 3.5–5)
PROCALCITONIN: 0.03 NG/ML (ref 0–0.09)
PROLACTIN: 187.2 NG/ML (ref 4.79–23.3)
PROTEIN UA: ABNORMAL MG/DL
RBC # BLD: 4.93 M/UL (ref 4.2–5.4)
RBC UA: 73 /HPF (ref 0–4)
SODIUM BLD-SCNC: 141 MMOL/L (ref 136–145)
SPECIFIC GRAVITY UA: 1.03 (ref 1–1.03)
TOTAL PROTEIN: 7.4 G/DL (ref 6.6–8.7)
UROBILINOGEN, URINE: 1 E.U./DL
WBC # BLD: 8.2 K/UL (ref 4.8–10.8)
WBC UA: 1 /HPF (ref 0–5)

## 2023-02-23 PROCEDURE — 51701 INSERT BLADDER CATHETER: CPT

## 2023-02-23 PROCEDURE — 99233 SBSQ HOSP IP/OBS HIGH 50: CPT | Performed by: PSYCHIATRY & NEUROLOGY

## 2023-02-23 PROCEDURE — 6360000002 HC RX W HCPCS: Performed by: ANESTHESIOLOGY

## 2023-02-23 PROCEDURE — 81001 URINALYSIS AUTO W/SCOPE: CPT

## 2023-02-23 PROCEDURE — 84145 PROCALCITONIN (PCT): CPT

## 2023-02-23 PROCEDURE — 6360000002 HC RX W HCPCS: Performed by: PSYCHIATRY & NEUROLOGY

## 2023-02-23 PROCEDURE — 51798 US URINE CAPACITY MEASURE: CPT

## 2023-02-23 PROCEDURE — 2580000003 HC RX 258: Performed by: ANESTHESIOLOGY

## 2023-02-23 PROCEDURE — 36415 COLL VENOUS BLD VENIPUNCTURE: CPT

## 2023-02-23 PROCEDURE — 6360000002 HC RX W HCPCS: Performed by: HOSPITALIST

## 2023-02-23 PROCEDURE — 86140 C-REACTIVE PROTEIN: CPT

## 2023-02-23 PROCEDURE — 85027 COMPLETE CBC AUTOMATED: CPT

## 2023-02-23 PROCEDURE — 6360000002 HC RX W HCPCS

## 2023-02-23 PROCEDURE — 80053 COMPREHEN METABOLIC PANEL: CPT

## 2023-02-23 PROCEDURE — 83605 ASSAY OF LACTIC ACID: CPT

## 2023-02-23 PROCEDURE — 6370000000 HC RX 637 (ALT 250 FOR IP): Performed by: NURSE PRACTITIONER

## 2023-02-23 PROCEDURE — 2000000000 HC ICU R&B

## 2023-02-23 PROCEDURE — 6370000000 HC RX 637 (ALT 250 FOR IP): Performed by: PSYCHIATRY & NEUROLOGY

## 2023-02-23 PROCEDURE — 74018 RADEX ABDOMEN 1 VIEW: CPT

## 2023-02-23 PROCEDURE — 84146 ASSAY OF PROLACTIN: CPT

## 2023-02-23 PROCEDURE — 6370000000 HC RX 637 (ALT 250 FOR IP): Performed by: HOSPITALIST

## 2023-02-23 PROCEDURE — 6370000000 HC RX 637 (ALT 250 FOR IP): Performed by: SURGERY

## 2023-02-23 RX ORDER — LORAZEPAM 2 MG/ML
INJECTION INTRAMUSCULAR
Status: COMPLETED
Start: 2023-02-23 | End: 2023-02-23

## 2023-02-23 RX ORDER — LORAZEPAM 2 MG/ML
1 INJECTION INTRAMUSCULAR ONCE
Status: COMPLETED | OUTPATIENT
Start: 2023-02-23 | End: 2023-02-23

## 2023-02-23 RX ORDER — 0.9 % SODIUM CHLORIDE 0.9 %
1000 INTRAVENOUS SOLUTION INTRAVENOUS ONCE
Status: DISCONTINUED | OUTPATIENT
Start: 2023-02-23 | End: 2023-02-27 | Stop reason: ALTCHOICE

## 2023-02-23 RX ORDER — LEVETIRACETAM 500 MG/1
1000 TABLET ORAL 2 TIMES DAILY
Status: DISCONTINUED | OUTPATIENT
Start: 2023-02-23 | End: 2023-02-24

## 2023-02-23 RX ORDER — LORAZEPAM 2 MG/ML
2 INJECTION INTRAMUSCULAR ONCE
Status: COMPLETED | OUTPATIENT
Start: 2023-02-23 | End: 2023-02-23

## 2023-02-23 RX ADMIN — LORAZEPAM 2 MG: 2 INJECTION INTRAMUSCULAR at 11:05

## 2023-02-23 RX ADMIN — CLONAZEPAM 0.5 MG: 1 TABLET ORAL at 12:00

## 2023-02-23 RX ADMIN — POLYETHYLENE GLYCOL 3350 17 G: 17 POWDER, FOR SOLUTION ORAL at 12:49

## 2023-02-23 RX ADMIN — LORAZEPAM 1 MG: 2 INJECTION INTRAMUSCULAR at 10:59

## 2023-02-23 RX ADMIN — LEVETIRACETAM 1000 MG: 500 TABLET, FILM COATED ORAL at 19:33

## 2023-02-23 RX ADMIN — POLYETHYLENE GLYCOL 3350 17 G: 17 POWDER, FOR SOLUTION ORAL at 19:34

## 2023-02-23 RX ADMIN — PRAZOSIN HYDROCHLORIDE 1 MG: 1 CAPSULE ORAL at 19:34

## 2023-02-23 RX ADMIN — LORAZEPAM 2 MG: 2 INJECTION INTRAMUSCULAR; INTRAVENOUS at 11:05

## 2023-02-23 RX ADMIN — SODIUM CHLORIDE, PRESERVATIVE FREE 10 ML: 5 INJECTION INTRAVENOUS at 19:35

## 2023-02-23 RX ADMIN — LEVETIRACETAM 1500 MG: 500 TABLET, FILM COATED ORAL at 12:00

## 2023-02-23 RX ADMIN — RISPERIDONE 2 MG: 1 TABLET ORAL at 12:00

## 2023-02-23 RX ADMIN — LORAZEPAM 1 MG: 2 INJECTION INTRAMUSCULAR; INTRAVENOUS at 10:59

## 2023-02-23 RX ADMIN — ENOXAPARIN SODIUM 40 MG: 100 INJECTION SUBCUTANEOUS at 12:55

## 2023-02-23 RX ADMIN — RISPERIDONE 2 MG: 1 TABLET ORAL at 19:33

## 2023-02-23 RX ADMIN — SODIUM CHLORIDE, PRESERVATIVE FREE 10 ML: 5 INJECTION INTRAVENOUS at 12:50

## 2023-02-23 RX ADMIN — LACOSAMIDE 50 MG: 50 TABLET, FILM COATED ORAL at 12:00

## 2023-02-23 RX ADMIN — Medication 5 MG: at 19:34

## 2023-02-23 RX ADMIN — NALTREXONE HYDROCHLORIDE 50 MG: 50 TABLET, FILM COATED ORAL at 12:50

## 2023-02-23 RX ADMIN — FLUOXETINE 80 MG: 20 CAPSULE ORAL at 12:50

## 2023-02-23 RX ADMIN — HALOPERIDOL LACTATE 5 MG: 5 INJECTION, SOLUTION INTRAMUSCULAR at 12:00

## 2023-02-23 RX ADMIN — HYDROMORPHONE HYDROCHLORIDE 0.5 MG: 1 INJECTION, SOLUTION INTRAMUSCULAR; INTRAVENOUS; SUBCUTANEOUS at 12:00

## 2023-02-23 RX ADMIN — LORAZEPAM 1 MG: 2 INJECTION INTRAMUSCULAR at 10:54

## 2023-02-23 RX ADMIN — CLONAZEPAM 0.5 MG: 1 TABLET ORAL at 21:15

## 2023-02-23 RX ADMIN — LAMOTRIGINE 225 MG: 200 TABLET ORAL at 12:50

## 2023-02-23 ASSESSMENT — PAIN DESCRIPTION - LOCATION: LOCATION: CHEST;ABDOMEN

## 2023-02-23 ASSESSMENT — PAIN SCALES - GENERAL
PAINLEVEL_OUTOF10: 7
PAINLEVEL_OUTOF10: 0

## 2023-02-23 ASSESSMENT — PAIN DESCRIPTION - ORIENTATION: ORIENTATION: MID

## 2023-02-23 ASSESSMENT — PAIN DESCRIPTION - DESCRIPTORS: DESCRIPTORS: ACHING

## 2023-02-23 ASSESSMENT — PAIN - FUNCTIONAL ASSESSMENT: PAIN_FUNCTIONAL_ASSESSMENT: ACTIVITIES ARE NOT PREVENTED

## 2023-02-23 NOTE — PROGRESS NOTES
[facility] Progress Note    Patient:  Emili Schumacher  YOB: 1995  Date of Service: 2/23/2023  MRN: [id number]   Acct: [de-identified]   Primary Care Physician: Kristie Cunningham  Advance Directive: Full Code  Admit Date: 2/16/2023       Hospital Day: 3        CHIEF COMPLAINT:     Chief Complaint   Patient presents with    Seizures     2 seizures prior to EMS arrival 4 seizures with EMS       2/23/2023 7:49 AM  Subjective / Interval History:   02/23/2023  Patient seen and examined this AM.  Doing well. Lying comfortably in bed in no acute distress. No acute changes or acute overnight event reported. Patient endorses abdominal pain with some burning sensation. Rapid response called at 10:51 AM, on account of patient with persistent seizure activity, requiring 6 mg of lorazepam and subsequent transfer to the ICU.        02/22/2023  No acute changes or acute overnight event reported. Patient seen and examined. Doing well. No new complaints. Continues to be intermittently agitated. Patient sitting in bed, teary. Responds to questions but typing on her iPad.      02/21/2023  Patient seen and examined this AM.  Intermittent agitation and aggressiveness. Currently laying comfortably in bed in no apparent acute distress. No further seizure episodes reported. 02/20/2023  No acute changes or acute overnight event reported. Patient seen and examined. Continues to be intermittently agitated, requiring one-to-one sitter in the CCU. Laying comfortably in bed however no apparent acute distress. 02/19/2023  Patient seen and examined. Sitting comfortably in bed in no acute distress. Denies any acute complaints or distress at this time. Patient transferred to CCU yesterday, for persistent seizure activities. No acute overnight event reported. 02/18/2023  Patient seen and examined this AM.  Alvina Michelle comfortably in bed in no acute distress. No new complaints.   One-to-one sitter at bedside for safety. Patient reportedly with another episode of seizures lasting about 5 minutes, with heart rates rising up to 150s yesterday. Patient reportedly with agitation and aggressiveness around 22:20 PM yesterday, requiring Geodon as well as diphenhydramine IM. Patient was reportedly swallowed some foreign bodies (2 disc batteries). GI subsequently consulted for foreign body removal via endoscopy. 02/17/2023  No acute changes or acute overnight event reported. Patient seen and examined. No new complaints. No further seizure activities at this time. Denies any dizziness, lightheadedness or palpitations. Endorses some nausea but no vomiting. Laying comfortably in bed in no acute distress. Review of Systems:   Review of Systems  ROS: 14 point review of systems is negative except as specifically addressed above. ADULT DIET;  Regular; Safety Tray; Safety Tray (Disposables No Utensils)    Intake/Output Summary (Last 24 hours) at 2/23/2023 0749  Last data filed at 2/22/2023 1819  Gross per 24 hour   Intake 780 ml   Output --   Net 780 ml           Medications:   sodium chloride       Current Facility-Administered Medications   Medication Dose Route Frequency Provider Last Rate Last Admin    risperiDONE (RISPERDAL) tablet 2 mg  2 mg Oral Nightly MARBELLA Hayward   2 mg at 02/22/23 2031    chlorproMAZINE (THORAZINE) injection 10 mg  10 mg IntraMUSCular Once Joyce Crews MD        risperiDONE (RISPERDAL) tablet 2 mg  2 mg Oral Q6H PRN Kim Lara MD   2 mg at 02/22/23 2960    naltrexone (DEPADE) tablet 50 mg  50 mg Oral Daily with breakfast Kim Lara MD   50 mg at 02/22/23 0835    polyethylene glycol (GLYCOLAX) packet 17 g  17 g Oral BID Hilary Tenorio MD   17 g at 02/22/23 2032    prazosin (MINIPRESS) capsule 1 mg  1 mg Oral Nightly Kim Lara MD   1 mg at 02/22/23 2031    lamoTRIgine (LAMICTAL) tablet 225 mg  225 mg Oral Daily Kim Lara MD 225 mg at 02/22/23 0834    haloperidol lactate (HALDOL) injection 5 mg  5 mg IntraMUSCular Q6H PRN Scott Recio MD   5 mg at 02/21/23 1415    melatonin disintegrating tablet 5 mg  5 mg Oral Nightly Scott Recio MD   5 mg at 02/22/23 2032    sodium chloride flush 0.9 % injection 5-40 mL  5-40 mL IntraVENous 2 times per day Branden Grant MD        sodium chloride flush 0.9 % injection 5-40 mL  5-40 mL IntraVENous PRN Branden Grant MD        0.9 % sodium chloride infusion   IntraVENous PRN Branden Grant MD        HYDROmorphone HCl PF (DILAUDID) injection 0.25 mg  0.25 mg IntraVENous Q5 Min PRN Branden Grant MD        HYDROmorphone HCl PF (DILAUDID) injection 0.5 mg  0.5 mg IntraVENous Q5 Min PRN Branden Grant MD        lacosamide (VIMPAT) tablet 50 mg  50 mg Oral BID Wendi Bone DO   50 mg at 02/22/23 2032    enoxaparin (LOVENOX) injection 40 mg  40 mg SubCUTAneous Daily Amy Jolly MD   40 mg at 02/22/23 0844    ondansetron (ZOFRAN-ODT) disintegrating tablet 4 mg  4 mg Oral Q8H PRN Amy Jolly MD        Or    ondansetron [facility]) injection 4 mg  4 mg IntraVENous Q6H PRN Amy Jolly MD        acetaminophen (TYLENOL) tablet 650 mg  650 mg Oral Q4H PRN Amy Jolly MD   650 mg at 02/22/23 1837    Or    acetaminophen (TYLENOL) suppository 650 mg  650 mg Rectal Q4H PRN Amy Jolly MD        potassium chloride (KLOR-CON M) extended release tablet 40 mEq  40 mEq Oral PRN Amy Jolly MD        Or    potassium bicarb-citric acid (EFFER-K) effervescent tablet 40 mEq  40 mEq Oral PRN Amy Jolly MD        Or    potassium chloride 10 mEq/100 mL IVPB (Peripheral Line)  10 mEq IntraVENous PRN Amy Jolly MD        magnesium sulfate 2000 mg in 50 mL IVPB premix  2,000 mg IntraVENous PRN Amy Jolly MD        sodium phosphate 22.5 mmol in sodium chloride 0.9 % 250 mL IVPB  0.32 mmol/kg IntraVENous PRN Amy Jolly MD        polyethylene glycol (GLYCOLAX) packet 17 g  17 g Oral Daily PRN William Bowling MD   17 g at 02/20/23 1523    melatonin disintegrating tablet 5 mg  5 mg Oral Nightly PRN William Bowling MD        calcium carbonate (TUMS) chewable tablet 500 mg  500 mg Oral TID PRN William Bowling MD        levETIRAcetam (KEPPRA) tablet 1,500 mg  1,500 mg Oral BID Franklin Jj MD   1,500 mg at 02/22/23 2031    clonazePAM (KLONOPIN) tablet 0.5 mg  0.5 mg Oral Q12H Franklin Jj MD   0.5 mg at 02/22/23 2031    ferrous sulfate (IRON 325) tablet 325 mg  325 mg Oral BID  William Bowling MD   325 mg at 02/22/23 1645    FLUoxetine (PROZAC) capsule 80 mg  80 mg Oral Daily William Bowling MD   80 mg at 02/22/23 5230    ascorbic acid (VITAMIN C) tablet 500 mg  500 mg Oral BID with meals William Bowling MD   500 mg at 02/22/23 1645         sodium chloride        risperiDONE  2 mg Oral Nightly    chlorproMAZINE  10 mg IntraMUSCular Once    naltrexone  50 mg Oral Daily with breakfast    polyethylene glycol  17 g Oral BID    prazosin  1 mg Oral Nightly    lamoTRIgine  225 mg Oral Daily    melatonin  5 mg Oral Nightly    sodium chloride flush  5-40 mL IntraVENous 2 times per day    lacosamide  50 mg Oral BID    enoxaparin  40 mg SubCUTAneous Daily    levETIRAcetam  1,500 mg Oral BID    clonazePAM  0.5 mg Oral Q12H    ferrous sulfate  325 mg Oral BID WC    FLUoxetine  80 mg Oral Daily    ascorbic acid  500 mg Oral BID with meals     risperiDONE, haloperidol lactate, sodium chloride flush, sodium chloride, HYDROmorphone, HYDROmorphone, ondansetron **OR** ondansetron, acetaminophen **OR** acetaminophen, potassium chloride **OR** potassium alternative oral replacement **OR** potassium chloride, magnesium sulfate, sodium phosphate IVPB, polyethylene glycol, melatonin, calcium carbonate  ADULT DIET;  Regular; Safety Tray; Safety Tray (Disposables No Utensils)       Labs:   CBC with DIFF:  No results for input(s): WBC, RBC, HGB, HCT, MCV, MCH, MCHC, RDW, PLT, MPV, NEUTOPHILPCT, LYMPHOPCT, MONOPCT, EOSRELPCT, BASOPCT, NEUTROABS, LYMPHSABS, MONOSABS, EOSABS, BASOSABS in the last 72 hours. CMP/BMP:  No results for input(s): NA, K, CL, CO2, ANIONGAP, GLUCOSE, BUN, CREATININE, LABGLOM, CALCIUM, PROT, LABALBU, BILITOT, ALKPHOS, ALT, AST, GLOB in the last 72 hours. CRP:  No results for input(s): CRP in the last 72 hours. Sed Rate:  No results for input(s): SEDRATE in the last 72 hours. HgBA1c:  No components found for: HGBA1C  FLP:  No results found for: TRIG, HDL, LDLCALC, LDLDIRECT, LABVLDL  TSH:    Lab Results   Component Value Date/Time    TSH 1.340 01/16/2023 06:17 PM     Troponin T:   No results for input(s): TROPONINI in the last 72 hours. Pro-BNP: No results for input(s): BNP in the last 72 hours. INR: No results for input(s): INR in the last 72 hours. ABGs:   Lab Results   Component Value Date/Time    PHART 7.430 01/13/2023 02:16 PM    PO2ART 139.0 01/13/2023 02:16 PM    WQM6SLV 39.0 01/13/2023 02:16 PM     UA:  No results for input(s): NITRITE, COLORU, PHUR, LABCAST, WBCUA, RBCUA, MUCUS, TRICHOMONAS, YEAST, BACTERIA, CLARITYU, SPECGRAV, LEUKOCYTESUR, UROBILINOGEN, BILIRUBINUR, BLOODU, GLUCOSEU, AMORPHOUS in the last 72 hours. Invalid input(s): Ca Madelinees        Culture Results:    No results for input(s): CXSURG in the last 720 hours. Blood Culture Recent: No results for input(s): BC in the last 720 hours. No results for input(s): BC, BLOODCULT2, ORG in the last 72 hours. Cultures:   No results for input(s): CULTURE in the last 72 hours. No results for input(s): BC, Ghislaine Ruth in the last 72 hours. No results for input(s): CXSURG in the last 72 hours. No results for input(s): MG, PHOS in the last 72 hours. No results for input(s): AST, ALT, ALB, BILITOT, ALKPHOS, ALB in the last 72 hours.         RAD:   CT Head WO Contrast    Result Date: 2/17/2023  Exam: Noncontrast CT of the brain Technique: Axial noncontrast images were obtained from the skull base to the vertex. Coronal and sagittal reconstructions were performed. Radiation dose optimization technique was utilized. Clinical information: Seizure Comparison: Noncontrast CT of the brain performed on January 16, 2023 Findings: No evidence of acute hemorrhage, mass, mass effect, or midline shift. The ventricular system and basal cisterns are within normal limits for the patients age. Gray-white differentiation is maintained. There is no evidence of an intra-axial or extra-axial fluid collection. Bone windows reveal no evidence of acute fracture. The visualized paranasal sinuses are within normal limits. The orbits are grossly unremarkable. No acute intracranial process evident on noncontrast CT of the brain. XR CHEST PORTABLE    Result Date: 2/17/2023  CLINICAL HISTORY: Seizure TECHNIQUE: Single AP view of the chest COMPARISON: CXR from 1/10/2023 FINDINGS: Lines and tubes: None. Cardiomediastinal: Normal size and configuration of the cardiomediastinal silhouette. No pneumomediastinum. Lungs and pleura: The lungs are grossly unremarkable. No pleural effusion. No pneumothorax. Musculoskeletal: No acute osseous abnormalities. Other: None. No acute cardiopulmonary process identified. Objective:   Vitals:   /74   Pulse (!) 102   Temp 97.7 °F (36.5 °C) (Oral)   Resp 18   Ht 5' 5\" (1.651 m)   Wt 187 lb 6 oz (85 kg)   SpO2 97%   BMI 31.18 kg/m²     Patient Vitals for the past 24 hrs:   BP Temp Temp src Pulse Resp SpO2 Weight   02/23/23 0716 -- -- -- -- -- -- 187 lb 6 oz (85 kg)   02/22/23 1649 110/74 97.7 °F (36.5 °C) Oral (!) 102 18 97 % --         24HR INTAKE/OUTPUT:    Intake/Output Summary (Last 24 hours) at 2/23/2023 0749  Last data filed at 2/22/2023 1819  Gross per 24 hour   Intake 780 ml   Output --   Net 780 ml           Physical Exam  Vitals and nursing note reviewed. Constitutional:       General: She is not in acute distress. Appearance: Normal appearance.  She is not ill-appearing, toxic-appearing or diaphoretic. HENT:      Head: Normocephalic and atraumatic. Right Ear: External ear normal.      Left Ear: External ear normal.      Nose: Nose normal. No congestion or rhinorrhea. Mouth/Throat:      Mouth: Mucous membranes are moist.      Pharynx: Oropharynx is clear. No oropharyngeal exudate or posterior oropharyngeal erythema. Eyes:      General: No scleral icterus. Right eye: No discharge. Left eye: No discharge. Extraocular Movements: Extraocular movements intact. Conjunctiva/sclera: Conjunctivae normal.      Pupils: Pupils are equal, round, and reactive to light. Cardiovascular:      Rate and Rhythm: Normal rate and regular rhythm. Pulses: Normal pulses. Heart sounds: Normal heart sounds. No murmur heard. No friction rub. No gallop. Pulmonary:      Effort: Pulmonary effort is normal. No respiratory distress. Breath sounds: Normal breath sounds. No stridor. No wheezing, rhonchi or rales. Chest:      Chest wall: No tenderness. Abdominal:      General: Bowel sounds are normal. There is no distension. Palpations: Abdomen is soft. Tenderness: There is no abdominal tenderness. There is no guarding or rebound. Musculoskeletal:         General: No swelling, tenderness, deformity or signs of injury. Normal range of motion. Cervical back: Normal range of motion and neck supple. No rigidity. No muscular tenderness. Right lower leg: No edema. Left lower leg: No edema. Skin:     General: Skin is warm and dry. Capillary Refill: Capillary refill takes less than 2 seconds. Coloration: Skin is not jaundiced or pale. Findings: No bruising, erythema, lesion or rash. Neurological:      General: No focal deficit present. Mental Status: She is alert. Mental status is at baseline. Cranial Nerves: No cranial nerve deficit. Sensory: No sensory deficit. Motor: No weakness. Coordination: Coordination normal.   Psychiatric:         Mood and Affect: Mood normal.         Behavior: Behavior normal.         Thought Content:  Thought content normal.         Judgment: Judgment normal.         Assessment/plan:     Patient Active Problem List    Diagnosis Date Noted    Seizure ([address] 02/20/2023     Priority: Medium    Lower abdominal pain 02/20/2023     Priority: Medium    Depression 02/19/2023     Priority: Medium    Gastric erosion 02/18/2023     Priority: Medium    Duodenal erosion 02/18/2023     Priority: Medium    Intractable seizure disorder ([address] 02/17/2023     Priority: Medium    Chronic static encephalopathy 02/17/2023     Priority: Medium    Autism 02/17/2023     Priority: Medium    Seizure-like activity ([address] 02/16/2023     Priority: Medium    Acne 02/14/2023     Priority: Medium    Attention deficit disorder 02/14/2023     Priority: Medium    Atypical squamous cells of undetermined significance (ASCUS) on Papanicolaou smear of cervix 02/14/2023     Priority: Medium    Dysmenorrhea 02/14/2023     Priority: Medium    Sensory processing difficulty 02/14/2023     Priority: Medium    Active autistic disorder 01/26/2023     Priority: Medium    PTSD (post-traumatic stress disorder) 01/26/2023     Priority: Medium    Hypokalemia 01/26/2023     Priority: Medium    Status epilepticus ([address] 01/06/2023     Priority: Medium    Swallowed foreign body 02/16/2023     Added automatically from request for surgery [id number]         Hospital Problems             Last Modified POA    * (Principal) Seizure-like activity ([address] 2/16/2023 Yes    PTSD (post-traumatic stress disorder) 2/20/2023 Yes    Intractable seizure disorder ([address] 2/17/2023 Yes    Chronic static encephalopathy 2/17/2023 Yes    Autism 2/17/2023 Yes    Gastric erosion 2/18/2023 Yes    Duodenal erosion 2/18/2023 Yes    Depression 2/19/2023 Yes    Seizure ([address] 2/20/2023 Yes    Lower abdominal pain 2/20/2023 Yes    Swallowed foreign body 2/18/2023 Unknown    Overview Signed 2/18/2023  2:27 AM by Clifm Fothergill, MD     Added automatically from request for surgery [id number]        Principal Problem:    Seizure-like activity ([address]  Active Problems:    PTSD (post-traumatic stress disorder)    Intractable seizure disorder ([address]    Chronic static encephalopathy    Autism    Gastric erosion    Duodenal erosion    Depression    Seizure ([address]    Lower abdominal pain    Swallowed foreign body  Resolved Problems:    * No resolved hospital problems. *          Brief History/Summary:   As per Initial admission HPI 2/16/2023, quoted below;  \"Ms. Domo Arauz is a pleasant 33year old lady from home. She follows with Dr Maryam Ibarra. She has had some of her medications decreased/tapered recently. She has a history of epilepsy with onset at age 22 years. She had family present at the bedside. The phenytoin and Vimpat were being tapered. She has had multiple seizures tonight here in the hospital. She had reportedly had but a single seizure after her last visit (end of January) prior to her medications being reduced last week. She also has a history of Autism and is at times mute, but will still freely answer with head gestures. \"    Further hospital course as per problem list below; Autism   Agitation and aggressiveness  One-to-one sitter at bedside for safety  Psych on board  Haloperidol 5 mg IM every 6 hours/ PRN for agitation and aggression  Risperidone 1 mg p.o. twice daily  Risperidone 2 mg p.o. every 6 hours as needed for agitation    Seizures  Seizure precautions  Aspiration precautions  Neuro check every 4 hours  Neurology consulted on admission  Normal awake and asleep EEG (02/17/2023)  Lamotrigine 225 mg p.o. daily  Levetiracetam 1,500 mg p.o. twice daily  Defer further antiepileptic regiment to Neurology        Abdominal pain. Foreign body (bilaterally) ingestion   KUB (02/18/2023):  Impression: There are 2 round metallic foreign bodies in the small bowel of the left mid abdomen  Continue serial imaging  KUB (02/18/2023): Impression: Nonobstructive bowel gas pattern. Ingested batteries are visualized in the right mid abdomen, not significantly changed in position when compared to prior study. Close follow-up recommended. KUB (02/19/2023): Impression: Round densities in the right mid abdomen and the right upper pelvis could represent the swallowed batteries. No evidence of small-bowel obstruction  KUB (02/20/2023): Impression: Nonobstructive bowel gas pattern. Ingested batteries are visualized in the right mid abdomen, not significantly changed in position when compared to prior study  F/u KUB (02/23/2023):   CT Abdomen/Pelvis WO Con -pending  GI consulted for removal of battery (02/18/2023)  General surgery on board, given concern for possible perforation. No emergent or urgent surgical intervention recommended at this time. Further work-up and management as per general surgery        Continue management of other chronic medical conditions - see above and orders. Advance Directive: Full Code    ADULT DIET; Regular; Safety Tray; Safety Tray (Disposables No Utensils)         Consults Made:   IP CONSULT TO NEUROLOGY  IP CONSULT TO CASE MANAGEMENT  IP CONSULT TO PSYCHIATRY  IP CONSULT TO GENERAL SURGERY      DVT prophylaxis: Enoxaparin        Discharge planning:   Continue current management, including one-to-one sitter at bedside and serial imaging  Continue management   SW and Psych on board, for possible placement versus inpatient psych admission, when medically stable    Time Spent is  35 mins  in the examination, evaluation, counseling and review of medications, assessment and plan.      Electronically signed by   Vilma Bravo MD, MD,   Internal Medicine Hospitalist   2/23/2023 7:49 AM

## 2023-02-23 NOTE — PROGRESS NOTES
Patient was walking with sitter in hallway. Patient grabbed thumbtack from bulletin by service elevators and proceeded to put in her mouth. Patient refuses to give up said thumbtack. Security called for assistance; patient unwilling to give up thumbtack. Attempted to make trades for tack such as food, ice, candy, etc but patient refuses to spit out and states she will swallow it if we try to take it from her mouth. Sitter exchanged out and new sitter updated.       Electronically signed by Maribell Mcfarland RN on 2/23/23 at 10:42 AM CST

## 2023-02-23 NOTE — PROGRESS NOTES
1748 Pt states she needs to urinate, RN assisted pt to bedside commode. Patient states she is unable to void. RN scanned pts bladder: 504ml. Patient HR got up to 150s and is now hangings around 140s, . MD [id number] notified. Orders received to straight cath.

## 2023-02-23 NOTE — PLAN OF CARE
Problem: Discharge Planning  Goal: Discharge to home or other facility with appropriate resources  2/23/2023 1623 by Eddie Fisher RN  Outcome: Progressing  Flowsheets (Taken 2/23/2023 1200)  Discharge to home or other facility with appropriate resources:   Identify barriers to discharge with patient and caregiver   Arrange for needed discharge resources and transportation as appropriate  2/23/2023 0315 by Kiarra Hyde LPN  Outcome: Progressing  Flowsheets (Taken 2/22/2023 2310)  Discharge to home or other facility with appropriate resources: Identify barriers to discharge with patient and caregiver

## 2023-02-23 NOTE — PROGRESS NOTES
[facility] Progress Note      Patient:   Davi Montero  MR#:    [id number]   Room:    [facility]-   YOB: 1995  Date of Progress Note: 2/23/2023  Time of Note                           1:27 PM  Consulting Physician:  Chanell Robles DO  Attending Physician:  Karen Soto MD      INTERVAL HISTORY:  More events this am, required ativan, patient also attempting to swallow more foreign objects this am, events felt non-epileptic, patient swallowed 2 small batteries previuosly, GI / surgery following. REVIEW OF SYSTEMS:  Constitutional: No fevers No chills  Neck:No stiffness  Respiratory: No shortness of breath  Cardiovascular: No chest pain No palpitations  Gastrointestinal: No abdominal pain    Genitourinary: No Dysuria  Neurological: No headache    PHYSICAL EXAM:    Constitutional -   /72   Pulse (!) 105   Temp 98.1 °F (36.7 °C) (Temporal)   Resp 20   Ht 5' 5\" (1.651 m)   Wt 187 lb 6 oz (85 kg)   SpO2 93%   BMI 31.18 kg/m²   General appearance: No acute distress   EYES -   Conjunctiva normal  Pupillary exam as below, see CN exam in the neurologic exam  ENT-    No scars, masses, or lesions over external nose or ears  Hearing normal bilaterally to finger rub  Neck-   No neck masses noted  Thyroid normal   No jugular vein distension  Cardiovascular -   No clubbing, cyanosis, or edema   Pulmonary-   Good expansion, normal effort without use of accessory muscles  Musculoskeletal -   No significant wasting of muscles noted  Gait as below, see gait exam in the neurologic exam  Muscle strength, tone, stability as below see the motor exam in the neurologic exam.   No bony deformities  Skin -   Warm, dry, and intact to inspection and palpation.     No rash, erythema, or pallor  Psychiatric -   Mood, affect, and behavior appear normal    Memory as below see mental status examination in the neurologic exam      NEUROLOGICAL EXAM    Mental status   [x] Awake, alert, oriented   [x] Affect attention and concentration appear appropriate  [x] Recent and remote memory appears unremarkable  [] Speech normal without dysarthria or aphasia, comprehension and repetition intact. COMMENTS: Speech limited    Cranial Nerves [x] No VF deficit to confrontation  [x] PERRLA, EOMI, no nystagmus, conjugate eye movements, no ptosis  [x] Face symmetric  [x] Facial sensation intact  [x] Tongue midline no atrophy or fasciculations present  [x] Palate midline, hearing to finger rub normal  [x] Shoulder shrug and SCM testing normal  COMMENTS:   Motor   [x] 5/5 strength x 4 extremities  [x] Normal bulk and tone  [x] No tremor present  [x] No rigidity or bradykinesia noted  COMMENTS:   Sensory  [x] Sensation intact to light touch, pin prick, vibration, and proprioception BLE  [] Sensation intact to light touch, pin prick, vibration, and proprioception BUE  COMMENTS:   Coordination [x] FTN normal bilaterally   [] HTS normal bilaterally  [] YEVGENIY normal.   COMMENTS:   Reflexes  [x] Symmetric and non-pathological  [] Toes downgoing bilaterally  [] No clonus present  COMMENTS:   Gait                  [] Normal steady gait    [] Ataxic    [] Spastic     [] Magnetic     [] Shuffling  [x] Not assessed  COMMENTS:       LABS/IMAGING:    CT Head WO Contrast    Result Date: 2/17/2023  Exam: Noncontrast CT of the brain Technique: Axial noncontrast images were obtained from the skull base to the vertex. Coronal and sagittal reconstructions were performed. Radiation dose optimization technique was utilized. Clinical information: Seizure Comparison: Noncontrast CT of the brain performed on January 16, 2023 Findings: No evidence of acute hemorrhage, mass, mass effect, or midline shift. The ventricular system and basal cisterns are within normal limits for the patients age. Gray-white differentiation is maintained. There is no evidence of an intra-axial or extra-axial fluid collection. Bone windows reveal no evidence of acute fracture. The visualized paranasal sinuses are within normal limits. The orbits are grossly unremarkable. No acute intracranial process evident on noncontrast CT of the brain. XR CHEST PORTABLE    Result Date: 2/17/2023  CLINICAL HISTORY: Seizure TECHNIQUE: Single AP view of the chest COMPARISON: CXR from 1/10/2023 FINDINGS: Lines and tubes: None. Cardiomediastinal: Normal size and configuration of the cardiomediastinal silhouette. No pneumomediastinum. Lungs and pleura: The lungs are grossly unremarkable. No pleural effusion. No pneumothorax. Musculoskeletal: No acute osseous abnormalities. Other: None. No acute cardiopulmonary process identified. Lab Results   Component Value Date    WBC 7.8 02/18/2023    HGB 12.3 02/18/2023    HCT 38.3 02/18/2023    MCV 86.3 02/18/2023     02/18/2023     Lab Results   Component Value Date     02/18/2023    K 3.7 02/18/2023     02/18/2023    CO2 23 02/18/2023    BUN 6 02/18/2023    CREATININE 0.7 02/18/2023    GLUCOSE 84 02/18/2023    CALCIUM 8.7 02/18/2023    PROT 7.1 02/16/2023    LABALBU 4.2 02/16/2023    BILITOT <0.2 02/16/2023    ALKPHOS 90 02/16/2023    AST 15 02/16/2023    ALT 17 02/16/2023    LABGLOM >60 02/18/2023   No results found for: INR, PROTIME    RECORD REVIEW:   Previous medical records, medications were reviewed at today's visit. Nursing/physician notes, imaging, labs and vitals reviewed. PT,OT and/or speech notes reviewed      ASSESSMENT:   33 y.o. admitted with seizure like episodes. She has had a extensive recent work-up including long-term EEG monitoring both here and at [facility].  No overt epileptiform abnormalities were seen. Her events are felt nonepileptic. Previously swallowed 2 small batteries,  GI and surgery have been following. Prior MRI and repeat EEG from here normal. Had further events this am, felt non-epileptic, was given ativan. Patient attempting to swallow more foreign objects this am.  Moved back to the ICU.      PLAN: Get Psych back on board. Weaning AEDs, stopped dilantin, weaning vimpat will stop today, will decrease keppra to 1000 mg bid, wean further in a few days. Will continue lamcital may help with mood stabilization. GI / surgery following     Ativan prn, event precautions   Monitor in the ICU given self harm risk. Please feel free to call with any questions. [phone] (cell phone).       Sherry Millan DO  Board Certified Neurology

## 2023-02-23 NOTE — PLAN OF CARE
Problem: Discharge Planning  Goal: Discharge to home or other facility with appropriate resources  Outcome: Progressing  Flowsheets (Taken 2/22/2023 2310)  Discharge to home or other facility with appropriate resources: Identify barriers to discharge with patient and caregiver     Problem: Safety - Adult  Goal: Free from fall injury  Outcome: Progressing  Flowsheets (Taken 2/23/2023 0312)  Free From Fall Injury:   Instruct family/caregiver on patient safety   Based on caregiver fall risk screen, instruct family/caregiver to ask for assistance with transferring infant if caregiver noted to have fall risk factors     Problem: Skin/Tissue Integrity  Goal: Absence of new skin breakdown  Description: 1. Monitor for areas of redness and/or skin breakdown  2. Assess vascular access sites hourly  3. Every 4-6 hours minimum:  Change oxygen saturation probe site  4. Every 4-6 hours:  If on nasal continuous positive airway pressure, respiratory therapy assess nares and determine need for appliance change or resting period. Outcome: Progressing     Problem: ABCDS Injury Assessment  Goal: Absence of physical injury  Outcome: Progressing  Flowsheets (Taken 2/23/2023 0312)  Absence of Physical Injury: Implement safety measures based on patient assessment     Problem: Neurosensory - Adult  Goal: Absence of seizures  Outcome: Progressing  Flowsheets (Taken 2/22/2023 2310)  Absence of seizures: Monitor for seizure activity. If seizure occurs, document type and location of movements and any associated apnea  Goal: Remains free of injury related to seizures activity  Outcome: Progressing  Flowsheets (Taken 2/22/2023 2310)  Remains free of injury related to seizure activity: Maintain airway, patient safety  and administer oxygen as ordered     Problem: Decision Making  Goal: Pt/Family able to effectively weigh alternatives and participate in decision making related to treatment and care  Description: INTERVENTIONS:  1.  Determine when there are differences between patient's view, family's view, and healthcare provider's view of condition  2. Facilitate patient and family articulation of goals for care  3. Help patient and family identify pros/cons of alternative solutions  4. Provide information as requested by patient/family  5. Respect patient/family right to receive or not to receive information  6. Serve as a liaison between patient and family and health care team  7. Initiate Consults from Ethics, Palliative Care or initiate [address] [place] [address] as is appropriate  Outcome: Progressing  Flowsheets (Taken 2/22/2023 2310)  Patient/family able to effectively weigh alternatives and participate in decision making related to treatment and care: Determine when there are differences between patient's view, family's view, and healthcare provider's view of condition     Problem: Musculoskeletal - Adult  Goal: Return mobility to safest level of function  Outcome: Progressing  Flowsheets (Taken 2/22/2023 2310)  Return Mobility to Safest Level of Function: Assess patient stability and activity tolerance for standing, transferring and ambulating with or without assistive devices  Goal: Maintain proper alignment of affected body part  Outcome: Progressing  Flowsheets (Taken 2/22/2023 2310)  Maintain proper alignment of affected body part:  Instruct and reinforce with patient and family use of appropriate assistive device and precautions (e.g. spinal or hip dislocation precautions)  Goal: Return ADL status to a safe level of function  Outcome: Progressing  Flowsheets (Taken 2/22/2023 2310)  Return ADL Status to a Safe Level of Function: Assess activities of daily living deficits and provide assistive devices as needed     Problem: Genitourinary - Adult  Goal: Absence of urinary retention  Outcome: Progressing  Flowsheets (Taken 2/22/2023 2310)  Absence of urinary retention: Assess patients ability to void and empty bladder  Goal: Urinary catheter remains patent  Outcome: Progressing  Flowsheets (Taken 2/22/2023 8290)  Urinary catheter remains patent: Assess patency of urinary catheter     Problem: Safety - Medical Restraint  Goal: Remains free of injury from restraints (Restraint for Interference with Medical Device)  Description: INTERVENTIONS:  1. Determine that other, less restrictive measures have been tried or would not be effective before applying the restraint  2. Evaluate the patient's condition at the time of restraint application  3. Inform patient/family regarding the reason for restraint  4.  Q2H: Monitor safety, psychosocial status, comfort, nutrition and hydration  Outcome: Progressing     Problem: Pain  Goal: Verbalizes/displays adequate comfort level or baseline comfort level  Outcome: Progressing

## 2023-02-23 NOTE — PROGRESS NOTES
Pt agitated and unable to sit still for CT, CT and Dr. Kim Lancaster aware. Order received for KUSEBASTIAN.

## 2023-02-23 NOTE — CODE DOCUMENTATION
1046 Patient started seizing  1050 Rapid Response called  1055 Vitals - /97, 96% Room Air, 123 HR  1055 1mg Ativan IM  1058 IV Attempt  1059 IV Placed  1100 1mg Ativan IV  1101 /89, Heart Rate 153  1102 [place] [facility] drawn  1105 2mg Ativan IV  1108 Seizure ended  1109 Neuro - Dr. Claudia Purdy at bedside  1112 Transfer to ICU  1120 Room 150 in ICU  1130 Report given to RODEZ, Gerhard Riedel

## 2023-02-23 NOTE — PROGRESS NOTES
4 Eyes Skin Assessment    Emili Schumacher is being assessed upon: Transfer to New Unit    I agree that Melisa Harmon RN, along with Lali Nickerson RN (either 2 RN's or 1 LPN and 1 RN) have performed a thorough Head to Toe Skin Assessment on the patient. ALL assessment sites listed below have been assessed. Areas assessed by both nurses:     [x]   Head, Face, and Ears   [x]   Shoulders, Back, and Chest  [x]   Arms, Elbows, and Hands   [x]   Coccyx, Sacrum, and Ischium  [x]   Legs, Feet, and Heels    Does the Patient have Skin Breakdown?  No    Junior Prevention initiated: No  Wound Care Orders initiated: No    WOC nurse consulted for Pressure Injury (Stage 3,4, Unstageable, DTI, NWPT, and Complex wounds) and New or Established Ostomies: No        Primary Nurse eSignature: Perry Machado RN on 2/23/2023 at 1:16 PM      Co-Signer eSignature: {Esignature:[id number]}

## 2023-02-24 LAB
ALBUMIN SERPL-MCNC: 3.8 G/DL (ref 3.5–5.2)
ALP BLD-CCNC: 74 U/L (ref 35–104)
ALT SERPL-CCNC: 12 U/L (ref 5–33)
ANION GAP SERPL CALCULATED.3IONS-SCNC: 10 MMOL/L (ref 7–19)
AST SERPL-CCNC: 12 U/L (ref 5–32)
BASOPHILS ABSOLUTE: 0.1 K/UL (ref 0–0.2)
BASOPHILS RELATIVE PERCENT: 1 % (ref 0–1)
BILIRUB SERPL-MCNC: <0.2 MG/DL (ref 0.2–1.2)
BUN BLDV-MCNC: 7 MG/DL (ref 6–20)
CALCIUM SERPL-MCNC: 8.9 MG/DL (ref 8.6–10)
CHLORIDE BLD-SCNC: 104 MMOL/L (ref 98–111)
CO2: 25 MMOL/L (ref 22–29)
CREAT SERPL-MCNC: 0.7 MG/DL (ref 0.5–0.9)
EOSINOPHILS ABSOLUTE: 0.2 K/UL (ref 0–0.6)
EOSINOPHILS RELATIVE PERCENT: 3.5 % (ref 0–5)
GFR SERPL CREATININE-BSD FRML MDRD: >60 ML/MIN/{1.73_M2}
GLUCOSE BLD-MCNC: 87 MG/DL (ref 74–109)
HCT VFR BLD CALC: 37.9 % (ref 37–47)
HEMOGLOBIN: 12 G/DL (ref 12–16)
IMMATURE GRANULOCYTES #: 0 K/UL
LYMPHOCYTES ABSOLUTE: 1.7 K/UL (ref 1.1–4.5)
LYMPHOCYTES RELATIVE PERCENT: 28.2 % (ref 20–40)
MAGNESIUM: 2.2 MG/DL (ref 1.6–2.6)
MCH RBC QN AUTO: 27.8 PG (ref 27–31)
MCHC RBC AUTO-ENTMCNC: 31.7 G/DL (ref 33–37)
MCV RBC AUTO: 87.7 FL (ref 81–99)
MONOCYTES ABSOLUTE: 0.6 K/UL (ref 0–0.9)
MONOCYTES RELATIVE PERCENT: 9.3 % (ref 0–10)
NEUTROPHILS ABSOLUTE: 3.4 K/UL (ref 1.5–7.5)
NEUTROPHILS RELATIVE PERCENT: 57.7 % (ref 50–65)
PDW BLD-RTO: 19.6 % (ref 11.5–14.5)
PLATELET # BLD: 190 K/UL (ref 130–400)
PMV BLD AUTO: 12 FL (ref 9.4–12.3)
POTASSIUM REFLEX MAGNESIUM: 3.5 MMOL/L (ref 3.5–5)
RBC # BLD: 4.32 M/UL (ref 4.2–5.4)
SODIUM BLD-SCNC: 139 MMOL/L (ref 136–145)
TOTAL PROTEIN: 6.2 G/DL (ref 6.6–8.7)
WBC # BLD: 5.9 K/UL (ref 4.8–10.8)

## 2023-02-24 PROCEDURE — 6360000002 HC RX W HCPCS: Performed by: HOSPITALIST

## 2023-02-24 PROCEDURE — 6360000002 HC RX W HCPCS: Performed by: PSYCHIATRY & NEUROLOGY

## 2023-02-24 PROCEDURE — 2000000000 HC ICU R&B

## 2023-02-24 PROCEDURE — 80053 COMPREHEN METABOLIC PANEL: CPT

## 2023-02-24 PROCEDURE — 6370000000 HC RX 637 (ALT 250 FOR IP): Performed by: PSYCHIATRY & NEUROLOGY

## 2023-02-24 PROCEDURE — 2580000003 HC RX 258: Performed by: ANESTHESIOLOGY

## 2023-02-24 PROCEDURE — 6370000000 HC RX 637 (ALT 250 FOR IP): Performed by: SURGERY

## 2023-02-24 PROCEDURE — 83735 ASSAY OF MAGNESIUM: CPT

## 2023-02-24 PROCEDURE — 36415 COLL VENOUS BLD VENIPUNCTURE: CPT

## 2023-02-24 PROCEDURE — 6370000000 HC RX 637 (ALT 250 FOR IP): Performed by: HOSPITALIST

## 2023-02-24 PROCEDURE — 99233 SBSQ HOSP IP/OBS HIGH 50: CPT | Performed by: PSYCHIATRY & NEUROLOGY

## 2023-02-24 PROCEDURE — 85025 COMPLETE CBC W/AUTO DIFF WBC: CPT

## 2023-02-24 RX ORDER — LEVETIRACETAM 500 MG/1
500 TABLET ORAL 2 TIMES DAILY
Status: DISCONTINUED | OUTPATIENT
Start: 2023-02-24 | End: 2023-02-25

## 2023-02-24 RX ORDER — LORAZEPAM 2 MG/ML
INJECTION INTRAMUSCULAR
Status: DISPENSED
Start: 2023-02-24 | End: 2023-02-24

## 2023-02-24 RX ORDER — LORAZEPAM 2 MG/ML
2 INJECTION INTRAMUSCULAR ONCE
Status: COMPLETED | OUTPATIENT
Start: 2023-02-24 | End: 2023-02-24

## 2023-02-24 RX ADMIN — HALOPERIDOL LACTATE 5 MG: 5 INJECTION, SOLUTION INTRAMUSCULAR at 21:04

## 2023-02-24 RX ADMIN — ENOXAPARIN SODIUM 40 MG: 100 INJECTION SUBCUTANEOUS at 07:39

## 2023-02-24 RX ADMIN — FERROUS SULFATE TAB 325 MG (65 MG ELEMENTAL FE) 325 MG: 325 (65 FE) TAB at 07:38

## 2023-02-24 RX ADMIN — PRAZOSIN HYDROCHLORIDE 1 MG: 1 CAPSULE ORAL at 20:02

## 2023-02-24 RX ADMIN — FERROUS SULFATE TAB 325 MG (65 MG ELEMENTAL FE) 325 MG: 325 (65 FE) TAB at 16:34

## 2023-02-24 RX ADMIN — RISPERIDONE 2 MG: 1 TABLET ORAL at 04:42

## 2023-02-24 RX ADMIN — SODIUM CHLORIDE, PRESERVATIVE FREE 10 ML: 5 INJECTION INTRAVENOUS at 07:39

## 2023-02-24 RX ADMIN — CLONAZEPAM 0.5 MG: 1 TABLET ORAL at 20:02

## 2023-02-24 RX ADMIN — OXYCODONE HYDROCHLORIDE AND ACETAMINOPHEN 500 MG: 500 TABLET ORAL at 07:38

## 2023-02-24 RX ADMIN — RISPERIDONE 2 MG: 1 TABLET ORAL at 20:02

## 2023-02-24 RX ADMIN — POLYETHYLENE GLYCOL 3350 17 G: 17 POWDER, FOR SOLUTION ORAL at 07:39

## 2023-02-24 RX ADMIN — SODIUM CHLORIDE, PRESERVATIVE FREE 10 ML: 5 INJECTION INTRAVENOUS at 20:06

## 2023-02-24 RX ADMIN — FLUOXETINE 80 MG: 20 CAPSULE ORAL at 07:38

## 2023-02-24 RX ADMIN — Medication 5 MG: at 20:02

## 2023-02-24 RX ADMIN — LEVETIRACETAM 500 MG: 500 TABLET, FILM COATED ORAL at 20:01

## 2023-02-24 RX ADMIN — OXYCODONE HYDROCHLORIDE AND ACETAMINOPHEN 500 MG: 500 TABLET ORAL at 16:34

## 2023-02-24 RX ADMIN — NALTREXONE HYDROCHLORIDE 50 MG: 50 TABLET, FILM COATED ORAL at 07:38

## 2023-02-24 RX ADMIN — LORAZEPAM 2 MG: 2 INJECTION INTRAMUSCULAR; INTRAVENOUS at 00:35

## 2023-02-24 RX ADMIN — LAMOTRIGINE 225 MG: 200 TABLET ORAL at 07:38

## 2023-02-24 RX ADMIN — LEVETIRACETAM 1000 MG: 500 TABLET, FILM COATED ORAL at 07:39

## 2023-02-24 RX ADMIN — CLONAZEPAM 0.5 MG: 1 TABLET ORAL at 10:10

## 2023-02-24 RX ADMIN — POLYETHYLENE GLYCOL 3350 17 G: 17 POWDER, FOR SOLUTION ORAL at 20:02

## 2023-02-24 ASSESSMENT — PAIN SCALES - GENERAL
PAINLEVEL_OUTOF10: 0

## 2023-02-24 NOTE — CARE COORDINATION
BRYCE attempted visit with Pt; she is not interested in having a discussion at this moment; will follow  Pt is known to BRYCE/MARIAELENA re: prior admission; she  lives at home with her mother; spouse is a ; possible upcoming divorce;has 3 kids; her mother is her legal guardian d/t her decisions/behaviors/actions.     Electronically signed by LIBBY Interiano on 2/24/2023 at 4:19 PM

## 2023-02-24 NOTE — PROGRESS NOTES
[facility] Neurology Progress Note      Patient:   Kami Rodríguez  MR#:    [id number]   Room:    [facility]150-01   YOB: 1995  Date of Progress Note: 2/24/2023  Time of Note                           11:43 AM  Consulting Physician:  Isabelle Flores DO  Attending Physician:  Erika Gustafson MD      INTERVAL HISTORY:  More events yesterday, self-harming behavior noted as well, now back in the ICU. Events felt non-epileptic, patient swallowed 2 small batteries previuosly, GI / surgery following. REVIEW OF SYSTEMS:  Constitutional: No fevers No chills  Neck:No stiffness  Respiratory: No shortness of breath  Cardiovascular: No chest pain No palpitations  Gastrointestinal: No abdominal pain    Genitourinary: No Dysuria  Neurological: No headache    PHYSICAL EXAM:    Constitutional -   BP 98/63   Pulse 89   Temp 98.7 °F (37.1 °C) (Temporal)   Resp 15   Ht 5' 5\" (1.651 m)   Wt 178 lb 6.4 oz (80.9 kg)   SpO2 94%   BMI 29.69 kg/m²   General appearance: No acute distress   EYES -   Conjunctiva normal  Pupillary exam as below, see CN exam in the neurologic exam  ENT-    No scars, masses, or lesions over external nose or ears  Hearing normal bilaterally to finger rub  Neck-   No neck masses noted  Thyroid normal   No jugular vein distension  Cardiovascular -   No clubbing, cyanosis, or edema   Pulmonary-   Good expansion, normal effort without use of accessory muscles  Musculoskeletal -   No significant wasting of muscles noted  Gait as below, see gait exam in the neurologic exam  Muscle strength, tone, stability as below see the motor exam in the neurologic exam.   No bony deformities  Skin -   Warm, dry, and intact to inspection and palpation.     No rash, erythema, or pallor  Psychiatric -   Mood, affect, and behavior appear normal    Memory as below see mental status examination in the neurologic exam      NEUROLOGICAL EXAM    Mental status   [x] Awake, alert, oriented   [x] Affect attention and concentration appear appropriate  [x] Recent and remote memory appears unremarkable  [] Speech normal without dysarthria or aphasia, comprehension and repetition intact. COMMENTS: Speech limited    Cranial Nerves [x] No VF deficit to confrontation  [x] PERRLA, EOMI, no nystagmus, conjugate eye movements, no ptosis  [x] Face symmetric  [x] Facial sensation intact  [x] Tongue midline no atrophy or fasciculations present  [x] Palate midline, hearing to finger rub normal  [x] Shoulder shrug and SCM testing normal  COMMENTS:   Motor   [x] 5/5 strength x 4 extremities  [x] Normal bulk and tone  [x] No tremor present  [x] No rigidity or bradykinesia noted  COMMENTS:   Sensory  [x] Sensation intact to light touch, pin prick, vibration, and proprioception BLE  [] Sensation intact to light touch, pin prick, vibration, and proprioception BUE  COMMENTS:   Coordination [x] FTN normal bilaterally   [] HTS normal bilaterally  [] YEVGENIY normal.   COMMENTS:   Reflexes  [x] Symmetric and non-pathological  [] Toes downgoing bilaterally  [] No clonus present  COMMENTS:   Gait                  [] Normal steady gait    [] Ataxic    [] Spastic     [] Magnetic     [] Shuffling  [x] Not assessed  COMMENTS:       LABS/IMAGING:    CT Head WO Contrast    Result Date: 2/17/2023  Exam: Noncontrast CT of the brain Technique: Axial noncontrast images were obtained from the skull base to the vertex. Coronal and sagittal reconstructions were performed. Radiation dose optimization technique was utilized. Clinical information: Seizure Comparison: Noncontrast CT of the brain performed on January 16, 2023 Findings: No evidence of acute hemorrhage, mass, mass effect, or midline shift. The ventricular system and basal cisterns are within normal limits for the patients age. Gray-white differentiation is maintained. There is no evidence of an intra-axial or extra-axial fluid collection. Bone windows reveal no evidence of acute fracture. The visualized paranasal sinuses are within normal limits.The orbits are grossly unremarkable.    No acute intracranial process evident on noncontrast CT of the brain.    XR CHEST PORTABLE    Result Date: 2/17/2023  CLINICAL HISTORY: Seizure TECHNIQUE: Single AP view of the chest COMPARISON: CXR from 1/10/2023 FINDINGS: Lines and tubes: None. Cardiomediastinal: Normal size and configuration of the cardiomediastinal silhouette.No pneumomediastinum. Lungs and pleura: The lungs are grossly unremarkable.No pleural effusion.No pneumothorax. Musculoskeletal: No acute osseous abnormalities. Other: None.    No acute cardiopulmonary process identified.      Lab Results   Component Value Date    WBC 5.9 02/24/2023    HGB 12.0 02/24/2023    HCT 37.9 02/24/2023    MCV 87.7 02/24/2023     02/24/2023     Lab Results   Component Value Date     02/24/2023    K 3.5 02/24/2023     02/24/2023    CO2 25 02/24/2023    BUN 7 02/24/2023    CREATININE 0.7 02/24/2023    GLUCOSE 87 02/24/2023    CALCIUM 8.9 02/24/2023    PROT 6.2 (L) 02/24/2023    LABALBU 3.8 02/24/2023    BILITOT <0.2 02/24/2023    ALKPHOS 74 02/24/2023    AST 12 02/24/2023    ALT 12 02/24/2023    LABGLOM >60 02/24/2023   No results found for: INR, PROTIME    RECORD REVIEW:   Previous medical records, medications were reviewed at today's visit.  Nursing/physician notes, imaging, labs and vitals reviewed.   PT,OT and/or speech notes reviewed      ASSESSMENT:   27 y.o. admitted with seizure like episodes.  She has had a extensive recent work-up including long-term EEG monitoring both here and at [facility] [place].  No overt epileptiform abnormalities were seen.  Her events are felt nonepileptic.  Previously swallowed 2 small batteries,  GI and surgery have been following. Prior MRI and repeat EEG from here normal. Had further events yesterday, felt non-epileptic, was given ativan.  Still with self-harming behavior as well. Patient attempting to swallow more foreign objects this am.  Moved  back to the ICU. PLAN:    Need Psych back on board. Weaning AEDs, stopped dilantin, stopped vimpat, will decrease keppra to 500 mg bid, wean further in a few days. Will continue lamcital may help with mood stabilization. GI / surgery following     Ativan prn, event precautions   Monitor in the ICU given self harm risk. Please feel free to call with any questions. [phone] (cell phone).       Chanell Robels DO  Board Certified Neurology

## 2023-02-24 NOTE — PROGRESS NOTES
[facility] Progress Note    Patient:  Lionel Tay  YOB: 1995  Date of Service: 2/24/2023  MRN: [id number]   Acct: [de-identified]   Primary Care Physician: Dariana Paige  Advance Directive: Full Code  Admit Date: 2/16/2023       Hospital Day: 4        CHIEF COMPLAINT:     Chief Complaint   Patient presents with    Seizures     2 seizures prior to EMS arrival 4 seizures with EMS       2/24/2023 7:42 AM  Subjective / Interval History:   02/24/2023  Patient doing well this am. No new complaints. Continues to reportedly have self harming behavior including attempting to swallow objects. Sitter remains at bedside for safety. 02/23/2023  Patient seen and examined this AM.  Doing well. Lying comfortably in bed in no acute distress. No acute changes or acute overnight event reported. Patient endorses abdominal pain with some burning sensation. Rapid response called at 10:51 AM, on account of patient with persistent seizure activity, requiring 6 mg of lorazepam and subsequent transfer to the ICU.        02/22/2023  No acute changes or acute overnight event reported. Patient seen and examined. Doing well. No new complaints. Continues to be intermittently agitated. Patient sitting in bed, teary. Responds to questions but typing on her iPad.      02/21/2023  Patient seen and examined this AM.  Intermittent agitation and aggressiveness. Currently laying comfortably in bed in no apparent acute distress. No further seizure episodes reported. 02/20/2023  No acute changes or acute overnight event reported. Patient seen and examined. Continues to be intermittently agitated, requiring one-to-one sitter in the CCU. Laying comfortably in bed however no apparent acute distress. 02/19/2023  Patient seen and examined. Sitting comfortably in bed in no acute distress. Denies any acute complaints or distress at this time.   Patient transferred to CCU yesterday, for persistent seizure activities. No acute overnight event reported. 02/18/2023  Patient seen and examined this AM.  Evelin Workman comfortably in bed in no acute distress. No new complaints. One-to-one sitter at bedside for safety. Patient reportedly with another episode of seizures lasting about 5 minutes, with heart rates rising up to 150s yesterday. Patient reportedly with agitation and aggressiveness around 22:20 PM yesterday, requiring Geodon as well as diphenhydramine IM. Patient was reportedly swallowed some foreign bodies (2 disc batteries). GI subsequently consulted for foreign body removal via endoscopy. 02/17/2023  No acute changes or acute overnight event reported. Patient seen and examined. No new complaints. No further seizure activities at this time. Denies any dizziness, lightheadedness or palpitations. Endorses some nausea but no vomiting. Laying comfortably in bed in no acute distress. Review of Systems:   Review of Systems  ROS: 14 point review of systems is negative except as specifically addressed above. ADULT DIET;  Regular    Intake/Output Summary (Last 24 hours) at 2/24/2023 2275  Last data filed at 2/23/2023 2200  Gross per 24 hour   Intake 400 ml   Output 475 ml   Net -75 ml           Medications:   sodium chloride       Current Facility-Administered Medications   Medication Dose Route Frequency Provider Last Rate Last Admin    LORazepam (ATIVAN) 2 MG/ML injection             levETIRAcetam (KEPPRA) tablet 1,000 mg  1,000 mg Oral BID Koffi Crowell DO   1,000 mg at 02/24/23 0739    0.9 % sodium chloride bolus  1,000 mL IntraVENous Once Nicolasa Mccall MD        risperiDONE (RISPERDAL) tablet 2 mg  2 mg Oral Nightly ShawnMARBELLA Chan   2 mg at 02/23/23 1933    chlorproMAZINE (THORAZINE) injection 10 mg  10 mg IntraMUSCular Once Nicolasa Mccall MD        risperiDONE (RISPERDAL) tablet 2 mg  2 mg Oral Q6H PRN Dylan Mendez MD   2 mg at 02/24/23 9082    naltrexone (DEPADE) tablet 50 mg  50 mg Oral Daily with breakfast Francesca Chacon MD   50 mg at 02/24/23 0738    polyethylene glycol (GLYCOLAX) packet 17 g  17 g Oral BID Crista Vinson MD   17 g at 02/24/23 0739    prazosin (MINIPRESS) capsule 1 mg  1 mg Oral Nightly Francesca Chacon MD   1 mg at 02/23/23 1934    lamoTRIgine (LAMICTAL) tablet 225 mg  225 mg Oral Daily Francesca Chacon MD   225 mg at 02/24/23 2950    haloperidol lactate (HALDOL) injection 5 mg  5 mg IntraMUSCular Q6H PRN Francesca Chacon MD   5 mg at 02/23/23 1200    melatonin disintegrating tablet 5 mg  5 mg Oral Nightly Francesca Chacon MD   5 mg at 02/23/23 1934    sodium chloride flush 0.9 % injection 5-40 mL  5-40 mL IntraVENous 2 times per day Lisa Carrizales MD   10 mL at 02/24/23 0739    sodium chloride flush 0.9 % injection 5-40 mL  5-40 mL IntraVENous PRN Lisa Carrizales MD        0.9 % sodium chloride infusion   IntraVENous PRN Lisa Carrizales MD        enoxaparin (LOVENOX) injection 40 mg  40 mg SubCUTAneous Daily Josie Howe MD   40 mg at 02/24/23 0739    ondansetron (ZOFRAN-ODT) disintegrating tablet 4 mg  4 mg Oral Q8H PRN Josie Howe MD        Or    ondansetron [facility]) injection 4 mg  4 mg IntraVENous Q6H PRN Josie Howe MD        acetaminophen (TYLENOL) tablet 650 mg  650 mg Oral Q4H PRN Josie Howe MD   650 mg at 02/22/23 1837    Or    acetaminophen (TYLENOL) suppository 650 mg  650 mg Rectal Q4H PRN Josie Howe MD        potassium chloride (KLOR-CON M) extended release tablet 40 mEq  40 mEq Oral PRN Josie Howe MD        Or    potassium bicarb-citric acid (EFFER-K) effervescent tablet 40 mEq  40 mEq Oral PRN Josie Howe MD        Or    potassium chloride 10 mEq/100 mL IVPB (Peripheral Line)  10 mEq IntraVENous PRN Josie Howe MD        magnesium sulfate 2000 mg in 50 mL IVPB premix  2,000 mg IntraVENous PRN Josie Howe MD        sodium phosphate 22.5 mmol in sodium chloride 0.9 % 250 mL IVPB  0.32 mmol/kg IntraVENous PRN Taylor Randall MD        polyethylene glycol [facility]) packet 17 g  17 g Oral Daily PRN Taylor Randall MD   17 g at 02/20/23 1523    melatonin disintegrating tablet 5 mg  5 mg Oral Nightly PRN Taylor Randall MD        calcium carbonate (TUMS) chewable tablet 500 mg  500 mg Oral TID PRN Taylor Randall MD        clonazePAM Nicole Row) tablet 0.5 mg  0.5 mg Oral Q12H Luiza Mello MD   0.5 mg at 02/23/23 2115    ferrous sulfate (IRON 325) tablet 325 mg  325 mg Oral BID WC Taylor Randall MD   325 mg at 02/24/23 8393    FLUoxetine (PROZAC) capsule 80 mg  80 mg Oral Daily Taylor Randall MD   80 mg at 02/24/23 2974    ascorbic acid (VITAMIN C) tablet 500 mg  500 mg Oral BID with meals Taylor Randall MD   500 mg at 02/24/23 9050         sodium chloride        LORazepam        levETIRAcetam  1,000 mg Oral BID    sodium chloride  1,000 mL IntraVENous Once    risperiDONE  2 mg Oral Nightly    chlorproMAZINE  10 mg IntraMUSCular Once    naltrexone  50 mg Oral Daily with breakfast    polyethylene glycol  17 g Oral BID    prazosin  1 mg Oral Nightly    lamoTRIgine  225 mg Oral Daily    melatonin  5 mg Oral Nightly    sodium chloride flush  5-40 mL IntraVENous 2 times per day    enoxaparin  40 mg SubCUTAneous Daily    clonazePAM  0.5 mg Oral Q12H    ferrous sulfate  325 mg Oral BID WC    FLUoxetine  80 mg Oral Daily    ascorbic acid  500 mg Oral BID with meals     risperiDONE, haloperidol lactate, sodium chloride flush, sodium chloride, ondansetron **OR** ondansetron, acetaminophen **OR** acetaminophen, potassium chloride **OR** potassium alternative oral replacement **OR** potassium chloride, magnesium sulfate, sodium phosphate IVPB, polyethylene glycol, melatonin, calcium carbonate  ADULT DIET;  Regular       Labs:   CBC with DIFF:  Recent Labs     02/23/23  1107 02/24/23  0136   WBC 8.2 5.9   RBC 4.93 4.32   HGB 13.5 12.0   HCT 45.0 37.9   MCV 91.3 87.7   MCH 27.4 27.8   MCHC 30.0* 31.7*   RDW 20.8* 19.6*    190   MPV 12.8* 12.0   NEUTOPHILPCT  --  57.7   LYMPHOPCT  --  28.2   MONOPCT  --  9.3   EOSRELPCT  --  3.5   BASOPCT  --  1.0   NEUTROABS  --  3.4   LYMPHSABS  --  1.7   MONOSABS  --  0.60   EOSABS  --  0.20   BASOSABS  --  0.10         CMP/BMP:  Recent Labs     02/23/23  1107 02/24/23  0136    139   K 4.0 3.5    104   CO2 24 25   ANIONGAP 16 10   GLUCOSE 109 87   BUN 8 7   CREATININE 0.7 0.7   LABGLOM >60 >60   CALCIUM 9.6 8.9   PROT 7.4 6.2*   LABALBU 4.4 3.8   BILITOT <0.2 <0.2   ALKPHOS 89 74   ALT 14 12   AST 15 12           CRP:    Recent Labs     02/23/23  1107   CRP 2.79*     Sed Rate:  No results for input(s): SEDRATE in the last 72 hours.      HgBA1c:  No components found for: HGBA1C  FLP:  No results found for: TRIG, HDL, LDLCALC, LDLDIRECT, LABVLDL  TSH:    Lab Results   Component Value Date/Time    TSH 1.340 01/16/2023 06:17 PM     Troponin T:   No results for input(s): TROPONINI in the last 72 hours.    Pro-BNP: No results for input(s): BNP in the last 72 hours.  INR: No results for input(s): INR in the last 72 hours.  ABGs:   Lab Results   Component Value Date/Time    PHART 7.430 01/13/2023 02:16 PM    PO2ART 139.0 01/13/2023 02:16 PM    LKZ7CCC 39.0 01/13/2023 02:16 PM     UA:  Recent Labs     02/23/23  1815   COLORU YELLOW   PHUR 6.5   WBCUA 1   RBCUA 73*   BACTERIA NEGATIVE*   CLARITYU TURBID*   SPECGRAV 1.032   LEUKOCYTESUR Negative   UROBILINOGEN 1.0   BILIRUBINUR Negative   BLOODU MODERATE*   GLUCOSEU Negative           Culture Results:    No results for input(s): CXSURG in the last 720 hours.    Blood Culture Recent: No results for input(s): BC in the last 720 hours.    No results for input(s): BC, BLOODCULT2, ORG in the last 72 hours.          Cultures:   No results for input(s): CULTURE in the last 72 hours.  No results for input(s): BC, BLOODCULT2 in the last 72 hours.  No results for input(s): CXSURG in the last 72 hours.      Recent Labs     02/24/23  0136   MG 2.2  Recent Labs     02/23/23  1107 02/24/23  0136   AST 15 12   ALT 14 12   BILITOT <0.2 <0.2   ALKPHOS 89 74           RAD:   CT Head WO Contrast    Result Date: 2/17/2023  Exam: Noncontrast CT of the brain Technique: Axial noncontrast images were obtained from the skull base to the vertex. Coronal and sagittal reconstructions were performed. Radiation dose optimization technique was utilized. Clinical information: Seizure Comparison: Noncontrast CT of the brain performed on January 16, 2023 Findings: No evidence of acute hemorrhage, mass, mass effect, or midline shift. The ventricular system and basal cisterns are within normal limits for the patients age. Gray-white differentiation is maintained. There is no evidence of an intra-axial or extra-axial fluid collection. Bone windows reveal no evidence of acute fracture. The visualized paranasal sinuses are within normal limits. The orbits are grossly unremarkable. No acute intracranial process evident on noncontrast CT of the brain. XR CHEST PORTABLE    Result Date: 2/17/2023  CLINICAL HISTORY: Seizure TECHNIQUE: Single AP view of the chest COMPARISON: CXR from 1/10/2023 FINDINGS: Lines and tubes: None. Cardiomediastinal: Normal size and configuration of the cardiomediastinal silhouette. No pneumomediastinum. Lungs and pleura: The lungs are grossly unremarkable. No pleural effusion. No pneumothorax. Musculoskeletal: No acute osseous abnormalities. Other: None. No acute cardiopulmonary process identified.           Objective:   Vitals:   /87   Pulse (!) 118   Temp 97.2 °F (36.2 °C) (Temporal)   Resp 15   Ht 5' 5\" (1.651 m)   Wt 178 lb 6.4 oz (80.9 kg)   SpO2 96%   BMI 29.69 kg/m²     Patient Vitals for the past 24 hrs:   BP Temp Temp src Pulse Resp SpO2 Weight   02/24/23 0700 130/87 -- -- (!) 118 15 96 % --   02/24/23 0600 101/64 -- -- 84 15 93 % --   02/24/23 0500 111/73 -- -- 78 16 94 % --   02/24/23 0400 97/62 97.2 °F (36.2 °C) Temporal 99 19 97 % -- 02/24/23 0300 99/62 -- -- 83 10 94 % --   02/24/23 0200 97/62 -- -- 88 16 94 % --   02/24/23 0100 103/66 -- -- 82 18 92 % --   02/24/23 0031 122/65 -- -- (!) 113 22 96 % --   02/24/23 0028 -- -- -- (!) 140 19 (!) 84 % --   02/24/23 0027 (!) 110/99 -- -- (!) 138 22 95 % --   02/24/23 0020 -- -- -- (!) 163 29 91 % --   02/24/23 0000 98/65 97.8 °F (36.6 °C) Temporal 81 15 94 % 178 lb 6.4 oz (80.9 kg)   02/23/23 2300 (!) 97/59 -- -- 77 17 93 % --   02/23/23 2200 98/62 -- -- 87 (!) 7 94 % --   02/23/23 2100 94/68 -- -- 82 17 93 % --   02/23/23 2000 115/66 98.7 °F (37.1 °C) Temporal 81 17 93 % --   02/23/23 1934 108/66 -- -- -- -- -- --   02/23/23 1900 108/66 -- -- 94 18 96 % --   02/23/23 1805 108/65 -- -- (!) 106 24 -- --   02/23/23 1700 99/61 -- -- 89 17 93 % --   02/23/23 1600 101/65 98.4 °F (36.9 °C) Temporal (!) 104 19 95 % --   02/23/23 1500 106/64 -- -- (!) 104 22 96 % --   02/23/23 1300 118/72 -- -- (!) 105 20 93 % --   02/23/23 1200 130/83 98.1 °F (36.7 °C) Temporal (!) 119 27 100 % --   02/23/23 1140 122/69 98.2 °F (36.8 °C) Temporal 92 12 97 % --         24HR INTAKE/OUTPUT:    Intake/Output Summary (Last 24 hours) at 2/24/2023 0742  Last data filed at 2/23/2023 2200  Gross per 24 hour   Intake 400 ml   Output 475 ml   Net -75 ml           Physical Exam  Vitals and nursing note reviewed. Constitutional:       General: She is not in acute distress. Appearance: Normal appearance. She is not ill-appearing, toxic-appearing or diaphoretic. HENT:      Head: Normocephalic and atraumatic. Right Ear: External ear normal.      Left Ear: External ear normal.      Nose: Nose normal. No congestion or rhinorrhea. Mouth/Throat:      Mouth: Mucous membranes are moist.      Pharynx: Oropharynx is clear. No oropharyngeal exudate or posterior oropharyngeal erythema. Eyes:      General: No scleral icterus. Right eye: No discharge. Left eye: No discharge.       Extraocular Movements: Extraocular movements intact. Conjunctiva/sclera: Conjunctivae normal.      Pupils: Pupils are equal, round, and reactive to light. Cardiovascular:      Rate and Rhythm: Normal rate and regular rhythm. Pulses: Normal pulses. Heart sounds: Normal heart sounds. No murmur heard. No friction rub. No gallop. Pulmonary:      Effort: Pulmonary effort is normal. No respiratory distress. Breath sounds: Normal breath sounds. No stridor. No wheezing, rhonchi or rales. Chest:      Chest wall: No tenderness. Abdominal:      General: Bowel sounds are normal. There is no distension. Palpations: Abdomen is soft. Tenderness: There is no abdominal tenderness. There is no guarding or rebound. Musculoskeletal:         General: No swelling, tenderness, deformity or signs of injury. Normal range of motion. Cervical back: Normal range of motion and neck supple. No rigidity. No muscular tenderness. Right lower leg: No edema. Left lower leg: No edema. Skin:     General: Skin is warm and dry. Capillary Refill: Capillary refill takes less than 2 seconds. Coloration: Skin is not jaundiced or pale. Findings: No bruising, erythema, lesion or rash. Neurological:      General: No focal deficit present. Mental Status: She is alert. Mental status is at baseline. Cranial Nerves: No cranial nerve deficit. Sensory: No sensory deficit. Motor: No weakness. Coordination: Coordination normal.   Psychiatric:         Mood and Affect: Mood normal.         Behavior: Behavior normal.         Thought Content:  Thought content normal.         Judgment: Judgment normal.         Assessment/plan:     Patient Active Problem List    Diagnosis Date Noted    [facility] 02/20/2023     Priority: Medium    Lower abdominal pain 02/20/2023     Priority: Medium    Depression 02/19/2023     Priority: Medium    Gastric erosion 02/18/2023     Priority: Medium    Duodenal erosion 02/18/2023     Priority: Medium    Intractable seizure disorder ([address] 02/17/2023     Priority: Medium    Chronic static encephalopathy 02/17/2023     Priority: Medium    Autism 02/17/2023     Priority: Medium    Seizure-like activity ([address] 02/16/2023     Priority: Medium    Acne 02/14/2023     Priority: Medium    Attention deficit disorder 02/14/2023     Priority: Medium    Atypical squamous cells of undetermined significance (ASCUS) on Papanicolaou smear of cervix 02/14/2023     Priority: Medium    Dysmenorrhea 02/14/2023     Priority: Medium    Sensory processing difficulty 02/14/2023     Priority: Medium    Active autistic disorder 01/26/2023     Priority: Medium    PTSD (post-traumatic stress disorder) 01/26/2023     Priority: Medium    Hypokalemia 01/26/2023     Priority: Medium    Status epilepticus ([address] 01/06/2023     Priority: Medium    Swallowed foreign body 02/16/2023     Added automatically from request for surgery [id number]         Hospital Problems             Last Modified POA    * (Principal) Seizure-like activity ([address] 2/16/2023 Yes    PTSD (post-traumatic stress disorder) 2/20/2023 Yes    Intractable seizure disorder ([address] 2/17/2023 Yes    Chronic static encephalopathy 2/17/2023 Yes    Autism 2/17/2023 Yes    Gastric erosion 2/18/2023 Yes    Duodenal erosion 2/18/2023 Yes    Depression 2/19/2023 Yes    Seizure ([address] 2/20/2023 Yes    Lower abdominal pain 2/20/2023 Yes    Swallowed foreign body 2/18/2023 Unknown    Overview Signed 2/18/2023  2:27 AM by Gricelda Burch MD     Added automatically from request for surgery [id number]        Principal Problem:    Seizure-like activity ([address]  Active Problems:    PTSD (post-traumatic stress disorder)    Intractable seizure disorder ([address]    Chronic static encephalopathy    Autism    Gastric erosion    Duodenal erosion    Depression    Seizure ([address]    Lower abdominal pain    Swallowed foreign body  Resolved Problems:    * No resolved hospital problems. *          Brief History/Summary:   As per Initial admission HPI 2/16/2023, quoted below;  \"Ms. Vladislav Engle is a pleasant 33year old lady from home. She follows with Dr Juan Johnston. She has had some of her medications decreased/tapered recently. She has a history of epilepsy with onset at age 22 years. She had family present at the bedside. The phenytoin and Vimpat were being tapered. She has had multiple seizures tonight here in the hospital. She had reportedly had but a single seizure after her last visit (end of January) prior to her medications being reduced last week. She also has a history of Autism and is at times mute, but will still freely answer with head gestures. \"    Further hospital course as per problem list below; Autism   Agitation and aggressiveness  One-to-one sitter at bedside for safety  Psych on board  Haloperidol 5 mg IM every 6 hours/ PRN for agitation and aggression  Risperidone 1 mg p.o. twice daily  Risperidone 2 mg p.o. every 6 hours as needed for agitation    Seizures  Seizure precautions  Aspiration precautions  Neuro check every 4 hours  Neurology consulted on admission  Normal awake and asleep EEG (02/17/2023)  Lamotrigine 225 mg p.o. daily  Levetiracetam 1,500 mg p.o. twice daily  Defer further antiepileptic regiment to Neurology        Abdominal pain. Foreign body (bilaterally) ingestion   KUB (02/18/2023): Impression: There are 2 round metallic foreign bodies in the small bowel of the left mid abdomen  Continue serial imaging  KUB (02/18/2023): Impression: Nonobstructive bowel gas pattern. Ingested batteries are visualized in the right mid abdomen, not significantly changed in position when compared to prior study. Close follow-up recommended. KUB (02/19/2023): Impression: Round densities in the right mid abdomen and the right upper pelvis could represent the swallowed batteries. No evidence of small-bowel obstruction  KUB (02/20/2023):  Impression: Nonobstructive bowel gas pattern. Ingested batteries are visualized in the right mid abdomen, not significantly changed in position when compared to prior study  F/u KUB (02/23/2023):   CT Abdomen/Pelvis WO Con -pending  GI consulted for removal of battery (02/18/2023)  General surgery on board, given concern for possible perforation. No emergent or urgent surgical intervention recommended at this time. Further work-up and management as per general surgery    Lactic Acidosis  Acutely elevated Prolactin (187) and Lactic acid of 4.5 during rapid response for seizure activity  Lactic acid trended down to 0.9 (02/23/2023) with IVF    Continue management of other chronic medical conditions - see above and orders. Advance Directive: Full Code    ADULT DIET; Regular         Consults Made:   IP CONSULT TO NEUROLOGY  IP CONSULT TO CASE MANAGEMENT  IP CONSULT TO PSYCHIATRY  IP CONSULT TO GENERAL SURGERY      DVT prophylaxis: Enoxaparin        Discharge planning:   Continue current management, including one-to-one sitter at bedside and serial imaging  Continue management in the ICU  SW and Psych on board, for possible placement versus inpatient psych admission, when medically stable    Time Spent is  35 mins  in the examination, evaluation, counseling and review of medications, assessment and plan.      Electronically signed by   Neena Carpenter MD,   Internal Medicine Hospitalist   2/24/2023 7:42 AM

## 2023-02-24 NOTE — PROGRESS NOTES
Sitter alerted this nurse about pt's HR increasing. Upon assessment, pt appeared to have seizure like posturing, shaking and HR reached 160. Ativan administered per Dr. Cassius Self note and notified Dr. Robin Terrazas. HR is now back to baseline (110), BP- 122/65, O2- 96% on RA.

## 2023-02-25 ENCOUNTER — APPOINTMENT (OUTPATIENT)
Dept: CT IMAGING | Age: 28
DRG: 101 | End: 2023-02-25

## 2023-02-25 PROCEDURE — 2580000003 HC RX 258: Performed by: ANESTHESIOLOGY

## 2023-02-25 PROCEDURE — 74176 CT ABD & PELVIS W/O CONTRAST: CPT

## 2023-02-25 PROCEDURE — 6370000000 HC RX 637 (ALT 250 FOR IP): Performed by: HOSPITALIST

## 2023-02-25 PROCEDURE — 99233 SBSQ HOSP IP/OBS HIGH 50: CPT | Performed by: PSYCHIATRY & NEUROLOGY

## 2023-02-25 PROCEDURE — 6360000002 HC RX W HCPCS: Performed by: HOSPITALIST

## 2023-02-25 PROCEDURE — 2000000000 HC ICU R&B

## 2023-02-25 PROCEDURE — 6360000002 HC RX W HCPCS: Performed by: PSYCHIATRY & NEUROLOGY

## 2023-02-25 PROCEDURE — 6370000000 HC RX 637 (ALT 250 FOR IP): Performed by: PSYCHIATRY & NEUROLOGY

## 2023-02-25 PROCEDURE — 6370000000 HC RX 637 (ALT 250 FOR IP): Performed by: NURSE PRACTITIONER

## 2023-02-25 PROCEDURE — 6370000000 HC RX 637 (ALT 250 FOR IP): Performed by: SURGERY

## 2023-02-25 RX ADMIN — RISPERIDONE 2 MG: 1 TABLET ORAL at 20:16

## 2023-02-25 RX ADMIN — CLONAZEPAM 0.5 MG: 1 TABLET ORAL at 09:05

## 2023-02-25 RX ADMIN — SODIUM CHLORIDE, PRESERVATIVE FREE 10 ML: 5 INJECTION INTRAVENOUS at 20:20

## 2023-02-25 RX ADMIN — POLYETHYLENE GLYCOL 3350 17 G: 17 POWDER, FOR SOLUTION ORAL at 09:05

## 2023-02-25 RX ADMIN — FLUOXETINE 80 MG: 20 CAPSULE ORAL at 09:05

## 2023-02-25 RX ADMIN — NALTREXONE HYDROCHLORIDE 50 MG: 50 TABLET, FILM COATED ORAL at 09:05

## 2023-02-25 RX ADMIN — Medication 5 MG: at 20:15

## 2023-02-25 RX ADMIN — LAMOTRIGINE 225 MG: 200 TABLET ORAL at 09:05

## 2023-02-25 RX ADMIN — RISPERIDONE 2 MG: 1 TABLET ORAL at 14:34

## 2023-02-25 RX ADMIN — POLYETHYLENE GLYCOL 3350 17 G: 17 POWDER, FOR SOLUTION ORAL at 20:17

## 2023-02-25 RX ADMIN — ENOXAPARIN SODIUM 40 MG: 100 INJECTION SUBCUTANEOUS at 09:05

## 2023-02-25 RX ADMIN — OXYCODONE HYDROCHLORIDE AND ACETAMINOPHEN 500 MG: 500 TABLET ORAL at 09:05

## 2023-02-25 RX ADMIN — CLONAZEPAM 0.5 MG: 1 TABLET ORAL at 20:17

## 2023-02-25 RX ADMIN — HALOPERIDOL LACTATE 5 MG: 5 INJECTION, SOLUTION INTRAMUSCULAR at 11:11

## 2023-02-25 RX ADMIN — SODIUM CHLORIDE, PRESERVATIVE FREE 10 ML: 5 INJECTION INTRAVENOUS at 09:12

## 2023-02-25 RX ADMIN — FERROUS SULFATE TAB 325 MG (65 MG ELEMENTAL FE) 325 MG: 325 (65 FE) TAB at 09:05

## 2023-02-25 ASSESSMENT — PAIN SCALES - GENERAL: PAINLEVEL_OUTOF10: 0

## 2023-02-25 NOTE — PROGRESS NOTES
[id number] [facility] Neurology Progress Note      Patient:   Debi Canalse  MR#:    [id number]   Room:    [address] [place] [address]   YOB: 1995  Date of Progress Note: 2/25/2023  Time of Note                           8:59 AM  Consulting Physician:  Mele Diamond DO  Attending Physician:  Samson Lancaster MD      INTERVAL HISTORY: Previously self-harming behavior noted as well, now back in the ICU. Events felt non-epileptic, patient swallowed 2 small batteries previuosly, GI / surgery following. No acute issues overnight as per nursing. REVIEW OF SYSTEMS:  Constitutional: No fevers No chills  Neck:No stiffness  Respiratory: No shortness of breath  Cardiovascular: No chest pain No palpitations  Gastrointestinal: No abdominal pain    Genitourinary: No Dysuria  Neurological: No headache    PHYSICAL EXAM:    Constitutional -   /67   Pulse 70   Temp 98.1 °F (36.7 °C) (Temporal)   Resp 16   Ht 5' 5\" (1.651 m)   Wt 198 lb 7 oz (90 kg)   SpO2 95%   BMI 33.02 kg/m²     Constitutional - well developed, well nourished. Eyes - conjunctiva normal.   Ear, nose, throat - No scars, masses, or lesions over external nose or ears, no atrophy of tongue  Neck-symmetric, no masses noted, no jugular vein distension  Respiration- chest wall appears symmetric, good expansion,   normal effort without use of accessory muscles  Musculoskeletal - no significant wasting of muscles noted, no bony deformities  Extremities-no clubbing, cyanosis or edema  Skin - warm, dry, and intact. No rash, erythema, or pallor.   Psychiatric - mood, affect, and behavior appear normal.      Neurological exam  Awake, alert,  oriented appropriate affect not talking to me writing on her cell phone to communicate  Attention and concentration appear appropriate  Recent and remote memory appears unremarkable  Speech not speaking  No clear issues with language of fund of knowledge     Cranial Nerve Exam     CN III, IV,VI-EOMI, No nystagmus, conjugate eye movements, no ptosis    CN VII-no facial assymetry       Motor Exam  antigravity throughout upper and lower extremities bilaterally      Tremors- no tremors in hands or head noted     Gait  Not tested   LABS/IMAGING:    CT Head WO Contrast    Result Date: 2/17/2023  Exam: Noncontrast CT of the brain Technique: Axial noncontrast images were obtained from the skull base to the vertex. Coronal and sagittal reconstructions were performed. Radiation dose optimization technique was utilized. Clinical information: Seizure Comparison: Noncontrast CT of the brain performed on January 16, 2023 Findings: No evidence of acute hemorrhage, mass, mass effect, or midline shift. The ventricular system and basal cisterns are within normal limits for the patients age. Gray-white differentiation is maintained. There is no evidence of an intra-axial or extra-axial fluid collection. Bone windows reveal no evidence of acute fracture. The visualized paranasal sinuses are within normal limits. The orbits are grossly unremarkable. No acute intracranial process evident on noncontrast CT of the brain. XR CHEST PORTABLE    Result Date: 2/17/2023  CLINICAL HISTORY: Seizure TECHNIQUE: Single AP view of the chest COMPARISON: CXR from 1/10/2023 FINDINGS: Lines and tubes: None. Cardiomediastinal: Normal size and configuration of the cardiomediastinal silhouette. No pneumomediastinum. Lungs and pleura: The lungs are grossly unremarkable. No pleural effusion. No pneumothorax. Musculoskeletal: No acute osseous abnormalities. Other: None. No acute cardiopulmonary process identified.       Lab Results   Component Value Date    WBC 5.9 02/24/2023    HGB 12.0 02/24/2023    HCT 37.9 02/24/2023    MCV 87.7 02/24/2023     02/24/2023     Lab Results   Component Value Date     02/24/2023    K 3.5 02/24/2023     02/24/2023    CO2 25 02/24/2023    BUN 7 02/24/2023    CREATININE 0.7 02/24/2023    GLUCOSE 87 02/24/2023    CALCIUM 8.9 02/24/2023 PROT 6.2 (L) 02/24/2023    LABALBU 3.8 02/24/2023    BILITOT <0.2 02/24/2023    ALKPHOS 74 02/24/2023    AST 12 02/24/2023    ALT 12 02/24/2023    LABGLOM >60 02/24/2023   No results found for: INR, PROTIME    RECORD REVIEW:   Previous medical records, medications were reviewed at today's visit. Nursing/physician notes, imaging, labs and vitals reviewed. PT,OT and/or speech notes reviewed      ASSESSMENT:   33 y.o. admitted with seizure like episodes. She has had a extensive recent work-up including long-term EEG monitoring both here and at [facility].  No overt epileptiform abnormalities were seen. Her events are felt nonepileptic. Previously swallowed 2 small batteries,  GI and surgery have been following. Prior MRI and repeat EEG from here normal. Had further events yesterday, felt non-epileptic, was given ativan. Still with self-harming behavior as well. Patient attempting to swallow more foreign objects this am.  Moved back to the ICU. PLAN:    Need Psych back on board. Weaning AEDs, stopped Dilantin, Vimpat, and  Keppra. Will continue lamcital may help with mood stabilization. GI / surgery following     Ativan prn, event precautions   Monitor in the ICU given self harm risk. Agree with Dr. Miriam Velasquez assessment above    Please feel free to call with any questions. [phone] (cell phone).       Franci Singh MD  Board Certified Neurology

## 2023-02-25 NOTE — PROGRESS NOTES
Notified Dr. Mimi Ortiz of patients CT scan report from this morning per request from Dr. Nancy Cotter. No new orders received. \"No change to general surgery plan. No operation unless perforated. \"    Electronically signed by Lydia Armas RN on 2/25/2023 at 2:24 PM

## 2023-02-25 NOTE — PROGRESS NOTES
Patient had a large bowel movement. Several pieces of white/ gray appearing objects in stool along with a blue piece of plastic. Bowel sounds active.     Electronically signed by Michael Marin RN on 2/25/2023 at 2:05 PM

## 2023-02-25 NOTE — PROGRESS NOTES
Patient assessed beginning of the shift. Patient was tearful, pulled her IV off. Then pulled her heart monitor electrodes and tried  eat them. Patient received prn haldol injection per ordered . Alert and calm at this time. Drinking orange juice. Sitter is in the room. Vital signs are stable and with in normal limits. Patient is no longer attempting to pulling medical equipments . Will continue to monitor the patient.

## 2023-02-25 NOTE — PROGRESS NOTES
[facility] Progress Note    Patient:  Melyssa Kamara  YOB: 1995  Date of Service: 2/25/2023  MRN: [id number]   Acct: [de-identified]   Primary Care Physician: Ramesh Lora  Advance Directive: Full Code  Admit Date: 2/16/2023       Hospital Day: 5        CHIEF COMPLAINT:     Chief Complaint   Patient presents with    Seizures     2 seizures prior to EMS arrival 4 seizures with EMS       2/25/2023 10:14 AM  Subjective / Interval History:   02/25/2023  No acute changes or acute overnight event reported. Patient laying calmly in bed in no acute distress. One-to-one sitter remains at bedside for safety. Patient denies any acute complaints or distress at this time. 02/24/2023  Patient doing well this am. No new complaints. Continues to reportedly have self harming behavior including attempting to swallow objects. Sitter remains at bedside for safety. 02/23/2023  Patient seen and examined this AM.  Doing well. Lying comfortably in bed in no acute distress. No acute changes or acute overnight event reported. Patient endorses abdominal pain with some burning sensation. Rapid response called at 10:51 AM, on account of patient with persistent seizure activity, requiring 6 mg of lorazepam and subsequent transfer to the ICU.        02/22/2023  No acute changes or acute overnight event reported. Patient seen and examined. Doing well. No new complaints. Continues to be intermittently agitated. Patient sitting in bed, teary. Responds to questions but typing on her iPad.      02/21/2023  Patient seen and examined this AM.  Intermittent agitation and aggressiveness. Currently laying comfortably in bed in no apparent acute distress. No further seizure episodes reported. 02/20/2023  No acute changes or acute overnight event reported. Patient seen and examined. Continues to be intermittently agitated, requiring one-to-one sitter in the CCU.   Laying comfortably in bed however no apparent acute distress. 02/19/2023  Patient seen and examined. Sitting comfortably in bed in no acute distress. Denies any acute complaints or distress at this time. Patient transferred to CCU yesterday, for persistent seizure activities. No acute overnight event reported. 02/18/2023  Patient seen and examined this AM.  Melvin Aguirre comfortably in bed in no acute distress. No new complaints. One-to-one sitter at bedside for safety. Patient reportedly with another episode of seizures lasting about 5 minutes, with heart rates rising up to 150s yesterday. Patient reportedly with agitation and aggressiveness around 22:20 PM yesterday, requiring Geodon as well as diphenhydramine IM. Patient was reportedly swallowed some foreign bodies (2 disc batteries). GI subsequently consulted for foreign body removal via endoscopy. 02/17/2023  No acute changes or acute overnight event reported. Patient seen and examined. No new complaints. No further seizure activities at this time. Denies any dizziness, lightheadedness or palpitations. Endorses some nausea but no vomiting. Laying comfortably in bed in no acute distress. Review of Systems:   Review of Systems  ROS: 14 point review of systems is negative except as specifically addressed above. ADULT DIET;  Regular    Intake/Output Summary (Last 24 hours) at 2/25/2023 1014  Last data filed at 2/25/2023 0600  Gross per 24 hour   Intake 810 ml   Output 2100 ml   Net -1290 ml           Medications:   sodium chloride       Current Facility-Administered Medications   Medication Dose Route Frequency Provider Last Rate Last Admin    0.9 % sodium chloride bolus  1,000 mL IntraVENous Once Neena Carpenter MD        risperiDONE (RISPERDAL) tablet 2 mg  2 mg Oral Nightly MARBELLA Snyder   2 mg at 02/23/23 1933    chlorproMAZINE (THORAZINE) injection 10 mg  10 mg IntraMUSCular Once Neena Carpenter MD        risperiDONE (RISPERDAL) tablet 2 mg  2 mg Oral Q6H PRN Alexei Delgado MD   2 mg at 02/24/23 2002    naltrexone (DEPADE) tablet 50 mg  50 mg Oral Daily with breakfast Alexei Delgado MD   50 mg at 02/25/23 0905    polyethylene glycol (GLYCOLAX) packet 17 g  17 g Oral BID Johana Cristina MD   17 g at 02/25/23 8435    prazosin (MINIPRESS) capsule 1 mg  1 mg Oral Nightly Alexei Delgado MD   1 mg at 02/24/23 2002    lamoTRIgine (LAMICTAL) tablet 225 mg  225 mg Oral Daily Alexei Delgado MD   225 mg at 02/25/23 0905    haloperidol lactate (HALDOL) injection 5 mg  5 mg IntraMUSCular Q6H PRN Alexei Delgado MD   5 mg at 02/24/23 2104    melatonin disintegrating tablet 5 mg  5 mg Oral Nightly Alexei Delgado MD   5 mg at 02/24/23 2002    sodium chloride flush 0.9 % injection 5-40 mL  5-40 mL IntraVENous 2 times per day Joellen Asher MD   10 mL at 02/25/23 0912    sodium chloride flush 0.9 % injection 5-40 mL  5-40 mL IntraVENous PRN Joellen Asher MD        0.9 % sodium chloride infusion   IntraVENous PRN Joellen Asher MD        enoxaparin (LOVENOX) injection 40 mg  40 mg SubCUTAneous Daily Lucy Doshi MD   40 mg at 02/25/23 0905    ondansetron (ZOFRAN-ODT) disintegrating tablet 4 mg  4 mg Oral Q8H PRN Lucy Doshi MD        Or    ondansetron [facility]) injection 4 mg  4 mg IntraVENous Q6H PRN Lucy Doshi MD        acetaminophen (TYLENOL) tablet 650 mg  650 mg Oral Q4H PRN Lucy Doshi MD   650 mg at 02/22/23 1837    Or    acetaminophen (TYLENOL) suppository 650 mg  650 mg Rectal Q4H PRN Lucy Doshi MD        potassium chloride (KLOR-CON M) extended release tablet 40 mEq  40 mEq Oral PRN Lucy Doshi MD        Or    potassium bicarb-citric acid (EFFER-K) effervescent tablet 40 mEq  40 mEq Oral PRN Lucy Doshi MD        Or    potassium chloride 10 mEq/100 mL IVPB (Peripheral Line)  10 mEq IntraVENous PRN Lucy Doshi MD        magnesium sulfate 2000 mg in 50 mL IVPB premix  2,000 mg IntraVENous PRN Lucy Doshi MD sodium phosphate 22.5 mmol in sodium chloride 0.9 % 250 mL IVPB  0.32 mmol/kg IntraVENous PRN William Bowling MD        polyethylene glycol (GLYCOLAX) packet 17 g  17 g Oral Daily PRN William Bowling MD   17 g at 02/20/23 1523    melatonin disintegrating tablet 5 mg  5 mg Oral Nightly PRN William Bowling MD        calcium carbonate (TUMS) chewable tablet 500 mg  500 mg Oral TID PRN William Bowling MD        clonazePAM Hollie Brown) tablet 0.5 mg  0.5 mg Oral Q12H Leo Hester MD   0.5 mg at 02/25/23 5825    ferrous sulfate (IRON 325) tablet 325 mg  325 mg Oral BID  William Bowling MD   325 mg at 02/25/23 7059    FLUoxetine (PROZAC) capsule 80 mg  80 mg Oral Daily William Bowling MD   80 mg at 02/25/23 9146    ascorbic acid (VITAMIN C) tablet 500 mg  500 mg Oral BID with meals William Bowling MD   500 mg at 02/25/23 8225         sodium chloride        sodium chloride  1,000 mL IntraVENous Once    risperiDONE  2 mg Oral Nightly    chlorproMAZINE  10 mg IntraMUSCular Once    naltrexone  50 mg Oral Daily with breakfast    polyethylene glycol  17 g Oral BID    prazosin  1 mg Oral Nightly    lamoTRIgine  225 mg Oral Daily    melatonin  5 mg Oral Nightly    sodium chloride flush  5-40 mL IntraVENous 2 times per day    enoxaparin  40 mg SubCUTAneous Daily    clonazePAM  0.5 mg Oral Q12H    ferrous sulfate  325 mg Oral BID     FLUoxetine  80 mg Oral Daily    ascorbic acid  500 mg Oral BID with meals     risperiDONE, haloperidol lactate, sodium chloride flush, sodium chloride, ondansetron **OR** ondansetron, acetaminophen **OR** acetaminophen, potassium chloride **OR** potassium alternative oral replacement **OR** potassium chloride, magnesium sulfate, sodium phosphate IVPB, polyethylene glycol, melatonin, calcium carbonate  ADULT DIET;  Regular       Labs:   CBC with DIFF:  Recent Labs     02/23/23  1107 02/24/23  0136   WBC 8.2 5.9   RBC 4.93 4.32   HGB 13.5 12.0   HCT 45.0 37.9   MCV 91.3 87.7   MCH 27.4 27.8   MCHC 30.0* 31.7*   RDW 20.8* 19.6*    190   MPV 12.8* 12.0   NEUTOPHILPCT  --  57.7   LYMPHOPCT  --  28.2   MONOPCT  --  9.3   EOSRELPCT  --  3.5   BASOPCT  --  1.0   NEUTROABS  --  3.4   LYMPHSABS  --  1.7   MONOSABS  --  0.60   EOSABS  --  0.20   BASOSABS  --  0.10         CMP/BMP:  Recent Labs     02/23/23  1107 02/24/23  0136    139   K 4.0 3.5    104   CO2 24 25   ANIONGAP 16 10   GLUCOSE 109 87   BUN 8 7   CREATININE 0.7 0.7   LABGLOM >60 >60   CALCIUM 9.6 8.9   PROT 7.4 6.2*   LABALBU 4.4 3.8   BILITOT <0.2 <0.2   ALKPHOS 89 74   ALT 14 12   AST 15 12           CRP:    Recent Labs     02/23/23  1107   CRP 2.79*       Sed Rate:  No results for input(s): SEDRATE in the last 72 hours. HgBA1c:  No components found for: HGBA1C  FLP:  No results found for: TRIG, HDL, LDLCALC, LDLDIRECT, LABVLDL  TSH:    Lab Results   Component Value Date/Time    TSH 1.340 01/16/2023 06:17 PM     Troponin T:   No results for input(s): TROPONINI in the last 72 hours. Pro-BNP: No results for input(s): BNP in the last 72 hours. INR: No results for input(s): INR in the last 72 hours. ABGs:   Lab Results   Component Value Date/Time    PHART 7.430 01/13/2023 02:16 PM    PO2ART 139.0 01/13/2023 02:16 PM    FKB9QPR 39.0 01/13/2023 02:16 PM     UA:  Recent Labs     02/23/23  1815   COLORU YELLOW   PHUR 6.5   WBCUA 1   RBCUA 73*   BACTERIA NEGATIVE*   CLARITYU TURBID*   SPECGRAV 1.032   LEUKOCYTESUR Negative   UROBILINOGEN 1.0   BILIRUBINUR Negative   BLOODU MODERATE*   GLUCOSEU Negative           Culture Results:    No results for input(s): CXSURG in the last 720 hours. Blood Culture Recent: No results for input(s): BC in the last 720 hours. No results for input(s): BC, BLOODCULT2, ORG in the last 72 hours. Cultures:   No results for input(s): CULTURE in the last 72 hours. No results for input(s): BC, Blenda Mart in the last 72 hours. No results for input(s): CXSURG in the last 72 hours.       Recent Labs 02/24/23  0136   MG 2.2       Recent Labs     02/23/23  1107 02/24/23  0136   AST 15 12   ALT 14 12   BILITOT <0.2 <0.2   ALKPHOS 89 74           RAD:   CT Head WO Contrast    Result Date: 2/17/2023  Exam: Noncontrast CT of the brain Technique: Axial noncontrast images were obtained from the skull base to the vertex.Coronal and sagittal reconstructions were performed.Radiation dose optimization technique was utilized. Clinical information: Seizure Comparison: Noncontrast CT of the brain performed on January 16, 2023 Findings: No evidence of acute hemorrhage, mass, mass effect, or midline shift.The ventricular system and basal cisterns are within normal limits for the patients age.Gray-white differentiation is maintained.There is no evidence of an intra-axial or extra-axial fluid collection. Bone windows reveal no evidence of acute fracture.The visualized paranasal sinuses are within normal limits.The orbits are grossly unremarkable.    No acute intracranial process evident on noncontrast CT of the brain.    XR CHEST PORTABLE    Result Date: 2/17/2023  CLINICAL HISTORY: Seizure TECHNIQUE: Single AP view of the chest COMPARISON: CXR from 1/10/2023 FINDINGS: Lines and tubes: None. Cardiomediastinal: Normal size and configuration of the cardiomediastinal silhouette.No pneumomediastinum. Lungs and pleura: The lungs are grossly unremarkable.No pleural effusion.No pneumothorax. Musculoskeletal: No acute osseous abnormalities. Other: None.    No acute cardiopulmonary process identified.          Objective:   Vitals:   /79   Pulse 89   Temp 98.4 °F (36.9 °C) (Temporal)   Resp 19   Ht 5' 5\" (1.651 m)   Wt 198 lb 7 oz (90 kg)   SpO2 95%   BMI 33.02 kg/m²     Patient Vitals for the past 24 hrs:   BP Temp Temp src Pulse Resp SpO2 Weight   02/25/23 0900 110/79 -- -- 89 19 95 % --   02/25/23 0800 123/75 98.4 °F (36.9 °C) Temporal 84 17 97 % --   02/25/23 0700 (!) 116/95 -- -- 96 20 96 % --   02/25/23 0615 -- -- -- --  -- -- 198 lb 7 oz (90 kg)   02/25/23 0600 107/67 -- -- 70 16 95 % --   02/25/23 0541 -- -- -- -- -- -- 179 lb 2 oz (81.3 kg)   02/25/23 0500 100/61 -- -- 69 15 95 % --   02/25/23 0200 92/61 -- -- 69 12 95 % --   02/25/23 0100 94/74 -- -- 72 16 (!) 77 % --   02/25/23 0001 98/62 98.1 °F (36.7 °C) Temporal 63 14 -- --   02/24/23 2300 -- 98.2 °F (36.8 °C) Temporal 74 17 -- --   02/24/23 2200 (!) 97/56 -- -- 74 16 96 % --   02/24/23 2100 (!) 142/97 -- -- 95 20 97 % --   02/24/23 2000 119/87 98.2 °F (36.8 °C) -- 94 22 -- --   02/24/23 1900 113/79 -- -- 99 26 97 % --   02/24/23 1800 135/89 -- -- 88 18 98 % --   02/24/23 1700 117/77 -- -- 77 18 96 % --   02/24/23 1600 123/78 98.6 °F (37 °C) Temporal 88 20 97 % --   02/24/23 1500 127/80 -- -- (!) 102 18 97 % --   02/24/23 1420 -- -- -- 95 21 97 % --   02/24/23 1400 118/84 -- -- (!) 114 20 95 % --   02/24/23 1300 (!) 103/55 -- -- 82 17 96 % --   02/24/23 1200 (!) 97/57 98.6 °F (37 °C) Temporal 85 17 94 % --   02/24/23 1100 98/63 -- -- 89 15 94 % --         24HR INTAKE/OUTPUT:    Intake/Output Summary (Last 24 hours) at 2/25/2023 1014  Last data filed at 2/25/2023 0600  Gross per 24 hour   Intake 810 ml   Output 2100 ml   Net -1290 ml           Physical Exam  Vitals and nursing note reviewed. Constitutional:       General: She is not in acute distress. Appearance: Normal appearance. She is not ill-appearing, toxic-appearing or diaphoretic. HENT:      Head: Normocephalic and atraumatic. Right Ear: External ear normal.      Left Ear: External ear normal.      Nose: Nose normal. No congestion or rhinorrhea. Mouth/Throat:      Mouth: Mucous membranes are moist.      Pharynx: Oropharynx is clear. No oropharyngeal exudate or posterior oropharyngeal erythema. Eyes:      General: No scleral icterus. Right eye: No discharge. Left eye: No discharge. Extraocular Movements: Extraocular movements intact.       Conjunctiva/sclera: Conjunctivae normal. Pupils: Pupils are equal, round, and reactive to light. Cardiovascular:      Rate and Rhythm: Normal rate and regular rhythm. Pulses: Normal pulses. Heart sounds: Normal heart sounds. No murmur heard. No friction rub. No gallop. Pulmonary:      Effort: Pulmonary effort is normal. No respiratory distress. Breath sounds: Normal breath sounds. No stridor. No wheezing, rhonchi or rales. Chest:      Chest wall: No tenderness. Abdominal:      General: Bowel sounds are normal. There is no distension. Palpations: Abdomen is soft. Tenderness: There is no abdominal tenderness. There is no guarding or rebound. Musculoskeletal:         General: No swelling, tenderness, deformity or signs of injury. Normal range of motion. Cervical back: Normal range of motion and neck supple. No rigidity. No muscular tenderness. Right lower leg: No edema. Left lower leg: No edema. Skin:     General: Skin is warm and dry. Capillary Refill: Capillary refill takes less than 2 seconds. Coloration: Skin is not jaundiced or pale. Findings: No bruising, erythema, lesion or rash. Neurological:      General: No focal deficit present. Mental Status: She is alert. Mental status is at baseline. Cranial Nerves: No cranial nerve deficit. Sensory: No sensory deficit. Motor: No weakness. Coordination: Coordination normal.   Psychiatric:         Mood and Affect: Mood normal.         Behavior: Behavior normal.         Thought Content:  Thought content normal.         Judgment: Judgment normal.         Assessment/plan:     Patient Active Problem List    Diagnosis Date Noted    Seizure ([facility] 02/20/2023     Priority: Medium    Lower abdominal pain 02/20/2023     Priority: Medium    Depression 02/19/2023     Priority: Medium    Gastric erosion 02/18/2023     Priority: Medium    Duodenal erosion 02/18/2023     Priority: Medium    Int[facility] ([facility] 02/17/2023     Priority: Medium    Chronic static encephalopathy 02/17/2023     Priority: Medium    Autism 02/17/2023     Priority: Medium    Seizure-like activity ([address] 02/16/2023     Priority: Medium    Acne 02/14/2023     Priority: Medium    Attention deficit disorder 02/14/2023     Priority: Medium    Atypical squamous cells of undetermined significance (ASCUS) on Papanicolaou smear of cervix 02/14/2023     Priority: Medium    Dysmenorrhea 02/14/2023     Priority: Medium    Sensory processing difficulty 02/14/2023     Priority: Medium    Active autistic disorder 01/26/2023     Priority: Medium    PTSD (post-traumatic stress disorder) 01/26/2023     Priority: Medium    Hypokalemia 01/26/2023     Priority: Medium    Status epilepticus ([address] 01/06/2023     Priority: Medium    Swallowed foreign body 02/16/2023     Added automatically from request for surgery [id number]         Hospital Problems             Last Modified POA    * (Principal) Seizure-like activity ([address] 2/16/2023 Yes    PTSD (post-traumatic stress disorder) 2/20/2023 Yes    Intractable seizure disorder ([address] 2/17/2023 Yes    Chronic static encephalopathy 2/17/2023 Yes    Autism 2/17/2023 Yes    Gastric erosion 2/18/2023 Yes    Duodenal erosion 2/18/2023 Yes    Depression 2/19/2023 Yes    Seizure ([address] 2/20/2023 Yes    Lower abdominal pain 2/20/2023 Yes    Swallowed foreign body 2/18/2023 Unknown    Overview Signed 2/18/2023  2:27 AM by Laura Amaya MD     Added automatically from request for surgery [id number]        Principal Problem:    Seizure-like activity ([address]  Active Problems:    PTSD (post-traumatic stress disorder)    Intractable seizure disorder ([address]    Chronic static encephalopathy    Autism    Gastric erosion    Duodenal erosion    Depression    Seizure ([address]    Lower abdominal pain    Swallowed foreign body  Resolved Problems:    * No resolved hospital problems.  *          Brief History/Summary:   As per Initial admission HPI 2/16/2023, quoted below;  \"Ms. Jurgen Avitia is a pleasant 33year old lady from home. She follows with Dr Flossie Landau. She has had some of her medications decreased/tapered recently. She has a history of epilepsy with onset at age 22 years. She had family present at the bedside. The phenytoin and Vimpat were being tapered. She has had multiple seizures tonight here in the hospital. She had reportedly had but a single seizure after her last visit (end of January) prior to her medications being reduced last week. She also has a history of Autism and is at times mute, but will still freely answer with head gestures. \"    Further hospital course as per problem list below; Autism   Agitation and aggressiveness  One-to-one sitter at bedside for safety  Psych on board  Haloperidol 5 mg IM every 6 hours/ PRN for agitation and aggression  Risperidone 1 mg p.o. twice daily  Risperidone 2 mg p.o. every 6 hours as needed for agitation    Seizures  Seizure precautions  Aspiration precautions  Neuro check every 4 hours  Neurology consulted on admission  Normal awake and asleep EEG (02/17/2023)  Lamotrigine 225 mg p.o. daily  Levetiracetam 1,500 mg p.o. twice daily  Defer further antiepileptic regiment to Neurology        Abdominal pain. Foreign body (bilaterally) ingestion   KUB (02/18/2023): Impression: There are 2 round metallic foreign bodies in the small bowel of the left mid abdomen  Continue serial imaging  KUB (02/18/2023): Impression: Nonobstructive bowel gas pattern. Ingested batteries are visualized in the right mid abdomen, not significantly changed in position when compared to prior study. Close follow-up recommended. KUB (02/19/2023): Impression: Round densities in the right mid abdomen and the right upper pelvis could represent the swallowed batteries. No evidence of small-bowel obstruction  KUB (02/20/2023): Impression: Nonobstructive bowel gas pattern.  Ingested batteries are visualized in the right mid abdomen, not significantly changed in position when compared to prior study  KUB (02/23/2023): Impression: NON-SPECIFIC GAS PATTERN. 2 foreign bodies batteries in ascending colon and sigmoid colon    CT Abdomen/Pelvis WO Con -(02/25/2023) pending, given patient currently agreeable  GI consulted for removal of battery (02/18/2023)  General surgery on board, given concern for possible perforation. No emergent or urgent surgical intervention recommended at this time. Further work-up and management as per general surgery    Lactic Acidosis  Acutely elevated Prolactin (187) and Lactic acid of 4.5 during rapid response for seizure activity  Lactic acid trended down to 0.9 (02/23/2023) with IVF    Continue management of other chronic medical conditions - see above and orders. Advance Directive: Full Code    ADULT DIET; Regular         Consults Made:   IP CONSULT TO NEUROLOGY  IP CONSULT TO CASE MANAGEMENT  IP CONSULT TO PSYCHIATRY  IP CONSULT TO GENERAL SURGERY      DVT prophylaxis: Enoxaparin        Discharge planning:   Continue current management, including one-to-one sitter at bedside and serial imaging  Continue management in the ICU  SW and Psych on board, for possible placement versus inpatient psych admission, when medically stable    Time Spent is  35 mins  in the examination, evaluation, counseling and review of medications, assessment and plan.      Electronically signed by   Joey Benjamin MD,   Internal Medicine Hospitalist   2/25/2023 10:14 AM

## 2023-02-26 ENCOUNTER — APPOINTMENT (OUTPATIENT)
Dept: GENERAL RADIOLOGY | Age: 28
DRG: 101 | End: 2023-02-26

## 2023-02-26 PROCEDURE — 6370000000 HC RX 637 (ALT 250 FOR IP): Performed by: SURGERY

## 2023-02-26 PROCEDURE — 2000000000 HC ICU R&B

## 2023-02-26 PROCEDURE — 6370000000 HC RX 637 (ALT 250 FOR IP): Performed by: PSYCHIATRY & NEUROLOGY

## 2023-02-26 PROCEDURE — 74018 RADEX ABDOMEN 1 VIEW: CPT

## 2023-02-26 PROCEDURE — 6360000002 HC RX W HCPCS: Performed by: HOSPITALIST

## 2023-02-26 PROCEDURE — 2580000003 HC RX 258: Performed by: ANESTHESIOLOGY

## 2023-02-26 PROCEDURE — 6370000000 HC RX 637 (ALT 250 FOR IP): Performed by: NURSE PRACTITIONER

## 2023-02-26 PROCEDURE — 6370000000 HC RX 637 (ALT 250 FOR IP): Performed by: HOSPITALIST

## 2023-02-26 PROCEDURE — 6360000002 HC RX W HCPCS: Performed by: PSYCHIATRY & NEUROLOGY

## 2023-02-26 RX ADMIN — FERROUS SULFATE TAB 325 MG (65 MG ELEMENTAL FE) 325 MG: 325 (65 FE) TAB at 16:10

## 2023-02-26 RX ADMIN — FLUOXETINE 80 MG: 20 CAPSULE ORAL at 07:36

## 2023-02-26 RX ADMIN — Medication 5 MG: at 19:32

## 2023-02-26 RX ADMIN — SODIUM CHLORIDE, PRESERVATIVE FREE 10 ML: 5 INJECTION INTRAVENOUS at 19:33

## 2023-02-26 RX ADMIN — LAMOTRIGINE 225 MG: 200 TABLET ORAL at 07:36

## 2023-02-26 RX ADMIN — CLONAZEPAM 0.5 MG: 1 TABLET ORAL at 19:36

## 2023-02-26 RX ADMIN — POLYETHYLENE GLYCOL 3350 17 G: 17 POWDER, FOR SOLUTION ORAL at 07:37

## 2023-02-26 RX ADMIN — POLYETHYLENE GLYCOL 3350 17 G: 17 POWDER, FOR SOLUTION ORAL at 19:33

## 2023-02-26 RX ADMIN — OXYCODONE HYDROCHLORIDE AND ACETAMINOPHEN 500 MG: 500 TABLET ORAL at 07:36

## 2023-02-26 RX ADMIN — SODIUM CHLORIDE, PRESERVATIVE FREE 10 ML: 5 INJECTION INTRAVENOUS at 12:01

## 2023-02-26 RX ADMIN — ENOXAPARIN SODIUM 40 MG: 100 INJECTION SUBCUTANEOUS at 07:36

## 2023-02-26 RX ADMIN — OXYCODONE HYDROCHLORIDE AND ACETAMINOPHEN 500 MG: 500 TABLET ORAL at 16:10

## 2023-02-26 RX ADMIN — RISPERIDONE 2 MG: 1 TABLET ORAL at 13:22

## 2023-02-26 RX ADMIN — FERROUS SULFATE TAB 325 MG (65 MG ELEMENTAL FE) 325 MG: 325 (65 FE) TAB at 07:36

## 2023-02-26 RX ADMIN — RISPERIDONE 2 MG: 1 TABLET ORAL at 19:33

## 2023-02-26 RX ADMIN — CLONAZEPAM 0.5 MG: 1 TABLET ORAL at 07:46

## 2023-02-26 RX ADMIN — HALOPERIDOL LACTATE 5 MG: 5 INJECTION, SOLUTION INTRAMUSCULAR at 18:18

## 2023-02-26 RX ADMIN — ACETAMINOPHEN 650 MG: 325 TABLET ORAL at 09:54

## 2023-02-26 RX ADMIN — PRAZOSIN HYDROCHLORIDE 1 MG: 1 CAPSULE ORAL at 19:33

## 2023-02-26 RX ADMIN — NALTREXONE HYDROCHLORIDE 50 MG: 50 TABLET, FILM COATED ORAL at 07:36

## 2023-02-26 ASSESSMENT — PAIN SCALES - GENERAL: PAINLEVEL_OUTOF10: 4

## 2023-02-26 ASSESSMENT — PAIN DESCRIPTION - LOCATION: LOCATION: ABDOMEN

## 2023-02-26 NOTE — PLAN OF CARE
Problem: Discharge Planning  Goal: Discharge to home or other facility with appropriate resources  Outcome: Progressing  Flowsheets (Taken 2/25/2023 1921)  Discharge to home or other facility with appropriate resources: Identify barriers to discharge with patient and caregiver     Problem: Safety - Adult  Goal: Free from fall injury  Outcome: Progressing  Flowsheets (Taken 2/25/2023 2355)  Free From Fall Injury: Instruct family/caregiver on patient safety     Problem: Skin/Tissue Integrity  Goal: Absence of new skin breakdown  Description: 1. Monitor for areas of redness and/or skin breakdown  2. Assess vascular access sites hourly  3. Every 4-6 hours minimum:  Change oxygen saturation probe site  4. Every 4-6 hours:  If on nasal continuous positive airway pressure, respiratory therapy assess nares and determine need for appliance change or resting period. Outcome: Progressing     Problem: ABCDS Injury Assessment  Goal: Absence of physical injury  Outcome: Progressing     Problem: Neurosensory - Adult  Goal: Absence of seizures  Outcome: Progressing  Flowsheets (Taken 2/25/2023 1921)  Absence of seizures: Monitor for seizure activity.   If seizure occurs, document type and location of movements and any associated apnea     Problem: Musculoskeletal - Adult  Goal: Return mobility to safest level of function  Outcome: Progressing  Flowsheets (Taken 2/25/2023 1921)  Return Mobility to Safest Level of Function: Assess patient stability and activity tolerance for standing, transferring and ambulating with or without assistive devices     Problem: Musculoskeletal - Adult  Goal: Return ADL status to a safe level of function  Outcome: Progressing  Flowsheets (Taken 2/25/2023 1921)  Return ADL Status to a Safe Level of Function: Administer medication as ordered     Problem: Genitourinary - Adult  Goal: Urinary catheter remains patent  Outcome: Progressing  Flowsheets (Taken 2/25/2023 1921)  Urinary catheter remains patent: Assess patency of urinary catheter     Problem: Decision Making  Goal: Pt/Family able to effectively weigh alternatives and participate in decision making related to treatment and care  Description: INTERVENTIONS:  1. Determine when there are differences between patient's view, family's view, and healthcare provider's view of condition  2. Facilitate patient and family articulation of goals for care  3. Help patient and family identify pros/cons of alternative solutions  4. Provide information as requested by patient/family  5. Respect patient/family right to receive or not to receive information  6. Serve as a liaison between patient and family and health care team  7.  Initiate Consults from Ethics, Palliative Care or initiate [facility] as is appropriate  Outcome: Progressing  Flowsheets (Taken 2/25/2023 1921)  Patient/family able to effectively weigh alternatives and participate in decision making related to treatment and care: Determine when there are differences between patient's view, family's view, and healthcare provider's view of condition     Problem: Pain  Goal: Verbalizes/displays adequate comfort level or baseline comfort level  Outcome: Progressing  Flowsheets (Taken 2/25/2023 1945)  Verbalizes/displays adequate comfort level or baseline comfort level: Encourage patient to monitor pain and request assistance

## 2023-02-26 NOTE — PROGRESS NOTES
Patient has been cooperative throughout this RN shift until recently. Patient has been verbally interactive and held a conversation about her kids and mom at home with nurse and sitter. Patient expressed that she wants to be in a regular room because it is quieter that the ICU. This RN closed patients glass door and turned down the lights to help her stay calm. Patient is also impulsive. Patient grabbed alcohol caps off of the computer and put one in her mouth. This RN and sitter talked with patient over a period of time ( about 10 minutes) in order to get her to spit out the alcohol cap. She communicated with us through texting on her phone. When asked why she continues to put things in her mouth that she is not supposed to she replied \" the voices tell me to\" and \"they tell me that something bad will happen if I dont listen\". This RN explained that it was not safe for her to swallow things that are not food or drink. She then asked \"why? I do it at home all the time\". Patient eventually spit out the alcohol cap from her mouth and it was disposed of. Patient continues to have intermittent episodes of agitation. She did finally agree to take the prn risperdal. She then explained that she does not like to take the medication for agitation because it makes her sleepy. Patient is resting in bed watching tv currently. Will monitor.          Electronically signed by Kaela Mars RN on 2/26/2023 at 1:55 PM

## 2023-02-26 NOTE — PROGRESS NOTES
Pt. grabbed her autistic green tag which was laying on the table and tried to put it into her mouth. When I tried to take it,she said its from my mommy and she brought it for me. I removed it from her sight for now. Pt is verbal and she communicates good. Pt was saying that she did not get to eat lunch and super. . We offered her sandwich and apple sauce. Pt. just ate it. Pt is watching TV now. Will continue to monitor.

## 2023-02-26 NOTE — PROGRESS NOTES
[facility] Progress Note    Patient:  Leandro Kelly  YOB: 1995  Date of Service: 2/26/2023  MRN: [id number]   Acct: [de-identified]   Primary Care Physician: Martin Allen  Advance Directive: Full Code  Admit Date: 2/16/2023       Hospital Day: 6        CHIEF COMPLAINT:     Chief Complaint   Patient presents with    Seizures     2 seizures prior to EMS arrival 4 seizures with EMS       2/26/2023 10:29 AM  Subjective / Interval History:   02/26/2023  Patient seen and examined this AM.  Doing well. No new complaints. Continues to inquire about being transferred to regular floor. Sitting comfortably in bed in no acute distress. Continues to require one-to-one sitter at bedside for safety. Patient reportedly with a large bowel movement yesterday. Denies any acute complaints or distress at this time. 02/25/2023  No acute changes or acute overnight event reported. Patient laying calmly in bed in no acute distress. One-to-one sitter remains at bedside for safety. Patient denies any acute complaints or distress at this time. 02/24/2023  Patient doing well this am. No new complaints. Continues to reportedly have self harming behavior including attempting to swallow objects. Sitter remains at bedside for safety. 02/23/2023  Patient seen and examined this AM.  Doing well. Lying comfortably in bed in no acute distress. No acute changes or acute overnight event reported. Patient endorses abdominal pain with some burning sensation. Rapid response called at 10:51 AM, on account of patient with persistent seizure activity, requiring 6 mg of lorazepam and subsequent transfer to the ICU.        02/22/2023  No acute changes or acute overnight event reported. Patient seen and examined. Doing well. No new complaints. Continues to be intermittently agitated. Patient sitting in bed, teary.   Responds to questions but typing on her iPad.      02/21/2023  Patient seen and examined this AM.  Intermittent agitation and aggressiveness. Currently laying comfortably in bed in no apparent acute distress. No further seizure episodes reported. 02/20/2023  No acute changes or acute overnight event reported. Patient seen and examined. Continues to be intermittently agitated, requiring one-to-one sitter in the CCU. Laying comfortably in bed however no apparent acute distress. 02/19/2023  Patient seen and examined. Sitting comfortably in bed in no acute distress. Denies any acute complaints or distress at this time. Patient transferred to CCU yesterday, for persistent seizure activities. No acute overnight event reported. 02/18/2023  Patient seen and examined this AM.  Johanne Montejo comfortably in bed in no acute distress. No new complaints. One-to-one sitter at bedside for safety. Patient reportedly with another episode of seizures lasting about 5 minutes, with heart rates rising up to 150s yesterday. Patient reportedly with agitation and aggressiveness around 22:20 PM yesterday, requiring Geodon as well as diphenhydramine IM. Patient was reportedly swallowed some foreign bodies (2 disc batteries). GI subsequently consulted for foreign body removal via endoscopy. 02/17/2023  No acute changes or acute overnight event reported. Patient seen and examined. No new complaints. No further seizure activities at this time. Denies any dizziness, lightheadedness or palpitations. Endorses some nausea but no vomiting. Laying comfortably in bed in no acute distress. Review of Systems:   Review of Systems  ROS: 14 point review of systems is negative except as specifically addressed above. ADULT DIET;  Regular    Intake/Output Summary (Last 24 hours) at 2/26/2023 1029  Last data filed at 2/26/2023 0800  Gross per 24 hour   Intake 530 ml   Output 990 ml   Net -460 ml           Medications:   sodium chloride       Current Facility-Administered Medications   Medication Dose Route Frequency Provider Last Rate Last Admin    0.9 % sodium chloride bolus  1,000 mL IntraVENous Once Erika Gustafson MD        risperiDONE (RISPERDAL) tablet 2 mg  2 mg Oral Nightly Lowmalachi Leisa, APRN   2 mg at 02/25/23 2016    chlorproMAZINE (THORAZINE) injection 10 mg  10 mg IntraMUSCular Once Erika Gustafson MD        risperiDONE (RISPERDAL) tablet 2 mg  2 mg Oral Q6H PRN Caterina Xiong MD   2 mg at 02/25/23 1434    naltrexone (DEPADE) tablet 50 mg  50 mg Oral Daily with breakfast Caterina Xiong MD   50 mg at 02/26/23 0736    polyethylene glycol (GLYCOLAX) packet 17 g  17 g Oral BID Myles Damon MD   17 g at 02/26/23 0737    prazosin (MINIPRESS) capsule 1 mg  1 mg Oral Nightly Caterina Xiong MD   1 mg at 02/24/23 2002    lamoTRIgine (LAMICTAL) tablet 225 mg  225 mg Oral Daily Caterina Xiong MD   225 mg at 02/26/23 0736    haloperidol lactate (HALDOL) injection 5 mg  5 mg IntraMUSCular Q6H PRN Caterina Xiong MD   5 mg at 02/25/23 1111    melatonin disintegrating tablet 5 mg  5 mg Oral Nightly Caterina Xiong MD   5 mg at 02/25/23 2015    sodium chloride flush 0.9 % injection 5-40 mL  5-40 mL IntraVENous 2 times per day Parker Szymanski MD   10 mL at 02/25/23 2020    sodium chloride flush 0.9 % injection 5-40 mL  5-40 mL IntraVENous PRN Parker Szymanski MD        0.9 % sodium chloride infusion   IntraVENous PRN Parker Szymanski MD        enoxaparin (LOVENOX) injection 40 mg  40 mg SubCUTAneous Daily Bello Watkins MD   40 mg at 02/26/23 0736    ondansetron (ZOFRAN-ODT) disintegrating tablet 4 mg  4 mg Oral Q8H PRN Bello Watkins MD        Or    [facility]ansetron [facility]) injection 4 mg  4 mg IntraVENous Q6H PRN Bello Watkins MD        acetaminophen (TYLENOL) tablet 650 mg  650 mg Oral Q4H PRN Bello Watkins MD   650 mg at 02/26/23 4945    Or    acetaminophen (TYLENOL) suppository 650 mg  650 mg Rectal Q4H PRN Bello Watkins MD        potassium chloride (KLOR-CON M) extended release tablet 40 mEq  40 mEq Oral PRN Scooter Griffin MD        Or    potassium bicarb-citric acid (EFFER-K) effervescent tablet 40 mEq  40 mEq Oral PRN Scooter Griffin MD        Or    potassium chloride 10 mEq/100 mL IVPB (Peripheral Line)  10 mEq IntraVENous PRN Scooter Griffin MD        magnesium sulfate 2000 mg in 50 mL IVPB premix  2,000 mg IntraVENous PRN Scooter Griffin MD        sodium phosphate 22.5 mmol in sodium chloride 0.9 % 250 mL IVPB  0.32 mmol/kg IntraVENous PRN Scootre Griffin MD        polyethylene glycol (GLYCOLAX) packet 17 g  17 g Oral Daily PRN Scooter Griffin MD   17 g at 02/20/23 1523    melatonin disintegrating tablet 5 mg  5 mg Oral Nightly PRN Scooter Griffin MD        calcium carbonate (TUMS) chewable tablet 500 mg  500 mg Oral TID PRN Scooter Griffin MD        clonazePAM Sharlot Ferraris) tablet 0.5 mg  0.5 mg Oral Q12H Amaris Morley MD   0.5 mg at 02/26/23 0746    ferrous sulfate (IRON 325) tablet 325 mg  325 mg Oral BID  Scooter Griffin MD   325 mg at 02/26/23 0736    FLUoxetine (PROZAC) capsule 80 mg  80 mg Oral Daily Scooter Griffin MD   80 mg at 02/26/23 1159    ascorbic acid (VITAMIN C) tablet 500 mg  500 mg Oral BID with meals Scooter Griffin MD   500 mg at 02/26/23 9743         sodium chloride        sodium chloride  1,000 mL IntraVENous Once    risperiDONE  2 mg Oral Nightly    chlorproMAZINE  10 mg IntraMUSCular Once    naltrexone  50 mg Oral Daily with breakfast    polyethylene glycol  17 g Oral BID    prazosin  1 mg Oral Nightly    lamoTRIgine  225 mg Oral Daily    melatonin  5 mg Oral Nightly    sodium chloride flush  5-40 mL IntraVENous 2 times per day    enoxaparin  40 mg SubCUTAneous Daily    clonazePAM  0.5 mg Oral Q12H    ferrous sulfate  325 mg Oral BID WC    FLUoxetine  80 mg Oral Daily    ascorbic acid  500 mg Oral BID with meals     risperiDONE, haloperidol lactate, sodium chloride flush, sodium chloride, ondansetron **OR** ondansetron, acetaminophen **OR** acetaminophen, potassium chloride **OR** potassium alternative oral replacement **OR** potassium chloride, magnesium sulfate, sodium phosphate IVPB, polyethylene glycol, melatonin, calcium carbonate  ADULT DIET; Regular       Labs:   CBC with DIFF:  Recent Labs     02/23/23  1107 02/24/23  0136   WBC 8.2 5.9   RBC 4.93 4.32   HGB 13.5 12.0   HCT 45.0 37.9   MCV 91.3 87.7   MCH 27.4 27.8   MCHC 30.0* 31.7*   RDW 20.8* 19.6*    190   MPV 12.8* 12.0   NEUTOPHILPCT  --  57.7   LYMPHOPCT  --  28.2   MONOPCT  --  9.3   EOSRELPCT  --  3.5   BASOPCT  --  1.0   NEUTROABS  --  3.4   LYMPHSABS  --  1.7   MONOSABS  --  0.60   EOSABS  --  0.20   BASOSABS  --  0.10         CMP/BMP:  Recent Labs     02/23/23  1107 02/24/23  0136    139   K 4.0 3.5    104   CO2 24 25   ANIONGAP 16 10   GLUCOSE 109 87   BUN 8 7   CREATININE 0.7 0.7   LABGLOM >60 >60   CALCIUM 9.6 8.9   PROT 7.4 6.2*   LABALBU 4.4 3.8   BILITOT <0.2 <0.2   ALKPHOS 89 74   ALT 14 12   AST 15 12           CRP:    Recent Labs     02/23/23  1107   CRP 2.79*       Sed Rate:  No results for input(s): SEDRATE in the last 72 hours. HgBA1c:  No components found for: HGBA1C  FLP:  No results found for: TRIG, HDL, LDLCALC, LDLDIRECT, LABVLDL  TSH:    Lab Results   Component Value Date/Time    TSH 1.340 01/16/2023 06:17 PM     Troponin T:   No results for input(s): TROPONINI in the last 72 hours. Pro-BNP: No results for input(s): BNP in the last 72 hours. INR: No results for input(s): INR in the last 72 hours.   ABGs:   Lab Results   Component Value Date/Time    PHART 7.430 01/13/2023 02:16 PM    PO2ART 139.0 01/13/2023 02:16 PM    FTQ6ANZ 39.0 01/13/2023 02:16 PM     UA:  Recent Labs     02/23/23  1815   COLORU YELLOW   PHUR 6.5   WBCUA 1   RBCUA 73*   BACTERIA NEGATIVE*   CLARITYU TURBID*   SPECGRAV 1.032   LEUKOCYTESUR Negative   UROBILINOGEN 1.0   BILIRUBINUR Negative   BLOODU MODERATE*   GLUCOSEU Negative           Culture Results:    No results for input(s): CXSURG in the last 720 hours. Blood Culture Recent: No results for input(s): BC in the last 720 hours. No results for input(s): BC, BLOODCULT2, ORG in the last 72 hours. Cultures:   No results for input(s): CULTURE in the last 72 hours. No results for input(s): BC, Elkin Pac in the last 72 hours. No results for input(s): CXSURG in the last 72 hours. Recent Labs     02/24/23  0136   MG 2.2       Recent Labs     02/23/23  1107 02/24/23  0136   AST 15 12   ALT 14 12   BILITOT <0.2 <0.2   ALKPHOS 89 74           RAD:   CT Head WO Contrast    Result Date: 2/17/2023  Exam: Noncontrast CT of the brain Technique: Axial noncontrast images were obtained from the skull base to the vertex. Coronal and sagittal reconstructions were performed. Radiation dose optimization technique was utilized. Clinical information: Seizure Comparison: Noncontrast CT of the brain performed on January 16, 2023 Findings: No evidence of acute hemorrhage, mass, mass effect, or midline shift. The ventricular system and basal cisterns are within normal limits for the patients age. Gray-white differentiation is maintained. There is no evidence of an intra-axial or extra-axial fluid collection. Bone windows reveal no evidence of acute fracture. The visualized paranasal sinuses are within normal limits. The orbits are grossly unremarkable. No acute intracranial process evident on noncontrast CT of the brain. XR CHEST PORTABLE    Result Date: 2/17/2023  CLINICAL HISTORY: Seizure TECHNIQUE: Single AP view of the chest COMPARISON: CXR from 1/10/2023 FINDINGS: Lines and tubes: None. Cardiomediastinal: Normal size and configuration of the cardiomediastinal silhouette. No pneumomediastinum. Lungs and pleura: The lungs are grossly unremarkable. No pleural effusion. No pneumothorax. Musculoskeletal: No acute osseous abnormalities. Other: None. No acute cardiopulmonary process identified.           Objective:   Vitals:   BP (!) 122/94   Pulse 94   Temp 97.7 °F (36.5 °C) (Temporal)   Resp 16   Ht 5' 5\" (1.651 m)   Wt 195 lb 9 oz (88.7 kg)   SpO2 99%   BMI 32.54 kg/m²     Patient Vitals for the past 24 hrs:   BP Temp Temp src Pulse Resp SpO2 Weight   02/26/23 0900 -- -- -- 94 16 -- --   02/26/23 0745 (!) 122/94 97.7 °F (36.5 °C) Temporal (!) 103 14 99 % --   02/26/23 0700 -- -- -- 96 17 -- --   02/26/23 0556 (!) 91/57 -- -- 66 14 -- --   02/26/23 0400 (!) 86/55 97.4 °F (36.3 °C) Temporal 82 16 (!) 84 % --   02/26/23 0200 (!) 85/59 -- -- 83 16 -- 195 lb 9 oz (88.7 kg)   02/25/23 2345 (!) 84/44 97.2 °F (36.2 °C) Temporal 58 15 -- --   02/25/23 2200 (!) 95/56 -- -- 62 17 -- --   02/25/23 2000 99/73 98.5 °F (36.9 °C) Temporal 89 13 -- --   02/25/23 1945 -- -- -- -- -- 96 % --   02/25/23 1600 107/75 98.2 °F (36.8 °C) Temporal 77 18 97 % --   02/25/23 1440 124/83 -- -- 91 29 95 % --   02/25/23 1225 114/76 -- -- 79 16 -- --         24HR INTAKE/OUTPUT:    Intake/Output Summary (Last 24 hours) at 2/26/2023 1029  Last data filed at 2/26/2023 0800  Gross per 24 hour   Intake 530 ml   Output 990 ml   Net -460 ml           Physical Exam  Vitals and nursing note reviewed. Constitutional:       General: She is not in acute distress. Appearance: Normal appearance. She is not ill-appearing, toxic-appearing or diaphoretic. HENT:      Head: Normocephalic and atraumatic. Right Ear: External ear normal.      Left Ear: External ear normal.      Nose: Nose normal. No congestion or rhinorrhea. Mouth/Throat:      Mouth: Mucous membranes are moist.      Pharynx: Oropharynx is clear. No oropharyngeal exudate or posterior oropharyngeal erythema. Eyes:      General: No scleral icterus. Right eye: No discharge. Left eye: No discharge. Extraocular Movements: Extraocular movements intact. Conjunctiva/sclera: Conjunctivae normal.      Pupils: Pupils are equal, round, and reactive to light.    Cardiovascular:      Rate and Rhythm: Normal rate and regular rhythm. Pulses: Normal pulses. Heart sounds: Normal heart sounds. No murmur heard. No friction rub. No gallop. Pulmonary:      Effort: Pulmonary effort is normal. No respiratory distress. Breath sounds: Normal breath sounds. No stridor. No wheezing, rhonchi or rales. Chest:      Chest wall: No tenderness. Abdominal:      General: Bowel sounds are normal. There is no distension. Palpations: Abdomen is soft. Tenderness: There is no abdominal tenderness. There is no guarding or rebound. Musculoskeletal:         General: No swelling, tenderness, deformity or signs of injury. Normal range of motion. Cervical back: Normal range of motion and neck supple. No rigidity. No muscular tenderness. Right lower leg: No edema. Left lower leg: No edema. Skin:     General: Skin is warm and dry. Capillary Refill: Capillary refill takes less than 2 seconds. Coloration: Skin is not jaundiced or pale. Findings: No bruising, erythema, lesion or rash. Neurological:      General: No focal deficit present. Mental Status: She is alert. Mental status is at baseline. Cranial Nerves: No cranial nerve deficit. Sensory: No sensory deficit. Motor: No weakness. Coordination: Coordination normal.   Psychiatric:         Mood and Affect: Mood normal.         Behavior: Behavior normal.         Thought Content:  Thought content normal.         Judgment: Judgment normal.         Assessment/plan:       Hospital Problems             Last Modified POA    * (Principal) Seizure-like activity ([address] 2/16/2023 Yes    PTSD (post-traumatic stress disorder) 2/20/2023 Yes    Intractable seizure disorder ([address] 2/17/2023 Yes    Chronic static encephalopathy 2/17/2023 Yes    Autism 2/17/2023 Yes    Gastric erosion 2/18/2023 Yes    Duodenal erosion 2/18/2023 Yes    Depression 2/19/2023 Yes    Seizure ([address] 2/20/2023 Yes    Lower abdominal pain 2/20/2023 Yes Swallowed foreign body 2/18/2023 Unknown    Overview Signed 2/18/2023  2:27 AM by Radha Guzman MD     Added automatically from request for surgery [id number]        Principal Problem:    Seizure-like activity (HCC)  Active Problems:    PTSD (post-traumatic stress disorder)    Intractable seizure disorder (HCC)    Chronic static encephalopathy    Autism    Gastric erosion    Duodenal erosion    Depression    Seizure (HCC)    Lower abdominal pain    Swallowed foreign body  Resolved Problems:    * No resolved hospital problems. *          Brief History/Summary:   As per Initial admission HPI 2/16/2023, quoted below;  \"Ms.Kylia Del Castillo is a pleasant 27 year old lady from home. She follows with Dr Crowell. She has had some of her medications decreased/tapered recently. She has a history of epilepsy with onset at age 24 years. She had family present at the bedside. The phenytoin and Vimpat were being tapered. She has had multiple seizures tonight here in the hospital. She had reportedly had but a single seizure after her last visit (end of January) prior to her medications being reduced last week.   She also has a history of Autism and is at times mute, but will still freely answer with head gestures.\"    Further hospital course as per problem list below;    Autism   Agitation and aggressiveness  One-to-one sitter at bedside for safety  Psych on board  Haloperidol 5 mg IM every 6 hours/ PRN for agitation and aggression  Risperidone 1 mg p.o. twice daily  Risperidone 2 mg p.o. every 6 hours as needed for agitation    Seizures  Seizure precautions  Aspiration precautions  Neuro check every 4 hours  Neurology consulted on admission  Normal awake and asleep EEG (02/17/2023)  Lamotrigine 225 mg p.o. daily  Levetiracetam 1,500 mg p.o. twice daily  Defer further antiepileptic regiment to Neurology        Abdominal pain.    Foreign body (bilaterally) ingestion   KUB (02/18/2023): Impression: There are 2 round metallic foreign  bodies in the small bowel of the left mid abdomen  Continue serial imaging  KUB (02/18/2023): Impression: Nonobstructive bowel gas pattern. Ingested batteries are visualized in the right mid abdomen, not significantly changed in position when compared to prior study. Close follow-up recommended. KUB (02/19/2023): Impression: Round densities in the right mid abdomen and the right upper pelvis could represent the swallowed batteries. No evidence of small-bowel obstruction  KUB (02/20/2023): Impression: Nonobstructive bowel gas pattern. Ingested batteries are visualized in the right mid abdomen, not significantly changed in position when compared to prior study  KUB (02/23/2023): Impression: NON-SPECIFIC GAS PATTERN. 2 foreign bodies batteries in ascending colon and sigmoid colon    CT Abdomen/Pelvis WO Con -(02/25/2023); Multiple ingested batteries seen in the distal small ileum, cecum and rectum. Focally dilated small bowel loop in the mid abdomen may suggest localized ileus. GI consulted for removal of battery (02/18/2023)  General surgery on board, given concern for possible perforation. And notified about CT finding. No emergent or urgent surgical intervention recommended at this time. Further work-up and management as per general surgery    Lactic Acidosis  Acutely elevated Prolactin (187) and Lactic acid of 4.5 during rapid response for seizure activity  Lactic acid trended down to 0.9 (02/23/2023) with IVF      Continue management of other chronic medical conditions - see above and orders. Advance Directive: Full Code    ADULT DIET;  Regular         Consults Made:   IP CONSULT TO NEUROLOGY  IP CONSULT TO CASE MANAGEMENT  IP CONSULT TO PSYCHIATRY  IP CONSULT TO GENERAL SURGERY      DVT prophylaxis: Enoxaparin        Discharge planning:   Continue current management, including one-to-one sitter at bedside and serial imaging  Continue management in the ICU  SW and Psych on board, for possible placement versus inpatient psych admission, when medically stable    Time Spent is  25 mins  in the examination, evaluation, counseling and review of medications, assessment and plan.      Electronically signed by   Nicolasa Mccall MD,   Internal Medicine Hospitalist   2/26/2023 10:29 AM

## 2023-02-27 VITALS
HEART RATE: 87 BPM | DIASTOLIC BLOOD PRESSURE: 83 MMHG | OXYGEN SATURATION: 95 % | TEMPERATURE: 97.4 F | WEIGHT: 193.2 LBS | BODY MASS INDEX: 32.19 KG/M2 | RESPIRATION RATE: 22 BRPM | HEIGHT: 65 IN | SYSTOLIC BLOOD PRESSURE: 118 MMHG

## 2023-02-27 PROCEDURE — 6360000002 HC RX W HCPCS: Performed by: HOSPITALIST

## 2023-02-27 PROCEDURE — 6370000000 HC RX 637 (ALT 250 FOR IP): Performed by: PSYCHIATRY & NEUROLOGY

## 2023-02-27 PROCEDURE — 2580000003 HC RX 258: Performed by: ANESTHESIOLOGY

## 2023-02-27 PROCEDURE — 99232 SBSQ HOSP IP/OBS MODERATE 35: CPT | Performed by: PSYCHIATRY & NEUROLOGY

## 2023-02-27 PROCEDURE — 51798 US URINE CAPACITY MEASURE: CPT

## 2023-02-27 PROCEDURE — 6370000000 HC RX 637 (ALT 250 FOR IP): Performed by: HOSPITALIST

## 2023-02-27 RX ORDER — RISPERIDONE 2 MG/1
2 TABLET ORAL NIGHTLY
Qty: 60 TABLET | Refills: 1 | Status: SHIPPED | OUTPATIENT
Start: 2023-02-27

## 2023-02-27 RX ORDER — LAMOTRIGINE 25 MG/1
250 TABLET ORAL DAILY
Qty: 30 TABLET | Refills: 1 | Status: SHIPPED | OUTPATIENT
Start: 2023-02-28

## 2023-02-27 RX ORDER — PRAZOSIN HYDROCHLORIDE 1 MG/1
1 CAPSULE ORAL NIGHTLY
Qty: 30 CAPSULE | Refills: 1 | Status: SHIPPED | OUTPATIENT
Start: 2023-02-27

## 2023-02-27 RX ADMIN — CLONAZEPAM 0.5 MG: 1 TABLET ORAL at 08:02

## 2023-02-27 RX ADMIN — OXYCODONE HYDROCHLORIDE AND ACETAMINOPHEN 500 MG: 500 TABLET ORAL at 08:01

## 2023-02-27 RX ADMIN — FLUOXETINE 80 MG: 20 CAPSULE ORAL at 08:01

## 2023-02-27 RX ADMIN — FERROUS SULFATE TAB 325 MG (65 MG ELEMENTAL FE) 325 MG: 325 (65 FE) TAB at 08:01

## 2023-02-27 RX ADMIN — ENOXAPARIN SODIUM 40 MG: 100 INJECTION SUBCUTANEOUS at 08:01

## 2023-02-27 RX ADMIN — NALTREXONE HYDROCHLORIDE 50 MG: 50 TABLET, FILM COATED ORAL at 08:01

## 2023-02-27 RX ADMIN — LAMOTRIGINE 225 MG: 200 TABLET ORAL at 08:01

## 2023-02-27 RX ADMIN — SODIUM CHLORIDE, PRESERVATIVE FREE 10 ML: 5 INJECTION INTRAVENOUS at 08:02

## 2023-02-27 RX ADMIN — RISPERIDONE 2 MG: 1 TABLET ORAL at 04:31

## 2023-02-27 NOTE — DISCHARGE SUMMARY
[place], [address] [facility] MEDICINE    DISCHARGE SUMMARY:        Dima Blank  :  1995  MRN:  [id number]    Admit date:  2023  Discharge date: 2023      Admitting Physician:  Mima Schwab, MD    Advance Directive: Full Code    Consults Made:   IP CONSULT TO NEUROLOGY  IP CONSULT TO CASE MANAGEMENT  IP CONSULT TO PSYCHIATRY  IP CONSULT TO GENERAL SURGERY      Primary Care Physician:  Rob Gist    Discharge Diagnoses:  Principal Problem:    Seizure-like activity ([facility] [address]  Active Problems:    Anxiety disorder    Intractable seizure disorder (HCC)    Chronic static encephalopathy    Autism    Gastric erosion    Duodenal erosion    Depression    Seizure ([facility] [address]    Lower abdominal pain    Swallowed foreign body  Resolved Problems:    * No resolved hospital problems. *          Pertinent Labs:  CBC with DIFF:No results for input(s): WBC, RBC, HGB, HCT, MCV, MCH, MCHC, RDW, PLT, MPV, NEUTOPHILPCT, LYMPHOPCT, MONOPCT, EOSRELPCT, BASOPCT, NEUTROABS, LYMPHSABS, MONOSABS, EOSABS, BASOSABS in the last 72 hours. CMP/BMP:  No results for input(s): NA, K, CL, CO2, ANIONGAP, GLUCOSE, BUN, CREATININE, LABGLOM, CALCIUM, PROT, LABALBU, BILITOT, ALKPHOS, ALT, AST, GLOB in the last 72 hours. CRP:  No results for input(s): CRP in the last 72 hours. Sed Rate:  No results for input(s): SEDRATE in the last 72 hours. HgBA1c:  No components found for: HGBA1C  FLP:  No results found for: TRIG, HDL, LDLCALC, LDLDIRECT, LABVLDL  TSH:    Lab Results   Component Value Date/Time    TSH 1.340 2023 06:17 PM     Troponin T: No results for input(s): TROPONINI in the last 72 hours. Pro-BNP: No results for input(s): BNP in the last 72 hours. INR: No results for input(s): INR in the last 72 hours.   ABGs:   Lab Results   Component Value Date/Time    PHART 7.430 2023 02:16 PM    PO2ART 139.0 2023 02:16 PM    FFG1CKH 39.0 2023 02:16 PM     UA:No results for input(s): NITRITE, COLORU, PHUR, LABCAST, WBCUA, RBCUA, MUCUS, TRICHOMONAS, YEAST, BACTERIA, CLARITYU, SPECGRAV, LEUKOCYTESUR, UROBILINOGEN, BILIRUBINUR, BLOODU, GLUCOSEU, AMORPHOUS in the last 72 hours. Invalid input(s): Ca [place]      Culture Results:    No results for input(s): CXSURG in the last 720 hours. Blood Culture Recent: No results for input(s): BC in the last 720 hours. Cultures:   No results for input(s): CULTURE in the last 72 hours. No results for input(s): BC, Elner Ruth in the last 72 hours. No results for input(s): CXSURG in the last 72 hours. No results for input(s): MG, PHOS in the last 72 hours. No results for input(s): AST, ALT, ALB, BILITOT, ALKPHOS, ALB in the last 72 hours. Significant Diagnostic Studies:   CT Head WO Contrast    Result Date: 2/17/2023  Exam: Noncontrast CT of the brain Technique: Axial noncontrast images were obtained from the skull base to the vertex. Coronal and sagittal reconstructions were performed. Radiation dose optimization technique was utilized. Clinical information: Seizure Comparison: Noncontrast CT of the brain performed on January 16, 2023 Findings: No evidence of acute hemorrhage, mass, mass effect, or midline shift. The ventricular system and basal cisterns are within normal limits for the patients age. Gray-white differentiation is maintained. There is no evidence of an intra-axial or extra-axial fluid collection. Bone windows reveal no evidence of acute fracture. The visualized paranasal sinuses are within normal limits. The orbits are grossly unremarkable. No acute intracranial process evident on noncontrast CT of the brain. XR CHEST PORTABLE    Result Date: 2/17/2023  CLINICAL HISTORY: Seizure TECHNIQUE: Single AP view of the chest COMPARISON: CXR from 1/10/2023 FINDINGS: Lines and tubes: None. Cardiomediastinal: Normal size and configuration of the cardiomediastinal silhouette. No pneumomediastinum. Lungs and pleura:  The lungs are grossly unremarkable. No pleural effusion. No pneumothorax. Musculoskeletal: No acute osseous abnormalities. Other: None. No acute cardiopulmonary process identified. Hospital Course: As per Initial admission HPI 2/16/2023, quoted below;  \"Ms. Kirsty Buitrago is a pleasant 33year old lady from home. She follows with Dr Marisabel Chahal. She has had some of her medications decreased/tapered recently. She has a history of epilepsy with onset at age 22 years. She had family present at the bedside. The phenytoin and Vimpat were being tapered. She has had multiple seizures tonight here in the hospital. She had reportedly had but a single seizure after her last visit (end of January) prior to her medications being reduced last week. She also has a history of Autism and is at times mute, but will still freely answer with head gestures. \"     Further hospital course as per problem list below; Anxiety and depression. Autism   Agitation and aggressiveness  One-to-one sitter at bedside for safety  Followed by Psych   Haloperidol 5 mg IM every 6 hours/ PRN for agitation and aggression, while inpatient  Risperidone 2 mg p.o. nightly  Continued lamotrigine (Lamictal) to as per neurology, to help also with mood stabilization  Lamotrigine 250 mg p.o. daily-dose increased by psych from 200 mg p.o. daily prior to admission dose. Prazosin 1 mg p.o. nightly  Okay for discharge home from psych standpoint       Seizures  Seizure precautions  Aspiration precautions  Neuro check every 4 hours  Neurology consulted on admission  Normal awake and asleep EEG (02/17/2023)  Lamotrigine 225 mg p.o. daily  Levetiracetam 1,500 mg p.o. twice daily  Defer further antiepileptic regiment to Neurology  Currently weaning of AEDs. Dilantin, Vimpat, and levetiracetam stopped. Continued lamotrigine (Lamictal) to as per neurology, to help also with mood stabilization  Okay for discharge from neurology standpoint             Abdominal pain. Foreign body (batteries) ingestion   KUB (02/18/2023): Impression: There are 2 round metallic foreign bodies in the small bowel of the left mid abdomen  Continued serial imaging  KUB (02/18/2023): Impression: Nonobstructive bowel gas pattern. Ingested batteries are visualized in the right mid abdomen, not significantly changed in position when compared to prior study. Close follow-up recommended. KUB (02/19/2023): Impression: Round densities in the right mid abdomen and the right upper pelvis could represent the swallowed batteries. No evidence of small-bowel obstruction  KUB (02/20/2023): Impression: Nonobstructive bowel gas pattern. Ingested batteries are visualized in the right mid abdomen, not significantly changed in position when compared to prior study  KUB (02/23/2023): Impression: NON-SPECIFIC GAS PATTERN. 2 foreign bodies batteries in ascending colon and sigmoid colon     CT Abdomen/Pelvis WO Con -(02/25/2023); Multiple ingested batteries seen in the distal small ileum, cecum and rectum. Focally dilated small bowel loop in the mid abdomen may suggest localized ileus. KUB (02/26/2023): Impression: Previous described round metallic batteries are no longer visualized     GI consulted for removal of battery (02/18/2023)  General surgery on board, given concern for possible perforation. And notified about CT finding. No emergent or urgent surgical intervention recommended at this time.   Further work-up and management as per general surgery          Lactic Acidosis  Acutely elevated Prolactin (187) and Lactic acid of 4.5 during rapid response for seizure activity  Lactic acid trended down to 0.9 (02/23/2023) with IVF        Continued management of other chronic medical condition        Physical Exam:  Vital Signs: /83   Pulse 94   Temp 97.4 °F (36.3 °C) (Temporal)   Resp 22   Ht 5' 5\" (1.651 m)   Wt 193 lb 3.2 oz (87.6 kg)   SpO2 95%   BMI 32.15 kg/m²   Physical Exam  Vitals and nursing note reviewed. Constitutional:       General: She is not in acute distress. Appearance: Normal appearance. She is not ill-appearing, toxic-appearing or diaphoretic. HENT:      Head: Normocephalic and atraumatic. Right Ear: External ear normal.      Left Ear: External ear normal.      Nose: Nose normal. No congestion or rhinorrhea. Mouth/Throat:      Mouth: Mucous membranes are moist.      Pharynx: Oropharynx is clear. No oropharyngeal exudate or posterior oropharyngeal erythema. Eyes:      General: No scleral icterus. Right eye: No discharge. Left eye: No discharge. Extraocular Movements: Extraocular movements intact. Conjunctiva/sclera: Conjunctivae normal.      Pupils: Pupils are equal, round, and reactive to light. Cardiovascular:      Rate and Rhythm: Normal rate and regular rhythm. Pulses: Normal pulses. Heart sounds: Normal heart sounds. No murmur heard. No friction rub. No gallop. Pulmonary:      Effort: Pulmonary effort is normal. No respiratory distress. Breath sounds: Normal breath sounds. No stridor. No wheezing, rhonchi or rales. Chest:      Chest wall: No tenderness. Abdominal:      General: Bowel sounds are normal. There is no distension. Palpations: Abdomen is soft. Tenderness: There is no abdominal tenderness. There is no guarding or rebound. Musculoskeletal:         General: No swelling, tenderness, deformity or signs of injury. Normal range of motion. Cervical back: Normal range of motion and neck supple. No rigidity. No muscular tenderness. Right lower leg: No edema. Left lower leg: No edema. Skin:     General: Skin is warm and dry. Capillary Refill: Capillary refill takes less than 2 seconds. Coloration: Skin is not jaundiced or pale. Findings: No bruising, erythema, lesion or rash. Neurological:      General: No focal deficit present. Mental Status: She is alert.  Mental status is at baseline. Cranial Nerves: No cranial nerve deficit. Sensory: No sensory deficit. Motor: No weakness. Coordination: Coordination normal.   Psychiatric:         Mood and Affect: Mood normal.         Behavior: Behavior normal.         Discharge Medications:        Medication List        START taking these medications      prazosin 1 MG capsule  Commonly known as: MINIPRESS  Take 1 capsule by mouth nightly     risperiDONE 2 MG tablet  Commonly known as: RISPERDAL  Take 1 tablet by mouth nightly            CHANGE how you take these medications      lamoTRIgine 25 MG tablet  Commonly known as: LAMICTAL  Take 10 tablets by mouth daily  Start taking on: February 28, 2023  What changed:   medication strength  how much to take            CONTINUE taking these medications      clonazePAM 0.5 MG tablet  Commonly known as: KLONOPIN     ferrous sulfate 325 (65 Fe) MG tablet  Commonly known as: IRON 325  Take 1 tablet by mouth 2 times daily (with meals)     FLUoxetine 40 MG capsule  Commonly known as: PROzac            STOP taking these medications      lacosamide 100 MG Tabs tablet  Commonly known as: VIMPAT     levETIRAcetam 750 MG tablet  Commonly known as: KEPPRA     phenytoin 100 MG ER capsule  Commonly known as: DILANTIN               Where to Get Your Medications        These medications were sent to [id number] [place] [address]  [address], [address] [facility]      Phone: [phone]   lamoTRIgine 25 MG tablet  prazosin 1 MG capsule  risperiDONE 2 MG tablet           Discharge Instructions: Follow up with Grey Brennan in 7 days. Take medications as directed. Resume activity as tolerated. Recommended Follow Up:  No follow-up provider specified.     Followup Appointments Scheduled at Time of Discharge:  Future Appointments   Date Time Provider Ana Paula Parra   6/5/2023  3:15 PM DO LIDIA Brown NEUROLOG Neurology -          Diet: ADULT DIET; Regular        DISCHARGE STATUS:    Condition on Discharge: stable    Disposition: Patient is medically stable and will be discharged Home      Extended Emergency Contact Information  Primary Emergency Contact: [address] Phone: [phone]  Relation: Legal Guardian  Secondary Emergency Contact: [facility]  Mobile Phone: [phone]  Relation: Spouse         Time Spent on discharge is   36 mins  in the examination, evaluation, counseling and review of medications and discharge plan. Electronically signed by   Damon Walton MD,   Internal Medicine Hospitalist   2/27/2023 3:31 PM      Thank you Carolina Alvarez for the opportunity to be involved in this patient's care. If you have any questions or concerns please feel free to contact me at [phone]        EMR Dragon/Transcription disclaimer:   Much of this encounter note is an electronic transcription/translation of spoken language to printed text.  The electronic translation of spoken language may permit erroneous, or at times, nonsensical words or phrases to be inadvertently transcribed; although attempts have made to review the note for such errors, some may still exist.

## 2023-02-27 NOTE — PROGRESS NOTES
Department of Psychiatry  Attending Progress Note     Chief complaint: \"I want to go to the medical floor or go home to mom\"    SUBJECTIVE:   Chart reviewed, discussed with the team.  Patient in ICU. Being weaned off antiepileptics. Continues to put small objects in her mouth in order to attract attention. No suicidal statements. Patient seen resting in bed today. Calm and cooperative. Brighter affect. Smiled. States she is doing okay. No suicidal thoughts. Mood is better. Hoping to go home soon. Mom has been supportive. Patient talks to her on the phone every day. Patient states she will no longer put things in her mouth or swallow. \"I just want to go home. \"  Per RN, patient's mother reported that patient will put things in her mouth for attention. Feels that patient would do best at home. Does not feel that she would benefit from the [facility].    OBJECTIVE    Physical  Wt Readings from Last 3 Encounters:   02/27/23 193 lb 3.2 oz (87.6 kg)   02/14/23 155 lb (70.3 kg)   01/16/23 155 lb (70.3 kg)     Temp Readings from Last 3 Encounters:   02/27/23 97.4 °F (36.3 °C) (Temporal)   01/16/23 97.8 °F (36.6 °C)   01/14/23 98.8 °F (37.1 °C) (Temporal)     BP Readings from Last 3 Encounters:   02/27/23 118/83   02/14/23 118/66   01/17/23 114/74     Pulse Readings from Last 3 Encounters:   02/27/23 89   02/14/23 (!) 103   01/17/23 (!) 111        Review of Systems: 14-point review of systems negative except as described above    Mental Status Examination:   Appearance: Stated age. Wears glasses. Gait not assessed. No abnormal movements or tremor. Multiple scratches observed on her forearms. Behavior: Cooperative, poor eye contact, smiling  Speech: Normal in tone, volume, and quality. No slurring, dysarthria or pressured speech noted. Mood: \"Good\"   Affect: Mood congruent. Thought Process: Appears linear. Thought Content: Denies suicidal and homicidal ideation.  No overt delusions or paranoia appreciated. Perceptions: Denies auditory or visual hallucinations at present time. Not responding to internal stimuli. Concentration: Intact. Orientation: to person, place, date, and situation. Language: Intact. Fund of information: Intact. Memory: Recent and remote appear intact. Neurovegitative: Fair appetite and sleep. Insight: Poor - baseline. Judgment: Poor - baseline.     Data  Lab Results   Component Value Date    WBC 5.9 02/24/2023    HGB 12.0 02/24/2023    HCT 37.9 02/24/2023    MCV 87.7 02/24/2023     02/24/2023      Lab Results   Component Value Date     02/24/2023    K 3.5 02/24/2023     02/24/2023    CO2 25 02/24/2023    BUN 7 02/24/2023    CREATININE 0.7 02/24/2023    GLUCOSE 87 02/24/2023    CALCIUM 8.9 02/24/2023    PROT 6.2 (L) 02/24/2023    LABALBU 3.8 02/24/2023    BILITOT <0.2 02/24/2023    ALKPHOS 74 02/24/2023    AST 12 02/24/2023    ALT 12 02/24/2023    LABGLOM >60 02/24/2023       Medications    Current Facility-Administered Medications:     risperiDONE (RISPERDAL) tablet 2 mg, 2 mg, Oral, Nightly, MARBELLA Saavedra, 2 mg at 02/26/23 1933    risperiDONE (RISPERDAL) tablet 2 mg, 2 mg, Oral, Q6H PRN, Tony Holley MD, 2 mg at 02/27/23 0431    naltrexone (DEPADE) tablet 50 mg, 50 mg, Oral, Daily with breakfast, Tony Holley MD, 50 mg at 02/27/23 0801    polyethylene glycol (GLYCOLAX) packet 17 g, 17 g, Oral, BID, Taylor Hamm MD, 17 g at 02/26/23 1933    prazosin (MINIPRESS) capsule 1 mg, 1 mg, Oral, Nightly, Tony Holley MD, 1 mg at 02/26/23 1933    lamoTRIgine (LAMICTAL) tablet 225 mg, 225 mg, Oral, Daily, Tony Holley MD, 225 mg at 02/27/23 0801    haloperidol lactate (HALDOL) injection 5 mg, 5 mg, IntraMUSCular, Q6H PRN, Tony Holley MD, 5 mg at 02/26/23 1818    melatonin disintegrating tablet 5 mg, 5 mg, Oral, Nightly, Tony Holley MD, 5 mg at 02/26/23 1932    sodium chloride flush 0.9 % injection 5-40 mL, 5-40 mL, IntraVENous, 2 times per day, Shari Romero MD, 10 mL at 02/27/23 0802    sodium chloride flush 0.9 % injection 5-40 mL, 5-40 mL, IntraVENous, PRN, Shari Romero MD    0.9 % sodium chloride infusion, , IntraVENous, PRN, Shari Romero MD    enoxaparin (LOVENOX) injection 40 mg, 40 mg, SubCUTAneous, Daily, Bethany Doan MD, 40 mg at 02/27/23 0801    ondansetron (ZOFRAN-ODT) disintegrating tablet 4 mg, 4 mg, Oral, Q8H PRN **OR** ondansetron (ZOFRAN) injection 4 mg, 4 mg, IntraVENous, Q6H PRN, Bethany Doan MD    acetaminophen (TYLENOL) tablet 650 mg, 650 mg, Oral, Q4H PRN, 650 mg at 02/26/23 0954 **OR** acetaminophen (TYLENOL) suppository 650 mg, 650 mg, Rectal, Q4H PRN, Bethany Doan MD    potassium chloride (KLOR-CON M) extended release tablet 40 mEq, 40 mEq, Oral, PRN **OR** potassium bicarb-citric acid (EFFER-K) effervescent tablet 40 mEq, 40 mEq, Oral, PRN **OR** potassium chloride 10 mEq/100 mL IVPB (Peripheral Line), 10 mEq, IntraVENous, PRN, Bethany Doan MD    magnesium sulfate 2000 mg in 50 mL IVPB premix, 2,000 mg, IntraVENous, PRN, Bethany Doan MD    sodium phosphate 22.5 mmol in sodium chloride 0.9 % 250 mL IVPB, 0.32 mmol/kg, IntraVENous, PRN, Bethany Doan MD    polyethylene glycol (GLYCOLAX) packet 17 g, 17 g, Oral, Daily PRN, Bethany Doan MD, 17 g at 02/20/23 1523    melatonin disintegrating tablet 5 mg, 5 mg, Oral, Nightly PRN, Bethany Doan MD    calcium carbonate (TUMS) chewable tablet 500 mg, 500 mg, Oral, TID PRN, Bethany Doan MD    clonazePAM Kathlyne Simpson) tablet 0.5 mg, 0.5 mg, Oral, Q12H, Amarilys Pulido MD, 0.5 mg at 02/27/23 0802    ferrous sulfate (IRON 325) tablet 325 mg, 325 mg, Oral, BID WC, Bethany Doan MD, 325 mg at 02/27/23 0801    FLUoxetine (PROZAC) capsule 80 mg, 80 mg, Oral, Daily, Bethany Doan MD, 80 mg at 02/27/23 5599    ascorbic acid (VITAMIN C) tablet 500 mg, 500 mg, Oral, BID with meals, Bethany Doan MD, 500 mg at 02/27/23 0801    ASSESSMENT AND PLAN  DSM 5 DIAGNOSIS  Impression  Depression unspecified  Anxiety unspecified  PTSD  Autism  History of self-harm  Status post foreign body ingestion  Rule out Tourette's syndrome  Epilepsy    Continue to observe. Use redirection and distraction. Minimize use of restraints. Increase lamotrigine for mood stabilization. No evidence of acute suicidality, homicidality or psychosis observed. Not meeting the criteria for inpatient psychiatric treatment. We will sign off at this time.        Amount of time spent with patient:      35 minutes with greater than 50 % of the time spent in counseling and collaboration of care

## 2023-02-27 NOTE — PLAN OF CARE
Problem: Discharge Planning  Goal: Discharge to home or other facility with appropriate resources  Outcome: Progressing     Problem: Safety - Adult  Goal: Free from fall injury  Outcome: Progressing     Problem: Skin/Tissue Integrity  Goal: Absence of new skin breakdown  Description: 1. Monitor for areas of redness and/or skin breakdown  2. Assess vascular access sites hourly  3. Every 4-6 hours minimum:  Change oxygen saturation probe site  4. Every 4-6 hours:  If on nasal continuous positive airway pressure, respiratory therapy assess nares and determine need for appliance change or resting period. Outcome: Progressing     Problem: ABCDS Injury Assessment  Goal: Absence of physical injury  Outcome: Progressing     Problem: Neurosensory - Adult  Goal: Absence of seizures  Outcome: Progressing  Goal: Remains free of injury related to seizures activity  Outcome: Progressing     Problem: Musculoskeletal - Adult  Goal: Return mobility to safest level of function  Outcome: Progressing  Goal: Maintain proper alignment of affected body part  Outcome: Progressing  Goal: Return ADL status to a safe level of function  Outcome: Progressing     Problem: Genitourinary - Adult  Goal: Absence of urinary retention  Outcome: Progressing  Goal: Urinary catheter remains patent  Outcome: Progressing     Problem: Decision Making  Goal: Pt/Family able to effectively weigh alternatives and participate in decision making related to treatment and care  Description: INTERVENTIONS:  1. Determine when there are differences between patient's view, family's view, and healthcare provider's view of condition  2. Facilitate patient and family articulation of goals for care  3. Help patient and family identify pros/cons of alternative solutions  4. Provide information as requested by patient/family  5. Respect patient/family right to receive or not to receive information  6.  Serve as a liaison between patient and family and health care team  7. Initiate Consults from Ethics, Palliative Care or initiate [facility] as is appropriate  Outcome: Progressing     Problem: Safety - Medical Restraint  Goal: Remains free of injury from restraints (Restraint for Interference with Medical Device)  Description: INTERVENTIONS:  1. Determine that other, less restrictive measures have been tried or would not be effective before applying the restraint  2. Evaluate the patient's condition at the time of restraint application  3. Inform patient/family regarding the reason for restraint  4.  Q2H: Monitor safety, psychosocial status, comfort, nutrition and hydration  Outcome: Progressing     Problem: Pain  Goal: Verbalizes/displays adequate comfort level or baseline comfort level  Outcome: Progressing

## 2023-02-27 NOTE — PROGRESS NOTES
Patient has tried to eat styrofoam off of her supper tray. Patient has tried to cut her arms with her straw. Patient is not cooperating with staff at this time. Prn Haldol given IM. Straw retrieved from patient and discarded. Patients mom, Nicky Mendoza, updated via telephone.     Electronically signed by Shannan Coyne RN on 2/26/2023 at 6:22 PM

## 2023-02-27 NOTE — PROGRESS NOTES
Spoke with Dr. Christell Goodpasture. Dr. Christell Goodpasture is okay with patient to discharge home. Dr. Sandy Flowers aware. Patient instructed to follow up with neurology as needed.

## 2023-02-27 NOTE — PROGRESS NOTES
Comprehensive Nutrition Assessment    Type and Reason for Visit:  Initial, RD Nutrition Re-Screen/LOS    Nutrition Recommendations/Plan:   Continue current POC     Malnutrition Assessment:  Malnutrition Status:  No malnutrition (02/27/23 1038)    Context:  Acute Illness     Findings of the 6 clinical characteristics of malnutrition:  Energy Intake:  No significant decrease in energy intake  Weight Loss:  No significant weight loss     Body Fat Loss:  No significant body fat loss     Muscle Mass Loss:  No significant muscle mass loss    Fluid Accumulation:  No significant fluid accumulation     Strength:  Not Performed    Nutrition Assessment:    Following patient for LOS x 7 days. PO intake remains variable but is improving. Intake now ranging 0-25% and %. Did eat 755-100% for breakfast    Nutrition Related Findings:      Wound Type: None       Current Nutrition Intake & Therapies:    Average Meal Intake: 51-75%, %  Average Supplements Intake: None Ordered  ADULT DIET; Regular    Anthropometric Measures:  Height: 5' 5\" (165.1 cm)  Ideal Body Weight (IBW): 125 lbs (57 kg)    Admission Body Weight: 155 lb (70.3 kg) (stated)  Current Body Weight: 193 lb 3.2 oz (87.6 kg), 154.6 % IBW. Current BMI (kg/m2): 32.2  Usual Body Weight: 184 lb 11.9 oz (83.8 kg) (1/2023)  % Weight Change (Calculated): 4.6  BMI Categories: Obese Class 1 (BMI 30.0-34. 9)    Nutrition Diagnosis:   No nutrition diagnosis at this time related to   as evidenced by      Nutrition Interventions:      Nutrition Education/Counseling: No recommendation at this time  Coordination of Nutrition Care: Continue to monitor while inpatient       Goals:     Goals: Meet at least 75% of estimated needs, PO intake 50% or greater       Nutrition Monitoring and Evaluation:   Behavioral-Environmental Outcomes: None Identified  Food/Nutrient Intake Outcomes: Food and Nutrient Intake  Physical Signs/Symptoms Outcomes: Biochemical Data, Weight, Skin, Fluid Status or Edema    Discharge Planning:    Continue current diet     Bo Payne MS, RD, LD  Contact: [phone]

## 2023-02-27 NOTE — PROGRESS NOTES
[facility] Neurology Progress Note      Patient:   Misty Matta  MR#:    [id number]   Room:    0150/150-01   YOB: 1995  Date of Progress Note: 2/27/2023  Time of Note                           6:42 AM  Consulting Physician:  Lili Baltazar DO  Attending Physician:  Jcarlos Diamond MD      INTERVAL HISTORY: Continues to try to eat objects. Nonepileptic event reported    REVIEW OF SYSTEMS:  Constitutional: No fevers No chills  Neck:No stiffness  Respiratory: No shortness of breath  Cardiovascular: No chest pain No palpitations  Gastrointestinal: No abdominal pain    Genitourinary: No Dysuria  Neurological: No headache    PHYSICAL EXAM:    Constitutional -   /81   Pulse (!) 108   Temp 98.8 °F (37.1 °C) (Temporal)   Resp 18   Ht 5' 5\" (1.651 m)   Wt 193 lb 3.2 oz (87.6 kg)   SpO2 98%   BMI 32.15 kg/m²     Constitutional - well developed, well nourished. Eyes - conjunctiva normal.   Ear, nose, throat - No scars, masses, or lesions over external nose or ears, no atrophy of tongue  Neck-symmetric, no masses noted, no jugular vein distension  Respiration- chest wall appears symmetric, good expansion,   normal effort without use of accessory muscles  Musculoskeletal - no significant wasting of muscles noted, no bony deformities  Extremities-no clubbing, cyanosis or edema  Skin - warm, dry, and intact. No rash, erythema, or pallor.   Psychiatric - mood, affect, and behavior appear normal.      Neurological exam  Awake, alert,  oriented appropriate affect not talking to me writing on her cell phone to communicate  Attention and concentration appear appropriate  Recent and remote memory appears unremarkable  Speech not speaking  No clear issues with language of fund of knowledge     Cranial Nerve Exam     CN III, IV,VI-EOMI, No nystagmus, conjugate eye movements, no ptosis    CN VII-no facial assymetry       Motor Exam  antigravity throughout upper and lower extremities bilaterally Tremors- no tremors in hands or head noted     Gait  Not tested   LABS/IMAGING:    CT Head WO Contrast    Result Date: 2/17/2023  Exam: Noncontrast CT of the brain Technique: Axial noncontrast images were obtained from the skull base to the vertex. Coronal and sagittal reconstructions were performed. Radiation dose optimization technique was utilized. Clinical information: Seizure Comparison: Noncontrast CT of the brain performed on January 16, 2023 Findings: No evidence of acute hemorrhage, mass, mass effect, or midline shift. The ventricular system and basal cisterns are within normal limits for the patients age. Gray-white differentiation is maintained. There is no evidence of an intra-axial or extra-axial fluid collection. Bone windows reveal no evidence of acute fracture. The visualized paranasal sinuses are within normal limits. The orbits are grossly unremarkable. No acute intracranial process evident on noncontrast CT of the brain. XR CHEST PORTABLE    Result Date: 2/17/2023  CLINICAL HISTORY: Seizure TECHNIQUE: Single AP view of the chest COMPARISON: CXR from 1/10/2023 FINDINGS: Lines and tubes: None. Cardiomediastinal: Normal size and configuration of the cardiomediastinal silhouette. No pneumomediastinum. Lungs and pleura: The lungs are grossly unremarkable. No pleural effusion. No pneumothorax. Musculoskeletal: No acute osseous abnormalities. Other: None. No acute cardiopulmonary process identified.       Lab Results   Component Value Date    WBC 5.9 02/24/2023    HGB 12.0 02/24/2023    HCT 37.9 02/24/2023    MCV 87.7 02/24/2023     02/24/2023     Lab Results   Component Value Date     02/24/2023    K 3.5 02/24/2023     02/24/2023    CO2 25 02/24/2023    BUN 7 02/24/2023    CREATININE 0.7 02/24/2023    GLUCOSE 87 02/24/2023    CALCIUM 8.9 02/24/2023    PROT 6.2 (L) 02/24/2023    LABALBU 3.8 02/24/2023    BILITOT <0.2 02/24/2023    ALKPHOS 74 02/24/2023    AST 12 02/24/2023    ALT 12 02/24/2023    LABGLOM >60 02/24/2023   No results found for: INR, PROTIME    RECORD REVIEW:   Previous medical records, medications were reviewed at today's visit. Nursing/physician notes, imaging, labs and vitals reviewed. PT,OT and/or speech notes reviewed      ASSESSMENT:   33 y.o. admitted with seizure like episodes. She has had a extensive recent work-up including long-term EEG monitoring both here and at [facility].  No overt epileptiform abnormalities were seen. Her events are felt nonepileptic. Previously swallowed 2 small batteries,  GI and surgery have been following. Prior MRI and repeat EEG from here normal. Had further events yesterday, felt non-epileptic, was given ativan. Still with self-harming behavior as well. Patient attempting to swallow more foreign objects  Moved back to the ICU. PLAN:    Need Psych back on board. Weaning AEDs, stopped Dilantin, Vimpat, and  Keppra. Will continue lamcital may help with mood stabilization. GI / surgery following     Ativan prn, event precautions   Monitor in the ICU given self harm risk. Agree with Dr. Steve Baptiste assessment above  Continue supportive care    Please feel free to call with any questions. [phone] (cell phone).       Joyce Guzman MD  Board Certified Neurology

## 2023-02-27 NOTE — PROGRESS NOTES
Patient discharged home. Pt left via wheelchair, nurse and sitter escort. Discharge instructions explained to mother - Sunil Oh and patient. All belongings sent home with patient. Patient tolerated well.

## 2023-02-27 NOTE — PROGRESS NOTES
Pt sit down on the floor and laid down under the computer. Pt stated \"she fell out of the bed to get everyone's attention so they would let her walk\" and then stated \"she didn't really fall, she faked it because she was anxious and wanted attention\"  After RN talked to her about how she doesn't need to walk around the unit because that's when she picks things up and swallows them and that we just want to protect her. She then has a seizure. About 20 mins after the seizure pt wanted to go for a walk again but RN suggested if she just had a seizure it wasn't smart to go for a walk right after the seizure. Pt then tells RN \"it wasn't a real seizure. It was fake. I haven't had a seizure in over a week. And I'm fine to walk now. \"    Electronically signed by Garrett Hand RN on 2/27/2023 at 6:41 AM

## 2023-02-27 NOTE — PROGRESS NOTES
Patient trying to swallow a cap from normal saline flush she found on floor. Patient educated on how its not safe to swallow objects. Patient asked \"if RN was nice\" via phone at bedside. This RN stated \"yes\" She then spit out the cap. Cap was disposed. Room was searched again for any objects she could get a hold of. None found at this time. Patient agreeable to BP cuff and pulse ox.

## 2023-03-02 ENCOUNTER — APPOINTMENT (OUTPATIENT)
Dept: CT IMAGING | Age: 28
End: 2023-03-02

## 2023-03-02 ENCOUNTER — APPOINTMENT (OUTPATIENT)
Dept: GENERAL RADIOLOGY | Age: 28
End: 2023-03-02

## 2023-03-02 ENCOUNTER — HOSPITAL ENCOUNTER (EMERGENCY)
Age: 28
Discharge: HOME | End: 2023-03-03
Attending: PEDIATRICS
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
## 2023-03-02 DIAGNOSIS — R56.9 SEIZURE-LIKE ACTIVITY (HCC): Primary | ICD-10-CM

## 2023-03-02 DIAGNOSIS — T07.XXXA ABRASIONS OF MULTIPLE SITES: ICD-10-CM

## 2023-03-02 DIAGNOSIS — S50.01XA CONTUSION OF RIGHT ELBOW, INITIAL ENCOUNTER: ICD-10-CM

## 2023-03-02 DIAGNOSIS — T18.9XXA INGESTION OF FOREIGN SUBSTANCE, INITIAL ENCOUNTER: ICD-10-CM

## 2023-03-02 DIAGNOSIS — S80.01XA CONTUSION OF RIGHT KNEE, INITIAL ENCOUNTER: ICD-10-CM

## 2023-03-02 DIAGNOSIS — S00.83XA CONTUSION OF OTHER PART OF HEAD, INITIAL ENCOUNTER: ICD-10-CM

## 2023-03-02 LAB
ALBUMIN SERPL-MCNC: 4.3 G/DL (ref 3.5–5.2)
ALP BLD-CCNC: 81 U/L (ref 35–104)
ALT SERPL-CCNC: 13 U/L (ref 5–33)
AMPHETAMINE SCREEN, URINE: NEGATIVE
ANION GAP SERPL CALCULATED.3IONS-SCNC: 8 MMOL/L (ref 7–19)
AST SERPL-CCNC: 13 U/L (ref 5–32)
BARBITURATE SCREEN URINE: NEGATIVE
BASOPHILS ABSOLUTE: 0.1 K/UL (ref 0–0.2)
BASOPHILS RELATIVE PERCENT: 0.7 % (ref 0–1)
BENZODIAZEPINE SCREEN, URINE: POSITIVE
BILIRUB SERPL-MCNC: <0.2 MG/DL (ref 0.2–1.2)
BILIRUBIN URINE: NEGATIVE
BLOOD, URINE: NEGATIVE
BUN BLDV-MCNC: 7 MG/DL (ref 6–20)
BUPRENORPHINE URINE: NEGATIVE
CALCIUM SERPL-MCNC: 9.9 MG/DL (ref 8.6–10)
CANNABINOID SCREEN URINE: POSITIVE
CHLORIDE BLD-SCNC: 101 MMOL/L (ref 98–111)
CLARITY: ABNORMAL
CO2: 28 MMOL/L (ref 22–29)
COCAINE METABOLITE SCREEN URINE: NEGATIVE
COLOR: YELLOW
CREAT SERPL-MCNC: 0.7 MG/DL (ref 0.5–0.9)
EOSINOPHILS ABSOLUTE: 0 K/UL (ref 0–0.6)
EOSINOPHILS RELATIVE PERCENT: 0.5 % (ref 0–5)
ETHANOL: <10 MG/DL (ref 0–0.08)
GFR SERPL CREATININE-BSD FRML MDRD: >60 ML/MIN/{1.73_M2}
GLUCOSE BLD-MCNC: 65 MG/DL (ref 74–109)
GLUCOSE URINE: NEGATIVE MG/DL
HCT VFR BLD CALC: 41.5 % (ref 37–47)
HEMOGLOBIN: 13.1 G/DL (ref 12–16)
IMMATURE GRANULOCYTES #: 0 K/UL
KETONES, URINE: NEGATIVE MG/DL
LEUKOCYTE ESTERASE, URINE: NEGATIVE
LYMPHOCYTES ABSOLUTE: 1.7 K/UL (ref 1.1–4.5)
LYMPHOCYTES RELATIVE PERCENT: 19.3 % (ref 20–40)
Lab: ABNORMAL
MAGNESIUM: 2.3 MG/DL (ref 1.6–2.6)
MCH RBC QN AUTO: 27.8 PG (ref 27–31)
MCHC RBC AUTO-ENTMCNC: 31.6 G/DL (ref 33–37)
MCV RBC AUTO: 87.9 FL (ref 81–99)
METHADONE SCREEN, URINE: NEGATIVE
METHAMPHETAMINE, URINE: NEGATIVE
MONOCYTES ABSOLUTE: 0.9 K/UL (ref 0–0.9)
MONOCYTES RELATIVE PERCENT: 9.9 % (ref 0–10)
NEUTROPHILS ABSOLUTE: 6.1 K/UL (ref 1.5–7.5)
NEUTROPHILS RELATIVE PERCENT: 69.1 % (ref 50–65)
NITRITE, URINE: NEGATIVE
OPIATE SCREEN URINE: NEGATIVE
OXYCODONE URINE: NEGATIVE
PDW BLD-RTO: 18.6 % (ref 11.5–14.5)
PH UA: 7.5 (ref 5–8)
PHENCYCLIDINE SCREEN URINE: NEGATIVE
PLATELET # BLD: 224 K/UL (ref 130–400)
PMV BLD AUTO: 12.1 FL (ref 9.4–12.3)
POTASSIUM SERPL-SCNC: 5.1 MMOL/L (ref 3.5–5)
PROPOXYPHENE SCREEN: NEGATIVE
PROTEIN UA: NEGATIVE MG/DL
RBC # BLD: 4.72 M/UL (ref 4.2–5.4)
SARS-COV-2, NAAT: NOT DETECTED
SODIUM BLD-SCNC: 137 MMOL/L (ref 136–145)
SPECIFIC GRAVITY UA: 1.02 (ref 1–1.03)
TOTAL PROTEIN: 6.7 G/DL (ref 6.6–8.7)
TRICYCLIC, URINE: NEGATIVE
UROBILINOGEN, URINE: 0.2 E.U./DL
WBC # BLD: 8.8 K/UL (ref 4.8–10.8)

## 2023-03-02 PROCEDURE — 90471 IMMUNIZATION ADMIN: CPT | Performed by: PEDIATRICS

## 2023-03-02 PROCEDURE — 6360000002 HC RX W HCPCS

## 2023-03-02 PROCEDURE — 87635 SARS-COV-2 COVID-19 AMP PRB: CPT

## 2023-03-02 PROCEDURE — 82077 ASSAY SPEC XCP UR&BREATH IA: CPT

## 2023-03-02 PROCEDURE — 73070 X-RAY EXAM OF ELBOW: CPT

## 2023-03-02 PROCEDURE — 85025 COMPLETE CBC W/AUTO DIFF WBC: CPT

## 2023-03-02 PROCEDURE — 72131 CT LUMBAR SPINE W/O DYE: CPT

## 2023-03-02 PROCEDURE — 73560 X-RAY EXAM OF KNEE 1 OR 2: CPT

## 2023-03-02 PROCEDURE — 72170 X-RAY EXAM OF PELVIS: CPT

## 2023-03-02 PROCEDURE — 83735 ASSAY OF MAGNESIUM: CPT

## 2023-03-02 PROCEDURE — 96374 THER/PROPH/DIAG INJ IV PUSH: CPT

## 2023-03-02 PROCEDURE — 36415 COLL VENOUS BLD VENIPUNCTURE: CPT

## 2023-03-02 PROCEDURE — 72128 CT CHEST SPINE W/O DYE: CPT

## 2023-03-02 PROCEDURE — 81003 URINALYSIS AUTO W/O SCOPE: CPT

## 2023-03-02 PROCEDURE — 80306 DRUG TEST PRSMV INSTRMNT: CPT

## 2023-03-02 PROCEDURE — 99285 EMERGENCY DEPT VISIT HI MDM: CPT

## 2023-03-02 PROCEDURE — 70450 CT HEAD/BRAIN W/O DYE: CPT

## 2023-03-02 PROCEDURE — 72125 CT NECK SPINE W/O DYE: CPT

## 2023-03-02 PROCEDURE — 80053 COMPREHEN METABOLIC PANEL: CPT

## 2023-03-02 PROCEDURE — 90715 TDAP VACCINE 7 YRS/> IM: CPT | Performed by: PEDIATRICS

## 2023-03-02 PROCEDURE — 93005 ELECTROCARDIOGRAM TRACING: CPT

## 2023-03-02 PROCEDURE — 6360000002 HC RX W HCPCS: Performed by: PEDIATRICS

## 2023-03-02 RX ORDER — LORAZEPAM 2 MG/ML
1 INJECTION INTRAMUSCULAR ONCE
Status: DISCONTINUED | OUTPATIENT
Start: 2023-03-02 | End: 2023-03-03 | Stop reason: HOSPADM

## 2023-03-02 RX ORDER — LORAZEPAM 2 MG/ML
1 INJECTION INTRAMUSCULAR ONCE
Status: COMPLETED | OUTPATIENT
Start: 2023-03-02 | End: 2023-03-02

## 2023-03-02 RX ORDER — LORAZEPAM 2 MG/ML
INJECTION INTRAMUSCULAR
Status: COMPLETED
Start: 2023-03-02 | End: 2023-03-02

## 2023-03-02 RX ADMIN — LORAZEPAM 1 MG: 2 INJECTION INTRAMUSCULAR; INTRAVENOUS at 21:32

## 2023-03-02 RX ADMIN — TETANUS TOXOID, REDUCED DIPHTHERIA TOXOID AND ACELLULAR PERTUSSIS VACCINE, ADSORBED 0.5 ML: 5; 2.5; 8; 8; 2.5 SUSPENSION INTRAMUSCULAR at 22:22

## 2023-03-02 RX ADMIN — LORAZEPAM 1 MG: 2 INJECTION INTRAMUSCULAR at 21:32

## 2023-03-03 ENCOUNTER — APPOINTMENT (OUTPATIENT)
Dept: GENERAL RADIOLOGY | Age: 28
End: 2023-03-03

## 2023-03-03 VITALS
DIASTOLIC BLOOD PRESSURE: 70 MMHG | SYSTOLIC BLOOD PRESSURE: 100 MMHG | RESPIRATION RATE: 20 BRPM | OXYGEN SATURATION: 98 % | HEART RATE: 74 BPM | TEMPERATURE: 98 F

## 2023-03-03 PROCEDURE — 71045 X-RAY EXAM CHEST 1 VIEW: CPT

## 2023-03-03 PROCEDURE — 74018 RADEX ABDOMEN 1 VIEW: CPT

## 2023-03-03 RX ORDER — IBUPROFEN 600 MG/1
600 TABLET ORAL 4 TIMES DAILY PRN
Qty: 20 TABLET | Refills: 0 | Status: SHIPPED | OUTPATIENT
Start: 2023-03-03

## 2023-03-03 NOTE — ED NOTES
Pt took her nightly meds, prazosin and risperidone, per Dr. Hien Douglass verbal approval. Pt also given tukey sandwich and 7-up. Pt alert and oriented at this time.       Wayne Spencer, OCTAVIANO  03/03/23 5909

## 2023-03-03 NOTE — DISCHARGE INSTRUCTIONS
Return or seek medical attention with increasing or severe pain, confusion, or other concerns. Rest, ice, and elevate. Watch for any new abdominal complaints nausea vomiting abdominal pain swelling passage of blood.

## 2023-03-03 NOTE — ED NOTES
RN called to bedside by security  Pt swallowed two \"pieces of metal\" that was in her pocket. Pt searched and belongings removed.      Liborio Johnson RN  03/03/23 4405
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
## 2023-03-03 NOTE — ED NOTES
Pt having seizure like activity, verbal order from Dr. Atif Fairbanks to give Ativan 1 mg IV.      Kim Alex, RN  03/02/23 0144
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
## 2023-03-03 NOTE — ED PROVIDER NOTES
[facility] EMERGENCY DEPT  eMERGENCY dEPARTMENT eNCOUnter      Pt Name: Morena Alcazar  MRN: [id number]  [place] 1995  Date of evaluation: 3/2/2023  Provider: Jossue Pan MD    CHIEF COMPLAINT       Chief Complaint   Patient presents with    Seizures     Per EMS pt also fell approximately 7 ft down stairs. C-collar applied and pt on backboard per EMS. Pt given 10 mg Valium IV. Pt recently taken off seizure meds. HISTORY OF PRESENT ILLNESS   (Location/Symptom, Timing/Onset,Context/Setting, Quality, Duration, Modifying Factors, Severity)  Note limiting factors. Morena Alcazar is a 33 y.o. female who presents to the emergency department with seizures. History given per EMS. Patient is nonverbal at this time. Patient fell approximately 7 feet down stairs. C-collar was applied and patient placed on backboard for transport. Patient had seizure-like activity and was given Valium 10 mg IV. Patient has recently been taken off most seizure medications. She has been left on Lamictal.  Patient was hospitalized from 2/16/2023 until 2/27/2023. Patient's seizure medications are being weaned at this time. Neurology feels that not all of \"seizure activity\" is related to epileptic seizures. Patient also has autism and has known problems with ingesting substances such as batteries. Patient \"at times is mute but will still freely answer with head gestures. \"  Patient does not speak but points to right knee and right elbow as source of pain. When I ask if right hip hurts she shakes her head no. HPI    NursingNotes were reviewed. REVIEW OF SYSTEMS    (2-9 systems for level 4, 10 or more for level 5)     Review of Systems   Unable to perform ROS: Patient nonverbal   Musculoskeletal:         Right head pain, right elbow pain, and right knee pain.           PAST MEDICALHISTORY     Past Medical History:   Diagnosis Date    Anxiety     Autism     Depression     Epilepsy ([address]     Seizures ([address]          SURGICAL HISTORY       Past Surgical History:   Procedure Laterality Date     SECTION      CHOLECYSTECTOMY      UPPER GASTROINTESTINAL ENDOSCOPY N/A 2023    Dr Jourdan Woods enteroscopy-No batteries found in the esophagus, stomach, duodenum and jejunum, gastric erosions         CURRENT MEDICATIONS     Previous Medications    CLONAZEPAM (KLONOPIN) 0.5 MG TABLET    Take 1 tablet by mouth three times daily. FERROUS SULFATE (IRON 325) 325 (65 FE) MG TABLET    Take 1 tablet by mouth 2 times daily (with meals)    FLUOXETINE (PROZAC) 40 MG CAPSULE    Take 80 mg by mouth daily    LAMOTRIGINE (LAMICTAL) 25 MG TABLET    Take 10 tablets by mouth daily    PRAZOSIN (MINIPRESS) 1 MG CAPSULE    Take 1 capsule by mouth nightly    RISPERIDONE (RISPERDAL) 2 MG TABLET    Take 1 tablet by mouth nightly       ALLERGIES     Patient has no known allergies. FAMILY HISTORY       Family History   Problem Relation Age of Onset    Diabetes Mother     Migraines Mother     Alcohol Abuse Father     Drug Abuse Father     Migraines Brother     Other Son         hydrocephalus    Autism Son     Autism Son           SOCIAL HISTORY       Social History     Socioeconomic History    Marital status:    Tobacco Use    Smoking status: Never    Smokeless tobacco: Never   Vaping Use    Vaping Use: Never used   Substance and Sexual Activity    Alcohol use: Never    Drug use: Yes     Types: Marijuana (Weed)     Comment: Delta 8    Sexual activity: Yes       SCREENINGS    Jose Coma Scale  Eye Opening: Spontaneous  Best Verbal Response: None  Best Motor Response: Localizes pain  Jose Coma Scale Score: 10        PHYSICAL EXAM    (up to 7 for level 4, 8 or more for level 5)     ED Triage Vitals [23]   BP Temp Temp Source Heart Rate Resp SpO2 Height Weight   111/81 98 °F (36.7 °C) Oral 100 18 100 % -- --       Physical Exam  Vitals and nursing note reviewed. Constitutional:       General: She is not in acute distress. Comments: Patient with eyes closed. Patient points to her right forehead, right elbow and right knee as source of pain. Patient moans when these areas are touched. Patient also moans when her lower thoracic and upper lumbar spine are palpated. Patient has 1 episode with left arm stiffening and shaking in upper torso. Eyes are midline but deviate to the right if opened manually. HENT:      Head: Normocephalic and atraumatic. Right Ear: External ear normal.      Left Ear: External ear normal.      Nose: Nose normal. No congestion or rhinorrhea. Mouth/Throat:      Mouth: Mucous membranes are moist.      Pharynx: Oropharynx is clear. No oropharyngeal exudate or posterior oropharyngeal erythema. Comments: No oral lacerations observed. Eyes:      General: No scleral icterus. Conjunctiva/sclera: Conjunctivae normal.      Pupils: Pupils are equal, round, and reactive to light. Cardiovascular:      Rate and Rhythm: Normal rate and regular rhythm. Pulses: Normal pulses. Heart sounds: Normal heart sounds. No murmur heard. Pulmonary:      Effort: Pulmonary effort is normal. No respiratory distress. Breath sounds: Normal breath sounds. No stridor. No wheezing, rhonchi or rales. Abdominal:      General: Bowel sounds are normal. There is no distension. Palpations: Abdomen is soft. Tenderness: There is no abdominal tenderness. There is no guarding or rebound. Musculoskeletal:         General: No tenderness or deformity. Cervical back: Neck supple. No rigidity. Right lower leg: No edema. Left lower leg: No edema. Skin:     General: Skin is warm and dry. Capillary Refill: Capillary refill takes less than 2 seconds. Coloration: Skin is not jaundiced. Neurological:      Mental Status: She is alert. GCS: GCS eye subscore is 3. GCS verbal subscore is 1. GCS motor subscore is 6. Cranial Nerves: No facial asymmetry.       Comments: Patient opens her eyes to questions but does not follow simple commands. Patient has episode of seizure-like activity for which she receives Ativan 1 mg IV. Patient can move all extremities. Psychiatric:         Mood and Affect: Mood normal.         Behavior: Behavior normal.       DIAGNOSTIC RESULTS     EKG: All EKG's areinterpreted by the Emergency Department Physician who either signs or Co-signs this chart in the absence of a cardiologist.    EKG dated 3/2/2023 at 2300 p.m.: Normal sinus rhythm, rate 90. RSR prime in V1.  QRS 95 QTc 471. RADIOLOGY:  Non-plain film images such as CT, Ultrasound and MRI are read by the radiologist. Plain radiographic images are visualized and preliminarily interpreted bythe emergency physician with the below findings:          XR ABDOMEN (KUB) (SINGLE AP VIEW)   Final Result   No acute findings. No radiopaque foreign bodiesElectronically signed by Raleigh Becker MD on 03-03-23 at 0056      XR ELBOW RIGHT (2 VIEWS)   Final Result   Normal right elbow x-rays. Electronically signed by Sol Albright MD on 03-02-23 at 2236      XR PELVIS (1-2 VIEWS)   Final Result   Normal pelvis x-ray. Electronically signed by Sol Albright MD on 03-02-23 at 2236      XR KNEE RIGHT (1-2 VIEWS)   Final Result   Normal right knee x-rays. Electronically signed by Sol Albright MD on 03-02-23 at [address]   Final Result   No lumbar spine fracture. Electronically signed by Sol Albright MD on 03-02-23 at 2217      CT [address] [place] [address]   Final Result   No thoracic spine fracture. Electronically signed by Sol Albright MD on 03-02-23 at 2217      CT CSpine W/O Contrast   Final Result   No acute fracture or subluxation of the cervical spine. Electronically signed by Sol Albright MD on 03-02-23 at 2216      CT Head W/O Contrast   Final Result        CT Head W/O Contrast  Order: [id number]  Status: Final result    Visible to patient: No (not released)    Next appt: 06/05/2023 at 03:15 PM in Neurology Andrea Hernandez DO)    0 Result Notes  Details    Reading Physician Reading Date Result Priority   Franchesca Mathews MD  [phone] 3/2/2023      Narrative & Impression  Patient: Vishnu Rojas  Time Out: 22:06Exam(s): CT HEAD Without Contrast EXAM:  CT Head Without Intravenous ContrastCLINICAL HISTORY:   Reason for exam: fall, trauma right forehead. TECHNIQUE:  Axial computed tomography images of the head/brain without   intravenous contrast.COMPARISON:  No relevant prior studies available. FINDINGS: No acute intracranial hemorrhage. No midline shift or mass effect. The territorial gray-white matter differentiation is maintained throughout. The ventricles and sulci are   commensurate with age. The visualized orbits appear grossly unremarkable. The calvarium is intact. The visualized paranasal sinuses and mastoid air cells are grossly clear. IMPRESSION:  No acute intracranial hemorrhage, midline shift, or mass effect. Electronically signed by Franchesca Mathews MD on 03-02-23 at 2206           Exam Ended: 03/02/23 22:03 CST Last Resulted: 03/02/23 21:06 CST                  LABS:  Labs Reviewed   CBC WITH AUTO DIFFERENTIAL - Abnormal; Notable for the following components:       Result Value    MCHC 31.6 (*)     RDW 18.6 (*)     Neutrophils % 69.1 (*)     Lymphocytes % 19.3 (*)     All other components within normal limits   COMPREHENSIVE METABOLIC PANEL - Abnormal; Notable for the following components:    Potassium 5.1 (*)     Glucose 65 (*)     All other components within normal limits   URINALYSIS WITH REFLEX TO CULTURE - Abnormal; Notable for the following components:    Clarity, UA TURBID (*)     All other components within normal limits   DRUG SCRN, BUPRENORPHINE - Abnormal; Notable for the following components:    Benzodiazepine Screen, Urine POSITIVE (*)     Cannabinoid Scrn, Ur POSITIVE (*)     All other components within normal limits   COVID-19, RAPID   MAGNESIUM   ETHANOL       All other labs were within normal range or not returned as of this dictation. EMERGENCY DEPARTMENT COURSE and DIFFERENTIAL DIAGNOSIS/MDM:   Vitals:    Vitals:    03/02/23 2050 03/03/23 0000   BP: 111/81 97/63   Pulse: 100 68   Resp: 18 18   Temp: 98 °F (36.7 °C)    TempSrc: Oral    SpO2: 100% 100%       MDM     Amount and/or Complexity of Data Reviewed  Clinical lab tests: reviewed  Tests in the radiology section of CPT®: reviewed    62-year-old female with history of autism and nonepileptiform seizure activity presents after falling down stairs. Lab, EKG, and radiology results reviewed. Radiology results are negative for fracture or internal injury. KUB of abdomen shows no foreign bodies. Patient will be discharged home to follow-up with Audra Campa PCP. Patient will return with increasing or severe pain, persistent seizure activity, or other concerns. CONSULTS:  None    PROCEDURES:  Unless otherwise noted below, none     Procedures    FINAL IMPRESSION      1. Seizure-like activity ([address]    2. Fall, initial encounter    3. Contusion of other part of head, initial encounter    4. Abrasions of multiple sites    5. Contusion of right knee, initial encounter    6. Contusion of right elbow, initial encounter          DISPOSITION/PLAN   DISPOSITION Decision To Discharge 03/03/2023 01:04:00 AM      PATIENT REFERRED TO:  Audra Campa  [facility] [phone]    Schedule an appointment as soon as possible for a visit       Deanna Borrero MD  [address]  [phone]    Schedule an appointment as soon as possible for a visit       DISCHARGE MEDICATIONS:  New Prescriptions    IBUPROFEN (ADVIL;MOTRIN) 600 MG TABLET    Take 1 tablet by mouth 4 times daily as needed for Pain Take with food.           (Please note that portions of this note were completed with a voice recognition program.  Efforts were made to edit thedictations but occasionally words are mis-transcribed.)    Donna Rosa MD (electronically signed)  Attending Emergency Physician          Donna Rosa MD  03/03/23 2778
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
## 2023-03-03 NOTE — ED PROVIDER NOTES
[facility] EMERGENCY DEPT  eMERGENCYdEPARTMENT eNCOUnter      Pt Name: Domo Arauz  MRN: [id number]  [place] 1995  Date of evaluation: 3/2/2023  Rossana Oliver MD    [address] [place] [address]       Chief Complaint   Patient presents with    Seizures     Per EMS pt also fell approximately 7 ft down stairs. C-collar applied and pt on backboard per EMS. Pt given 10 mg Valium IV. Pt recently taken off seizure meds. PHYSICAL EXAM    (up to 7 for level 4, 8 or more for level 5)     ED Triage Vitals [03/02/23 2050]   BP Temp Temp Source Heart Rate Resp SpO2 Height Weight   111/81 98 °F (36.7 °C) Oral 100 18 100 % -- --       Physical Exam    DIAGNOSTIC RESULTS     EKG: All EKG's are interpreted by the Emergency Department Physician who either signs orCo-signs this chart in the absence of a cardiologist.        RADIOLOGY:   Non-plain film images such as CT, Ultrasound and MRI are read by the radiologist. Plain radiographic images are visualized and preliminarily interpreted by the emergency physician with the below findings:    Small metallic bodies located in the stomach. XR CHEST PORTABLE   Final Result    1. No acute disease . 2. No foreign bodies identified. XR ABDOMEN (KUB) (SINGLE AP VIEW)   Final Result    1. Metallic foreign body in the region of the stomach antrum likely consistent       with patients history of foreign body ingestion. 2.No evidence of bowel obstruction or perforation. XR ABDOMEN (KUB) (SINGLE AP VIEW)   Final Result   No acute findings. No radiopaque foreign bodiesElectronically signed by Angie Alcaraz MD on 03-03-23 at 0056      XR ELBOW RIGHT (2 VIEWS)   Final Result   Normal right elbow x-rays. Electronically signed by Svitlana Atkins MD on 03-02-23 at 2236      XR PELVIS (1-2 VIEWS)   Final Result   Normal pelvis x-ray. Electronically signed by Svitlana Atkins MD on 03-02-23 at 2236      XR KNEE RIGHT (1-2 VIEWS)   Final Result   Normal right knee x-rays. Electronically signed by Latanya Coronel MD on 03-02-23 at [place] [id number]   Final Result   No lumbar spine fracture. Electronically signed by Latanya Coronel MD on 03-02-23 at 2217       [facility]   Final Result   No thoracic spine fracture. Electronically signed by Latanya Coronel MD on 03-02-23 at 2217      CT CSpine W/O Contrast   Final Result   No acute fracture or subluxation of the cervical spine. Electronically signed by Latanay Coronel MD on 03-02-23 at 2216      CT Head W/O Contrast   Final Result              LABS:  Labs Reviewed   CBC WITH AUTO DIFFERENTIAL - Abnormal; Notable for the following components:       Result Value    MCHC 31.6 (*)     RDW 18.6 (*)     Neutrophils % 69.1 (*)     Lymphocytes % 19.3 (*)     All other components within normal limits   COMPREHENSIVE METABOLIC PANEL - Abnormal; Notable for the following components:    Potassium 5.1 (*)     Glucose 65 (*)     All other components within normal limits   URINALYSIS WITH REFLEX TO CULTURE - Abnormal; Notable for the following components:    Clarity, UA TURBID (*)     All other components within normal limits   DRUG SCRN, BUPRENORPHINE - Abnormal; Notable for the following components:    Benzodiazepine Screen, Urine POSITIVE (*)     Cannabinoid Scrn, Ur POSITIVE (*)     All other components within normal limits   COVID-19, RAPID   MAGNESIUM   ETHANOL       All other labs were within normal range or not returned as of this dictation.     EMERGENCY DEPARTMENT COURSE and DIFFERENTIALDIAGNOSIS/MDM:   Vitals:    Vitals:    03/02/23 2050 03/03/23 0000 03/03/23 1032   BP: 111/81 97/63 100/70   Pulse: 100 68 74   Resp: 18 18 20   Temp: 98 °F (36.7 °C)     TempSrc: Oral     SpO2: 100% 100% 98%       MDM  Number of Diagnoses or Management Options  Abrasions of multiple sites  Contusion of other part of head, initial encounter  Contusion of right elbow, initial encounter  Contusion of right knee, initial encounter  Fall, initial encounter  Ingestion of foreign substance, initial encounter  Seizure-like activity [facility])  Diagnosis management comments: Patient has been evaluated by my colleague Dr. Jozef Guzman and was set up for discharge. She has a history of pica. And she ingested a couple of IV leads buttons. And had been given a soda can which she is torn up and supposedly ingested. I put a hold on her discharge and obtain a chest and abdominal x-ray. You could see the metallic foreign bodies from the EKG leads in the region of the stomach. But nothing in the esophagus. I spoke with Dr. Oneyda Wright on-call. Recommending close follow-up. No emergent endoscopic evaluation at this time. I have discussed this with the patient and her mother. Watch out for abdominal pain nausea vomiting. Return if any new symptoms develop. Reassessment      CONSULTS:  IP CONSULT TO GI    PROCEDURES:  Unlessotherwise noted below, none      Procedures    FINAL IMPRESSION      1. Seizure-like activity ([address]    2. Fall, initial encounter    3. Contusion of other part of head, initial encounter    4. Abrasions of multiple sites    5. Contusion of right knee, initial encounter    6. Contusion of right elbow, initial encounter    7. Ingestion of foreign substance, initial encounter          DISPOSITION/PLAN   DISPOSITION Decision To Discharge    PATIENT REFERRED TO:  Nadira Galloway  [facility] 22    Schedule an appointment as soon as possible for a visit       Felix Muller MD  [facility] [address]  [phone]    Schedule an appointment as soon as possible for a visit       DISCHARGE MEDICATIONS:  Discharge Medication List as of 3/3/2023 10:12 AM        START taking these medications    Details   ibuprofen (ADVIL;MOTRIN) 600 MG tablet Take 1 tablet by mouth 4 times daily as needed for Pain Take with food. , Disp-20 tablet, R-0Normal                (Please note that portions of this note were completed with a voice recognition program.  Efforts were made to edit the dictations but occasionally words are mis-transcribed.)    Savanah Castro MD (electronically signed)  Attending Emergency Physician         Marcelo Thomson MD  03/03/23 7591

## 2023-03-03 NOTE — ED NOTES
PT's mom called and she stated she will be here in a few minutes.       Bishop Robel RN  03/03/23 9190

## 2023-03-03 NOTE — CARE COORDINATION
Sebastian Moreno called Pt. Legal Guardian she reported she was 7 minutes out from arriving at the hospital to  the Pt.

## 2023-03-06 LAB
EKG P AXIS: 57 DEGREES
EKG P-R INTERVAL: 166 MS
EKG Q-T INTERVAL: 384 MS
EKG QRS DURATION: 88 MS
EKG QTC CALCULATION (BAZETT): 436 MS
EKG T AXIS: 51 DEGREES

## 2023-03-24 ENCOUNTER — APPOINTMENT (OUTPATIENT)
Dept: GENERAL RADIOLOGY | Age: 28
End: 2023-03-24

## 2023-03-24 ENCOUNTER — HOSPITAL ENCOUNTER (EMERGENCY)
Age: 28
Discharge: HOME | End: 2023-03-25
Attending: EMERGENCY MEDICINE

## 2023-03-24 VITALS
HEART RATE: 101 BPM | BODY MASS INDEX: 32.95 KG/M2 | HEIGHT: 64 IN | RESPIRATION RATE: 18 BRPM | WEIGHT: 193 LBS | OXYGEN SATURATION: 98 % | DIASTOLIC BLOOD PRESSURE: 92 MMHG | SYSTOLIC BLOOD PRESSURE: 125 MMHG | TEMPERATURE: 98.4 F

## 2023-03-24 DIAGNOSIS — F32.A DEPRESSION, UNSPECIFIED DEPRESSION TYPE: ICD-10-CM

## 2023-03-24 DIAGNOSIS — Z87.898 HISTORY OF PSEUDOSEIZURE: ICD-10-CM

## 2023-03-24 DIAGNOSIS — R56.9 OBSERVED SEIZURE-LIKE ACTIVITY (HCC): Primary | ICD-10-CM

## 2023-03-24 LAB
ALBUMIN SERPL-MCNC: 4.3 G/DL (ref 3.5–5.2)
ALP SERPL-CCNC: 93 U/L (ref 35–104)
ALT SERPL-CCNC: 19 U/L (ref 5–33)
AMPHET UR QL SCN: NEGATIVE
ANION GAP SERPL CALCULATED.3IONS-SCNC: 13 MMOL/L (ref 7–19)
APAP SERPL-MCNC: <5 UG/ML (ref 10–30)
AST SERPL-CCNC: 18 U/L (ref 5–32)
BACTERIA URNS QL MICRO: NEGATIVE /HPF
BARBITURATES UR QL SCN: POSITIVE
BASOPHILS # BLD: 0.1 K/UL (ref 0–0.2)
BASOPHILS NFR BLD: 0.4 % (ref 0–1)
BENZODIAZ UR QL SCN: POSITIVE
BILIRUB SERPL-MCNC: <0.2 MG/DL (ref 0.2–1.2)
BILIRUB UR QL STRIP: NEGATIVE
BUN SERPL-MCNC: 4 MG/DL (ref 6–20)
BUPRENORPHINE URINE: NEGATIVE
CALCIUM SERPL-MCNC: 9.8 MG/DL (ref 8.6–10)
CANNABINOIDS UR QL SCN: POSITIVE
CHLORIDE SERPL-SCNC: 103 MMOL/L (ref 98–111)
CLARITY UR: CLEAR
CO2 SERPL-SCNC: 23 MMOL/L (ref 22–29)
COCAINE UR QL SCN: NEGATIVE
COLOR UR: YELLOW
CREAT SERPL-MCNC: 0.6 MG/DL (ref 0.5–0.9)
CRYSTALS URNS MICRO: ABNORMAL /HPF
DRUG SCREEN COMMENT UR-IMP: ABNORMAL
EOSINOPHIL # BLD: 0 K/UL (ref 0–0.6)
EOSINOPHIL NFR BLD: 0.4 % (ref 0–5)
EPI CELLS #/AREA URNS AUTO: 3 /HPF (ref 0–5)
ERYTHROCYTE [DISTWIDTH] IN BLOOD BY AUTOMATED COUNT: 17.2 % (ref 11.5–14.5)
ETHANOLAMINE SERPL-MCNC: <10 MG/DL (ref 0–0.08)
GLUCOSE SERPL-MCNC: 97 MG/DL (ref 74–109)
GLUCOSE UR STRIP.AUTO-MCNC: NEGATIVE MG/DL
HCG UR QL: NEGATIVE
HCT VFR BLD AUTO: 41.1 % (ref 37–47)
HGB BLD-MCNC: 13.1 G/DL (ref 12–16)
HGB UR STRIP.AUTO-MCNC: ABNORMAL MG/L
HYALINE CASTS #/AREA URNS AUTO: 8 /HPF (ref 0–8)
IMM GRANULOCYTES # BLD: 0.1 K/UL
KETONES UR STRIP.AUTO-MCNC: 40 MG/DL
LEUKOCYTE ESTERASE UR QL STRIP.AUTO: NEGATIVE
LYMPHOCYTES # BLD: 1.7 K/UL (ref 1.1–4.5)
LYMPHOCYTES NFR BLD: 14.8 % (ref 20–40)
MCH RBC QN AUTO: 28.1 PG (ref 27–31)
MCHC RBC AUTO-ENTMCNC: 31.9 G/DL (ref 33–37)
MCV RBC AUTO: 88 FL (ref 81–99)
METHADONE UR QL SCN: NEGATIVE
METHAMPHETAMINE, URINE: NEGATIVE
MONOCYTES # BLD: 0.9 K/UL (ref 0–0.9)
MONOCYTES NFR BLD: 8.2 % (ref 0–10)
NEUTROPHILS # BLD: 8.4 K/UL (ref 1.5–7.5)
NEUTS SEG NFR BLD: 75.7 % (ref 50–65)
NITRITE UR QL STRIP.AUTO: NEGATIVE
OPIATES UR QL SCN: NEGATIVE
OXYCODONE UR QL SCN: NEGATIVE
PCP UR QL SCN: NEGATIVE
PH UR STRIP.AUTO: 7.5 [PH] (ref 5–8)
PLATELET # BLD AUTO: 281 K/UL (ref 130–400)
PMV BLD AUTO: 12.3 FL (ref 9.4–12.3)
POTASSIUM SERPL-SCNC: 4.5 MMOL/L (ref 3.5–5)
PROPOXYPH UR QL SCN: NEGATIVE
PROT SERPL-MCNC: 7.6 G/DL (ref 6.6–8.7)
PROT UR STRIP.AUTO-MCNC: NEGATIVE MG/DL
RBC # BLD AUTO: 4.67 M/UL (ref 4.2–5.4)
RBC #/AREA URNS AUTO: 6 /HPF (ref 0–4)
REASON FOR REJECTION: NORMAL
REASON FOR REJECTION: NORMAL
REJECTED TEST: NORMAL
REJECTED TEST: NORMAL
SALICYLATES SERPL-MCNC: <0.3 MG/DL (ref 3–10)
SARS-COV-2 RDRP RESP QL NAA+PROBE: NOT DETECTED
SODIUM SERPL-SCNC: 139 MMOL/L (ref 136–145)
SP GR UR STRIP.AUTO: 1.01 (ref 1–1.03)
TRICYCLIC, URINE: NEGATIVE
UROBILINOGEN UR STRIP.AUTO-MCNC: 1 E.U./DL
WBC # BLD AUTO: 11.1 K/UL (ref 4.8–10.8)
WBC #/AREA URNS AUTO: 3 /HPF (ref 0–5)

## 2023-03-24 PROCEDURE — 85025 COMPLETE CBC W/AUTO DIFF WBC: CPT

## 2023-03-24 PROCEDURE — 80306 DRUG TEST PRSMV INSTRMNT: CPT

## 2023-03-24 PROCEDURE — 99284 EMERGENCY DEPT VISIT MOD MDM: CPT

## 2023-03-24 PROCEDURE — 71045 X-RAY EXAM CHEST 1 VIEW: CPT

## 2023-03-24 PROCEDURE — 81001 URINALYSIS AUTO W/SCOPE: CPT

## 2023-03-24 PROCEDURE — 80143 DRUG ASSAY ACETAMINOPHEN: CPT

## 2023-03-24 PROCEDURE — 80179 DRUG ASSAY SALICYLATE: CPT

## 2023-03-24 PROCEDURE — 84703 CHORIONIC GONADOTROPIN ASSAY: CPT

## 2023-03-24 PROCEDURE — 82077 ASSAY SPEC XCP UR&BREATH IA: CPT

## 2023-03-24 PROCEDURE — 80053 COMPREHEN METABOLIC PANEL: CPT

## 2023-03-24 PROCEDURE — 36415 COLL VENOUS BLD VENIPUNCTURE: CPT

## 2023-03-24 PROCEDURE — 87635 SARS-COV-2 COVID-19 AMP PRB: CPT

## 2023-03-24 ASSESSMENT — PAIN - FUNCTIONAL ASSESSMENT: PAIN_FUNCTIONAL_ASSESSMENT: NONE - DENIES PAIN

## 2023-03-24 NOTE — ED NOTES
Pt standing at side of bed, petting service dog that is laying on stretcher. Pt also removed all monitor wires and placed regular shirts back on.       Markell Greene RN  03/24/23 7692

## 2023-03-24 NOTE — ED NOTES
Pt refusing lab to draw blood. Dr. Magdaleno Barraza made aware.       Zahraa Carolina RN  03/24/23 8742

## 2023-03-24 NOTE — ED NOTES
Lab called stating labs were hemolyzed. Lab asked to come and draw patient.       Reji Rodriguez RN  03/24/23 4337

## 2023-03-24 NOTE — ED NOTES
Renny Arias, with lab,  again regarding patient labs. Will send someone as soon as they can.       Faheem Arrington RN  03/24/23 0283

## 2023-03-24 NOTE — ED NOTES
Able to obtain lab work, although unable to obtain IV access. Will continue to try.       Quirino Morejon RN  03/24/23 8417

## 2023-03-24 NOTE — ED PROVIDER NOTES
Dr Lily Sheffield enteroscopy-No batteries found in the esophagus, stomach, duodenum and jejunum, gastric erosions         CURRENT MEDICATIONS       Previous Medications    CLONAZEPAM (KLONOPIN) 0.5 MG TABLET    Take 1 tablet by mouth three times daily. FERROUS SULFATE (IRON 325) 325 (65 FE) MG TABLET    Take 1 tablet by mouth 2 times daily (with meals)    FLUOXETINE (PROZAC) 40 MG CAPSULE    Take 80 mg by mouth daily    IBUPROFEN (ADVIL;MOTRIN) 600 MG TABLET    Take 1 tablet by mouth 4 times daily as needed for Pain Take with food. LAMOTRIGINE (LAMICTAL) 25 MG TABLET    Take 10 tablets by mouth daily    PRAZOSIN (MINIPRESS) 1 MG CAPSULE    Take 1 capsule by mouth nightly    RISPERIDONE (RISPERDAL) 2 MG TABLET    Take 1 tablet by mouth nightly       ALLERGIES     Patient has no known allergies.     FAMILY HISTORY       Family History   Problem Relation Age of Onset    Diabetes Mother     Migraines Mother     Alcohol Abuse Father     Drug Abuse Father     Migraines Brother     Other Son         hydrocephalus    Autism Son     Autism Son           SOCIAL HISTORY       Social History     Socioeconomic History    Marital status:      Spouse name: None    Number of children: None    Years of education: None    Highest education level: None   Tobacco Use    Smoking status: Never    Smokeless tobacco: Never   Vaping Use    Vaping Use: Never used   Substance and Sexual Activity    Alcohol use: Never    Drug use: Yes     Types: Marijuana (Weed)     Comment: Delta 8    Sexual activity: Yes       SCREENINGS         Jose Coma Scale  Eye Opening: Spontaneous  Best Verbal Response: Oriented  Best Motor Response: Obeys commands  [place] Coma Scale Score: 15                     CIWA Assessment  BP: (!) 125/92  Heart Rate: (!) 101                 PHYSICAL EXAM    (up to 7 for level 4, 8 or more for level 5)     ED Triage Vitals [03/24/23 1338]   BP Temp Temp Source Heart Rate Resp SpO2 Height Weight   (!) 125/92 98.4 DISPOSITION/PLAN   DISPOSITION        PATIENT REFERRED TO:  No follow-up provider specified. DISCHARGE MEDICATIONS:  New Prescriptions    No medications on file     Controlled Substances Monitoring:     No flowsheet data found.     (Please note that portions of this note were completed with a voice recognition program.  Efforts were made to edit the dictations but occasionally words are mis-transcribed.)    Benedicto Burns DO (electronically signed)  Attending Emergency Physician           Benedicto Burns DO  03/24/23 4608

## 2023-03-24 NOTE — ED NOTES
Called lab regarding status of pending labs. Lab states will send someone as soon as possible, that they were unaware of patient needing draw.       Carina Jay RN  03/24/23 2953

## 2023-03-25 NOTE — ED NOTES
Spoke with patients mother, mother stated that she will be on her way soon.       Saurabh Draper RN  03/24/23 1896

## 2023-03-25 NOTE — ED NOTES
Able to obtain marble from patient. Pt given phone to call mother.       Holly Lazo RN  03/24/23 3124

## 2023-03-25 NOTE — ED NOTES
Patient noted to be picking at paper scrubs and eating the threads from them. Patient encouraged to stop, patient refused, MD made aware.       Paty Lobo RN  03/24/23 9935

## 2023-03-25 NOTE — ED NOTES
Spoke with patients mother and updated on patients condition.       Faheem Arrington RN  03/24/23 4482

## 2023-03-25 NOTE — ED NOTES
Patient noted to be trying to leave at exit of emergency room, chewing on something rubber, stretchy and pink and what sounded like a marble. Patient escorted back to room. Patient refused to release marble from mouth. Patient did release rubber piece and give to staff. Patient changed in to [facility] paper scrubs and belongings removed from room for patient safety. Patient stated that she swallowed a \"metal piece from the stickers earlier and she is fine. \" Dr Danni Valera made aware of patient situation.       Roseanne Lobo RN  03/24/23 2057

## 2023-03-25 NOTE — ED PROVIDER NOTES
Screen, Ur POSITIVE (*)     Benzodiazepine Screen, Urine POSITIVE (*)     Cannabinoid Scrn, Ur POSITIVE (*)     All other components within normal limits   COMPREHENSIVE METABOLIC PANEL W/ REFLEX TO MG FOR LOW K - Abnormal; Notable for the following components:    BUN 4 (*)     All other components within normal limits    Narrative:     please collect 03/24/2023  14:54   ACETAMINOPHEN LEVEL - Abnormal; Notable for the following components:    Acetaminophen Level <5 (*)     All other components within normal limits    Narrative:     please collect 59/13/7394  01:95   SALICYLATE LEVEL - Abnormal; Notable for the following components:    Salicylate, Serum <7.2 (*)     All other components within normal limits    Narrative:     please collect 03/24/2023  14:54   MICROSCOPIC URINALYSIS - Abnormal; Notable for the following components:    Bacteria, UA NEGATIVE (*)     Crystals, UA NEG (*)     RBC, UA 6 (*)     All other components within normal limits   COVID-19, RAPID   PREGNANCY, URINE   SPECIMEN REJECTION   ETHANOL    Narrative:     please collect 03/24/2023  14:54   SPECIMEN REJECTION    Narrative:     ORDER WAS CANCELLED 03/24/2023 15:08, Rejected: Hemolyzed. All other labs were within normal range or not returned as of this dictation. EMERGENCY DEPARTMENT COURSE and DIFFERENTIAL DIAGNOSIS/MDM:   Vitals:    Vitals:    03/24/23 1338   BP: (!) 125/92   Pulse: (!) 101   Resp: 18   Temp: 98.4 °F (36.9 °C)   TempSrc: Axillary   SpO2: 98%   Weight: 193 lb (87.5 kg)   Height: 5' 4\" (1.626 m)       MDM      Reassessment  ***    CONSULTS:  IP CONSULT TO PSYCHIATRY    Patient was seen and evaluated by mental of professional and after discussion with attending psychiatry, Dr. Ronaldo Cheadle, patient was recommended for discharge home and outpatient follow-up. Patient's family is agreeable with this plan of care and all questions been answered.     PROCEDURES:  Unless otherwise noted below, none     Procedures    FINAL IMPRESSION      1. Observed seizure-like activity ([facility] [address]    2. History of pseudoseizure    3.  Depression, unspecified depression type          DISPOSITION/PLAN   DISPOSITION Decision To Discharge    PATIENT [facility]:  [facility] [id number] [phone]          DISCHARGE MEDICATIONS:  Discharge Medication List as of 3/25/2023 12:24 AM             (Please note that portions ofthis note were completed with a voice recognition program.  Efforts were made to edit the dictations but occasionally words are mis-transcribed.)    Aurelia Gonzalez MD(electronically signed)  Attending Emergency Physician

## 2023-03-25 NOTE — PROGRESS NOTES
DENIZ ADULT INITIAL INTAKE ASSESSMENT     3/24/23    Joan Weathers ,a 33 y.o. female, presents to the ED for a psychiatric assessment. ED Arrival time: [address]  ED physician:  Dr. Page Hall  [facility] Notification time:   [facility] Assessment start time: 2200  Psychiatrist call time: 0  Spoke with Dr. Alyn Kanner    Patient is referred by: EMS    Reason for visit to ED - Presenting problem:   Observed in ED room 8. Patient is wearing paper scrubs she has torn of bits and laid them in a row on the sheet she has used one of these torn pieses scrunched into a cylinder shape and bites/chews on it but, does not swallow it. Autism diagnosis is noted. Patient makes no eye contact with this author. She does however communicate sometimes verbally and sometimes via typing a message on her phone. The following summarizes her communications:  PT states reason for ED visit, \" I am scared,  too many people. I am not suicidal but, I don't feel safe being by myself. I live with my Mom and my three children. I am scared because I am not with Mommy right now. Usually I have a routine, every night. Then I kept being in the hospital and I don't have a routine anymore. I can't get it back. I'm trying but, it is not working. Mommy takes care of my kids because sometimes they get too loud for me. She got temporary custody of them because I am always in the hospital.  I don't mind being in the regular hospital but I don't like being locked away. I have to have my communication device, my phone and they take it away. I can't always, I can't always. ... Pocahontas Bruch Ashlee Bruch They take it and I can't. They took my chew toys away here. They take away all the things I self regulate with. \"  Denies SI, HI and AVH. Easily becomes distressed and begins to rock self. See collateral below. ED provider notes:  Patient is a 62-year-old female who presents the emergency department by EMS after a witnessed seizure activity. Patient has autism.   According to her

## 2023-05-19 ENCOUNTER — APPOINTMENT (OUTPATIENT)
Dept: GENERAL RADIOLOGY | Age: 28
DRG: 101 | End: 2023-05-19

## 2023-05-19 ENCOUNTER — HOSPITAL ENCOUNTER (INPATIENT)
Age: 28
LOS: 4 days | Discharge: HOME | DRG: 101 | End: 2023-05-23
Attending: EMERGENCY MEDICINE | Admitting: HOSPITALIST

## 2023-05-19 ENCOUNTER — APPOINTMENT (OUTPATIENT)
Dept: CT IMAGING | Age: 28
DRG: 101 | End: 2023-05-19

## 2023-05-19 DIAGNOSIS — G40.901 STATUS EPILEPTICUS (HCC): Primary | ICD-10-CM

## 2023-05-19 LAB
ALBUMIN SERPL-MCNC: 4.8 G/DL (ref 3.5–5.2)
ALLENS TEST: ABNORMAL
ALP SERPL-CCNC: 118 U/L (ref 35–104)
ALT SERPL-CCNC: 21 U/L (ref 5–33)
ANION GAP SERPL CALCULATED.3IONS-SCNC: 16 MMOL/L (ref 7–19)
AST SERPL-CCNC: 14 U/L (ref 5–32)
BACTERIA URNS QL MICRO: NEGATIVE /HPF
BASE EXCESS ARTERIAL: 3.9 MMOL/L (ref -2–2)
BASOPHILS # BLD: 0.1 K/UL (ref 0–0.2)
BASOPHILS NFR BLD: 0.5 % (ref 0–1)
BILIRUB SERPL-MCNC: <0.2 MG/DL (ref 0.2–1.2)
BILIRUB UR QL STRIP: NEGATIVE
BUN SERPL-MCNC: 8 MG/DL (ref 6–20)
CALCIUM SERPL-MCNC: 9.7 MG/DL (ref 8.6–10)
CARBOXYHEMOGLOBIN ARTERIAL: 1.9 % (ref 0–5)
CHLORIDE SERPL-SCNC: 100 MMOL/L (ref 98–111)
CLARITY UR: CLEAR
CO2 SERPL-SCNC: 25 MMOL/L (ref 22–29)
COLOR UR: YELLOW
CREAT SERPL-MCNC: 0.8 MG/DL (ref 0.5–0.9)
CRYSTALS URNS MICRO: ABNORMAL /HPF
EOSINOPHIL # BLD: 0 K/UL (ref 0–0.6)
EOSINOPHIL NFR BLD: 0.2 % (ref 0–5)
EPI CELLS #/AREA URNS AUTO: 1 /HPF (ref 0–5)
ERYTHROCYTE [DISTWIDTH] IN BLOOD BY AUTOMATED COUNT: 15.5 % (ref 11.5–14.5)
FIO2: 30 %
GLUCOSE BLD-MCNC: 95 MG/DL (ref 70–99)
GLUCOSE SERPL-MCNC: 95 MG/DL (ref 74–109)
GLUCOSE UR STRIP.AUTO-MCNC: NEGATIVE MG/DL
HCG SERPL QL: NEGATIVE
HCO3 ARTERIAL: 27.6 MMOL/L (ref 22–26)
HCT VFR BLD AUTO: 37.7 % (ref 37–47)
HEMOGLOBIN, ART, EXTENDED: 9.8 G/DL (ref 12–16)
HGB BLD-MCNC: 11.2 G/DL (ref 12–16)
HGB UR STRIP.AUTO-MCNC: NEGATIVE MG/L
HYALINE CASTS #/AREA URNS AUTO: 0 /HPF (ref 0–8)
IMM GRANULOCYTES # BLD: 0 K/UL
KETONES UR STRIP.AUTO-MCNC: NEGATIVE MG/DL
LACTATE BLDV-SCNC: 2 MMOL/L (ref 0.5–1.9)
LEUKOCYTE ESTERASE UR QL STRIP.AUTO: ABNORMAL
LYMPHOCYTES # BLD: 1.6 K/UL (ref 1.1–4.5)
LYMPHOCYTES NFR BLD: 16.5 % (ref 20–40)
MAGNESIUM SERPL-MCNC: 2.2 MG/DL (ref 1.6–2.6)
MCH RBC QN AUTO: 25.8 PG (ref 27–31)
MCHC RBC AUTO-ENTMCNC: 29.7 G/DL (ref 33–37)
MCV RBC AUTO: 86.9 FL (ref 81–99)
MECHANICAL RATE IN BPM: 12
METHEMOGLOBIN ARTERIAL: 1.2 %
MODE: ABNORMAL
MONOCYTES # BLD: 0.9 K/UL (ref 0–0.9)
MONOCYTES NFR BLD: 9 % (ref 0–10)
NEUTROPHILS # BLD: 7.1 K/UL (ref 1.5–7.5)
NEUTS SEG NFR BLD: 73.6 % (ref 50–65)
NITRITE UR QL STRIP.AUTO: NEGATIVE
O2 CONTENT ARTERIAL: 13.5 ML/DL
O2 SAT, ARTERIAL: 96.5 %
O2 THERAPY: ABNORMAL
PCO2 ARTERIAL: 37 MMHG (ref 35–45)
PERFORMED ON: NORMAL
PH ARTERIAL: 7.48 (ref 7.35–7.45)
PH UR STRIP.AUTO: 7.5 [PH] (ref 5–8)
PHOSPHATE SERPL-MCNC: 3.5 MG/DL (ref 2.5–4.5)
PLATELET # BLD AUTO: 261 K/UL (ref 130–400)
PMV BLD AUTO: 11.9 FL (ref 9.4–12.3)
PO2 ARTERIAL: 106 MMHG (ref 80–100)
POSITIVE END EXP PRESS: 5
POTASSIUM BLD-SCNC: 3.4 MMOL/L
POTASSIUM SERPL-SCNC: 4.4 MMOL/L (ref 3.5–5)
PROLACTIN SERPL-MCNC: 167.3 NG/ML (ref 4.79–23.3)
PROT SERPL-MCNC: 7.3 G/DL (ref 6.6–8.7)
PROT UR STRIP.AUTO-MCNC: NEGATIVE MG/DL
RBC # BLD AUTO: 4.34 M/UL (ref 4.2–5.4)
RBC #/AREA URNS AUTO: 1 /HPF (ref 0–4)
SAMPLE SOURCE: ABNORMAL
SODIUM SERPL-SCNC: 141 MMOL/L (ref 136–145)
SP GR UR STRIP.AUTO: 1.02 (ref 1–1.03)
UROBILINOGEN UR STRIP.AUTO-MCNC: 0.2 E.U./DL
VT MECHANICAL: 500 %
WBC # BLD AUTO: 9.7 K/UL (ref 4.8–10.8)
WBC #/AREA URNS AUTO: 2 /HPF (ref 0–5)

## 2023-05-19 PROCEDURE — 83605 ASSAY OF LACTIC ACID: CPT

## 2023-05-19 PROCEDURE — 2580000003 HC RX 258: Performed by: HOSPITALIST

## 2023-05-19 PROCEDURE — 99285 EMERGENCY DEPT VISIT HI MDM: CPT

## 2023-05-19 PROCEDURE — 6360000002 HC RX W HCPCS: Performed by: HOSPITALIST

## 2023-05-19 PROCEDURE — 6360000002 HC RX W HCPCS: Performed by: EMERGENCY MEDICINE

## 2023-05-19 PROCEDURE — 94760 N-INVAS EAR/PLS OXIMETRY 1: CPT

## 2023-05-19 PROCEDURE — 36600 WITHDRAWAL OF ARTERIAL BLOOD: CPT

## 2023-05-19 PROCEDURE — 99284 EMERGENCY DEPT VISIT MOD MDM: CPT

## 2023-05-19 PROCEDURE — 2580000003 HC RX 258: Performed by: EMERGENCY MEDICINE

## 2023-05-19 PROCEDURE — 96375 TX/PRO/DX INJ NEW DRUG ADDON: CPT

## 2023-05-19 PROCEDURE — 70450 CT HEAD/BRAIN W/O DYE: CPT

## 2023-05-19 PROCEDURE — 2000000000 HC ICU R&B

## 2023-05-19 PROCEDURE — 6360000002 HC RX W HCPCS

## 2023-05-19 PROCEDURE — 80053 COMPREHEN METABOLIC PANEL: CPT

## 2023-05-19 PROCEDURE — 6360000002 HC RX W HCPCS: Performed by: NURSE PRACTITIONER

## 2023-05-19 PROCEDURE — 0BH17EZ INSERTION OF ENDOTRACHEAL AIRWAY INTO TRACHEA, VIA NATURAL OR ARTIFICIAL OPENING: ICD-10-PCS | Performed by: INTERNAL MEDICINE

## 2023-05-19 PROCEDURE — 36415 COLL VENOUS BLD VENIPUNCTURE: CPT

## 2023-05-19 PROCEDURE — 5A1945Z RESPIRATORY VENTILATION, 24-96 CONSECUTIVE HOURS: ICD-10-PCS | Performed by: INTERNAL MEDICINE

## 2023-05-19 PROCEDURE — 2500000003 HC RX 250 WO HCPCS: Performed by: HOSPITALIST

## 2023-05-19 PROCEDURE — 31500 INSERT EMERGENCY AIRWAY: CPT

## 2023-05-19 PROCEDURE — 84146 ASSAY OF PROLACTIN: CPT

## 2023-05-19 PROCEDURE — 71045 X-RAY EXAM CHEST 1 VIEW: CPT

## 2023-05-19 PROCEDURE — 83735 ASSAY OF MAGNESIUM: CPT

## 2023-05-19 PROCEDURE — 85025 COMPLETE CBC W/AUTO DIFF WBC: CPT

## 2023-05-19 PROCEDURE — 51702 INSERT TEMP BLADDER CATH: CPT

## 2023-05-19 PROCEDURE — 2700000000 HC OXYGEN THERAPY PER DAY

## 2023-05-19 PROCEDURE — 84703 CHORIONIC GONADOTROPIN ASSAY: CPT

## 2023-05-19 PROCEDURE — 96374 THER/PROPH/DIAG INJ IV PUSH: CPT

## 2023-05-19 PROCEDURE — 81001 URINALYSIS AUTO W/SCOPE: CPT

## 2023-05-19 PROCEDURE — 6370000000 HC RX 637 (ALT 250 FOR IP): Performed by: HOSPITALIST

## 2023-05-19 PROCEDURE — 82962 GLUCOSE BLOOD TEST: CPT

## 2023-05-19 PROCEDURE — 84100 ASSAY OF PHOSPHORUS: CPT

## 2023-05-19 PROCEDURE — 82803 BLOOD GASES ANY COMBINATION: CPT

## 2023-05-19 RX ORDER — CHLORHEXIDINE GLUCONATE 0.12 MG/ML
15 RINSE ORAL 2 TIMES DAILY
Status: DISCONTINUED | OUTPATIENT
Start: 2023-05-19 | End: 2023-05-23 | Stop reason: HOSPADM

## 2023-05-19 RX ORDER — MIDAZOLAM HYDROCHLORIDE 2 MG/2ML
4 INJECTION, SOLUTION INTRAMUSCULAR; INTRAVENOUS ONCE
Status: COMPLETED | OUTPATIENT
Start: 2023-05-19 | End: 2023-05-19

## 2023-05-19 RX ORDER — PROPOFOL 10 MG/ML
INJECTION, EMULSION INTRAVENOUS
Status: COMPLETED
Start: 2023-05-19 | End: 2023-05-19

## 2023-05-19 RX ORDER — LORAZEPAM 2 MG/ML
2 INJECTION INTRAMUSCULAR EVERY 5 MIN PRN
Status: DISCONTINUED | OUTPATIENT
Start: 2023-05-19 | End: 2023-05-23 | Stop reason: HOSPADM

## 2023-05-19 RX ORDER — MAGNESIUM SULFATE IN WATER 40 MG/ML
2000 INJECTION, SOLUTION INTRAVENOUS PRN
Status: DISCONTINUED | OUTPATIENT
Start: 2023-05-19 | End: 2023-05-23 | Stop reason: HOSPADM

## 2023-05-19 RX ORDER — SODIUM CHLORIDE, SODIUM LACTATE, POTASSIUM CHLORIDE, AND CALCIUM CHLORIDE .6; .31; .03; .02 G/100ML; G/100ML; G/100ML; G/100ML
1000 INJECTION, SOLUTION INTRAVENOUS ONCE
Status: COMPLETED | OUTPATIENT
Start: 2023-05-19 | End: 2023-05-19

## 2023-05-19 RX ORDER — SUCCINYLCHOLINE CHLORIDE 20 MG/ML
100 INJECTION INTRAMUSCULAR; INTRAVENOUS ONCE
Status: COMPLETED | OUTPATIENT
Start: 2023-05-19 | End: 2023-05-19

## 2023-05-19 RX ORDER — LORAZEPAM 2 MG/ML
2 INJECTION INTRAMUSCULAR ONCE
Status: COMPLETED | OUTPATIENT
Start: 2023-05-19 | End: 2023-05-19

## 2023-05-19 RX ORDER — FENTANYL CITRATE-0.9 % NACL/PF 10 MCG/ML
25-300 PLASTIC BAG, INJECTION (ML) INTRAVENOUS CONTINUOUS
Status: DISCONTINUED | OUTPATIENT
Start: 2023-05-19 | End: 2023-05-23 | Stop reason: HOSPADM

## 2023-05-19 RX ORDER — MIDAZOLAM HYDROCHLORIDE 1 MG/ML
1-10 INJECTION, SOLUTION INTRAVENOUS CONTINUOUS
Status: DISCONTINUED | OUTPATIENT
Start: 2023-05-19 | End: 2023-05-23 | Stop reason: HOSPADM

## 2023-05-19 RX ORDER — SODIUM CHLORIDE 0.9 % (FLUSH) 0.9 %
5-40 SYRINGE (ML) INJECTION EVERY 12 HOURS SCHEDULED
Status: DISCONTINUED | OUTPATIENT
Start: 2023-05-19 | End: 2023-05-23 | Stop reason: HOSPADM

## 2023-05-19 RX ORDER — POLYVINYL ALCOHOL 14 MG/ML
1 SOLUTION/ DROPS OPHTHALMIC EVERY 4 HOURS
Status: DISCONTINUED | OUTPATIENT
Start: 2023-05-20 | End: 2023-05-23 | Stop reason: HOSPADM

## 2023-05-19 RX ORDER — ACETAMINOPHEN 650 MG/1
650 SUPPOSITORY RECTAL EVERY 4 HOURS PRN
Status: DISCONTINUED | OUTPATIENT
Start: 2023-05-19 | End: 2023-05-23 | Stop reason: HOSPADM

## 2023-05-19 RX ORDER — FOSPHENYTOIN SODIUM 50 MG/ML
100 INJECTION, SOLUTION INTRAMUSCULAR; INTRAVENOUS EVERY 8 HOURS
Status: DISCONTINUED | OUTPATIENT
Start: 2023-05-20 | End: 2023-05-23 | Stop reason: HOSPADM

## 2023-05-19 RX ORDER — SODIUM CHLORIDE 0.9 % (FLUSH) 0.9 %
5-40 SYRINGE (ML) INJECTION PRN
Status: DISCONTINUED | OUTPATIENT
Start: 2023-05-19 | End: 2023-05-23 | Stop reason: HOSPADM

## 2023-05-19 RX ORDER — MIDAZOLAM HYDROCHLORIDE 1 MG/ML
INJECTION, SOLUTION INTRAVENOUS
Status: COMPLETED
Start: 2023-05-19 | End: 2023-05-19

## 2023-05-19 RX ORDER — SODIUM CHLORIDE, SODIUM LACTATE, POTASSIUM CHLORIDE, CALCIUM CHLORIDE 600; 310; 30; 20 MG/100ML; MG/100ML; MG/100ML; MG/100ML
INJECTION, SOLUTION INTRAVENOUS CONTINUOUS
Status: DISCONTINUED | OUTPATIENT
Start: 2023-05-19 | End: 2023-05-23 | Stop reason: HOSPADM

## 2023-05-19 RX ORDER — MIDAZOLAM HYDROCHLORIDE 1 MG/ML
INJECTION INTRAMUSCULAR; INTRAVENOUS
Status: COMPLETED
Start: 2023-05-19 | End: 2023-05-19

## 2023-05-19 RX ORDER — LEVETIRACETAM 500 MG/5ML
500 INJECTION, SOLUTION, CONCENTRATE INTRAVENOUS EVERY 12 HOURS
Status: DISCONTINUED | OUTPATIENT
Start: 2023-05-20 | End: 2023-05-23 | Stop reason: HOSPADM

## 2023-05-19 RX ORDER — ONDANSETRON 2 MG/ML
4 INJECTION INTRAMUSCULAR; INTRAVENOUS EVERY 6 HOURS PRN
Status: DISCONTINUED | OUTPATIENT
Start: 2023-05-19 | End: 2023-05-23 | Stop reason: HOSPADM

## 2023-05-19 RX ORDER — POTASSIUM CHLORIDE 7.45 MG/ML
10 INJECTION INTRAVENOUS PRN
Status: DISCONTINUED | OUTPATIENT
Start: 2023-05-19 | End: 2023-05-23 | Stop reason: HOSPADM

## 2023-05-19 RX ORDER — POTASSIUM CHLORIDE 29.8 MG/ML
20 INJECTION INTRAVENOUS PRN
Status: DISCONTINUED | OUTPATIENT
Start: 2023-05-19 | End: 2023-05-23 | Stop reason: HOSPADM

## 2023-05-19 RX ORDER — ACETYLCYSTEINE 200 MG/ML
600 SOLUTION ORAL; RESPIRATORY (INHALATION) 2 TIMES DAILY PRN
Status: DISCONTINUED | OUTPATIENT
Start: 2023-05-19 | End: 2023-05-23 | Stop reason: HOSPADM

## 2023-05-19 RX ORDER — ENOXAPARIN SODIUM 100 MG/ML
40 INJECTION SUBCUTANEOUS DAILY
Status: DISCONTINUED | OUTPATIENT
Start: 2023-05-20 | End: 2023-05-23 | Stop reason: HOSPADM

## 2023-05-19 RX ORDER — PROPOFOL 10 MG/ML
5-50 INJECTION, EMULSION INTRAVENOUS ONCE
Status: COMPLETED | OUTPATIENT
Start: 2023-05-19 | End: 2023-05-19

## 2023-05-19 RX ORDER — ACETAMINOPHEN 325 MG/1
650 TABLET ORAL EVERY 4 HOURS PRN
Status: DISCONTINUED | OUTPATIENT
Start: 2023-05-19 | End: 2023-05-23 | Stop reason: HOSPADM

## 2023-05-19 RX ORDER — ALBUTEROL SULFATE 2.5 MG/3ML
2.5 SOLUTION RESPIRATORY (INHALATION) EVERY 4 HOURS PRN
Status: DISCONTINUED | OUTPATIENT
Start: 2023-05-19 | End: 2023-05-23 | Stop reason: HOSPADM

## 2023-05-19 RX ORDER — LEVETIRACETAM 500 MG/5ML
1000 INJECTION, SOLUTION, CONCENTRATE INTRAVENOUS ONCE
Status: COMPLETED | OUTPATIENT
Start: 2023-05-19 | End: 2023-05-19

## 2023-05-19 RX ORDER — MINERAL OIL AND WHITE PETROLATUM 150; 830 MG/G; MG/G
OINTMENT OPHTHALMIC EVERY 4 HOURS
Status: DISCONTINUED | OUTPATIENT
Start: 2023-05-20 | End: 2023-05-23 | Stop reason: HOSPADM

## 2023-05-19 RX ORDER — VECURONIUM BROMIDE 1 MG/ML
10 INJECTION, POWDER, LYOPHILIZED, FOR SOLUTION INTRAVENOUS ONCE
Status: COMPLETED | OUTPATIENT
Start: 2023-05-19 | End: 2023-05-19

## 2023-05-19 RX ORDER — SODIUM CHLORIDE 9 MG/ML
INJECTION, SOLUTION INTRAVENOUS PRN
Status: DISCONTINUED | OUTPATIENT
Start: 2023-05-19 | End: 2023-05-23 | Stop reason: HOSPADM

## 2023-05-19 RX ORDER — MIDAZOLAM HYDROCHLORIDE 2 MG/2ML
2 INJECTION, SOLUTION INTRAMUSCULAR; INTRAVENOUS ONCE
Status: COMPLETED | OUTPATIENT
Start: 2023-05-19 | End: 2023-05-19

## 2023-05-19 RX ADMIN — Medication 50 MCG/HR: at 20:05

## 2023-05-19 RX ADMIN — SODIUM CHLORIDE, POTASSIUM CHLORIDE, SODIUM LACTATE AND CALCIUM CHLORIDE 1000 ML: 600; 310; 30; 20 INJECTION, SOLUTION INTRAVENOUS at 18:53

## 2023-05-19 RX ADMIN — LORAZEPAM 2 MG: 2 INJECTION INTRAMUSCULAR; INTRAVENOUS at 18:49

## 2023-05-19 RX ADMIN — SODIUM CHLORIDE, PRESERVATIVE FREE 20 MG: 5 INJECTION INTRAVENOUS at 23:09

## 2023-05-19 RX ADMIN — MIDAZOLAM HYDROCHLORIDE 2 MG: 2 INJECTION, SOLUTION INTRAMUSCULAR; INTRAVENOUS at 19:29

## 2023-05-19 RX ADMIN — SODIUM CHLORIDE, POTASSIUM CHLORIDE, SODIUM LACTATE AND CALCIUM CHLORIDE: 600; 310; 30; 20 INJECTION, SOLUTION INTRAVENOUS at 20:40

## 2023-05-19 RX ADMIN — LORAZEPAM 2 MG: 2 INJECTION INTRAMUSCULAR; INTRAVENOUS at 18:50

## 2023-05-19 RX ADMIN — SUCCINYLCHOLINE CHLORIDE 100 MG: 20 INJECTION, SOLUTION INTRAMUSCULAR; INTRAVENOUS at 19:16

## 2023-05-19 RX ADMIN — SODIUM CHLORIDE 1750 MG PE: 9 INJECTION, SOLUTION INTRAVENOUS at 19:51

## 2023-05-19 RX ADMIN — CHLORHEXIDINE GLUCONATE 15 ML: 1.2 RINSE ORAL at 23:09

## 2023-05-19 RX ADMIN — MIDAZOLAM HYDROCHLORIDE 1 MG/HR: 1 INJECTION, SOLUTION INTRAVENOUS at 19:32

## 2023-05-19 RX ADMIN — POLYVINYL ALCOHOL 1 DROP: 14 SOLUTION/ DROPS OPHTHALMIC at 23:09

## 2023-05-19 RX ADMIN — LEVETIRACETAM 1000 MG: 100 INJECTION INTRAVENOUS at 18:45

## 2023-05-19 RX ADMIN — VECURONIUM BROMIDE 10 MG: 1 INJECTION, POWDER, LYOPHILIZED, FOR SOLUTION INTRAVENOUS at 20:23

## 2023-05-19 RX ADMIN — PROPOFOL 20 MCG/KG/MIN: 10 INJECTION, EMULSION INTRAVENOUS at 19:22

## 2023-05-19 RX ADMIN — LORAZEPAM 2 MG: 2 INJECTION INTRAMUSCULAR; INTRAVENOUS at 19:01

## 2023-05-19 RX ADMIN — MIDAZOLAM HYDROCHLORIDE 4 MG: 1 INJECTION, SOLUTION INTRAMUSCULAR; INTRAVENOUS at 19:16

## 2023-05-19 RX ADMIN — MIDAZOLAM 2 MG: 1 INJECTION INTRAMUSCULAR; INTRAVENOUS at 19:29

## 2023-05-19 ASSESSMENT — PULMONARY FUNCTION TESTS
PIF_VALUE: 18
PIF_VALUE: 18
PIF_VALUE: 17
PIF_VALUE: 17

## 2023-05-20 LAB
ALBUMIN SERPL-MCNC: 3.4 G/DL (ref 3.5–5.2)
ALP SERPL-CCNC: 92 U/L (ref 35–104)
ALT SERPL-CCNC: 15 U/L (ref 5–33)
ANION GAP SERPL CALCULATED.3IONS-SCNC: 11 MMOL/L (ref 7–19)
AST SERPL-CCNC: 14 U/L (ref 5–32)
BASOPHILS # BLD: 0 K/UL (ref 0–0.2)
BASOPHILS NFR BLD: 0.5 % (ref 0–1)
BILIRUB SERPL-MCNC: 0.3 MG/DL (ref 0.2–1.2)
BUN SERPL-MCNC: 6 MG/DL (ref 6–20)
CALCIUM SERPL-MCNC: 8.4 MG/DL (ref 8.6–10)
CHLORIDE SERPL-SCNC: 103 MMOL/L (ref 98–111)
CO2 SERPL-SCNC: 21 MMOL/L (ref 22–29)
CREAT SERPL-MCNC: 0.6 MG/DL (ref 0.5–0.9)
EOSINOPHIL # BLD: 0.1 K/UL (ref 0–0.6)
EOSINOPHIL NFR BLD: 1.3 % (ref 0–5)
ERYTHROCYTE [DISTWIDTH] IN BLOOD BY AUTOMATED COUNT: 15.6 % (ref 11.5–14.5)
GLUCOSE SERPL-MCNC: 83 MG/DL (ref 74–109)
HCT VFR BLD AUTO: 31.6 % (ref 37–47)
HGB BLD-MCNC: 9.6 G/DL (ref 12–16)
IMM GRANULOCYTES # BLD: 0 K/UL
LYMPHOCYTES # BLD: 2.1 K/UL (ref 1.1–4.5)
LYMPHOCYTES NFR BLD: 34.3 % (ref 20–40)
MCH RBC QN AUTO: 25.9 PG (ref 27–31)
MCHC RBC AUTO-ENTMCNC: 30.4 G/DL (ref 33–37)
MCV RBC AUTO: 85.2 FL (ref 81–99)
MONOCYTES # BLD: 0.7 K/UL (ref 0–0.9)
MONOCYTES NFR BLD: 11.4 % (ref 0–10)
NEUTROPHILS # BLD: 3.2 K/UL (ref 1.5–7.5)
NEUTS SEG NFR BLD: 52.2 % (ref 50–65)
PLATELET # BLD AUTO: 166 K/UL (ref 130–400)
PMV BLD AUTO: 12.8 FL (ref 9.4–12.3)
POTASSIUM SERPL-SCNC: 4 MMOL/L (ref 3.5–5)
PROT SERPL-MCNC: 5.9 G/DL (ref 6.6–8.7)
RBC # BLD AUTO: 3.71 M/UL (ref 4.2–5.4)
SODIUM SERPL-SCNC: 135 MMOL/L (ref 136–145)
WBC # BLD AUTO: 6.1 K/UL (ref 4.8–10.8)

## 2023-05-20 PROCEDURE — C9113 INJ PANTOPRAZOLE SODIUM, VIA: HCPCS | Performed by: INTERNAL MEDICINE

## 2023-05-20 PROCEDURE — 94002 VENT MGMT INPAT INIT DAY: CPT

## 2023-05-20 PROCEDURE — 85025 COMPLETE CBC W/AUTO DIFF WBC: CPT

## 2023-05-20 PROCEDURE — 2500000003 HC RX 250 WO HCPCS: Performed by: HOSPITALIST

## 2023-05-20 PROCEDURE — 6360000002 HC RX W HCPCS: Performed by: PSYCHIATRY & NEUROLOGY

## 2023-05-20 PROCEDURE — 80053 COMPREHEN METABOLIC PANEL: CPT

## 2023-05-20 PROCEDURE — 6360000002 HC RX W HCPCS: Performed by: HOSPITALIST

## 2023-05-20 PROCEDURE — 2580000003 HC RX 258: Performed by: HOSPITALIST

## 2023-05-20 PROCEDURE — 2700000000 HC OXYGEN THERAPY PER DAY

## 2023-05-20 PROCEDURE — 6360000002 HC RX W HCPCS: Performed by: INTERNAL MEDICINE

## 2023-05-20 PROCEDURE — 95819 EEG AWAKE AND ASLEEP: CPT

## 2023-05-20 PROCEDURE — 95819 EEG AWAKE AND ASLEEP: CPT | Performed by: PSYCHIATRY & NEUROLOGY

## 2023-05-20 PROCEDURE — 2000000000 HC ICU R&B

## 2023-05-20 PROCEDURE — 2580000003 HC RX 258: Performed by: INTERNAL MEDICINE

## 2023-05-20 PROCEDURE — 94760 N-INVAS EAR/PLS OXIMETRY 1: CPT

## 2023-05-20 PROCEDURE — 36415 COLL VENOUS BLD VENIPUNCTURE: CPT

## 2023-05-20 PROCEDURE — 2580000003 HC RX 258: Performed by: PSYCHIATRY & NEUROLOGY

## 2023-05-20 PROCEDURE — 99223 1ST HOSP IP/OBS HIGH 75: CPT | Performed by: PSYCHIATRY & NEUROLOGY

## 2023-05-20 RX ORDER — PROPOFOL 10 MG/ML
5-50 INJECTION, EMULSION INTRAVENOUS CONTINUOUS
Status: DISCONTINUED | OUTPATIENT
Start: 2023-05-20 | End: 2023-05-23 | Stop reason: HOSPADM

## 2023-05-20 RX ADMIN — SODIUM CHLORIDE, PRESERVATIVE FREE 40 MG: 5 INJECTION INTRAVENOUS at 11:05

## 2023-05-20 RX ADMIN — SODIUM CHLORIDE, POTASSIUM CHLORIDE, SODIUM LACTATE AND CALCIUM CHLORIDE: 600; 310; 30; 20 INJECTION, SOLUTION INTRAVENOUS at 14:44

## 2023-05-20 RX ADMIN — FOSPHENYTOIN SODIUM 100 MG PE: 50 INJECTION, SOLUTION INTRAMUSCULAR; INTRAVENOUS at 11:05

## 2023-05-20 RX ADMIN — POLYVINYL ALCOHOL 1 DROP: 14 SOLUTION/ DROPS OPHTHALMIC at 16:18

## 2023-05-20 RX ADMIN — ENOXAPARIN SODIUM 40 MG: 100 INJECTION SUBCUTANEOUS at 07:48

## 2023-05-20 RX ADMIN — SODIUM CHLORIDE, POTASSIUM CHLORIDE, SODIUM LACTATE AND CALCIUM CHLORIDE: 600; 310; 30; 20 INJECTION, SOLUTION INTRAVENOUS at 04:24

## 2023-05-20 RX ADMIN — MIDAZOLAM HYDROCHLORIDE 10 MG/HR: 1 INJECTION, SOLUTION INTRAVENOUS at 14:02

## 2023-05-20 RX ADMIN — SODIUM CHLORIDE, PRESERVATIVE FREE 20 MG: 5 INJECTION INTRAVENOUS at 07:47

## 2023-05-20 RX ADMIN — LEVETIRACETAM 500 MG: 100 INJECTION INTRAVENOUS at 17:58

## 2023-05-20 RX ADMIN — POLYVINYL ALCOHOL 1 DROP: 14 SOLUTION/ DROPS OPHTHALMIC at 07:47

## 2023-05-20 RX ADMIN — MIDAZOLAM HYDROCHLORIDE 10 MG/HR: 1 INJECTION, SOLUTION INTRAVENOUS at 03:57

## 2023-05-20 RX ADMIN — SODIUM CHLORIDE, PRESERVATIVE FREE 10 ML: 5 INJECTION INTRAVENOUS at 19:58

## 2023-05-20 RX ADMIN — POLYVINYL ALCOHOL 1 DROP: 14 SOLUTION/ DROPS OPHTHALMIC at 23:35

## 2023-05-20 RX ADMIN — ZIPRASIDONE MESYLATE 20 MG: 20 INJECTION, POWDER, LYOPHILIZED, FOR SOLUTION INTRAMUSCULAR at 19:57

## 2023-05-20 RX ADMIN — CHLORHEXIDINE GLUCONATE 15 ML: 1.2 RINSE ORAL at 07:47

## 2023-05-20 RX ADMIN — MIDAZOLAM HYDROCHLORIDE 10 MG/HR: 1 INJECTION, SOLUTION INTRAVENOUS at 23:35

## 2023-05-20 RX ADMIN — POLYVINYL ALCOHOL 1 DROP: 14 SOLUTION/ DROPS OPHTHALMIC at 19:57

## 2023-05-20 RX ADMIN — PROPOFOL 5 MCG/KG/MIN: 10 INJECTION, EMULSION INTRAVENOUS at 18:18

## 2023-05-20 RX ADMIN — Medication 200 MCG/HR: at 19:10

## 2023-05-20 RX ADMIN — POLYVINYL ALCOHOL 1 DROP: 14 SOLUTION/ DROPS OPHTHALMIC at 12:59

## 2023-05-20 RX ADMIN — POLYVINYL ALCOHOL 1 DROP: 14 SOLUTION/ DROPS OPHTHALMIC at 03:57

## 2023-05-20 RX ADMIN — CHLORHEXIDINE GLUCONATE 15 ML: 1.2 RINSE ORAL at 19:57

## 2023-05-20 RX ADMIN — FOSPHENYTOIN SODIUM 100 MG PE: 50 INJECTION, SOLUTION INTRAMUSCULAR; INTRAVENOUS at 03:57

## 2023-05-20 RX ADMIN — SODIUM CHLORIDE, PRESERVATIVE FREE 10 ML: 5 INJECTION INTRAVENOUS at 07:48

## 2023-05-20 RX ADMIN — LEVETIRACETAM 500 MG: 100 INJECTION INTRAVENOUS at 06:09

## 2023-05-20 RX ADMIN — FOSPHENYTOIN SODIUM 100 MG PE: 50 INJECTION, SOLUTION INTRAMUSCULAR; INTRAVENOUS at 19:57

## 2023-05-20 ASSESSMENT — PULMONARY FUNCTION TESTS
PIF_VALUE: 16
PIF_VALUE: 21
PIF_VALUE: 17
PIF_VALUE: 18
PIF_VALUE: 16
PIF_VALUE: 21
PIF_VALUE: 16
PIF_VALUE: 19
PIF_VALUE: 17
PIF_VALUE: 16
PIF_VALUE: 17
PIF_VALUE: 19
PIF_VALUE: 17
PIF_VALUE: 20
PIF_VALUE: 16
PIF_VALUE: 17
PIF_VALUE: 19
PIF_VALUE: 21
PIF_VALUE: 20
PIF_VALUE: 18
PIF_VALUE: 17
PIF_VALUE: 17
PIF_VALUE: 16
PIF_VALUE: 21
PIF_VALUE: 20
PIF_VALUE: 16
PIF_VALUE: 21

## 2023-05-21 LAB
ANION GAP SERPL CALCULATED.3IONS-SCNC: 12 MMOL/L (ref 7–19)
BASOPHILS # BLD: 0 K/UL (ref 0–0.2)
BASOPHILS NFR BLD: 0.5 % (ref 0–1)
BUN SERPL-MCNC: 5 MG/DL (ref 6–20)
CALCIUM SERPL-MCNC: 8.6 MG/DL (ref 8.6–10)
CHLORIDE SERPL-SCNC: 103 MMOL/L (ref 98–111)
CO2 SERPL-SCNC: 23 MMOL/L (ref 22–29)
CREAT SERPL-MCNC: 0.6 MG/DL (ref 0.5–0.9)
EOSINOPHIL # BLD: 0.2 K/UL (ref 0–0.6)
EOSINOPHIL NFR BLD: 2.8 % (ref 0–5)
ERYTHROCYTE [DISTWIDTH] IN BLOOD BY AUTOMATED COUNT: 15.5 % (ref 11.5–14.5)
GLUCOSE SERPL-MCNC: 88 MG/DL (ref 74–109)
HCT VFR BLD AUTO: 31.1 % (ref 37–47)
HGB BLD-MCNC: 9.7 G/DL (ref 12–16)
IMM GRANULOCYTES # BLD: 0.1 K/UL
LYMPHOCYTES # BLD: 2.7 K/UL (ref 1.1–4.5)
LYMPHOCYTES NFR BLD: 31.9 % (ref 20–40)
MAGNESIUM SERPL-MCNC: 2 MG/DL (ref 1.6–2.6)
MCH RBC QN AUTO: 25.9 PG (ref 27–31)
MCHC RBC AUTO-ENTMCNC: 31.2 G/DL (ref 33–37)
MCV RBC AUTO: 82.9 FL (ref 81–99)
MONOCYTES # BLD: 1.2 K/UL (ref 0–0.9)
MONOCYTES NFR BLD: 14 % (ref 0–10)
NEUTROPHILS # BLD: 4.2 K/UL (ref 1.5–7.5)
NEUTS SEG NFR BLD: 50.2 % (ref 50–65)
PLATELET # BLD AUTO: 176 K/UL (ref 130–400)
PMV BLD AUTO: 12.2 FL (ref 9.4–12.3)
POTASSIUM SERPL-SCNC: 5 MMOL/L (ref 3.5–5)
RBC # BLD AUTO: 3.75 M/UL (ref 4.2–5.4)
SODIUM SERPL-SCNC: 138 MMOL/L (ref 136–145)
WBC # BLD AUTO: 8.3 K/UL (ref 4.8–10.8)

## 2023-05-21 PROCEDURE — C9113 INJ PANTOPRAZOLE SODIUM, VIA: HCPCS | Performed by: INTERNAL MEDICINE

## 2023-05-21 PROCEDURE — 2580000003 HC RX 258: Performed by: PSYCHIATRY & NEUROLOGY

## 2023-05-21 PROCEDURE — 6360000002 HC RX W HCPCS: Performed by: INTERNAL MEDICINE

## 2023-05-21 PROCEDURE — 99232 SBSQ HOSP IP/OBS MODERATE 35: CPT | Performed by: PSYCHIATRY & NEUROLOGY

## 2023-05-21 PROCEDURE — 36415 COLL VENOUS BLD VENIPUNCTURE: CPT

## 2023-05-21 PROCEDURE — 6360000002 HC RX W HCPCS: Performed by: HOSPITALIST

## 2023-05-21 PROCEDURE — 85025 COMPLETE CBC W/AUTO DIFF WBC: CPT

## 2023-05-21 PROCEDURE — 2700000000 HC OXYGEN THERAPY PER DAY

## 2023-05-21 PROCEDURE — 2580000003 HC RX 258: Performed by: HOSPITALIST

## 2023-05-21 PROCEDURE — 94003 VENT MGMT INPAT SUBQ DAY: CPT

## 2023-05-21 PROCEDURE — 6360000002 HC RX W HCPCS: Performed by: PSYCHIATRY & NEUROLOGY

## 2023-05-21 PROCEDURE — 83735 ASSAY OF MAGNESIUM: CPT

## 2023-05-21 PROCEDURE — 2580000003 HC RX 258: Performed by: INTERNAL MEDICINE

## 2023-05-21 PROCEDURE — 2000000000 HC ICU R&B

## 2023-05-21 PROCEDURE — 80048 BASIC METABOLIC PNL TOTAL CA: CPT

## 2023-05-21 RX ADMIN — PROPOFOL 30 MCG/KG/MIN: 10 INJECTION, EMULSION INTRAVENOUS at 19:01

## 2023-05-21 RX ADMIN — POLYVINYL ALCOHOL 1 DROP: 14 SOLUTION/ DROPS OPHTHALMIC at 08:24

## 2023-05-21 RX ADMIN — FOSPHENYTOIN SODIUM 100 MG PE: 50 INJECTION, SOLUTION INTRAMUSCULAR; INTRAVENOUS at 13:03

## 2023-05-21 RX ADMIN — MIDAZOLAM HYDROCHLORIDE 10 MG/HR: 1 INJECTION, SOLUTION INTRAVENOUS at 21:01

## 2023-05-21 RX ADMIN — LEVETIRACETAM 500 MG: 100 INJECTION INTRAVENOUS at 17:43

## 2023-05-21 RX ADMIN — Medication 200 MCG/HR: at 05:50

## 2023-05-21 RX ADMIN — POLYVINYL ALCOHOL 1 DROP: 14 SOLUTION/ DROPS OPHTHALMIC at 04:15

## 2023-05-21 RX ADMIN — SODIUM CHLORIDE, PRESERVATIVE FREE 40 MG: 5 INJECTION INTRAVENOUS at 08:32

## 2023-05-21 RX ADMIN — FOSPHENYTOIN SODIUM 100 MG PE: 50 INJECTION, SOLUTION INTRAMUSCULAR; INTRAVENOUS at 05:16

## 2023-05-21 RX ADMIN — SODIUM CHLORIDE, POTASSIUM CHLORIDE, SODIUM LACTATE AND CALCIUM CHLORIDE: 600; 310; 30; 20 INJECTION, SOLUTION INTRAVENOUS at 04:58

## 2023-05-21 RX ADMIN — SODIUM CHLORIDE, PRESERVATIVE FREE 10 ML: 5 INJECTION INTRAVENOUS at 19:35

## 2023-05-21 RX ADMIN — POLYVINYL ALCOHOL 1 DROP: 14 SOLUTION/ DROPS OPHTHALMIC at 16:30

## 2023-05-21 RX ADMIN — MIDAZOLAM HYDROCHLORIDE 6 MG/HR: 1 INJECTION, SOLUTION INTRAVENOUS at 10:34

## 2023-05-21 RX ADMIN — POLYVINYL ALCOHOL 1 DROP: 14 SOLUTION/ DROPS OPHTHALMIC at 19:35

## 2023-05-21 RX ADMIN — PROPOFOL 20 MCG/KG/MIN: 10 INJECTION, EMULSION INTRAVENOUS at 10:32

## 2023-05-21 RX ADMIN — POLYVINYL ALCOHOL 1 DROP: 14 SOLUTION/ DROPS OPHTHALMIC at 13:03

## 2023-05-21 RX ADMIN — ZIPRASIDONE MESYLATE 20 MG: 20 INJECTION, POWDER, LYOPHILIZED, FOR SOLUTION INTRAMUSCULAR at 19:35

## 2023-05-21 RX ADMIN — FOSPHENYTOIN SODIUM 100 MG PE: 50 INJECTION, SOLUTION INTRAMUSCULAR; INTRAVENOUS at 19:35

## 2023-05-21 RX ADMIN — CHLORHEXIDINE GLUCONATE 15 ML: 1.2 RINSE ORAL at 08:24

## 2023-05-21 RX ADMIN — SODIUM CHLORIDE, PRESERVATIVE FREE 10 ML: 5 INJECTION INTRAVENOUS at 13:04

## 2023-05-21 RX ADMIN — LEVETIRACETAM 500 MG: 100 INJECTION INTRAVENOUS at 05:42

## 2023-05-21 RX ADMIN — Medication 175 MCG/HR: at 21:00

## 2023-05-21 RX ADMIN — PROPOFOL 35 MCG/KG/MIN: 10 INJECTION, EMULSION INTRAVENOUS at 05:44

## 2023-05-21 RX ADMIN — ENOXAPARIN SODIUM 40 MG: 100 INJECTION SUBCUTANEOUS at 08:32

## 2023-05-21 RX ADMIN — CHLORHEXIDINE GLUCONATE 15 ML: 1.2 RINSE ORAL at 19:35

## 2023-05-21 ASSESSMENT — PULMONARY FUNCTION TESTS
PIF_VALUE: 20
PIF_VALUE: 18
PIF_VALUE: 21
PIF_VALUE: 21
PIF_VALUE: 24
PIF_VALUE: 24
PIF_VALUE: 20
PIF_VALUE: 24
PIF_VALUE: 23
PIF_VALUE: 21
PIF_VALUE: 18
PIF_VALUE: 22
PIF_VALUE: 23
PIF_VALUE: 24
PIF_VALUE: 15
PIF_VALUE: 17
PIF_VALUE: 17
PIF_VALUE: 37
PIF_VALUE: 23
PIF_VALUE: 21
PIF_VALUE: 22
PIF_VALUE: 22
PIF_VALUE: 17
PIF_VALUE: 17
PIF_VALUE: 21
PIF_VALUE: 28
PIF_VALUE: 22

## 2023-05-22 LAB
ANION GAP SERPL CALCULATED.3IONS-SCNC: 12 MMOL/L (ref 7–19)
BASOPHILS # BLD: 0 K/UL (ref 0–0.2)
BASOPHILS NFR BLD: 0.5 % (ref 0–1)
BUN SERPL-MCNC: 4 MG/DL (ref 6–20)
CALCIUM SERPL-MCNC: 8.8 MG/DL (ref 8.6–10)
CHLORIDE SERPL-SCNC: 104 MMOL/L (ref 98–111)
CO2 SERPL-SCNC: 23 MMOL/L (ref 22–29)
CREAT SERPL-MCNC: 0.5 MG/DL (ref 0.5–0.9)
EOSINOPHIL # BLD: 0.1 K/UL (ref 0–0.6)
EOSINOPHIL NFR BLD: 2.4 % (ref 0–5)
ERYTHROCYTE [DISTWIDTH] IN BLOOD BY AUTOMATED COUNT: 15.9 % (ref 11.5–14.5)
GLUCOSE SERPL-MCNC: 85 MG/DL (ref 74–109)
HCT VFR BLD AUTO: 31.7 % (ref 37–47)
HGB BLD-MCNC: 9.8 G/DL (ref 12–16)
IMM GRANULOCYTES # BLD: 0 K/UL
LYMPHOCYTES # BLD: 1.7 K/UL (ref 1.1–4.5)
LYMPHOCYTES NFR BLD: 29 % (ref 20–40)
MAGNESIUM SERPL-MCNC: 2.1 MG/DL (ref 1.6–2.6)
MCH RBC QN AUTO: 26.1 PG (ref 27–31)
MCHC RBC AUTO-ENTMCNC: 30.9 G/DL (ref 33–37)
MCV RBC AUTO: 84.5 FL (ref 81–99)
MONOCYTES # BLD: 0.6 K/UL (ref 0–0.9)
MONOCYTES NFR BLD: 10.4 % (ref 0–10)
NEUTROPHILS # BLD: 3.3 K/UL (ref 1.5–7.5)
NEUTS SEG NFR BLD: 57.5 % (ref 50–65)
PLATELET # BLD AUTO: 166 K/UL (ref 130–400)
PMV BLD AUTO: 12.8 FL (ref 9.4–12.3)
POTASSIUM SERPL-SCNC: 4.3 MMOL/L (ref 3.5–5)
RBC # BLD AUTO: 3.75 M/UL (ref 4.2–5.4)
SODIUM SERPL-SCNC: 139 MMOL/L (ref 136–145)
WBC # BLD AUTO: 5.8 K/UL (ref 4.8–10.8)

## 2023-05-22 PROCEDURE — 2700000000 HC OXYGEN THERAPY PER DAY

## 2023-05-22 PROCEDURE — 80048 BASIC METABOLIC PNL TOTAL CA: CPT

## 2023-05-22 PROCEDURE — 2580000003 HC RX 258: Performed by: PSYCHIATRY & NEUROLOGY

## 2023-05-22 PROCEDURE — 94003 VENT MGMT INPAT SUBQ DAY: CPT

## 2023-05-22 PROCEDURE — 2000000000 HC ICU R&B

## 2023-05-22 PROCEDURE — 2580000003 HC RX 258: Performed by: HOSPITALIST

## 2023-05-22 PROCEDURE — 6360000002 HC RX W HCPCS: Performed by: HOSPITALIST

## 2023-05-22 PROCEDURE — 6360000002 HC RX W HCPCS: Performed by: INTERNAL MEDICINE

## 2023-05-22 PROCEDURE — 94760 N-INVAS EAR/PLS OXIMETRY 1: CPT

## 2023-05-22 PROCEDURE — 85025 COMPLETE CBC W/AUTO DIFF WBC: CPT

## 2023-05-22 PROCEDURE — 83735 ASSAY OF MAGNESIUM: CPT

## 2023-05-22 PROCEDURE — 2580000003 HC RX 258: Performed by: INTERNAL MEDICINE

## 2023-05-22 PROCEDURE — C9113 INJ PANTOPRAZOLE SODIUM, VIA: HCPCS | Performed by: INTERNAL MEDICINE

## 2023-05-22 PROCEDURE — 6360000002 HC RX W HCPCS: Performed by: PSYCHIATRY & NEUROLOGY

## 2023-05-22 PROCEDURE — 2500000003 HC RX 250 WO HCPCS: Performed by: INTERNAL MEDICINE

## 2023-05-22 PROCEDURE — 99232 SBSQ HOSP IP/OBS MODERATE 35: CPT | Performed by: PSYCHIATRY & NEUROLOGY

## 2023-05-22 PROCEDURE — 36415 COLL VENOUS BLD VENIPUNCTURE: CPT

## 2023-05-22 RX ORDER — GLYCOPYRROLATE 0.2 MG/ML
0.1 INJECTION INTRAMUSCULAR; INTRAVENOUS 4 TIMES DAILY
Status: DISCONTINUED | OUTPATIENT
Start: 2023-05-22 | End: 2023-05-23 | Stop reason: HOSPADM

## 2023-05-22 RX ADMIN — CHLORHEXIDINE GLUCONATE 15 ML: 1.2 RINSE ORAL at 21:08

## 2023-05-22 RX ADMIN — Medication 250 MCG/HR: at 08:46

## 2023-05-22 RX ADMIN — MIDAZOLAM HYDROCHLORIDE 10 MG/HR: 1 INJECTION, SOLUTION INTRAVENOUS at 07:06

## 2023-05-22 RX ADMIN — POLYVINYL ALCOHOL 1 DROP: 14 SOLUTION/ DROPS OPHTHALMIC at 03:39

## 2023-05-22 RX ADMIN — POLYVINYL ALCOHOL 1 DROP: 14 SOLUTION/ DROPS OPHTHALMIC at 21:08

## 2023-05-22 RX ADMIN — SODIUM CHLORIDE, PRESERVATIVE FREE 10 ML: 5 INJECTION INTRAVENOUS at 21:08

## 2023-05-22 RX ADMIN — PROPOFOL 35 MCG/KG/MIN: 10 INJECTION, EMULSION INTRAVENOUS at 16:00

## 2023-05-22 RX ADMIN — PROPOFOL 30 MCG/KG/MIN: 10 INJECTION, EMULSION INTRAVENOUS at 21:13

## 2023-05-22 RX ADMIN — POLYVINYL ALCOHOL 1 DROP: 14 SOLUTION/ DROPS OPHTHALMIC at 11:44

## 2023-05-22 RX ADMIN — GLYCOPYRROLATE 0.1 MG: 0.2 INJECTION INTRAMUSCULAR; INTRAVENOUS at 16:01

## 2023-05-22 RX ADMIN — LEVETIRACETAM 500 MG: 100 INJECTION INTRAVENOUS at 16:55

## 2023-05-22 RX ADMIN — GLYCOPYRROLATE 0.1 MG: 0.2 INJECTION INTRAMUSCULAR; INTRAVENOUS at 21:08

## 2023-05-22 RX ADMIN — POLYVINYL ALCOHOL 1 DROP: 14 SOLUTION/ DROPS OPHTHALMIC at 00:09

## 2023-05-22 RX ADMIN — SODIUM CHLORIDE, POTASSIUM CHLORIDE, SODIUM LACTATE AND CALCIUM CHLORIDE: 600; 310; 30; 20 INJECTION, SOLUTION INTRAVENOUS at 08:35

## 2023-05-22 RX ADMIN — PROPOFOL 25 MCG/KG/MIN: 10 INJECTION, EMULSION INTRAVENOUS at 03:38

## 2023-05-22 RX ADMIN — SODIUM CHLORIDE, POTASSIUM CHLORIDE, SODIUM LACTATE AND CALCIUM CHLORIDE: 600; 310; 30; 20 INJECTION, SOLUTION INTRAVENOUS at 21:13

## 2023-05-22 RX ADMIN — SODIUM CHLORIDE, PRESERVATIVE FREE 40 MG: 5 INJECTION INTRAVENOUS at 08:47

## 2023-05-22 RX ADMIN — GLYCOPYRROLATE 0.1 MG: 0.2 INJECTION INTRAMUSCULAR; INTRAVENOUS at 12:30

## 2023-05-22 RX ADMIN — ZIPRASIDONE MESYLATE 20 MG: 20 INJECTION, POWDER, LYOPHILIZED, FOR SOLUTION INTRAMUSCULAR at 21:12

## 2023-05-22 RX ADMIN — SODIUM CHLORIDE, PRESERVATIVE FREE 10 ML: 5 INJECTION INTRAVENOUS at 08:48

## 2023-05-22 RX ADMIN — ENOXAPARIN SODIUM 40 MG: 100 INJECTION SUBCUTANEOUS at 08:47

## 2023-05-22 RX ADMIN — Medication 300 MCG/HR: at 18:35

## 2023-05-22 RX ADMIN — FOSPHENYTOIN SODIUM 100 MG PE: 50 INJECTION, SOLUTION INTRAMUSCULAR; INTRAVENOUS at 20:00

## 2023-05-22 RX ADMIN — LEVETIRACETAM 500 MG: 100 INJECTION INTRAVENOUS at 05:54

## 2023-05-22 RX ADMIN — CHLORHEXIDINE GLUCONATE 15 ML: 1.2 RINSE ORAL at 08:48

## 2023-05-22 RX ADMIN — POLYVINYL ALCOHOL 1 DROP: 14 SOLUTION/ DROPS OPHTHALMIC at 08:48

## 2023-05-22 RX ADMIN — MIDAZOLAM HYDROCHLORIDE 10 MG/HR: 1 INJECTION, SOLUTION INTRAVENOUS at 16:53

## 2023-05-22 RX ADMIN — POLYVINYL ALCOHOL 1 DROP: 14 SOLUTION/ DROPS OPHTHALMIC at 16:01

## 2023-05-22 RX ADMIN — FOSPHENYTOIN SODIUM 100 MG PE: 50 INJECTION, SOLUTION INTRAMUSCULAR; INTRAVENOUS at 11:44

## 2023-05-22 RX ADMIN — FOSPHENYTOIN SODIUM 100 MG PE: 50 INJECTION, SOLUTION INTRAMUSCULAR; INTRAVENOUS at 03:38

## 2023-05-22 ASSESSMENT — PULMONARY FUNCTION TESTS
PIF_VALUE: 20
PIF_VALUE: 26
PIF_VALUE: 20
PIF_VALUE: 20
PIF_VALUE: 22
PIF_VALUE: 29
PIF_VALUE: 21
PIF_VALUE: 20
PIF_VALUE: 23
PIF_VALUE: 29
PIF_VALUE: 23
PIF_VALUE: 22
PIF_VALUE: 19
PIF_VALUE: 26
PIF_VALUE: 25
PIF_VALUE: 20
PIF_VALUE: 19
PIF_VALUE: 29
PIF_VALUE: 19
PIF_VALUE: 27
PIF_VALUE: 19
PIF_VALUE: 21
PIF_VALUE: 17
PIF_VALUE: 24
PIF_VALUE: 18
PIF_VALUE: 28
PIF_VALUE: 25
PIF_VALUE: 22

## 2023-05-22 NOTE — PROGRESS NOTES
Comprehensive Nutrition Assessment    Type and Reason for Visit:  Reassess    Nutrition Recommendations/Plan:   Continue to work to new EN goal rate as tolerated: 50 ml/hr Jevity 1.2. Malnutrition Assessment:  Malnutrition Status: At risk for malnutrition (Comment) (05/20/23 7456)    Context:  Acute Illness     Findings of the 6 clinical characteristics of malnutrition:  Energy Intake:  Mild decrease in energy intake (Comment)  Weight Loss:  No significant weight loss     Body Fat Loss:  No significant body fat loss     Muscle Mass Loss:  No significant muscle mass loss    Fluid Accumulation:  No significant fluid accumulation Extremities   Strength:  Not Performed    Nutrition Assessment:    EN infusing, tolerating well at this time at 35 ml/hr. Pt now recieving propofol @ 12.8 ml/hr providing 338 non-protein kcals/day. Continue advancing as tolerated until new goal rate achieved of 55 ml/hr. Nutrition Related Findings:      Wound Type: None       Current Nutrition Intake & Therapies:    Average Meal Intake: NPO  Average Supplements Intake: NPO  Diet NPO  ADULT TUBE FEEDING; Orogastric; Other Tube Feeding (specify); Jevity 1.2; Continuous; 25; Yes; 5; Q 6 hours; 66; 25; Q 1 hour  Current Tube Feeding (TF) Orders:  Feeding Route: Orogastric  Formula:  (jevity 1.2)  Schedule: Continuous  Feeding Regimen: Jevity 1.2 @ 55 ml/hr  Additives/Modulars: None  Water Flushes: 25ml hourly  Current TF & Flush Orders Provides: Jevity 1.2 @ 35 ml/hr with 25 ml/hr H2O flush = 1008 kcals,47 g protein, 142 g CHO, 1278 mL/fluid/day from formula and flush. Propofol adds 338 NP kcals/day  Goal TF & Flush Orders Provides: Jevity 1.2 @ 50 ml/hr with 25 ml/hr H2O flush = 1584 kcals, 73 g protein, 224 g CHO, 1665 mL/fluid/day from formula and flush. Additional Calorie Sources:  propofol @ 12.8 ml/hr providing 338 non-protein kcals/day.     Anthropometric Measures:  Height: 5' 8\" (172.7 cm)  Ideal Body Weight (IBW): 140 lbs (64

## 2023-05-22 NOTE — PROGRESS NOTES
Patient sedation decreased. Patient not following commands. Patient moves all extremities and makes eye contact. Patient placed on SBT FIO2 30, PEEP 5. Patient noted to have copious secretions. Patient suctioned. Shortly after switching to CPAP, Desat to 60%. Volumes in 200s. . Patient placed on previous vent settings. VS stable at this time. O2 sat back up to 94% on minimal vent. Dr. Elsy Duggan notified, orders received for Jeromyinul.  Will attempt another breathing trial in AM.

## 2023-05-22 NOTE — PROCEDURES
SILVIONCANABELLA PALACIOS [facility] OF [facility] [address] [facility]                          ELECTROENCEPHALOGRAM REPORT    PATIENT NAME: Emily Dias                     :        1995  MED REC NO:   [id number]                              ROOM:       [facility]  ACCOUNT NO:   [de-identified]                           ADMIT DATE: 2023  PROVIDER:     Jass Hernandez MD    DATE OF EE2023    INDICATION FOR TEST:  Status epilepticus. DESCRIPTION:  This patient is sedated and intubated throughout the  tracing. An 11 or 12 Hz activity is seen throughout the tracing. There  is no focal slowing nor any epileptiform activity noted. IMPRESSION:  Normal sedated-appearing EEG with patient appearing to be  asleep. No epileptiform activity noted. Correlate clinically.         Isauro Palma MD    D: 2023 14:19:29      T: 2023 15:10:33     [id number]  Job#: [id number]     Doc#: [id number]    CC:

## 2023-05-22 NOTE — PLAN OF CARE
Problem: Safety - Medical Restraint  Goal: Remains free of injury from restraints (Restraint for Interference with Medical Device)  Description: INTERVENTIONS:  1. Determine that other, less restrictive measures have been tried or would not be effective before applying the restraint  2. Evaluate the patient's condition at the time of restraint application  3. Inform patient/family regarding the reason for restraint  4.  Q2H: Monitor safety, psychosocial status, comfort, nutrition and hydration  Outcome: Progressing     Problem: Discharge Planning  Goal: Discharge to home or other facility with appropriate resources  5/22/2023 0969 by Robert Kohli RN  Outcome: Progressing  5/21/2023 0632 by Beatris Oreilly RN  Outcome: Progressing     Problem: Pain  Goal: Verbalizes/displays adequate comfort level or baseline comfort level  Outcome: Progressing     Problem: Safety - Adult  Goal: Free from fall injury  Outcome: Progressing     Problem: Nutrition Deficit:  Goal: Optimize nutritional status  Outcome: Progressing

## 2023-05-22 NOTE — PROGRESS NOTES
Hospitalist Progress Note  [facility]     Patient: Juan Elder  : 1995  MRN: [id number]  Code Status: Full Code    Hospital Day: 3   Date of Service: 2023    Subjective:   Patient seen and examined. Intubated and sedated.     Past Medical History:   Diagnosis Date    Anxiety     Autism     Depression     Epile[address] ([facility] [address]     Seizures ([facility] [address]        Past Surgical History:   Procedure Laterality Date     SECTION      CHOLECYSTECTOMY      UPPER GASTROINTESTINAL ENDOSCOPY N/A 2023    Dr Bronson Carvalho enteroscopy-No batteries found in the esophagus, stomach, duodenum and jejunum, gastric erosions       Family History   Problem Relation Age of Onset    Diabetes Mother     Migraines Mother     Alcohol Abuse Father     Drug Abuse Father     Migraines Brother     Other Son         hydrocephalus    Autism Son     Autism Son        Social History     Socioeconomic History    Marital status:      Spouse name: Not on file    Number of children: Not on file    Years of education: Not on file    Highest education level: Not on file   Occupational History    Not on file   Tobacco Use    Smoking status: Never    Smokeless tobacco: Never   Vaping Use    Vaping Use: Never used   Substance and Sexual Activity    Alcohol use: Never    Drug use: Yes     Types: Marijuana Jonita Ibrahim)     Comment: Delta 8    Sexual activity: Yes   Other Topics Concern    Not on file   Social History Narrative    Not on file     Social Determinants of Health     Financial Resource Strain: Not on file   Food Insecurity: Not on file   Transportation Needs: Not on file   Physical Activity: Not on file   Stress: Not on file   Social Connections: Not on file   Intimate Partner Violence: Not on file   Housing Stability: Not on file       Current Facility-Administered Medications   Medication Dose Route Frequency Provider Last Rate Last Admin    Glycopyrrolate injection 0.1 mg  0.1 mg IntraVENous 4x Daily Dirk Adler

## 2023-05-22 NOTE — PROGRESS NOTES
Patient:   Kitty Robledo  MR#:    [id number]   Room:    0150/150-01   YOB: 1995  Date of Progress Note: 5/22/2023  Time of Note                           10:32 AM  Consulting Physician:   Devonte Colunga M.D. Attending Physician:  Diego Arnold MD     CHIEF COMPLAINT: Seizures  Subjective  This 33 y.o. female who presents with seizures. She said she felt like 1 might come on while she was fishing with her . They packed things up and he called an ambulance. Reportedly had numerous episodes of tonic-clonic type activity in the ED requiring numerous rounds of Ativan and subsequent intubation for airway protection. Has a history of high functioning autism and PTSD with pica. She has been here several times and is seeing all of our neurologist and has had continuous video EEG on numerous occasions and never found to have any epileptiform discharges. She had this happen at [facility] as well as [place] in their EMU. She clearly has nonepileptic events but the question is whether or not any of them are truly epileptic at all. Clinically she looks very convincing but has had normal EEGs and very manipulative behavior. Remains intubated. No complaints. Alerts to voice and follows simple commands  REVIEW OF SYSTEMS:  Unable to obtain as patient is intubated      PHYSICAL EXAM:  BP (!) 92/58   Pulse 62   Temp (!) 96.4 °F (35.8 °C) (Temporal)   Resp 14   Ht 5' 8\" (1.727 m)   Wt 189 lb 8 oz (86 kg)   SpO2 100%   BMI 28.81 kg/m²     Constitutional: she appears well-developed and well-nourished. Eyes - conjunctiva normal.  Pupils react to light  Ear, nose, throat -hearing intact to voice.  No scars, masses, or lesions over external nose or ears, no atrophy of tongue  Neck-symmetric, no masses noted, no jugular vein distension  Respiration- chest wall appears symmetric, good expansion,   normal effort without use of accessory muscles  Cardiovascular- RRR  Musculoskeletal - no

## 2023-05-23 ENCOUNTER — APPOINTMENT (OUTPATIENT)
Dept: GENERAL RADIOLOGY | Age: 28
DRG: 101 | End: 2023-05-23

## 2023-05-23 VITALS
WEIGHT: 187.5 LBS | SYSTOLIC BLOOD PRESSURE: 119 MMHG | HEART RATE: 119 BPM | BODY MASS INDEX: 28.42 KG/M2 | DIASTOLIC BLOOD PRESSURE: 56 MMHG | TEMPERATURE: 97.4 F | HEIGHT: 68 IN | OXYGEN SATURATION: 84 % | RESPIRATION RATE: 29 BRPM

## 2023-05-23 LAB
ALLENS TEST: ABNORMAL
ANION GAP SERPL CALCULATED.3IONS-SCNC: 14 MMOL/L (ref 7–19)
BASE EXCESS ARTERIAL: 1.2 MMOL/L (ref -2–2)
BASOPHILS # BLD: 0 K/UL (ref 0–0.2)
BASOPHILS NFR BLD: 0.7 % (ref 0–1)
BUN SERPL-MCNC: 4 MG/DL (ref 6–20)
CALCIUM SERPL-MCNC: 9 MG/DL (ref 8.6–10)
CARBOXYHEMOGLOBIN ARTERIAL: 1.9 % (ref 0–5)
CHLORIDE SERPL-SCNC: 105 MMOL/L (ref 98–111)
CO2 SERPL-SCNC: 19 MMOL/L (ref 22–29)
CREAT SERPL-MCNC: 0.6 MG/DL (ref 0.5–0.9)
EOSINOPHIL # BLD: 0.1 K/UL (ref 0–0.6)
EOSINOPHIL NFR BLD: 2.8 % (ref 0–5)
ERYTHROCYTE [DISTWIDTH] IN BLOOD BY AUTOMATED COUNT: 15.8 % (ref 11.5–14.5)
FIO2: 30 %
GLUCOSE SERPL-MCNC: 95 MG/DL (ref 74–109)
HCO3 ARTERIAL: 27.1 MMOL/L (ref 22–26)
HCT VFR BLD AUTO: 35.1 % (ref 37–47)
HEMOGLOBIN, ART, EXTENDED: 10.1 G/DL (ref 12–16)
HGB BLD-MCNC: 10.5 G/DL (ref 12–16)
IMM GRANULOCYTES # BLD: 0.1 K/UL
LYMPHOCYTES # BLD: 1.3 K/UL (ref 1.1–4.5)
LYMPHOCYTES NFR BLD: 31.8 % (ref 20–40)
MAGNESIUM SERPL-MCNC: 2.3 MG/DL (ref 1.6–2.6)
MCH RBC QN AUTO: 25.8 PG (ref 27–31)
MCHC RBC AUTO-ENTMCNC: 29.9 G/DL (ref 33–37)
MCV RBC AUTO: 86.2 FL (ref 81–99)
MECHANICAL RATE IN BPM: 14
METHEMOGLOBIN ARTERIAL: 0.9 %
MODE: ABNORMAL
MONOCYTES # BLD: 0.4 K/UL (ref 0–0.9)
MONOCYTES NFR BLD: 9.7 % (ref 0–10)
NEUTROPHILS # BLD: 2.3 K/UL (ref 1.5–7.5)
NEUTS SEG NFR BLD: 53.8 % (ref 50–65)
O2 CONTENT ARTERIAL: 13.9 ML/DL
O2 DELIVERY DEVICE: ABNORMAL
O2 SAT, ARTERIAL: 96.5 %
O2 THERAPY: ABNORMAL
PCO2 ARTERIAL: 48 MMHG (ref 35–45)
PH ARTERIAL: 7.36 (ref 7.35–7.45)
PLATELET # BLD AUTO: 164 K/UL (ref 130–400)
PLATELET SLIDE REVIEW: ADEQUATE
PMV BLD AUTO: 12.3 FL (ref 9.4–12.3)
PO2 ARTERIAL: 107 MMHG (ref 80–100)
POSITIVE END EXP PRESS: 5
POTASSIUM BLD-SCNC: 3.9 MMOL/L
POTASSIUM SERPL-SCNC: 4.4 MMOL/L (ref 3.5–5)
RBC # BLD AUTO: 4.07 M/UL (ref 4.2–5.4)
SAMPLE SOURCE: ABNORMAL
SODIUM SERPL-SCNC: 138 MMOL/L (ref 136–145)
VT MECHANICAL: 450 %
WBC # BLD AUTO: 4.2 K/UL (ref 4.8–10.8)

## 2023-05-23 PROCEDURE — 36600 WITHDRAWAL OF ARTERIAL BLOOD: CPT

## 2023-05-23 PROCEDURE — 94760 N-INVAS EAR/PLS OXIMETRY 1: CPT

## 2023-05-23 PROCEDURE — 2700000000 HC OXYGEN THERAPY PER DAY

## 2023-05-23 PROCEDURE — 2500000003 HC RX 250 WO HCPCS: Performed by: INTERNAL MEDICINE

## 2023-05-23 PROCEDURE — 2580000003 HC RX 258: Performed by: INTERNAL MEDICINE

## 2023-05-23 PROCEDURE — 85025 COMPLETE CBC W/AUTO DIFF WBC: CPT

## 2023-05-23 PROCEDURE — 82803 BLOOD GASES ANY COMBINATION: CPT

## 2023-05-23 PROCEDURE — 36415 COLL VENOUS BLD VENIPUNCTURE: CPT

## 2023-05-23 PROCEDURE — 6360000002 HC RX W HCPCS: Performed by: INTERNAL MEDICINE

## 2023-05-23 PROCEDURE — 94003 VENT MGMT INPAT SUBQ DAY: CPT

## 2023-05-23 PROCEDURE — 6360000002 HC RX W HCPCS: Performed by: HOSPITALIST

## 2023-05-23 PROCEDURE — 99232 SBSQ HOSP IP/OBS MODERATE 35: CPT | Performed by: PSYCHIATRY & NEUROLOGY

## 2023-05-23 PROCEDURE — 80048 BASIC METABOLIC PNL TOTAL CA: CPT

## 2023-05-23 PROCEDURE — 83735 ASSAY OF MAGNESIUM: CPT

## 2023-05-23 PROCEDURE — C9113 INJ PANTOPRAZOLE SODIUM, VIA: HCPCS | Performed by: INTERNAL MEDICINE

## 2023-05-23 PROCEDURE — 71045 X-RAY EXAM CHEST 1 VIEW: CPT

## 2023-05-23 RX ORDER — OLANZAPINE 10 MG/1
10 INJECTION, POWDER, LYOPHILIZED, FOR SOLUTION INTRAMUSCULAR ONCE
Status: COMPLETED | OUTPATIENT
Start: 2023-05-23 | End: 2023-05-23

## 2023-05-23 RX ADMIN — Medication 275 MCG/HR: at 03:39

## 2023-05-23 RX ADMIN — POLYVINYL ALCOHOL 1 DROP: 14 SOLUTION/ DROPS OPHTHALMIC at 00:16

## 2023-05-23 RX ADMIN — FOSPHENYTOIN SODIUM 100 MG PE: 50 INJECTION, SOLUTION INTRAMUSCULAR; INTRAVENOUS at 11:53

## 2023-05-23 RX ADMIN — CHLORHEXIDINE GLUCONATE 15 ML: 1.2 RINSE ORAL at 08:56

## 2023-05-23 RX ADMIN — ENOXAPARIN SODIUM 40 MG: 100 INJECTION SUBCUTANEOUS at 08:56

## 2023-05-23 RX ADMIN — LEVETIRACETAM 500 MG: 100 INJECTION INTRAVENOUS at 05:31

## 2023-05-23 RX ADMIN — GLYCOPYRROLATE 0.1 MG: 0.2 INJECTION INTRAMUSCULAR; INTRAVENOUS at 08:57

## 2023-05-23 RX ADMIN — GLYCOPYRROLATE 0.1 MG: 0.2 INJECTION INTRAMUSCULAR; INTRAVENOUS at 11:53

## 2023-05-23 RX ADMIN — POLYVINYL ALCOHOL 1 DROP: 14 SOLUTION/ DROPS OPHTHALMIC at 08:57

## 2023-05-23 RX ADMIN — OLANZAPINE 10 MG: 10 INJECTION, POWDER, FOR SOLUTION INTRAMUSCULAR at 09:34

## 2023-05-23 RX ADMIN — MIDAZOLAM HYDROCHLORIDE 10 MG/HR: 1 INJECTION, SOLUTION INTRAVENOUS at 03:39

## 2023-05-23 RX ADMIN — LORAZEPAM 2 MG: 2 INJECTION INTRAMUSCULAR; INTRAVENOUS at 06:08

## 2023-05-23 RX ADMIN — POLYVINYL ALCOHOL 1 DROP: 14 SOLUTION/ DROPS OPHTHALMIC at 03:37

## 2023-05-23 RX ADMIN — SODIUM CHLORIDE, PRESERVATIVE FREE 40 MG: 5 INJECTION INTRAVENOUS at 08:56

## 2023-05-23 RX ADMIN — FOSPHENYTOIN SODIUM 100 MG PE: 50 INJECTION, SOLUTION INTRAMUSCULAR; INTRAVENOUS at 03:37

## 2023-05-23 RX ADMIN — PROPOFOL 30 MCG/KG/MIN: 10 INJECTION, EMULSION INTRAVENOUS at 03:38

## 2023-05-23 ASSESSMENT — PULMONARY FUNCTION TESTS
PIF_VALUE: 35
PIF_VALUE: 21
PIF_VALUE: 30
PIF_VALUE: 31
PIF_VALUE: 21
PIF_VALUE: 20
PIF_VALUE: 21
PIF_VALUE: 25
PIF_VALUE: 30
PIF_VALUE: 25
PIF_VALUE: 20
PIF_VALUE: 31

## 2023-05-23 NOTE — PROGRESS NOTES
Patient demanding to leave AMA. RN walked in the room to find J loop from IV bit into. Oxygen tubing was bit in half, and other IV from L arm is missing. Uncertain if patient ate it. Patient yelling at staff to call her mom and that she will not stop eating objects until she talks to her mother. Patient mother was called. Mother was able to talk to the patient and to calm her. Mother states she will come get her if the patient is discharged by the doctor. Dr. Yenifer Dent notified and at bedside.

## 2023-05-23 NOTE — PLAN OF CARE
Problem: Safety - Medical Restraint  Goal: Remains free of injury from restraints (Restraint for Interference with Medical Device)  Description: INTERVENTIONS:  1. Determine that other, less restrictive measures have been tried or would not be effective before applying the restraint  2. Evaluate the patient's condition at the time of restraint application  3. Inform patient/family regarding the reason for restraint  4.  Q2H: Monitor safety, psychosocial status, comfort, nutrition and hydration  Outcome: Progressing     Problem: Discharge Planning  Goal: Discharge to home or other facility with appropriate resources  Outcome: Progressing     Problem: Pain  Goal: Verbalizes/displays adequate comfort level or baseline comfort level  Outcome: Progressing     Problem: Safety - Adult  Goal: Free from fall injury  Outcome: Progressing     Problem: Nutrition Deficit:  Goal: Optimize nutritional status  Outcome: Progressing

## 2023-05-23 NOTE — PROGRESS NOTES
Patient:   Lionel Tay  MR#:    [id number]   Room:    0150/150-01   YOB: 1995  Date of Progress Note: 5/23/2023  Time of Note                           9:37 AM  Consulting Physician:   Alek Hamm M.D. Attending Physician:  Bipni Gonzalez MD     CHIEF COMPLAINT: Seizures  Subjective  This 33 y.o. female who presents with seizures. She said she felt like 1 might come on while she was fishing with her . They packed things up and he called an ambulance. Reportedly had numerous episodes of tonic-clonic type activity in the ED requiring numerous rounds of Ativan and subsequent intubation for airway protection. Has a history of high functioning autism and PTSD with pica. She has been here several times and is seeing all of our neurologist and has had continuous video EEG on numerous occasions and never found to have any epileptiform discharges. She had this happen at [facility] as well as [place] in their EMU. She clearly has nonepileptic events but the question is whether or not any of them are truly epileptic at all. Clinically she looks very convincing but has had normal EEGs and very manipulative behavior. Remains intubated. No complaints. Alerts to voice and follows simple commands. Has an occasional brief like seizure-like episode with no postictal state  REVIEW OF SYSTEMS:  Unable to obtain as patient is intubated      PHYSICAL EXAM:  BP (!) 91/50   Pulse 63   Temp 97.4 °F (36.3 °C) (Temporal)   Resp 16   Ht 5' 8\" (1.727 m)   Wt 187 lb 8 oz (85 kg)   SpO2 100%   BMI 28.51 kg/m²     Constitutional: she appears well-developed and well-nourished. Eyes - conjunctiva normal.  Pupils react to light  Ear, nose, throat -hearing intact to voice.  No scars, masses, or lesions over external nose or ears, no atrophy of tongue  Neck-symmetric, no masses noted, no jugular vein distension  Respiration- chest wall appears symmetric, good expansion,   normal effort

## 2023-05-23 NOTE — PROGRESS NOTES
Physician Progress Note      PATIENTNat Osler  CSN #:                  [id number]  :                       1995  ADMIT DATE:       2023 5:57 PM  [address] [place] DATE:  Izquierdo Lexi  PROVIDER #:        Geovanny Ortiz MD          QUERY TEXT:    Patient admitted with seizures. Noted documentation of intubation for airway   protection in H/P dated 23 and acute respiratory failure in pnotes dated   23. Please indicate one of the following and document in the medical   record: The medical record reflects the following:  Risk Factors: seizures  Clinical Indicators: presented with seizures and treated with Ativan which   resulted in pt becoming briefly hypoxic; RR 14-16; documentation reflects \"no   respiratory distress\"  Treatment: intubation and ventilation    Thank you,  Татьяна Farley [phone]  Options provided:  -- Intubated for Acute Respiratory Failure as evidenced by, Please document   evidence. -- Intubated for airway protection only, Acute Respiratory Failure ruled out   after study  -- Other - I will add my own diagnosis  -- Disagree - Not applicable / Not valid  -- Disagree - Clinically unable to determine / Unknown  -- Refer to Clinical Documentation Reviewer    PROVIDER RESPONSE TEXT:    This patient was intubated for airway protection only, Acute Respiratory   Failure has been ruled out after study.     Query created by: Morena Bender on 2023 8:56 AM      Electronically signed by:  Geovanny Ortiz MD 2023 2:50 PM

## 2023-05-23 NOTE — DISCHARGE SUMMARY
Hospitalist Discharge Summary  [facility]    Patient: Lesta Carrel  : 1995  MRN: [id number]  Code Status: Full Code  PCP: Diogenes Huber  Attending: Chad Aguiar MD  Admission Date: 2023  Discharge Date: 2023    Discharge Medications:     Current Discharge Medication List             Details   lacosamide (VIMPAT) 100 MG TABS tablet Take 1 tablet by mouth 2 times daily.  Max Daily Amount: 200 mg      levETIRAcetam (KEPPRA) 750 MG tablet Take 2 tablets by mouth 2 times daily      Melatonin 10 MG TABS Take by mouth      lamoTRIgine (LAMICTAL) 25 MG tablet Take 10 tablets by mouth daily  Qty: 30 tablet, Refills: 1      prazosin (MINIPRESS) 1 MG capsule Take 1 capsule by mouth nightly  Qty: 30 capsule, Refills: 1      risperiDONE (RISPERDAL) 2 MG tablet Take 1 tablet by mouth nightly  Qty: 60 tablet, Refills: 1      ferrous sulfate (IRON 325) 325 (65 Fe) MG tablet Take 1 tablet by mouth 2 times daily (with meals)  Qty: 30 tablet, Refills: 3      FLUoxetine (PROZAC) 40 MG capsule Take 2 capsules by mouth daily            Discharge Instructions:   Recommended Follow Up:  MD Richard Humphrey [phone]    Schedule an appointment as soon as possible for a visit      Riana Donis  [facility] [phone]    Schedule an appointment as soon as possible for a visit      [facility] EMERGENCY DEPT  [facility]  [phone]  Go to  As needed    Future Appointments Scheduled at Time of Discharge:  Future Appointments   Date Time Provider Ana Paula Parra   2023  3:15 PM Robin Black Neurology -      Hospital Course:   Query status epilepticus versus pseudoseizures resolved  Ventilator dependent acute respiratory failure resolved   Medical noncompliance counseled  Autism     Neurology following  Neurology suspects nonepileptiform process  Bedside EEG unremarkable per neurology  No

## 2023-05-23 NOTE — PROGRESS NOTES
Discharge instructions explained to patient mother. Patient mother states she will make follow up appointment with Aidee Richey since they have a busy schedule and patient cannot drive.  Patient currently has an appointment with Dr. Janey Interiano in the next 2 weeks so neurology follow up was not scheduled

## 2023-05-23 NOTE — PROGRESS NOTES
Patient trying to self extubate. Patient unable to remain calm to complete breathing trial. Dr. Ruben Samuels notified and at bedside. Orders received to extubate while Dr. Ruben Samuels is in the room. Patient was suctioned and extubated to 2L NC. Patient tolerated well. Sats at 94% and resp rate at 19.  VS stable

## 2023-05-27 ENCOUNTER — HOSPITAL ENCOUNTER (OUTPATIENT)
Age: 28
Setting detail: OBSERVATION
Discharge: HOME | End: 2023-05-28
Attending: PEDIATRICS | Admitting: INTERNAL MEDICINE

## 2023-05-27 ENCOUNTER — APPOINTMENT (OUTPATIENT)
Dept: GENERAL RADIOLOGY | Age: 28
End: 2023-05-27

## 2023-05-27 DIAGNOSIS — G40.919 BREAKTHROUGH SEIZURE (HCC): ICD-10-CM

## 2023-05-27 DIAGNOSIS — T18.9XXA FOREIGN BODY INGESTION, INITIAL ENCOUNTER: Primary | ICD-10-CM

## 2023-05-27 LAB
ALBUMIN SERPL-MCNC: 4.5 G/DL (ref 3.5–5.2)
ALP SERPL-CCNC: 96 U/L (ref 35–104)
ALT SERPL-CCNC: 17 U/L (ref 5–33)
ANION GAP SERPL CALCULATED.3IONS-SCNC: 16 MMOL/L (ref 7–19)
ANISOCYTOSIS BLD QL SMEAR: ABNORMAL
AST SERPL-CCNC: 29 U/L (ref 5–32)
BASOPHILS # BLD: 0.1 K/UL (ref 0–0.2)
BASOPHILS NFR BLD: 0.9 % (ref 0–1)
BILIRUB SERPL-MCNC: <0.2 MG/DL (ref 0.2–1.2)
BUN SERPL-MCNC: 8 MG/DL (ref 6–20)
CALCIUM SERPL-MCNC: 9.6 MG/DL (ref 8.6–10)
CHLORIDE SERPL-SCNC: 102 MMOL/L (ref 98–111)
CO2 SERPL-SCNC: 19 MMOL/L (ref 22–29)
CREAT SERPL-MCNC: 0.7 MG/DL (ref 0.5–0.9)
EOSINOPHIL # BLD: 0.1 K/UL (ref 0–0.6)
EOSINOPHIL NFR BLD: 0.5 % (ref 0–5)
ERYTHROCYTE [DISTWIDTH] IN BLOOD BY AUTOMATED COUNT: 16 % (ref 11.5–14.5)
GLUCOSE SERPL-MCNC: 116 MG/DL (ref 74–109)
HCT VFR BLD AUTO: 38.9 % (ref 37–47)
HGB BLD-MCNC: 11.1 G/DL (ref 12–16)
HYPOCHROMIA BLD QL SMEAR: ABNORMAL
IMM GRANULOCYTES # BLD: 0 K/UL
LYMPHOCYTES # BLD: 1.4 K/UL (ref 1.1–4.5)
LYMPHOCYTES NFR BLD: 14.7 % (ref 20–40)
MCH RBC QN AUTO: 25.6 PG (ref 27–31)
MCHC RBC AUTO-ENTMCNC: 28.5 G/DL (ref 33–37)
MCV RBC AUTO: 89.6 FL (ref 81–99)
MONOCYTES # BLD: 0.8 K/UL (ref 0–0.9)
MONOCYTES NFR BLD: 8.5 % (ref 0–10)
NEUTROPHILS # BLD: 6.9 K/UL (ref 1.5–7.5)
NEUTS SEG NFR BLD: 75 % (ref 50–65)
PLATELET # BLD AUTO: 257 K/UL (ref 130–400)
PLATELET SLIDE REVIEW: ADEQUATE
PMV BLD AUTO: 11.6 FL (ref 9.4–12.3)
POTASSIUM SERPL-SCNC: 4.7 MMOL/L (ref 3.5–5)
PROT SERPL-MCNC: 7 G/DL (ref 6.6–8.7)
RBC # BLD AUTO: 4.34 M/UL (ref 4.2–5.4)
SODIUM SERPL-SCNC: 137 MMOL/L (ref 136–145)
WBC # BLD AUTO: 9.3 K/UL (ref 4.8–10.8)

## 2023-05-27 PROCEDURE — 36415 COLL VENOUS BLD VENIPUNCTURE: CPT

## 2023-05-27 PROCEDURE — 84703 CHORIONIC GONADOTROPIN ASSAY: CPT

## 2023-05-27 PROCEDURE — 6360000002 HC RX W HCPCS: Performed by: PEDIATRICS

## 2023-05-27 PROCEDURE — 74022 RADEX COMPL AQT ABD SERIES: CPT

## 2023-05-27 PROCEDURE — 85025 COMPLETE CBC W/AUTO DIFF WBC: CPT

## 2023-05-27 PROCEDURE — 99285 EMERGENCY DEPT VISIT HI MDM: CPT

## 2023-05-27 PROCEDURE — 80053 COMPREHEN METABOLIC PANEL: CPT

## 2023-05-27 PROCEDURE — 96372 THER/PROPH/DIAG INJ SC/IM: CPT

## 2023-05-27 RX ORDER — ZIPRASIDONE MESYLATE 20 MG/ML
20 INJECTION, POWDER, LYOPHILIZED, FOR SOLUTION INTRAMUSCULAR ONCE
Status: COMPLETED | OUTPATIENT
Start: 2023-05-27 | End: 2023-05-27

## 2023-05-27 RX ADMIN — ZIPRASIDONE MESYLATE 20 MG: 20 INJECTION, POWDER, LYOPHILIZED, FOR SOLUTION INTRAMUSCULAR at 23:08

## 2023-05-28 ENCOUNTER — ANESTHESIA (OUTPATIENT)
Dept: ENDOSCOPY | Age: 28
End: 2023-05-28

## 2023-05-28 ENCOUNTER — ANESTHESIA EVENT (OUTPATIENT)
Dept: ENDOSCOPY | Age: 28
End: 2023-05-28

## 2023-05-28 VITALS
DIASTOLIC BLOOD PRESSURE: 60 MMHG | OXYGEN SATURATION: 100 % | RESPIRATION RATE: 16 BRPM | SYSTOLIC BLOOD PRESSURE: 106 MMHG | HEART RATE: 75 BPM | TEMPERATURE: 97.2 F

## 2023-05-28 LAB — HCG SERPL QL: NEGATIVE

## 2023-05-28 PROCEDURE — G0378 HOSPITAL OBSERVATION PER HR: HCPCS

## 2023-05-28 PROCEDURE — 2500000003 HC RX 250 WO HCPCS: Performed by: ANESTHESIOLOGY

## 2023-05-28 PROCEDURE — 3700000001 HC ADD 15 MINUTES (ANESTHESIA): Performed by: INTERNAL MEDICINE

## 2023-05-28 PROCEDURE — 3609012900 HC EGD FOREIGN BODY REMOVAL: Performed by: INTERNAL MEDICINE

## 2023-05-28 PROCEDURE — 6360000002 HC RX W HCPCS: Performed by: ANESTHESIOLOGY

## 2023-05-28 PROCEDURE — 7100000001 HC PACU RECOVERY - ADDTL 15 MIN: Performed by: INTERNAL MEDICINE

## 2023-05-28 PROCEDURE — 3700000000 HC ANESTHESIA ATTENDED CARE: Performed by: INTERNAL MEDICINE

## 2023-05-28 PROCEDURE — 7100000000 HC PACU RECOVERY - FIRST 15 MIN: Performed by: INTERNAL MEDICINE

## 2023-05-28 PROCEDURE — 43247 EGD REMOVE FOREIGN BODY: CPT | Performed by: INTERNAL MEDICINE

## 2023-05-28 PROCEDURE — 2709999900 HC NON-CHARGEABLE SUPPLY: Performed by: INTERNAL MEDICINE

## 2023-05-28 RX ORDER — SODIUM CHLORIDE 0.9 % (FLUSH) 0.9 %
5-40 SYRINGE (ML) INJECTION PRN
Status: DISCONTINUED | OUTPATIENT
Start: 2023-05-28 | End: 2023-05-28 | Stop reason: HOSPADM

## 2023-05-28 RX ORDER — ONDANSETRON 2 MG/ML
INJECTION INTRAMUSCULAR; INTRAVENOUS PRN
Status: DISCONTINUED | OUTPATIENT
Start: 2023-05-28 | End: 2023-05-28 | Stop reason: SDUPTHER

## 2023-05-28 RX ORDER — SODIUM CHLORIDE 9 MG/ML
INJECTION, SOLUTION INTRAVENOUS PRN
Status: DISCONTINUED | OUTPATIENT
Start: 2023-05-28 | End: 2023-05-28 | Stop reason: HOSPADM

## 2023-05-28 RX ORDER — PROPOFOL 10 MG/ML
INJECTION, EMULSION INTRAVENOUS PRN
Status: DISCONTINUED | OUTPATIENT
Start: 2023-05-28 | End: 2023-05-28 | Stop reason: SDUPTHER

## 2023-05-28 RX ORDER — HYDROMORPHONE HYDROCHLORIDE 1 MG/ML
0.25 INJECTION, SOLUTION INTRAMUSCULAR; INTRAVENOUS; SUBCUTANEOUS EVERY 5 MIN PRN
Status: DISCONTINUED | OUTPATIENT
Start: 2023-05-28 | End: 2023-05-28 | Stop reason: HOSPADM

## 2023-05-28 RX ORDER — SODIUM CHLORIDE 0.9 % (FLUSH) 0.9 %
5-40 SYRINGE (ML) INJECTION EVERY 12 HOURS SCHEDULED
Status: DISCONTINUED | OUTPATIENT
Start: 2023-05-28 | End: 2023-05-28 | Stop reason: HOSPADM

## 2023-05-28 RX ORDER — HYDROMORPHONE HYDROCHLORIDE 1 MG/ML
0.5 INJECTION, SOLUTION INTRAMUSCULAR; INTRAVENOUS; SUBCUTANEOUS EVERY 5 MIN PRN
Status: DISCONTINUED | OUTPATIENT
Start: 2023-05-28 | End: 2023-05-28 | Stop reason: HOSPADM

## 2023-05-28 RX ORDER — SUCCINYLCHOLINE CHLORIDE 20 MG/ML
INJECTION INTRAMUSCULAR; INTRAVENOUS PRN
Status: DISCONTINUED | OUTPATIENT
Start: 2023-05-28 | End: 2023-05-28 | Stop reason: SDUPTHER

## 2023-05-28 RX ORDER — LIDOCAINE HYDROCHLORIDE 10 MG/ML
INJECTION, SOLUTION INFILTRATION; PERINEURAL PRN
Status: DISCONTINUED | OUTPATIENT
Start: 2023-05-28 | End: 2023-05-28 | Stop reason: SDUPTHER

## 2023-05-28 RX ORDER — ONDANSETRON 2 MG/ML
4 INJECTION INTRAMUSCULAR; INTRAVENOUS
Status: DISCONTINUED | OUTPATIENT
Start: 2023-05-28 | End: 2023-05-28 | Stop reason: HOSPADM

## 2023-05-28 RX ADMIN — ONDANSETRON 4 MG: 2 INJECTION INTRAMUSCULAR; INTRAVENOUS at 00:54

## 2023-05-28 RX ADMIN — PROPOFOL 200 MG: 10 INJECTION, EMULSION INTRAVENOUS at 00:54

## 2023-05-28 RX ADMIN — LIDOCAINE HYDROCHLORIDE 50 MG: 10 INJECTION, SOLUTION INFILTRATION; PERINEURAL at 00:54

## 2023-05-28 RX ADMIN — SUCCINYLCHOLINE CHLORIDE 200 MG: 20 INJECTION, SOLUTION INTRAMUSCULAR; INTRAVENOUS at 00:54

## 2023-05-28 ASSESSMENT — LIFESTYLE VARIABLES: SMOKING_STATUS: 0

## 2023-05-28 ASSESSMENT — PAIN SCALES - GENERAL: PAINLEVEL_OUTOF10: 0

## 2023-05-30 NOTE — OP NOTE
removed from the stomach successfully. PLAN:  The patient's parent, who is the legal guardian for the patient,  was counseled on removing access to batteries in the future. The  patient will be observed in PACU and then discharged home to the care of  her mother. She is to remain n.p.o. for 6 hours and then can resume  feeding and taking her maintenance medications. She is not to operate  any motor vehicle for the next 24 hours as instructed.         Jean-Paul Cadena MD    D: 05/28/2023 2:44:52      T: 05/28/2023 2:49:32     [email or web address]  Job#: [id number]     Doc#: [id number]

## 2023-05-31 NOTE — DISCHARGE SUMMARY
SILVIONCANABELLA PALACIOS [facility] [address]Y [address] [facility]                               DISCHARGE SUMMARY    PATIENT NAME: Marino James                     :        1995  MED REC NO:   [id number]                              ROOM:       [facility]  ACCOUNT NO:   [de-identified]                           ADMIT DATE: 2023  PROVIDER:     Jes Ayala MD               [address] [place] [address] DATE: 2023    FINAL DIAGNOSIS:  Foreign body ingestion. PROCEDURE PERFORMED:  EGD for endoscopic removal of foreign body from  the stomach done successfully without complication. HOSPITAL COURSE:  This 81-year-old female presented in the emergency  room announcing that she had intentionally swallowed small circular  batteries along with a magnet. She denied that this was a suicide  attempt. There is a history of being similar occurrences in the past of  foreign body ingestion. The patient's mother who has legal guardianship  gave informed consent for EGD. EGD was successfully completed with the  patient intubated for removal of the foreign bodies. The patient was  then observed in PACU and then discharged home. Condition at the time  of discharge was good. The patient and mother were instructed, to  remain n.p.o. overnight and then resume a regular diet at breakfast.   She is not to operate a motor vehicle or have any other activity where  she could possibly hurt herself. I discussed with the patient's parent  the need to keep away batteries from the patient since this is a  recurrent problem.         Simon Adams MD    D: 2023 9:26:37      T: 2023 9:30:42     [email or web address]  Job#: [id number]     Doc#: [id number]    CC:

## 2023-06-08 NOTE — ED PROVIDER NOTES
SCREENINGS    Jose Coma Scale  Eye Opening: Spontaneous  Best Verbal Response: Oriented  Best Motor Response: Obeys commands  Jose Coma Scale Score: 15        PHYSICAL EXAM    (up to 7 for level 4, 8 or more for level 5)     ED Triage Vitals   BP Temp Temp Source Pulse Respirations SpO2 Height Weight   05/27/23 2111 05/27/23 2111 05/27/23 2111 05/27/23 2111 05/27/23 2111 05/27/23 2312 -- --   104/83 97.1 °F (36.2 °C) Oral 100 17 (!) 5 %         Physical Exam  Vitals and nursing note reviewed. Constitutional:       General: She is not in acute distress. Appearance: Normal appearance. HENT:      Head: Normocephalic and atraumatic. Right Ear: External ear normal.      Left Ear: External ear normal.      Nose: Nose normal. No congestion or rhinorrhea. Mouth/Throat:      Mouth: Mucous membranes are moist.      Pharynx: Oropharynx is clear. No oropharyngeal exudate or posterior oropharyngeal erythema. Eyes:      General: No scleral icterus. Conjunctiva/sclera: Conjunctivae normal.      Pupils: Pupils are equal, round, and reactive to light. Cardiovascular:      Rate and Rhythm: Normal rate and regular rhythm. Pulses: Normal pulses. Heart sounds: Normal heart sounds. Pulmonary:      Effort: Pulmonary effort is normal. No respiratory distress. Breath sounds: Normal breath sounds. No stridor. No wheezing, rhonchi or rales. Abdominal:      General: Bowel sounds are normal. There is no distension. Palpations: Abdomen is soft. Tenderness: There is no abdominal tenderness. There is no guarding or rebound. Musculoskeletal:         General: No tenderness or deformity. Cervical back: Neck supple. No rigidity. Right lower leg: No edema. Left lower leg: No edema. Skin:     General: Skin is warm and dry. Capillary Refill: Capillary refill takes less than 2 seconds. Coloration: Skin is not jaundiced.    Neurological:      General: No focal

## 2023-06-12 PROBLEM — R13.10 DYSPHAGIA: Status: ACTIVE | Noted: 2023-06-12

## 2023-08-02 ENCOUNTER — APPOINTMENT (OUTPATIENT)
Dept: CT IMAGING | Age: 28
End: 2023-08-02

## 2023-08-02 ENCOUNTER — HOSPITAL ENCOUNTER (OUTPATIENT)
Age: 28
Setting detail: OBSERVATION
Discharge: HOME | End: 2023-08-02
Attending: EMERGENCY MEDICINE | Admitting: HOSPITALIST

## 2023-08-02 ENCOUNTER — ANESTHESIA (OUTPATIENT)
Dept: ENDOSCOPY | Age: 28
End: 2023-08-02

## 2023-08-02 ENCOUNTER — ANESTHESIA EVENT (OUTPATIENT)
Dept: ENDOSCOPY | Age: 28
End: 2023-08-02

## 2023-08-02 VITALS
RESPIRATION RATE: 14 BRPM | BODY MASS INDEX: 31.92 KG/M2 | OXYGEN SATURATION: 100 % | HEART RATE: 67 BPM | HEIGHT: 64 IN | TEMPERATURE: 97.9 F | DIASTOLIC BLOOD PRESSURE: 69 MMHG | WEIGHT: 187 LBS | SYSTOLIC BLOOD PRESSURE: 99 MMHG

## 2023-08-02 DIAGNOSIS — T18.9XXA SWALLOWED FOREIGN BODY, INITIAL ENCOUNTER: ICD-10-CM

## 2023-08-02 DIAGNOSIS — R56.9 SEIZURE (HCC): ICD-10-CM
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
## 2023-08-02 DIAGNOSIS — F84.0 AUTISM SPECTRUM DISORDER: ICD-10-CM

## 2023-08-02 PROBLEM — F91.3 OPPOSITIONAL DEFIANT DISORDER WITH CHRONIC IRRITABILITY AND ANGER: Status: ACTIVE | Noted: 2023-08-02

## 2023-08-02 PROBLEM — S30.851A: Status: ACTIVE | Noted: 2023-08-02

## 2023-08-02 PROBLEM — D64.9 ANEMIA: Status: ACTIVE | Noted: 2023-08-02

## 2023-08-02 PROBLEM — R45.4 OPPOSITIONAL DEFIANT DISORDER WITH CHRONIC IRRITABILITY AND ANGER: Status: ACTIVE | Noted: 2023-08-02

## 2023-08-02 PROBLEM — Z91.89: Status: ACTIVE | Noted: 2023-08-02

## 2023-08-02 LAB
ABO/RH: NORMAL
ALBUMIN SERPL-MCNC: 4.2 G/DL (ref 3.5–5.2)
ALP SERPL-CCNC: 85 U/L (ref 35–104)
ALT SERPL-CCNC: 18 U/L (ref 5–33)
ANION GAP SERPL CALCULATED.3IONS-SCNC: 11 MMOL/L (ref 7–19)
ANTIBODY SCREEN: NORMAL
APTT PPP: 26.7 SEC (ref 26–36.2)
AST SERPL-CCNC: 17 U/L (ref 5–32)
BASOPHILS # BLD: 0 K/UL (ref 0–0.2)
BASOPHILS NFR BLD: 0.7 % (ref 0–1)
BILIRUB SERPL-MCNC: <0.2 MG/DL (ref 0.2–1.2)
BUN SERPL-MCNC: 9 MG/DL (ref 6–20)
CALCIUM SERPL-MCNC: 9.1 MG/DL (ref 8.6–10)
CHLORIDE SERPL-SCNC: 104 MMOL/L (ref 98–111)
CO2 SERPL-SCNC: 24 MMOL/L (ref 22–29)
CREAT SERPL-MCNC: 0.6 MG/DL (ref 0.5–0.9)
EOSINOPHIL # BLD: 0 K/UL (ref 0–0.6)
EOSINOPHIL NFR BLD: 0.5 % (ref 0–5)
ERYTHROCYTE [DISTWIDTH] IN BLOOD BY AUTOMATED COUNT: 18.6 % (ref 11.5–14.5)
GLUCOSE SERPL-MCNC: 102 MG/DL (ref 74–109)
HCG SERPL QL: NEGATIVE
HCT VFR BLD AUTO: 35.3 % (ref 37–47)
HGB BLD-MCNC: 10.6 G/DL (ref 12–16)
IMM GRANULOCYTES # BLD: 0 K/UL
INR PPP: 1.12 (ref 0.88–1.18)
LYMPHOCYTES # BLD: 1.3 K/UL (ref 1.1–4.5)
LYMPHOCYTES NFR BLD: 22.6 % (ref 20–40)
MCH RBC QN AUTO: 24.9 PG (ref 27–31)
MCHC RBC AUTO-ENTMCNC: 30 G/DL (ref 33–37)
MCV RBC AUTO: 82.9 FL (ref 81–99)
MONOCYTES # BLD: 0.6 K/UL (ref 0–0.9)
MONOCYTES NFR BLD: 11 % (ref 0–10)
NEUTROPHILS # BLD: 3.6 K/UL (ref 1.5–7.5)
NEUTS SEG NFR BLD: 64.5 % (ref 50–65)
PLATELET # BLD AUTO: 186 K/UL (ref 130–400)
PMV BLD AUTO: 12.1 FL (ref 9.4–12.3)
POTASSIUM SERPL-SCNC: 4.5 MMOL/L (ref 3.5–5)
PROT SERPL-MCNC: 6.7 G/DL (ref 6.6–8.7)
PROTHROMBIN TIME: 14.1 SEC (ref 12–14.6)
RBC # BLD AUTO: 4.26 M/UL (ref 4.2–5.4)
SODIUM SERPL-SCNC: 139 MMOL/L (ref 136–145)
WBC # BLD AUTO: 5.6 K/UL (ref 4.8–10.8)

## 2023-08-02 PROCEDURE — G0378 HOSPITAL OBSERVATION PER HR: HCPCS

## 2023-08-02 PROCEDURE — 74176 CT ABD & PELVIS W/O CONTRAST: CPT

## 2023-08-02 PROCEDURE — 7100000001 HC PACU RECOVERY - ADDTL 15 MIN: Performed by: SPECIALIST

## 2023-08-02 PROCEDURE — 36415 COLL VENOUS BLD VENIPUNCTURE: CPT

## 2023-08-02 PROCEDURE — 3609012900 HC EGD FOREIGN BODY REMOVAL: Performed by: SPECIALIST

## 2023-08-02 PROCEDURE — 2580000003 HC RX 258: Performed by: ANESTHESIOLOGY

## 2023-08-02 PROCEDURE — C9113 INJ PANTOPRAZOLE SODIUM, VIA: HCPCS | Performed by: EMERGENCY MEDICINE

## 2023-08-02 PROCEDURE — 80053 COMPREHEN METABOLIC PANEL: CPT

## 2023-08-02 PROCEDURE — 85610 PROTHROMBIN TIME: CPT

## 2023-08-02 PROCEDURE — 3700000001 HC ADD 15 MINUTES (ANESTHESIA): Performed by: SPECIALIST

## 2023-08-02 PROCEDURE — 85025 COMPLETE CBC W/AUTO DIFF WBC: CPT

## 2023-08-02 PROCEDURE — 2709999900 HC NON-CHARGEABLE SUPPLY: Performed by: SPECIALIST

## 2023-08-02 PROCEDURE — 2500000003 HC RX 250 WO HCPCS: Performed by: ANESTHESIOLOGY

## 2023-08-02 PROCEDURE — 70450 CT HEAD/BRAIN W/O DYE: CPT

## 2023-08-02 PROCEDURE — 3700000000 HC ANESTHESIA ATTENDED CARE: Performed by: SPECIALIST

## 2023-08-02 PROCEDURE — 96374 THER/PROPH/DIAG INJ IV PUSH: CPT

## 2023-08-02 PROCEDURE — 85730 THROMBOPLASTIN TIME PARTIAL: CPT

## 2023-08-02 PROCEDURE — 2580000003 HC RX 258: Performed by: EMERGENCY MEDICINE

## 2023-08-02 PROCEDURE — 99285 EMERGENCY DEPT VISIT HI MDM: CPT

## 2023-08-02 PROCEDURE — 96375 TX/PRO/DX INJ NEW DRUG ADDON: CPT

## 2023-08-02 PROCEDURE — 86850 RBC ANTIBODY SCREEN: CPT

## 2023-08-02 PROCEDURE — 7100000000 HC PACU RECOVERY - FIRST 15 MIN: Performed by: SPECIALIST

## 2023-08-02 PROCEDURE — 84703 CHORIONIC GONADOTROPIN ASSAY: CPT

## 2023-08-02 PROCEDURE — 71250 CT THORAX DX C-: CPT

## 2023-08-02 PROCEDURE — 6360000002 HC RX W HCPCS: Performed by: EMERGENCY MEDICINE

## 2023-08-02 PROCEDURE — 6360000002 HC RX W HCPCS: Performed by: ANESTHESIOLOGY

## 2023-08-02 PROCEDURE — 72125 CT NECK SPINE W/O DYE: CPT

## 2023-08-02 RX ORDER — ONDANSETRON 2 MG/ML
INJECTION INTRAMUSCULAR; INTRAVENOUS PRN
Status: DISCONTINUED | OUTPATIENT
Start: 2023-08-02 | End: 2023-08-02 | Stop reason: SDUPTHER

## 2023-08-02 RX ORDER — EPHEDRINE SULFATE 50 MG/ML
INJECTION, SOLUTION INTRAVENOUS PRN
Status: DISCONTINUED | OUTPATIENT
Start: 2023-08-02 | End: 2023-08-02 | Stop reason: SDUPTHER

## 2023-08-02 RX ORDER — PROPOFOL 10 MG/ML
INJECTION, EMULSION INTRAVENOUS PRN
Status: DISCONTINUED | OUTPATIENT
Start: 2023-08-02 | End: 2023-08-02 | Stop reason: SDUPTHER

## 2023-08-02 RX ORDER — POTASSIUM CHLORIDE 7.45 MG/ML
10 INJECTION INTRAVENOUS PRN
Status: DISCONTINUED | OUTPATIENT
Start: 2023-08-02 | End: 2023-08-02 | Stop reason: HOSPADM

## 2023-08-02 RX ORDER — ONDANSETRON 2 MG/ML
4 INJECTION INTRAMUSCULAR; INTRAVENOUS EVERY 6 HOURS PRN
Status: DISCONTINUED | OUTPATIENT
Start: 2023-08-02 | End: 2023-08-02 | Stop reason: HOSPADM

## 2023-08-02 RX ORDER — LACOSAMIDE 50 MG/1
100 TABLET ORAL 2 TIMES DAILY
Status: DISCONTINUED | OUTPATIENT
Start: 2023-08-02 | End: 2023-08-02 | Stop reason: HOSPADM

## 2023-08-02 RX ORDER — ONDANSETRON 4 MG/1
4 TABLET, ORALLY DISINTEGRATING ORAL EVERY 8 HOURS PRN
Status: DISCONTINUED | OUTPATIENT
Start: 2023-08-02 | End: 2023-08-02 | Stop reason: HOSPADM

## 2023-08-02 RX ORDER — SODIUM CHLORIDE, SODIUM LACTATE, POTASSIUM CHLORIDE, CALCIUM CHLORIDE 600; 310; 30; 20 MG/100ML; MG/100ML; MG/100ML; MG/100ML
INJECTION, SOLUTION INTRAVENOUS CONTINUOUS PRN
Status: DISCONTINUED | OUTPATIENT
Start: 2023-08-02 | End: 2023-08-02 | Stop reason: SDUPTHER

## 2023-08-02 RX ORDER — HYDROMORPHONE HYDROCHLORIDE 1 MG/ML
0.25 INJECTION, SOLUTION INTRAMUSCULAR; INTRAVENOUS; SUBCUTANEOUS EVERY 5 MIN PRN
Status: DISCONTINUED | OUTPATIENT
Start: 2023-08-02 | End: 2023-08-02 | Stop reason: HOSPADM

## 2023-08-02 RX ORDER — ENOXAPARIN SODIUM 100 MG/ML
40 INJECTION SUBCUTANEOUS DAILY
Status: DISCONTINUED | OUTPATIENT
Start: 2023-08-02 | End: 2023-08-02 | Stop reason: HOSPADM

## 2023-08-02 RX ORDER — SUCCINYLCHOLINE CHLORIDE 20 MG/ML
INJECTION INTRAMUSCULAR; INTRAVENOUS PRN
Status: DISCONTINUED | OUTPATIENT
Start: 2023-08-02 | End: 2023-08-02 | Stop reason: SDUPTHER

## 2023-08-02 RX ORDER — SODIUM CHLORIDE 0.9 % (FLUSH) 0.9 %
5-40 SYRINGE (ML) INJECTION EVERY 12 HOURS SCHEDULED
Status: DISCONTINUED | OUTPATIENT
Start: 2023-08-02 | End: 2023-08-02 | Stop reason: HOSPADM

## 2023-08-02 RX ORDER — DIPHENHYDRAMINE HYDROCHLORIDE 50 MG/ML
25 INJECTION INTRAMUSCULAR; INTRAVENOUS ONCE
Status: COMPLETED | OUTPATIENT
Start: 2023-08-02 | End: 2023-08-02

## 2023-08-02 RX ORDER — SODIUM CHLORIDE 9 MG/ML
INJECTION, SOLUTION INTRAVENOUS PRN
Status: DISCONTINUED | OUTPATIENT
Start: 2023-08-02 | End: 2023-08-02 | Stop reason: HOSPADM

## 2023-08-02 RX ORDER — HYDROMORPHONE HYDROCHLORIDE 1 MG/ML
0.5 INJECTION, SOLUTION INTRAMUSCULAR; INTRAVENOUS; SUBCUTANEOUS EVERY 5 MIN PRN
Status: DISCONTINUED | OUTPATIENT
Start: 2023-08-02 | End: 2023-08-02 | Stop reason: HOSPADM

## 2023-08-02 RX ORDER — SODIUM CHLORIDE 0.9 % (FLUSH) 0.9 %
5-40 SYRINGE (ML) INJECTION PRN
Status: DISCONTINUED | OUTPATIENT
Start: 2023-08-02 | End: 2023-08-02 | Stop reason: HOSPADM

## 2023-08-02 RX ORDER — ACETAMINOPHEN 650 MG/1
650 SUPPOSITORY RECTAL EVERY 4 HOURS PRN
Status: DISCONTINUED | OUTPATIENT
Start: 2023-08-02 | End: 2023-08-02 | Stop reason: HOSPADM

## 2023-08-02 RX ORDER — MAGNESIUM SULFATE IN WATER 40 MG/ML
2000 INJECTION, SOLUTION INTRAVENOUS PRN
Status: DISCONTINUED | OUTPATIENT
Start: 2023-08-02 | End: 2023-08-02 | Stop reason: HOSPADM

## 2023-08-02 RX ORDER — MECOBALAMIN 5000 MCG
10 TABLET,DISINTEGRATING ORAL NIGHTLY
Status: DISCONTINUED | OUTPATIENT
Start: 2023-08-02 | End: 2023-08-02 | Stop reason: HOSPADM

## 2023-08-02 RX ORDER — FENTANYL CITRATE 50 UG/ML
INJECTION, SOLUTION INTRAMUSCULAR; INTRAVENOUS PRN
Status: DISCONTINUED | OUTPATIENT
Start: 2023-08-02 | End: 2023-08-02 | Stop reason: SDUPTHER

## 2023-08-02 RX ORDER — RISPERIDONE 1 MG/1
2 TABLET ORAL NIGHTLY
Status: DISCONTINUED | OUTPATIENT
Start: 2023-08-02 | End: 2023-08-02 | Stop reason: HOSPADM

## 2023-08-02 RX ORDER — LEVETIRACETAM 500 MG/1
1500 TABLET ORAL 2 TIMES DAILY
Status: DISCONTINUED | OUTPATIENT
Start: 2023-08-02 | End: 2023-08-02 | Stop reason: HOSPADM

## 2023-08-02 RX ORDER — LIDOCAINE HYDROCHLORIDE 10 MG/ML
INJECTION, SOLUTION INFILTRATION; PERINEURAL PRN
Status: DISCONTINUED | OUTPATIENT
Start: 2023-08-02 | End: 2023-08-02 | Stop reason: SDUPTHER

## 2023-08-02 RX ORDER — SODIUM CHLORIDE, SODIUM LACTATE, POTASSIUM CHLORIDE, CALCIUM CHLORIDE 600; 310; 30; 20 MG/100ML; MG/100ML; MG/100ML; MG/100ML
INJECTION, SOLUTION INTRAVENOUS CONTINUOUS
Status: DISCONTINUED | OUTPATIENT
Start: 2023-08-02 | End: 2023-08-02 | Stop reason: HOSPADM

## 2023-08-02 RX ORDER — HALOPERIDOL 5 MG/ML
5 INJECTION INTRAMUSCULAR ONCE
Status: COMPLETED | OUTPATIENT
Start: 2023-08-02 | End: 2023-08-02

## 2023-08-02 RX ORDER — PRAZOSIN HYDROCHLORIDE 1 MG/1
1 CAPSULE ORAL NIGHTLY
Status: DISCONTINUED | OUTPATIENT
Start: 2023-08-02 | End: 2023-08-02 | Stop reason: HOSPADM

## 2023-08-02 RX ORDER — ONDANSETRON 2 MG/ML
4 INJECTION INTRAMUSCULAR; INTRAVENOUS
Status: DISCONTINUED | OUTPATIENT
Start: 2023-08-02 | End: 2023-08-02 | Stop reason: HOSPADM

## 2023-08-02 RX ORDER — ACETAMINOPHEN 325 MG/1
650 TABLET ORAL EVERY 4 HOURS PRN
Status: DISCONTINUED | OUTPATIENT
Start: 2023-08-02 | End: 2023-08-02 | Stop reason: HOSPADM

## 2023-08-02 RX ADMIN — EPHEDRINE SULFATE 5 MG: 50 INJECTION INTRAMUSCULAR; INTRAVENOUS; SUBCUTANEOUS at 06:25

## 2023-08-02 RX ADMIN — PROPOFOL 200 MG: 10 INJECTION, EMULSION INTRAVENOUS at 05:48

## 2023-08-02 RX ADMIN — ONDANSETRON 4 MG: 2 INJECTION INTRAMUSCULAR; INTRAVENOUS at 05:56

## 2023-08-02 RX ADMIN — SODIUM CHLORIDE, SODIUM LACTATE, POTASSIUM CHLORIDE, AND CALCIUM CHLORIDE: 600; 310; 30; 20 INJECTION, SOLUTION INTRAVENOUS at 05:42

## 2023-08-02 RX ADMIN — FENTANYL CITRATE 50 MCG: 50 INJECTION INTRAMUSCULAR; INTRAVENOUS at 06:30

## 2023-08-02 RX ADMIN — SUCCINYLCHOLINE CHLORIDE 160 MG: 20 INJECTION, SOLUTION INTRAMUSCULAR; INTRAVENOUS at 05:48

## 2023-08-02 RX ADMIN — PHENYLEPHRINE HYDROCHLORIDE 100 MCG: 10 INJECTION INTRAVENOUS at 06:46

## 2023-08-02 RX ADMIN — LIDOCAINE HYDROCHLORIDE 50 MG: 10 INJECTION, SOLUTION INFILTRATION; PERINEURAL at 05:48

## 2023-08-02 RX ADMIN — PHENYLEPHRINE HYDROCHLORIDE 100 MCG: 10 INJECTION INTRAVENOUS at 07:10

## 2023-08-02 RX ADMIN — HALOPERIDOL LACTATE 5 MG: 5 INJECTION, SOLUTION INTRAMUSCULAR at 03:45

## 2023-08-02 RX ADMIN — PHENYLEPHRINE HYDROCHLORIDE 100 MCG: 10 INJECTION INTRAVENOUS at 06:54

## 2023-08-02 RX ADMIN — SODIUM CHLORIDE, PRESERVATIVE FREE 40 MG: 5 INJECTION INTRAVENOUS at 04:26

## 2023-08-02 RX ADMIN — EPHEDRINE SULFATE 5 MG: 50 INJECTION INTRAMUSCULAR; INTRAVENOUS; SUBCUTANEOUS at 06:35

## 2023-08-02 RX ADMIN — PHENYLEPHRINE HYDROCHLORIDE 100 MCG: 10 INJECTION INTRAVENOUS at 07:02

## 2023-08-02 RX ADMIN — DIPHENHYDRAMINE HYDROCHLORIDE 25 MG: 50 INJECTION, SOLUTION INTRAMUSCULAR; INTRAVENOUS at 03:46

## 2023-08-02 ASSESSMENT — LIFESTYLE VARIABLES: SMOKING_STATUS: 0

## 2023-08-02 ASSESSMENT — PAIN - FUNCTIONAL ASSESSMENT: PAIN_FUNCTIONAL_ASSESSMENT: NONE - DENIES PAIN

## 2023-08-02 NOTE — PROGRESS NOTES
Pt mother called and dc instructions reviewed w/ her via phone, verbalized understanding. Mother states she has the pt small children with her and would be unable to leave them unattended to come and get pt. Pt will be d/c via w/c to private vehicle home once mother arrives.

## 2023-08-02 NOTE — ED NOTES
Called Dr. Pineda Renteria ()     [facility]  08/02/23 [id number]

## 2023-08-02 NOTE — CONSULTS
Consult Note              Today's date  8/2/2023      Hospital day # : 0      Patient Bettina Staff     YOB: 1995     Age:27 y.o. Inpatient consult to GI  Consult performed by: Eleuterio Jha MD  Consult ordered by: Yaya Mcclellan MD      Inpatient consult to GI  Consult performed by: Eleuterio Jha MD  Consult ordered by: Jose Parson MD          Consult reason:    Foreign body  ingestion-at least 2 batteries 2 magnets and unknown quantity/unknown number of other foreign bodies, patient would not divulge any more information. History of recurrent foreign body ingestions  Chief Complaint       Chief Complaint   Patient presents with    Seizures     PT fell down approx 6 steps after seizure, PT has hx of seizure    Ingestion     EMS states PTs mother stated PT ate batteries and a jewelry chain earlier today             History Obtained From       patient, electronic medical record. Care everywhere tab: Documents: No GI notes. No GI procedure notes found. Patient has had multiple encounters at [facility]. Vital signs review: Afebrile. Respiratory rate, pulse rate normal.  Last recorded blood pressure 94/72. Oxygen saturation 97% on room air. Chart review tab: Notes tab: Gastroenterology tab:    5/27/2023: H&P Dr. Garcia Gigi: Assessment foreign body ingestion with history of same multiple times, pseudoseizures, mild hypertension, autism, anxiety/depressive disorder. Plan was EGD for removal of foreign bodies from the stomach.    5/28/2023: EGD: Multiple small circular batteries were identified clustered and held together by a magnet. There was one biopsy that was separate. He notes that was then deployed to snare and removed all of the batteries and magnet.   The scope was then reintroduced into the esophagus and stomach to infect all portions of the stomach and proximal duodenum which were normal.  There were no remaining foreign

## 2023-08-02 NOTE — ANESTHESIA POSTPROCEDURE EVALUATION
Department of Anesthesiology  Postprocedure Note    Patient: Vianey Rosales  MRN: [id number]  YOB: 1995  Date of evaluation: 8/2/2023      Procedure Summary     Date: 08/02/23 Room / Location: [address]    Anesthesia Start: [facility] Anesthesia Stop: 7443    Procedure: EGD FOREIGN BODY REMOVAL Diagnosis:       Swallowed foreign body, initial encounter      (Swallowed foreign body, initial encounter [T18. 9XXA])    Surgeons: Aimee Pisano MD Responsible Provider: Yael Tijerina MD    Anesthesia Type: general ASA Status: 3 - Emergent          Anesthesia Type: No value filed.     Randy Phase I:      Randy Phase II:        Anesthesia Post Evaluation    Patient location during evaluation: PACU  Patient participation: complete - patient participated  Level of consciousness: awake and alert  Pain score: 0  Airway patency: patent  Nausea & Vomiting: no nausea and no vomiting  Complications: no  Cardiovascular status: hemodynamically stable  Respiratory status: acceptable and spontaneous ventilation  Hydration status: euvolemic  Pain management: adequate

## 2023-08-02 NOTE — DISCHARGE INSTR - DIET

## 2023-08-02 NOTE — H&P
1    Physical Exam  Vitals reviewed. Constitutional:       General: She is not in acute distress. Appearance: Normal appearance. She is normal weight. She is ill-appearing. She is not toxic-appearing. Interventions: She is intubated. HENT:      Head: Normocephalic and atraumatic. Nose: No congestion or rhinorrhea. Eyes:      General:         Right eye: No discharge. Left eye: No discharge. Neck:      Comments: Supple, trachea appears midline  Cardiovascular:      Rate and Rhythm: Normal rate and regular rhythm. Heart sounds: No murmur heard. No friction rub. No gallop. Pulmonary:      Effort: No respiratory distress. She is intubated. Breath sounds: No stridor. No wheezing, rhonchi or rales. Comments: Breathing on vent  Chest:      Chest wall: No tenderness. Abdominal:      Comments: Soft, nontender, no guarding, no rebound, bowel sounds present   Musculoskeletal:      Comments: No wasting of fat or muscle stores   Skin:     General: Skin is warm. Comments: Nondiaphoretic, color appropriate for ethnicity   Neurological:      Motor: Seizure activity present. No atrophy. Psychiatric:      Comments: unable to assess as per general medical condition      LABORATORY DATA:    CBC:   Recent Labs     08/02/23 0220   WBC 5.6   HGB 10.6*   HCT 35.3*        BMP:   Recent Labs     08/02/23 0220      K 4.5      CO2 24   BUN 9   CREATININE 0.6   CALCIUM 9.1     Hepatic Profile:   Recent Labs     08/02/23 0220   AST 17   ALT 18   BILITOT <0.2   ALKPHOS 85        Latest Reference Range & Units 08/02/23 02:20   Prothrombin Time 12.0 - 14.6 sec 14.1   INR 0.88 - 1.18  1.12   aPTT 26.0 - 36.2 sec 26.7     IMAGING:  XR CHEST PORTABLE  Result Date: 5/19/2023  NO ACUTE CARDIOPULMONARY PROCESS. NO EVIDENCE OF AIRSPACE CONSOLIDATION OR PULMONARY VENOUS CONGESTION.         ASESSMENTS & PLANS:    Swallowed Metallic Foreign Body - Probably  Admit to medical-surgical [facility]  GI consulted, case d/w me at the beside in the ED and input appreciated  Seizure precuations  Assault Precautions  Elopement Precautions  Fall precautions  Bed alarm  WATCH TO PREVENT SWALLOWING OF Fbs  LR at 125cc/h  Strict Is and Os Q4h  Daily weights    Chronic Medical Problems:  Continue Home Regimen as indicated, AEDs may NOT be delayed  Vimpat 100mg IV BID  Keppra 1.5g BID    Supoportive and Prophylactic Txx:  DVT Prophylaxis: lovenox SQ  GI (PUD) PPx: famotidine 20mg IV BOD as per intubated sttaus  PT consult for evalutation and Txx as indicated: have to defer, while it is indicated our facility lacks a vent PT program  NPO        Care time of 40 minutes  Pt seen/examined and admitted to inpatient status. Inpatient status is used for patients with an expected LOS extending past two midnights due to medical therapy and or critical care needs, otherwise patients are placed to OBServation status. Signed:  Electronically signed by Scott Joya MD on 8/2/23 at 5:30 AM CDT.

## 2023-08-02 NOTE — PROGRESS NOTES
8/2/2023: Repeat CT abdomen and pelvis without contrast:    Liver normal.    Gallbladder surgically absent. Pancreas normal.    Spleen normal.    Retroperitoneum and  peritoneum normal.    Pelvis normal.    Previous cholecystectomy without biliary dilation. Small fat transmitting umbilical hernia. No retained foreign bodies in the abdomen and pelvis    Plan:    Advance diet. I have signed off. Please call with further assistance needed in the management of this patient. I will not be able to follow-up with this patient as an outpatient basis since I am a locum tenens physician and I do not have an office practice. At the time of discharge, please schedule follow-up with patient PCP in 1 to 2 weeks after discharge. Locum tenens possibilities    To whom it may concern. I am a locum tenens physician. Therefore,  I do not have an office practice. I will not be able to follow-up on any patient as an outpatient basis. I will not be able to follow-up on the results of pending labs, pathology reports,  imaging studies. I will not be able to or do any outpatient procedures. I will not be able to provide continuity of care of any kind to any patient . I  will not be able to keep in touch with any patient by any means such as phone, text or by e-mail because of the nature of my responsibilities as a locum tenens physician. When the patient is discharged, my patient physician relationship is over. The patient will need to follow-up with other physicians in the hospital when I am not on-call. After discharge, patient will need to follow-up with their primary care doctor, gastroenterologist or liver doctor for continuity of care, check on pending labs, check on pending pathology and check on results of the pending imaging studies-such as x-rays, ultrasound, CAT scan, MRI, HIDA scan, gastric emptying scan or any other studies.     For ER physicians, hospitalists and other physicians involved in

## 2023-08-02 NOTE — PROGRESS NOTES
4 Eyes Skin Assessment    Casey Haynes is being assessed upon: Admission    I agree that Derrick Garcia RN, along with Shaun Diaz LPN have performed a thorough Head to Toe Skin Assessment on the patient. ALL assessment sites listed below have been assessed. Areas assessed by both nurses:     [x]   Head, Face, and Ears   [x]   Shoulders, Back, and Chest  [x]   Arms, Elbows, and Hands   [x]   Coccyx, Sacrum, and Ischium  [x]   Legs, Feet, and Heels    Does the Patient have Skin Breakdown?  No    Junior Prevention initiated: No  Wound Care Orders initiated: No    [facility] nurse consulted for Pressure Injury (Stage 3,4, Unstageable, DTI, NWPT, and Complex wounds) and New or Established Ostomies: No        Primary Nurse eSignature: Rosana Sepulveda RN on 8/2/2023 at 10:52 AM      Co-Signer eSignature: {Esignature:[id number]}

## 2023-08-02 NOTE — ED PROVIDER NOTES
Robert      Pt Name: Marcy Caro  MRN: [id number]  [address] 1995  Date of evaluation: 2023  Provider: Jorden Brizuela MD    CHIEF COMPLAINT       Chief Complaint   Patient presents with    Seizures     PT fell down approx 6 steps after seizure, PT has hx of seizure    Ingestion     EMS states PTs mother stated PT ate batteries and a jewelry chain earlier today          HISTORY OF PRESENT ILLNESS   (Location/Symptom, Timing/Onset,Context/Setting, Quality, Duration, Modifying Factors, Severity)  Note limiting factors. Marcy Caro is a 33 y.o. female who presents to the emergency department for evaluation after she reportedly fell down about 6 steps following a seizure episode. Patient has history of epilepsy as well as autism spectrum disorder and chronic static encephalopathy due to medical records. According to records, takes Lamictal and Keppra as well as Vimpat    Patient also has history of intentional ingestion of foreign bodies. Mom reported to EMS that she may have ingested 1 or more batteries and a chain necklace earlier today    HPI    NursingNotes were reviewed. REVIEW OF SYSTEMS    (2-9 systems for level 4, 10 or more for level 5)     Review of Systems   Unable to perform ROS: Patient nonverbal     A complete review of systems was performed and is negative except as noted above in the HPI.        PAST MEDICAL HISTORY     Past Medical History:   Diagnosis Date    Anxiety     Attention deficit disorder 2023    Autism     Depression     Epilepsy ([address])     Psychiatric pseudoseizure     Seizures ([address])          SURGICAL HISTORY       Past Surgical History:   Procedure Laterality Date     SECTION      CHOLECYSTECTOMY      UPPER GASTROINTESTINAL ENDOSCOPY N/A 2023    Dr Mandy Narayanan enteroscopy-No batteries found in the esophagus, stomach, duodenum and jejunum, gastric erosions    UPPER GASTROINTESTINAL ENDOSCOPY N/A 2023     DISPOSITION Admitted 08/02/2023 04:49:50 AM      No notes of EC Admission Criteria type on file. PATIENT REFERRED TO:  No follow-up provider specified.     DISCHARGE MEDICATIONS:  Current Discharge Medication List             (Please note that portions of this note were completed with a voice recognition program.  Efforts were made to edit the dictations butoccasionally words are mis-transcribed.)    Fatimah Lozano MD (electronically signed)  AttendingEmergency Physician          Fatimah Lozano., MD  08/02/23 [id number]

## 2024-03-26 ENCOUNTER — TELEPHONE (OUTPATIENT)
Dept: NEUROLOGY | Age: 29
End: 2024-03-26

## 2024-03-26 NOTE — TELEPHONE ENCOUNTER
Received a call from jn Sarmiento. Requesting most recent medical records records. I explained to her she has not been seen in our facility since 2/14/23. She voiced understanding and stated she would call back if anything else was needded.

## 2024-07-08 ENCOUNTER — HOSPITAL ENCOUNTER (INPATIENT)
Facility: HOSPITAL | Age: 29
LOS: 2 days | Discharge: HOME | End: 2024-07-12
Attending: EMERGENCY MEDICINE | Admitting: INTERNAL MEDICINE
Payer: MEDICAID

## 2024-07-08 DIAGNOSIS — T18.9XXA SWALLOWED FOREIGN BODY, INITIAL ENCOUNTER: Primary | ICD-10-CM

## 2024-07-08 DIAGNOSIS — R56.9 CONVULSIONS, UNSPECIFIED CONVULSION TYPE: ICD-10-CM

## 2024-07-08 PROCEDURE — 85007 BL SMEAR W/DIFF WBC COUNT: CPT | Performed by: EMERGENCY MEDICINE

## 2024-07-08 PROCEDURE — 36415 COLL VENOUS BLD VENIPUNCTURE: CPT

## 2024-07-08 PROCEDURE — 80185 ASSAY OF PHENYTOIN TOTAL: CPT | Performed by: EMERGENCY MEDICINE

## 2024-07-08 PROCEDURE — 82077 ASSAY SPEC XCP UR&BREATH IA: CPT | Performed by: EMERGENCY MEDICINE

## 2024-07-08 PROCEDURE — 85025 COMPLETE CBC W/AUTO DIFF WBC: CPT | Performed by: EMERGENCY MEDICINE

## 2024-07-08 PROCEDURE — 80053 COMPREHEN METABOLIC PANEL: CPT | Performed by: EMERGENCY MEDICINE

## 2024-07-08 PROCEDURE — 99285 EMERGENCY DEPT VISIT HI MDM: CPT

## 2024-07-08 PROCEDURE — 83690 ASSAY OF LIPASE: CPT | Performed by: EMERGENCY MEDICINE

## 2024-07-08 PROCEDURE — 25010000002 LORAZEPAM PER 2 MG

## 2024-07-08 RX ORDER — LORAZEPAM 2 MG/ML
1 INJECTION INTRAMUSCULAR ONCE
Status: COMPLETED | OUTPATIENT
Start: 2024-07-08 | End: 2024-07-08

## 2024-07-08 RX ORDER — LORAZEPAM 2 MG/ML
INJECTION INTRAMUSCULAR
Status: COMPLETED
Start: 2024-07-08 | End: 2024-07-08

## 2024-07-08 RX ORDER — LORAZEPAM 2 MG/ML
1 INJECTION INTRAMUSCULAR ONCE
Status: DISCONTINUED | OUTPATIENT
Start: 2024-07-08 | End: 2024-07-08

## 2024-07-08 RX ADMIN — LORAZEPAM 1 MG: 2 INJECTION, SOLUTION INTRAMUSCULAR; INTRAVENOUS at 23:34

## 2024-07-08 RX ADMIN — LORAZEPAM 1 MG: 2 INJECTION INTRAMUSCULAR at 23:34

## 2024-07-09 ENCOUNTER — APPOINTMENT (OUTPATIENT)
Dept: GENERAL RADIOLOGY | Facility: HOSPITAL | Age: 29
End: 2024-07-09
Payer: MEDICAID

## 2024-07-09 ENCOUNTER — ANESTHESIA (OUTPATIENT)
Dept: PERIOP | Facility: HOSPITAL | Age: 29
End: 2024-07-09
Payer: MEDICAID

## 2024-07-09 ENCOUNTER — ANESTHESIA EVENT (OUTPATIENT)
Dept: PERIOP | Facility: HOSPITAL | Age: 29
End: 2024-07-09
Payer: MEDICAID

## 2024-07-09 ENCOUNTER — APPOINTMENT (OUTPATIENT)
Dept: CT IMAGING | Facility: HOSPITAL | Age: 29
End: 2024-07-09
Payer: MEDICAID

## 2024-07-09 PROBLEM — T18.9XXA FOREIGN BODY INGESTION: Status: ACTIVE | Noted: 2024-07-09

## 2024-07-09 LAB
ALBUMIN SERPL-MCNC: 4.1 G/DL (ref 3.5–5.2)
ALBUMIN/GLOB SERPL: 1.2 G/DL
ALP SERPL-CCNC: 99 U/L (ref 39–117)
ALT SERPL W P-5'-P-CCNC: 12 U/L (ref 1–33)
AMPHET+METHAMPHET UR QL: NEGATIVE
AMPHETAMINES UR QL: NEGATIVE
ANION GAP SERPL CALCULATED.3IONS-SCNC: 14 MMOL/L (ref 5–15)
ANISOCYTOSIS BLD QL: ABNORMAL
ARTERIAL PATENCY WRIST A: ABNORMAL
AST SERPL-CCNC: 14 U/L (ref 1–32)
ATMOSPHERIC PRESS: 745 MMHG
B-HCG UR QL: NEGATIVE
BACTERIA UR QL AUTO: ABNORMAL /HPF
BARBITURATES UR QL SCN: NEGATIVE
BASE EXCESS BLDA CALC-SCNC: 1.7 MMOL/L (ref 0–2)
BASOPHILS # BLD MANUAL: 0.07 10*3/MM3 (ref 0–0.2)
BASOPHILS NFR BLD MANUAL: 0.8 % (ref 0–1.5)
BDY SITE: ABNORMAL
BENZODIAZ UR QL SCN: POSITIVE
BILIRUB SERPL-MCNC: <0.2 MG/DL (ref 0–1.2)
BILIRUB UR QL STRIP: NEGATIVE
BODY TEMPERATURE: 37
BUN SERPL-MCNC: 6 MG/DL (ref 6–20)
BUN/CREAT SERPL: 7.8 (ref 7–25)
BUPRENORPHINE SERPL-MCNC: NEGATIVE NG/ML
CALCIUM SPEC-SCNC: 9.3 MG/DL (ref 8.6–10.5)
CANNABINOIDS SERPL QL: NEGATIVE
CHLORIDE SERPL-SCNC: 103 MMOL/L (ref 98–107)
CLARITY UR: CLEAR
CO2 SERPL-SCNC: 22 MMOL/L (ref 22–29)
COCAINE UR QL: NEGATIVE
COLOR UR: YELLOW
CREAT SERPL-MCNC: 0.77 MG/DL (ref 0.57–1)
DEPRECATED RDW RBC AUTO: 45.6 FL (ref 37–54)
EGFRCR SERPLBLD CKD-EPI 2021: 108.6 ML/MIN/1.73
EOSINOPHIL # BLD MANUAL: 0 10*3/MM3 (ref 0–0.4)
EOSINOPHIL NFR BLD MANUAL: 0 % (ref 0.3–6.2)
ERYTHROCYTE [DISTWIDTH] IN BLOOD BY AUTOMATED COUNT: 17.2 % (ref 12.3–15.4)
ETHANOL UR QL: <0.01 %
FENTANYL UR-MCNC: NEGATIVE NG/ML
GIANT PLATELETS: ABNORMAL
GLOBULIN UR ELPH-MCNC: 3.5 GM/DL
GLUCOSE SERPL-MCNC: 111 MG/DL (ref 65–99)
GLUCOSE UR STRIP-MCNC: NEGATIVE MG/DL
HCO3 BLDA-SCNC: 26.7 MMOL/L (ref 20–26)
HCT VFR BLD AUTO: 31.7 % (ref 34–46.6)
HGB BLD-MCNC: 9.6 G/DL (ref 12–15.9)
HGB UR QL STRIP.AUTO: NEGATIVE
HOLD SPECIMEN: NORMAL
HYALINE CASTS UR QL AUTO: ABNORMAL /LPF
INHALED O2 CONCENTRATION: 40 %
KETONES UR QL STRIP: NEGATIVE
LEUKOCYTE ESTERASE UR QL STRIP.AUTO: ABNORMAL
LIPASE SERPL-CCNC: 15 U/L (ref 13–60)
LYMPHOCYTES # BLD MANUAL: 1.21 10*3/MM3 (ref 0.7–3.1)
LYMPHOCYTES NFR BLD MANUAL: 4.8 % (ref 5–12)
Lab: ABNORMAL
MCH RBC QN AUTO: 22.3 PG (ref 26.6–33)
MCHC RBC AUTO-ENTMCNC: 30.3 G/DL (ref 31.5–35.7)
MCV RBC AUTO: 73.7 FL (ref 79–97)
METHADONE UR QL SCN: NEGATIVE
MICROCYTES BLD QL: ABNORMAL
MODALITY: ABNORMAL
MONOCYTES # BLD: 0.45 10*3/MM3 (ref 0.1–0.9)
NEUTROPHILS # BLD AUTO: 7.63 10*3/MM3 (ref 1.7–7)
NEUTROPHILS NFR BLD MANUAL: 81.5 % (ref 42.7–76)
NITRITE UR QL STRIP: NEGATIVE
NRBC BLD AUTO-RTO: 0 /100 WBC (ref 0–0.2)
OPIATES UR QL: NEGATIVE
OXYCODONE UR QL SCN: NEGATIVE
PCO2 BLDA: 43 MM HG (ref 35–45)
PCO2 TEMP ADJ BLD: 43 MM HG (ref 35–45)
PCP UR QL SCN: NEGATIVE
PEEP RESPIRATORY: 5 CM[H2O]
PH BLDA: 7.4 PH UNITS (ref 7.35–7.45)
PH UR STRIP.AUTO: 8 [PH] (ref 5–8)
PH, TEMP CORRECTED: 7.4 PH UNITS (ref 7.35–7.45)
PHENYTOIN SERPL-MCNC: <0.8 MCG/ML (ref 10–20)
PLATELET # BLD AUTO: 249 10*3/MM3 (ref 140–450)
PMV BLD AUTO: 11.7 FL (ref 6–12)
PO2 BLD: 435 MM[HG] (ref 0–500)
PO2 BLDA: 174 MM HG (ref 83–108)
PO2 TEMP ADJ BLD: 174 MM HG (ref 83–108)
POIKILOCYTOSIS BLD QL SMEAR: ABNORMAL
POLYCHROMASIA BLD QL SMEAR: ABNORMAL
POTASSIUM SERPL-SCNC: 4 MMOL/L (ref 3.5–5.2)
PROT SERPL-MCNC: 7.6 G/DL (ref 6–8.5)
PROT UR QL STRIP: NEGATIVE
RBC # BLD AUTO: 4.3 10*6/MM3 (ref 3.77–5.28)
RBC # UR STRIP: ABNORMAL /HPF
REF LAB TEST METHOD: ABNORMAL
SAO2 % BLDCOA: 99.9 % (ref 94–99)
SET MECH RESP RATE: 18
SODIUM SERPL-SCNC: 139 MMOL/L (ref 136–145)
SP GR UR STRIP: 1.01 (ref 1–1.03)
SQUAMOUS #/AREA URNS HPF: ABNORMAL /HPF
TRICYCLICS UR QL SCN: NEGATIVE
UROBILINOGEN UR QL STRIP: ABNORMAL
VARIANT LYMPHS NFR BLD MANUAL: 12.9 % (ref 19.6–45.3)
VENTILATOR MODE: AC
VT ON VENT VENT: 450 ML
WBC # UR STRIP: ABNORMAL /HPF
WBC MORPH BLD: NORMAL
WBC NRBC COR # BLD AUTO: 9.36 10*3/MM3 (ref 3.4–10.8)
WHOLE BLOOD HOLD COAG: NORMAL
WHOLE BLOOD HOLD SPECIMEN: NORMAL

## 2024-07-09 PROCEDURE — 25010000002 PROPOFOL 10 MG/ML EMULSION

## 2024-07-09 PROCEDURE — 80177 DRUG SCRN QUAN LEVETIRACETAM: CPT | Performed by: EMERGENCY MEDICINE

## 2024-07-09 PROCEDURE — 25010000002 METOCLOPRAMIDE PER 10 MG: Performed by: STUDENT IN AN ORGANIZED HEALTH CARE EDUCATION/TRAINING PROGRAM

## 2024-07-09 PROCEDURE — 99222 1ST HOSP IP/OBS MODERATE 55: CPT

## 2024-07-09 PROCEDURE — 0DJ08ZZ INSPECTION OF UPPER INTESTINAL TRACT, VIA NATURAL OR ARTIFICIAL OPENING ENDOSCOPIC: ICD-10-PCS | Performed by: INTERNAL MEDICINE

## 2024-07-09 PROCEDURE — 94002 VENT MGMT INPAT INIT DAY: CPT

## 2024-07-09 PROCEDURE — G0378 HOSPITAL OBSERVATION PER HR: HCPCS

## 2024-07-09 PROCEDURE — 25010000002 LEVETRIRACETAM PER 10 MG: Performed by: INTERNAL MEDICINE

## 2024-07-09 PROCEDURE — C9254 INJECTION, LACOSAMIDE: HCPCS | Performed by: INTERNAL MEDICINE

## 2024-07-09 PROCEDURE — 25010000002 MIDAZOLAM PER 1 MG: Performed by: ANESTHESIOLOGY

## 2024-07-09 PROCEDURE — 99204 OFFICE O/P NEW MOD 45 MIN: CPT | Performed by: INTERNAL MEDICINE

## 2024-07-09 PROCEDURE — 25010000002 ONDANSETRON PER 1 MG

## 2024-07-09 PROCEDURE — 94799 UNLISTED PULMONARY SVC/PX: CPT

## 2024-07-09 PROCEDURE — 25810000003 SODIUM CHLORIDE 0.9 % SOLUTION: Performed by: ANESTHESIOLOGY

## 2024-07-09 PROCEDURE — 74018 RADEX ABDOMEN 1 VIEW: CPT

## 2024-07-09 PROCEDURE — 25010000002 ONDANSETRON PER 1 MG: Performed by: INTERNAL MEDICINE

## 2024-07-09 PROCEDURE — 81025 URINE PREGNANCY TEST: CPT | Performed by: INTERNAL MEDICINE

## 2024-07-09 PROCEDURE — 25010000002 MIDAZOLAM PER 1 MG

## 2024-07-09 PROCEDURE — 25010000002 PROPOFOL 10 MG/ML EMULSION: Performed by: PHYSICIAN ASSISTANT

## 2024-07-09 PROCEDURE — 74176 CT ABD & PELVIS W/O CONTRAST: CPT

## 2024-07-09 PROCEDURE — 25810000003 SODIUM CHLORIDE 0.9 % SOLUTION: Performed by: EMERGENCY MEDICINE

## 2024-07-09 PROCEDURE — 71045 X-RAY EXAM CHEST 1 VIEW: CPT

## 2024-07-09 PROCEDURE — 81001 URINALYSIS AUTO W/SCOPE: CPT | Performed by: EMERGENCY MEDICINE

## 2024-07-09 PROCEDURE — 05HY33Z INSERTION OF INFUSION DEVICE INTO UPPER VEIN, PERCUTANEOUS APPROACH: ICD-10-PCS | Performed by: INTERNAL MEDICINE

## 2024-07-09 PROCEDURE — 25010000002 PROPOFOL 10 MG/ML EMULSION: Performed by: ANESTHESIOLOGY

## 2024-07-09 PROCEDURE — 94761 N-INVAS EAR/PLS OXIMETRY MLT: CPT

## 2024-07-09 PROCEDURE — 5A1945Z RESPIRATORY VENTILATION, 24-96 CONSECUTIVE HOURS: ICD-10-PCS | Performed by: ANESTHESIOLOGY

## 2024-07-09 PROCEDURE — 99223 1ST HOSP IP/OBS HIGH 75: CPT | Performed by: PSYCHIATRY & NEUROLOGY

## 2024-07-09 PROCEDURE — 25810000003 LACTATED RINGERS PER 1000 ML: Performed by: ANESTHESIOLOGY

## 2024-07-09 PROCEDURE — 82803 BLOOD GASES ANY COMBINATION: CPT

## 2024-07-09 PROCEDURE — 80307 DRUG TEST PRSMV CHEM ANLYZR: CPT | Performed by: EMERGENCY MEDICINE

## 2024-07-09 PROCEDURE — 25010000002 LACOSAMIDE 200 MG/20ML SOLUTION: Performed by: INTERNAL MEDICINE

## 2024-07-09 PROCEDURE — 36600 WITHDRAWAL OF ARTERIAL BLOOD: CPT

## 2024-07-09 PROCEDURE — 25010000002 LEVETRIRACETAM PER 10 MG: Performed by: EMERGENCY MEDICINE

## 2024-07-09 PROCEDURE — 0BH17EZ INSERTION OF ENDOTRACHEAL AIRWAY INTO TRACHEA, VIA NATURAL OR ARTIFICIAL OPENING: ICD-10-PCS | Performed by: ANESTHESIOLOGY

## 2024-07-09 PROCEDURE — 43235 EGD DIAGNOSTIC BRUSH WASH: CPT | Performed by: INTERNAL MEDICINE

## 2024-07-09 RX ORDER — ACETAMINOPHEN 500 MG
500 TABLET ORAL EVERY 6 HOURS PRN
COMMUNITY

## 2024-07-09 RX ORDER — LORAZEPAM 2 MG/ML
2 INJECTION INTRAMUSCULAR ONCE AS NEEDED
Status: COMPLETED | OUTPATIENT
Start: 2024-07-09 | End: 2024-07-11

## 2024-07-09 RX ORDER — SUCCINYLCHOLINE/SOD CL,ISO/PF 200MG/10ML
SYRINGE (ML) INTRAVENOUS AS NEEDED
Status: DISCONTINUED | OUTPATIENT
Start: 2024-07-09 | End: 2024-07-09 | Stop reason: SURG

## 2024-07-09 RX ORDER — SODIUM CHLORIDE 0.9 % (FLUSH) 0.9 %
10 SYRINGE (ML) INJECTION EVERY 12 HOURS SCHEDULED
Status: DISCONTINUED | OUTPATIENT
Start: 2024-07-09 | End: 2024-07-12 | Stop reason: HOSPADM

## 2024-07-09 RX ORDER — ONDANSETRON 2 MG/ML
4 INJECTION INTRAMUSCULAR; INTRAVENOUS EVERY 6 HOURS PRN
Status: DISCONTINUED | OUTPATIENT
Start: 2024-07-09 | End: 2024-07-12 | Stop reason: HOSPADM

## 2024-07-09 RX ORDER — SODIUM CHLORIDE 0.9 % (FLUSH) 0.9 %
10 SYRINGE (ML) INJECTION AS NEEDED
Status: DISCONTINUED | OUTPATIENT
Start: 2024-07-09 | End: 2024-07-09 | Stop reason: HOSPADM

## 2024-07-09 RX ORDER — FLUOXETINE HYDROCHLORIDE 20 MG/1
80 CAPSULE ORAL DAILY
Status: DISCONTINUED | OUTPATIENT
Start: 2024-07-09 | End: 2024-07-12 | Stop reason: HOSPADM

## 2024-07-09 RX ORDER — LIDOCAINE HYDROCHLORIDE 20 MG/ML
INJECTION, SOLUTION EPIDURAL; INFILTRATION; INTRACAUDAL; PERINEURAL AS NEEDED
Status: DISCONTINUED | OUTPATIENT
Start: 2024-07-09 | End: 2024-07-09 | Stop reason: SURG

## 2024-07-09 RX ORDER — LEVETIRACETAM 500 MG/5ML
1000 INJECTION, SOLUTION, CONCENTRATE INTRAVENOUS 2 TIMES DAILY
Status: DISCONTINUED | OUTPATIENT
Start: 2024-07-09 | End: 2024-07-10

## 2024-07-09 RX ORDER — SODIUM CHLORIDE 9 MG/ML
100 INJECTION, SOLUTION INTRAVENOUS CONTINUOUS
Status: DISCONTINUED | OUTPATIENT
Start: 2024-07-09 | End: 2024-07-12 | Stop reason: HOSPADM

## 2024-07-09 RX ORDER — FLUOXETINE HYDROCHLORIDE 40 MG/1
80 CAPSULE ORAL DAILY
COMMUNITY

## 2024-07-09 RX ORDER — ACETAMINOPHEN 160 MG/5ML
650 SOLUTION ORAL EVERY 4 HOURS PRN
Status: DISCONTINUED | OUTPATIENT
Start: 2024-07-09 | End: 2024-07-12 | Stop reason: HOSPADM

## 2024-07-09 RX ORDER — LACOSAMIDE 50 MG/1
100 TABLET ORAL ONCE
Status: COMPLETED | OUTPATIENT
Start: 2024-07-09 | End: 2024-07-09

## 2024-07-09 RX ORDER — DEXTROSE MONOHYDRATE AND SODIUM CHLORIDE 5; .45 G/100ML; G/100ML
100 INJECTION, SOLUTION INTRAVENOUS CONTINUOUS
Status: DISCONTINUED | OUTPATIENT
Start: 2024-07-09 | End: 2024-07-09

## 2024-07-09 RX ORDER — MAGNESIUM CARB/ALUMINUM HYDROX 105-160MG
30 TABLET,CHEWABLE ORAL ONCE
Status: DISCONTINUED | OUTPATIENT
Start: 2024-07-09 | End: 2024-07-09

## 2024-07-09 RX ORDER — MONTELUKAST SODIUM 10 MG/1
10 TABLET ORAL NIGHTLY
COMMUNITY

## 2024-07-09 RX ORDER — SENNOSIDES A AND B 8.6 MG/1
1 TABLET, FILM COATED ORAL DAILY
Status: DISCONTINUED | OUTPATIENT
Start: 2024-07-09 | End: 2024-07-09

## 2024-07-09 RX ORDER — SODIUM CHLORIDE 9 MG/ML
40 INJECTION, SOLUTION INTRAVENOUS AS NEEDED
Status: DISCONTINUED | OUTPATIENT
Start: 2024-07-09 | End: 2024-07-12 | Stop reason: HOSPADM

## 2024-07-09 RX ORDER — PROPOFOL 10 MG/ML
VIAL (ML) INTRAVENOUS AS NEEDED
Status: DISCONTINUED | OUTPATIENT
Start: 2024-07-09 | End: 2024-07-09 | Stop reason: SURG

## 2024-07-09 RX ORDER — MIDAZOLAM HYDROCHLORIDE 1 MG/ML
1 INJECTION INTRAMUSCULAR; INTRAVENOUS ONCE
Status: DISCONTINUED | OUTPATIENT
Start: 2024-07-09 | End: 2024-07-09 | Stop reason: HOSPADM

## 2024-07-09 RX ORDER — ACETAMINOPHEN 650 MG/1
650 SUPPOSITORY RECTAL EVERY 4 HOURS PRN
Status: DISCONTINUED | OUTPATIENT
Start: 2024-07-09 | End: 2024-07-12 | Stop reason: HOSPADM

## 2024-07-09 RX ORDER — PANTOPRAZOLE SODIUM 40 MG/1
40 TABLET, DELAYED RELEASE ORAL DAILY
COMMUNITY

## 2024-07-09 RX ORDER — OLANZAPINE 5 MG/1
5 TABLET, ORALLY DISINTEGRATING ORAL 3 TIMES DAILY
COMMUNITY

## 2024-07-09 RX ORDER — HYDROXYZINE HYDROCHLORIDE 25 MG/1
25 TABLET, FILM COATED ORAL 3 TIMES DAILY PRN
Status: DISCONTINUED | OUTPATIENT
Start: 2024-07-09 | End: 2024-07-12 | Stop reason: HOSPADM

## 2024-07-09 RX ORDER — BISACODYL 10 MG
10 SUPPOSITORY, RECTAL RECTAL ONCE
Status: COMPLETED | OUTPATIENT
Start: 2024-07-09 | End: 2024-07-09

## 2024-07-09 RX ORDER — SODIUM CHLORIDE 9 MG/ML
40 INJECTION, SOLUTION INTRAVENOUS AS NEEDED
Status: DISCONTINUED | OUTPATIENT
Start: 2024-07-09 | End: 2024-07-09 | Stop reason: HOSPADM

## 2024-07-09 RX ORDER — OLANZAPINE 5 MG/1
5 TABLET ORAL 3 TIMES DAILY
Status: DISCONTINUED | OUTPATIENT
Start: 2024-07-09 | End: 2024-07-12 | Stop reason: HOSPADM

## 2024-07-09 RX ORDER — MAGNESIUM CARB/ALUMINUM HYDROX 105-160MG
30 TABLET,CHEWABLE ORAL ONCE
Status: COMPLETED | OUTPATIENT
Start: 2024-07-09 | End: 2024-07-09

## 2024-07-09 RX ORDER — SODIUM CHLORIDE 0.9 % (FLUSH) 0.9 %
10 SYRINGE (ML) INJECTION AS NEEDED
Status: DISCONTINUED | OUTPATIENT
Start: 2024-07-09 | End: 2024-07-12 | Stop reason: HOSPADM

## 2024-07-09 RX ORDER — SODIUM CHLORIDE, SODIUM LACTATE, POTASSIUM CHLORIDE, CALCIUM CHLORIDE 600; 310; 30; 20 MG/100ML; MG/100ML; MG/100ML; MG/100ML
20 INJECTION, SOLUTION INTRAVENOUS CONTINUOUS
Status: DISCONTINUED | OUTPATIENT
Start: 2024-07-09 | End: 2024-07-12 | Stop reason: HOSPADM

## 2024-07-09 RX ORDER — SENNOSIDES A AND B 8.6 MG/1
1 TABLET, FILM COATED ORAL DAILY
Status: DISCONTINUED | OUTPATIENT
Start: 2024-07-09 | End: 2024-07-12 | Stop reason: HOSPADM

## 2024-07-09 RX ORDER — ONDANSETRON 2 MG/ML
4 INJECTION INTRAMUSCULAR; INTRAVENOUS ONCE
Status: COMPLETED | OUTPATIENT
Start: 2024-07-09 | End: 2024-07-09

## 2024-07-09 RX ORDER — POLYETHYLENE GLYCOL 3350 17 G/17G
17 POWDER, FOR SOLUTION ORAL DAILY
Status: DISCONTINUED | OUTPATIENT
Start: 2024-07-09 | End: 2024-07-09

## 2024-07-09 RX ORDER — ONDANSETRON 2 MG/ML
4 INJECTION INTRAMUSCULAR; INTRAVENOUS EVERY 6 HOURS PRN
Status: DISCONTINUED | OUTPATIENT
Start: 2024-07-09 | End: 2024-07-09

## 2024-07-09 RX ORDER — MIDAZOLAM HYDROCHLORIDE 2 MG/2ML
2 INJECTION, SOLUTION INTRAMUSCULAR; INTRAVENOUS ONCE
Status: DISCONTINUED | OUTPATIENT
Start: 2024-07-09 | End: 2024-07-09 | Stop reason: HOSPADM

## 2024-07-09 RX ORDER — LACOSAMIDE 10 MG/ML
100 INJECTION, SOLUTION INTRAVENOUS 2 TIMES DAILY
Status: DISCONTINUED | OUTPATIENT
Start: 2024-07-09 | End: 2024-07-10

## 2024-07-09 RX ORDER — PANTOPRAZOLE SODIUM 40 MG/1
40 TABLET, DELAYED RELEASE ORAL DAILY
Status: DISCONTINUED | OUTPATIENT
Start: 2024-07-09 | End: 2024-07-09

## 2024-07-09 RX ORDER — MONTELUKAST SODIUM 10 MG/1
10 TABLET ORAL NIGHTLY
Status: DISCONTINUED | OUTPATIENT
Start: 2024-07-09 | End: 2024-07-12 | Stop reason: HOSPADM

## 2024-07-09 RX ORDER — PANTOPRAZOLE SODIUM 40 MG/10ML
40 INJECTION, POWDER, LYOPHILIZED, FOR SOLUTION INTRAVENOUS DAILY
Status: DISCONTINUED | OUTPATIENT
Start: 2024-07-09 | End: 2024-07-12 | Stop reason: HOSPADM

## 2024-07-09 RX ORDER — ACETAMINOPHEN 325 MG/1
650 TABLET ORAL EVERY 4 HOURS PRN
Status: DISCONTINUED | OUTPATIENT
Start: 2024-07-09 | End: 2024-07-12 | Stop reason: HOSPADM

## 2024-07-09 RX ORDER — TERAZOSIN 1 MG/1
1 CAPSULE ORAL NIGHTLY
Status: DISCONTINUED | OUTPATIENT
Start: 2024-07-09 | End: 2024-07-12 | Stop reason: HOSPADM

## 2024-07-09 RX ORDER — CHLORHEXIDINE GLUCONATE 500 MG/1
1 CLOTH TOPICAL EVERY 24 HOURS
Status: DISCONTINUED | OUTPATIENT
Start: 2024-07-10 | End: 2024-07-12 | Stop reason: HOSPADM

## 2024-07-09 RX ORDER — ROCURONIUM BROMIDE 10 MG/ML
INJECTION, SOLUTION INTRAVENOUS AS NEEDED
Status: DISCONTINUED | OUTPATIENT
Start: 2024-07-09 | End: 2024-07-09 | Stop reason: SURG

## 2024-07-09 RX ORDER — HYDROXYZINE HYDROCHLORIDE 25 MG/1
25 TABLET, FILM COATED ORAL 3 TIMES DAILY PRN
COMMUNITY

## 2024-07-09 RX ORDER — LEVETIRACETAM 500 MG/5ML
1000 INJECTION, SOLUTION, CONCENTRATE INTRAVENOUS ONCE
Status: COMPLETED | OUTPATIENT
Start: 2024-07-09 | End: 2024-07-09

## 2024-07-09 RX ORDER — SENNOSIDES A AND B 8.6 MG/1
2 TABLET, FILM COATED ORAL DAILY
COMMUNITY

## 2024-07-09 RX ORDER — SODIUM CHLORIDE 0.9 % (FLUSH) 0.9 %
10 SYRINGE (ML) INJECTION EVERY 12 HOURS SCHEDULED
Status: DISCONTINUED | OUTPATIENT
Start: 2024-07-09 | End: 2024-07-09 | Stop reason: HOSPADM

## 2024-07-09 RX ORDER — METOCLOPRAMIDE HYDROCHLORIDE 5 MG/ML
10 INJECTION INTRAMUSCULAR; INTRAVENOUS EVERY 6 HOURS SCHEDULED
Status: DISCONTINUED | OUTPATIENT
Start: 2024-07-09 | End: 2024-07-12 | Stop reason: HOSPADM

## 2024-07-09 RX ORDER — LACOSAMIDE 50 MG/1
100 TABLET ORAL 2 TIMES DAILY
Status: DISCONTINUED | OUTPATIENT
Start: 2024-07-09 | End: 2024-07-09

## 2024-07-09 RX ORDER — DEXMEDETOMIDINE HYDROCHLORIDE 4 UG/ML
.2-1.5 INJECTION, SOLUTION INTRAVENOUS
Status: DISCONTINUED | OUTPATIENT
Start: 2024-07-09 | End: 2024-07-12 | Stop reason: HOSPADM

## 2024-07-09 RX ORDER — DOXAZOSIN MESYLATE 1 MG/1
1 TABLET ORAL 2 TIMES DAILY
COMMUNITY

## 2024-07-09 RX ORDER — POLYETHYLENE GLYCOL 3350 17 G/17G
17 POWDER, FOR SOLUTION ORAL DAILY
Status: DISCONTINUED | OUTPATIENT
Start: 2024-07-09 | End: 2024-07-12 | Stop reason: HOSPADM

## 2024-07-09 RX ORDER — LEVETIRACETAM 500 MG/1
1000 TABLET ORAL 2 TIMES DAILY
Status: DISCONTINUED | OUTPATIENT
Start: 2024-07-09 | End: 2024-07-09

## 2024-07-09 RX ORDER — ALBUTEROL SULFATE 90 UG/1
2 AEROSOL, METERED RESPIRATORY (INHALATION) EVERY 4 HOURS PRN
COMMUNITY

## 2024-07-09 RX ORDER — CHLORHEXIDINE GLUCONATE 500 MG/1
1 CLOTH TOPICAL ONCE
Status: COMPLETED | OUTPATIENT
Start: 2024-07-09 | End: 2024-07-09

## 2024-07-09 RX ORDER — MIDAZOLAM HYDROCHLORIDE 1 MG/ML
INJECTION INTRAMUSCULAR; INTRAVENOUS
Status: COMPLETED
Start: 2024-07-09 | End: 2024-07-09

## 2024-07-09 RX ADMIN — ONDANSETRON 4 MG: 2 INJECTION INTRAMUSCULAR; INTRAVENOUS at 12:58

## 2024-07-09 RX ADMIN — MONTELUKAST SODIUM 10 MG: 10 TABLET, FILM COATED ORAL at 22:41

## 2024-07-09 RX ADMIN — PROPOFOL INJECTABLE EMULSION 50 MCG/KG/MIN: 10 INJECTION, EMULSION INTRAVENOUS at 11:11

## 2024-07-09 RX ADMIN — METOCLOPRAMIDE HYDROCHLORIDE 10 MG: 5 INJECTION INTRAMUSCULAR; INTRAVENOUS at 23:09

## 2024-07-09 RX ADMIN — PANTOPRAZOLE SODIUM 40 MG: 40 INJECTION, POWDER, FOR SOLUTION INTRAVENOUS at 22:40

## 2024-07-09 RX ADMIN — PROPOFOL INJECTABLE EMULSION 35 MCG/KG/MIN: 10 INJECTION, EMULSION INTRAVENOUS at 13:58

## 2024-07-09 RX ADMIN — MIDAZOLAM HYDROCHLORIDE 2 MG: 1 INJECTION, SOLUTION INTRAMUSCULAR; INTRAVENOUS at 10:35

## 2024-07-09 RX ADMIN — SENNOSIDES 1 TABLET: 8.6 TABLET, FILM COATED ORAL at 23:09

## 2024-07-09 RX ADMIN — LIDOCAINE HYDROCHLORIDE 50 MG: 20 INJECTION, SOLUTION EPIDURAL; INFILTRATION; INTRACAUDAL; PERINEURAL at 09:10

## 2024-07-09 RX ADMIN — DEXMEDETOMIDINE HYDROCHLORIDE 1 MCG/KG/HR: 4 INJECTION, SOLUTION INTRAVENOUS at 16:34

## 2024-07-09 RX ADMIN — DEXMEDETOMIDINE HYDROCHLORIDE 0.2 MCG/KG/HR: 4 INJECTION, SOLUTION INTRAVENOUS at 12:19

## 2024-07-09 RX ADMIN — LACOSAMIDE 100 MG: 50 TABLET, FILM COATED ORAL at 01:32

## 2024-07-09 RX ADMIN — TERAZOSIN HYDROCHLORIDE 1 MG: 1 CAPSULE ORAL at 22:45

## 2024-07-09 RX ADMIN — ROCURONIUM BROMIDE 5 MG: 10 INJECTION, SOLUTION INTRAVENOUS at 09:10

## 2024-07-09 RX ADMIN — SODIUM CHLORIDE 1000 ML: 9 INJECTION, SOLUTION INTRAVENOUS at 00:33

## 2024-07-09 RX ADMIN — LACOSAMIDE 100 MG: 10 INJECTION, SOLUTION INTRAVENOUS at 22:40

## 2024-07-09 RX ADMIN — OLANZAPINE 5 MG: 5 TABLET, FILM COATED ORAL at 23:08

## 2024-07-09 RX ADMIN — FLUOXETINE HYDROCHLORIDE 80 MG: 20 CAPSULE ORAL at 22:41

## 2024-07-09 RX ADMIN — MINERAL OIL 30 ML: 1000 SOLUTION ORAL at 22:41

## 2024-07-09 RX ADMIN — Medication 1 APPLICATION: at 23:23

## 2024-07-09 RX ADMIN — BISACODYL 10 MG: 10 SUPPOSITORY RECTAL at 22:41

## 2024-07-09 RX ADMIN — Medication 120 MG: at 09:10

## 2024-07-09 RX ADMIN — PROPOFOL 200 MG: 10 INJECTION, EMULSION INTRAVENOUS at 09:10

## 2024-07-09 RX ADMIN — POLYETHYLENE GLYCOL 3350 17 G: 17 POWDER, FOR SOLUTION ORAL at 22:41

## 2024-07-09 RX ADMIN — CHLORHEXIDINE GLUCONATE 1 APPLICATION: 500 CLOTH TOPICAL at 22:09

## 2024-07-09 RX ADMIN — LAMOTRIGINE 250 MG: 100 TABLET ORAL at 01:33

## 2024-07-09 RX ADMIN — LEVETIRACETAM 1000 MG: 100 INJECTION, SOLUTION INTRAVENOUS at 22:40

## 2024-07-09 RX ADMIN — Medication 10 ML: at 22:11

## 2024-07-09 RX ADMIN — PROPOFOL INJECTABLE EMULSION 35 MCG/KG/MIN: 10 INJECTION, EMULSION INTRAVENOUS at 18:52

## 2024-07-09 RX ADMIN — ONDANSETRON 4 MG: 2 INJECTION INTRAMUSCULAR; INTRAVENOUS at 09:34

## 2024-07-09 RX ADMIN — LEVETIRACETAM 1000 MG: 100 INJECTION, SOLUTION INTRAVENOUS at 01:30

## 2024-07-09 RX ADMIN — DEXMEDETOMIDINE HYDROCHLORIDE 0.8 MCG/KG/HR: 4 INJECTION, SOLUTION INTRAVENOUS at 22:09

## 2024-07-09 RX ADMIN — SODIUM CHLORIDE 100 ML/HR: 9 INJECTION, SOLUTION INTRAVENOUS at 22:46

## 2024-07-09 RX ADMIN — MIDAZOLAM HYDROCHLORIDE 1 MG: 1 INJECTION, SOLUTION INTRAMUSCULAR; INTRAVENOUS at 10:23

## 2024-07-09 RX ADMIN — SODIUM CHLORIDE, POTASSIUM CHLORIDE, SODIUM LACTATE AND CALCIUM CHLORIDE 20 ML/HR: 600; 310; 30; 20 INJECTION, SOLUTION INTRAVENOUS at 08:52

## 2024-07-09 NOTE — CONSULTS
"        [facility] Gastroenterology  Inpatient Consult Note  Today's date:  07/09/24    Duane Cooley  1995       Referring Provider: No ref. provider found  Primary Physician: Rosie Olvera MD   Primary Gastroenterologist: unKnown    Date of Admission: 7/8/2024  Date of Service:  07/09/24    Reason for Consultation/Chief Complaint: Swallowed metallic foreign body    History of present illness: 28-year-old patient with autism and seizure disorder who presented with a seizure disorder per the emergency room staff.  Apparently when she woke up after the seizure she stated she may have swallowed some metal objects and a CT scan suggested a round metal object and a 2.5 cm elongated ribbed object in the distal stomach.  There is a possibility that 1 of these is a battery and 1 of these is a magnet per history.  The patient is nonverbal and unable to give any history.  She does not relate any history of pain.  There has been no melena, hematemesis or hematochezia per chart review.  She was chewing on a plastic cylindrical object and texted that this was a \"sensory chew\".    Past Medical History:   Diagnosis Date    Seizures        History reviewed. No pertinent surgical history.     No Known Allergies    (Not in a hospital admission)      Hospital Medications (active)         Dose Frequency Start End    acetaminophen (TYLENOL) 160 MG/5ML oral solution 650 mg 650 mg Every 4 Hours PRN 7/9/2024 --    Admin Instructions: If given for fever, use fever parameter: fever greater than 100.4 °F  Based on patient request - if ordered for moderate or severe pain, provider allows for administration of a medication prescribed for a lower pain scale.    Do not exceed 4 grams of acetaminophen in a 24 hr period. Max dose of 2gm for AST/ALT greater than 120 units/L.    If given for pain, use the following pain scale:   Mild Pain = Pain Score of 1-3, CPOT 1-2  Moderate Pain = Pain Score of 4-6, CPOT 3-4  Severe " "Pain = Pain Score of 7-10, CPOT 5-8    Route: Oral    Linked Group 1: Placed in \"Or\" Linked Group        acetaminophen (TYLENOL) suppository 650 mg 650 mg Every 4 Hours PRN 7/9/2024 --    Admin Instructions: If given for fever, use fever parameter: fever greater than 100.4 °F  Based on patient request - if ordered for moderate or severe pain, provider allows for administration of a medication prescribed for a lower pain scale.    Do not exceed 4 grams of acetaminophen in a 24 hr period. Max dose of 2gm for AST/ALT greater than 120 units/L.    If given for pain, use the following pain scale:   Mild Pain = Pain Score of 1-3, CPOT 1-2  Moderate Pain = Pain Score of 4-6, CPOT 3-4  Severe Pain = Pain Score of 7-10, CPOT 5-8    Route: Rectal    Linked Group 1: Placed in \"Or\" Linked Group        acetaminophen (TYLENOL) tablet 650 mg 650 mg Every 4 Hours PRN 7/9/2024 --    Admin Instructions: If given for fever, use fever parameter: fever greater than 100.4 °F  Based on patient request - if ordered for moderate or severe pain, provider allows for administration of a medication prescribed for a lower pain scale.    Do not exceed 4 grams of acetaminophen in a 24 hr period. Max dose of 2gm for AST/ALT greater than 120 units/L.    If given for pain, use the following pain scale:   Mild Pain = Pain Score of 1-3, CPOT 1-2  Moderate Pain = Pain Score of 4-6, CPOT 3-4  Severe Pain = Pain Score of 7-10, CPOT 5-8    Route: Oral    Linked Group 1: Placed in \"Or\" Linked Group        lacosamide (VIMPAT) tablet 100 mg 100 mg 2 Times Daily 7/9/2024 --    Admin Instructions: Unknown    Route: Oral    lamoTRIgine (LaMICtal) tablet 250 mg 250 mg Daily 7/9/2024 --    Admin Instructions: Caution: Look alike/sound alike drug alert    Route: Oral    levETIRAcetam (KEPPRA) tablet 1,000 mg 1,000 mg 2 Times Daily 7/9/2024 --    Admin Instructions: Mucous membrane irritant. Do not crush or chew tablet or capsule unless administered through a feeding " tube.  For tube route administration, disperse crushed tablets in 10 mL of water, shake for 5 minutes to dissolve, and administer immediately via enteral feeding tube.    Caution: Look alike/sound alike drug alert.    Notes to Pharmacy: For tube route administration disperse crushed tablets in 10 mL of water, shake for 5 minutes to dissolve, and administer immediately via enteral feeding tube.    Route: Oral    OLANZapine (zyPREXA) tablet 5 mg 5 mg 3 times daily 7/9/2024 --    Admin Instructions: Caution: Look alike/sound alike drug alert    Route: Oral    senna (SENOKOT) tablet 1 tablet 1 tablet Daily 7/9/2024 --    Route: Oral    sodium chloride 0.9 % flush 10 mL 10 mL Every 12 Hours Scheduled 7/9/2024 --    Route: Intravenous    sodium chloride 0.9 % flush 10 mL 10 mL As Needed 7/9/2024 --    Route: Intravenous    sodium chloride 0.9 % infusion 40 mL 40 mL As Needed 7/9/2024 --    Admin Instructions: Following administration of an IV intermittent medication, flush line with 40mL NS at 100mL/hr.    Route: Intravenous    sodium chloride 0.9 % infusion 100 mL/hr Continuous 7/9/2024 --    Route: Intravenous            Social History     Tobacco Use    Smoking status: Unknown    Smokeless tobacco: Not on file   Substance Use Topics    Alcohol use: Defer        Past Family History:  History reviewed. No pertinent family history.    Review of Systems:  Constitutional: No unexpected weight change, no fatigue, no unexplained fever, no sweats or chills.   HEENT: No icteric sclera.  No hearing or visual deficits.  No sore throat.  No chronic nasal discharge.  Pulmonary: No chronic cough.  No hemoptysis.  No shortness of breath.  Cardiovascular: No chest pain.  No palpitations.  No shortness of breath.  Gastrointestinal: As above.  Musculoskeletal/extremities: No peripheral edema.  No cyanosis.  No claudications.  No back pain.  Genitourinary: No dysuria.  No blood in stool.  No urethral discharges.  Neurologic: No seizures.   No headaches.  No dizziness.  No gait problems.  Skin: No rash.  No icterus.  Mental: No psychosis.  No confusions.  No hallucinations.      Physical Exam:  Temp:  [98.4 °F (36.9 °C)] 98.4 °F (36.9 °C)  Heart Rate:  [] 89  Resp:  [15-20] 18  BP: ()/(61-89) 102/89  Body mass index is 30.41 kg/m².    Intake/Output Summary (Last 24 hours) at 7/9/2024 0814  Last data filed at 7/9/2024 0154  Gross per 24 hour   Intake 1000 ml   Output --   Net 1000 ml     No intake/output data recorded.    General appearance:   HEENT: Nonicteric sclerae.  Moist oral mucosa.  PERRLA.  EOMI.  Clear pharynx.  Lungs: Clear to auscultation bilaterally.  No wheezing, rales or rhonchi.  Heart: Regular rate and rhythm.  Normal S1 and S2, no S3, S4 or murmur.  Abdomen: Soft, nondistended, nontender to palpation, with normoactive bowel sounds, no hepatosplenomegaly, no palpable masses.  Extremities: No cyanosis, edema or pulse deficits.  Skin: No rash or jaundice.    Results Review:  Lab Results (last 24 hours)       Procedure Component Value Units Date/Time    Urinalysis With Microscopic If Indicated (No Culture) - Urine, Clean Catch [[id number]]  (Abnormal) Collected: 07/09/24 0141    Specimen: Urine, Clean Catch Updated: 07/09/24 0228     Color, UA Yellow     Appearance, UA Clear     pH, UA 8.0     Specific Gravity, UA 1.010     Glucose, UA Negative     Ketones, UA Negative     Bilirubin, UA Negative     Blood, UA Negative     Protein, UA Negative     Leuk Esterase, UA Moderate (2+)     Nitrite, UA Negative     Urobilinogen, UA 0.2 E.U./dL    Urinalysis, Microscopic Only - Urine, Clean Catch [[id number]]  (Abnormal) Collected: 07/09/24 0141    Specimen: Urine, Clean Catch Updated: 07/09/24 0228     RBC, UA None Seen /HPF      WBC, UA 3-5 /HPF      Bacteria, UA Trace /HPF      Squamous Epithelial Cells, UA 0-2 /HPF      Hyaline Casts, UA None Seen /LPF      Methodology Manual Light Microscopy    Fentanyl, Urine - Urine, Clean Catch  [[id number]]  (Normal) Collected: 07/09/24 0141    Specimen: Urine, Clean Catch Updated: 07/09/24 0226     Fentanyl, Urine Negative    Narrative:      Negative Threshold:      Fentanyl 5 ng/mL     The normal value for the drug tested is negative. This report includes final unconfirmed screening results to be used for medical treatment purposes only. Unconfirmed results must not be used for non-medical purposes such as employment or legal testing. Clinical consideration should be applied to any drug of abuse test, particularly when unconfirmed results are used.           Urine Drug Screen - Urine, Clean Catch [[id number]]  (Abnormal) Collected: 07/09/24 0141    Specimen: Urine, Clean Catch Updated: 07/09/24 0225     THC, Screen, Urine Negative     Phencyclidine (PCP), Urine Negative     Cocaine Screen, Urine Negative     Methamphetamine, Ur Negative     Opiate Screen Negative     Amphetamine Screen, Urine Negative     Benzodiazepine Screen, Urine Positive     Tricyclic Antidepressants Screen Negative     Methadone Screen, Urine Negative     Barbiturates Screen, Urine Negative     Oxycodone Screen, Urine Negative     Buprenorphine, Screen, Urine Negative    Narrative:      Cutoff For Drugs Screened:    Amphetamines               500 ng/ml  Barbiturates               200 ng/ml  Benzodiazepines            150 ng/ml  Cocaine                    150 ng/ml  Methadone                  200 ng/ml  Opiates                    100 ng/ml  Phencyclidine               25 ng/ml  THC                         50 ng/ml  Methamphetamine            500 ng/ml  Tricyclic Antidepressants  300 ng/ml  Oxycodone                  100 ng/ml  Buprenorphine               10 ng/ml    The normal value for all drugs tested is negative. This report includes unconfirmed screening results, with the cutoff values listed, to be used for medical treatment purposes only.  Unconfirmed results must not be used for non-medical purposes such as employment or legal  testing.  Clinical consideration should be applied to any drug of abuse test, particularly when unconfirmed results are used.      Levetiracetam Level (Keppra) [[id number]] Collected: 07/09/24 0154    Specimen: Blood Updated: 07/09/24 0202    Phenytoin Level, Total [[id number]]  (Abnormal) Collected: 07/08/24 2350    Specimen: Blood Updated: 07/09/24 0052     Phenytoin Level <0.8 mcg/mL     Comprehensive Metabolic Panel [[id number]]  (Abnormal) Collected: 07/08/24 2350    Specimen: Blood Updated: 07/09/24 0047     Glucose 111 mg/dL      BUN 6 mg/dL      Creatinine 0.77 mg/dL      Sodium 139 mmol/L      Potassium 4.0 mmol/L      Chloride 103 mmol/L      CO2 22.0 mmol/L      Calcium 9.3 mg/dL      Total Protein 7.6 g/dL      Albumin 4.1 g/dL      ALT (SGPT) 12 U/L      AST (SGOT) 14 U/L      Alkaline Phosphatase 99 U/L      Total Bilirubin <0.2 mg/dL      Globulin 3.5 gm/dL      A/G Ratio 1.2 g/dL      BUN/Creatinine Ratio 7.8     Anion Gap 14.0 mmol/L      eGFR 108.6 mL/min/1.73     Narrative:      GFR Normal >60  Chronic Kidney Disease <60  Kidney Failure <15      Lipase [[id number]]  (Normal) Collected: 07/08/24 2350    Specimen: Blood Updated: 07/09/24 0047     Lipase 15 U/L     Ethanol [[id number]] Collected: 07/08/24 2350    Specimen: Blood Updated: 07/09/24 0047     Ethanol % <0.010 %     Narrative:      Not for legal purposes. Chain of Custody not followed.     Manual Differential [[id number]]  (Abnormal) Collected: 07/08/24 2350    Specimen: Blood Updated: 07/09/24 0040     Neutrophil % 81.5 %      Lymphocyte % 12.9 %      Monocyte % 4.8 %      Eosinophil % 0.0 %      Basophil % 0.8 %      Neutrophils Absolute 7.63 10*3/mm3      Lymphocytes Absolute 1.21 10*3/mm3      Monocytes Absolute 0.45 10*3/mm3      Eosinophils Absolute 0.00 10*3/mm3      Basophils Absolute 0.07 10*3/mm3      Anisocytosis Slight/1+     Microcytes Slight/1+     Poikilocytes Slight/1+     Polychromasia Slight/1+     WBC Morphology Normal      Giant Platelets Slight/1+    CBC & Differential [[id number]]  (Abnormal) Collected: 07/08/24 2350    Specimen: Blood Updated: 07/09/24 0036    Narrative:      The following orders were created for panel order CBC & Differential.  Procedure                               Abnormality         Status                     ---------                               -----------         ------                     CBC Auto Differential[[id number]]        Abnormal            Final result                 Please view results for these tests on the individual orders.    CBC Auto Differential [[id number]]  (Abnormal) Collected: 07/08/24 2350    Specimen: Blood Updated: 07/09/24 0036     WBC 9.36 10*3/mm3      RBC 4.30 10*6/mm3      Hemoglobin 9.6 g/dL      Hematocrit 31.7 %      MCV 73.7 fL      MCH 22.3 pg      MCHC 30.3 g/dL      RDW 17.2 %      RDW-SD 45.6 fl      MPV 11.7 fL      Platelets 249 10*3/mm3      nRBC 0.0 /100 WBC     [place] Draw [[id number]] Collected: 07/08/24 2350    Specimen: Blood Updated: 07/09/24 0016    Narrative:      The following orders were created for panel order [place] Draw.  Procedure                               Abnormality         Status                     ---------                               -----------         ------                     Green Top (Gel)[[id number]]                                  Final result               Lavender Top[[id number]]                                     Final result               Red Top[[id number]]                                          Final result               Gray Top[[id number]]                                         Final result               Light Blue Top[[id number]]                                   Final result                 Please view results for these tests on the individual orders.    [place] Top [[id number]] Collected: 07/09/24 0000    Specimen: Blood from Arm, Right Updated: 07/09/24 0016     Extra Tube Hold for add-ons.     Comment: Auto resulted.        Light Blue Top [[id number]] Collected: 07/09/24 0000    Specimen: Blood from Arm, Right Updated: 07/09/24 0016     Extra Tube Hold for add-ons.     Comment: Auto resulted       Green Top (Gel) [[id number]] Collected: 07/08/24 2350    Specimen: Blood Updated: 07/09/24 0001     Extra Tube Hold for add-ons.     Comment: Auto resulted.       Lavender Top [[id number]] Collected: 07/08/24 2350    Specimen: Blood Updated: 07/09/24 0001     Extra Tube hold for add-on     Comment: Auto resulted       Red Top [[id number]] Collected: 07/08/24 2350    Specimen: Blood Updated: 07/09/24 0001     Extra Tube Hold for add-ons.     Comment: Auto resulted.               Radiology Review:  Imaging Results (Last 72 Hours)       Procedure Component Value Units Date/Time    XR Chest 1 View [[id number]] Collected: 07/09/24 0551     Updated: 07/09/24 0556    Narrative:      EXAMINATION: XR CHEST 1 VW-     7/9/2024 12:29 AM     HISTORY: possible swallowed foreign body     A frontal projection of the chest is compared with the previous study  dated 2/1/2023.     The lungs are poorly expanded.     No radiopaque foreign in the chest and included proximal part of the  abdomen is noted.     There is no evidence of recent infiltrate, pleural effusion, pulmonary  congestion or pneumothorax.     The heart size is in the normal range.     No acute bony abnormality.       Impression:      1. No radiopaque foreign body in the chest or limited visualized  abdomen.  2. Poor lung expansion. No active cardiopulmonary disease.     This report was signed and finalized on 7/9/2024 5:53 AM by Dr. Medina Braun MD.       XR Abdomen KUB [[id number]] Collected: 07/09/24 0548     Updated: 07/09/24 0554    Narrative:      EXAMINATION: XR ABDOMEN KUB-     7/9/2024 1:23 AM     HISTORY: possible ingested fb     A frontal projection of the abdomen is compared with the previous study  obtained earlier today.     Previously seen 2 adjacently located metallic foreign  bodies in the left  midabdomen are reidentified. No significant change in position of the  foreign bodies. The anatomical location of these foreign objects is not  certain. This may be located in the dependent part of the fluid and food  filled distended stomach?.     There is moderate gas and stool in the colon. No evidence of dilatation  of the small or large bowel loops.     There is evidence of prior cholecystectomy.     There is no acute bony abnormality.       Impression:      1. 2 adjacent is located metallic foreign objects in the left mid  abdomen. The anatomical location of these objects is not certain.  Further follow-up may be obtained.  2. The remaining abdomen is unremarkable.              This report was signed and finalized on 7/9/2024 5:51 AM by Dr. Medina Braun MD.       XR Abdomen KUB [[id number]] Collected: 07/09/24 0541     Updated: 07/09/24 0551    Narrative:      EXAMINATION: XR ABDOMEN KUB-     7/9/2024 12:29 AM     HISTORY: possible swallowed foreign body     A frontal projection of the abdomen is compared with the previous study  dated 1/25/2023.     There are metallic objects projected over the mid central and left  paracentral/lower abdomen which may represent the ingested foreign  bodies as suggested in the history. This may represent  nut an  bolt?. The exact anatomical location of these foreign bodies is not  certain. There may be an independent part of this fluid-filled stomach  are in the proximal to mid small bowel?. There is moderate gas and stool  in the colon. There is small amount of gas in the gastric fundus.     There is evidence of prior cholecystectomy.     Both kidneys are obscured by the bowel contents. No definite radiopaque  calculi.     There is no acute bony abnormality.       Impression:      1. 2 metallic object projected over the central mid and left lower  abdomen which may represent the swallowed foreign body as suggested in  the history. The  anatomical location of the foreign body is not certain.           This report was signed and finalized on 7/9/2024 5:48 AM by Dr. Medina Braun MD.       CT Abdomen Pelvis Without Contrast [[id number]] Collected: 07/09/24 0449     Updated: 07/09/24 0518    Narrative:      CT ABDOMEN PELVIS WO CONTRAST- 7/9/2024 1:49 AM     HISTORY: possible ingested foreign body (battery)      COMPARISON: KUB 7/9/2024     TOTAL DOSE LENGTH PRODUCT: 495.19 mGy.cm. Automated exposure control was  also utilized to decrease patient radiation dose.     TECHNIQUE: Axial images of the abdomen and pelvis are performed without  IV contrast.     FINDINGS: There are few scattered subcentimeter smooth margin  well-circumscribed nodules/masses of the bilateral breast parenchyma  supporting a benign process given the morphology and multiplicity. The  visible lung bases are unremarkable.     The nonenhanced liver, spleen, pancreas, and adrenal glands are  unremarkable. Cholecystectomy clips. No focal renal contour abnormality.  No abnormal perinephric fluid collection. No hydronephrosis. Bladder is  intact. Uterus and ovaries are unremarkable.     There are two foreign bodies within the upper abdomen. There is a  rounded 8 mm foreign body which may represent an ingested battery  positioned immediately right of a cylindrical rib 2.9 cm foreign body  with extensive streak artifact of uncertain etiology. These are  positioned along the posterior dependent portion of the distal stomach  (sagittal series 900 image 29 through 36). There is no free air to  suggest a perforation. Moderate fecal stasis. Normal appendix. No small  bowel dilatation. Fatty containing umbilical hernia. No pathologic  lymphadenopathy identified. Normal caliber abdominal aorta.     No focal destructive regional bony lesion.       Impression:      1. There are 2 foreign bodies present along the dependent posterior wall  of the distal stomach (8 mm rounded foreign body which  may represent an  ingested battery with an adjacent 2.9 cm cylindrical ribbed foreign body  of uncertain etiology causing extensive streak artifact) without  pneumoperitoneum favoring a dependent intraluminal position within the  distal stomach. Moderate fecal stasis. No evidence for gastric or bowel  obstruction. Small fatty containing umbilical hernia. Prior  cholecystectomy.  2. Few scattered well-circumscribed bilateral subcentimeter nodules of  the visible breast parenchyma supporting a benign process given the  morphology and multiplicity.     This report was signed and finalized on 7/9/2024 5:15 AM by Dr. Neelam Hogue MD.               Impression/Plan:    Foreign body ingestion    Pseudoseizures      Distal gastric metallic foreign bodies on CT scan.  1 apparently a button battery and the other a magnet per history.  They should be removed promptly as there is increased risk for perforation with magnets and association with button batteries in the GI tract.  If we are unable to remove these objects endoscopically the patient will need a general surgical consultation for removal.  We will schedule the patient for an upper endoscopy with attempt at gastric foreign body removal and agree with maintaining the patient n.p.o. at this time.  The patient will need appropriate consent signed for the procedure from the patient's power of  or direct caregiver.  The above was discussed in detail with the emergency room staff caring for the patient.  Thank you    Tian Morris MD  07/09/24   08:14 CDT    DISCLAIMER:    This physician works through a [employer] as an inpatient consultant gastroenterologist only and has no outpatient clinic for patient follow up.  Any results not available at time of inpatient discharge and/or GI clinic follow up should be managed by the hospitalist team, PCP, or outpatient gastroenterologist.

## 2024-07-09 NOTE — ANESTHESIA POSTPROCEDURE EVALUATION
"Patient: Duane Cooley    Procedure Summary       Date: 07/09/24 Room / Location:  [facility]  /  [facility]    Anesthesia Start: 0858 Anesthesia Stop: 0930    Procedure: ESOPHAGOGASTRODUODENOSCOPY WITH ANESTHESIA Diagnosis:       Swallowed foreign body, initial encounter      (Swallowed foreign body, initial encounter [T18.9XXA])    Surgeons: Tian Morris MD Provider: Alistair Allen CRNA    Anesthesia Type: general ASA Status: 3 - Emergent            Anesthesia Type: general    Vitals  Vitals Value Taken Time   BP 92/55 07/09/24 1118   Temp 97.1 °F (36.2 °C) 07/09/24 1118   Pulse 108 07/09/24 1123   Resp 16 07/09/24 1118   SpO2 100 % 07/09/24 1123   Vitals shown include unfiled device data.        Post Anesthesia Care and Evaluation    Patient location during evaluation: PACU  Patient participation: complete - patient participated  Level of consciousness: awake and alert  Pain management: adequate    Airway patency: patent  Anesthetic complications: No anesthetic complications    Cardiovascular status: acceptable  Respiratory status: acceptable  Hydration status: acceptable    Comments: Blood pressure (!) 82/48, pulse 86, temperature 98.1 °F (36.7 °C), temperature source Axillary, resp. rate 18, height 172.7 cm (68\"), weight 90.7 kg (200 lb), SpO2 100%.    Pt discharged from PACU based on pablo score >8    "
Anya Bowers

## 2024-07-09 NOTE — ANESTHESIA PROCEDURE NOTES
Airway  Urgency: emergent    Date/Time: 7/9/2024 9:10 AM  End Time:7/9/2024 11:10 AM  Airway not difficult    General Information and Staff    Patient location during procedure: PACU  Anesthesiologist: Lv Garcia MD    Indications and Patient Condition  Indications for airway management: airway protection    Preoxygenated: yes  Mask difficulty assessment: 1 - vent by mask    Final Airway Details  Final airway type: endotracheal airway      Successful airway: ETT  Cuffed: yes   Successful intubation technique: direct laryngoscopy  Facilitating devices/methods: intubating stylet  Endotracheal tube insertion site: oral  Blade: Guallpa  Blade size: 3  ETT size (mm): 8.0  Cormack-Lehane Classification: grade I - full view of glottis  Placement verified by: chest auscultation and capnometry   Measured from: lips  ETT/EBT  to lips (cm): 22  Number of attempts at approach: 1  Assessment: lips, teeth, and gum same as pre-op and atraumatic intubation    Additional Comments  Pt with continued apparent seizure activity with shortening break free episodes thus requiring airway protection. Called Dr Esposito

## 2024-07-09 NOTE — CONSULTS
"  Neurology Consult Note    Consult Date: 2024  Referring MD: No ref. provider found  Reason for Consult: Seizure-like activity    Patient: Duane Cooley (28 y.o. female)  MRN: [id number]  : 1995    History of Present Illness:   Duane Cooley is a 28 y.o. female with a known history of seizure-like activity.  She was followed by [facility] neurology for quite some time.  [facility] neurology saw her last in 2023.  Below is an excerpt from that note:    This is a 27-year old female routinely cared for by Rosie Pagan MD, who has been followed by Declan Lira MD, at [facility] neurology for possible seizure disorder.  Patient has a known history of autism, is described in medical records as having a \"static encephalopathy,\" depression, and anxiety.     The patient was hospitalized at [facility] from 2023-2023 with what was initially felt to be status epilepticus, however, none of the screening or continuous EEG studies demonstrated any epileptiform activity.     Dr. Srinivas Acevedo MD, saw the patient on 2023 after the patient had been successfully extubated.  At that time, she was being managed with Keppra 1500 mg twice daily, Lamictal 200 mg in the morning, Dilantin 100 mg 3 times daily and Vimpat 100 mg twice daily.  Despite this, she was given multiple doses of Versed in the emergency room at the beginning of that admission leading to eventual intubation due to seizure-like activity.     Dr. Acevedo's assessment at that time was that \"at least some of the events, if not all appear to be pseudoseizures.\"  Recommendation was for the patient to then follow-up with Dr. Declan Lira MD, as an outpatient.     Unfortunately, the patient returned to the emergency room again at [facility] and was subsequently transferred to [facility] in [place], [place].  We have requested these records for review as well.     Yesterday, the patient apparently had a " witnessed seizure-like event at home for which EMS was summoned.  They presented her to [facility] this time for evaluation where she was felt to have signs of status epilepticus.  The patient was treated, however, with multiple doses of Valium and Versed by EMS prior to arrival.  Despite this, the patient was demonstrating convulsive-like activity eventually leading to intubation for airway protection.    The patient reportedly was discharged on January 26, 2023.  She was supposed to follow-up with Dr. Declan Lira for continued care of her seizure-like events.  There are no records that she ever followed up with him subsequent to that appointment.  [facility] to [facility]ologic reports.    She also has a known history of multiple developmental disorders including autism.  She has been prone to pathologically swallowing things in the past.  She presented to our medical facility late last night.  She was having jerking like episodes.  When the jerking activity stopped she reported to the medical staff that she may have swallowed a battery.  An x-ray of the abdomen showed 2 metallic foreign objects in the left mid abdomen GI was consulted and they recommended admitting for endoscopy.  She reportedly had an endoscopy this morning at around 09 30.  An endoscopy showed no signs of a foreign object.  After the endoscopy the patient demonstrated seizure-like activity and anesthesia intubated the patient.  She currently is in the intensive care unit awaiting a general surgery evaluation.  She is intubated and sedated.  No seizure activity is currently seen.    I was now able to speak to her mother by phone a Ms. Kelli Cooley.  She tells me that after I saw the patient in January 2023 that she elected not to follow-up with [facility] neurology.  She wanted another opinion.  She was seen by neurologist at [facility].  That neurologist said that she did not need  "to be on any of her antiepileptics.  By report she stopped all of her seizure meds and then started having multiple seizures after this.  She ended up at [facility].  The timeline is unclear because her mother tells me that was as recent as 1 month ago.  At [facility] they restarted her antiepileptics not because they believe that they were actual epileptic events but instead suggested a slower taper to avoid any seizure activity.  Her mother tells me that she often swallows things.  She has not done in several months but yesterday reported that she is swallowed either some watch batteries or several magnets.  It is unclear whether she takes her meds according to her mother.  I asked whether she has establish care with a neurologist.  She tells me that she has a new neurologist that she is scheduled with now in [facility] as she wants a \"second opinion.\"  That is despite her having 5-6 neurologic opinions prior to this that all pointed towards psychogenic nonepileptic spells.  Her mother does know that she does not have true epileptic events.  She calls the episodes \"spells.\"  She has been told that by multiple neurologist.  She tells me that when the patient has one of her spells if she will leave her alone long enough the patient will snap out of it.      Medical History:   Past Medical/Surgical Hx:  Past Medical History:   Diagnosis Date    Seizures      History reviewed. No pertinent surgical history.    Medications On Admission:  Medications Prior to Admission   Medication Sig Dispense Refill Last Dose    doxazosin (CARDURA) 1 MG tablet Take 1 tablet by mouth 2 (Two) Times a Day.       FLUoxetine (PROzac) 40 MG capsule Take 2 capsules by mouth Daily.       hydrOXYzine (ATARAX) 25 MG tablet Take 1 tablet by mouth 3 (Three) Times a Day As Needed for Itching.       lacosamide (VIMPAT) 100 MG tablet tablet Take 1 tablet by mouth 2 (Two) Times a Day.       lamoTRIgine (LaMICtal) 100 MG " tablet Take 2.5 tablets by mouth Daily.   Patient Taking Differently    levETIRAcetam (KEPPRA) 1000 MG tablet Take 1 tablet by mouth 2 (Two) Times a Day.       montelukast (SINGULAIR) 10 MG tablet Take 1 tablet by mouth Every Night.       OLANZapine zydis (zyPREXA) 5 MG disintegrating tablet Take 1 tablet by mouth 3 times a day.       pantoprazole (PROTONIX) 40 MG EC tablet Take 1 tablet by mouth Daily.       senna 8.6 MG tablet Take 2 tablets by mouth Daily.       acetaminophen (TYLENOL) 500 MG tablet Take 1 tablet by mouth Every 6 (Six) Hours As Needed for Mild Pain.       albuterol sulfate  (90 Base) MCG/ACT inhaler Inhale 2 puffs Every 4 (Four) Hours As Needed for Wheezing.       diazePAM (VALTOCO 15 MG DOSE NA) 15 mg into the nostril(s) as directed by provider As Needed (seizure lasting longer than 5 minutes).          Current Medications:    Current Facility-Administered Medications:     acetaminophen (TYLENOL) tablet 650 mg, 650 mg, Oral, Q4H PRN **OR** acetaminophen (TYLENOL) 160 MG/5ML oral solution 650 mg, 650 mg, Oral, Q4H PRN **OR** acetaminophen (TYLENOL) suppository 650 mg, 650 mg, Rectal, Q4H PRN, Eriberto Trejo MD    Chlorhexidine Gluconate Cloth 2 % pads 1 Application, 1 application , Topical, Once, Luisito Nguyen MD    [START ON 7/10/2024] Chlorhexidine Gluconate Cloth 2 % pads 1 Application, 1 Application, Topical, Q24H, Luisito Nguyen MD    dexmedetomidine (PRECEDEX) 400 mcg in 100 mL NS infusion, 0.2-1.5 mcg/kg/hr, Intravenous, Titrated, Dev Emanuel PA-C, Last Rate: 22.7 mL/hr at 07/09/24 1315, 1 mcg/kg/hr at 07/09/24 1315    lacosamide (VIMPAT) tablet 100 mg, 100 mg, Oral, BID, Eriberto Trejo MD    lactated ringers infusion, 20 mL/hr, Intravenous, Continuous, Vanita Draper MD, Last Rate: 20 mL/hr at 07/09/24 0858, Currently Infusing at 07/09/24 0858    lamoTRIgine (LaMICtal) tablet 250 mg, 250 mg, Oral, Daily, Eriberto Trejo MD     levETIRAcetam (KEPPRA) tablet 1,000 mg, 1,000 mg, Oral, BID, Eriberto Trejo MD    LORazepam (ATIVAN) injection 2 mg, 2 mg, Intravenous, Once PRN, Dev Emanuel PA-C    mupirocin (BACTROBAN) 2 % nasal ointment 1 Application, 1 application , Each Nare, BID, Luisito Nguyen MD    OLANZapine (zyPREXA) tablet 5 mg, 5 mg, Oral, TID, Eriberto Trejo MD    ondansetron (ZOFRAN) injection 4 mg, 4 mg, Intravenous, Q6H PRN, Dev Emanuel PA-C    propofol (DIPRIVAN) infusion 10 mg/mL 100 mL, 5-75 mcg/kg/min, Intravenous, Titrated, Dev Emanuel PA-C, Last Rate: 19.05 mL/hr at 07/09/24 1358, 35 mcg/kg/min at 07/09/24 1358    senna (SENOKOT) tablet 1 tablet, 1 tablet, Oral, Daily, Eriberto Trejo MD    sodium chloride 0.9 % flush 10 mL, 10 mL, Intravenous, Q12H, Eriberto Trejo MD    sodium chloride 0.9 % flush 10 mL, 10 mL, Intravenous, PRN, Eriberto Trejo MD    sodium chloride 0.9 % infusion 40 mL, 40 mL, Intravenous, PRN, Eriberto Trejo MD    sodium chloride 0.9 % infusion, 100 mL/hr, Intravenous, Continuous, Lance Esposito DO    sodium chloride 0.9 % infusion, 100 mL/hr, Intravenous, Continuous, Vanita Draper MD     Allergies:  No Known Allergies    Social Hx:  Social History     Socioeconomic History    Marital status:    Tobacco Use    Smoking status: Unknown   Vaping Use    Vaping status: Never Used   Substance and Sexual Activity    Alcohol use: Defer    Drug use: Defer    Sexual activity: Defer       Family Hx:  History reviewed. No pertinent family history.  Physical Examination:   Vital Signs:  Vitals:    07/09/24 1103 07/09/24 1112 07/09/24 1118 07/09/24 1200   BP: 112/60  92/55 (!) 82/48   BP Location:    Right arm   Patient Position:    Lying   Pulse: 108 88 89 86   Resp: 18 18 16 18   Temp:   97.1 °F (36.2 °C) 98.1 °F (36.7 °C)   TempSrc:    Axillary   SpO2: 100% 100% 100% 100%   Weight:       Height:      "      Examination:  Intubated and sedated  Pupils equally reactive  No spontaneous awakening  No spontaneous movement  No rhythmic jerking activity    Recent Diagnostics:   Laboratory Results:   - Reviewed in EMR  Lab Results   Component Value Date    GLUCOSE 111 (H) 07/08/2024    CALCIUM 9.3 07/08/2024     07/08/2024    K 4.0 07/08/2024    CO2 22.0 07/08/2024     07/08/2024    BUN 6 07/08/2024    CREATININE 0.77 07/08/2024    BCR 7.8 07/08/2024    ANIONGAP 14.0 07/08/2024     Lab Results   Component Value Date    WBC 9.36 07/08/2024    HGB 9.6 (L) 07/08/2024    HCT 31.7 (L) 07/08/2024    MCV 73.7 (L) 07/08/2024     07/08/2024     No results found for: \"PTT\", \"INR\"  No results found for: \"CHOLTOT\", \"TRIG\", \"HDL\", \"LDL\"  No results found for: \"HGBA1C\"    Imaging Results:  Imaging Results (Last 24 Hours)       Procedure Component Value Units Date/Time    XR Chest 1 View [[id number]] Collected: 07/09/24 1121     Updated: 07/09/24 1125    Narrative:      EXAM: XR CHEST 1 VW-      DATE: 7/9/2024 10:09 AM     HISTORY: Confirm ET Tube Placement; T18.9XXA-Foreign body of alimentary  tract, part unspecified, initial encounter; R56.9-Unspecified  convulsions       COMPARISON: 7/9/2024 at 1:23 a.m.     TECHNIQUE:  Frontal view(s) of the chest submitted.     FINDINGS:    An ET tube has been placed with tip 4.2 cm above the elizabeth. Lungs are  grossly clear. No effusion or pneumothorax is seen. Heart and  mediastinum are unremarkable.          Impression:         1. ET tube placement with tip above the elizabeth and no active disease in  the chest.     This report was signed and finalized on 7/9/2024 11:22 AM by Collins Lam.       XR Chest 1 View [[id number]] Collected: 07/09/24 0551     Updated: 07/09/24 0556    Narrative:      EXAMINATION: XR CHEST 1 VW-     7/9/2024 12:29 AM     HISTORY: possible swallowed foreign body     A frontal projection of the chest is compared with the previous study  dated " 2/1/2023.     The lungs are poorly expanded.     No radiopaque foreign in the chest and included proximal part of the  abdomen is noted.     There is no evidence of recent infiltrate, pleural effusion, pulmonary  congestion or pneumothorax.     The heart size is in the normal range.     No acute bony abnormality.       Impression:      1. No radiopaque foreign body in the chest or limited visualized  abdomen.  2. Poor lung expansion. No active cardiopulmonary disease.     This report was signed and finalized on 7/9/2024 5:53 AM by Dr. Medina Braun MD.       XR Abdomen KUB [[id number]] Collected: 07/09/24 0548     Updated: 07/09/24 0554    Narrative:      EXAMINATION: XR ABDOMEN KUB-     7/9/2024 1:23 AM     HISTORY: possible ingested fb     A frontal projection of the abdomen is compared with the previous study  obtained earlier today.     Previously seen 2 adjacently located metallic foreign bodies in the left  midabdomen are reidentified. No significant change in position of the  foreign bodies. The anatomical location of these foreign objects is not  certain. This may be located in the dependent part of the fluid and food  filled distended stomach?.     There is moderate gas and stool in the colon. No evidence of dilatation  of the small or large bowel loops.     There is evidence of prior cholecystectomy.     There is no acute bony abnormality.       Impression:      1. 2 adjacent is located metallic foreign objects in the left mid  abdomen. The anatomical location of these objects is not certain.  Further follow-up may be obtained.  2. The remaining abdomen is unremarkable.              This report was signed and finalized on 7/9/2024 5:51 AM by Dr. Medina Braun MD.       XR Abdomen KUB [[id number]] Collected: 07/09/24 0541     Updated: 07/09/24 0551    Narrative:      EXAMINATION: XR ABDOMEN KUB-     7/9/2024 12:29 AM     HISTORY: possible swallowed foreign body     A frontal projection of the  abdomen is compared with the previous study  dated 1/25/2023.     There are metallic objects projected over the mid central and left  paracentral/lower abdomen which may represent the ingested foreign  bodies as suggested in the history. This may represent  nut an  bolt?. The exact anatomical location of these foreign bodies is not  certain. There may be an independent part of this fluid-filled stomach  are in the proximal to mid small bowel?. There is moderate gas and stool  in the colon. There is small amount of gas in the gastric fundus.     There is evidence of prior cholecystectomy.     Both kidneys are obscured by the bowel contents. No definite radiopaque  calculi.     There is no acute bony abnormality.       Impression:      1. 2 metallic object projected over the central mid and left lower  abdomen which may represent the swallowed foreign body as suggested in  the history. The anatomical location of the foreign body is not certain.           This report was signed and finalized on 7/9/2024 5:48 AM by Dr. Medina Braun MD.       CT Abdomen Pelvis Without Contrast [[id number]] Collected: 07/09/24 0449     Updated: 07/09/24 0518    Narrative:      CT ABDOMEN PELVIS WO CONTRAST- 7/9/2024 1:49 AM     HISTORY: possible ingested foreign body (battery)      COMPARISON: KUB 7/9/2024     TOTAL DOSE LENGTH PRODUCT: 495.19 mGy.cm. Automated exposure control was  also utilized to decrease patient radiation dose.     TECHNIQUE: Axial images of the abdomen and pelvis are performed without  IV contrast.     FINDINGS: There are few scattered subcentimeter smooth margin  well-circumscribed nodules/masses of the bilateral breast parenchyma  supporting a benign process given the morphology and multiplicity. The  visible lung bases are unremarkable.     The nonenhanced liver, spleen, pancreas, and adrenal glands are  unremarkable. Cholecystectomy clips. No focal renal contour abnormality.  No abnormal perinephric  fluid collection. No hydronephrosis. Bladder is  intact. Uterus and ovaries are unremarkable.     There are two foreign bodies within the upper abdomen. There is a  rounded 8 mm foreign body which may represent an ingested battery  positioned immediately right of a cylindrical rib 2.9 cm foreign body  with extensive streak artifact of uncertain etiology. These are  positioned along the posterior dependent portion of the distal stomach  (sagittal series 900 image 29 through 36). There is no free air to  suggest a perforation. Moderate fecal stasis. Normal appendix. No small  bowel dilatation. Fatty containing umbilical hernia. No pathologic  lymphadenopathy identified. Normal caliber abdominal aorta.     No focal destructive regional bony lesion.       Impression:      1. There are 2 foreign bodies present along the dependent posterior wall  of the distal stomach (8 mm rounded foreign body which may represent an  ingested battery with an adjacent 2.9 cm cylindrical ribbed foreign body  of uncertain etiology causing extensive streak artifact) without  pneumoperitoneum favoring a dependent intraluminal position within the  distal stomach. Moderate fecal stasis. No evidence for gastric or bowel  obstruction. Small fatty containing umbilical hernia. Prior  cholecystectomy.  2. Few scattered well-circumscribed bilateral subcentimeter nodules of  the visible breast parenchyma supporting a benign process given the  morphology and multiplicity.     This report was signed and finalized on 7/9/2024 5:15 AM by Dr. Neelam Hogue MD.                Other labs:  White count on admission was 9, hemoglobin 9, and platelets were 249 next  Metabolic panel on admission showed no electrolyte abnormalities  Ethanol level was negative  Phenytoin level was less than 0.8  Urine analysis showed moderate leukocyte Estrace  Urine drug screen was positive for benzodiazepines  Urine fentanyl was negative  Urine pregnancy was negative  Keppra  level pending        Assessment & Plan:   Patient presenting with behavioral disturbances including likely purposely swallowing foreign objects.  Seizure-like activity stopped in the ER.  A second spell of seizure-like activity was seen after the endoscopy.  I believe that based on her history these likely represent psychogenic nonepileptic spells.  The fact that she reports (per a binder that she carries with her) that she takes multiple antiepileptics is extremely concerning.  My plan at discharge was that she establish care again with an epileptologist to could wean these off.  She clearly has not done that here locally.  If she is taking her Dilantin then it is extremely concerning that her Dilantin level on admission was undetectable.  I recommend that she remain intubated and sedated while awaiting surgical consultation to see if she does require a surgery.  If she will not require surgery that it is completely reasonable to lower the sedation and extubate the patient immediately.  In the future, please avoid intubation for any seizure-like activity.    Impression:  Psychogenic nonepileptic spells  Possible swallowing of metallic objects the could potentially be a battery  Behavioral disorder      Plan:   No neurologic workup required  Continue all home doses of reported antiepileptics once the patient awakens  I highly recommend that she reestablish care with Dr. Declan Lira at [facility] neurology.  I attempted to dissuade the patient's mother to let the patient continue seeking multiple other opinions including the neurology appointment that she has scheduled at [facility].  I think she needs to be discontinued off of all antiepileptics.  I believe that the binder that she carries with her that list all of her medications and medical conditions needs to be corrected so that it reads that she has only psychogenic nonepileptic spells.  I believe that she does not need a rescue medicine even though she has  a bag by her bedside that is listed as a rescue medication and has nasal benzodiazepines listed on her home medication list.  She has a medical alert bracelet that she reportedly wears and is currently in her backpack that does read that she has epilepsy.  I recommend that this be discontinued immediately and that she receive another 1 that identifies her correctly is having psychogenic nonepileptic spells.  I believe continuing down this path of misinformation will lead her to unnecessary medical and surgical procedures including repeat intubations for psychogenic nonepileptic spells.    Oscar Martínez MD  07/09/24  15:12 CDT    Medical Decision Making    Number/Complexity of Problems  Moderate  1 undiagnosed new problem with uncertain prognosis -   1 acute illness with systemic symptoms -   High  1 acute or chronic illness that poses a threat to life/body function -   Highly complex     MDM Data  Moderate - 1/3 categories  Extensive - 2/3 categories    Category 1: 3 of the following  Review of external notes  Review of results  Ordering of each unique test  Independent historian  Category 2:  Independent interpretation of test (ex: imaging)  Category 3:  Discussion of management with another provider    Independent interpretation of testing and discussion of management with intensive care specialist     Treatment Plan  Moderate - Prescription Drug management  High  Drug therapy requiring intensive monitoring for toxicity  Decision regarding hospitalization or escalation of care  De-escalate care/DNR decisions  Reviewed drug therapy including drug levels for monitoring.

## 2024-07-09 NOTE — PROGRESS NOTES
"    [facility] Intensivist Services  INPATIENT PROGRESS NOTE    Patient Name: Duane Cooley  Date of Admission: 7/8/2024  Today's Date: 07/09/24  Length of Stay: 0  Primary Care Physician: Rosie Olvera MD    Subjective   Chief Complaint: Possible seizure, ingestion of foreign object  HPI   28-year-old female with past medical history of autism, and pseudoseizures that presents to the emergency room with jerking movements that were stopped once a nasopharyngeal airway was placed.  She had received Versed in transport by EMS.  She had hide hospitalizations as being treated aggressively for what was thought to be seizures and required intubation and propofol drip several locations in the past.  On her last visit in February of last year she was evaluated by Dr. Schreiber who recommended her neurologist neurologist wean her off antiepileptic medications.     After this jerking movements episode was stopped, she reported that she might of swallowed a battery and some mild minutes she was chewing on.  X-ray of the abdomen shows 2 metallic foreign objects in the left mid abdomen, and CT abdomen showed 2 foreign bodies present the posterior wall of the stomach without pneumoperitoneum favoring an intraluminal foreign body.  Gastroenterologist was called and recommended admission to our services.  The patient is chewing on a marker does not appear distressed no complaints of pain, she is interested in reporting that she has \"epilepsy\".    The patient went for endoscopy with gastroenterology on 7/9/2024 to further evaluate the 2 foreign objects seen on x-ray of the abdomen and CT of the abdomen.  However, while the patient was recovering in PACU, she began to display seizure-like activity and was unable to protect her airway.  Therefore, she was intubated by anesthesia.  She was then transferred to the ICU for closer monitoring.    I saw this patient shortly after arrival to the " ICU.  She reportedly had seizure-like activity upon her initial arrival to the ICU.  However, saw her within 1 to 2 minutes of her arrival, and the seizure-like activity had ceased.  Neurology has been consulted, and they believe this is likely psychogenic nonepileptic spells, and not true seizure activity.  General surgery was also consulted as her endoscopy showed that the 2 foreign objects she had swallowed had both passed her stomach.  We will obtain a daily KUB.  We will follow any recommendations per general surgery.  We have kept the patient intubated at this time in case patient will need surgery in the near future.      Review of Systems   All pertinent negatives and positives are as above. All other systems have been reviewed and are negative unless otherwise stated.     Objective    Temp:  [97 °F (36.1 °C)-98.4 °F (36.9 °C)] 98.1 °F (36.7 °C)  Heart Rate:  [] 86  Resp:  [14-22] 18  BP: ()/(48-89) 82/48  FiO2 (%):  [40 %] 40 %  Physical Exam  Vitals and nursing note reviewed.   Constitutional:       Appearance: She is ill-appearing.      Interventions: She is sedated and intubated.   HENT:      Head: Normocephalic and atraumatic.   Cardiovascular:      Rate and Rhythm: Normal rate and regular rhythm.      Pulses: Normal pulses.   Pulmonary:      Effort: Pulmonary effort is normal. She is intubated.      Breath sounds: Decreased breath sounds present.   Abdominal:      General: Abdomen is flat. There is no distension.      Palpations: Abdomen is soft.   Musculoskeletal:      Cervical back: Neck supple.   Skin:     General: Skin is warm and dry.      Capillary Refill: Capillary refill takes less than 2 seconds.   Neurological:      Comments: Unable to perform thorough neuroexam as patient is currently intubated and sedated           Results Review:        XR Chest 1 View    Result Date: 7/9/2024   1. ET tube placement with tip above the elizabeth and no active disease in the chest.  This report was  signed and finalized on 7/9/2024 11:22 AM by Collins Lam.      XR Chest 1 View    Result Date: 7/9/2024  1. No radiopaque foreign body in the chest or limited visualized abdomen. 2. Poor lung expansion. No active cardiopulmonary disease.  This report was signed and finalized on 7/9/2024 5:53 AM by Dr. Medina Braun MD.      XR Abdomen KUB    Result Date: 7/9/2024  1. 2 adjacent is located metallic foreign objects in the left mid abdomen. The anatomical location of these objects is not certain. Further follow-up may be obtained. 2. The remaining abdomen is unremarkable.     This report was signed and finalized on 7/9/2024 5:51 AM by Dr. Medina Braun MD.      XR Abdomen KUB    Result Date: 7/9/2024  1. 2 metallic object projected over the central mid and left lower abdomen which may represent the swallowed foreign body as suggested in the history. The anatomical location of the foreign body is not certain.    This report was signed and finalized on 7/9/2024 5:48 AM by Dr. Medina Braun MD.      CT Abdomen Pelvis Without Contrast    Result Date: 7/9/2024  1. There are 2 foreign bodies present along the dependent posterior wall of the distal stomach (8 mm rounded foreign body which may represent an ingested battery with an adjacent 2.9 cm cylindrical ribbed foreign body of uncertain etiology causing extensive streak artifact) without pneumoperitoneum favoring a dependent intraluminal position within the distal stomach. Moderate fecal stasis. No evidence for gastric or bowel obstruction. Small fatty containing umbilical hernia. Prior cholecystectomy. 2. Few scattered well-circumscribed bilateral subcentimeter nodules of the visible breast parenchyma supporting a benign process given the morphology and multiplicity.  This report was signed and finalized on 7/9/2024 5:15 AM by Dr. Neelam Hogue MD.       Result Review:  I have personally reviewed the results from the time of this admission to 7/9/2024  "12:45 CDT and agree with these findings:  [x]  Laboratory list / accordion  []  Microbiology  [x]  Radiology  [x]  EKG/Telemetry   []  Cardiology/Vascular   []  Pathology  []  Old records  []  Other:  Most notable findings include: UDS positive for benzodiazepines, CMP unremarkable, hemoglobin 9.6, hematocrit 31.7.  Latest KUB shows metallic foreign body projects to the left of midline at the level of the upper sacrum.  Patient was sinus rhythm on telemetry.      Culture Data:   No results found for: \"BLOODCX\", \"URINECX\", \"WOUNDCX\", \"MRSACX\", \"RESPCX\", \"STOOLCX\"    I have reviewed the patient's current medications.     Assessment/Plan   Assessment  Active Hospital Problems    Diagnosis     **Foreign body ingestion     Pseudoseizures        Medical Decision Making  Number and Complexity of problems: 2  Differential Diagnosis: Ingestion of foreign body, perforated viscera, seizures, pseudoseizures    Conditions and Status        Condition is stable.     MDM Data  External documents reviewed: N/A  Cardiac tracing (EKG, telemetry) interpretation: Sinus rhythm on telemetry  Radiology interpretation: Yes, see above  Labs reviewed: Yes, see above  Any tests that were considered but not ordered: No     Decision rules/scores evaluated (example [id number], Wells, etc): N/A     Discussed with: Dr. Nguyen     Care Planning  Shared decision making: Dr. Nguyen  Code status and discussions: Full    Treatment Plan  Ingestion of foreign body   -Patient found to have 2 metallic objects noted on multiple imaging studies   -EGD showed that the metallic objects had already passed patient's stomach and entered her small intestines   -General surgery consulted for possible surgical removal if needed, we appreciate their recommendations   -We will monitor with daily KUB    2.  Seizure-like activity   -Patient noted to have seizure-like activity in PACU, which required her to be rapidly intubated   -Neurology following, and they believe " seizure-like activity is likely representative of psychogenic nonepileptic spells based on her history.  We appreciate their recommendations.    3. Acute Respiratory failure   -Currently intubated and sedated   -Currently on 40% FiO2 with TV of 450 and PEEP 5   -Will likely try to wean with SATs and SBTs in the morning     Disposition  Social Determinants of Health that impact treatment or disposition: N/A  I expect the patient to be discharged to home in 2-4 days.     I provided 40 minutes of total critical care time. Due to the high probability of clinically significant, life-threatening deterioration, the patient required my direct and personal management. The critical care time does not include time spent on separately billable procedures.    Electronically signed by Dev Emanuel PA-C on 7/9/2024 at 12:45 CDT

## 2024-07-09 NOTE — NURSING NOTE
[facility]  INPATIENT WOUND & OSTOMY CARE    Today's Date: 07/09/24    Patient Name: Duane Cooley  MRN: [id number]  [facility]: [id number]  PCP: Rosie Olvera MD  Attending Provider: Luisito Nguyen MD  Length of Stay: 0    I placed pressure injury prevention measure orders per protocol due to patient being at risk for skin breakdown and being admitted to the unit. Please reach out to wound care nurse if any skin issues arise.         This document has been electronically signed by Nancy Watters RN on 7/9/2024 13:09 CDT

## 2024-07-09 NOTE — ANESTHESIA PROCEDURE NOTES
Airway  Urgency: elective    Date/Time: 7/9/2024 9:10 AM  Airway not difficult    General Information and Staff    Patient location during procedure: OR    Indications and Patient Condition  Indications for airway management: airway protection    Preoxygenated: yes  MILS maintained throughout  Mask difficulty assessment: 0 - not attempted    Final Airway Details  Final airway type: endotracheal airway      Successful airway: ETT  Cuffed: yes   Successful intubation technique: RSI and video laryngoscopy  Facilitating devices/methods: intubating stylet  Endotracheal tube insertion site: oral  Blade: Elliott  Blade size: 3  ETT size (mm): 7.0  Cormack-Lehane Classification: grade I - full view of glottis  Placement verified by: chest auscultation and capnometry   Cuff volume (mL): 8  Measured from: teeth  ETT/EBT  to teeth (cm): 21  Number of attempts at approach: 1  Assessment: lips, teeth, and gum same as pre-op and atraumatic intubation

## 2024-07-09 NOTE — PLAN OF CARE
Goal Outcome Evaluation:           Problem: Communication Impairment (Mechanical Ventilation, Invasive)  Goal: Effective Communication  Outcome: Ongoing, Progressing     Problem: Device-Related Complication Risk (Mechanical Ventilation, Invasive)  Goal: Optimal Device Function  Outcome: Ongoing, Progressing  Intervention: Optimize Device Care and Function  Recent Flowsheet Documentation  Taken 7/9/2024 1838 by Akilah Cazares RRT  Airway Safety Measures:   mask valve resuscitator at bedside   oxygen flowmeter at bedside   suction at bedside     Problem: Inability to Wean (Mechanical Ventilation, Invasive)  Goal: Mechanical Ventilation Liberation  Outcome: Ongoing, Progressing     Problem: Nutrition Impairment (Mechanical Ventilation, Invasive)  Goal: Optimal Nutrition Delivery  Outcome: Ongoing, Progressing     Problem: Skin and Tissue Injury (Mechanical Ventilation, Invasive)  Goal: Absence of Device-Related Skin and Tissue Injury  Outcome: Ongoing, Progressing  Intervention: Maintain Skin and Tissue Health  Recent Flowsheet Documentation  Taken 7/9/2024 1838 by Akilah Cazares, VAN  Device Skin Pressure Protection: skin-to-device areas padded     Problem: Ventilator-Induced Lung Injury (Mechanical Ventilation, Invasive)  Goal: Absence of Ventilator-Induced Lung Injury  Outcome: Ongoing, Progressing  Intervention: Prevent Ventilator-Associated Pneumonia  Recent Flowsheet Documentation  Taken 7/9/2024 1838 by Akilah Cazares RRT  Head of Bed (HOB) Positioning: HOB at 20-30 degrees         RT EQUIPMENT DEVICE RELATED - SKIN ASSESSMENT    Nikko Score:        RT Medical Equipment/Device:     ETT Hemphill/Anchorfast    Skin Assessment:      Cheek:  Intact  Lips:  Intact  Mouth:  Intact    Device Skin Pressure Protection:  Skin-to-device areas padded:  Anchorfast    Nurse Notification:  No    Akilah Cazares RRT

## 2024-07-09 NOTE — CONSULTS
20 gauge 2.5 inch USGPIV placed in right upper arm with 1 attempt(s).  20 gauge 1.88 inch USGPIV placed in left forearm with 1 attempt(s).

## 2024-07-09 NOTE — H&P
"    [facility]  HISTORY AND PHYSICAL    Date of Admission: 7/8/2024  Primary Care Physician: Rosie Olvera MD    Subjective   Primary Historian: Patient    Chief Complaint: Seizure    History of Present Illness  28-year-old female with past medical history of autism, and pseudoseizures that presents to the emergency room with jerking movements that were stopped once a nasopharyngeal airway was placed.  She had received Versed in transport by EMS.  She had hide hospitalizations as being treated aggressively for what was thought to be seizures and required intubation and propofol drip several locations in the past.  On her last visit in February of last year she was evaluated by Dr. Schreiber who recommended her neurologist neurologist wean her off antiepileptic medications.    After this jerking movements episode was stopped, she reported that she might of swallowed a battery and some mild minutes she was chewing on.  X-ray of the abdomen shows 2 metallic foreign objects in the left mid abdomen, and CT abdomen showed 2 foreign bodies present the posterior wall of the stomach without pneumoperitoneum favoring an intraluminal foreign body.  Gastroenterologist was called and recommended admission to our services.  The patient is chewing on a marker does not appear distressed no complaints of pain, she is interested in reporting that she has \"epilepsy\".    Review of Systems   Otherwise complete ROS reviewed and negative except as mentioned in the HPI.    Past Medical History:   Past Medical History:   Diagnosis Date    Seizures      Past Surgical History:History reviewed. No pertinent surgical history.  Social History: Alcohol use questions deferred to the physician. Drug use questions deferred to the physician.    Family History: Patient can not provide    Allergies:  No Known Allergies    Medications:  Prior to Admission medications    Medication Sig Start " Date End Date Taking? Authorizing Provider   acetaminophen (TYLENOL) 500 MG tablet Take 800 mg by mouth Every 6 (Six) Hours As Needed for Mild Pain.   Yes Rayray Jean MD   albuterol sulfate  (90 Base) MCG/ACT inhaler Inhale 2 puffs Every 4 (Four) Hours As Needed for Wheezing.   Yes Rayray Jean MD   diazePAM (VALTOCO 15 MG DOSE NA) 16 mg into the nostril(s) as directed by provider As Needed (seizure lasting longer than 5 minutes).   Yes Rayray Jean MD   doxazosin (CARDURA) 1 MG tablet Take 1 tablet by mouth Every Night.   Yes Rayray Jean MD   FLUoxetine (PROzac) 40 MG capsule Take 2 capsules by mouth Daily.   Yes Rayray Jean MD   hydrOXYzine (ATARAX) 25 MG tablet Take 1 tablet by mouth 3 (Three) Times a Day As Needed for Itching.   Yes Rayray Jean MD   lacosamide (VIMPAT) 100 MG tablet tablet Take 1 tablet by mouth 2 (Two) Times a Day.   Yes Rayray Jean MD   LAMOTRIGINE PO Take 250 mg by mouth Daily.   Yes Rayray Jean MD   levETIRAcetam (KEPPRA) 1000 MG tablet Take 1 tablet by mouth 2 (Two) Times a Day.   Yes Rayray Jean MD   montelukast (SINGULAIR) 10 MG tablet Take 1 tablet by mouth Every Night.   Yes Rayray Jean MD   OLANZapine (zyPREXA) 5 MG tablet Take 1 tablet by mouth 3 times a day.   Yes Rayray Jean MD   pantoprazole (PROTONIX) 40 MG EC tablet Take 1 tablet by mouth Daily.   Yes Rayray Jean MD   senna 8.6 MG tablet Take 1 tablet by mouth Daily.   Yes Rayray Jean MD   ferrous sulfate 325 (65 FE) MG tablet Take 1 tablet by mouth 2 (Two) Times a Day.    Rayray Jean MD   phenytoin ER (DILANTIN) 100 MG capsule Take 1 capsule by mouth 3 (Three) Times a Day.    Rayray Jean MD     I have utilized all available immediate resources to obtain, update, or review the patient's current medications (including all prescriptions, over-the-counter products, herbals,  "cannabis/cannabidiol products, and vitamin/mineral/dietary (nutritional) supplements).    Objective     Vital Signs: /68   Pulse 107   Temp 98.4 °F (36.9 °C) (Oral)   Resp 16   Ht 172.7 cm (68\")   Wt 90.7 kg (200 lb)   SpO2 100%   BMI 30.41 kg/m²   Physical Exam  Vitals reviewed.   Constitutional:       General: She is not in acute distress.     Appearance: She is well-developed. She is not toxic-appearing.   HENT:      Head: Normocephalic and atraumatic.      Right Ear: External ear normal.      Left Ear: External ear normal.      Mouth/Throat:      Mouth: Mucous membranes are dry.      Pharynx: Oropharynx is clear.   Eyes:      General:         Right eye: No discharge.         Left eye: No discharge.      Extraocular Movements: Extraocular movements intact.      Conjunctiva/sclera: Conjunctivae normal.      Pupils: Pupils are equal, round, and reactive to light.   Neck:      Vascular: No JVD.   Cardiovascular:      Rate and Rhythm: Normal rate and regular rhythm.      Pulses: Normal pulses.      Heart sounds: Normal heart sounds. No murmur heard.     No friction rub. No gallop.   Pulmonary:      Effort: Pulmonary effort is normal. No respiratory distress.      Breath sounds: No stridor. No wheezing, rhonchi or rales.   Chest:      Chest wall: No tenderness.   Abdominal:      General: Bowel sounds are normal. There is no distension.      Palpations: Abdomen is soft.      Tenderness: There is no abdominal tenderness. There is no guarding or rebound.      Hernia: No hernia is present.   Musculoskeletal:         General: No swelling, tenderness or deformity. Normal range of motion.      Cervical back: Normal range of motion and neck supple. No rigidity or tenderness. No muscular tenderness.      Right lower leg: No edema.      Left lower leg: No edema.   Skin:     General: Skin is warm and dry.      Findings: No erythema or rash.   Neurological:      General: No focal deficit present.      Mental Status: " She is alert. Mental status is at baseline.      Cranial Nerves: No cranial nerve deficit.      Sensory: No sensory deficit.      Motor: No weakness or abnormal muscle tone.      Deep Tendon Reflexes: Reflexes normal.              Results Reviewed:  Lab Results (last 24 hours)       Procedure Component Value Units Date/Time    Urinalysis With Microscopic If Indicated (No Culture) - Urine, Clean Catch [[id number]]  (Abnormal) Collected: 07/09/24 0141    Specimen: Urine, Clean Catch Updated: 07/09/24 0228     Color, UA Yellow     Appearance, UA Clear     pH, UA 8.0     Specific Gravity, UA 1.010     Glucose, UA Negative     Ketones, UA Negative     Bilirubin, UA Negative     Blood, UA Negative     Protein, UA Negative     Leuk Esterase, UA Moderate (2+)     Nitrite, UA Negative     Urobilinogen, UA 0.2 E.U./dL    Urinalysis, Microscopic Only - Urine, Clean Catch [[id number]]  (Abnormal) Collected: 07/09/24 0141    Specimen: Urine, Clean Catch Updated: 07/09/24 0228     RBC, UA None Seen /HPF      WBC, UA 3-5 /HPF      Bacteria, UA Trace /HPF      Squamous Epithelial Cells, UA 0-2 /HPF      Hyaline Casts, UA None Seen /LPF      Methodology Manual Light Microscopy    Fentanyl, Urine - Urine, Clean Catch [[id number]]  (Normal) Collected: 07/09/24 0141    Specimen: Urine, Clean Catch Updated: 07/09/24 0226     Fentanyl, Urine Negative    Narrative:      Negative Threshold:      Fentanyl 5 ng/mL     The normal value for the drug tested is negative. This report includes final unconfirmed screening results to be used for medical treatment purposes only. Unconfirmed results must not be used for non-medical purposes such as employment or legal testing. Clinical consideration should be applied to any drug of abuse test, particularly when unconfirmed results are used.           Urine Drug Screen - Urine, Clean Catch [[id number]]  (Abnormal) Collected: 07/09/24 0141    Specimen: Urine, Clean Catch Updated: 07/09/24 0225     THC,  Screen, Urine Negative     Phencyclidine (PCP), Urine Negative     Cocaine Screen, Urine Negative     Methamphetamine, Ur Negative     Opiate Screen Negative     Amphetamine Screen, Urine Negative     Benzodiazepine Screen, Urine Positive     Tricyclic Antidepressants Screen Negative     Methadone Screen, Urine Negative     Barbiturates Screen, Urine Negative     Oxycodone Screen, Urine Negative     Buprenorphine, Screen, Urine Negative    Narrative:      Cutoff For Drugs Screened:    Amphetamines               500 ng/ml  Barbiturates               200 ng/ml  Benzodiazepines            150 ng/ml  Cocaine                    150 ng/ml  Methadone                  200 ng/ml  Opiates                    100 ng/ml  Phencyclidine               25 ng/ml  THC                         50 ng/ml  Methamphetamine            500 ng/ml  Tricyclic Antidepressants  300 ng/ml  Oxycodone                  100 ng/ml  Buprenorphine               10 ng/ml    The normal value for all drugs tested is negative. This report includes unconfirmed screening results, with the cutoff values listed, to be used for medical treatment purposes only.  Unconfirmed results must not be used for non-medical purposes such as employment or legal testing.  Clinical consideration should be applied to any drug of abuse test, particularly when unconfirmed results are used.      Levetiracetam Level (Keppra) [[id number]] Collected: 07/09/24 0154    Specimen: Blood Updated: 07/09/24 0202    Phenytoin Level, Total [[id number]]  (Abnormal) Collected: 07/08/24 2350    Specimen: Blood Updated: 07/09/24 0052     Phenytoin Level <0.8 mcg/mL     Comprehensive Metabolic Panel [[id number]]  (Abnormal) Collected: 07/08/24 2350    Specimen: Blood Updated: 07/09/24 0047     Glucose 111 mg/dL      BUN 6 mg/dL      Creatinine 0.77 mg/dL      Sodium 139 mmol/L      Potassium 4.0 mmol/L      Chloride 103 mmol/L      CO2 22.0 mmol/L      Calcium 9.3 mg/dL      Total Protein 7.6 g/dL       Albumin 4.1 g/dL      ALT (SGPT) 12 U/L      AST (SGOT) 14 U/L      Alkaline Phosphatase 99 U/L      Total Bilirubin <0.2 mg/dL      Globulin 3.5 gm/dL      A/G Ratio 1.2 g/dL      BUN/Creatinine Ratio 7.8     Anion Gap 14.0 mmol/L      eGFR 108.6 mL/min/1.73     Narrative:      GFR Normal >60  Chronic Kidney Disease <60  Kidney Failure <15      Lipase [[id number]]  (Normal) Collected: 07/08/24 2350    Specimen: Blood Updated: 07/09/24 0047     Lipase 15 U/L     Ethanol [[id number]] Collected: 07/08/24 2350    Specimen: Blood Updated: 07/09/24 0047     Ethanol % <0.010 %     Narrative:      Not for legal purposes. Chain of Custody not followed.     Manual Differential [[id number]]  (Abnormal) Collected: 07/08/24 2350    Specimen: Blood Updated: 07/09/24 0040     Neutrophil % 81.5 %      Lymphocyte % 12.9 %      Monocyte % 4.8 %      Eosinophil % 0.0 %      Basophil % 0.8 %      Neutrophils Absolute 7.63 10*3/mm3      Lymphocytes Absolute 1.21 10*3/mm3      Monocytes Absolute 0.45 10*3/mm3      Eosinophils Absolute 0.00 10*3/mm3      Basophils Absolute 0.07 10*3/mm3      Anisocytosis Slight/1+     Microcytes Slight/1+     Poikilocytes Slight/1+     Polychromasia Slight/1+     WBC Morphology Normal     Giant Platelets Slight/1+    CBC & Differential [[id number]]  (Abnormal) Collected: 07/08/24 2350    Specimen: Blood Updated: 07/09/24 0036    Narrative:      The following orders were created for panel order CBC & Differential.  Procedure                               Abnormality         Status                     ---------                               -----------         ------                     CBC Auto Differential[[id number]]        Abnormal            Final result                 Please view results for these tests on the individual orders.    CBC Auto Differential [[id number]]  (Abnormal) Collected: 07/08/24 2350    Specimen: Blood Updated: 07/09/24 0036     WBC 9.36 10*3/mm3      RBC 4.30 10*6/mm3      Hemoglobin  9.6 g/dL      Hematocrit 31.7 %      MCV 73.7 fL      MCH 22.3 pg      MCHC 30.3 g/dL      RDW 17.2 %      RDW-SD 45.6 fl      MPV 11.7 fL      Platelets 249 10*3/mm3      nRBC 0.0 /100 WBC     [place] Draw [[id number]] Collected: 07/08/24 2350    Specimen: Blood Updated: 07/09/24 0016    Narrative:      The following orders were created for panel order [place] Draw.  Procedure                               Abnormality         Status                     ---------                               -----------         ------                     Green Top (Gel)[[id number]]                                  Final result               Lavender Top[[id number]]                                     Final result               Red Top[[id number]]                                          Final result               Gray Top[[id number]]                                         Final result               Light Blue Top[[id number]]                                   Final result                 Please view results for these tests on the individual orders.    [place] Top [[id number]] Collected: 07/09/24 0000    Specimen: Blood from Arm, Right Updated: 07/09/24 0016     Extra Tube Hold for add-ons.     Comment: Auto resulted.       Light Blue Top [[id number]] Collected: 07/09/24 0000    Specimen: Blood from Arm, Right Updated: 07/09/24 0016     Extra Tube Hold for add-ons.     Comment: Auto resulted       Green Top (Gel) [[id number]] Collected: 07/08/24 2350    Specimen: Blood Updated: 07/09/24 0001     Extra Tube Hold for add-ons.     Comment: Auto resulted.       Lavender Top [[id number]] Collected: 07/08/24 2350    Specimen: Blood Updated: 07/09/24 0001     Extra Tube hold for add-on     Comment: Auto resulted       Red Top [[id number]] Collected: 07/08/24 2350    Specimen: Blood Updated: 07/09/24 0001     Extra Tube Hold for add-ons.     Comment: Auto resulted.             Imaging Results (Last 24 Hours)       Procedure Component Value Units  Date/Time    XR Chest 1 View [[id number]] Collected: 07/09/24 0551     Updated: 07/09/24 0556    Narrative:      EXAMINATION: XR CHEST 1 VW-     7/9/2024 12:29 AM     HISTORY: possible swallowed foreign body     A frontal projection of the chest is compared with the previous study  dated 2/1/2023.     The lungs are poorly expanded.     No radiopaque foreign in the chest and included proximal part of the  abdomen is noted.     There is no evidence of recent infiltrate, pleural effusion, pulmonary  congestion or pneumothorax.     The heart size is in the normal range.     No acute bony abnormality.       Impression:      1. No radiopaque foreign body in the chest or limited visualized  abdomen.  2. Poor lung expansion. No active cardiopulmonary disease.     This report was signed and finalized on 7/9/2024 5:53 AM by Dr. Medina Braun MD.       XR Abdomen KUB [[id number]] Collected: 07/09/24 0548     Updated: 07/09/24 0554    Narrative:      EXAMINATION: XR ABDOMEN KUB-     7/9/2024 1:23 AM     HISTORY: possible ingested fb     A frontal projection of the abdomen is compared with the previous study  obtained earlier today.     Previously seen 2 adjacently located metallic foreign bodies in the left  midabdomen are reidentified. No significant change in position of the  foreign bodies. The anatomical location of these foreign objects is not  certain. This may be located in the dependent part of the fluid and food  filled distended stomach?.     There is moderate gas and stool in the colon. No evidence of dilatation  of the small or large bowel loops.     There is evidence of prior cholecystectomy.     There is no acute bony abnormality.       Impression:      1. 2 adjacent is located metallic foreign objects in the left mid  abdomen. The anatomical location of these objects is not certain.  Further follow-up may be obtained.  2. The remaining abdomen is unremarkable.              This report was signed and finalized  on 7/9/2024 5:51 AM by Dr. Medina Braun MD.       XR Abdomen KUB [[id number]] Collected: 07/09/24 0541     Updated: 07/09/24 0551    Narrative:      EXAMINATION: XR ABDOMEN KUB-     7/9/2024 12:29 AM     HISTORY: possible swallowed foreign body     A frontal projection of the abdomen is compared with the previous study  dated 1/25/2023.     There are metallic objects projected over the mid central and left  paracentral/lower abdomen which may represent the ingested foreign  bodies as suggested in the history. This may represent  nut an  bolt?. The exact anatomical location of these foreign bodies is not  certain. There may be an independent part of this fluid-filled stomach  are in the proximal to mid small bowel?. There is moderate gas and stool  in the colon. There is small amount of gas in the gastric fundus.     There is evidence of prior cholecystectomy.     Both kidneys are obscured by the bowel contents. No definite radiopaque  calculi.     There is no acute bony abnormality.       Impression:      1. 2 metallic object projected over the central mid and left lower  abdomen which may represent the swallowed foreign body as suggested in  the history. The anatomical location of the foreign body is not certain.           This report was signed and finalized on 7/9/2024 5:48 AM by Dr. Medina Braun MD.       CT Abdomen Pelvis Without Contrast [[id number]] Collected: 07/09/24 0449     Updated: 07/09/24 0518    Narrative:      CT ABDOMEN PELVIS WO CONTRAST- 7/9/2024 1:49 AM     HISTORY: possible ingested foreign body (battery)      COMPARISON: KUB 7/9/2024     TOTAL DOSE LENGTH PRODUCT: 495.19 mGy.cm. Automated exposure control was  also utilized to decrease patient radiation dose.     TECHNIQUE: Axial images of the abdomen and pelvis are performed without  IV contrast.     FINDINGS: There are few scattered subcentimeter smooth margin  well-circumscribed nodules/masses of the bilateral breast  parenchyma  supporting a benign process given the morphology and multiplicity. The  visible lung bases are unremarkable.     The nonenhanced liver, spleen, pancreas, and adrenal glands are  unremarkable. Cholecystectomy clips. No focal renal contour abnormality.  No abnormal perinephric fluid collection. No hydronephrosis. Bladder is  intact. Uterus and ovaries are unremarkable.     There are two foreign bodies within the upper abdomen. There is a  rounded 8 mm foreign body which may represent an ingested battery  positioned immediately right of a cylindrical rib 2.9 cm foreign body  with extensive streak artifact of uncertain etiology. These are  positioned along the posterior dependent portion of the distal stomach  (sagittal series 900 image 29 through 36). There is no free air to  suggest a perforation. Moderate fecal stasis. Normal appendix. No small  bowel dilatation. Fatty containing umbilical hernia. No pathologic  lymphadenopathy identified. Normal caliber abdominal aorta.     No focal destructive regional bony lesion.       Impression:      1. There are 2 foreign bodies present along the dependent posterior wall  of the distal stomach (8 mm rounded foreign body which may represent an  ingested battery with an adjacent 2.9 cm cylindrical ribbed foreign body  of uncertain etiology causing extensive streak artifact) without  pneumoperitoneum favoring a dependent intraluminal position within the  distal stomach. Moderate fecal stasis. No evidence for gastric or bowel  obstruction. Small fatty containing umbilical hernia. Prior  cholecystectomy.  2. Few scattered well-circumscribed bilateral subcentimeter nodules of  the visible breast parenchyma supporting a benign process given the  morphology and multiplicity.     This report was signed and finalized on 7/9/2024 5:15 AM by Dr. Neelam Hogue MD.             I have personally reviewed and interpreted the radiology studies and ECG obtained at time of  admission.     Assessment / Plan   Assessment:   Active Hospital Problems    Diagnosis     **Foreign body ingestion     Pseudoseizures      Treatment Plan  The patient will be admitted to my service here at [facility].   Observe in med floor  Vitals every 4 hours  N.p.o.  IV fluids D5W 1/2  cc/hour  GI consult for EGD foreign body extraction    Home medications reviewed    DVT prophylaxis SCDs    Medical Decision Making  Number and Complexity of problems: 2 complex problems with ingestion of foreign body into stomach and risk of perforation of viscera with life threatening consequences  Differential Diagnosis: see above    Conditions and Status        Condition is unchanged.     MDM Data  External documents reviewed: [employer]  Cardiac tracing (EKG, telemetry) interpretation: -  Radiology interpretation: see above  Labs reviewed: see above  Any tests that were considered but not ordered: none     Decision rules/scores evaluated (example [id number], [place], etc): N/A     Discussed with: Patient and Dr Ruiz     Care Planning  Shared decision making: Patient  Code status and discussions: Full code    Disposition  Social Determinants of Health that impact treatment or disposition: none  Estimated length of stay is to be determined.     I confirmed that the patient's advanced care plan is present, code status is documented, and a surrogate decision maker is listed in the patient's medical record.     The patient's surrogate decision maker is family, see records.     The patient was seen and examined by me on 7/09/2024 at 0624 AM.    Electronically signed by Eriberto Trejo MD, 07/09/24, 07:09 CDT.

## 2024-07-09 NOTE — ED NOTES
Patient awake, sitting upright in bed.  Using Ipad/typing for communication.  Answers questions appropriately - follows all commands.  Reports she did not take her medication today but told her mom that she did.  Stated she has 2 kinds of seizures - FND and epileptic.  Complains of headache.  Reassured patient she is in hospital and in safe environment. Return demonstration of call light use performed.  Call light in bed, pillows taped to bed rails for safety, HOB at 30 degrees.

## 2024-07-09 NOTE — PROCEDURES
ESOPHAGOGASTRODUODENOSCOPY    Date:  7/9/2024    Indications: Ingested metallic foreign body     Postprocedure diagnosis: Normal upper endoscopy to descending duodenum, metallic foreign body not seen procedure: ESOPHAGOGASTRODUODENOSCOPY     Sedation: As per anesthesia.    Surgeon: Jamie Morris MD    Anesthesia: General anesthesia with endotracheal intubation     Procedure  Description: After informed consent was obtained, a timeout was called in the endoscopy suite to confirm the correct patient and appropriate procedure.  Thereafter with the patient in the left lateral position, esophagus was intubated under direct vision with adult Olympus gastroscope.  Thereafter scope was slowly advanced into the second portion of the duodenum under direct vision.  Careful examination of the duodenum, stomach and esophagus was performed while slowly withdrawing the scope.  Retroflex examination of the gastric cardia and fundus were also performed.    Findings: After informed consent and medications and endotracheal intubation per the anesthesia service, the Olympus diagnostic video upper endoscope was advanced into the esophagus under direct visualization without difficulty.  The esophagus appeared unremarkable.  The squamocolumnar junction and proximal gastric folds were noted at approximately 39 cm.  The endoscope passed easily into an easily distensible stomach which appeared normal.  Retroflexed view of the cardia and fundus were normal.  The pylorus was patent and the duodenum was normal to the descending portion within the reach of the endoscope.  No metallic foreign bodies were seen and these most likely have passed distally into the small bowel.    Complications: No immediate complications.    Recommendations: Would recommend a general surgical consultation for an opinion related to ingestion of suspected button battery and magnets which appear to be associated with each other and passed into the small bowel.  Hopefully,  these foreign objects will passed through the ileocecal valve and anal canal into the stool.  The risk of pressure necrosis of the bowel is much lower if the magnets and the batteries stay together as they passed through the intestinal tract.  Would recommend daily abdominal x-rays to monitor progress.  May advance diet as tolerated from a gastrointestinal standpoint if approved by the general surgical service.  The inpatient GI service will follow the patient peripherally at this point.  The above was discussed in detail with the patient's admitting physician.  Thank you    Procedure CPT code: Unknown    Post-Op Diagnosis Codes:     * Swallowed foreign body, initial encounter [T18.9XXA]    Tian Morris MD    DISCLAIMER:    This physician works through a [employer] as an inpatient consultant gastroenterologist only and has no outpatient clinic for patient follow up.  Any results not available at time of inpatient discharge and/or GI clinic follow up should be managed by the hospitalist team, PCP, or outpatient gastroenterologist.

## 2024-07-09 NOTE — ANESTHESIA PREPROCEDURE EVALUATION
Anesthesia Evaluation     Patient summary reviewed   no history of anesthetic complications:   NPO Solid Status: Waived due to emergency  NPO Liquid Status: Waived due to emergency           Airway   Mallampati: II  TM distance: >3 FB  Neck ROM: full  No difficulty expected  Dental - normal exam     Pulmonary    (+) asthma,  Cardiovascular - negative cardio ROS        Neuro/Psych  (+) seizures (epilepsy and FND), psychiatric history Anxiety and Depression    ROS Comment: Autism   GI/Hepatic/Renal/Endo    (+) obesity    Musculoskeletal (-) negative ROS    Abdominal    Substance History      OB/GYN          Other        ROS/Med Hx Other: Foreign body                Anesthesia Plan    ASA 3 - emergent     general   Rapid sequence  (Spoke with mother via [employer] on patient phone to obtain consent.    Patient uses iphone to text and has also provided history reviewed in binder)    Anesthetic plan, risks, benefits, and alternatives have been provided, discussed and informed consent has been obtained with: mother and patient.      CODE STATUS:    Code Status (Patient has no pulse and is not breathing): CPR (Attempt to Resuscitate)  Medical Interventions (Patient has pulse or is breathing): Full Support

## 2024-07-09 NOTE — CONSULTS
Miladys Biggs PA-C Consult Note - General Surgery     Referring Provider: No ref. provider found    Patient Care Team:  Rosie Olvera MD as PCP - General (Family Medicine)    Chief complaint foreign body ingestion    Subjective .     History of present illness:  Patient is a 29 y/o female with a history of autism and pseudoseizures who presented to the ER by EMS with jerking movements. After jerking movements were stopped, patient reported that she possibly swallowed a battery and some other objects. CT scan in the ER showed a round metal object and a 2.5 cm elongated ribbed object in the distal stomach. Patient underwent EGD this morning and objects were not visualized and assumed to have passed into the small bowel. After the procedure, patient was found to have continue apparent seizure activity and was intubated. KUB done this afternoon shows foreign bodies have proceeded to the left of midline at the level of the upper sacrum.     Review of Systems  All systems were unable to be reviewed as patient is intubated.       History  Past Medical History:   Diagnosis Date    Seizures    , History reviewed. No pertinent surgical history., History reviewed. No pertinent family history.,   Social History     Tobacco Use    Smoking status: Unknown   Vaping Use    Vaping status: Never Used   Substance Use Topics    Alcohol use: Defer    Drug use: Defer   ,   Medications Prior to Admission   Medication Sig Dispense Refill Last Dose    doxazosin (CARDURA) 1 MG tablet Take 1 tablet by mouth 2 (Two) Times a Day.       FLUoxetine (PROzac) 40 MG capsule Take 2 capsules by mouth Daily.       hydrOXYzine (ATARAX) 25 MG tablet Take 1 tablet by mouth 3 (Three) Times a Day As Needed for Itching.       lacosamide (VIMPAT) 100 MG tablet tablet Take 1 tablet by mouth 2 (Two) Times a Day.       lamoTRIgine (LaMICtal) 100 MG tablet Take 2.5 tablets by mouth Daily.   Patient Taking Differently    levETIRAcetam (KEPPRA)  1000 MG tablet Take 1 tablet by mouth 2 (Two) Times a Day.       montelukast (SINGULAIR) 10 MG tablet Take 1 tablet by mouth Every Night.       OLANZapine zydis (zyPREXA) 5 MG disintegrating tablet Take 1 tablet by mouth 3 times a day.       pantoprazole (PROTONIX) 40 MG EC tablet Take 1 tablet by mouth Daily.       senna 8.6 MG tablet Take 2 tablets by mouth Daily.       acetaminophen (TYLENOL) 500 MG tablet Take 1 tablet by mouth Every 6 (Six) Hours As Needed for Mild Pain.       albuterol sulfate  (90 Base) MCG/ACT inhaler Inhale 2 puffs Every 4 (Four) Hours As Needed for Wheezing.       diazePAM (VALTOCO 15 MG DOSE NA) 15 mg into the nostril(s) as directed by provider As Needed (seizure lasting longer than 5 minutes).       and Allergies:  Patient has no known allergies.    Current Facility-Administered Medications:     acetaminophen (TYLENOL) tablet 650 mg, 650 mg, Oral, Q4H PRN **OR** acetaminophen (TYLENOL) 160 MG/5ML oral solution 650 mg, 650 mg, Oral, Q4H PRN **OR** acetaminophen (TYLENOL) suppository 650 mg, 650 mg, Rectal, Q4H PRN, Eriberto Trejo MD    Chlorhexidine Gluconate Cloth 2 % pads 1 Application, 1 application , Topical, Once, Luisito Nguyen MD    [START ON 7/10/2024] Chlorhexidine Gluconate Cloth 2 % pads 1 Application, 1 Application, Topical, Q24H, Luisito Nguyen MD    dexmedetomidine (PRECEDEX) 400 mcg in 100 mL NS infusion, 0.2-1.5 mcg/kg/hr, Intravenous, Titrated, Dev Emanuel PA-C, Last Rate: 22.7 mL/hr at 07/09/24 1634, 1 mcg/kg/hr at 07/09/24 1634    lacosamide (VIMPAT) tablet 100 mg, 100 mg, Oral, BID, Eriberto Trejo MD    lactated ringers infusion, 20 mL/hr, Intravenous, Continuous, Vanita Draper MD, Last Rate: 20 mL/hr at 07/09/24 0858, Currently Infusing at 07/09/24 0858    lamoTRIgine (LaMICtal) tablet 250 mg, 250 mg, Oral, Daily, Eriberto Trejo MD    levETIRAcetam (KEPPRA) tablet 1,000 mg, 1,000 mg, Oral, BID, Eriberto Trejo  MD [place]    LORazepam (ATIVAN) injection 2 mg, 2 mg, Intravenous, Once PRN, Dev Emanuel PA-C    mupirocin (BACTROBAN) 2 % nasal ointment 1 Application, 1 application , Each Nare, BID, Luisito Nguyen MD    OLANZapine (zyPREXA) tablet 5 mg, 5 mg, Oral, TID, Eriberto Trejo MD    ondansetron (ZOFRAN) injection 4 mg, 4 mg, Intravenous, Q6H PRN, Dev Emanuel PA-C    propofol (DIPRIVAN) infusion 10 mg/mL 100 mL, 5-75 mcg/kg/min, Intravenous, Titrated, Dev Emanuel PA-C, Last Rate: 19.05 mL/hr at 07/09/24 1358, 35 mcg/kg/min at 07/09/24 1358    senna (SENOKOT) tablet 1 tablet, 1 tablet, Oral, Daily, Eriberto Trejo MD    sodium chloride 0.9 % flush 10 mL, 10 mL, Intravenous, Q12H, Eriberto Trejo MD    sodium chloride 0.9 % flush 10 mL, 10 mL, Intravenous, PRN, Eriberto Trejo MD    sodium chloride 0.9 % infusion 40 mL, 40 mL, Intravenous, PRN, Eriberto Trejo MD    sodium chloride 0.9 % infusion, 100 mL/hr, Intravenous, Continuous, Lance Esposito DO    sodium chloride 0.9 % infusion, 100 mL/hr, Intravenous, Continuous, Vanita Draper MD    Objective     Vital Signs   Temp:  [97 °F (36.1 °C)-98.4 °F (36.9 °C)] 98.3 °F (36.8 °C)  Heart Rate:  [] 62  Resp:  [14-22] 18  BP: ()/(44-89) 106/78  FiO2 (%):  [40 %] 40 %    Physical Exam:  General appearance - sedated and intubated   Mental status - sedated and intubated   Eyes - not examined  Mouth -  ETT in proper position  Chest - no tachypnea, retractions or cyanosis  Heart - normal rate and regular rhythm  Neurological - intubated   Extremities - no pedal edema noted  Skin - normal coloration and turgor, no rashes, no suspicious skin lesions noted    Results Review:    Lab Results (last 24 hours)       Procedure Component Value Units Date/Time    Blood Gas, Arterial - [[id number]]  (Abnormal) Collected: 07/09/24 1350    Specimen: Arterial Blood Updated: 07/09/24 1353     Site Right Brachial      Ricky's Test N/A     pH, Arterial 7.402 pH units      pCO2, Arterial 43.0 mm Hg      pO2, Arterial 174.0 mm Hg      Comment: 83 Value above reference range        HCO3, Arterial 26.7 mmol/L      Comment: 83 Value above reference range        Base Excess, Arterial 1.7 mmol/L      O2 Saturation, Arterial 99.9 %      Comment: 83 Value above reference range        Temperature 37.0     Barometric Pressure for Blood Gas 745 mmHg      Modality Ventilator     FIO2 40 %      Ventilator Mode AC     Set Tidal Volume 450     Set Mech Resp Rate 18.0     PEEP 5.0     Collected by [id number]     Comment: Meter: [id number]     :  Deepa Messina, VAN        pCO2, Temperature Corrected 43.0 mm Hg      pH, Temp Corrected 7.402 pH Units      pO2, Temperature Corrected 174 mm Hg      PO2/FIO2 435    Pregnancy, Urine - Urine, Clean Catch [[id number]]  (Normal) Collected: 07/09/24 0141    Specimen: Urine, Clean Catch Updated: 07/09/24 0847     HCG, Urine QL Negative    Urinalysis With Microscopic If Indicated (No Culture) - Urine, Clean Catch [[id number]]  (Abnormal) Collected: 07/09/24 0141    Specimen: Urine, Clean Catch Updated: 07/09/24 0228     Color, UA Yellow     Appearance, UA Clear     pH, UA 8.0     Specific Gravity, UA 1.010     Glucose, UA Negative     Ketones, UA Negative     Bilirubin, UA Negative     Blood, UA Negative     Protein, UA Negative     Leuk Esterase, UA Moderate (2+)     Nitrite, UA Negative     Urobilinogen, UA 0.2 E.U./dL    Urinalysis, Microscopic Only - Urine, Clean Catch [[id number]]  (Abnormal) Collected: 07/09/24 0141    Specimen: Urine, Clean Catch Updated: 07/09/24 0228     RBC, UA None Seen /HPF      WBC, UA 3-5 /HPF      Bacteria, UA Trace /HPF      Squamous Epithelial Cells, UA 0-2 /HPF      Hyaline Casts, UA None Seen /LPF      Methodology Manual Light Microscopy    Fentanyl, Urine - Urine, Clean Catch [[id number]]  (Normal) Collected: 07/09/24 0141    Specimen: Urine, Clean Catch Updated:  07/09/24 0226     Fentanyl, Urine Negative    Narrative:      Negative Threshold:      Fentanyl 5 ng/mL     The normal value for the drug tested is negative. This report includes final unconfirmed screening results to be used for medical treatment purposes only. Unconfirmed results must not be used for non-medical purposes such as employment or legal testing. Clinical consideration should be applied to any drug of abuse test, particularly when unconfirmed results are used.           Urine Drug Screen - Urine, Clean Catch [[id number]]  (Abnormal) Collected: 07/09/24 0141    Specimen: Urine, Clean Catch Updated: 07/09/24 0225     THC, Screen, Urine Negative     Phencyclidine (PCP), Urine Negative     Cocaine Screen, Urine Negative     Methamphetamine, Ur Negative     Opiate Screen Negative     Amphetamine Screen, Urine Negative     Benzodiazepine Screen, Urine Positive     Tricyclic Antidepressants Screen Negative     Methadone Screen, Urine Negative     Barbiturates Screen, Urine Negative     Oxycodone Screen, Urine Negative     Buprenorphine, Screen, Urine Negative    Narrative:      Cutoff For Drugs Screened:    Amphetamines               500 ng/ml  Barbiturates               200 ng/ml  Benzodiazepines            150 ng/ml  Cocaine                    150 ng/ml  Methadone                  200 ng/ml  Opiates                    100 ng/ml  Phencyclidine               25 ng/ml  THC                         50 ng/ml  Methamphetamine            500 ng/ml  Tricyclic Antidepressants  300 ng/ml  Oxycodone                  100 ng/ml  Buprenorphine               10 ng/ml    The normal value for all drugs tested is negative. This report includes unconfirmed screening results, with the cutoff values listed, to be used for medical treatment purposes only.  Unconfirmed results must not be used for non-medical purposes such as employment or legal testing.  Clinical consideration should be applied to any drug of abuse test,  particularly when unconfirmed results are used.      Levetiracetam Level (Keppra) [[id number]] Collected: 07/09/24 0154    Specimen: Blood Updated: 07/09/24 0202    Phenytoin Level, Total [[id number]]  (Abnormal) Collected: 07/08/24 2350    Specimen: Blood Updated: 07/09/24 0052     Phenytoin Level <0.8 mcg/mL     Comprehensive Metabolic Panel [[id number]]  (Abnormal) Collected: 07/08/24 2350    Specimen: Blood Updated: 07/09/24 0047     Glucose 111 mg/dL      BUN 6 mg/dL      Creatinine 0.77 mg/dL      Sodium 139 mmol/L      Potassium 4.0 mmol/L      Chloride 103 mmol/L      CO2 22.0 mmol/L      Calcium 9.3 mg/dL      Total Protein 7.6 g/dL      Albumin 4.1 g/dL      ALT (SGPT) 12 U/L      AST (SGOT) 14 U/L      Alkaline Phosphatase 99 U/L      Total Bilirubin <0.2 mg/dL      Globulin 3.5 gm/dL      A/G Ratio 1.2 g/dL      BUN/Creatinine Ratio 7.8     Anion Gap 14.0 mmol/L      eGFR 108.6 mL/min/1.73     Narrative:      GFR Normal >60  Chronic Kidney Disease <60  Kidney Failure <15      Lipase [[id number]]  (Normal) Collected: 07/08/24 2350    Specimen: Blood Updated: 07/09/24 0047     Lipase 15 U/L     Ethanol [[id number]] Collected: 07/08/24 2350    Specimen: Blood Updated: 07/09/24 0047     Ethanol % <0.010 %     Narrative:      Not for legal purposes. Chain of Custody not followed.     Manual Differential [[id number]]  (Abnormal) Collected: 07/08/24 2350    Specimen: Blood Updated: 07/09/24 0040     Neutrophil % 81.5 %      Lymphocyte % 12.9 %      Monocyte % 4.8 %      Eosinophil % 0.0 %      Basophil % 0.8 %      Neutrophils Absolute 7.63 10*3/mm3      Lymphocytes Absolute 1.21 10*3/mm3      Monocytes Absolute 0.45 10*3/mm3      Eosinophils Absolute 0.00 10*3/mm3      Basophils Absolute 0.07 10*3/mm3      Anisocytosis Slight/1+     Microcytes Slight/1+     Poikilocytes Slight/1+     Polychromasia Slight/1+     WBC Morphology Normal     Giant Platelets Slight/1+    CBC & Differential [[id number]]  (Abnormal)  Collected: 07/08/24 2350    Specimen: Blood Updated: 07/09/24 0036    Narrative:      The following orders were created for panel order CBC & Differential.  Procedure                               Abnormality         Status                     ---------                               -----------         ------                     CBC Auto Differential[[id number]]        Abnormal            Final result                 Please view results for these tests on the individual orders.    CBC Auto Differential [[id number]]  (Abnormal) Collected: 07/08/24 2350    Specimen: Blood Updated: 07/09/24 0036     WBC 9.36 10*3/mm3      RBC 4.30 10*6/mm3      Hemoglobin 9.6 g/dL      Hematocrit 31.7 %      MCV 73.7 fL      MCH 22.3 pg      MCHC 30.3 g/dL      RDW 17.2 %      RDW-SD 45.6 fl      MPV 11.7 fL      Platelets 249 10*3/mm3      nRBC 0.0 /100 WBC     [place] Draw [[id number]] Collected: 07/08/24 2350    Specimen: Blood Updated: 07/09/24 0016    Narrative:      The following orders were created for panel order [place] Draw.  Procedure                               Abnormality         Status                     ---------                               -----------         ------                     Green Top (Gel)[[id number]]                                  Final result               Lavender Top[[id number]]                                     Final result               Red Top[[id number]]                                          Final result               Gray Top[[id number]]                                         Final result               Light Blue Top[[id number]]                                   Final result                 Please view results for these tests on the individual orders.    [place] Top [[id number]] Collected: 07/09/24 0000    Specimen: Blood from Arm, Right Updated: 07/09/24 0016     Extra Tube Hold for add-ons.     Comment: Auto resulted.       Light Blue Top [[id number]] Collected: 07/09/24 0000    Specimen: Blood  from Arm, Right Updated: 07/09/24 0016     Extra Tube Hold for add-ons.     Comment: Auto resulted       Green Top (Gel) [[id number]] Collected: 07/08/24 2350    Specimen: Blood Updated: 07/09/24 0001     Extra Tube Hold for add-ons.     Comment: Auto resulted.       Lavender Top [[id number]] Collected: 07/08/24 2350    Specimen: Blood Updated: 07/09/24 0001     Extra Tube hold for add-on     Comment: Auto resulted       Red Top [[id number]] Collected: 07/08/24 2350    Specimen: Blood Updated: 07/09/24 0001     Extra Tube Hold for add-ons.     Comment: Auto resulted.             Imaging Results (Last 24 Hours)       Procedure Component Value Units Date/Time    XR Abdomen KUB [[id number]] Resulted: 07/09/24 1809     Updated: 07/09/24 1809    XR Abdomen 1 View [[id number]] Collected: 07/09/24 1625     Updated: 07/09/24 1631    Narrative:      EXAM: XR ABDOMEN 1 VW-      DATE: 7/9/2024 3:15 PM     HISTORY: eval progression of foreign body; T18.9XXA-Foreign body of  alimentary tract, part unspecified, initial encounter; R56.9-Unspecified  convulsions       COMPARISON: 7/9/2024 at 2:21 a.m.     TECHNIQUE:   Frontal view of the abdomen. 1 image.     FINDINGS:    Metallic foreign body projects to the left of midline at the level of  the upper sacrum. Bowel gas pattern is nonspecific but nonobstructive.  There is mild distention of the stomach with air.          Impression:         1. Metallic foreign body projects to the left of midline at the level of  the upper sacrum.     This report was signed and finalized on 7/9/2024 4:28 PM by Collins Lam.       XR Chest 1 View [[id number]] Collected: 07/09/24 1121     Updated: 07/09/24 1125    Narrative:      EXAM: XR CHEST 1 VW-      DATE: 7/9/2024 10:09 AM     HISTORY: Confirm ET Tube Placement; T18.9XXA-Foreign body of alimentary  tract, part unspecified, initial encounter; R56.9-Unspecified  convulsions       COMPARISON: 7/9/2024 at 1:23 a.m.     TECHNIQUE:  Frontal view(s) of  the chest submitted.     FINDINGS:    An ET tube has been placed with tip 4.2 cm above the elizabeth. Lungs are  grossly clear. No effusion or pneumothorax is seen. Heart and  mediastinum are unremarkable.          Impression:         1. ET tube placement with tip above the elizabeth and no active disease in  the chest.     This report was signed and finalized on 7/9/2024 11:22 AM by Collins Lam.       XR Chest 1 View [[id number]] Collected: 07/09/24 0551     Updated: 07/09/24 0556    Narrative:      EXAMINATION: XR CHEST 1 VW-     7/9/2024 12:29 AM     HISTORY: possible swallowed foreign body     A frontal projection of the chest is compared with the previous study  dated 2/1/2023.     The lungs are poorly expanded.     No radiopaque foreign in the chest and included proximal part of the  abdomen is noted.     There is no evidence of recent infiltrate, pleural effusion, pulmonary  congestion or pneumothorax.     The heart size is in the normal range.     No acute bony abnormality.       Impression:      1. No radiopaque foreign body in the chest or limited visualized  abdomen.  2. Poor lung expansion. No active cardiopulmonary disease.     This report was signed and finalized on 7/9/2024 5:53 AM by Dr. Medina Braun MD.       XR Abdomen KUB [[id number]] Collected: 07/09/24 0548     Updated: 07/09/24 0554    Narrative:      EXAMINATION: XR ABDOMEN KUB-     7/9/2024 1:23 AM     HISTORY: possible ingested fb     A frontal projection of the abdomen is compared with the previous study  obtained earlier today.     Previously seen 2 adjacently located metallic foreign bodies in the left  midabdomen are reidentified. No significant change in position of the  foreign bodies. The anatomical location of these foreign objects is not  certain. This may be located in the dependent part of the fluid and food  filled distended stomach?.     There is moderate gas and stool in the colon. No evidence of dilatation  of the small or  large bowel loops.     There is evidence of prior cholecystectomy.     There is no acute bony abnormality.       Impression:      1. 2 adjacent is located metallic foreign objects in the left mid  abdomen. The anatomical location of these objects is not certain.  Further follow-up may be obtained.  2. The remaining abdomen is unremarkable.              This report was signed and finalized on 7/9/2024 5:51 AM by Dr. Medina Braun MD.       XR Abdomen KUB [[id number]] Collected: 07/09/24 0541     Updated: 07/09/24 0551    Narrative:      EXAMINATION: XR ABDOMEN KUB-     7/9/2024 12:29 AM     HISTORY: possible swallowed foreign body     A frontal projection of the abdomen is compared with the previous study  dated 1/25/2023.     There are metallic objects projected over the mid central and left  paracentral/lower abdomen which may represent the ingested foreign  bodies as suggested in the history. This may represent  nut an  bolt?. The exact anatomical location of these foreign bodies is not  certain. There may be an independent part of this fluid-filled stomach  are in the proximal to mid small bowel?. There is moderate gas and stool  in the colon. There is small amount of gas in the gastric fundus.     There is evidence of prior cholecystectomy.     Both kidneys are obscured by the bowel contents. No definite radiopaque  calculi.     There is no acute bony abnormality.       Impression:      1. 2 metallic object projected over the central mid and left lower  abdomen which may represent the swallowed foreign body as suggested in  the history. The anatomical location of the foreign body is not certain.           This report was signed and finalized on 7/9/2024 5:48 AM by Dr. Medina Braun MD.       CT Abdomen Pelvis Without Contrast [[id number]] Collected: 07/09/24 0449     Updated: 07/09/24 0518    Narrative:      CT ABDOMEN PELVIS WO CONTRAST- 7/9/2024 1:49 AM     HISTORY: possible ingested foreign  body (battery)      COMPARISON: KUB 7/9/2024     TOTAL DOSE LENGTH PRODUCT: 495.19 mGy.cm. Automated exposure control was  also utilized to decrease patient radiation dose.     TECHNIQUE: Axial images of the abdomen and pelvis are performed without  IV contrast.     FINDINGS: There are few scattered subcentimeter smooth margin  well-circumscribed nodules/masses of the bilateral breast parenchyma  supporting a benign process given the morphology and multiplicity. The  visible lung bases are unremarkable.     The nonenhanced liver, spleen, pancreas, and adrenal glands are  unremarkable. Cholecystectomy clips. No focal renal contour abnormality.  No abnormal perinephric fluid collection. No hydronephrosis. Bladder is  intact. Uterus and ovaries are unremarkable.     There are two foreign bodies within the upper abdomen. There is a  rounded 8 mm foreign body which may represent an ingested battery  positioned immediately right of a cylindrical rib 2.9 cm foreign body  with extensive streak artifact of uncertain etiology. These are  positioned along the posterior dependent portion of the distal stomach  (sagittal series 900 image 29 through 36). There is no free air to  suggest a perforation. Moderate fecal stasis. Normal appendix. No small  bowel dilatation. Fatty containing umbilical hernia. No pathologic  lymphadenopathy identified. Normal caliber abdominal aorta.     No focal destructive regional bony lesion.       Impression:      1. There are 2 foreign bodies present along the dependent posterior wall  of the distal stomach (8 mm rounded foreign body which may represent an  ingested battery with an adjacent 2.9 cm cylindrical ribbed foreign body  of uncertain etiology causing extensive streak artifact) without  pneumoperitoneum favoring a dependent intraluminal position within the  distal stomach. Moderate fecal stasis. No evidence for gastric or bowel  obstruction. Small fatty containing umbilical hernia.  Prior  cholecystectomy.  2. Few scattered well-circumscribed bilateral subcentimeter nodules of  the visible breast parenchyma supporting a benign process given the  morphology and multiplicity.     This report was signed and finalized on 7/9/2024 5:15 AM by Dr. Neelam Hogue MD.                     Assessment & Plan       Foreign Body Ingestion   Non-operative at this time as foreign bodies have continued to proceed through the small bowel. Plan for bowel regimen this evening to hopefully expedite passage of foreign bodies. Repeat KUB in AM to recheck location.       Miladys Biggs PA-C  07/09/24  18:16 CDT

## 2024-07-09 NOTE — ED PROVIDER NOTES
Subjective   History of Present Illness  Pt presents to the EC with report of seizures - has reportedly had several episodes tonight.  Pt does not provide any history att. She was reportedly given versed by EMS.  Was given ativan in EC.  Per prior notes in system - pt has hx of pseudoseizure and has had three incidents of intubation with her pseudoseizures.  There was notation regarding weaning off of her seizure medications.  In room on eval pt has episode of reaching to the right and shaking - placed NPA and pt stops this and grimaces - pulled NPA out.        Review of Systems   Reason unable to perform ROS: Pt doesn't respond.       Past Medical History:   Diagnosis Date    Seizures        No Known Allergies    No past surgical history on file.    No family history on file.    Social History     Socioeconomic History    Marital status:    Tobacco Use    Smoking status: Unknown   Vaping Use    Vaping status: Unknown   Substance and Sexual Activity    Alcohol use: Defer    Drug use: Defer    Sexual activity: Defer           Objective   Physical Exam  Vitals and nursing note reviewed.   Constitutional:       General: She is in acute distress.      Appearance: Normal appearance.   HENT:      Head: Normocephalic and atraumatic.      Nose: Nose normal.      Mouth/Throat:      Mouth: Mucous membranes are moist.   Eyes:      Conjunctiva/sclera: Conjunctivae normal.      Pupils: Pupils are equal, round, and reactive to light.      Comments: EOM grossly intact - does not follow commands att.   Cardiovascular:      Rate and Rhythm: Normal rate and regular rhythm.      Pulses: Normal pulses.      Heart sounds: Normal heart sounds.   Pulmonary:      Effort: Pulmonary effort is normal.      Breath sounds: Normal breath sounds.   Abdominal:      General: Abdomen is flat.      Palpations: Abdomen is soft.   Musculoskeletal:         General: No deformity or signs of injury.   Skin:     General: Skin is warm and dry.       Capillary Refill: Capillary refill takes less than 2 seconds.   Neurological:      Comments: Pt does not respond/follow commands att.  Moves all extremities.      As noted in HPI - pt has episode of shaking/reaching - turning to R - she does respond to NPA placement during this and aborts the above.  Pt pulled out NPA       Procedures           ED Course      Labs Reviewed   COMPREHENSIVE METABOLIC PANEL - Abnormal; Notable for the following components:       Result Value    Glucose 111 (*)     All other components within normal limits    Narrative:     GFR Normal >60  Chronic Kidney Disease <60  Kidney Failure <15     URINALYSIS W/ MICROSCOPIC IF INDICATED (NO CULTURE) - Abnormal; Notable for the following components:    Leuk Esterase, UA Moderate (2+) (*)     All other components within normal limits   URINE DRUG SCREEN - Abnormal; Notable for the following components:    Benzodiazepine Screen, Urine Positive (*)     All other components within normal limits    Narrative:     Cutoff For Drugs Screened:    Amphetamines               500 ng/ml  Barbiturates               200 ng/ml  Benzodiazepines            150 ng/ml  Cocaine                    150 ng/ml  Methadone                  200 ng/ml  Opiates                    100 ng/ml  Phencyclidine               25 ng/ml  THC                         50 ng/ml  Methamphetamine            500 ng/ml  Tricyclic Antidepressants  300 ng/ml  Oxycodone                  100 ng/ml  Buprenorphine               10 ng/ml    The normal value for all drugs tested is negative. This report includes unconfirmed screening results, with the cutoff values listed, to be used for medical treatment purposes only.  Unconfirmed results must not be used for non-medical purposes such as employment or legal testing.  Clinical consideration should be applied to any drug of abuse test, particularly when unconfirmed results are used.     PHENYTOIN LEVEL, TOTAL - Abnormal; Notable for the following  components:    Phenytoin Level <0.8 (*)     All other components within normal limits   CBC WITH AUTO DIFFERENTIAL - Abnormal; Notable for the following components:    Hemoglobin 9.6 (*)     Hematocrit 31.7 (*)     MCV 73.7 (*)     MCH 22.3 (*)     MCHC 30.3 (*)     RDW 17.2 (*)     All other components within normal limits   MANUAL DIFFERENTIAL - Abnormal; Notable for the following components:    Neutrophil % 81.5 (*)     Lymphocyte % 12.9 (*)     Monocyte % 4.8 (*)     Eosinophil % 0.0 (*)     Neutrophils Absolute 7.63 (*)     All other components within normal limits   URINALYSIS, MICROSCOPIC ONLY - Abnormal; Notable for the following components:    WBC, UA 3-5 (*)     Bacteria, UA Trace (*)     All other components within normal limits   LIPASE - Normal   FENTANYL, URINE - Normal    Narrative:     Negative Threshold:      Fentanyl 5 ng/mL     The normal value for the drug tested is negative. This report includes final unconfirmed screening results to be used for medical treatment purposes only. Unconfirmed results must not be used for non-medical purposes such as employment or legal testing. Clinical consideration should be applied to any drug of abuse test, particularly when unconfirmed results are used.          RAINBOW DRAW    Narrative:     The following orders were created for panel order [place] Draw.  Procedure                               Abnormality         Status                     ---------                               -----------         ------                     Green Top (Gel)[[id number]]                                  Final result               Lavender Top[[id number]]                                     Final result               Red Top[[id number]]                                          Final result               Gray Top[[id number]]                                         Final result               Light Blue Top[[id number]]                                   Final result                  Please view results for these tests on the individual orders.   ETHANOL    Narrative:     Not for legal purposes. Chain of Custody not followed.    LEVETIRACETAM LEVEL   GREEN TOP   LAVENDER TOP   RED TOP   GRAY TOP   LIGHT BLUE TOP   CBC AND DIFFERENTIAL    Narrative:     The following orders were created for panel order CBC & Differential.  Procedure                               Abnormality         Status                     ---------                               -----------         ------                     CBC Auto Differential[[id number]]        Abnormal            Final result                 Please view results for these tests on the individual orders.     CT Abdomen Pelvis Without Contrast   Final Result   1. There are 2 foreign bodies present along the dependent posterior wall   of the distal stomach (8 mm rounded foreign body which may represent an   ingested battery with an adjacent 2.9 cm cylindrical ribbed foreign body   of uncertain etiology causing extensive streak artifact) without   pneumoperitoneum favoring a dependent intraluminal position within the   distal stomach. Moderate fecal stasis. No evidence for gastric or bowel   obstruction. Small fatty containing umbilical hernia. Prior   cholecystectomy.   2. Few scattered well-circumscribed bilateral subcentimeter nodules of   the visible breast parenchyma supporting a benign process given the   morphology and multiplicity.       This report was signed and finalized on 7/9/2024 5:15 AM by Dr. Neelam Hogue MD.          XR Abdomen KUB   Final Result   1. 2 adjacent is located metallic foreign objects in the left mid   abdomen. The anatomical location of these objects is not certain.   Further follow-up may be obtained.   2. The remaining abdomen is unremarkable.                   This report was signed and finalized on 7/9/2024 5:51 AM by Dr. Medina Braun MD.          XR Chest 1 View   Final Result   1. No radiopaque foreign body in  the chest or limited visualized   abdomen.   2. Poor lung expansion. No active cardiopulmonary disease.       This report was signed and finalized on 7/9/2024 5:53 AM by Dr. Medina Braun MD.          XR Abdomen KUB   Final Result   1. 2 metallic object projected over the central mid and left lower   abdomen which may represent the swallowed foreign body as suggested in   the history. The anatomical location of the foreign body is not certain.               This report was signed and finalized on 7/9/2024 5:48 AM by Dr. Medina Braun MD.                                                     Medical Decision Making  Pt stable in EC - has been observed for several hours in EC with no further activity regarding her pseudoseizures.  She has been resting comfortably.  As she reported to nursing that she had ingested a foreign body - XR was obtained which showed what appeared to be two metallic foreign bodies.  Pt then reported that she ingested magnets as well.  CT obtained and it appears that she has a button battery in the stomach and likely several magnets that have attached to one another.  D/w Dr. Morris - recommends admit for endo this AM.  D/w Dr. Trejo - will admit    Amount and/or Complexity of Data Reviewed  Labs: ordered.  Radiology: ordered.    Risk  Prescription drug management.        Final diagnoses:   Swallowed foreign body, initial encounter   Convulsions, unspecified convulsion type       ED Disposition  ED Disposition       ED Disposition   Decision to Admit    Condition   --    Comment   --               No follow-up provider specified.       Medication List      No changes were made to your prescriptions during this visit.            Lv Ruiz,   07/08/24 9164       Lv Ruiz,   07/09/24 0653

## 2024-07-10 ENCOUNTER — APPOINTMENT (OUTPATIENT)
Dept: GENERAL RADIOLOGY | Facility: HOSPITAL | Age: 29
End: 2024-07-10
Payer: MEDICAID

## 2024-07-10 LAB
ANION GAP SERPL CALCULATED.3IONS-SCNC: 9 MMOL/L (ref 5–15)
BASOPHILS # BLD AUTO: 0.04 10*3/MM3 (ref 0–0.2)
BASOPHILS NFR BLD AUTO: 0.5 % (ref 0–1.5)
BUN SERPL-MCNC: 6 MG/DL (ref 6–20)
BUN/CREAT SERPL: 10 (ref 7–25)
CALCIUM SPEC-SCNC: 9.1 MG/DL (ref 8.6–10.5)
CHLORIDE SERPL-SCNC: 104 MMOL/L (ref 98–107)
CO2 SERPL-SCNC: 25 MMOL/L (ref 22–29)
CREAT SERPL-MCNC: 0.6 MG/DL (ref 0.57–1)
DEPRECATED RDW RBC AUTO: 45.7 FL (ref 37–54)
EGFRCR SERPLBLD CKD-EPI 2021: 126.3 ML/MIN/1.73
EOSINOPHIL # BLD AUTO: 0.14 10*3/MM3 (ref 0–0.4)
EOSINOPHIL NFR BLD AUTO: 1.9 % (ref 0.3–6.2)
ERYTHROCYTE [DISTWIDTH] IN BLOOD BY AUTOMATED COUNT: 17 % (ref 12.3–15.4)
GLUCOSE SERPL-MCNC: 103 MG/DL (ref 65–99)
HCT VFR BLD AUTO: 33.2 % (ref 34–46.6)
HGB BLD-MCNC: 9.7 G/DL (ref 12–15.9)
IMM GRANULOCYTES # BLD AUTO: 0.03 10*3/MM3 (ref 0–0.05)
IMM GRANULOCYTES NFR BLD AUTO: 0.4 % (ref 0–0.5)
LYMPHOCYTES # BLD AUTO: 1.77 10*3/MM3 (ref 0.7–3.1)
LYMPHOCYTES NFR BLD AUTO: 24.1 % (ref 19.6–45.3)
MCH RBC QN AUTO: 21.8 PG (ref 26.6–33)
MCHC RBC AUTO-ENTMCNC: 29.2 G/DL (ref 31.5–35.7)
MCV RBC AUTO: 74.6 FL (ref 79–97)
MONOCYTES # BLD AUTO: 0.67 10*3/MM3 (ref 0.1–0.9)
MONOCYTES NFR BLD AUTO: 9.1 % (ref 5–12)
NEUTROPHILS NFR BLD AUTO: 4.7 10*3/MM3 (ref 1.7–7)
NEUTROPHILS NFR BLD AUTO: 64 % (ref 42.7–76)
PLATELET # BLD AUTO: 242 10*3/MM3 (ref 140–450)
PMV BLD AUTO: 11.4 FL (ref 6–12)
POTASSIUM SERPL-SCNC: 4.6 MMOL/L (ref 3.5–5.2)
RBC # BLD AUTO: 4.45 10*6/MM3 (ref 3.77–5.28)
SODIUM SERPL-SCNC: 138 MMOL/L (ref 136–145)
WBC NRBC COR # BLD AUTO: 7.35 10*3/MM3 (ref 3.4–10.8)

## 2024-07-10 PROCEDURE — 85025 COMPLETE CBC W/AUTO DIFF WBC: CPT | Performed by: FAMILY MEDICINE

## 2024-07-10 PROCEDURE — 25010000002 MIDAZOLAM PER 1MG: Performed by: INTERNAL MEDICINE

## 2024-07-10 PROCEDURE — 25010000002 PROPOFOL 10 MG/ML EMULSION: Performed by: PHYSICIAN ASSISTANT

## 2024-07-10 PROCEDURE — 25810000003 SODIUM CHLORIDE 0.9 % SOLUTION: Performed by: ANESTHESIOLOGY

## 2024-07-10 PROCEDURE — 99232 SBSQ HOSP IP/OBS MODERATE 35: CPT | Performed by: STUDENT IN AN ORGANIZED HEALTH CARE EDUCATION/TRAINING PROGRAM

## 2024-07-10 PROCEDURE — 99233 SBSQ HOSP IP/OBS HIGH 50: CPT | Performed by: PSYCHIATRY & NEUROLOGY

## 2024-07-10 PROCEDURE — 80048 BASIC METABOLIC PNL TOTAL CA: CPT | Performed by: FAMILY MEDICINE

## 2024-07-10 PROCEDURE — 25010000002 LACOSAMIDE 200 MG/20ML SOLUTION: Performed by: INTERNAL MEDICINE

## 2024-07-10 PROCEDURE — 99232 SBSQ HOSP IP/OBS MODERATE 35: CPT | Performed by: INTERNAL MEDICINE

## 2024-07-10 PROCEDURE — 94799 UNLISTED PULMONARY SVC/PX: CPT

## 2024-07-10 PROCEDURE — 94761 N-INVAS EAR/PLS OXIMETRY MLT: CPT

## 2024-07-10 PROCEDURE — 25010000002 METOCLOPRAMIDE PER 10 MG: Performed by: STUDENT IN AN ORGANIZED HEALTH CARE EDUCATION/TRAINING PROGRAM

## 2024-07-10 PROCEDURE — 94664 DEMO&/EVAL PT USE INHALER: CPT

## 2024-07-10 PROCEDURE — 25010000002 LEVETRIRACETAM PER 10 MG: Performed by: INTERNAL MEDICINE

## 2024-07-10 PROCEDURE — 74018 RADEX ABDOMEN 1 VIEW: CPT

## 2024-07-10 PROCEDURE — C9254 INJECTION, LACOSAMIDE: HCPCS | Performed by: INTERNAL MEDICINE

## 2024-07-10 PROCEDURE — 94003 VENT MGMT INPAT SUBQ DAY: CPT

## 2024-07-10 RX ORDER — MIDAZOLAM HYDROCHLORIDE 2 MG/2ML
2 INJECTION, SOLUTION INTRAMUSCULAR; INTRAVENOUS ONCE
Status: COMPLETED | OUTPATIENT
Start: 2024-07-10 | End: 2024-07-10

## 2024-07-10 RX ORDER — CHLORHEXIDINE GLUCONATE ORAL RINSE 1.2 MG/ML
15 SOLUTION DENTAL EVERY 12 HOURS SCHEDULED
Status: DISCONTINUED | OUTPATIENT
Start: 2024-07-10 | End: 2024-07-12 | Stop reason: HOSPADM

## 2024-07-10 RX ADMIN — OLANZAPINE 5 MG: 5 TABLET, FILM COATED ORAL at 16:14

## 2024-07-10 RX ADMIN — PROPOFOL INJECTABLE EMULSION 50 MCG/KG/MIN: 10 INJECTION, EMULSION INTRAVENOUS at 21:25

## 2024-07-10 RX ADMIN — METOCLOPRAMIDE HYDROCHLORIDE 10 MG: 5 INJECTION INTRAMUSCULAR; INTRAVENOUS at 05:18

## 2024-07-10 RX ADMIN — OLANZAPINE 5 MG: 5 TABLET, FILM COATED ORAL at 08:01

## 2024-07-10 RX ADMIN — DEXMEDETOMIDINE HYDROCHLORIDE 0.6 MCG/KG/HR: 4 INJECTION, SOLUTION INTRAVENOUS at 05:18

## 2024-07-10 RX ADMIN — CHLORHEXIDINE GLUCONATE 1 APPLICATION: 500 CLOTH TOPICAL at 04:03

## 2024-07-10 RX ADMIN — OLANZAPINE 5 MG: 5 TABLET, FILM COATED ORAL at 20:51

## 2024-07-10 RX ADMIN — SODIUM CHLORIDE 100 ML/HR: 9 INJECTION, SOLUTION INTRAVENOUS at 16:14

## 2024-07-10 RX ADMIN — PROPOFOL INJECTABLE EMULSION 35 MCG/KG/MIN: 10 INJECTION, EMULSION INTRAVENOUS at 05:18

## 2024-07-10 RX ADMIN — Medication 10 ML: at 20:52

## 2024-07-10 RX ADMIN — METOCLOPRAMIDE HYDROCHLORIDE 10 MG: 5 INJECTION INTRAMUSCULAR; INTRAVENOUS at 11:53

## 2024-07-10 RX ADMIN — CHLORHEXIDINE GLUCONATE 15 ML: 1.2 RINSE ORAL at 20:52

## 2024-07-10 RX ADMIN — PROPOFOL INJECTABLE EMULSION 35 MCG/KG/MIN: 10 INJECTION, EMULSION INTRAVENOUS at 04:02

## 2024-07-10 RX ADMIN — PROPOFOL INJECTABLE EMULSION 45 MCG/KG/MIN: 10 INJECTION, EMULSION INTRAVENOUS at 13:49

## 2024-07-10 RX ADMIN — MONTELUKAST SODIUM 10 MG: 10 TABLET, FILM COATED ORAL at 20:51

## 2024-07-10 RX ADMIN — METOCLOPRAMIDE HYDROCHLORIDE 10 MG: 5 INJECTION INTRAMUSCULAR; INTRAVENOUS at 17:59

## 2024-07-10 RX ADMIN — LACOSAMIDE 100 MG: 10 INJECTION, SOLUTION INTRAVENOUS at 09:01

## 2024-07-10 RX ADMIN — LEVETIRACETAM 1000 MG: 100 INJECTION, SOLUTION INTRAVENOUS at 09:01

## 2024-07-10 RX ADMIN — TERAZOSIN HYDROCHLORIDE 1 MG: 1 CAPSULE ORAL at 20:51

## 2024-07-10 RX ADMIN — PROPOFOL INJECTABLE EMULSION 45 MCG/KG/MIN: 10 INJECTION, EMULSION INTRAVENOUS at 18:03

## 2024-07-10 RX ADMIN — LAMOTRIGINE 250 MG: 100 TABLET ORAL at 08:01

## 2024-07-10 RX ADMIN — Medication 1 APPLICATION: at 08:01

## 2024-07-10 RX ADMIN — Medication 1 APPLICATION: at 20:51

## 2024-07-10 RX ADMIN — PROPOFOL INJECTABLE EMULSION 35 MCG/KG/MIN: 10 INJECTION, EMULSION INTRAVENOUS at 09:19

## 2024-07-10 RX ADMIN — DEXMEDETOMIDINE HYDROCHLORIDE 0.7 MCG/KG/HR: 4 INJECTION, SOLUTION INTRAVENOUS at 22:33

## 2024-07-10 RX ADMIN — DEXMEDETOMIDINE HYDROCHLORIDE 0.4 MCG/KG/HR: 4 INJECTION, SOLUTION INTRAVENOUS at 16:14

## 2024-07-10 RX ADMIN — FLUOXETINE HYDROCHLORIDE 80 MG: 20 CAPSULE ORAL at 08:00

## 2024-07-10 RX ADMIN — CHLORHEXIDINE GLUCONATE 15 ML: 1.2 RINSE ORAL at 13:28

## 2024-07-10 RX ADMIN — SODIUM CHLORIDE 100 ML/HR: 9 INJECTION, SOLUTION INTRAVENOUS at 05:18

## 2024-07-10 RX ADMIN — MIDAZOLAM HYDROCHLORIDE 2 MG: 1 INJECTION, SOLUTION INTRAMUSCULAR; INTRAVENOUS at 19:51

## 2024-07-10 NOTE — PLAN OF CARE
Goal Outcome Evaluation:        Problem: Communication Impairment (Mechanical Ventilation, Invasive)  Goal: Effective Communication  Outcome: Ongoing, Progressing     Problem: Device-Related Complication Risk (Mechanical Ventilation, Invasive)  Goal: Optimal Device Function  Outcome: Ongoing, Progressing  Intervention: Optimize Device Care and Function  Recent Flowsheet Documentation  Taken 7/10/2024 0624 by Brandon Schmitz CRT  Airway Safety Measures:   mask valve resuscitator at bedside   manual resuscitator/mask at bedside   suction at bedside   high-efficiency antimicrobial filters maintained     Problem: Inability to Wean (Mechanical Ventilation, Invasive)  Goal: Mechanical Ventilation Liberation  Outcome: Ongoing, Progressing     Problem: Skin and Tissue Injury (Mechanical Ventilation, Invasive)  Goal: Absence of Device-Related Skin and Tissue Injury  Outcome: Ongoing, Progressing  Intervention: Maintain Skin and Tissue Health  Recent Flowsheet Documentation  Taken 7/10/2024 0624 by Brandon Schmitz CRT  Device Skin Pressure Protection: skin-to-device areas padded     Problem: Ventilator-Induced Lung Injury (Mechanical Ventilation, Invasive)  Goal: Absence of Ventilator-Induced Lung Injury  Outcome: Ongoing, Progressing  Intervention: Prevent Ventilator-Associated Pneumonia  Recent Flowsheet Documentation  Taken 7/10/2024 0624 by Brandon Schmitz CRT  Head of Bed (HOB) Positioning: HOB at 30-45 degrees      RT EQUIPMENT DEVICE RELATED - SKIN ASSESSMENT    Nikko Score:  Nikko Score: 12     RT Medical Equipment/Device:     ETT [place]/Anchorfast    Skin Assessment:      Cheek:  Intact  Neck:  Intact  Lips:  Intact    Device Skin Pressure Protection:  Skin-to-device areas padded:  Anchorfast    Nurse Notification:  No    Brandon Schmitz CRT

## 2024-07-10 NOTE — PROGRESS NOTES
General acute hospital Gastroenterology  Inpatient Progress Note  Today's date:  07/10/24    Duane Cooley  1995       Reason for Follow Up: Swallowed metallic foreign objects    Subjective:   Patient continues to be intubated on a ventilator with orogastric tube in place draining clear clearing fluid.  EGD did not reveal any retained upper GI metallic substances.  X-ray today shows metallic objects in the presumed area of the ascending colon.  The patient is unresponsive and unable to give a history.  The history is obtained per chart review and nursing staff interview in the ICU.    No Known Allergies    Current Facility-Administered Medications:     acetaminophen (TYLENOL) tablet 650 mg, 650 mg, Oral, Q4H PRN **OR** acetaminophen (TYLENOL) 160 MG/5ML oral solution 650 mg, 650 mg, Oral, Q4H PRN **OR** acetaminophen (TYLENOL) suppository 650 mg, 650 mg, Rectal, Q4H PRN, Eriberto Trejo MD    Chlorhexidine Gluconate Cloth 2 % pads 1 Application, 1 Application, Topical, Q24H, Luisito Nguyen MD, 1 Application at 07/10/24 0403    dexmedetomidine (PRECEDEX) 400 mcg in 100 mL NS infusion, 0.2-1.5 mcg/kg/hr, Intravenous, Titrated, Dev Emanuel PA-C, Last Rate: 9.1 mL/hr at 07/10/24 0627, 0.4 mcg/kg/hr at 07/10/24 0627    FLUoxetine (PROzac) capsule 80 mg, 80 mg, Oral, Daily, Dev Emanuel PA-C, 80 mg at 07/10/24 0800    hydrOXYzine (ATARAX) tablet 25 mg, 25 mg, Oral, TID PRN, Dev Emanuel PA-C    lacosamide (VIMPAT) injection 100 mg, 100 mg, Intravenous, BID, Luisito Nguyen MD, 100 mg at 07/10/24 0901    lactated ringers infusion, 20 mL/hr, Intravenous, Continuous, Vanita Draper MD, Last Rate: 20 mL/hr at 07/09/24 0858, Currently Infusing at 07/09/24 0858    lamoTRIgine (LaMICtal) tablet 250 mg, 250 mg, Oral, Daily, Eriberto Trejo MD, 250 mg at 07/10/24 0801    levETIRAcetam (KEPPRA) injection 1,000 mg, 1,000 mg, Intravenous, BID, Luisito Nguyen MD, 1,000 mg at 07/10/24  0901    LORazepam (ATIVAN) injection 2 mg, 2 mg, Intravenous, Once PRN, Dev Emanuel PA-C    metoclopramide (REGLAN) injection 10 mg, 10 mg, Intravenous, Q6H, Camille Lam MD, 10 mg at 07/10/24 0518    montelukast (SINGULAIR) tablet 10 mg, 10 mg, Oral, Nightly, Dev Emanuel PA-C, 10 mg at 07/09/24 2241    mupirocin (BACTROBAN) 2 % nasal ointment 1 Application, 1 application , Each Nare, BID, Luisito Nguyen MD, 1 Application at 07/10/24 0801    OLANZapine (zyPREXA) tablet 5 mg, 5 mg, Oral, TID, Eriberto Trejo MD, 5 mg at 07/10/24 0801    ondansetron (ZOFRAN) injection 4 mg, 4 mg, Intravenous, Q6H PRN, Dev Emanuel PA-C    pantoprazole (PROTONIX) injection 40 mg, 40 mg, Intravenous, Daily, Luisito Nguyen MD, 40 mg at 07/09/24 2240    polyethylene glycol (MIRALAX) packet 17 g, 17 g, Oral, Daily, Luisito Nguyen MD, 17 g at 07/09/24 2241    propofol (DIPRIVAN) infusion 10 mg/mL 100 mL, 5-75 mcg/kg/min, Intravenous, Titrated, Dev Emanuel PA-C, Last Rate: 19.05 mL/hr at 07/10/24 0800, 35 mcg/kg/min at 07/10/24 0800    senna (SENOKOT) tablet 1 tablet, 1 tablet, Oral, Daily, Luisito Nguyen MD, 1 tablet at 07/09/24 2309    sodium chloride 0.9 % flush 10 mL, 10 mL, Intravenous, Q12H, Eriberto Trejo MD, 10 mL at 07/09/24 2211    sodium chloride 0.9 % flush 10 mL, 10 mL, Intravenous, PRN, Eriberto Trejo MD    sodium chloride 0.9 % infusion 40 mL, 40 mL, Intravenous, PRN, Eriberto Trejo, MD    sodium chloride 0.9 % infusion, 100 mL/hr, Intravenous, Continuous, Lance Esposito DO    sodium chloride 0.9 % infusion, 100 mL/hr, Intravenous, Continuous, Vanita Draper MD, Last Rate: 100 mL/hr at 07/10/24 0518, 100 mL/hr at 07/10/24 0518    terazosin (HYTRIN) capsule 1 mg, 1 mg, Oral, Nightly, Dev Emanuel PA-C, 1 mg at 07/09/24 0009    Review of Systems:   Review of Systems   Unobtainable  Vital Signs:  Temp:  [97 °F (36.1 °C)-98.3 °F (36.8 °C)]  97.4 °F (36.3 °C)  Heart Rate:  [] 55  Resp:  [14-22] 18  BP: ()/(38-88) 102/55  FiO2 (%):  [30 %-40 %] 30 %  Body mass index is 31.34 kg/m².     Intake/Output Summary (Last 24 hours) at 7/10/2024 0918  Last data filed at 7/10/2024 0800  Gross per 24 hour   Intake 1691.6 ml   Output 2125 ml   Net -433.4 ml     I/O this shift:  In: 495 [I.V.:495]  Out: -   Physical Exam:  Physical Exam   Lungs: Clear to auscultation    Cardiac: Regular rhythm without murmurs    Abdomen: Soft with positive bowel sounds.  Results Review:   I have reviewed all of the patient's current test results    Results from last 7 days   Lab Units 07/10/24  0209 07/08/24  2350   WBC 10*3/mm3 7.35 9.36   HEMOGLOBIN g/dL 9.7* 9.6*   HEMATOCRIT % 33.2* 31.7*   PLATELETS 10*3/mm3 242 249       Results from last 7 days   Lab Units 07/10/24  0209 07/08/24  2350   SODIUM mmol/L 138 139   POTASSIUM mmol/L 4.6 4.0   CHLORIDE mmol/L 104 103   CO2 mmol/L 25.0 22.0   BUN mg/dL 6 6   CREATININE mg/dL 0.60 0.77   CALCIUM mg/dL 9.1 9.3   BILIRUBIN mg/dL  --  <0.2   ALK PHOS U/L  --  99   ALT (SGPT) U/L  --  12   AST (SGOT) U/L  --  14   GLUCOSE mg/dL 103* 111*             Lab Results   Lab Value Date/Time    LIPASE 15 07/08/2024 2350       Radiology Review:  Imaging Results (Last 24 Hours)       Procedure Component Value Units Date/Time    XR Abdomen KUB [[id number]] Collected: 07/10/24 0620     Updated: 07/10/24 0627    Narrative:      EXAMINATION: XR ABDOMEN KUB-     7/10/2024 2:20 AM     HISTORY: f/u xray for passage of swallowed metallic objects;  T18.9XXA-Foreign body of alimentary tract, part unspecified, initial  encounter; R56.9-Unspecified convulsions     A frontal projection of the abdomen is compared with the previous study  dated 7/9/2024.     The previously seen metallic objects are now seen in the right lower  abdomen in the area of the proximal ascending colon.     There is moderate gas and stool in the colon.     Distal end of  nasogastric tube is in the mid to distal stomach. The  distal sideport is in the proximal stomach. The position of the tube has  not significantly changed in the previous study.     Both kidneys are obscured by the bowel contents. No definite radiopaque  calculi.     There is evidence of prior cholecystectomy.     No acute bony abnormality.       Impression:      1. The metallic foreign bodies are seen in the right lower abdomen in  the area of the proximal ascending colon. The abdominal gas pattern is  unremarkable. No evidence of obstruction or ileus.  2. Nasogastric tube in place. No change since the previous study.        This report was signed and finalized on 7/10/2024 6:23 AM by Dr. Medina Braun MD.       XR Abdomen KUB [[id number]] Collected: 07/09/24 1848     Updated: 07/09/24 1857    Narrative:      XR ABDOMEN KUB-     HISTORY: OG placement verification; T18.9XXA-Foreign body of alimentary  tract, part unspecified, initial encounter; R56.9-Unspecified  convulsions     COMPARISON: 7/9/2024 3:59 p.m.     FINDINGS: Frontal view of the upper abdomen obtained.     The enteric tube tip is present within the distal body of the stomach.  Cholecystectomy clips. No dilated loops of bowel seen within the upper  abdomen. Lung bases appear clear.       Impression:      1. Enteric tube tip in the distal body of the stomach.        This report was signed and finalized on 7/9/2024 6:54 PM by Dr. Neelam Hogue MD.       XR Abdomen 1 View [[id number]] Collected: 07/09/24 1625     Updated: 07/09/24 1631    Narrative:      EXAM: XR ABDOMEN 1 VW-      DATE: 7/9/2024 3:15 PM     HISTORY: eval progression of foreign body; T18.9XXA-Foreign body of  alimentary tract, part unspecified, initial encounter; R56.9-Unspecified  convulsions       COMPARISON: 7/9/2024 at 2:21 a.m.     TECHNIQUE:   Frontal view of the abdomen. 1 image.     FINDINGS:    Metallic foreign body projects to the left of midline at the level of  the upper  sacrum. Bowel gas pattern is nonspecific but nonobstructive.  There is mild distention of the stomach with air.          Impression:         1. Metallic foreign body projects to the left of midline at the level of  the upper sacrum.     This report was signed and finalized on 7/9/2024 4:28 PM by Collins Lam.       XR Chest 1 View [[id number]] Collected: 07/09/24 1121     Updated: 07/09/24 1125    Narrative:      EXAM: XR CHEST 1 VW-      DATE: 7/9/2024 10:09 AM     HISTORY: Confirm ET Tube Placement; T18.9XXA-Foreign body of alimentary  tract, part unspecified, initial encounter; R56.9-Unspecified  convulsions       COMPARISON: 7/9/2024 at 1:23 a.m.     TECHNIQUE:  Frontal view(s) of the chest submitted.     FINDINGS:    An ET tube has been placed with tip 4.2 cm above the elizabeth. Lungs are  grossly clear. No effusion or pneumothorax is seen. Heart and  mediastinum are unremarkable.          Impression:         1. ET tube placement with tip above the elizabeth and no active disease in  the chest.     This report was signed and finalized on 7/9/2024 11:22 AM by Collins Lam.               Impression/Plan:    Foreign body ingestion    Pseudoseizures    Swallowed metallic objects appear to be progressing through the GI tract.  Hopefully these will pass in the stool through the anus.  General surgery has seen the patient as well.  The inpatient GI service will sign off at this time.  Please call with any questions or concerns.  The above was discussed in detail with the ICU nursing staff.  Thank you    Tian Morris MD  07/10/24  09:18 CDT    DISCLAIMER:    This physician works through a [employer] as an inpatient consultant gastroenterologist only and has no outpatient clinic for patient follow up.  Any results not available at time of inpatient discharge and/or GI clinic follow up should be managed by the hospitalist team, PCP, or outpatient gastroenterologist.

## 2024-07-10 NOTE — PROGRESS NOTES
Neurology Progress Note      Chief Complaint: Pseudoseizures  Length of Stay:  0   Subjective     Subjective:    Remains intubated and sedated.  No seizure activity last 24 hours.  Awaiting foreign objects to be recovered in stool.  Surgery is following.  Medications:  Current Facility-Administered Medications   Medication Dose Route Frequency Provider Last Rate Last Admin    acetaminophen (TYLENOL) tablet 650 mg  650 mg Oral Q4H PRN Eriberto Trejo MD        Or    acetaminophen (TYLENOL) 160 MG/5ML oral solution 650 mg  650 mg Oral Q4H PRN Eriberto Trejo MD        Or    acetaminophen (TYLENOL) suppository 650 mg  650 mg Rectal Q4H PRN Eriberto Trejo MD        chlorhexidine (PERIDEX) 0.12 % solution 15 mL  15 mL Mouth/Throat Q12H Lv Garcia MD        Chlorhexidine Gluconate Cloth 2 % pads 1 Application  1 Application Topical Q24H Luisito Nguyen MD   1 Application at 07/10/24 0403    dexmedetomidine (PRECEDEX) 400 mcg in 100 mL NS infusion  0.2-1.5 mcg/kg/hr Intravenous Titrated Dev Emanuel PA-C 9.1 mL/hr at 07/10/24 0627 0.4 mcg/kg/hr at 07/10/24 0627    FLUoxetine (PROzac) capsule 80 mg  80 mg Oral Daily Dev Emanuel PA-C   80 mg at 07/10/24 0800    hydrOXYzine (ATARAX) tablet 25 mg  25 mg Oral TID PRN Dev Emanuel PA-C        lactated ringers infusion  20 mL/hr Intravenous Continuous Vanita Draper MD 20 mL/hr at 07/09/24 0858 Currently Infusing at 07/09/24 0858    LORazepam (ATIVAN) injection 2 mg  2 mg Intravenous Once PRN Dev Emanuel PA-C        metoclopramide (REGLAN) injection 10 mg  10 mg Intravenous Q6H Camille Lam MD   10 mg at 07/10/24 1153    montelukast (SINGULAIR) tablet 10 mg  10 mg Oral Nightly Dev Emanuel PA-C   10 mg at 07/09/24 2241    mupirocin (BACTROBAN) 2 % nasal ointment 1 Application  1 application  Each Nare BID Luisito Nguyen, MD   1 Application at 07/10/24 0801    OLANZapine (zyPREXA) tablet 5 mg  5 mg Oral  TID Eriberto Trejo MD   5 mg at 07/10/24 0801    ondansetron (ZOFRAN) injection 4 mg  4 mg Intravenous Q6H PRN Dev Emanuel PA-C        pantoprazole (PROTONIX) injection 40 mg  40 mg Intravenous Daily Luisito Nguyen MD   40 mg at 07/09/24 2240    polyethylene glycol (MIRALAX) packet 17 g  17 g Oral Daily Luisito Nguyen MD   17 g at 07/09/24 2241    propofol (DIPRIVAN) infusion 10 mg/mL 100 mL  5-75 mcg/kg/min Intravenous Titrated Dev Emanuel PA-C 24.5 mL/hr at 07/10/24 1137 45 mcg/kg/min at 07/10/24 1137    senna (SENOKOT) tablet 1 tablet  1 tablet Oral Daily Luisito Nguyen MD   1 tablet at 07/09/24 2309    sodium chloride 0.9 % flush 10 mL  10 mL Intravenous Q12H Eriberto Trejo MD   10 mL at 07/09/24 2211    sodium chloride 0.9 % flush 10 mL  10 mL Intravenous PRN Eriberto Trejo MD        sodium chloride 0.9 % infusion 40 mL  40 mL Intravenous PRN Eriberto Trejo MD        sodium chloride 0.9 % infusion  100 mL/hr Intravenous Continuous Lance Esposito DO        sodium chloride 0.9 % infusion  100 mL/hr Intravenous Continuous Vanita Draper  mL/hr at 07/10/24 0518 100 mL/hr at 07/10/24 0518    terazosin (HYTRIN) capsule 1 mg  1 mg Oral Nightly Dev Emanuel PA-C   1 mg at 07/09/24 2245             Objective      Vital Signs  Temp:  [97.4 °F (36.3 °C)-98.3 °F (36.8 °C)] 97.4 °F (36.3 °C)  Heart Rate:  [50-87] 55  Resp:  [18] 18  BP: ()/(38-86) 112/50  FiO2 (%):  [30 %-40 %] 30 %    Physical Exam:    HEENT:  neck is supple  CVS:  RRR  Lungs:  CTA - B/L  Abd:  NT/ND  Ext:  no edema  Skin:  no rashes    Pertinent Neuro Exam:  Sedated and intubated.  No rhythmic activity seen    Results Review:      Labs:  CBC and CMP reviewed  Keppra level pending  Total Dilantin level on admission was less than 0.8        Assessment/Plan     Hospital Problem List      Foreign body ingestion    Pseudoseizures    Impression:  Psychogenic nonepileptic  spells  Possible swallowing of metallic objects the could potentially be a battery  Behavioral disorder    Plan:  Continue all home doses of reported antiepileptics once the patient awakens  I highly recommend that she reestablish care with Dr. Declan Lira at [facility] neurology.  I attempted to dissuade the patient's mother to let the patient continue seeking multiple other opinions including the neurology appointment that she has scheduled at [facility].  I think she needs to be discontinued off of all antiepileptics.  I believe that the binder that she carries with her that list all of her medications and medical conditions needs to be corrected so that it reads that she has only psychogenic nonepileptic spells.  I believe that she does not need a rescue medicine even though she has a bag by her bedside that is listed as a rescue medication and has nasal benzodiazepines listed on her home medication list.  She has a medical alert bracelet that she reportedly wears and is currently in her backpack that does read that she has epilepsy.  I recommend that this be discontinued immediately and that she receive another 1 that identifies her correctly is having psychogenic nonepileptic spells.  I believe continuing down this path of misinformation will lead her to unnecessary medical and surgical procedures including repeat intubations for psychogenic nonepileptic spells.      Medical Decision Making    Number/Complexity of Problems  Moderate  1 undiagnosed new problem with uncertain prognosis -   1 acute illness with systemic symptoms -   High  1 acute or chronic illness that pose a threat to life/body function -   High complexity    MDM Data  Moderate - 1/3 categories  Extensive - 2/3 categories    Category 1: 3 of the following  Review of external notes  Review of results  Ordering of each unique test  Independent historian  Category 2:  Independent interpretation of test (ex: imaging)  Category 3:  Discussion of  management with another provider    Discussion of management with primary provider.  Reviewed external notes, reviewed results, and interviewed an independent historian as I spoke with her mother     Treatment Plan  Moderate - Prescription Drug management  High  Drug therapy requiring intensive monitoring for toxicity  Decision regarding hospitalization or escalation of care  De-escalate care/DNR decisions         Oscar Martínez MD  07/10/24  13:08 CDT

## 2024-07-10 NOTE — PROGRESS NOTES
"    [facility] Intensivist Services  INPATIENT PROGRESS NOTE    Patient Name: Duane Cooley  Date of Admission: 7/8/2024  Today's Date: 07/10/24  Length of Stay: 0  Primary Care Physician: Rosie Olvera MD    Subjective   Chief Complaint: Possible seizure, ingestion of foreign object  Seizures        28-year-old female with past medical history of autism, and pseudoseizures that presents to the emergency room with jerking movements that were stopped once a nasopharyngeal airway was placed.  She had received Versed in transport by EMS.  She had hide hospitalizations as being treated aggressively for what was thought to be seizures and required intubation and propofol drip several locations in the past.  On her last visit in February of last year she was evaluated by Dr. Schreiber who recommended her neurologist neurologist wean her off antiepileptic medications.     After this jerking movements episode was stopped, she reported that she might of swallowed a battery and some mild minutes she was chewing on.  X-ray of the abdomen shows 2 metallic foreign objects in the left mid abdomen, and CT abdomen showed 2 foreign bodies present the posterior wall of the stomach without pneumoperitoneum favoring an intraluminal foreign body.  Gastroenterologist was called and recommended admission to our services.  The patient is chewing on a marker does not appear distressed no complaints of pain, she is interested in reporting that she has \"epilepsy\".    The patient went for endoscopy with gastroenterology on 7/9/2024 to further evaluate the 2 foreign objects seen on x-ray of the abdomen and CT of the abdomen.  However, while the patient was recovering in PACU, she began to display seizure-like activity and was unable to protect her airway.  Therefore, she was intubated by anesthesia.  She was then transferred to the ICU for closer monitoring.    I saw this patient shortly after " arrival to the ICU.  She reportedly had seizure-like activity upon her initial arrival to the ICU.  However, saw her within 1 to 2 minutes of her arrival, and the seizure-like activity had ceased.  Neurology has been consulted, and they believe this is likely psychogenic nonepileptic spells, and not true seizure activity.  General surgery was also consulted as her endoscopy showed that the 2 foreign objects she had swallowed had both passed her stomach.  We will obtain a daily KUB.  We will follow any recommendations per general surgery.  We have kept the patient intubated at this time in case patient will need surgery in the near future.    Interval history  7/10/2024: Patient remains intubated at this time.  Her foreign bodies appear to have moved much lower based on her recent abdominal x-ray.      Review of Systems   Unable to perform ROS: Intubated      All pertinent negatives and positives are as above. All other systems have been reviewed and are negative unless otherwise stated.     Objective    Temp:  [97.4 °F (36.3 °C)-97.7 °F (36.5 °C)] 97.4 °F (36.3 °C)  Heart Rate:  [49-87] 74  Resp:  [18] 18  BP: ()/(38-86) 106/46  FiO2 (%):  [30 %-40 %] 30 %  Physical Exam  Vitals and nursing note reviewed.   Constitutional:       Appearance: She is ill-appearing.      Interventions: She is sedated and intubated.   HENT:      Head: Normocephalic and atraumatic.   Cardiovascular:      Rate and Rhythm: Normal rate and regular rhythm.      Pulses: Normal pulses.   Pulmonary:      Effort: Pulmonary effort is normal. She is intubated.      Breath sounds: Decreased breath sounds present.   Abdominal:      General: Abdomen is flat. There is no distension.      Palpations: Abdomen is soft.   Musculoskeletal:      Cervical back: Neck supple.   Skin:     General: Skin is warm and dry.      Capillary Refill: Capillary refill takes less than 2 seconds.   Neurological:      Comments: Unable to perform thorough neuroexam as  patient is currently intubated and sedated         Results Review:    XR Abdomen KUB    Result Date: 7/10/2024  1. The metallic foreign bodies are seen in the right lower abdomen in the area of the proximal ascending colon. The abdominal gas pattern is unremarkable. No evidence of obstruction or ileus. 2. Nasogastric tube in place. No change since the previous study.   This report was signed and finalized on 7/10/2024 6:23 AM by Dr. Medina Braun MD.      XR Abdomen KUB    Result Date: 7/9/2024  1. Enteric tube tip in the distal body of the stomach.   This report was signed and finalized on 7/9/2024 6:54 PM by Dr. Neelam Hogue MD.      XR Abdomen 1 View    Result Date: 7/9/2024   1. Metallic foreign body projects to the left of midline at the level of the upper sacrum.  This report was signed and finalized on 7/9/2024 4:28 PM by Collins Lam.      XR Chest 1 View    Result Date: 7/9/2024   1. ET tube placement with tip above the elizabeth and no active disease in the chest.  This report was signed and finalized on 7/9/2024 11:22 AM by Collins Lam.      XR Chest 1 View    Result Date: 7/9/2024  1. No radiopaque foreign body in the chest or limited visualized abdomen. 2. Poor lung expansion. No active cardiopulmonary disease.  This report was signed and finalized on 7/9/2024 5:53 AM by Dr. Medina Braun MD.      XR Abdomen KUB    Result Date: 7/9/2024  1. 2 adjacent is located metallic foreign objects in the left mid abdomen. The anatomical location of these objects is not certain. Further follow-up may be obtained. 2. The remaining abdomen is unremarkable.     This report was signed and finalized on 7/9/2024 5:51 AM by Dr. Medina Braun MD.      XR Abdomen KUB    Result Date: 7/9/2024  1. 2 metallic object projected over the central mid and left lower abdomen which may represent the swallowed foreign body as suggested in the history. The anatomical location of the foreign body is not certain.     "This report was signed and finalized on 7/9/2024 5:48 AM by Dr. Medina Braun MD.      CT Abdomen Pelvis Without Contrast    Result Date: 7/9/2024  1. There are 2 foreign bodies present along the dependent posterior wall of the distal stomach (8 mm rounded foreign body which may represent an ingested battery with an adjacent 2.9 cm cylindrical ribbed foreign body of uncertain etiology causing extensive streak artifact) without pneumoperitoneum favoring a dependent intraluminal position within the distal stomach. Moderate fecal stasis. No evidence for gastric or bowel obstruction. Small fatty containing umbilical hernia. Prior cholecystectomy. 2. Few scattered well-circumscribed bilateral subcentimeter nodules of the visible breast parenchyma supporting a benign process given the morphology and multiplicity.  This report was signed and finalized on 7/9/2024 5:15 AM by Dr. Neelam Hogue MD.       Result Review:  I have personally reviewed the results from the time of this admission to 7/10/2024 16:09 CDT and agree with these findings:  [x]  Laboratory list / accordion  []  Microbiology  [x]  Radiology  [x]  EKG/Telemetry   []  Cardiology/Vascular   []  Pathology  []  Old records  []  Other:  Most notable findings include: UDS positive for benzodiazepines, CMP unremarkable, hemoglobin 9.7, hematocrit 33.2.  Latest KUB shows metallic foreign body in the area of the proximal ascending colon.  Patient was sinus rhythm on telemetry.      Culture Data:   No results found for: \"BLOODCX\", \"URINECX\", \"WOUNDCX\", \"MRSACX\", \"RESPCX\", \"STOOLCX\"    I have reviewed the patient's current medications.     Assessment/Plan   Assessment  Active Hospital Problems    Diagnosis     **Foreign body ingestion     Pseudoseizures        Medical Decision Making  Number and Complexity of problems: 2  Differential Diagnosis: Ingestion of foreign body, perforated viscera, seizures, pseudoseizures    Conditions and Status        Condition is " stable.     [facility] Data  External documents reviewed: N/A  Cardiac tracing (EKG, telemetry) interpretation: Sinus rhythm on telemetry  Radiology interpretation: Yes, see above  Labs reviewed: Yes, see above  Any tests that were considered but not ordered: No     Decision rules/scores evaluated (example [id number], Wells, etc): N/A     Discussed with: Dr. Nguyen     Care Planning  Shared decision making: Dr. Nguyen  Code status and discussions: Full    Treatment Plan  Ingestion of foreign body   -Patient found to have 2 metallic objects noted on multiple imaging studies   -EGD showed that the metallic objects had already passed patient's stomach and entered her small intestines   -General surgery consulted for possible surgical removal if needed, we appreciate their recommendations   -We will monitor with daily KUB    2.  Seizure-like activity   -Patient noted to have seizure-like activity in PACU, which required her to be rapidly intubated   -Neurology following, and they believe seizure-like activity is likely representative of psychogenic nonepileptic spells based on her history.  We appreciate their recommendations.    3. Acute Respiratory failure   -Currently intubated and sedated   -Currently on 30% FiO2 with TV of 450 and PEEP 5   -Will likely try to wean with SATs and SBTs in the morning     Disposition  Social Determinants of Health that impact treatment or disposition: N/A  I expect the patient to be discharged to home in 2-4 days.     I provided 36 minutes of total critical care time. Due to the high probability of clinically significant, life-threatening deterioration, the patient required my direct and personal management. The critical care time does not include time spent on separately billable procedures.    Electronically signed by Dev Emanuel PA-C on 7/10/2024 at 16:09 CDT

## 2024-07-10 NOTE — SIGNIFICANT NOTE
07/10/24 0624   Readings   PEEP Intrinsic (cm H2O) 4.9 cm H2O   Plateau Pressure (cm H2O) 14 cm H2O   Driving Pressure (cm H2O) 9 cm H2O   Static Compliance (L/cm H2O) 53   Dynamic Compliance (L/cm H2O) 67 L/cm H2O        Left message with patient's , Laci, that lab results were normal.

## 2024-07-10 NOTE — PROGRESS NOTES
Camille Lam MD - General Surgery  Progress Note     LOS: 0 days   Patient Care Team:  Rosie Olvera MD as PCP - General (Family Medicine)      Subjective     Interval History:     Intubated and sedated. No acute events overnight.  Vital stable.     Objective     Vital Signs  Temp:  [97.4 °F (36.3 °C)-98.3 °F (36.8 °C)] 97.4 °F (36.3 °C)  Heart Rate:  [50-91] 55  Resp:  [18] 18  BP: ()/(38-86) 112/50  FiO2 (%):  [30 %-40 %] 30 %    Physical Exam:  General appearance - sedated and intubated   Mental status - sedated and intubated   Eyes - not examined  Mouth -  ETT in proper position  Chest - no tachypnea, retractions or cyanosis  Heart - normal rate and regular rhythm  Neurological - intubated   Extremities - no pedal edema noted  Skin - normal coloration and turgor, no rashes, no suspicious skin lesions noted  Abdomen - soft, non-distended, non-tender       Results Review:    Lab Results (last 24 hours)       Procedure Component Value Units Date/Time    Basic Metabolic Panel [[id number]]  (Abnormal) Collected: 07/10/24 0209    Specimen: Blood Updated: 07/10/24 0254     Glucose 103 mg/dL      BUN 6 mg/dL      Creatinine 0.60 mg/dL      Sodium 138 mmol/L      Potassium 4.6 mmol/L      Chloride 104 mmol/L      CO2 25.0 mmol/L      Calcium 9.1 mg/dL      BUN/Creatinine Ratio 10.0     Anion Gap 9.0 mmol/L      eGFR 126.3 mL/min/1.73     Narrative:      GFR Normal >60  Chronic Kidney Disease <60  Kidney Failure <15      CBC Auto Differential [[id number]]  (Abnormal) Collected: 07/10/24 0209    Specimen: Blood Updated: 07/10/24 0232     WBC 7.35 10*3/mm3      RBC 4.45 10*6/mm3      Hemoglobin 9.7 g/dL      Hematocrit 33.2 %      MCV 74.6 fL      MCH 21.8 pg      MCHC 29.2 g/dL      RDW 17.0 %      RDW-SD 45.7 fl      MPV 11.4 fL      Platelets 242 10*3/mm3      Neutrophil % 64.0 %      Lymphocyte % 24.1 %      Monocyte % 9.1 %      Eosinophil % 1.9 %      Basophil % 0.5 %      Immature Grans %  0.4 %      Neutrophils, Absolute 4.70 10*3/mm3      Lymphocytes, Absolute 1.77 10*3/mm3      Monocytes, Absolute 0.67 10*3/mm3      Eosinophils, Absolute 0.14 10*3/mm3      Basophils, Absolute 0.04 10*3/mm3      Immature Grans, Absolute 0.03 10*3/mm3     Blood Gas, Arterial - [[id number]]  (Abnormal) Collected: 07/09/24 1350    Specimen: Arterial Blood Updated: 07/09/24 1353     Site Right Brachial     Ricky's Test N/A     pH, Arterial 7.402 pH units      pCO2, Arterial 43.0 mm Hg      pO2, Arterial 174.0 mm Hg      Comment: 83 Value above reference range        HCO3, Arterial 26.7 mmol/L      Comment: 83 Value above reference range        Base Excess, Arterial 1.7 mmol/L      O2 Saturation, Arterial 99.9 %      Comment: 83 Value above reference range        Temperature 37.0     Barometric Pressure for Blood Gas 745 mmHg      Modality Ventilator     FIO2 40 %      Ventilator Mode AC     Set Tidal Volume 450     Set Mech Resp Rate 18.0     PEEP 5.0     Collected by [id number]     Comment: Meter: [id number]     :  Deepa Messina, RRT        pCO2, Temperature Corrected 43.0 mm Hg      pH, Temp Corrected 7.402 pH Units      pO2, Temperature Corrected 174 mm Hg      PO2/FIO2 435          Imaging Results (Last 24 Hours)       Procedure Component Value Units Date/Time    XR Abdomen KUB [[id number]] Collected: 07/10/24 0620     Updated: 07/10/24 0627    Narrative:      EXAMINATION: XR ABDOMEN KUB-     7/10/2024 2:20 AM     HISTORY: f/u xray for passage of swallowed metallic objects;  T18.9XXA-Foreign body of alimentary tract, part unspecified, initial  encounter; R56.9-Unspecified convulsions     A frontal projection of the abdomen is compared with the previous study  dated 7/9/2024.     The previously seen metallic objects are now seen in the right lower  abdomen in the area of the proximal ascending colon.     There is moderate gas and stool in the colon.     Distal end of nasogastric tube is in the mid to distal  stomach. The  distal sideport is in the proximal stomach. The position of the tube has  not significantly changed in the previous study.     Both kidneys are obscured by the bowel contents. No definite radiopaque  calculi.     There is evidence of prior cholecystectomy.     No acute bony abnormality.       Impression:      1. The metallic foreign bodies are seen in the right lower abdomen in  the area of the proximal ascending colon. The abdominal gas pattern is  unremarkable. No evidence of obstruction or ileus.  2. Nasogastric tube in place. No change since the previous study.        This report was signed and finalized on 7/10/2024 6:23 AM by Dr. Medina Braun MD.       XR Abdomen KUB [[id number]] Collected: 07/09/24 1848     Updated: 07/09/24 1857    Narrative:      XR ABDOMEN KUB-     HISTORY: OG placement verification; T18.9XXA-Foreign body of alimentary  tract, part unspecified, initial encounter; R56.9-Unspecified  convulsions     COMPARISON: 7/9/2024 3:59 p.m.     FINDINGS: Frontal view of the upper abdomen obtained.     The enteric tube tip is present within the distal body of the stomach.  Cholecystectomy clips. No dilated loops of bowel seen within the upper  abdomen. Lung bases appear clear.       Impression:      1. Enteric tube tip in the distal body of the stomach.        This report was signed and finalized on 7/9/2024 6:54 PM by Dr. Neelam Hogue MD.       XR Abdomen 1 View [[id number]] Collected: 07/09/24 1625     Updated: 07/09/24 1631    Narrative:      EXAM: XR ABDOMEN 1 VW-      DATE: 7/9/2024 3:15 PM     HISTORY: eval progression of foreign body; T18.9XXA-Foreign body of  alimentary tract, part unspecified, initial encounter; R56.9-Unspecified  convulsions       COMPARISON: 7/9/2024 at 2:21 a.m.     TECHNIQUE:   Frontal view of the abdomen. 1 image.     FINDINGS:    Metallic foreign body projects to the left of midline at the level of  the upper sacrum. Bowel gas pattern is nonspecific  but nonobstructive.  There is mild distention of the stomach with air.          Impression:         1. Metallic foreign body projects to the left of midline at the level of  the upper sacrum.     This report was signed and finalized on 7/9/2024 4:28 PM by Collins Lam.                 Assessment & Plan     Foreign Body     Ms. Cooley is a 28 year old female s/p ingestion of two foreign bodies. They had passed the stomach by the time EGD was done. We are currently monitoring her with serial exams and AXRs to watch foreign body progression. WBC count normal today. AXR from yesterday and today show progression of the foreign bodies into the ascending colon. Repeat AXR tomorrow AM. Bowel regimen ordered yesterday. Okay to start tube feeds today or start clear liquids if extubated.       Camille Lam MD  07/10/24  12:24 CDT

## 2024-07-10 NOTE — PROGRESS NOTES
Adult Nutrition  Assessment/PES    Patient Name:  Duane Cooley  YOB: 1995  MRN: [id number]  Admit Date:  7/8/2024    Assessment Date:  7/10/2024    Comments: Sedated on vent. If pt npo 5 days or greater may benefit from alternate means of nutrition support. RDN available for recommendations.     Reason for Assessment       Row Name 07/10/24 1132          Reason for Assessment    Reason For Assessment per organizational policy  vent protocol     Diagnosis pulmonary disease                    Nutrition/Diet History       Row Name 07/10/24 1133          Nutrition/Diet History    Typical Intake (Food/Fluid/EN/PN) RDN vent screen Pt intubated and sedated on vent at this time. Propofol at 21.8 ml/hr providing ~ 575 kcal/day. Pt admitted to facility with signs of status epilepticus; had convulsive like activity and was intubated to protect airway. Pt has been given bowel prep. If pt remains npo 5 day or greater recommend alternate source of nutrition support. Available for recommendations.     Functional Status nonambulatory     Factors Affecting Nutritional Intake respiratory difficulty/therapies                    Labs/Tests/Procedures/Meds       Row Name 07/10/24 1137          Labs/Procedures/Meds    Lab Results Reviewed reviewed        Diagnostic Tests/Procedures    Diagnostic Test/Procedure Reviewed reviewed        Medications    Pertinent Medications Reviewed reviewed                    Physical Findings       Row Name 07/10/24 1130          Physical Findings    Overall Physical Appearance obese,intubated                    Estimated/Assessed Needs - Anthropometrics       Row Name 07/10/24 0600          Anthropometrics    Weight 93.5 kg (206 lb 1.6 oz)                    Nutrition Prescription Ordered       Row Name 07/10/24 1133          Nutrition Prescription PO    Current PO Diet NPO                    Evaluation of Received Nutrient/Fluid Intake       Row Name 07/10/24 1132           Nutrient/Fluid Evaluation    Number of Days Evaluated 1 day     Additional Documentation Intake Assessment (Group)        Fluid Intake Evaluation    Oral Fluid (mL) 0     Enteral Fluid (mL) --       Other Fluid (mL) 665  IVF        PO Evaluation    % PO Intake NPO                   Problem/Interventions:   Problem 1       Row Name 07/10/24 1141          Nutrition Diagnoses Problem 1    Problem 1 Nutrition Appropriate for Condition at this Time                    Intervention Goal       Row Name 07/10/24 1141          Intervention Goal    General Maintain nutrition     TF/PN --  If pt remains NPO 5 day or > may benefit from AMONS                    Nutrition Intervention       Row Name 07/10/24 1141          Nutrition Intervention    RD/Tech Action Follow Tx progress;Care plan reviewd                      Education/Evaluation       Row Name 07/10/24 1142          Education    Education No discharge needs identified at this time        Monitor/Evaluation    Monitor Per protocol                     Electronically signed by:  Heidi Rodriguez RDN, LD  07/10/24 11:43 CDT

## 2024-07-10 NOTE — PLAN OF CARE
Goal Outcome Evaluation:  Plan of Care Reviewed With: caregiver        Progress: no change  Outcome Evaluation: RDN vent screen Pt intubated and sedated on vent at this time. Propofol at 21.8 ml/hr providing ~ 575 kcal/day. Pt admitted to facility with signs of status epilepticus; had convulsive like activity and was intubated to protect airway. Pt has been given bowel prep. If pt remains npo 5 day or greater recommend alternate source of nutrition support. Available for recommendations. Cont to follow per protocol.

## 2024-07-10 NOTE — PAYOR COMM NOTE
"REF:  [id number]    Admit 7/8 observation  Inpatient order 7/10/2024    [facility]  KELLI,   [phone]   OR  FAX  [phone]    Earl Cooley (28 y.o. Female)       Date of Birth   1995    Social Security Number       Address   [address] [place] [id number]    Home Phone   [phone]    MRN   [id number]       Yazidi   Other    Marital Status                               Admission Date   7/8/24    Admission Type   Emergency    Admitting Provider   Luisito Nguyen MD    Attending Provider   Luisito Nguyen MD    Department, Room/Bed   [facility] INTENSIVE CARE, I009/1       Discharge Date       Discharge Disposition       Discharge Destination                                 Attending Provider: Luisito Nguyen MD    Allergies: No Known Allergies    Isolation: None   Infection: None   Code Status: CPR    Ht: 172.7 cm (68\")   Wt: 93.5 kg (206 lb 1.6 oz)    Admission Cmt: None   Principal Problem: Foreign body ingestion [T18.9XXA]                   Active Insurance as of 7/8/2024       Primary Coverage       Payor Plan Insurance Group Employer/Plan Group    [facility] COMMUNITY PLAN [facility] COMMUNITY PLAN [facility]       Payor Plan Address Payor Plan Phone Number Payor Plan Fax Number Effective Dates    [address]   1/1/2024 - None Entered    [facility] [id number]         Subscriber Name Subscriber Birth Date Member ID       EARL COOLEY 1995 [id number]                     Emergency Contacts        (Rel.) Home Phone Work Phone Mobile Phone    Kelli Cooley (Mother) -- -- [phone]    SARBJIT FUNG (Spouse) [phone] -- [phone]          Patient Care Timeline (7/8/2024 23:25 to 7/9/2024 08:36)    7/8/2024 7/8/2024 Event Details User   23:25 Patient arrival  Reanna Rocha, RN   23:25 In Facility Status: Arrived --   23:25 HPI HPI (Adult)  Stated Reason for Visit: PT PRESENTS TO ER WITH SEIZURES. PT HAS LONG HX OF " "SEIZURES, TAKES MULTIPLE MEDS. PT RECIEVED NASAL VALIUM FROM MOTHER PTA OF EMS. RECEIVED 10 MG IM VERSED EN ROUTE BY EMS. PT HAD 5 SEIZURES WITH EMS, LASTING NO LONGER THAN 15 SECONDS. PT AUTISTIC, NON VERBAL AT TIMES. PT POST TICTAL UPON ARRIVAL Reanna Rocha RN   23:25:37 Patient roomed in ED To room 22 Reanna Rocha RN   23:25:52 Trigger for Triage Start  Reanna Rocha RN   23::52 Triage Started  Reanna Rocha RN   23:25:52 Chief Complaints Updated Seizures Reanna Rocha RN     23:31  Vital Signs  Heart Rate: 127 Abnormal   Heart Rate Source: Monitor  Resp: 20  Resp Rate Source: Visual  BP: 133/80  BP Location: Left arm  BP Method: Automatic  Patient Position: Lying  BMI (Calculated): 30.4  Oxygen Therapy  SpO2: 98 %  Device (Oxygen Therapy): room air  Vitals Timer  Restart Vitals Timer: Yes  Height and Weight  Height: 172.7 cm (68\")  Height Method: Stated  Weight: 90.7 kg (200 lb)  Weight Method: Stated  Other flowsheet entries  Ideal Body Weight k.9 Reanna Rocha RN     23:34 Medication Given LORazepam (ATIVAN) injection 1 mg - Dose: 1 mg ; Route: Intravenous ; Line: Peripheral IV 24 2330 Anterior;Right;Upper Arm ; Scheduled Time:  ; Linked override order: LORazepam (ATIVAN) 2 MG/ML injection  - ADS Override Pull Reanna Rocha RN   23:35 Pain Pain (Adult)  Preferred Pain Scale: PAINAD (Pain Assessment in Advance Dementia Scale)  PAINAD Breathin-->normal  PAINAD Negative Vocalization: 0-->none  PAINAD Facial Expression: 0-->smiling or inexpressive  PAINAD Body Language: 0-->relaxed  PAINAD Consolability: 0-->no need to console  PAINAD Score: 0 Reanna Rocha RN     23:44 Vital Signs Vital Signs  Heart Rate: 139 Abnormal  (Device Time: 23:44:00)  Oxygen Therapy  SpO2: 99 % (Device Time: :44:00) Janneth Sanches RN     00:05:43 Seizures Assessments Respiratory WDL  Respiratory WDL: WDL  Breath Sounds  Breath Sounds: All Fields  All Lung Fields Breath Sounds: Anterior:; Posterior:; " clear; equal bilaterally  Cardiac WDL  Cardiac WDL: .WDL except; rhythm  Cardiac Rhythm: tachycardic  Peripheral/Neurovascular WDL  Peripheral Neurovascular WDL: WDL  Seizure Assessment  Seizure Activity: witnessed  Seizure Presentation: drooling; eye deviation; eyelid flutter/blinking; stiffening  Seizure Areas Involved: generalized  Seizure Movement: rhythmic jerking; tonic  Seizure Extraocular Movements: both eyes; deviated; down  Seizure Associated Symptoms: tachycardia; dilated pupils; drooling  Cognitive/Neuro/Behavioral WDL  Cognitive/Neuro/Behavioral WDL: .WDL except; arousability  Arousal Level: arouses to repeated stimulation (FOLLOWS COMMANDS, MUMBLES INCOHERANTLY/NO INTELLIGEABLE SPEECH.)  Pupils  Pupil PERRLA: yes  Gastrointestinal WDL  Gastrointestinal WDL: WDL  Musculoskeletal WDL  Musculoskeletal WDL: WDL  Skin WDL  Skin WDL: WDL  Safety WDL  Safety WDL: WDL Janneth Sanches RN     00:26 ED Notes Patient awake, sitting upright in bed.  Using Ipad/typing for communication.  Answers questions appropriately - follows all commands.  Reports she did not take her medication today but told her mom that she did.  Stated she has 2 kinds of seizures - FND and epileptic.  Complains of headache.  Reassured patient she is in hospital and in safe environment. Return demonstration of call light use performed.  Call light in bed, pillows taped to bed rails for safety, HOB at 30 degrees. Janneth Sanches RN   00:30 Sepsis Predictive Model Early Detection of Sepsis PA score  Early Detection of Sepsis PA score: 1.56 Inpatient, Batch Job   00:32:46 Orders Placed Lab  - Manual Differential Lv Ruiz DO   00:33 Medication New Bag sodium chloride 0.9 % bolus 1,000 mL - Dose: 1,000 mL ; Rate: 2,000 mL/hr ; Route: Intravenous ; Line: Peripheral IV 07/08/24 2359 Anterior;Distal;Right Forearm ; Scheduled Time: 0038 Janneth Sanches RN     01:30 Medication Given levETIRAcetam (KEPPRA) injection 1,000  mg - Dose: 1,000 mg ; Route: Intravenous ; Line: Peripheral IV 07/08/24 2359 Anterior;Distal;Right Forearm ; Scheduled Time: 0129 Janneth Sanches RN     01:32 Medication Given lacosamide (VIMPAT) tablet 100 mg - Dose: 100 mg ; Route: Oral ; Scheduled Time: 0129 Janneth Sanches RN   01:33 Medication Given lamoTRIgine (LaMICtal) tablet 250 mg - Dose: 250 mg ; Route: Oral ; Scheduled Time: 0129 Janneth Sanches RN Finnell, Joey A, CRT   Respiratory Therapist  Respiratory Therapy     Significant Note      Signed     Date of Service: 07/10/24 0625  Creation Time: 07/10/24 0625     Signed              07/10/24 0624   Readings   PEEP Intrinsic (cm H2O) 4.9 cm H2O   Plateau Pressure (cm H2O) 14 cm H2O   Driving Pressure (cm H2O) 9 cm H2O   Static Compliance (L/cm H2O) 53   Dynamic Compliance (L/cm H2O) 67 L/cm H2O       Heidi blair RDN, LD   Registered Dietitian  Nutrition     Plan of Care      Signed     Date of Service: 07/10/24 1143  Creation Time: 07/10/24 1143     Signed         Goal Outcome Evaluation:  Plan of Care Reviewed With: caregiver  Progress: no change  Outcome Evaluation: RDN vent screen Pt intubated and sedated on vent at this time. Propofol at 21.8 ml/hr providing ~ 575 kcal/day. Pt admitted to facility with signs of status epilepticus; had convulsive like activity and was intubated to protect airway. Pt has been given bowel prep. If pt remains npo 5 day or greater recommend alternate source of nutrition support. Available for recommendations. Cont to follow per protocol.                                History & Physical        Eriberto Trejo MD at 07/09/24 0709              [facility]  HISTORY AND PHYSICAL    Date of Admission: 7/8/2024  Primary Care Physician: Rosie Olvera MD    Subjective   Primary Historian: Patient    Chief Complaint: Seizure    History of Present Illness  28-year-old female with past  "medical history of autism, and pseudoseizures that presents to the emergency room with jerking movements that were stopped once a nasopharyngeal airway was placed.  She had received Versed in transport by EMS.  She had hide hospitalizations as being treated aggressively for what was thought to be seizures and required intubation and propofol drip several locations in the past.  On her last visit in February of last year she was evaluated by Dr. Schreiber who recommended her neurologist neurologist wean her off antiepileptic medications.    After this jerking movements episode was stopped, she reported that she might of swallowed a battery and some mild minutes she was chewing on.  X-ray of the abdomen shows 2 metallic foreign objects in the left mid abdomen, and CT abdomen showed 2 foreign bodies present the posterior wall of the stomach without pneumoperitoneum favoring an intraluminal foreign body.  Gastroenterologist was called and recommended admission to our services.  The patient is chewing on a marker does not appear distressed no complaints of pain, she is interested in reporting that she has \"epilepsy\".    Review of Systems   Otherwise complete ROS reviewed and negative except as mentioned in the HPI.    Past Medical History:   Past Medical History:   Diagnosis Date    Seizures      Past Surgical History:History reviewed. No pertinent surgical history.  Social History: Alcohol use questions deferred to the physician. Drug use questions deferred to the physician.    Family History: Patient can not provide    Allergies:  No Known Allergies    Medications:  Prior to Admission medications    Medication Sig Start Date End Date Taking? Authorizing Provider   acetaminophen (TYLENOL) 500 MG tablet Take 800 mg by mouth Every 6 (Six) Hours As Needed for Mild Pain.   Yes Provider, MD Rayray   albuterol sulfate  (90 Base) MCG/ACT inhaler Inhale 2 puffs Every 4 (Four) Hours As Needed for Wheezing.   Yes " "Rayray Jean MD   diazePAM (VALTOCO 15 MG DOSE NA) 16 mg into the nostril(s) as directed by provider As Needed (seizure lasting longer than 5 minutes).   Yes Rayray Jean MD   doxazosin (CARDURA) 1 MG tablet Take 1 tablet by mouth Every Night.   Yes Rayray Jean MD   FLUoxetine (PROzac) 40 MG capsule Take 2 capsules by mouth Daily.   Yes Rayray Jean MD   hydrOXYzine (ATARAX) 25 MG tablet Take 1 tablet by mouth 3 (Three) Times a Day As Needed for Itching.   Yes Rayray Jean MD   lacosamide (VIMPAT) 100 MG tablet tablet Take 1 tablet by mouth 2 (Two) Times a Day.   Yes Rayray Jean MD   LAMOTRIGINE PO Take 250 mg by mouth Daily.   Yes Rayray Jean MD   levETIRAcetam (KEPPRA) 1000 MG tablet Take 1 tablet by mouth 2 (Two) Times a Day.   Yes Rayray Jean MD   montelukast (SINGULAIR) 10 MG tablet Take 1 tablet by mouth Every Night.   Yes Rayray Jean MD   OLANZapine (zyPREXA) 5 MG tablet Take 1 tablet by mouth 3 times a day.   Yes Rayray Jean MD   pantoprazole (PROTONIX) 40 MG EC tablet Take 1 tablet by mouth Daily.   Yes Rayray Jean MD   senna 8.6 MG tablet Take 1 tablet by mouth Daily.   Yes Rayray Jean MD   ferrous sulfate 325 (65 FE) MG tablet Take 1 tablet by mouth 2 (Two) Times a Day.    Rayray Jean MD   phenytoin ER (DILANTIN) 100 MG capsule Take 1 capsule by mouth 3 (Three) Times a Day.    Rayray Jean MD     I have utilized all available immediate resources to obtain, update, or review the patient's current medications (including all prescriptions, over-the-counter products, herbals, cannabis/cannabidiol products, and vitamin/mineral/dietary (nutritional) supplements).    Objective     Vital Signs: /68   Pulse 107   Temp 98.4 °F (36.9 °C) (Oral)   Resp 16   Ht 172.7 cm (68\")   Wt 90.7 kg (200 lb)   SpO2 100%   BMI 30.41 kg/m²   Physical Exam  Vitals reviewed. "   Constitutional:       General: She is not in acute distress.     Appearance: She is well-developed. She is not toxic-appearing.   HENT:      Head: Normocephalic and atraumatic.      Right Ear: External ear normal.      Left Ear: External ear normal.      Mouth/Throat:      Mouth: Mucous membranes are dry.      Pharynx: Oropharynx is clear.   Eyes:      General:         Right eye: No discharge.         Left eye: No discharge.      Extraocular Movements: Extraocular movements intact.      Conjunctiva/sclera: Conjunctivae normal.      Pupils: Pupils are equal, round, and reactive to light.   Neck:      Vascular: No JVD.   Cardiovascular:      Rate and Rhythm: Normal rate and regular rhythm.      Pulses: Normal pulses.      Heart sounds: Normal heart sounds. No murmur heard.     No friction rub. No gallop.   Pulmonary:      Effort: Pulmonary effort is normal. No respiratory distress.      Breath sounds: No stridor. No wheezing, rhonchi or rales.   Chest:      Chest wall: No tenderness.   Abdominal:      General: Bowel sounds are normal. There is no distension.      Palpations: Abdomen is soft.      Tenderness: There is no abdominal tenderness. There is no guarding or rebound.      Hernia: No hernia is present.   Musculoskeletal:         General: No swelling, tenderness or deformity. Normal range of motion.      Cervical back: Normal range of motion and neck supple. No rigidity or tenderness. No muscular tenderness.      Right lower leg: No edema.      Left lower leg: No edema.   Skin:     General: Skin is warm and dry.      Findings: No erythema or rash.   Neurological:      General: No focal deficit present.      Mental Status: She is alert. Mental status is at baseline.      Cranial Nerves: No cranial nerve deficit.      Sensory: No sensory deficit.      Motor: No weakness or abnormal muscle tone.      Deep Tendon Reflexes: Reflexes normal.              Results Reviewed:  Lab Results (last 24 hours)       Procedure  Component Value Units Date/Time    Urinalysis With Microscopic If Indicated (No Culture) - Urine, Clean Catch [[id number]]  (Abnormal) Collected: 07/09/24 0141    Specimen: Urine, Clean Catch Updated: 07/09/24 0228     Color, UA Yellow     Appearance, UA Clear     pH, UA 8.0     Specific Gravity, UA 1.010     Glucose, UA Negative     Ketones, UA Negative     Bilirubin, UA Negative     Blood, UA Negative     Protein, UA Negative     Leuk Esterase, UA Moderate (2+)     Nitrite, UA Negative     Urobilinogen, UA 0.2 E.U./dL    Urinalysis, Microscopic Only - Urine, Clean Catch [[id number]]  (Abnormal) Collected: 07/09/24 0141    Specimen: Urine, Clean Catch Updated: 07/09/24 0228     RBC, UA None Seen /HPF      WBC, UA 3-5 /HPF      Bacteria, UA Trace /HPF      Squamous Epithelial Cells, UA 0-2 /HPF      Hyaline Casts, UA None Seen /LPF      Methodology Manual Light Microscopy    Fentanyl, Urine - Urine, Clean Catch [[id number]]  (Normal) Collected: 07/09/24 0141    Specimen: Urine, Clean Catch Updated: 07/09/24 0226     Fentanyl, Urine Negative    Narrative:      Negative Threshold:      Fentanyl 5 ng/mL     The normal value for the drug tested is negative. This report includes final unconfirmed screening results to be used for medical treatment purposes only. Unconfirmed results must not be used for non-medical purposes such as employment or legal testing. Clinical consideration should be applied to any drug of abuse test, particularly when unconfirmed results are used.           Urine Drug Screen - Urine, Clean Catch [[id number]]  (Abnormal) Collected: 07/09/24 0141    Specimen: Urine, Clean Catch Updated: 07/09/24 0225     THC, Screen, Urine Negative     Phencyclidine (PCP), Urine Negative     Cocaine Screen, Urine Negative     Methamphetamine, Ur Negative     Opiate Screen Negative     Amphetamine Screen, Urine Negative     Benzodiazepine Screen, Urine Positive     Tricyclic Antidepressants Screen Negative     Methadone  Screen, Urine Negative     Barbiturates Screen, Urine Negative     Oxycodone Screen, Urine Negative     Buprenorphine, Screen, Urine Negative    Narrative:      Cutoff For Drugs Screened:    Amphetamines               500 ng/ml  Barbiturates               200 ng/ml  Benzodiazepines            150 ng/ml  Cocaine                    150 ng/ml  Methadone                  200 ng/ml  Opiates                    100 ng/ml  Phencyclidine               25 ng/ml  THC                         50 ng/ml  Methamphetamine            500 ng/ml  Tricyclic Antidepressants  300 ng/ml  Oxycodone                  100 ng/ml  Buprenorphine               10 ng/ml    The normal value for all drugs tested is negative. This report includes unconfirmed screening results, with the cutoff values listed, to be used for medical treatment purposes only.  Unconfirmed results must not be used for non-medical purposes such as employment or legal testing.  Clinical consideration should be applied to any drug of abuse test, particularly when unconfirmed results are used.      Levetiracetam Level (Keppra) [[id number]] Collected: 07/09/24 0154    Specimen: Blood Updated: 07/09/24 0202    Phenytoin Level, Total [[id number]]  (Abnormal) Collected: 07/08/24 2350    Specimen: Blood Updated: 07/09/24 0052     Phenytoin Level <0.8 mcg/mL     Comprehensive Metabolic Panel [[id number]]  (Abnormal) Collected: 07/08/24 2350    Specimen: Blood Updated: 07/09/24 0047     Glucose 111 mg/dL      BUN 6 mg/dL      Creatinine 0.77 mg/dL      Sodium 139 mmol/L      Potassium 4.0 mmol/L      Chloride 103 mmol/L      CO2 22.0 mmol/L      Calcium 9.3 mg/dL      Total Protein 7.6 g/dL      Albumin 4.1 g/dL      ALT (SGPT) 12 U/L      AST (SGOT) 14 U/L      Alkaline Phosphatase 99 U/L      Total Bilirubin <0.2 mg/dL      Globulin 3.5 gm/dL      A/G Ratio 1.2 g/dL      BUN/Creatinine Ratio 7.8     Anion Gap 14.0 mmol/L      eGFR 108.6 mL/min/1.73     Narrative:      GFR Normal  >60  Chronic Kidney Disease <60  Kidney Failure <15      Lipase [[id number]]  (Normal) Collected: 07/08/24 2350    Specimen: Blood Updated: 07/09/24 0047     Lipase 15 U/L     Ethanol [[id number]] Collected: 07/08/24 2350    Specimen: Blood Updated: 07/09/24 0047     Ethanol % <0.010 %     Narrative:      Not for legal purposes. Chain of Custody not followed.     Manual Differential [[id number]]  (Abnormal) Collected: 07/08/24 2350    Specimen: Blood Updated: 07/09/24 0040     Neutrophil % 81.5 %      Lymphocyte % 12.9 %      Monocyte % 4.8 %      Eosinophil % 0.0 %      Basophil % 0.8 %      Neutrophils Absolute 7.63 10*3/mm3      Lymphocytes Absolute 1.21 10*3/mm3      Monocytes Absolute 0.45 10*3/mm3      Eosinophils Absolute 0.00 10*3/mm3      Basophils Absolute 0.07 10*3/mm3      Anisocytosis Slight/1+     Microcytes Slight/1+     Poikilocytes Slight/1+     Polychromasia Slight/1+     WBC Morphology Normal     Giant Platelets Slight/1+    CBC & Differential [[id number]]  (Abnormal) Collected: 07/08/24 2350    Specimen: Blood Updated: 07/09/24 0036    Narrative:      The following orders were created for panel order CBC & Differential.  Procedure                               Abnormality         Status                     ---------                               -----------         ------                     CBC Auto Differential[[id number]]        Abnormal            Final result                 Please view results for these tests on the individual orders.    CBC Auto Differential [[id number]]  (Abnormal) Collected: 07/08/24 2350    Specimen: Blood Updated: 07/09/24 0036     WBC 9.36 10*3/mm3      RBC 4.30 10*6/mm3      Hemoglobin 9.6 g/dL      Hematocrit 31.7 %      MCV 73.7 fL      MCH 22.3 pg      MCHC 30.3 g/dL      RDW 17.2 %      RDW-SD 45.6 fl      MPV 11.7 fL      Platelets 249 10*3/mm3      nRBC 0.0 /100 WBC     [place] Draw [[id number]] Collected: 07/08/24 2350    Specimen: Blood Updated: 07/09/24 0016     Narrative:      The following orders were created for panel order [place] Draw.  Procedure                               Abnormality         Status                     ---------                               -----------         ------                     Green Top (Gel)[[id number]]                                  Final result               Lavender Top[[id number]]                                     Final result               Red Top[[id number]]                                          Final result               Gray Top[[id number]]                                         Final result               Light Blue Top[[id number]]                                   Final result                 Please view results for these tests on the individual orders.    Gordon Top [[id number]] Collected: 07/09/24 0000    Specimen: Blood from Arm, Right Updated: 07/09/24 0016     Extra Tube Hold for add-ons.     Comment: Auto resulted.       Light Blue Top [[id number]] Collected: 07/09/24 0000    Specimen: Blood from Arm, Right Updated: 07/09/24 0016     Extra Tube Hold for add-ons.     Comment: Auto resulted       Green Top (Gel) [[id number]] Collected: 07/08/24 2350    Specimen: Blood Updated: 07/09/24 0001     Extra Tube Hold for add-ons.     Comment: Auto resulted.       Lavender Top [[id number]] Collected: 07/08/24 2350    Specimen: Blood Updated: 07/09/24 0001     Extra Tube hold for add-on     Comment: Auto resulted       Red Top [[id number]] Collected: 07/08/24 2350    Specimen: Blood Updated: 07/09/24 0001     Extra Tube Hold for add-ons.     Comment: Auto resulted.             Imaging Results (Last 24 Hours)       Procedure Component Value Units Date/Time    XR Chest 1 View [[id number]] Collected: 07/09/24 0551     Updated: 07/09/24 0556    Narrative:      EXAMINATION: XR CHEST 1 VW-     7/9/2024 12:29 AM     HISTORY: possible swallowed foreign body     A frontal projection of the chest is compared with the previous study  dated  2/1/2023.     The lungs are poorly expanded.     No radiopaque foreign in the chest and included proximal part of the  abdomen is noted.     There is no evidence of recent infiltrate, pleural effusion, pulmonary  congestion or pneumothorax.     The heart size is in the normal range.     No acute bony abnormality.       Impression:      1. No radiopaque foreign body in the chest or limited visualized  abdomen.  2. Poor lung expansion. No active cardiopulmonary disease.     This report was signed and finalized on 7/9/2024 5:53 AM by Dr. Medina Braun MD.       XR Abdomen KUB [[id number]] Collected: 07/09/24 0548     Updated: 07/09/24 0554    Narrative:      EXAMINATION: XR ABDOMEN KUB-     7/9/2024 1:23 AM     HISTORY: possible ingested fb     A frontal projection of the abdomen is compared with the previous study  obtained earlier today.     Previously seen 2 adjacently located metallic foreign bodies in the left  midabdomen are reidentified. No significant change in position of the  foreign bodies. The anatomical location of these foreign objects is not  certain. This may be located in the dependent part of the fluid and food  filled distended stomach?.     There is moderate gas and stool in the colon. No evidence of dilatation  of the small or large bowel loops.     There is evidence of prior cholecystectomy.     There is no acute bony abnormality.       Impression:      1. 2 adjacent is located metallic foreign objects in the left mid  abdomen. The anatomical location of these objects is not certain.  Further follow-up may be obtained.  2. The remaining abdomen is unremarkable.              This report was signed and finalized on 7/9/2024 5:51 AM by Dr. Medina Braun MD.       XR Abdomen KUB [[id number]] Collected: 07/09/24 0541     Updated: 07/09/24 0551    Narrative:      EXAMINATION: XR ABDOMEN KUB-     7/9/2024 12:29 AM     HISTORY: possible swallowed foreign body     A frontal projection of the  abdomen is compared with the previous study  dated 1/25/2023.     There are metallic objects projected over the mid central and left  paracentral/lower abdomen which may represent the ingested foreign  bodies as suggested in the history. This may represent  nut an  bolt?. The exact anatomical location of these foreign bodies is not  certain. There may be an independent part of this fluid-filled stomach  are in the proximal to mid small bowel?. There is moderate gas and stool  in the colon. There is small amount of gas in the gastric fundus.     There is evidence of prior cholecystectomy.     Both kidneys are obscured by the bowel contents. No definite radiopaque  calculi.     There is no acute bony abnormality.       Impression:      1. 2 metallic object projected over the central mid and left lower  abdomen which may represent the swallowed foreign body as suggested in  the history. The anatomical location of the foreign body is not certain.           This report was signed and finalized on 7/9/2024 5:48 AM by Dr. Medina Braun MD.       CT Abdomen Pelvis Without Contrast [[id number]] Collected: 07/09/24 0449     Updated: 07/09/24 0518    Narrative:      CT ABDOMEN PELVIS WO CONTRAST- 7/9/2024 1:49 AM     HISTORY: possible ingested foreign body (battery)      COMPARISON: KUB 7/9/2024     TOTAL DOSE LENGTH PRODUCT: 495.19 mGy.cm. Automated exposure control was  also utilized to decrease patient radiation dose.     TECHNIQUE: Axial images of the abdomen and pelvis are performed without  IV contrast.     FINDINGS: There are few scattered subcentimeter smooth margin  well-circumscribed nodules/masses of the bilateral breast parenchyma  supporting a benign process given the morphology and multiplicity. The  visible lung bases are unremarkable.     The nonenhanced liver, spleen, pancreas, and adrenal glands are  unremarkable. Cholecystectomy clips. No focal renal contour abnormality.  No abnormal perinephric  fluid collection. No hydronephrosis. Bladder is  intact. Uterus and ovaries are unremarkable.     There are two foreign bodies within the upper abdomen. There is a  rounded 8 mm foreign body which may represent an ingested battery  positioned immediately right of a cylindrical rib 2.9 cm foreign body  with extensive streak artifact of uncertain etiology. These are  positioned along the posterior dependent portion of the distal stomach  (sagittal series 900 image 29 through 36). There is no free air to  suggest a perforation. Moderate fecal stasis. Normal appendix. No small  bowel dilatation. Fatty containing umbilical hernia. No pathologic  lymphadenopathy identified. Normal caliber abdominal aorta.     No focal destructive regional bony lesion.       Impression:      1. There are 2 foreign bodies present along the dependent posterior wall  of the distal stomach (8 mm rounded foreign body which may represent an  ingested battery with an adjacent 2.9 cm cylindrical ribbed foreign body  of uncertain etiology causing extensive streak artifact) without  pneumoperitoneum favoring a dependent intraluminal position within the  distal stomach. Moderate fecal stasis. No evidence for gastric or bowel  obstruction. Small fatty containing umbilical hernia. Prior  cholecystectomy.  2. Few scattered well-circumscribed bilateral subcentimeter nodules of  the visible breast parenchyma supporting a benign process given the  morphology and multiplicity.     This report was signed and finalized on 7/9/2024 5:15 AM by Dr. Neelam Hogue MD.             I have personally reviewed and interpreted the radiology studies and ECG obtained at time of admission.     Assessment / Plan   Assessment:   Active Hospital Problems    Diagnosis     **Foreign body ingestion     Pseudoseizures      Treatment Plan  The patient will be admitted to my service here at [facility].   Observe in med floor  Vitals every 4 hours  N.p.o.  IV fluids  D5W 1/2  cc/hour  GI consult for EGD foreign body extraction    Home medications reviewed    DVT prophylaxis SCDs    Medical Decision Making  Number and Complexity of problems: 2 complex problems with ingestion of foreign body into stomach and risk of perforation of viscera with life threatening consequences  Differential Diagnosis: see above    Conditions and Status        Condition is unchanged.     MDM Data  External documents reviewed: [employer] EHR  Cardiac tracing (EKG, telemetry) interpretation: -  Radiology interpretation: see above  Labs reviewed: see above  Any tests that were considered but not ordered: none     Decision rules/scores evaluated (example [id number], Wells, etc): N/A     Discussed with: Patient and Dr Ruiz     Care Planning  Shared decision making: Patient  Code status and discussions: Full code    Disposition  Social Determinants of Health that impact treatment or disposition: none  Estimated length of stay is to be determined.     I confirmed that the patient's advanced care plan is present, code status is documented, and a surrogate decision maker is listed in the patient's medical record.     The patient's surrogate decision maker is family, see records.     The patient was seen and examined by me on 7/09/2024 at 0624 AM.    Electronically signed by Eriberto Trejo MD, 07/09/24, 07:09 CDT.              Electronically signed by Eriberto Trejo MD at 07/09/24 0725          Emergency Department Notes        Janneth Sanches, RN at 07/09/24 0026          Patient awake, sitting upright in bed.  Using Ipad/typing for communication.  Answers questions appropriately - follows all commands.  Reports she did not take her medication today but told her mom that she did.  Stated she has 2 kinds of seizures - FND and epileptic.  Complains of headache.  Reassured patient she is in hospital and in safe environment. Return demonstration of call light use performed.  Call  light in bed, pillows taped to bed rails for safety, HOB at 30 degrees.    Electronically signed by Janneth Sanches RN at 07/09/24 0028       Lv Ruiz DO at 07/08/24 6654          Subjective   History of Present Illness  Pt presents to the EC with report of seizures - has reportedly had several episodes tonight.  Pt does not provide any history att. She was reportedly given versed by EMS.  Was given ativan in EC.  Per prior notes in system - pt has hx of pseudoseizure and has had three incidents of intubation with her pseudoseizures.  There was notation regarding weaning off of her seizure medications.  In room on eval pt has episode of reaching to the right and shaking - placed NPA and pt stops this and grimaces - pulled NPA out.        Review of Systems   Reason unable to perform ROS: Pt doesn't respond.       Past Medical History:   Diagnosis Date    Seizures        No Known Allergies    No past surgical history on file.    No family history on file.    Social History     Socioeconomic History    Marital status:    Tobacco Use    Smoking status: Unknown   Vaping Use    Vaping status: Unknown   Substance and Sexual Activity    Alcohol use: Defer    Drug use: Defer    Sexual activity: Defer           Objective   Physical Exam  Vitals and nursing note reviewed.   Constitutional:       General: She is in acute distress.      Appearance: Normal appearance.   HENT:      Head: Normocephalic and atraumatic.      Nose: Nose normal.      Mouth/Throat:      Mouth: Mucous membranes are moist.   Eyes:      Conjunctiva/sclera: Conjunctivae normal.      Pupils: Pupils are equal, round, and reactive to light.      Comments: EOM grossly intact - does not follow commands att.   Cardiovascular:      Rate and Rhythm: Normal rate and regular rhythm.      Pulses: Normal pulses.      Heart sounds: Normal heart sounds.   Pulmonary:      Effort: Pulmonary effort is normal.      Breath sounds: Normal breath  sounds.   Abdominal:      General: Abdomen is flat.      Palpations: Abdomen is soft.   Musculoskeletal:         General: No deformity or signs of injury.   Skin:     General: Skin is warm and dry.      Capillary Refill: Capillary refill takes less than 2 seconds.   Neurological:      Comments: Pt does not respond/follow commands att.  Moves all extremities.      As noted in HPI - pt has episode of shaking/reaching - turning to R - she does respond to NPA placement during this and aborts the above.  Pt pulled out NPA       Procedures          ED Course      Labs Reviewed   COMPREHENSIVE METABOLIC PANEL - Abnormal; Notable for the following components:       Result Value    Glucose 111 (*)     All other components within normal limits    Narrative:     GFR Normal >60  Chronic Kidney Disease <60  Kidney Failure <15     URINALYSIS W/ MICROSCOPIC IF INDICATED (NO CULTURE) - Abnormal; Notable for the following components:    Leuk Esterase, UA Moderate (2+) (*)     All other components within normal limits   URINE DRUG SCREEN - Abnormal; Notable for the following components:    Benzodiazepine Screen, Urine Positive (*)     All other components within normal limits    Narrative:     Cutoff For Drugs Screened:    Amphetamines               500 ng/ml  Barbiturates               200 ng/ml  Benzodiazepines            150 ng/ml  Cocaine                    150 ng/ml  Methadone                  200 ng/ml  Opiates                    100 ng/ml  Phencyclidine               25 ng/ml  THC                         50 ng/ml  Methamphetamine            500 ng/ml  Tricyclic Antidepressants  300 ng/ml  Oxycodone                  100 ng/ml  Buprenorphine               10 ng/ml    The normal value for all drugs tested is negative. This report includes unconfirmed screening results, with the cutoff values listed, to be used for medical treatment purposes only.  Unconfirmed results must not be used for non-medical purposes such as employment or  legal testing.  Clinical consideration should be applied to any drug of abuse test, particularly when unconfirmed results are used.     PHENYTOIN LEVEL, TOTAL - Abnormal; Notable for the following components:    Phenytoin Level <0.8 (*)     All other components within normal limits   CBC WITH AUTO DIFFERENTIAL - Abnormal; Notable for the following components:    Hemoglobin 9.6 (*)     Hematocrit 31.7 (*)     MCV 73.7 (*)     MCH 22.3 (*)     MCHC 30.3 (*)     RDW 17.2 (*)     All other components within normal limits   MANUAL DIFFERENTIAL - Abnormal; Notable for the following components:    Neutrophil % 81.5 (*)     Lymphocyte % 12.9 (*)     Monocyte % 4.8 (*)     Eosinophil % 0.0 (*)     Neutrophils Absolute 7.63 (*)     All other components within normal limits   URINALYSIS, MICROSCOPIC ONLY - Abnormal; Notable for the following components:    WBC, UA 3-5 (*)     Bacteria, UA Trace (*)     All other components within normal limits   LIPASE - Normal   FENTANYL, URINE - Normal    Narrative:     Negative Threshold:      Fentanyl 5 ng/mL     The normal value for the drug tested is negative. This report includes final unconfirmed screening results to be used for medical treatment purposes only. Unconfirmed results must not be used for non-medical purposes such as employment or legal testing. Clinical consideration should be applied to any drug of abuse test, particularly when unconfirmed results are used.          RAINBOW DRAW    Narrative:     The following orders were created for panel order [place] Draw.  Procedure                               Abnormality         Status                     ---------                               -----------         ------                     Green Top (Gel)[[id number]]                                  Final result               Lavender Top[[id number]]                                     Final result               Red Top[[id number]]                                          Final  result               Gordon Top[[id number]]                                         Final result               Light Blue Top[[id number]]                                   Final result                 Please view results for these tests on the individual orders.   ETHANOL    Narrative:     Not for legal purposes. Chain of Custody not followed.    LEVETIRACETAM LEVEL   GREEN TOP   LAVENDER TOP   RED TOP   GRAY TOP   LIGHT BLUE TOP   CBC AND DIFFERENTIAL    Narrative:     The following orders were created for panel order CBC & Differential.  Procedure                               Abnormality         Status                     ---------                               -----------         ------                     CBC Auto Differential[[id number]]        Abnormal            Final result                 Please view results for these tests on the individual orders.     CT Abdomen Pelvis Without Contrast   Final Result   1. There are 2 foreign bodies present along the dependent posterior wall   of the distal stomach (8 mm rounded foreign body which may represent an   ingested battery with an adjacent 2.9 cm cylindrical ribbed foreign body   of uncertain etiology causing extensive streak artifact) without   pneumoperitoneum favoring a dependent intraluminal position within the   distal stomach. Moderate fecal stasis. No evidence for gastric or bowel   obstruction. Small fatty containing umbilical hernia. Prior   cholecystectomy.   2. Few scattered well-circumscribed bilateral subcentimeter nodules of   the visible breast parenchyma supporting a benign process given the   morphology and multiplicity.       This report was signed and finalized on 7/9/2024 5:15 AM by Dr. Neelam Hogue MD.          XR Abdomen KUB   Final Result   1. 2 adjacent is located metallic foreign objects in the left mid   abdomen. The anatomical location of these objects is not certain.   Further follow-up may be obtained.   2. The remaining abdomen is  unremarkable.                   This report was signed and finalized on 7/9/2024 5:51 AM by Dr. Medina Braun MD.          XR Chest 1 View   Final Result   1. No radiopaque foreign body in the chest or limited visualized   abdomen.   2. Poor lung expansion. No active cardiopulmonary disease.       This report was signed and finalized on 7/9/2024 5:53 AM by Dr. Medina Braun MD.          XR Abdomen KUB   Final Result   1. 2 metallic object projected over the central mid and left lower   abdomen which may represent the swallowed foreign body as suggested in   the history. The anatomical location of the foreign body is not certain.               This report was signed and finalized on 7/9/2024 5:48 AM by Dr. Medina Braun MD.                                                     Medical Decision Making  Pt stable in EC - has been observed for several hours in EC with no further activity regarding her pseudoseizures.  She has been resting comfortably.  As she reported to nursing that she had ingested a foreign body - XR was obtained which showed what appeared to be two metallic foreign bodies.  Pt then reported that she ingested magnets as well.  CT obtained and it appears that she has a button battery in the stomach and likely several magnets that have attached to one another.  D/w Dr. Morris - recommends admit for endo this AM.  D/w Dr. Trejo - will admit    Amount and/or Complexity of Data Reviewed  Labs: ordered.  Radiology: ordered.    Risk  Prescription drug management.        Final diagnoses:   Swallowed foreign body, initial encounter   Convulsions, unspecified convulsion type       ED Disposition  ED Disposition       ED Disposition   Decision to Admit    Condition   --    Comment   --               No follow-up provider specified.       Medication List      No changes were made to your prescriptions during this visit.            Lv Ruiz DO  07/08/24 4241       Joseph  Lv CHRISTIE DO  07/09/24 0620      Electronically signed by Lv Ruiz DO at 07/09/24 0620              Physician Progress Notes (last 72 hours)        Camille Lam MD at 07/10/24 1223          Camille Lam MD - General Surgery  Progress Note     LOS: 0 days   Patient Care Team:  Rosie Olvera MD as PCP - General (Family Medicine)      Subjective     Interval History:     Intubated and sedated. No acute events overnight.  Vital stable.     Objective     Vital Signs  Temp:  [97.4 °F (36.3 °C)-98.3 °F (36.8 °C)] 97.4 °F (36.3 °C)  Heart Rate:  [50-91] 55  Resp:  [18] 18  BP: ()/(38-86) 112/50  FiO2 (%):  [30 %-40 %] 30 %    Physical Exam:  General appearance - sedated and intubated   Mental status - sedated and intubated   Eyes - not examined  Mouth -  ETT in proper position  Chest - no tachypnea, retractions or cyanosis  Heart - normal rate and regular rhythm  Neurological - intubated   Extremities - no pedal edema noted  Skin - normal coloration and turgor, no rashes, no suspicious skin lesions noted  Abdomen - soft, non-distended, non-tender       Results Review:    Lab Results (last 24 hours)       Procedure Component Value Units Date/Time    Basic Metabolic Panel [[id number]]  (Abnormal) Collected: 07/10/24 0209    Specimen: Blood Updated: 07/10/24 0254     Glucose 103 mg/dL      BUN 6 mg/dL      Creatinine 0.60 mg/dL      Sodium 138 mmol/L      Potassium 4.6 mmol/L      Chloride 104 mmol/L      CO2 25.0 mmol/L      Calcium 9.1 mg/dL      BUN/Creatinine Ratio 10.0     Anion Gap 9.0 mmol/L      eGFR 126.3 mL/min/1.73     Narrative:      GFR Normal >60  Chronic Kidney Disease <60  Kidney Failure <15      CBC Auto Differential [[id number]]  (Abnormal) Collected: 07/10/24 0209    Specimen: Blood Updated: 07/10/24 0232     WBC 7.35 10*3/mm3      RBC 4.45 10*6/mm3      Hemoglobin 9.7 g/dL      Hematocrit 33.2 %      MCV 74.6 fL      MCH 21.8 pg      MCHC 29.2 g/dL      RDW  17.0 %      RDW-SD 45.7 fl      MPV 11.4 fL      Platelets 242 10*3/mm3      Neutrophil % 64.0 %      Lymphocyte % 24.1 %      Monocyte % 9.1 %      Eosinophil % 1.9 %      Basophil % 0.5 %      Immature Grans % 0.4 %      Neutrophils, Absolute 4.70 10*3/mm3      Lymphocytes, Absolute 1.77 10*3/mm3      Monocytes, Absolute 0.67 10*3/mm3      Eosinophils, Absolute 0.14 10*3/mm3      Basophils, Absolute 0.04 10*3/mm3      Immature Grans, Absolute 0.03 10*3/mm3     Blood Gas, Arterial - [[id number]]  (Abnormal) Collected: 07/09/24 1350    Specimen: Arterial Blood Updated: 07/09/24 1353     Site Right Brachial     Ricky's Test N/A     pH, Arterial 7.402 pH units      pCO2, Arterial 43.0 mm Hg      pO2, Arterial 174.0 mm Hg      Comment: 83 Value above reference range        HCO3, Arterial 26.7 mmol/L      Comment: 83 Value above reference range        Base Excess, Arterial 1.7 mmol/L      O2 Saturation, Arterial 99.9 %      Comment: 83 Value above reference range        Temperature 37.0     Barometric Pressure for Blood Gas 745 mmHg      Modality Ventilator     FIO2 40 %      Ventilator Mode AC     Set Tidal Volume 450     Set Mech Resp Rate 18.0     PEEP 5.0     Collected by [id number]     Comment: Meter: [id number]     :  Deepa Messina, VAN        pCO2, Temperature Corrected 43.0 mm Hg      pH, Temp Corrected 7.402 pH Units      pO2, Temperature Corrected 174 mm Hg      PO2/FIO2 435          Imaging Results (Last 24 Hours)       Procedure Component Value Units Date/Time    XR Abdomen KUB [[id number]] Collected: 07/10/24 0620     Updated: 07/10/24 0627    Narrative:      EXAMINATION: XR ABDOMEN KUB-     7/10/2024 2:20 AM     HISTORY: f/u xray for passage of swallowed metallic objects;  T18.9XXA-Foreign body of alimentary tract, part unspecified, initial  encounter; R56.9-Unspecified convulsions     A frontal projection of the abdomen is compared with the previous study  dated 7/9/2024.     The previously seen  metallic objects are now seen in the right lower  abdomen in the area of the proximal ascending colon.     There is moderate gas and stool in the colon.     Distal end of nasogastric tube is in the mid to distal stomach. The  distal sideport is in the proximal stomach. The position of the tube has  not significantly changed in the previous study.     Both kidneys are obscured by the bowel contents. No definite radiopaque  calculi.     There is evidence of prior cholecystectomy.     No acute bony abnormality.       Impression:      1. The metallic foreign bodies are seen in the right lower abdomen in  the area of the proximal ascending colon. The abdominal gas pattern is  unremarkable. No evidence of obstruction or ileus.  2. Nasogastric tube in place. No change since the previous study.        This report was signed and finalized on 7/10/2024 6:23 AM by Dr. Medina Braun MD.       XR Abdomen KUB [[id number]] Collected: 07/09/24 1848     Updated: 07/09/24 1857    Narrative:      XR ABDOMEN KUB-     HISTORY: OG placement verification; T18.9XXA-Foreign body of alimentary  tract, part unspecified, initial encounter; R56.9-Unspecified  convulsions     COMPARISON: 7/9/2024 3:59 p.m.     FINDINGS: Frontal view of the upper abdomen obtained.     The enteric tube tip is present within the distal body of the stomach.  Cholecystectomy clips. No dilated loops of bowel seen within the upper  abdomen. Lung bases appear clear.       Impression:      1. Enteric tube tip in the distal body of the stomach.        This report was signed and finalized on 7/9/2024 6:54 PM by Dr. Neelam Hogue MD.       XR Abdomen 1 View [[id number]] Collected: 07/09/24 1625     Updated: 07/09/24 1631    Narrative:      EXAM: XR ABDOMEN 1 VW-      DATE: 7/9/2024 3:15 PM     HISTORY: eval progression of foreign body; T18.9XXA-Foreign body of  alimentary tract, part unspecified, initial encounter; R56.9-Unspecified  convulsions       COMPARISON:  7/9/2024 at 2:21 a.m.     TECHNIQUE:   Frontal view of the abdomen. 1 image.     FINDINGS:    Metallic foreign body projects to the left of midline at the level of  the upper sacrum. Bowel gas pattern is nonspecific but nonobstructive.  There is mild distention of the stomach with air.          Impression:         1. Metallic foreign body projects to the left of midline at the level of  the upper sacrum.     This report was signed and finalized on 7/9/2024 4:28 PM by Collins Lam.                 Assessment & Plan     Foreign Body     Ms. Cooley is a 28 year old female s/p ingestion of two foreign bodies. They had passed the stomach by the time EGD was done. We are currently monitoring her with serial exams and AXRs to watch foreign body progression. WBC count normal today. AXR from yesterday and today show progression of the foreign bodies into the ascending colon. Repeat AXR tomorrow AM. Bowel regimen ordered yesterday. Okay to start tube feeds today or start clear liquids if extubated.       Camille Lam MD  07/10/24  12:24 CDT          Electronically signed by Camille Lam MD at 07/10/24 1237       Tian Morris MD at 07/10/24 0917                [facility] Gastroenterology  Inpatient Progress Note  Today's date:  07/10/24    Duane Cooley  1995       Reason for Follow Up: Swallowed metallic foreign objects    Subjective:   Patient continues to be intubated on a ventilator with orogastric tube in place draining clear clearing fluid.  EGD did not reveal any retained upper GI metallic substances.  X-ray today shows metallic objects in the presumed area of the ascending colon.  The patient is unresponsive and unable to give a history.  The history is obtained per chart review and nursing staff interview in the ICU.    No Known Allergies    Current Facility-Administered Medications:     acetaminophen (TYLENOL) tablet 650 mg, 650 mg, Oral, Q4H PRN **OR** acetaminophen (TYLENOL) 160  MG/5ML oral solution 650 mg, 650 mg, Oral, Q4H PRN **OR** acetaminophen (TYLENOL) suppository 650 mg, 650 mg, Rectal, Q4H PRN, Eriberto Trejo MD    Chlorhexidine Gluconate Cloth 2 % pads 1 Application, 1 Application, Topical, Q24H, Luisito Nguyen MD, 1 Application at 07/10/24 0403    dexmedetomidine (PRECEDEX) 400 mcg in 100 mL NS infusion, 0.2-1.5 mcg/kg/hr, Intravenous, Titrated, Dev Emanuel PA-C, Last Rate: 9.1 mL/hr at 07/10/24 0627, 0.4 mcg/kg/hr at 07/10/24 0627    FLUoxetine (PROzac) capsule 80 mg, 80 mg, Oral, Daily, Dev Emanuel PA-C, 80 mg at 07/10/24 0800    hydrOXYzine (ATARAX) tablet 25 mg, 25 mg, Oral, TID PRN, Dev Emanuel PA-C    lacosamide (VIMPAT) injection 100 mg, 100 mg, Intravenous, BID, Luisito Nguyen MD, 100 mg at 07/10/24 0901    lactated ringers infusion, 20 mL/hr, Intravenous, Continuous, Vanita Draper MD, Last Rate: 20 mL/hr at 07/09/24 0858, Currently Infusing at 07/09/24 0858    lamoTRIgine (LaMICtal) tablet 250 mg, 250 mg, Oral, Daily, Eriberto Trejo MD, 250 mg at 07/10/24 0801    levETIRAcetam (KEPPRA) injection 1,000 mg, 1,000 mg, Intravenous, BID, Luisito Nguyen MD, 1,000 mg at 07/10/24 0901    LORazepam (ATIVAN) injection 2 mg, 2 mg, Intravenous, Once PRN, Dev Emanuel PA-C    metoclopramide (REGLAN) injection 10 mg, 10 mg, Intravenous, Q6H, Camille Lam MD, 10 mg at 07/10/24 0518    montelukast (SINGULAIR) tablet 10 mg, 10 mg, Oral, Nightly, Dev Emanuel PA-C, 10 mg at 07/09/24 2241    mupirocin (BACTROBAN) 2 % nasal ointment 1 Application, 1 application , Each Nare, BID, Luisito Nguyen MD, 1 Application at 07/10/24 0801    OLANZapine (zyPREXA) tablet 5 mg, 5 mg, Oral, TID, Eriberto Trejo MD, 5 mg at 07/10/24 0801    ondansetron (ZOFRAN) injection 4 mg, 4 mg, Intravenous, Q6H PRN, Dev Emanuel PA-C    pantoprazole (PROTONIX) injection 40 mg, 40 mg, Intravenous, Daily, Luisito Nguyen MD, 40 mg at  07/09/24 2240    polyethylene glycol (MIRALAX) packet 17 g, 17 g, Oral, Daily, Luisito Nguyen MD, 17 g at 07/09/24 2241    propofol (DIPRIVAN) infusion 10 mg/mL 100 mL, 5-75 mcg/kg/min, Intravenous, Titrated, Dev Emanuel PA-C, Last Rate: 19.05 mL/hr at 07/10/24 0800, 35 mcg/kg/min at 07/10/24 0800    senna (SENOKOT) tablet 1 tablet, 1 tablet, Oral, Daily, Luisito Nguyen MD, 1 tablet at 07/09/24 2309    sodium chloride 0.9 % flush 10 mL, 10 mL, Intravenous, Q12H, Eriberto Trejo MD, 10 mL at 07/09/24 2211    sodium chloride 0.9 % flush 10 mL, 10 mL, Intravenous, PRN, Eriberto Trejo MD    sodium chloride 0.9 % infusion 40 mL, 40 mL, Intravenous, PRN, Eriberto Trejo MD    sodium chloride 0.9 % infusion, 100 mL/hr, Intravenous, Continuous, Lance Esposito DO    sodium chloride 0.9 % infusion, 100 mL/hr, Intravenous, Continuous, Vanita Draper MD, Last Rate: 100 mL/hr at 07/10/24 0518, 100 mL/hr at 07/10/24 0518    terazosin (HYTRIN) capsule 1 mg, 1 mg, Oral, Nightly, Dev Emanuel PA-C, 1 mg at 07/09/24 2245    Review of Systems:   Review of Systems   Unobtainable  Vital Signs:  Temp:  [97 °F (36.1 °C)-98.3 °F (36.8 °C)] 97.4 °F (36.3 °C)  Heart Rate:  [] 55  Resp:  [14-22] 18  BP: ()/(38-88) 102/55  FiO2 (%):  [30 %-40 %] 30 %  Body mass index is 31.34 kg/m².     Intake/Output Summary (Last 24 hours) at 7/10/2024 0918  Last data filed at 7/10/2024 0800  Gross per 24 hour   Intake 1691.6 ml   Output 2125 ml   Net -433.4 ml     I/O this shift:  In: 495 [I.V.:495]  Out: -   Physical Exam:  Physical Exam   Lungs: Clear to auscultation    Cardiac: Regular rhythm without murmurs    Abdomen: Soft with positive bowel sounds.  Results Review:   I have reviewed all of the patient's current test results    Results from last 7 days   Lab Units 07/10/24  0209 07/08/24  2350   WBC 10*3/mm3 7.35 9.36   HEMOGLOBIN g/dL 9.7* 9.6*   HEMATOCRIT % 33.2* 31.7*   PLATELETS  10*3/mm3 242 249       Results from last 7 days   Lab Units 07/10/24  0209 07/08/24  2350   SODIUM mmol/L 138 139   POTASSIUM mmol/L 4.6 4.0   CHLORIDE mmol/L 104 103   CO2 mmol/L 25.0 22.0   BUN mg/dL 6 6   CREATININE mg/dL 0.60 0.77   CALCIUM mg/dL 9.1 9.3   BILIRUBIN mg/dL  --  <0.2   ALK PHOS U/L  --  99   ALT (SGPT) U/L  --  12   AST (SGOT) U/L  --  14   GLUCOSE mg/dL 103* 111*             Lab Results   Lab Value Date/Time    LIPASE 15 07/08/2024 2350       Radiology Review:  Imaging Results (Last 24 Hours)       Procedure Component Value Units Date/Time    XR Abdomen KUB [[id number]] Collected: 07/10/24 0620     Updated: 07/10/24 0627    Narrative:      EXAMINATION: XR ABDOMEN KUB-     7/10/2024 2:20 AM     HISTORY: f/u xray for passage of swallowed metallic objects;  T18.9XXA-Foreign body of alimentary tract, part unspecified, initial  encounter; R56.9-Unspecified convulsions     A frontal projection of the abdomen is compared with the previous study  dated 7/9/2024.     The previously seen metallic objects are now seen in the right lower  abdomen in the area of the proximal ascending colon.     There is moderate gas and stool in the colon.     Distal end of nasogastric tube is in the mid to distal stomach. The  distal sideport is in the proximal stomach. The position of the tube has  not significantly changed in the previous study.     Both kidneys are obscured by the bowel contents. No definite radiopaque  calculi.     There is evidence of prior cholecystectomy.     No acute bony abnormality.       Impression:      1. The metallic foreign bodies are seen in the right lower abdomen in  the area of the proximal ascending colon. The abdominal gas pattern is  unremarkable. No evidence of obstruction or ileus.  2. Nasogastric tube in place. No change since the previous study.        This report was signed and finalized on 7/10/2024 6:23 AM by Dr. Medina Braun MD.       XR Abdomen KUB [[id number]] Collected:  07/09/24 1848     Updated: 07/09/24 1857    Narrative:      XR ABDOMEN KUB-     HISTORY: OG placement verification; T18.9XXA-Foreign body of alimentary  tract, part unspecified, initial encounter; R56.9-Unspecified  convulsions     COMPARISON: 7/9/2024 3:59 p.m.     FINDINGS: Frontal view of the upper abdomen obtained.     The enteric tube tip is present within the distal body of the stomach.  Cholecystectomy clips. No dilated loops of bowel seen within the upper  abdomen. Lung bases appear clear.       Impression:      1. Enteric tube tip in the distal body of the stomach.        This report was signed and finalized on 7/9/2024 6:54 PM by Dr. Neelam Hogue MD.       XR Abdomen 1 View [[id number]] Collected: 07/09/24 1625     Updated: 07/09/24 1631    Narrative:      EXAM: XR ABDOMEN 1 VW-      DATE: 7/9/2024 3:15 PM     HISTORY: eval progression of foreign body; T18.9XXA-Foreign body of  alimentary tract, part unspecified, initial encounter; R56.9-Unspecified  convulsions       COMPARISON: 7/9/2024 at 2:21 a.m.     TECHNIQUE:   Frontal view of the abdomen. 1 image.     FINDINGS:    Metallic foreign body projects to the left of midline at the level of  the upper sacrum. Bowel gas pattern is nonspecific but nonobstructive.  There is mild distention of the stomach with air.          Impression:         1. Metallic foreign body projects to the left of midline at the level of  the upper sacrum.     This report was signed and finalized on 7/9/2024 4:28 PM by Collins Lam.       XR Chest 1 View [[id number]] Collected: 07/09/24 1121     Updated: 07/09/24 1125    Narrative:      EXAM: XR CHEST 1 VW-      DATE: 7/9/2024 10:09 AM     HISTORY: Confirm ET Tube Placement; T18.9XXA-Foreign body of alimentary  tract, part unspecified, initial encounter; R56.9-Unspecified  convulsions       COMPARISON: 7/9/2024 at 1:23 a.m.     TECHNIQUE:  Frontal view(s) of the chest submitted.     FINDINGS:    An ET tube has been placed  with tip 4.2 cm above the elizabeth. Lungs are  grossly clear. No effusion or pneumothorax is seen. Heart and  mediastinum are unremarkable.          Impression:         1. ET tube placement with tip above the elizabeth and no active disease in  the chest.     This report was signed and finalized on 7/9/2024 11:22 AM by Collins Lam.               Impression/Plan:    Foreign body ingestion    Pseudoseizures    Swallowed metallic objects appear to be progressing through the GI tract.  Hopefully these will pass in the stool through the anus.  General surgery has seen the patient as well.  The inpatient GI service will sign off at this time.  Please call with any questions or concerns.  The above was discussed in detail with the ICU nursing staff.  Thank you    Tian Morris MD  07/10/24  09:18 CDT    DISCLAIMER:    This physician works through a [employer] as an inpatient consultant gastroenterologist only and has no outpatient clinic for patient follow up.  Any results not available at time of inpatient discharge and/or GI clinic follow up should be managed by the hospitalist team, PCP, or outpatient gastroenterologist.       Electronically signed by Tian Morris MD at 07/10/24 0919       Dev Emanuel PA-C at 07/09/24 1245              [facility]  INPATIENT PROGRESS NOTE    Patient Name: Duane Cooley  Date of Admission: 7/8/2024  Today's Date: 07/09/24  Length of Stay: 0  Primary Care Physician: Rosie Olvera MD    Subjective   Chief Complaint: Possible seizure, ingestion of foreign object  HPI   28-year-old female with past medical history of autism, and pseudoseizures that presents to the emergency room with jerking movements that were stopped once a nasopharyngeal airway was placed.  She had received Versed in transport by EMS.  She had hide hospitalizations as being treated aggressively for what was thought to be seizures  "and required intubation and propofol drip several locations in the past.  On her last visit in February of last year she was evaluated by Dr. Schreiber who recommended her neurologist neurologist wean her off antiepileptic medications.     After this jerking movements episode was stopped, she reported that she might of swallowed a battery and some mild minutes she was chewing on.  X-ray of the abdomen shows 2 metallic foreign objects in the left mid abdomen, and CT abdomen showed 2 foreign bodies present the posterior wall of the stomach without pneumoperitoneum favoring an intraluminal foreign body.  Gastroenterologist was called and recommended admission to our services.  The patient is chewing on a marker does not appear distressed no complaints of pain, she is interested in reporting that she has \"epilepsy\".    The patient went for endoscopy with gastroenterology on 7/9/2024 to further evaluate the 2 foreign objects seen on x-ray of the abdomen and CT of the abdomen.  However, while the patient was recovering in PACU, she began to display seizure-like activity and was unable to protect her airway.  Therefore, she was intubated by anesthesia.  She was then transferred to the ICU for closer monitoring.    I saw this patient shortly after arrival to the ICU.  She reportedly had seizure-like activity upon her initial arrival to the ICU.  However, saw her within 1 to 2 minutes of her arrival, and the seizure-like activity had ceased.  Neurology has been consulted, and they believe this is likely psychogenic nonepileptic spells, and not true seizure activity.  General surgery was also consulted as her endoscopy showed that the 2 foreign objects she had swallowed had both passed her stomach.  We will obtain a daily KUB.  We will follow any recommendations per general surgery.  We have kept the patient intubated at this time in case patient will need surgery in the near future.      Review of Systems   All pertinent " negatives and positives are as above. All other systems have been reviewed and are negative unless otherwise stated.     Objective    Temp:  [97 °F (36.1 °C)-98.4 °F (36.9 °C)] 98.1 °F (36.7 °C)  Heart Rate:  [] 86  Resp:  [14-22] 18  BP: ()/(48-89) 82/48  FiO2 (%):  [40 %] 40 %  Physical Exam  Vitals and nursing note reviewed.   Constitutional:       Appearance: She is ill-appearing.      Interventions: She is sedated and intubated.   HENT:      Head: Normocephalic and atraumatic.   Cardiovascular:      Rate and Rhythm: Normal rate and regular rhythm.      Pulses: Normal pulses.   Pulmonary:      Effort: Pulmonary effort is normal. She is intubated.      Breath sounds: Decreased breath sounds present.   Abdominal:      General: Abdomen is flat. There is no distension.      Palpations: Abdomen is soft.   Musculoskeletal:      Cervical back: Neck supple.   Skin:     General: Skin is warm and dry.      Capillary Refill: Capillary refill takes less than 2 seconds.   Neurological:      Comments: Unable to perform thorough neuroexam as patient is currently intubated and sedated           Results Review:        XR Chest 1 View    Result Date: 7/9/2024   1. ET tube placement with tip above the elizabeth and no active disease in the chest.  This report was signed and finalized on 7/9/2024 11:22 AM by Collins Lam.      XR Chest 1 View    Result Date: 7/9/2024  1. No radiopaque foreign body in the chest or limited visualized abdomen. 2. Poor lung expansion. No active cardiopulmonary disease.  This report was signed and finalized on 7/9/2024 5:53 AM by Dr. Medina Braun MD.      XR Abdomen KUB    Result Date: 7/9/2024  1. 2 adjacent is located metallic foreign objects in the left mid abdomen. The anatomical location of these objects is not certain. Further follow-up may be obtained. 2. The remaining abdomen is unremarkable.     This report was signed and finalized on 7/9/2024 5:51 AM by Dr. Medina Braun  "MD.      XR Abdomen KUB    Result Date: 7/9/2024  1. 2 metallic object projected over the central mid and left lower abdomen which may represent the swallowed foreign body as suggested in the history. The anatomical location of the foreign body is not certain.    This report was signed and finalized on 7/9/2024 5:48 AM by Dr. Medina Braun MD.      CT Abdomen Pelvis Without Contrast    Result Date: 7/9/2024  1. There are 2 foreign bodies present along the dependent posterior wall of the distal stomach (8 mm rounded foreign body which may represent an ingested battery with an adjacent 2.9 cm cylindrical ribbed foreign body of uncertain etiology causing extensive streak artifact) without pneumoperitoneum favoring a dependent intraluminal position within the distal stomach. Moderate fecal stasis. No evidence for gastric or bowel obstruction. Small fatty containing umbilical hernia. Prior cholecystectomy. 2. Few scattered well-circumscribed bilateral subcentimeter nodules of the visible breast parenchyma supporting a benign process given the morphology and multiplicity.  This report was signed and finalized on 7/9/2024 5:15 AM by Dr. Neelam Hogue MD.       Result Review:  I have personally reviewed the results from the time of this admission to 7/9/2024 12:45 CDT and agree with these findings:  [x]  Laboratory list / accordion  []  Microbiology  [x]  Radiology  [x]  EKG/Telemetry   []  Cardiology/Vascular   []  Pathology  []  Old records  []  Other:  Most notable findings include: UDS positive for benzodiazepines, CMP unremarkable, hemoglobin 9.6, hematocrit 31.7.  Latest KUB shows metallic foreign body projects to the left of midline at the level of the upper sacrum.  Patient was sinus rhythm on telemetry.      Culture Data:   No results found for: \"BLOODCX\", \"URINECX\", \"WOUNDCX\", \"MRSACX\", \"RESPCX\", \"STOOLCX\"    I have reviewed the patient's current medications.     Assessment/Plan   Assessment  Active Hospital " Problems    Diagnosis     **Foreign body ingestion     Pseudoseizures        Medical Decision Making  Number and Complexity of problems: 2  Differential Diagnosis: Ingestion of foreign body, perforated viscera, seizures, pseudoseizures    Conditions and Status        Condition is stable.     [facility] Data  External documents reviewed: N/A  Cardiac tracing (EKG, telemetry) interpretation: Sinus rhythm on telemetry  Radiology interpretation: Yes, see above  Labs reviewed: Yes, see above  Any tests that were considered but not ordered: No     Decision rules/scores evaluated (example [id number], Wells, etc): N/A     Discussed with: Dr. Nguyen     Care Planning  Shared decision making: Dr. Nguyen  Code status and discussions: Full    Treatment Plan  Ingestion of foreign body   -Patient found to have 2 metallic objects noted on multiple imaging studies   -EGD showed that the metallic objects had already passed patient's stomach and entered her small intestines   -General surgery consulted for possible surgical removal if needed, we appreciate their recommendations   -We will monitor with daily KUB    2.  Seizure-like activity   -Patient noted to have seizure-like activity in PACU, which required her to be rapidly intubated   -Neurology following, and they believe seizure-like activity is likely representative of psychogenic nonepileptic spells based on her history.  We appreciate their recommendations.    3. Acute Respiratory failure   -Currently intubated and sedated   -Currently on 40% FiO2 with TV of 450 and PEEP 5   -Will likely try to wean with SATs and SBTs in the morning     Disposition  Social Determinants of Health that impact treatment or disposition: N/A  I expect the patient to be discharged to home in 2-4 days.     I provided 40 minutes of total critical care time. Due to the high probability of clinically significant, life-threatening deterioration, the patient required my direct and personal management. The  critical care time does not include time spent on separately billable procedures.    Electronically signed by Dev Emanuel PA-C on 7/9/2024 at 12:45 CDT    Electronically signed by Dev Emanuel PA-C at 07/09/24 1934          Consult Notes (last 72 hours)        Miladys Biggs PA-C at 07/09/24 1804        Consult Orders    1. Inpatient General Surgery Consult [[id number]] ordered by Lance Esposito DO at 07/09/24 1003              Attestation signed by Camille Lam MD at 07/09/24 1828    I have reviewed this documentation and agree. She was admitted with possible pseudoseizures and ingestion of a magnet and battery. The magnet and battery were noted on CT early this AM to be in the stomach. Later in the morning, GI took the patient for EGD and it showed that the foreign bodies were no longer in the stomach. Her vitals are stable. WBC count stable. No evidence of perforation on CT. As this is in the small bowel, will watch closely clinically and radiographically for it to pass. Bowel regimen ordered to help it pass.     Camille Lam MD  07/09/24                  Miladys Biggs PA-C Consult Note - General Surgery     Referring Provider: No ref. provider found    Patient Care Team:  Rosie Olvera MD as PCP - General (Family Medicine)    Chief complaint foreign body ingestion    Subjective .     History of present illness:  Patient is a 27 y/o female with a history of autism and pseudoseizures who presented to the ER by EMS with jerking movements. After jerking movements were stopped, patient reported that she possibly swallowed a battery and some other objects. CT scan in the ER showed a round metal object and a 2.5 cm elongated ribbed object in the distal stomach. Patient underwent EGD this morning and objects were not visualized and assumed to have passed into the small bowel. After the procedure, patient was found to have continue apparent seizure activity and was  intubated. KUB done this afternoon shows foreign bodies have proceeded to the left of midline at the level of the upper sacrum.     Review of Systems  All systems were unable to be reviewed as patient is intubated.       History  Past Medical History:   Diagnosis Date    Seizures    , History reviewed. No pertinent surgical history., History reviewed. No pertinent family history.,   Social History     Tobacco Use    Smoking status: Unknown   Vaping Use    Vaping status: Never Used   Substance Use Topics    Alcohol use: Defer    Drug use: Defer   ,   Medications Prior to Admission   Medication Sig Dispense Refill Last Dose    doxazosin (CARDURA) 1 MG tablet Take 1 tablet by mouth 2 (Two) Times a Day.       FLUoxetine (PROzac) 40 MG capsule Take 2 capsules by mouth Daily.       hydrOXYzine (ATARAX) 25 MG tablet Take 1 tablet by mouth 3 (Three) Times a Day As Needed for Itching.       lacosamide (VIMPAT) 100 MG tablet tablet Take 1 tablet by mouth 2 (Two) Times a Day.       lamoTRIgine (LaMICtal) 100 MG tablet Take 2.5 tablets by mouth Daily.   Patient Taking Differently    levETIRAcetam (KEPPRA) 1000 MG tablet Take 1 tablet by mouth 2 (Two) Times a Day.       montelukast (SINGULAIR) 10 MG tablet Take 1 tablet by mouth Every Night.       OLANZapine zydis (zyPREXA) 5 MG disintegrating tablet Take 1 tablet by mouth 3 times a day.       pantoprazole (PROTONIX) 40 MG EC tablet Take 1 tablet by mouth Daily.       senna 8.6 MG tablet Take 2 tablets by mouth Daily.       acetaminophen (TYLENOL) 500 MG tablet Take 1 tablet by mouth Every 6 (Six) Hours As Needed for Mild Pain.       albuterol sulfate  (90 Base) MCG/ACT inhaler Inhale 2 puffs Every 4 (Four) Hours As Needed for Wheezing.       diazePAM (VALTOCO 15 MG DOSE NA) 15 mg into the nostril(s) as directed by provider As Needed (seizure lasting longer than 5 minutes).       and Allergies:  Patient has no known allergies.    Current Facility-Administered Medications:      acetaminophen (TYLENOL) tablet 650 mg, 650 mg, Oral, Q4H PRN **OR** acetaminophen (TYLENOL) 160 MG/5ML oral solution 650 mg, 650 mg, Oral, Q4H PRN **OR** acetaminophen (TYLENOL) suppository 650 mg, 650 mg, Rectal, Q4H PRN, Eriberto Trejo MD    Chlorhexidine Gluconate Cloth 2 % pads 1 Application, 1 application , Topical, Once, Luisito Nguyen MD    [START ON 7/10/2024] Chlorhexidine Gluconate Cloth 2 % pads 1 Application, 1 Application, Topical, Q24H, Luisito Nguyen MD    dexmedetomidine (PRECEDEX) 400 mcg in 100 mL NS infusion, 0.2-1.5 mcg/kg/hr, Intravenous, Titrated, Dev Emanuel PA-C, Last Rate: 22.7 mL/hr at 07/09/24 1634, 1 mcg/kg/hr at 07/09/24 1634    lacosamide (VIMPAT) tablet 100 mg, 100 mg, Oral, BID, Eriberto Trejo MD    lactated ringers infusion, 20 mL/hr, Intravenous, Continuous, LincolnVanita MD, Last Rate: 20 mL/hr at 07/09/24 0858, Currently Infusing at 07/09/24 0858    lamoTRIgine (LaMICtal) tablet 250 mg, 250 mg, Oral, Daily, Eriberto Trejo MD    levETIRAcetam (KEPPRA) tablet 1,000 mg, 1,000 mg, Oral, BID, Eriberto Trejo MD    LORazepam (ATIVAN) injection 2 mg, 2 mg, Intravenous, Once PRN, Dev Emanuel PA-C    mupirocin (BACTROBAN) 2 % nasal ointment 1 Application, 1 application , Each Nare, BID, Luisito Nguyen MD    OLANZapine (zyPREXA) tablet 5 mg, 5 mg, Oral, TID, Eriberto Trejo MD    ondansetron (ZOFRAN) injection 4 mg, 4 mg, Intravenous, Q6H PRN, Jenae, Dev C, PA-C    propofol (DIPRIVAN) infusion 10 mg/mL 100 mL, 5-75 mcg/kg/min, Intravenous, Titrated, Dev Emanuel PA-C, Last Rate: 19.05 mL/hr at 07/09/24 1358, 35 mcg/kg/min at 07/09/24 1358    senna (SENOKOT) tablet 1 tablet, 1 tablet, Oral, Daily, Eriberto Trejo MD    sodium chloride 0.9 % flush 10 mL, 10 mL, Intravenous, Q12H, Eriberto Trejo MD    sodium chloride 0.9 % flush 10 mL, 10 mL, Intravenous, PRN, Eriberto Trejo MD     sodium chloride 0.9 % infusion 40 mL, 40 mL, Intravenous, PRN, Eriberto Trejo MD    sodium chloride 0.9 % infusion, 100 mL/hr, Intravenous, Continuous, Lance Esposito DO    sodium chloride 0.9 % infusion, 100 mL/hr, Intravenous, Continuous, Vanita Draper MD    Objective     Vital Signs   Temp:  [97 °F (36.1 °C)-98.4 °F (36.9 °C)] 98.3 °F (36.8 °C)  Heart Rate:  [] 62  Resp:  [14-22] 18  BP: ()/(44-89) 106/78  FiO2 (%):  [40 %] 40 %    Physical Exam:  General appearance - sedated and intubated   Mental status - sedated and intubated   Eyes - not examined  Mouth -  ETT in proper position  Chest - no tachypnea, retractions or cyanosis  Heart - normal rate and regular rhythm  Neurological - intubated   Extremities - no pedal edema noted  Skin - normal coloration and turgor, no rashes, no suspicious skin lesions noted    Results Review:    Lab Results (last 24 hours)       Procedure Component Value Units Date/Time    Blood Gas, Arterial - [[id number]]  (Abnormal) Collected: 07/09/24 1350    Specimen: Arterial Blood Updated: 07/09/24 1353     Site Right Brachial     Ricky's Test N/A     pH, Arterial 7.402 pH units      pCO2, Arterial 43.0 mm Hg      pO2, Arterial 174.0 mm Hg      Comment: 83 Value above reference range        HCO3, Arterial 26.7 mmol/L      Comment: 83 Value above reference range        Base Excess, Arterial 1.7 mmol/L      O2 Saturation, Arterial 99.9 %      Comment: 83 Value above reference range        Temperature 37.0     Barometric Pressure for Blood Gas 745 mmHg      Modality Ventilator     FIO2 40 %      Ventilator Mode AC     Set Tidal Volume 450     Set Mech Resp Rate 18.0     PEEP 5.0     Collected by [id number]     Comment: Meter: [id number]     :  Deepa Messina RRT        pCO2, Temperature Corrected 43.0 mm Hg      pH, Temp Corrected 7.402 pH Units      pO2, Temperature Corrected 174 mm Hg      PO2/FIO2 435    Pregnancy, Urine - Urine, Clean Catch  [[id number]]  (Normal) Collected: 07/09/24 0141    Specimen: Urine, Clean Catch Updated: 07/09/24 0847     HCG, Urine QL Negative    Urinalysis With Microscopic If Indicated (No Culture) - Urine, Clean Catch [[id number]]  (Abnormal) Collected: 07/09/24 0141    Specimen: Urine, Clean Catch Updated: 07/09/24 0228     Color, UA Yellow     Appearance, UA Clear     pH, UA 8.0     Specific Gravity, UA 1.010     Glucose, UA Negative     Ketones, UA Negative     Bilirubin, UA Negative     Blood, UA Negative     Protein, UA Negative     Leuk Esterase, UA Moderate (2+)     Nitrite, UA Negative     Urobilinogen, UA 0.2 E.U./dL    Urinalysis, Microscopic Only - Urine, Clean Catch [[id number]]  (Abnormal) Collected: 07/09/24 0141    Specimen: Urine, Clean Catch Updated: 07/09/24 0228     RBC, UA None Seen /HPF      WBC, UA 3-5 /HPF      Bacteria, UA Trace /HPF      Squamous Epithelial Cells, UA 0-2 /HPF      Hyaline Casts, UA None Seen /LPF      Methodology Manual Light Microscopy    Fentanyl, Urine - Urine, Clean Catch [[id number]]  (Normal) Collected: 07/09/24 0141    Specimen: Urine, Clean Catch Updated: 07/09/24 0226     Fentanyl, Urine Negative    Narrative:      Negative Threshold:      Fentanyl 5 ng/mL     The normal value for the drug tested is negative. This report includes final unconfirmed screening results to be used for medical treatment purposes only. Unconfirmed results must not be used for non-medical purposes such as employment or legal testing. Clinical consideration should be applied to any drug of abuse test, particularly when unconfirmed results are used.           Urine Drug Screen - Urine, Clean Catch [[id number]]  (Abnormal) Collected: 07/09/24 0141    Specimen: Urine, Clean Catch Updated: 07/09/24 0225     THC, Screen, Urine Negative     Phencyclidine (PCP), Urine Negative     Cocaine Screen, Urine Negative     Methamphetamine, Ur Negative     Opiate Screen Negative     Amphetamine Screen, Urine Negative      Benzodiazepine Screen, Urine Positive     Tricyclic Antidepressants Screen Negative     Methadone Screen, Urine Negative     Barbiturates Screen, Urine Negative     Oxycodone Screen, Urine Negative     Buprenorphine, Screen, Urine Negative    Narrative:      Cutoff For Drugs Screened:    Amphetamines               500 ng/ml  Barbiturates               200 ng/ml  Benzodiazepines            150 ng/ml  Cocaine                    150 ng/ml  Methadone                  200 ng/ml  Opiates                    100 ng/ml  Phencyclidine               25 ng/ml  THC                         50 ng/ml  Methamphetamine            500 ng/ml  Tricyclic Antidepressants  300 ng/ml  Oxycodone                  100 ng/ml  Buprenorphine               10 ng/ml    The normal value for all drugs tested is negative. This report includes unconfirmed screening results, with the cutoff values listed, to be used for medical treatment purposes only.  Unconfirmed results must not be used for non-medical purposes such as employment or legal testing.  Clinical consideration should be applied to any drug of abuse test, particularly when unconfirmed results are used.      Levetiracetam Level (Keppra) [[id number]] Collected: 07/09/24 0154    Specimen: Blood Updated: 07/09/24 0202    Phenytoin Level, Total [[id number]]  (Abnormal) Collected: 07/08/24 2350    Specimen: Blood Updated: 07/09/24 0052     Phenytoin Level <0.8 mcg/mL     Comprehensive Metabolic Panel [[id number]]  (Abnormal) Collected: 07/08/24 2350    Specimen: Blood Updated: 07/09/24 0047     Glucose 111 mg/dL      BUN 6 mg/dL      Creatinine 0.77 mg/dL      Sodium 139 mmol/L      Potassium 4.0 mmol/L      Chloride 103 mmol/L      CO2 22.0 mmol/L      Calcium 9.3 mg/dL      Total Protein 7.6 g/dL      Albumin 4.1 g/dL      ALT (SGPT) 12 U/L      AST (SGOT) 14 U/L      Alkaline Phosphatase 99 U/L      Total Bilirubin <0.2 mg/dL      Globulin 3.5 gm/dL      A/G Ratio 1.2 g/dL      BUN/Creatinine  Ratio 7.8     Anion Gap 14.0 mmol/L      eGFR 108.6 mL/min/1.73     Narrative:      GFR Normal >60  Chronic Kidney Disease <60  Kidney Failure <15      Lipase [[id number]]  (Normal) Collected: 07/08/24 2350    Specimen: Blood Updated: 07/09/24 0047     Lipase 15 U/L     Ethanol [[id number]] Collected: 07/08/24 2350    Specimen: Blood Updated: 07/09/24 0047     Ethanol % <0.010 %     Narrative:      Not for legal purposes. Chain of Custody not followed.     Manual Differential [[id number]]  (Abnormal) Collected: 07/08/24 2350    Specimen: Blood Updated: 07/09/24 0040     Neutrophil % 81.5 %      Lymphocyte % 12.9 %      Monocyte % 4.8 %      Eosinophil % 0.0 %      Basophil % 0.8 %      Neutrophils Absolute 7.63 10*3/mm3      Lymphocytes Absolute 1.21 10*3/mm3      Monocytes Absolute 0.45 10*3/mm3      Eosinophils Absolute 0.00 10*3/mm3      Basophils Absolute 0.07 10*3/mm3      Anisocytosis Slight/1+     Microcytes Slight/1+     Poikilocytes Slight/1+     Polychromasia Slight/1+     WBC Morphology Normal     Giant Platelets Slight/1+    CBC & Differential [[id number]]  (Abnormal) Collected: 07/08/24 2350    Specimen: Blood Updated: 07/09/24 0036    Narrative:      The following orders were created for panel order CBC & Differential.  Procedure                               Abnormality         Status                     ---------                               -----------         ------                     CBC Auto Differential[[id number]]        Abnormal            Final result                 Please view results for these tests on the individual orders.    CBC Auto Differential [[id number]]  (Abnormal) Collected: 07/08/24 2350    Specimen: Blood Updated: 07/09/24 0036     WBC 9.36 10*3/mm3      RBC 4.30 10*6/mm3      Hemoglobin 9.6 g/dL      Hematocrit 31.7 %      MCV 73.7 fL      MCH 22.3 pg      MCHC 30.3 g/dL      RDW 17.2 %      RDW-SD 45.6 fl      MPV 11.7 fL      Platelets 249 10*3/mm3      nRBC 0.0 /100 WBC      [place] Draw [[id number]] Collected: 07/08/24 2350    Specimen: Blood Updated: 07/09/24 0016    Narrative:      The following orders were created for panel order [place] Draw.  Procedure                               Abnormality         Status                     ---------                               -----------         ------                     Green Top (Gel)[[id number]]                                  Final result               Lavender Top[[id number]]                                     Final result               Red Top[[id number]]                                          Final result               Gray Top[[id number]]                                         Final result               Light Blue Top[[id number]]                                   Final result                 Please view results for these tests on the individual orders.    [place] Top [[id number]] Collected: 07/09/24 0000    Specimen: Blood from Arm, Right Updated: 07/09/24 0016     Extra Tube Hold for add-ons.     Comment: Auto resulted.       Light Blue Top [[id number]] Collected: 07/09/24 0000    Specimen: Blood from Arm, Right Updated: 07/09/24 0016     Extra Tube Hold for add-ons.     Comment: Auto resulted       Green Top (Gel) [[id number]] Collected: 07/08/24 2350    Specimen: Blood Updated: 07/09/24 0001     Extra Tube Hold for add-ons.     Comment: Auto resulted.       Lavender Top [[id number]] Collected: 07/08/24 2350    Specimen: Blood Updated: 07/09/24 0001     Extra Tube hold for add-on     Comment: Auto resulted       Red Top [[id number]] Collected: 07/08/24 2350    Specimen: Blood Updated: 07/09/24 0001     Extra Tube Hold for add-ons.     Comment: Auto resulted.             Imaging Results (Last 24 Hours)       Procedure Component Value Units Date/Time    XR Abdomen KUB [[id number]] Resulted: 07/09/24 1809     Updated: 07/09/24 1809    XR Abdomen 1 View [[id number]] Collected: 07/09/24 1625     Updated: 07/09/24 1631    Narrative:      EXAM:  XR ABDOMEN 1 VW-      DATE: 7/9/2024 3:15 PM     HISTORY: eval progression of foreign body; T18.9XXA-Foreign body of  alimentary tract, part unspecified, initial encounter; R56.9-Unspecified  convulsions       COMPARISON: 7/9/2024 at 2:21 a.m.     TECHNIQUE:   Frontal view of the abdomen. 1 image.     FINDINGS:    Metallic foreign body projects to the left of midline at the level of  the upper sacrum. Bowel gas pattern is nonspecific but nonobstructive.  There is mild distention of the stomach with air.          Impression:         1. Metallic foreign body projects to the left of midline at the level of  the upper sacrum.     This report was signed and finalized on 7/9/2024 4:28 PM by Collins Lam.       XR Chest 1 View [[id number]] Collected: 07/09/24 1121     Updated: 07/09/24 1125    Narrative:      EXAM: XR CHEST 1 VW-      DATE: 7/9/2024 10:09 AM     HISTORY: Confirm ET Tube Placement; T18.9XXA-Foreign body of alimentary  tract, part unspecified, initial encounter; R56.9-Unspecified  convulsions       COMPARISON: 7/9/2024 at 1:23 a.m.     TECHNIQUE:  Frontal view(s) of the chest submitted.     FINDINGS:    An ET tube has been placed with tip 4.2 cm above the elizabeth. Lungs are  grossly clear. No effusion or pneumothorax is seen. Heart and  mediastinum are unremarkable.          Impression:         1. ET tube placement with tip above the elizabeth and no active disease in  the chest.     This report was signed and finalized on 7/9/2024 11:22 AM by Collins Lam.       XR Chest 1 View [[id number]] Collected: 07/09/24 0551     Updated: 07/09/24 0556    Narrative:      EXAMINATION: XR CHEST 1 VW-     7/9/2024 12:29 AM     HISTORY: possible swallowed foreign body     A frontal projection of the chest is compared with the previous study  dated 2/1/2023.     The lungs are poorly expanded.     No radiopaque foreign in the chest and included proximal part of the  abdomen is noted.     There is no evidence of recent  infiltrate, pleural effusion, pulmonary  congestion or pneumothorax.     The heart size is in the normal range.     No acute bony abnormality.       Impression:      1. No radiopaque foreign body in the chest or limited visualized  abdomen.  2. Poor lung expansion. No active cardiopulmonary disease.     This report was signed and finalized on 7/9/2024 5:53 AM by Dr. Medina Braun MD.       XR Abdomen KUB [[id number]] Collected: 07/09/24 0548     Updated: 07/09/24 0554    Narrative:      EXAMINATION: XR ABDOMEN KUB-     7/9/2024 1:23 AM     HISTORY: possible ingested fb     A frontal projection of the abdomen is compared with the previous study  obtained earlier today.     Previously seen 2 adjacently located metallic foreign bodies in the left  midabdomen are reidentified. No significant change in position of the  foreign bodies. The anatomical location of these foreign objects is not  certain. This may be located in the dependent part of the fluid and food  filled distended stomach?.     There is moderate gas and stool in the colon. No evidence of dilatation  of the small or large bowel loops.     There is evidence of prior cholecystectomy.     There is no acute bony abnormality.       Impression:      1. 2 adjacent is located metallic foreign objects in the left mid  abdomen. The anatomical location of these objects is not certain.  Further follow-up may be obtained.  2. The remaining abdomen is unremarkable.              This report was signed and finalized on 7/9/2024 5:51 AM by Dr. Medina Braun MD.       XR Abdomen KUB [[id number]] Collected: 07/09/24 0541     Updated: 07/09/24 0551    Narrative:      EXAMINATION: XR ABDOMEN KUB-     7/9/2024 12:29 AM     HISTORY: possible swallowed foreign body     A frontal projection of the abdomen is compared with the previous study  dated 1/25/2023.     There are metallic objects projected over the mid central and left  paracentral/lower abdomen which may  represent the ingested foreign  bodies as suggested in the history. This may represent  nut an  bolt?. The exact anatomical location of these foreign bodies is not  certain. There may be an independent part of this fluid-filled stomach  are in the proximal to mid small bowel?. There is moderate gas and stool  in the colon. There is small amount of gas in the gastric fundus.     There is evidence of prior cholecystectomy.     Both kidneys are obscured by the bowel contents. No definite radiopaque  calculi.     There is no acute bony abnormality.       Impression:      1. 2 metallic object projected over the central mid and left lower  abdomen which may represent the swallowed foreign body as suggested in  the history. The anatomical location of the foreign body is not certain.           This report was signed and finalized on 7/9/2024 5:48 AM by Dr. Medina Braun MD.       CT Abdomen Pelvis Without Contrast [[id number]] Collected: 07/09/24 0449     Updated: 07/09/24 0518    Narrative:      CT ABDOMEN PELVIS WO CONTRAST- 7/9/2024 1:49 AM     HISTORY: possible ingested foreign body (battery)      COMPARISON: KUB 7/9/2024     TOTAL DOSE LENGTH PRODUCT: 495.19 mGy.cm. Automated exposure control was  also utilized to decrease patient radiation dose.     TECHNIQUE: Axial images of the abdomen and pelvis are performed without  IV contrast.     FINDINGS: There are few scattered subcentimeter smooth margin  well-circumscribed nodules/masses of the bilateral breast parenchyma  supporting a benign process given the morphology and multiplicity. The  visible lung bases are unremarkable.     The nonenhanced liver, spleen, pancreas, and adrenal glands are  unremarkable. Cholecystectomy clips. No focal renal contour abnormality.  No abnormal perinephric fluid collection. No hydronephrosis. Bladder is  intact. Uterus and ovaries are unremarkable.     There are two foreign bodies within the upper abdomen. There is  a  rounded 8 mm foreign body which may represent an ingested battery  positioned immediately right of a cylindrical rib 2.9 cm foreign body  with extensive streak artifact of uncertain etiology. These are  positioned along the posterior dependent portion of the distal stomach  (sagittal series 900 image 29 through 36). There is no free air to  suggest a perforation. Moderate fecal stasis. Normal appendix. No small  bowel dilatation. Fatty containing umbilical hernia. No pathologic  lymphadenopathy identified. Normal caliber abdominal aorta.     No focal destructive regional bony lesion.       Impression:      1. There are 2 foreign bodies present along the dependent posterior wall  of the distal stomach (8 mm rounded foreign body which may represent an  ingested battery with an adjacent 2.9 cm cylindrical ribbed foreign body  of uncertain etiology causing extensive streak artifact) without  pneumoperitoneum favoring a dependent intraluminal position within the  distal stomach. Moderate fecal stasis. No evidence for gastric or bowel  obstruction. Small fatty containing umbilical hernia. Prior  cholecystectomy.  2. Few scattered well-circumscribed bilateral subcentimeter nodules of  the visible breast parenchyma supporting a benign process given the  morphology and multiplicity.     This report was signed and finalized on 7/9/2024 5:15 AM by Dr. Neelam Hogue MD.                     Assessment & Plan       Foreign Body Ingestion   Non-operative at this time as foreign bodies have continued to proceed through the small bowel. Plan for bowel regimen this evening to hopefully expedite passage of foreign bodies. Repeat KUB in AM to recheck location.       Miladys Biggs PA-C  07/09/24  18:16 CDT             Electronically signed by Camille Lam MD at 07/09/24 1828       Oscar Martínez MD at 07/09/24 1512            Neurology Consult Note    Consult Date: 7/9/2024  Referring MD: No ref. provider  "found  Reason for Consult: Seizure-like activity    Patient: Duane Cooley (28 y.o. female)  MRN: [id number]  : 1995    History of Present Illness:   Duane Cooley is a 28 y.o. female with a known history of seizure-like activity.  She was followed by [facility] neurology for quite some time.  [facility] neurology saw her last in 2023.  Below is an excerpt from that note:    This is a 27-year old female routinely cared for by Rosie Pagan MD, who has been followed by Declan Lira MD, at [facility] neurology for possible seizure disorder.  Patient has a known history of autism, is described in medical records as having a \"static encephalopathy,\" depression, and anxiety.     The patient was hospitalized at [facility] from 2023-2023 with what was initially felt to be status epilepticus, however, none of the screening or continuous EEG studies demonstrated any epileptiform activity.     Dr. Srinivas Acevedo MD, saw the patient on 2023 after the patient had been successfully extubated.  At that time, she was being managed with Keppra 1500 mg twice daily, Lamictal 200 mg in the morning, Dilantin 100 mg 3 times daily and Vimpat 100 mg twice daily.  Despite this, she was given multiple doses of Versed in the emergency room at the beginning of that admission leading to eventual intubation due to seizure-like activity.     Dr. Acevedo's assessment at that time was that \"at least some of the events, if not all appear to be pseudoseizures.\"  Recommendation was for the patient to then follow-up with Dr. Declan Lira MD, as an outpatient.     Unfortunately, the patient returned to the emergency room again at [facility] and was subsequently transferred to [facility] in [place], [place].  We have requested these records for review as well.     Yesterday, the patient apparently had a witnessed seizure-like event at home for which EMS was summoned.  They presented her to " [facility] this time for evaluation where she was felt to have signs of status epilepticus.  The patient was treated, however, with multiple doses of Valium and Versed by EMS prior to arrival.  Despite this, the patient was demonstrating convulsive-like activity eventually leading to intubation for airway protection.    The patient reportedly was discharged on January 26, 2023.  She was supposed to follow-up with Dr. Declan Lira for continued care of her seizure-like events.  There are no records that she ever followed up with him subsequent to that appointment.  [facility] reports.    She also has a known history of multiple developmental disorders including autism.  She has been prone to pathologically swallowing things in the past.  She presented to our medical facility late last night.  She was having jerking like episodes.  When the jerking activity stopped she reported to the medical staff that she may have swallowed a battery.  An x-ray of the abdomen showed 2 metallic foreign objects in the left mid abdomen GI was consulted and they recommended admitting for endoscopy.  She reportedly had an endoscopy this morning at around 09 30.  An endoscopy showed no signs of a foreign object.  After the endoscopy the patient demonstrated seizure-like activity and anesthesia intubated the patient.  She currently is in the intensive care unit awaiting a general surgery evaluation.  She is intubated and sedated.  No seizure activity is currently seen.    I was now able to speak to her mother by phone a Ms. Kelli Cooley.  She tells me that after I saw the patient in January 2023 that she elected not to follow-up with [facility] neurology.  She wanted another opinion.  She was seen by neurologist at [facility].  That neurologist said that she did not need to be on any of her antiepileptics.  By report she stopped all of her seizure meds and  "then started having multiple seizures after this.  She ended up at [facility].  The timeline is unclear because her mother tells me that was as recent as 1 month ago.  At [facility] they restarted her antiepileptics not because they believe that they were actual epileptic events but instead suggested a slower taper to avoid any seizure activity.  Her mother tells me that she often swallows things.  She has not done in several months but yesterday reported that she is swallowed either some watch batteries or several magnets.  It is unclear whether she takes her meds according to her mother.  I asked whether she has establish care with a neurologist.  She tells me that she has a new neurologist that she is scheduled with now in [facility] as she wants a \"second opinion.\"  That is despite her having 5-6 neurologic opinions prior to this that all pointed towards psychogenic nonepileptic spells.  Her mother does know that she does not have true epileptic events.  She calls the episodes \"spells.\"  She has been told that by multiple neurologist.  She tells me that when the patient has one of her spells if she will leave her alone long enough the patient will snap out of it.      Medical History:   Past Medical/Surgical Hx:  Past Medical History:   Diagnosis Date    Seizures      History reviewed. No pertinent surgical history.    Medications On Admission:  Medications Prior to Admission   Medication Sig Dispense Refill Last Dose    doxazosin (CARDURA) 1 MG tablet Take 1 tablet by mouth 2 (Two) Times a Day.       FLUoxetine (PROzac) 40 MG capsule Take 2 capsules by mouth Daily.       hydrOXYzine (ATARAX) 25 MG tablet Take 1 tablet by mouth 3 (Three) Times a Day As Needed for Itching.       lacosamide (VIMPAT) 100 MG tablet tablet Take 1 tablet by mouth 2 (Two) Times a Day.       lamoTRIgine (LaMICtal) 100 MG tablet Take 2.5 tablets by mouth Daily.   Patient Taking Differently    levETIRAcetam " (KEPPRA) 1000 MG tablet Take 1 tablet by mouth 2 (Two) Times a Day.       montelukast (SINGULAIR) 10 MG tablet Take 1 tablet by mouth Every Night.       OLANZapine zydis (zyPREXA) 5 MG disintegrating tablet Take 1 tablet by mouth 3 times a day.       pantoprazole (PROTONIX) 40 MG EC tablet Take 1 tablet by mouth Daily.       senna 8.6 MG tablet Take 2 tablets by mouth Daily.       acetaminophen (TYLENOL) 500 MG tablet Take 1 tablet by mouth Every 6 (Six) Hours As Needed for Mild Pain.       albuterol sulfate  (90 Base) MCG/ACT inhaler Inhale 2 puffs Every 4 (Four) Hours As Needed for Wheezing.       diazePAM (VALTOCO 15 MG DOSE NA) 15 mg into the nostril(s) as directed by provider As Needed (seizure lasting longer than 5 minutes).          Current Medications:    Current Facility-Administered Medications:     acetaminophen (TYLENOL) tablet 650 mg, 650 mg, Oral, Q4H PRN **OR** acetaminophen (TYLENOL) 160 MG/5ML oral solution 650 mg, 650 mg, Oral, Q4H PRN **OR** acetaminophen (TYLENOL) suppository 650 mg, 650 mg, Rectal, Q4H PRN, Eriberto Trejo MD    Chlorhexidine Gluconate Cloth 2 % pads 1 Application, 1 application , Topical, Once, Luisito Nguyen MD    [START ON 7/10/2024] Chlorhexidine Gluconate Cloth 2 % pads 1 Application, 1 Application, Topical, Q24H, Luisito Nguyen MD    dexmedetomidine (PRECEDEX) 400 mcg in 100 mL NS infusion, 0.2-1.5 mcg/kg/hr, Intravenous, Titrated, Dev Emanuel PA-C, Last Rate: 22.7 mL/hr at 07/09/24 1315, 1 mcg/kg/hr at 07/09/24 1315    lacosamide (VIMPAT) tablet 100 mg, 100 mg, Oral, BID, Eriberto Trejo MD    lactated ringers infusion, 20 mL/hr, Intravenous, Continuous, Vanita Draper MD, Last Rate: 20 mL/hr at 07/09/24 0858, Currently Infusing at 07/09/24 0858    lamoTRIgine (LaMICtal) tablet 250 mg, 250 mg, Oral, Daily, Eriberto Trejo MD    levETIRAcetam (KEPPRA) tablet 1,000 mg, 1,000 mg, Oral, BID, Eriberto Trejo MD     LORazepam (ATIVAN) injection 2 mg, 2 mg, Intravenous, Once PRN, Dev Emanuel PA-C    mupirocin (BACTROBAN) 2 % nasal ointment 1 Application, 1 application , Each Nare, BID, Luisito Nguyen MD    OLANZapine (zyPREXA) tablet 5 mg, 5 mg, Oral, TID, Eriberto Trejo MD    ondansetron (ZOFRAN) injection 4 mg, 4 mg, Intravenous, Q6H PRN, Dev Emanuel PA-C    propofol (DIPRIVAN) infusion 10 mg/mL 100 mL, 5-75 mcg/kg/min, Intravenous, Titrated, Dev Emanuel PA-C, Last Rate: 19.05 mL/hr at 07/09/24 1358, 35 mcg/kg/min at 07/09/24 1358    senna (SENOKOT) tablet 1 tablet, 1 tablet, Oral, Daily, Eriberto Trjeo MD    sodium chloride 0.9 % flush 10 mL, 10 mL, Intravenous, Q12H, Eriberto Trejo MD    sodium chloride 0.9 % flush 10 mL, 10 mL, Intravenous, PRN, Eriberto Trejo MD    sodium chloride 0.9 % infusion 40 mL, 40 mL, Intravenous, PRN, Eriberto Trejo MD    sodium chloride 0.9 % infusion, 100 mL/hr, Intravenous, Continuous, Lance Esposito DO    sodium chloride 0.9 % infusion, 100 mL/hr, Intravenous, Continuous, Vanita Draper MD     Allergies:  No Known Allergies    Social Hx:  Social History     Socioeconomic History    Marital status:    Tobacco Use    Smoking status: Unknown   Vaping Use    Vaping status: Never Used   Substance and Sexual Activity    Alcohol use: Defer    Drug use: Defer    Sexual activity: Defer       Family Hx:  History reviewed. No pertinent family history.  Physical Examination:   Vital Signs:  Vitals:    07/09/24 1103 07/09/24 1112 07/09/24 1118 07/09/24 1200   BP: 112/60  92/55 (!) 82/48   BP Location:    Right arm   Patient Position:    Lying   Pulse: 108 88 89 86   Resp: 18 18 16 18   Temp:   97.1 °F (36.2 °C) 98.1 °F (36.7 °C)   TempSrc:    Axillary   SpO2: 100% 100% 100% 100%   Weight:       Height:           Examination:  Intubated and sedated  Pupils equally reactive  No spontaneous awakening  No spontaneous  "movement  No rhythmic jerking activity    Recent Diagnostics:   Laboratory Results:   - Reviewed in EMR  Lab Results   Component Value Date    GLUCOSE 111 (H) 07/08/2024    CALCIUM 9.3 07/08/2024     07/08/2024    K 4.0 07/08/2024    CO2 22.0 07/08/2024     07/08/2024    BUN 6 07/08/2024    CREATININE 0.77 07/08/2024    BCR 7.8 07/08/2024    ANIONGAP 14.0 07/08/2024     Lab Results   Component Value Date    WBC 9.36 07/08/2024    HGB 9.6 (L) 07/08/2024    HCT 31.7 (L) 07/08/2024    MCV 73.7 (L) 07/08/2024     07/08/2024     No results found for: \"PTT\", \"INR\"  No results found for: \"CHOLTOT\", \"TRIG\", \"HDL\", \"LDL\"  No results found for: \"HGBA1C\"    Imaging Results:  Imaging Results (Last 24 Hours)       Procedure Component Value Units Date/Time    XR Chest 1 View [[id number]] Collected: 07/09/24 1121     Updated: 07/09/24 1125    Narrative:      EXAM: XR CHEST 1 VW-      DATE: 7/9/2024 10:09 AM     HISTORY: Confirm ET Tube Placement; T18.9XXA-Foreign body of alimentary  tract, part unspecified, initial encounter; R56.9-Unspecified  convulsions       COMPARISON: 7/9/2024 at 1:23 a.m.     TECHNIQUE:  Frontal view(s) of the chest submitted.     FINDINGS:    An ET tube has been placed with tip 4.2 cm above the elizabeth. Lungs are  grossly clear. No effusion or pneumothorax is seen. Heart and  mediastinum are unremarkable.          Impression:         1. ET tube placement with tip above the elizabeth and no active disease in  the chest.     This report was signed and finalized on 7/9/2024 11:22 AM by Collins Lam.       XR Chest 1 View [[id number]] Collected: 07/09/24 0551     Updated: 07/09/24 0556    Narrative:      EXAMINATION: XR CHEST 1 VW-     7/9/2024 12:29 AM     HISTORY: possible swallowed foreign body     A frontal projection of the chest is compared with the previous study  dated 2/1/2023.     The lungs are poorly expanded.     No radiopaque foreign in the chest and included proximal part of " the  abdomen is noted.     There is no evidence of recent infiltrate, pleural effusion, pulmonary  congestion or pneumothorax.     The heart size is in the normal range.     No acute bony abnormality.       Impression:      1. No radiopaque foreign body in the chest or limited visualized  abdomen.  2. Poor lung expansion. No active cardiopulmonary disease.     This report was signed and finalized on 7/9/2024 5:53 AM by Dr. Medina Braun MD.       XR Abdomen KUB [[id number]] Collected: 07/09/24 0548     Updated: 07/09/24 0554    Narrative:      EXAMINATION: XR ABDOMEN KUB-     7/9/2024 1:23 AM     HISTORY: possible ingested fb     A frontal projection of the abdomen is compared with the previous study  obtained earlier today.     Previously seen 2 adjacently located metallic foreign bodies in the left  midabdomen are reidentified. No significant change in position of the  foreign bodies. The anatomical location of these foreign objects is not  certain. This may be located in the dependent part of the fluid and food  filled distended stomach?.     There is moderate gas and stool in the colon. No evidence of dilatation  of the small or large bowel loops.     There is evidence of prior cholecystectomy.     There is no acute bony abnormality.       Impression:      1. 2 adjacent is located metallic foreign objects in the left mid  abdomen. The anatomical location of these objects is not certain.  Further follow-up may be obtained.  2. The remaining abdomen is unremarkable.              This report was signed and finalized on 7/9/2024 5:51 AM by Dr. Medina Braun MD.       XR Abdomen KUB [[id number]] Collected: 07/09/24 0541     Updated: 07/09/24 0551    Narrative:      EXAMINATION: XR ABDOMEN KUB-     7/9/2024 12:29 AM     HISTORY: possible swallowed foreign body     A frontal projection of the abdomen is compared with the previous study  dated 1/25/2023.     There are metallic objects projected over the mid  central and left  paracentral/lower abdomen which may represent the ingested foreign  bodies as suggested in the history. This may represent  nut an  bolt?. The exact anatomical location of these foreign bodies is not  certain. There may be an independent part of this fluid-filled stomach  are in the proximal to mid small bowel?. There is moderate gas and stool  in the colon. There is small amount of gas in the gastric fundus.     There is evidence of prior cholecystectomy.     Both kidneys are obscured by the bowel contents. No definite radiopaque  calculi.     There is no acute bony abnormality.       Impression:      1. 2 metallic object projected over the central mid and left lower  abdomen which may represent the swallowed foreign body as suggested in  the history. The anatomical location of the foreign body is not certain.           This report was signed and finalized on 7/9/2024 5:48 AM by Dr. Medina Braun MD.       CT Abdomen Pelvis Without Contrast [[id number]] Collected: 07/09/24 0449     Updated: 07/09/24 0518    Narrative:      CT ABDOMEN PELVIS WO CONTRAST- 7/9/2024 1:49 AM     HISTORY: possible ingested foreign body (battery)      COMPARISON: KUB 7/9/2024     TOTAL DOSE LENGTH PRODUCT: 495.19 mGy.cm. Automated exposure control was  also utilized to decrease patient radiation dose.     TECHNIQUE: Axial images of the abdomen and pelvis are performed without  IV contrast.     FINDINGS: There are few scattered subcentimeter smooth margin  well-circumscribed nodules/masses of the bilateral breast parenchyma  supporting a benign process given the morphology and multiplicity. The  visible lung bases are unremarkable.     The nonenhanced liver, spleen, pancreas, and adrenal glands are  unremarkable. Cholecystectomy clips. No focal renal contour abnormality.  No abnormal perinephric fluid collection. No hydronephrosis. Bladder is  intact. Uterus and ovaries are unremarkable.     There are two  foreign bodies within the upper abdomen. There is a  rounded 8 mm foreign body which may represent an ingested battery  positioned immediately right of a cylindrical rib 2.9 cm foreign body  with extensive streak artifact of uncertain etiology. These are  positioned along the posterior dependent portion of the distal stomach  (sagittal series 900 image 29 through 36). There is no free air to  suggest a perforation. Moderate fecal stasis. Normal appendix. No small  bowel dilatation. Fatty containing umbilical hernia. No pathologic  lymphadenopathy identified. Normal caliber abdominal aorta.     No focal destructive regional bony lesion.       Impression:      1. There are 2 foreign bodies present along the dependent posterior wall  of the distal stomach (8 mm rounded foreign body which may represent an  ingested battery with an adjacent 2.9 cm cylindrical ribbed foreign body  of uncertain etiology causing extensive streak artifact) without  pneumoperitoneum favoring a dependent intraluminal position within the  distal stomach. Moderate fecal stasis. No evidence for gastric or bowel  obstruction. Small fatty containing umbilical hernia. Prior  cholecystectomy.  2. Few scattered well-circumscribed bilateral subcentimeter nodules of  the visible breast parenchyma supporting a benign process given the  morphology and multiplicity.     This report was signed and finalized on 7/9/2024 5:15 AM by Dr. Neelam Hogue MD.                Other labs:  White count on admission was 9, hemoglobin 9, and platelets were 249 next  Metabolic panel on admission showed no electrolyte abnormalities  Ethanol level was negative  Phenytoin level was less than 0.8  Urine analysis showed moderate leukocyte Estrace  Urine drug screen was positive for benzodiazepines  Urine fentanyl was negative  Urine pregnancy was negative  Keppra level pending        Assessment & Plan:   Patient presenting with behavioral disturbances including likely  purposely swallowing foreign objects.  Seizure-like activity stopped in the ER.  A second spell of seizure-like activity was seen after the endoscopy.  I believe that based on her history these likely represent psychogenic nonepileptic spells.  The fact that she reports (per a binder that she carries with her) that she takes multiple antiepileptics is extremely concerning.  My plan at discharge was that she establish care again with an epileptologist to could wean these off.  She clearly has not done that here locally.  If she is taking her Dilantin then it is extremely concerning that her Dilantin level on admission was undetectable.  I recommend that she remain intubated and sedated while awaiting surgical consultation to see if she does require a surgery.  If she will not require surgery that it is completely reasonable to lower the sedation and extubate the patient immediately.  In the future, please avoid intubation for any seizure-like activity.    Impression:  Psychogenic nonepileptic spells  Possible swallowing of metallic objects the could potentially be a battery  Behavioral disorder      Plan:   No neurologic workup required  Continue all home doses of reported antiepileptics once the patient awakens  I highly recommend that she reestablish care with Dr. Declan Lira at [facility] neurology.  I attempted to dissuade the patient's mother to let the patient continue seeking multiple other opinions including the neurology appointment that she has scheduled at [facility].  I think she needs to be discontinued off of all antiepileptics.  I believe that the binder that she carries with her that list all of her medications and medical conditions needs to be corrected so that it reads that she has only psychogenic nonepileptic spells.  I believe that she does not need a rescue medicine even though she has a bag by her bedside that is listed as a rescue medication and has nasal benzodiazepines listed on her  home medication list.  She has a medical alert bracelet that she reportedly wears and is currently in her backpack that does read that she has epilepsy.  I recommend that this be discontinued immediately and that she receive another 1 that identifies her correctly is having psychogenic nonepileptic spells.  I believe continuing down this path of misinformation will lead her to unnecessary medical and surgical procedures including repeat intubations for psychogenic nonepileptic spells.    Oscar Martínez MD  07/09/24  15:12 CDT    Medical Decision Making    Number/Complexity of Problems  Moderate  1 undiagnosed new problem with uncertain prognosis -   1 acute illness with systemic symptoms -   High  1 acute or chronic illness that poses a threat to life/body function -   Highly complex     MDM Data  Moderate - 1/3 categories  Extensive - 2/3 categories    Category 1: 3 of the following  Review of external notes  Review of results  Ordering of each unique test  Independent historian  Category 2:  Independent interpretation of test (ex: imaging)  Category 3:  Discussion of management with another provider    Independent interpretation of testing and discussion of management with intensive care specialist     Treatment Plan  Moderate - Prescription Drug management  High  Drug therapy requiring intensive monitoring for toxicity  Decision regarding hospitalization or escalation of care  De-escalate care/DNR decisions  Reviewed drug therapy including drug levels for monitoring.          Electronically signed by Oscar Martínez MD at 07/09/24 1545       Tian Morris MD at 07/09/24 0812        Consult Orders    1. Inpatient Gastroenterology Consult [[id number]] ordered by Eriberto Trejo MD at 07/09/24 0759                         [facility] Gastroenterology  Inpatient Consult Note  Today's date:  07/09/24    Duane Cooley  1995       Referring Provider: No ref. provider found  Primary Physician:  "Rosie Olvera MD   Primary Gastroenterologist: unKnown    Date of Admission: 7/8/2024  Date of Service:  07/09/24    Reason for Consultation/Chief Complaint: Swallowed metallic foreign body    History of present illness: 28-year-old patient with autism and seizure disorder who presented with a seizure disorder per the emergency room staff.  Apparently when she woke up after the seizure she stated she may have swallowed some metal objects and a CT scan suggested a round metal object and a 2.5 cm elongated ribbed object in the distal stomach.  There is a possibility that 1 of these is a battery and 1 of these is a magnet per history.  The patient is nonverbal and unable to give any history.  She does not relate any history of pain.  There has been no melena, hematemesis or hematochezia per chart review.  She was chewing on a plastic cylindrical object and texted that this was a \"sensory chew\".    Past Medical History:   Diagnosis Date    Seizures        History reviewed. No pertinent surgical history.     No Known Allergies    (Not in a hospital admission)      Hospital Medications (active)         Dose Frequency Start End    acetaminophen (TYLENOL) 160 MG/5ML oral solution 650 mg 650 mg Every 4 Hours PRN 7/9/2024 --    Admin Instructions: If given for fever, use fever parameter: fever greater than 100.4 °F  Based on patient request - if ordered for moderate or severe pain, provider allows for administration of a medication prescribed for a lower pain scale.    Do not exceed 4 grams of acetaminophen in a 24 hr period. Max dose of 2gm for AST/ALT greater than 120 units/L.    If given for pain, use the following pain scale:   Mild Pain = Pain Score of 1-3, CPOT 1-2  Moderate Pain = Pain Score of 4-6, CPOT 3-4  Severe Pain = Pain Score of 7-10, CPOT 5-8    Route: Oral    Linked Group 1: Placed in \"Or\" Linked Group        acetaminophen (TYLENOL) suppository 650 mg 650 mg Every 4 Hours PRN 7/9/2024 --    " "Admin Instructions: If given for fever, use fever parameter: fever greater than 100.4 °F  Based on patient request - if ordered for moderate or severe pain, provider allows for administration of a medication prescribed for a lower pain scale.    Do not exceed 4 grams of acetaminophen in a 24 hr period. Max dose of 2gm for AST/ALT greater than 120 units/L.    If given for pain, use the following pain scale:   Mild Pain = Pain Score of 1-3, CPOT 1-2  Moderate Pain = Pain Score of 4-6, CPOT 3-4  Severe Pain = Pain Score of 7-10, CPOT 5-8    Route: Rectal    Linked Group 1: Placed in \"Or\" Linked Group        acetaminophen (TYLENOL) tablet 650 mg 650 mg Every 4 Hours PRN 7/9/2024 --    Admin Instructions: If given for fever, use fever parameter: fever greater than 100.4 °F  Based on patient request - if ordered for moderate or severe pain, provider allows for administration of a medication prescribed for a lower pain scale.    Do not exceed 4 grams of acetaminophen in a 24 hr period. Max dose of 2gm for AST/ALT greater than 120 units/L.    If given for pain, use the following pain scale:   Mild Pain = Pain Score of 1-3, CPOT 1-2  Moderate Pain = Pain Score of 4-6, CPOT 3-4  Severe Pain = Pain Score of 7-10, CPOT 5-8    Route: Oral    Linked Group 1: Placed in \"Or\" Linked Group        lacosamide (VIMPAT) tablet 100 mg 100 mg 2 Times Daily 7/9/2024 --    Admin Instructions: Unknown    Route: Oral    lamoTRIgine (LaMICtal) tablet 250 mg 250 mg Daily 7/9/2024 --    Admin Instructions: Caution: Look alike/sound alike drug alert    Route: Oral    levETIRAcetam (KEPPRA) tablet 1,000 mg 1,000 mg 2 Times Daily 7/9/2024 --    Admin Instructions: Mucous membrane irritant. Do not crush or chew tablet or capsule unless administered through a feeding tube.  For tube route administration, disperse crushed tablets in 10 mL of water, shake for 5 minutes to dissolve, and administer immediately via enteral feeding tube.    Caution: Look " alike/sound alike drug alert.    Notes to Pharmacy: For tube route administration disperse crushed tablets in 10 mL of water, shake for 5 minutes to dissolve, and administer immediately via enteral feeding tube.    Route: Oral    OLANZapine (zyPREXA) tablet 5 mg 5 mg 3 times daily 7/9/2024 --    Admin Instructions: Caution: Look alike/sound alike drug alert    Route: Oral    senna (SENOKOT) tablet 1 tablet 1 tablet Daily 7/9/2024 --    Route: Oral    sodium chloride 0.9 % flush 10 mL 10 mL Every 12 Hours Scheduled 7/9/2024 --    Route: Intravenous    sodium chloride 0.9 % flush 10 mL 10 mL As Needed 7/9/2024 --    Route: Intravenous    sodium chloride 0.9 % infusion 40 mL 40 mL As Needed 7/9/2024 --    Admin Instructions: Following administration of an IV intermittent medication, flush line with 40mL NS at 100mL/hr.    Route: Intravenous    sodium chloride 0.9 % infusion 100 mL/hr Continuous 7/9/2024 --    Route: Intravenous            Social History     Tobacco Use    Smoking status: Unknown    Smokeless tobacco: Not on file   Substance Use Topics    Alcohol use: Defer        Past Family History:  History reviewed. No pertinent family history.    Review of Systems:  Constitutional: No unexpected weight change, no fatigue, no unexplained fever, no sweats or chills.   HEENT: No icteric sclera.  No hearing or visual deficits.  No sore throat.  No chronic nasal discharge.  Pulmonary: No chronic cough.  No hemoptysis.  No shortness of breath.  Cardiovascular: No chest pain.  No palpitations.  No shortness of breath.  Gastrointestinal: As above.  Musculoskeletal/extremities: No peripheral edema.  No cyanosis.  No claudications.  No back pain.  Genitourinary: No dysuria.  No blood in stool.  No urethral discharges.  Neurologic: No seizures.  No headaches.  No dizziness.  No gait problems.  Skin: No rash.  No icterus.  Mental: No psychosis.  No confusions.  No hallucinations.      Physical Exam:  Temp:  [98.4 °F (36.9 °C)]  98.4 °F (36.9 °C)  Heart Rate:  [] 89  Resp:  [15-20] 18  BP: ()/(61-89) 102/89  Body mass index is 30.41 kg/m².    Intake/Output Summary (Last 24 hours) at 7/9/2024 0814  Last data filed at 7/9/2024 0154  Gross per 24 hour   Intake 1000 ml   Output --   Net 1000 ml     No intake/output data recorded.    General appearance:   HEENT: Nonicteric sclerae.  Moist oral mucosa.  PERRLA.  EOMI.  Clear pharynx.  Lungs: Clear to auscultation bilaterally.  No wheezing, rales or rhonchi.  Heart: Regular rate and rhythm.  Normal S1 and S2, no S3, S4 or murmur.  Abdomen: Soft, nondistended, nontender to palpation, with normoactive bowel sounds, no hepatosplenomegaly, no palpable masses.  Extremities: No cyanosis, edema or pulse deficits.  Skin: No rash or jaundice.    Results Review:  Lab Results (last 24 hours)       Procedure Component Value Units Date/Time    Urinalysis With Microscopic If Indicated (No Culture) - Urine, Clean Catch [[id number]]  (Abnormal) Collected: 07/09/24 0141    Specimen: Urine, Clean Catch Updated: 07/09/24 0228     Color, UA Yellow     Appearance, UA Clear     pH, UA 8.0     Specific Gravity, UA 1.010     Glucose, UA Negative     Ketones, UA Negative     Bilirubin, UA Negative     Blood, UA Negative     Protein, UA Negative     Leuk Esterase, UA Moderate (2+)     Nitrite, UA Negative     Urobilinogen, UA 0.2 E.U./dL    Urinalysis, Microscopic Only - Urine, Clean Catch [[id number]]  (Abnormal) Collected: 07/09/24 0141    Specimen: Urine, Clean Catch Updated: 07/09/24 0228     RBC, UA None Seen /HPF      WBC, UA 3-5 /HPF      Bacteria, UA Trace /HPF      Squamous Epithelial Cells, UA 0-2 /HPF      Hyaline Casts, UA None Seen /LPF      Methodology Manual Light Microscopy    Fentanyl, Urine - Urine, Clean Catch [[id number]]  (Normal) Collected: 07/09/24 0141    Specimen: Urine, Clean Catch Updated: 07/09/24 0226     Fentanyl, Urine Negative    Narrative:      Negative Threshold:      Fentanyl 5  ng/mL     The normal value for the drug tested is negative. This report includes final unconfirmed screening results to be used for medical treatment purposes only. Unconfirmed results must not be used for non-medical purposes such as employment or legal testing. Clinical consideration should be applied to any drug of abuse test, particularly when unconfirmed results are used.           Urine Drug Screen - Urine, Clean Catch [[id number]]  (Abnormal) Collected: 07/09/24 0141    Specimen: Urine, Clean Catch Updated: 07/09/24 0225     THC, Screen, Urine Negative     Phencyclidine (PCP), Urine Negative     Cocaine Screen, Urine Negative     Methamphetamine, Ur Negative     Opiate Screen Negative     Amphetamine Screen, Urine Negative     Benzodiazepine Screen, Urine Positive     Tricyclic Antidepressants Screen Negative     Methadone Screen, Urine Negative     Barbiturates Screen, Urine Negative     Oxycodone Screen, Urine Negative     Buprenorphine, Screen, Urine Negative    Narrative:      Cutoff For Drugs Screened:    Amphetamines               500 ng/ml  Barbiturates               200 ng/ml  Benzodiazepines            150 ng/ml  Cocaine                    150 ng/ml  Methadone                  200 ng/ml  Opiates                    100 ng/ml  Phencyclidine               25 ng/ml  THC                         50 ng/ml  Methamphetamine            500 ng/ml  Tricyclic Antidepressants  300 ng/ml  Oxycodone                  100 ng/ml  Buprenorphine               10 ng/ml    The normal value for all drugs tested is negative. This report includes unconfirmed screening results, with the cutoff values listed, to be used for medical treatment purposes only.  Unconfirmed results must not be used for non-medical purposes such as employment or legal testing.  Clinical consideration should be applied to any drug of abuse test, particularly when unconfirmed results are used.      Levetiracetam Level (Keppra) [[id number]] Collected:  07/09/24 0154    Specimen: Blood Updated: 07/09/24 0202    Phenytoin Level, Total [[id number]]  (Abnormal) Collected: 07/08/24 2350    Specimen: Blood Updated: 07/09/24 0052     Phenytoin Level <0.8 mcg/mL     Comprehensive Metabolic Panel [[id number]]  (Abnormal) Collected: 07/08/24 2350    Specimen: Blood Updated: 07/09/24 0047     Glucose 111 mg/dL      BUN 6 mg/dL      Creatinine 0.77 mg/dL      Sodium 139 mmol/L      Potassium 4.0 mmol/L      Chloride 103 mmol/L      CO2 22.0 mmol/L      Calcium 9.3 mg/dL      Total Protein 7.6 g/dL      Albumin 4.1 g/dL      ALT (SGPT) 12 U/L      AST (SGOT) 14 U/L      Alkaline Phosphatase 99 U/L      Total Bilirubin <0.2 mg/dL      Globulin 3.5 gm/dL      A/G Ratio 1.2 g/dL      BUN/Creatinine Ratio 7.8     Anion Gap 14.0 mmol/L      eGFR 108.6 mL/min/1.73     Narrative:      GFR Normal >60  Chronic Kidney Disease <60  Kidney Failure <15      Lipase [[id number]]  (Normal) Collected: 07/08/24 2350    Specimen: Blood Updated: 07/09/24 0047     Lipase 15 U/L     Ethanol [[id number]] Collected: 07/08/24 2350    Specimen: Blood Updated: 07/09/24 0047     Ethanol % <0.010 %     Narrative:      Not for legal purposes. Chain of Custody not followed.     Manual Differential [[id number]]  (Abnormal) Collected: 07/08/24 2350    Specimen: Blood Updated: 07/09/24 0040     Neutrophil % 81.5 %      Lymphocyte % 12.9 %      Monocyte % 4.8 %      Eosinophil % 0.0 %      Basophil % 0.8 %      Neutrophils Absolute 7.63 10*3/mm3      Lymphocytes Absolute 1.21 10*3/mm3      Monocytes Absolute 0.45 10*3/mm3      Eosinophils Absolute 0.00 10*3/mm3      Basophils Absolute 0.07 10*3/mm3      Anisocytosis Slight/1+     Microcytes Slight/1+     Poikilocytes Slight/1+     Polychromasia Slight/1+     WBC Morphology Normal     Giant Platelets Slight/1+    CBC & Differential [[id number]]  (Abnormal) Collected: 07/08/24 2350    Specimen: Blood Updated: 07/09/24 0036    Narrative:      The following orders were  created for panel order CBC & Differential.  Procedure                               Abnormality         Status                     ---------                               -----------         ------                     CBC Auto Differential[[id number]]        Abnormal            Final result                 Please view results for these tests on the individual orders.    CBC Auto Differential [[id number]]  (Abnormal) Collected: 07/08/24 2350    Specimen: Blood Updated: 07/09/24 0036     WBC 9.36 10*3/mm3      RBC 4.30 10*6/mm3      Hemoglobin 9.6 g/dL      Hematocrit 31.7 %      MCV 73.7 fL      MCH 22.3 pg      MCHC 30.3 g/dL      RDW 17.2 %      RDW-SD 45.6 fl      MPV 11.7 fL      Platelets 249 10*3/mm3      nRBC 0.0 /100 WBC     [place] Draw [[id number]] Collected: 07/08/24 2350    Specimen: Blood Updated: 07/09/24 0016    Narrative:      The following orders were created for panel order [place] Draw.  Procedure                               Abnormality         Status                     ---------                               -----------         ------                     Green Top (Gel)[[id number]]                                  Final result               Lavender Top[[id number]]                                     Final result               Red Top[[id number]]                                          Final result               Gray Top[[id number]]                                         Final result               Light Blue Top[[id number]]                                   Final result                 Please view results for these tests on the individual orders.    Gordon Top [[id number]] Collected: 07/09/24 0000    Specimen: Blood from Arm, Right Updated: 07/09/24 0016     Extra Tube Hold for add-ons.     Comment: Auto resulted.       Light Blue Top [[id number]] Collected: 07/09/24 0000    Specimen: Blood from Arm, Right Updated: 07/09/24 0016     Extra Tube Hold for add-ons.     Comment: Auto resulted       Green Top  (Gel) [[id number]] Collected: 07/08/24 2350    Specimen: Blood Updated: 07/09/24 0001     Extra Tube Hold for add-ons.     Comment: Auto resulted.       Lavender Top [[id number]] Collected: 07/08/24 2350    Specimen: Blood Updated: 07/09/24 0001     Extra Tube hold for add-on     Comment: Auto resulted       Red Top [[id number]] Collected: 07/08/24 2350    Specimen: Blood Updated: 07/09/24 0001     Extra Tube Hold for add-ons.     Comment: Auto resulted.               Radiology Review:  Imaging Results (Last 72 Hours)       Procedure Component Value Units Date/Time    XR Chest 1 View [[id number]] Collected: 07/09/24 0551     Updated: 07/09/24 0556    Narrative:      EXAMINATION: XR CHEST 1 VW-     7/9/2024 12:29 AM     HISTORY: possible swallowed foreign body     A frontal projection of the chest is compared with the previous study  dated 2/1/2023.     The lungs are poorly expanded.     No radiopaque foreign in the chest and included proximal part of the  abdomen is noted.     There is no evidence of recent infiltrate, pleural effusion, pulmonary  congestion or pneumothorax.     The heart size is in the normal range.     No acute bony abnormality.       Impression:      1. No radiopaque foreign body in the chest or limited visualized  abdomen.  2. Poor lung expansion. No active cardiopulmonary disease.     This report was signed and finalized on 7/9/2024 5:53 AM by Dr. Medina Braun MD.       XR Abdomen KUB [[id number]] Collected: 07/09/24 0548     Updated: 07/09/24 0554    Narrative:      EXAMINATION: XR ABDOMEN KUB-     7/9/2024 1:23 AM     HISTORY: possible ingested fb     A frontal projection of the abdomen is compared with the previous study  obtained earlier today.     Previously seen 2 adjacently located metallic foreign bodies in the left  midabdomen are reidentified. No significant change in position of the  foreign bodies. The anatomical location of these foreign objects is not  certain. This may be  located in the dependent part of the fluid and food  filled distended stomach?.     There is moderate gas and stool in the colon. No evidence of dilatation  of the small or large bowel loops.     There is evidence of prior cholecystectomy.     There is no acute bony abnormality.       Impression:      1. 2 adjacent is located metallic foreign objects in the left mid  abdomen. The anatomical location of these objects is not certain.  Further follow-up may be obtained.  2. The remaining abdomen is unremarkable.              This report was signed and finalized on 7/9/2024 5:51 AM by Dr. Medina Braun MD.       XR Abdomen KUB [[id number]] Collected: 07/09/24 0541     Updated: 07/09/24 0551    Narrative:      EXAMINATION: XR ABDOMEN KUB-     7/9/2024 12:29 AM     HISTORY: possible swallowed foreign body     A frontal projection of the abdomen is compared with the previous study  dated 1/25/2023.     There are metallic objects projected over the mid central and left  paracentral/lower abdomen which may represent the ingested foreign  bodies as suggested in the history. This may represent  nut an  bolt?. The exact anatomical location of these foreign bodies is not  certain. There may be an independent part of this fluid-filled stomach  are in the proximal to mid small bowel?. There is moderate gas and stool  in the colon. There is small amount of gas in the gastric fundus.     There is evidence of prior cholecystectomy.     Both kidneys are obscured by the bowel contents. No definite radiopaque  calculi.     There is no acute bony abnormality.       Impression:      1. 2 metallic object projected over the central mid and left lower  abdomen which may represent the swallowed foreign body as suggested in  the history. The anatomical location of the foreign body is not certain.           This report was signed and finalized on 7/9/2024 5:48 AM by Dr. Medina Braun MD.       CT Abdomen Pelvis Without  Contrast [[id number]] Collected: 07/09/24 0449     Updated: 07/09/24 0518    Narrative:      CT ABDOMEN PELVIS WO CONTRAST- 7/9/2024 1:49 AM     HISTORY: possible ingested foreign body (battery)      COMPARISON: KUB 7/9/2024     TOTAL DOSE LENGTH PRODUCT: 495.19 mGy.cm. Automated exposure control was  also utilized to decrease patient radiation dose.     TECHNIQUE: Axial images of the abdomen and pelvis are performed without  IV contrast.     FINDINGS: There are few scattered subcentimeter smooth margin  well-circumscribed nodules/masses of the bilateral breast parenchyma  supporting a benign process given the morphology and multiplicity. The  visible lung bases are unremarkable.     The nonenhanced liver, spleen, pancreas, and adrenal glands are  unremarkable. Cholecystectomy clips. No focal renal contour abnormality.  No abnormal perinephric fluid collection. No hydronephrosis. Bladder is  intact. Uterus and ovaries are unremarkable.     There are two foreign bodies within the upper abdomen. There is a  rounded 8 mm foreign body which may represent an ingested battery  positioned immediately right of a cylindrical rib 2.9 cm foreign body  with extensive streak artifact of uncertain etiology. These are  positioned along the posterior dependent portion of the distal stomach  (sagittal series 900 image 29 through 36). There is no free air to  suggest a perforation. Moderate fecal stasis. Normal appendix. No small  bowel dilatation. Fatty containing umbilical hernia. No pathologic  lymphadenopathy identified. Normal caliber abdominal aorta.     No focal destructive regional bony lesion.       Impression:      1. There are 2 foreign bodies present along the dependent posterior wall  of the distal stomach (8 mm rounded foreign body which may represent an  ingested battery with an adjacent 2.9 cm cylindrical ribbed foreign body  of uncertain etiology causing extensive streak artifact) without  pneumoperitoneum favoring a  dependent intraluminal position within the  distal stomach. Moderate fecal stasis. No evidence for gastric or bowel  obstruction. Small fatty containing umbilical hernia. Prior  cholecystectomy.  2. Few scattered well-circumscribed bilateral subcentimeter nodules of  the visible breast parenchyma supporting a benign process given the  morphology and multiplicity.     This report was signed and finalized on 7/9/2024 5:15 AM by Dr. Neelam Hogue MD.               Impression/Plan:    Foreign body ingestion    Pseudoseizures      Distal gastric metallic foreign bodies on CT scan.  1 apparently a button battery and the other a magnet per history.  They should be removed promptly as there is increased risk for perforation with magnets and association with button batteries in the GI tract.  If we are unable to remove these objects endoscopically the patient will need a general surgical consultation for removal.  We will schedule the patient for an upper endoscopy with attempt at gastric foreign body removal and agree with maintaining the patient n.p.o. at this time.  The patient will need appropriate consent signed for the procedure from the patient's power of  or direct caregiver.  The above was discussed in detail with the emergency room staff caring for the patient.  Thank you    Tian Morris MD  07/09/24   08:14 CDT    DISCLAIMER:    This physician works through a [employer] as an inpatient consultant gastroenterologist only and has no outpatient clinic for patient follow up.  Any results not available at time of inpatient discharge and/or GI clinic follow up should be managed by the hospitalist team, PCP, or outpatient gastroenterologist.       Electronically signed by Tian Morris MD at 07/09/24 0819

## 2024-07-11 ENCOUNTER — APPOINTMENT (OUTPATIENT)
Dept: GENERAL RADIOLOGY | Facility: HOSPITAL | Age: 29
End: 2024-07-11
Payer: MEDICAID

## 2024-07-11 LAB
ARTERIAL PATENCY WRIST A: POSITIVE
ATMOSPHERIC PRESS: 753 MMHG
BASE EXCESS BLDA CALC-SCNC: 0.5 MMOL/L (ref 0–2)
BDY SITE: ABNORMAL
BODY TEMPERATURE: 37
GLUCOSE BLDC GLUCOMTR-MCNC: 78 MG/DL (ref 70–130)
HCO3 BLDA-SCNC: 24.7 MMOL/L (ref 20–26)
INHALED O2 CONCENTRATION: 30 %
LEVETIRACETAM SERPL-MCNC: 41.8 UG/ML (ref 10–40)
Lab: ABNORMAL
MODALITY: ABNORMAL
PCO2 BLDA: 37.3 MM HG (ref 35–45)
PCO2 TEMP ADJ BLD: 37.3 MM HG (ref 35–45)
PEEP RESPIRATORY: 5 CM[H2O]
PH BLDA: 7.43 PH UNITS (ref 7.35–7.45)
PH, TEMP CORRECTED: 7.43 PH UNITS (ref 7.35–7.45)
PO2 BLD: 197 MM[HG] (ref 0–500)
PO2 BLDA: 59 MM HG (ref 83–108)
PO2 TEMP ADJ BLD: 59 MM HG (ref 83–108)
SAO2 % BLDCOA: 93.8 % (ref 94–99)
SET MECH RESP RATE: 18
VENTILATOR MODE: AC
VT ON VENT VENT: 450 ML

## 2024-07-11 PROCEDURE — 94761 N-INVAS EAR/PLS OXIMETRY MLT: CPT

## 2024-07-11 PROCEDURE — 99232 SBSQ HOSP IP/OBS MODERATE 35: CPT | Performed by: STUDENT IN AN ORGANIZED HEALTH CARE EDUCATION/TRAINING PROGRAM

## 2024-07-11 PROCEDURE — 25010000002 MIDAZOLAM PER 1MG: Performed by: NURSE PRACTITIONER

## 2024-07-11 PROCEDURE — 82948 REAGENT STRIP/BLOOD GLUCOSE: CPT

## 2024-07-11 PROCEDURE — 94799 UNLISTED PULMONARY SVC/PX: CPT

## 2024-07-11 PROCEDURE — 82803 BLOOD GASES ANY COMBINATION: CPT

## 2024-07-11 PROCEDURE — 25010000002 METOCLOPRAMIDE PER 10 MG: Performed by: STUDENT IN AN ORGANIZED HEALTH CARE EDUCATION/TRAINING PROGRAM

## 2024-07-11 PROCEDURE — 25010000002 LORAZEPAM PER 2 MG: Performed by: PHYSICIAN ASSISTANT

## 2024-07-11 PROCEDURE — 36600 WITHDRAWAL OF ARTERIAL BLOOD: CPT

## 2024-07-11 PROCEDURE — 99231 SBSQ HOSP IP/OBS SF/LOW 25: CPT | Performed by: PSYCHIATRY & NEUROLOGY

## 2024-07-11 PROCEDURE — 94003 VENT MGMT INPAT SUBQ DAY: CPT

## 2024-07-11 PROCEDURE — 25810000003 SODIUM CHLORIDE 0.9 % SOLUTION: Performed by: ANESTHESIOLOGY

## 2024-07-11 PROCEDURE — 25010000002 PROPOFOL 10 MG/ML EMULSION: Performed by: PHYSICIAN ASSISTANT

## 2024-07-11 PROCEDURE — 74018 RADEX ABDOMEN 1 VIEW: CPT

## 2024-07-11 RX ORDER — LACTULOSE 20 G/30ML
20 SOLUTION ORAL 3 TIMES DAILY
Status: DISCONTINUED | OUTPATIENT
Start: 2024-07-11 | End: 2024-07-12 | Stop reason: HOSPADM

## 2024-07-11 RX ORDER — MIDAZOLAM HYDROCHLORIDE 2 MG/2ML
2 INJECTION, SOLUTION INTRAMUSCULAR; INTRAVENOUS ONCE
Status: COMPLETED | OUTPATIENT
Start: 2024-07-11 | End: 2024-07-11

## 2024-07-11 RX ORDER — AMINO AC/PROTEIN HYDR/WHEY PRO 10G-100/30
45 LIQUID (ML) ORAL 2 TIMES DAILY
Status: DISCONTINUED | OUTPATIENT
Start: 2024-07-11 | End: 2024-07-12 | Stop reason: HOSPADM

## 2024-07-11 RX ADMIN — POLYETHYLENE GLYCOL 3350, SODIUM SULFATE ANHYDROUS, SODIUM BICARBONATE, SODIUM CHLORIDE, POTASSIUM CHLORIDE 2000 ML: 236; 22.74; 6.74; 5.86; 2.97 POWDER, FOR SOLUTION ORAL at 21:04

## 2024-07-11 RX ADMIN — PROPOFOL INJECTABLE EMULSION 75 MCG/KG/MIN: 10 INJECTION, EMULSION INTRAVENOUS at 20:22

## 2024-07-11 RX ADMIN — PANTOPRAZOLE SODIUM 40 MG: 40 INJECTION, POWDER, FOR SOLUTION INTRAVENOUS at 08:37

## 2024-07-11 RX ADMIN — Medication 10 ML: at 20:45

## 2024-07-11 RX ADMIN — OLANZAPINE 5 MG: 5 TABLET, FILM COATED ORAL at 16:11

## 2024-07-11 RX ADMIN — SENNOSIDES 1 TABLET: 8.6 TABLET, FILM COATED ORAL at 08:37

## 2024-07-11 RX ADMIN — LORAZEPAM 2 MG: 2 INJECTION, SOLUTION INTRAMUSCULAR; INTRAVENOUS at 14:29

## 2024-07-11 RX ADMIN — CHLORHEXIDINE GLUCONATE 1 APPLICATION: 500 CLOTH TOPICAL at 04:07

## 2024-07-11 RX ADMIN — MIDAZOLAM HYDROCHLORIDE 2 MG: 1 INJECTION, SOLUTION INTRAMUSCULAR; INTRAVENOUS at 22:00

## 2024-07-11 RX ADMIN — POLYETHYLENE GLYCOL 3350 17 G: 17 POWDER, FOR SOLUTION ORAL at 08:37

## 2024-07-11 RX ADMIN — PROPOFOL INJECTABLE EMULSION 60 MCG/KG/MIN: 10 INJECTION, EMULSION INTRAVENOUS at 05:55

## 2024-07-11 RX ADMIN — Medication 1 APPLICATION: at 08:37

## 2024-07-11 RX ADMIN — Medication 45 ML: at 20:42

## 2024-07-11 RX ADMIN — PROPOFOL INJECTABLE EMULSION 60 MCG/KG/MIN: 10 INJECTION, EMULSION INTRAVENOUS at 01:11

## 2024-07-11 RX ADMIN — METOCLOPRAMIDE HYDROCHLORIDE 10 MG: 5 INJECTION INTRAMUSCULAR; INTRAVENOUS at 00:18

## 2024-07-11 RX ADMIN — METOCLOPRAMIDE HYDROCHLORIDE 10 MG: 5 INJECTION INTRAMUSCULAR; INTRAVENOUS at 05:55

## 2024-07-11 RX ADMIN — METOCLOPRAMIDE HYDROCHLORIDE 10 MG: 5 INJECTION INTRAMUSCULAR; INTRAVENOUS at 17:36

## 2024-07-11 RX ADMIN — CHLORHEXIDINE GLUCONATE 15 ML: 1.2 RINSE ORAL at 20:41

## 2024-07-11 RX ADMIN — CHLORHEXIDINE GLUCONATE 15 ML: 1.2 RINSE ORAL at 08:37

## 2024-07-11 RX ADMIN — LACTULOSE 20 G: 20 SOLUTION ORAL at 20:41

## 2024-07-11 RX ADMIN — Medication 10 ML: at 08:38

## 2024-07-11 RX ADMIN — PROPOFOL INJECTABLE EMULSION 65 MCG/KG/MIN: 10 INJECTION, EMULSION INTRAVENOUS at 22:55

## 2024-07-11 RX ADMIN — LACTULOSE 20 G: 20 SOLUTION ORAL at 11:03

## 2024-07-11 RX ADMIN — DEXMEDETOMIDINE HYDROCHLORIDE 0.4 MCG/KG/HR: 4 INJECTION, SOLUTION INTRAVENOUS at 11:03

## 2024-07-11 RX ADMIN — PROPOFOL INJECTABLE EMULSION 65 MCG/KG/MIN: 10 INJECTION, EMULSION INTRAVENOUS at 14:41

## 2024-07-11 RX ADMIN — FLUOXETINE HYDROCHLORIDE 80 MG: 20 CAPSULE ORAL at 08:37

## 2024-07-11 RX ADMIN — TERAZOSIN HYDROCHLORIDE 1 MG: 1 CAPSULE ORAL at 20:41

## 2024-07-11 RX ADMIN — LACTULOSE 20 G: 20 SOLUTION ORAL at 16:11

## 2024-07-11 RX ADMIN — DEXMEDETOMIDINE HYDROCHLORIDE 0.6 MCG/KG/HR: 4 INJECTION, SOLUTION INTRAVENOUS at 03:31

## 2024-07-11 RX ADMIN — SODIUM CHLORIDE 100 ML/HR: 9 INJECTION, SOLUTION INTRAVENOUS at 14:31

## 2024-07-11 RX ADMIN — SODIUM CHLORIDE 100 ML/HR: 9 INJECTION, SOLUTION INTRAVENOUS at 04:13

## 2024-07-11 RX ADMIN — PROPOFOL INJECTABLE EMULSION 60 MCG/KG/MIN: 10 INJECTION, EMULSION INTRAVENOUS at 03:22

## 2024-07-11 RX ADMIN — OLANZAPINE 5 MG: 5 TABLET, FILM COATED ORAL at 20:41

## 2024-07-11 RX ADMIN — PROPOFOL INJECTABLE EMULSION 65 MCG/KG/MIN: 10 INJECTION, EMULSION INTRAVENOUS at 17:36

## 2024-07-11 RX ADMIN — OLANZAPINE 5 MG: 5 TABLET, FILM COATED ORAL at 08:37

## 2024-07-11 RX ADMIN — Medication 1 APPLICATION: at 20:41

## 2024-07-11 RX ADMIN — DEXMEDETOMIDINE HYDROCHLORIDE 0.4 MCG/KG/HR: 4 INJECTION, SOLUTION INTRAVENOUS at 21:26

## 2024-07-11 RX ADMIN — PROPOFOL INJECTABLE EMULSION 60 MCG/KG/MIN: 10 INJECTION, EMULSION INTRAVENOUS at 11:53

## 2024-07-11 RX ADMIN — MONTELUKAST SODIUM 10 MG: 10 TABLET, FILM COATED ORAL at 20:41

## 2024-07-11 RX ADMIN — METOCLOPRAMIDE HYDROCHLORIDE 10 MG: 5 INJECTION INTRAMUSCULAR; INTRAVENOUS at 11:03

## 2024-07-11 RX ADMIN — PROPOFOL INJECTABLE EMULSION 60 MCG/KG/MIN: 10 INJECTION, EMULSION INTRAVENOUS at 08:56

## 2024-07-11 NOTE — PLAN OF CARE
Goal Outcome Evaluation:         Pt still intubated for protection of the airway. A smear BM today, no foreign objects. Pt remains in restraints. VSS. Safety maintained.

## 2024-07-11 NOTE — PROGRESS NOTES
[facility] Intensivist Services  Progress Note    Date of Admission: 7/8/2024  Primary Care Physician: Rosie Olvera MD    Subjective     Chief Complaint: Foreign body ingestion     History of Present Illness  This is a 28-year-old lady with past medical history of autism, seizure seizures who presented to the emergency room with jerking movement and she ended up intubated    The patient also has swallowed 2 foreign bodies    GI was consulted and the plan was to wait for this foreign body to pass through stool however due to her pseudoseizures she was intubated and transferred to the ICU in the ICU she was kept intubated and sedated        Review of Systems   Otherwise complete ROS reviewed and negative except as mentioned in the HPI.    Past Medical History:   Past Medical History:   Diagnosis Date    Seizures      Past Surgical History:  Past Surgical History:   Procedure Laterality Date    ENDOSCOPY N/A 7/9/2024    Procedure: ESOPHAGOGASTRODUODENOSCOPY WITH ANESTHESIA;  Surgeon: Tian Morris MD;  Location: [facility];  Service: Gastroenterology;  Laterality: N/A;  pre foreign body in stomach  post foreign body passed out of stomach  dr kaiden houston     Social History: Alcohol use questions deferred to the physician. Drug use questions deferred to the physician.    Family History: family history is not on file.     Allergies:  No Known Allergies    Medications:  Prior to Admission medications    Medication Sig Start Date End Date Taking? Authorizing Provider   doxazosin (CARDURA) 1 MG tablet Take 1 tablet by mouth 2 (Two) Times a Day.   Yes Rayray Jean MD   FLUoxetine (PROzac) 40 MG capsule Take 2 capsules by mouth Daily.   Yes ProviderRayray MD   hydrOXYzine (ATARAX) 25 MG tablet Take 1 tablet by mouth 3 (Three) Times a Day As Needed for Itching.   Yes Rayray Jean MD   lacosamide (VIMPAT) 100 MG tablet tablet Take 1  "tablet by mouth 2 (Two) Times a Day.   Yes Rayray Jean MD   lamoTRIgine (LaMICtal) 100 MG tablet Take 2.5 tablets by mouth Daily.   Yes Rayray Jean MD   levETIRAcetam (KEPPRA) 1000 MG tablet Take 1 tablet by mouth 2 (Two) Times a Day.   Yes Rayray Jean MD   montelukast (SINGULAIR) 10 MG tablet Take 1 tablet by mouth Every Night.   Yes Rayray Jean MD   OLANZapine zydis (zyPREXA) 5 MG disintegrating tablet Take 1 tablet by mouth 3 times a day.   Yes Rayray Jean MD   pantoprazole (PROTONIX) 40 MG EC tablet Take 1 tablet by mouth Daily.   Yes Rayray Jean MD   senna 8.6 MG tablet Take 2 tablets by mouth Daily.   Yes Rayray Jean MD   acetaminophen (TYLENOL) 500 MG tablet Take 1 tablet by mouth Every 6 (Six) Hours As Needed for Mild Pain.    Rayray Jean MD   albuterol sulfate  (90 Base) MCG/ACT inhaler Inhale 2 puffs Every 4 (Four) Hours As Needed for Wheezing.    Rayray Jean MD   diazePAM (VALTOCO 15 MG DOSE NA) 15 mg into the nostril(s) as directed by provider As Needed (seizure lasting longer than 5 minutes).    Rayray Jean MD     I have utilized all available immediate resources to obtain, update, or review the patient's current medications (including all prescriptions, over-the-counter products, herbals, cannabis/cannabidiol products, and vitamin/mineral/dietary (nutritional) supplements).    Objective     Vital Signs: /67   Pulse 62   Temp 97.4 °F (36.3 °C) (Axillary)   Resp 18   Ht 172.7 cm (68\")   Wt 94.1 kg (207 lb 6.4 oz)   LMP  (LMP Unknown) Comment: neg hcg  SpO2 100%   BMI 31.54 kg/m²   Physical Exam   General : Patient is sedated and ventilated    Head exam: Atraumatic ,normocephalic    Neck exam: No rigidity    Cardiac exam : normal cardiac sounds, no murmurs, no added sounds, normal rate and rhythm    Chest exam : Normal breath sounds, no crepitation, no rhonchi, normal work of " breathing    Abdominal exam: Soft abdomen, no organomegaly, no tenderness    Skin exam: No rashes, no petechiae    Extremities: Normal range of movement, no edema    Neurological exam: Patient is sedated and ventilated       Results Reviewed:    Lab Results (last 24 hours)       Procedure Component Value Units Date/Time    Levetiracetam Level (Keppra) [[id number]]  (Abnormal) Collected: 07/09/24 0154    Specimen: Blood Updated: 07/11/24 1313     Levetiracetam 41.8 ug/mL     Narrative:      Performed at:  [address] [place] [address]  [id number]  : Bhargavi Stanford MD, Phone:  [phone]    Blood Gas, Arterial - [[id number]]  (Abnormal) Collected: 07/11/24 0448    Specimen: Arterial Blood Updated: 07/11/24 0450     Site Right Radial     Ricky's Test Positive     pH, Arterial 7.430 pH units      pCO2, Arterial 37.3 mm Hg      pO2, Arterial 59.0 mm Hg      Comment: 84 Value below reference range        HCO3, Arterial 24.7 mmol/L      Base Excess, Arterial 0.5 mmol/L      O2 Saturation, Arterial 93.8 %      Comment: 84 Value below reference range        Temperature 37.0     Barometric Pressure for Blood Gas 753 mmHg      Modality Ventilator     FIO2 30 %      Ventilator Mode AC     Set Tidal Volume 450     Set Mech Resp Rate 18.0     PEEP 5.0     Collected by [id number]     Comment: Meter: [id number]     :  Lv Verduzco, CRT        pCO2, Temperature Corrected 37.3 mm Hg      pH, Temp Corrected 7.430 pH Units      pO2, Temperature Corrected 59.0 mm Hg      PO2/FIO2 197            XR Abdomen 1 View    Result Date: 7/11/2024  EXAMINATION: XR ABDOMEN 1 VW-  7/11/2024 2:17 AM  HISTORY: eval for foreign body progression; T18.9XXA-Foreign body of alimentary tract, part unspecified, initial encounter; R56.9-Unspecified convulsions  A frontal projection of the abdomen is compared with the previous study dated 7/10/2024.  There are radiopaque/metallic foreign bodies are reidentified  and appears to be located in the right lower abdomen in the ascending colon similar to the previous study. No significant change in position.  There is moderate gas and stool in the colon. No significant dilatation of small bowel loops. Moderate gas in the stomach.  Distal end of nasogastric tube is seen in the mid stomach. Distal sideport is in the proximal stomach. No change in position.  Both kidneys are obscured by the bowel contents. No radiopaque calculi.  There is evidence of prior cholecystectomy.  No acute bony abnormality.      Impression: 1. No change in position of the radiopaque/metallic foreign body in the right lower abdomen. The abdominal gas pattern is unremarkable.  This report was signed and finalized on 7/11/2024 6:47 AM by Dr. Medina Braun MD.      XR Abdomen KUB    Result Date: 7/10/2024  EXAMINATION: XR ABDOMEN KUB-  7/10/2024 2:20 AM  HISTORY: f/u xray for passage of swallowed metallic objects; T18.9XXA-Foreign body of alimentary tract, part unspecified, initial encounter; R56.9-Unspecified convulsions  A frontal projection of the abdomen is compared with the previous study dated 7/9/2024.  The previously seen metallic objects are now seen in the right lower abdomen in the area of the proximal ascending colon.  There is moderate gas and stool in the colon.  Distal end of nasogastric tube is in the mid to distal stomach. The distal sideport is in the proximal stomach. The position of the tube has not significantly changed in the previous study.  Both kidneys are obscured by the bowel contents. No definite radiopaque calculi.  There is evidence of prior cholecystectomy.  No acute bony abnormality.      Impression: 1. The metallic foreign bodies are seen in the right lower abdomen in the area of the proximal ascending colon. The abdominal gas pattern is unremarkable. No evidence of obstruction or ileus. 2. Nasogastric tube in place. No change since the previous study.   This report was  signed and finalized on 7/10/2024 6:23 AM by Dr. Medina Braun MD.      XR Abdomen KUB    Result Date: 7/9/2024  XR ABDOMEN KUB-  HISTORY: OG placement verification; T18.9XXA-Foreign body of alimentary tract, part unspecified, initial encounter; R56.9-Unspecified convulsions  COMPARISON: 7/9/2024 3:59 p.m.  FINDINGS: Frontal view of the upper abdomen obtained.  The enteric tube tip is present within the distal body of the stomach. Cholecystectomy clips. No dilated loops of bowel seen within the upper abdomen. Lung bases appear clear.      Impression: 1. Enteric tube tip in the distal body of the stomach.   This report was signed and finalized on 7/9/2024 6:54 PM by Dr. Neelam Hogue MD.       Assessment / Plan   Assessment and Plan    Active Hospital Problems    Diagnosis     **Foreign body ingestion     Pseudoseizures    Pseudoseizures patient had to be sedated intubated to control those pseudoseizures    Ingestion of foreign body for which we are waiting for it to be passed through bowel movement she was given laxatives    Acute respiratory failure secondary to sedation to control her pseudoseizures      I provided 35 minutes of total critical care time. Due to the high probability of clinically significant, life-threatening deterioration, the patient required my direct and personal management. The critical care time does not include time spent on separately billable procedures.    Code Status:    .     Electronically signed by Luisito Nguyen MD on 7/11/2024 at 16:58 CDT

## 2024-07-11 NOTE — PLAN OF CARE
Goal Outcome Evaluation:        Problem: Inability to Wean (Mechanical Ventilation, Invasive)  Goal: Mechanical Ventilation Liberation  Outcome: Ongoing, Progressing      Goal: wean from ventilator.      Problem: Skin Injury Risk Increased  Goal: Skin Health and Integrity  Outcome: Ongoing, Progressing   RT EQUIPMENT DEVICE RELATED - SKIN ASSESSMENT    Nikko Score:  Nikko Score: 14     RT Medical Equipment/Device:     ETT [place]/Anchorfast    Skin Assessment:      Cheek:  Intact  Neck:  Intact  Lips:  Intact    Device Skin Pressure Protection:  Positioning supports utilized, Pressure points protected, and Skin-to-device areas padded:  Anchorfast    Nurse Notification:  Milady Liz, RRT

## 2024-07-11 NOTE — PLAN OF CARE
Goal Outcome Evaluation:              Outcome Evaluation: At beginning of shift pt was very agitated and restless, 2mg versed administered. Propofol at currently 60 (max 75), Precedex at 0.4. NS infusing at 100. Pt has been sinus tj in the 50s. Adequate UO. Afebrile.         Problem: Adult Inpatient Plan of Care  Goal: Plan of Care Review  Outcome: Ongoing, Progressing  Flowsheets (Taken 7/11/2024 0620)  Outcome Evaluation: At beginning of shift pt was very agitated and restless, 2mg versed administered. Propofol at currently 60 (max 75), Precedex at 0.4. NS infusing at 100. Pt has been sinus tj in the 50s. Adequate UO. Afebrile.  Goal: Patient-Specific Goal (Individualized)  Outcome: Ongoing, Progressing  Goal: Absence of Hospital-Acquired Illness or Injury  Outcome: Ongoing, Progressing  Intervention: Identify and Manage Fall Risk  Recent Flowsheet Documentation  Taken 7/11/2024 0600 by Luis Alberto Soriano RN  Safety Promotion/Fall Prevention: safety round/check completed  Taken 7/11/2024 0500 by Luis Alberto Soriano RN  Safety Promotion/Fall Prevention: safety round/check completed  Taken 7/11/2024 0400 by Luis Alberto Soriano RN  Safety Promotion/Fall Prevention: safety round/check completed  Taken 7/11/2024 0300 by Luis Alberto Soriano RN  Safety Promotion/Fall Prevention: safety round/check completed  Taken 7/11/2024 0200 by Luis Alberto Soriano RN  Safety Promotion/Fall Prevention: safety round/check completed  Taken 7/11/2024 0100 by Luis Alberto Soriano RN  Safety Promotion/Fall Prevention: safety round/check completed  Taken 7/11/2024 0000 by Luis Alberto Soriano RN  Safety Promotion/Fall Prevention: safety round/check completed  Taken 7/10/2024 2300 by Luis Alberto Soriano RN  Safety Promotion/Fall Prevention: safety round/check completed  Taken 7/10/2024 2200 by Luis Alberto Soriano RN  Safety Promotion/Fall Prevention: safety round/check completed  Taken 7/10/2024 2100 by Luis Alberto Soriano RN  Safety Promotion/Fall Prevention: safety  round/check completed  Taken 7/10/2024 2000 by Luis Alberto Soriano RN  Safety Promotion/Fall Prevention: safety round/check completed  Taken 7/10/2024 1900 by Luis Alberto Soriano RN  Safety Promotion/Fall Prevention: safety round/check completed  Intervention: Prevent Skin Injury  Recent Flowsheet Documentation  Taken 7/11/2024 0500 by Luis Alberto Soriano RN  Body Position: supine  Taken 7/11/2024 0300 by Luis Alberto Soriano RN  Body Position:   turned   left   side-lying  Taken 7/11/2024 0100 by Luis Alberto Soriano RN  Body Position:   turned   right   side-lying  Taken 7/11/2024 0000 by Luis Alberto Soriano RN  Skin Protection: adhesive use limited  Taken 7/10/2024 2300 by Luis Alberto Soriano RN  Body Position:   turned   left   side-lying  Taken 7/10/2024 2200 by Luis Alberto Soriano RN  Body Position: position changed independently  Taken 7/10/2024 2100 by Luis Alberto Soriano RN  Body Position:   turned   right   side-lying  Taken 7/10/2024 1900 by Luis Alberto Soriano RN  Body Position: position changed independently  Intervention: Prevent and Manage VTE (Venous Thromboembolism) Risk  Recent Flowsheet Documentation  Taken 7/11/2024 0400 by Luis Alberto Soriano RN  Activity Management: bedrest  Goal: Optimal Comfort and Wellbeing  Outcome: Ongoing, Progressing  Intervention: Provide Person-Centered Care  Recent Flowsheet Documentation  Taken 7/11/2024 0400 by Luis Alberto Soriano RN  Trust Relationship/Rapport: care explained  Taken 7/11/2024 0000 by Luis Alberto Soriano RN  Trust Relationship/Rapport: care explained  Taken 7/10/2024 2000 by Luis Alberto Soriano RN  Trust Relationship/Rapport: care explained  Goal: Readiness for Transition of Care  Outcome: Ongoing, Progressing     Problem: Communication Impairment (Mechanical Ventilation, Invasive)  Goal: Effective Communication  Outcome: Ongoing, Progressing     Problem: Device-Related Complication Risk (Mechanical Ventilation, Invasive)  Goal: Optimal Device Function  Outcome: Ongoing, Progressing     Problem: Inability  to Wean (Mechanical Ventilation, Invasive)  Goal: Mechanical Ventilation Liberation  Outcome: Ongoing, Progressing  Intervention: Promote Extubation and Mechanical Ventilation Liberation  Recent Flowsheet Documentation  Taken 7/11/2024 0600 by Luis Alberto Soriano RN  Medication Review/Management: medications reviewed  Taken 7/11/2024 0500 by Luis Alberto Soriano RN  Medication Review/Management: medications reviewed  Taken 7/11/2024 0400 by Luis Alberto Soriano RN  Medication Review/Management: medications reviewed  Taken 7/11/2024 0300 by Luis Alberto Soriano RN  Medication Review/Management: medications reviewed  Taken 7/11/2024 0200 by Luis Alberto Soriano RN  Medication Review/Management: medications reviewed  Taken 7/11/2024 0100 by Luis Alberto Soriano RN  Medication Review/Management: medications reviewed  Taken 7/11/2024 0000 by Luis Alberto Soriano RN  Medication Review/Management: medications reviewed  Taken 7/10/2024 2300 by Luis Alberto Soriano RN  Medication Review/Management: medications reviewed  Taken 7/10/2024 2200 by Luis Alberto Soriano RN  Medication Review/Management: medications reviewed  Taken 7/10/2024 2100 by Luis Alberto Soriano RN  Medication Review/Management: medications reviewed  Taken 7/10/2024 2000 by Luis Alberto Soriano RN  Medication Review/Management: medications reviewed  Taken 7/10/2024 1900 by Luis Alberto Soriano RN  Medication Review/Management: medications reviewed     Problem: Nutrition Impairment (Mechanical Ventilation, Invasive)  Goal: Optimal Nutrition Delivery  Outcome: Ongoing, Progressing     Problem: Skin and Tissue Injury (Mechanical Ventilation, Invasive)  Goal: Absence of Device-Related Skin and Tissue Injury  Outcome: Ongoing, Progressing  Intervention: Maintain Skin and Tissue Health  Recent Flowsheet Documentation  Taken 7/11/2024 0000 by Luis Alberto Soriano RN  Device Skin Pressure Protection: skin-to-skin areas padded     Problem: Ventilator-Induced Lung Injury (Mechanical Ventilation, Invasive)  Goal: Absence of  Ventilator-Induced Lung Injury  Outcome: Ongoing, Progressing  Intervention: Prevent Ventilator-Associated Pneumonia  Recent Flowsheet Documentation  Taken 7/11/2024 0500 by Luis Alberto Soriano RN  Head of Bed (HOB) Positioning: HOB at 30-45 degrees  Taken 7/11/2024 0400 by Luis Alberto Soriano RN  Oral Care:   swabbed with antiseptic solution   teeth brushed - suction toothbrush  Taken 7/11/2024 0300 by Luis Alberto Soriano RN  Head of Bed (HOB) Positioning: HOB at 30-45 degrees  Taken 7/11/2024 0100 by Luis Alberto Soriano RN  Head of Bed (HOB) Positioning: HOB at 30-45 degrees  Taken 7/11/2024 0000 by Luis Alberto Soriano RN  Oral Care: teeth brushed - suction toothbrush  Taken 7/10/2024 2300 by Luis Alberto Soriano RN  Head of Bed (HOB) Positioning: HOB at 30-45 degrees  Taken 7/10/2024 2200 by Luis Alberto Soriano RN  Head of Bed (HOB) Positioning: HOB at 30-45 degrees  Taken 7/10/2024 2100 by Luis Alberto Soriano RN  Head of Bed (HOB) Positioning: HOB at 30-45 degrees  Taken 7/10/2024 2000 by Luis Alberto Soriano RN  Oral Care: teeth brushed - suction toothbrush  Taken 7/10/2024 1900 by Luis Alberto Soriano RN  Head of Bed (HOB) Positioning: HOB at 30-45 degrees     Problem: Skin Injury Risk Increased  Goal: Skin Health and Integrity  Outcome: Ongoing, Progressing  Intervention: Optimize Skin Protection  Recent Flowsheet Documentation  Taken 7/11/2024 0500 by Luis Alberto Soriano RN  Head of Bed (HOB) Positioning: HOB at 30-45 degrees  Taken 7/11/2024 0300 by Luis Alberto Soriano RN  Head of Bed (HOB) Positioning: HOB at 30-45 degrees  Taken 7/11/2024 0100 by Luis Alberto Soriano RN  Head of Bed (HOB) Positioning: HOB at 30-45 degrees  Taken 7/11/2024 0000 by Luis Alberto Soriano RN  Pressure Reduction Techniques: heels elevated off bed  Pressure Reduction Devices: specialty bed utilized  Skin Protection: adhesive use limited  Taken 7/10/2024 2300 by Luis Alberto Soriano, RN  Head of Bed (HOB) Positioning: HOB at 30-45 degrees  Taken 7/10/2024 2200 by Luis Alberto Soriano, RN  Head of Bed  (HOB) Positioning: HOB at 30-45 degrees  Taken 7/10/2024 2100 by Luis Alberto Soriano RN  Head of Bed (HOB) Positioning: HOB at 30-45 degrees  Taken 7/10/2024 1900 by Luis Alberto Soriano RN  Head of Bed (HOB) Positioning: HOB at 30-45 degrees     Problem: Restraint, Nonviolent  Goal: Absence of Harm or Injury  Outcome: Ongoing, Progressing  Intervention: Implement Least Restrictive Safety Strategies  Recent Flowsheet Documentation  Taken 7/11/2024 0600 by Luis Alberto Soriano RN  Medical Device Protection:   torso covered   tubing secured  Less Restrictive Alternative: environment adjusted  De-Escalation Techniques: stimulation decreased  Diversional Activities: television  Taken 7/11/2024 0400 by Luis Alberto Soriano RN  Medical Device Protection:   torso covered   tubing secured  Less Restrictive Alternative: environment adjusted  De-Escalation Techniques: stimulation decreased  Diversional Activities: television  Taken 7/11/2024 0200 by Luis Alberto Soriano RN  Medical Device Protection:   torso covered   tubing secured  Less Restrictive Alternative: environment adjusted  De-Escalation Techniques: stimulation decreased  Diversional Activities: television  Taken 7/11/2024 0000 by Luis Alberto Soriano RN  Medical Device Protection:   torso covered   tubing secured  Less Restrictive Alternative: environment adjusted  De-Escalation Techniques: stimulation decreased  Diversional Activities: television  Taken 7/10/2024 2200 by Luis Alberto Soriano RN  Medical Device Protection:   torso covered   tubing secured  Less Restrictive Alternative: environment adjusted  De-Escalation Techniques: stimulation decreased  Diversional Activities: television  Taken 7/10/2024 2000 by Luis Alberto Soriano RN  Diversional Activities: television  Intervention: Protect Dignity, Rights, and Personal Wellbeing  Recent Flowsheet Documentation  Taken 7/11/2024 0400 by Luis Alberto Soriano RN  Trust Relationship/Rapport: care explained  Taken 7/11/2024 0000 by Luis Alberto Soriano RN Trust  Relationship/Rapport: care explained  Taken 7/10/2024 2000 by Luis Alberto Soriano RN  Trust Relationship/Rapport: care explained  Intervention: Protect Skin and Joint Integrity  Recent Flowsheet Documentation  Taken 7/11/2024 0500 by Luis Alberto Soriano RN  Body Position: supine  Taken 7/11/2024 0300 by Luis Alberto Soriano RN  Body Position:   turned   left   side-lying  Taken 7/11/2024 0100 by Luis Alberto Soriano RN  Body Position:   turned   right   side-lying  Taken 7/10/2024 2300 by Luis Alberto Soriano RN  Body Position:   turned   left   side-lying  Taken 7/10/2024 2200 by Luis Alberto Soriano RN  Body Position: position changed independently  Taken 7/10/2024 2100 by Luis Alberto Soriano RN  Body Position:   turned   right   side-lying  Taken 7/10/2024 1900 by Luis Alberto Soriano RN  Body Position: position changed independently     Problem: Fall Injury Risk  Goal: Absence of Fall and Fall-Related Injury  Outcome: Ongoing, Progressing  Intervention: Identify and Manage Contributors  Recent Flowsheet Documentation  Taken 7/11/2024 0600 by Luis Alberto Soriano RN  Medication Review/Management: medications reviewed  Taken 7/11/2024 0500 by Luis Alberto Soriano RN  Medication Review/Management: medications reviewed  Taken 7/11/2024 0400 by Luis Alberto Soriano RN  Medication Review/Management: medications reviewed  Taken 7/11/2024 0300 by Luis Alberto Soriano RN  Medication Review/Management: medications reviewed  Taken 7/11/2024 0200 by Luis Alberto Soriano RN  Medication Review/Management: medications reviewed  Taken 7/11/2024 0100 by Luis Alberto Soriano RN  Medication Review/Management: medications reviewed  Taken 7/11/2024 0000 by Luis Alberto Soriano RN  Medication Review/Management: medications reviewed  Taken 7/10/2024 2300 by Luis Alberto Soriano RN  Medication Review/Management: medications reviewed  Taken 7/10/2024 2200 by Luis Alberto Soriano RN  Medication Review/Management: medications reviewed  Taken 7/10/2024 2100 by Luis Alberto Soriano RN  Medication Review/Management:  medications reviewed  Taken 7/10/2024 2000 by Luis Alberto Soriano RN  Medication Review/Management: medications reviewed  Taken 7/10/2024 1900 by Luis Alberto Soriano RN  Medication Review/Management: medications reviewed  Intervention: Promote Injury-Free Environment  Recent Flowsheet Documentation  Taken 7/11/2024 0600 by Luis Alberto Soriano RN  Safety Promotion/Fall Prevention: safety round/check completed  Taken 7/11/2024 0500 by Luis Alberto Soriano RN  Safety Promotion/Fall Prevention: safety round/check completed  Taken 7/11/2024 0400 by Luis Alberto Soriano RN  Safety Promotion/Fall Prevention: safety round/check completed  Taken 7/11/2024 0300 by Luis Alberto Soriano RN  Safety Promotion/Fall Prevention: safety round/check completed  Taken 7/11/2024 0200 by Luis Alberto Soriano RN  Safety Promotion/Fall Prevention: safety round/check completed  Taken 7/11/2024 0100 by Luis Alberto Soriano RN  Safety Promotion/Fall Prevention: safety round/check completed  Taken 7/11/2024 0000 by Luis Alberto Soriano RN  Safety Promotion/Fall Prevention: safety round/check completed  Taken 7/10/2024 2300 by Luis Alberto Soriano RN  Safety Promotion/Fall Prevention: safety round/check completed  Taken 7/10/2024 2200 by Luis Alberto Soriano RN  Safety Promotion/Fall Prevention: safety round/check completed  Taken 7/10/2024 2100 by Luis Alberto Soriano RN  Safety Promotion/Fall Prevention: safety round/check completed  Taken 7/10/2024 2000 by Luis Alberto Soriano RN  Safety Promotion/Fall Prevention: safety round/check completed  Taken 7/10/2024 1900 by Luis Alberto Soriano RN  Safety Promotion/Fall Prevention: safety round/check completed

## 2024-07-11 NOTE — SIGNIFICANT NOTE
07/11/24 0641   Readings   PEEP Intrinsic (cm H2O) 4.7 cm H2O   Plateau Pressure (cm H2O) 14 cm H2O   Driving Pressure (cm H2O) 9.3 cm H2O   Static Compliance (L/cm H2O) 48   Dynamic Compliance (L/cm H2O) 57 L/cm H2O

## 2024-07-11 NOTE — PROGRESS NOTES
Neurology Progress Note      Chief Complaint: Pseudoseizures  Length of Stay:  1   Subjective     Subjective:    No seizures overnight.  The patient did become somewhat restless and required increasing doses of sedation.    Medications:  Current Facility-Administered Medications   Medication Dose Route Frequency Provider Last Rate Last Admin    acetaminophen (TYLENOL) tablet 650 mg  650 mg Oral Q4H PRN Eriberto Trejo MD        Or    acetaminophen (TYLENOL) 160 MG/5ML oral solution 650 mg  650 mg Oral Q4H PRN Eriberto Trejo MD        Or    acetaminophen (TYLENOL) suppository 650 mg  650 mg Rectal Q4H PRN Eriberto Trejo MD        chlorhexidine (PERIDEX) 0.12 % solution 15 mL  15 mL Mouth/Throat Q12H Lv Garcia MD   15 mL at 07/11/24 0837    Chlorhexidine Gluconate Cloth 2 % pads 1 Application  1 Application Topical Q24H Luisito Nguyen MD   1 Application at 07/11/24 0407    dexmedetomidine (PRECEDEX) 400 mcg in 100 mL NS infusion  0.2-1.5 mcg/kg/hr Intravenous Titrated Dev Emanuel PA-C 9.1 mL/hr at 07/11/24 0445 0.4 mcg/kg/hr at 07/11/24 0445    FLUoxetine (PROzac) capsule 80 mg  80 mg Oral Daily Dev Emanuel PA-C   80 mg at 07/11/24 0837    hydrOXYzine (ATARAX) tablet 25 mg  25 mg Oral TID PRN Dev Emanuel PA-C        lactated ringers infusion  20 mL/hr Intravenous Continuous Vanita Draper MD 20 mL/hr at 07/09/24 0858 Currently Infusing at 07/09/24 0858    lactulose solution 20 g  20 g Nasogastric TID Alistair Torres APRN        LORazepam (ATIVAN) injection 2 mg  2 mg Intravenous Once PRN Dev Emanuel PA-C        metoclopramide (REGLAN) injection 10 mg  10 mg Intravenous Q6H Camille Lam MD   10 mg at 07/11/24 0555    montelukast (SINGULAIR) tablet 10 mg  10 mg Oral Nightly Dev Emanuel PA-C   10 mg at 07/10/24 2051    mupirocin (BACTROBAN) 2 % nasal ointment 1 Application  1 application  Each Nare BID Luisito Nguyen MD   1 Application  at 07/11/24 0837    OLANZapine (zyPREXA) tablet 5 mg  5 mg Oral TID Eriberto Trejo MD   5 mg at 07/11/24 0837    ondansetron (ZOFRAN) injection 4 mg  4 mg Intravenous Q6H PRN Dev Emanuel PA-C        pantoprazole (PROTONIX) injection 40 mg  40 mg Intravenous Daily Luisito Nguyen MD   40 mg at 07/11/24 0837    polyethylene glycol (MIRALAX) packet 17 g  17 g Oral Daily Luisito Nguyen MD   17 g at 07/11/24 0837    propofol (DIPRIVAN) infusion 10 mg/mL 100 mL  5-75 mcg/kg/min Intravenous Titrated Dev Emanuel PA-C 32.7 mL/hr at 07/11/24 0856 60 mcg/kg/min at 07/11/24 0856    senna (SENOKOT) tablet 1 tablet  1 tablet Oral Daily Luisito Nguyen MD   1 tablet at 07/11/24 0837    sodium chloride 0.9 % flush 10 mL  10 mL Intravenous Q12H Eriberto Trejo MD   10 mL at 07/11/24 0838    sodium chloride 0.9 % flush 10 mL  10 mL Intravenous PRN Eriberto Trejo MD        sodium chloride 0.9 % infusion 40 mL  40 mL Intravenous PRN Eriberto Trejo MD        sodium chloride 0.9 % infusion  100 mL/hr Intravenous Continuous Lance Esposito DO        sodium chloride 0.9 % infusion  100 mL/hr Intravenous Continuous Vanita Draper  mL/hr at 07/11/24 0413 100 mL/hr at 07/11/24 0413    terazosin (HYTRIN) capsule 1 mg  1 mg Oral Nightly Dev Emanuel PA-C   1 mg at 07/10/24 2051             Objective      Vital Signs  Temp:  [97.4 °F (36.3 °C)-97.6 °F (36.4 °C)] 97.4 °F (36.3 °C)  Heart Rate:  [48-81] 59  Resp:  [18] 18  BP: ()/(46-78) 121/53  FiO2 (%):  [30 %] 30 %    Physical Exam:    HEENT:  neck is supple  CVS:  RRR  Lungs:  CTA - B/L  Abd:  NT/ND  Ext:  no edema  Skin:  no rashes    Pertinent Neuro Exam:  Sedated and intubated.  No rhythmic activity seen    Results Review:      Labs:  CBC and CMP reviewed  Keppra level pending  Total Dilantin level on admission was less than 0.8        Assessment/Plan     Hospital Problem List      Foreign body ingestion     Pseudoseizures    Impression:  Psychogenic nonepileptic spells  Possible swallowing of metallic objects the could potentially be a battery  Behavioral disorder    Plan:  Continue all home doses of reported antiepileptics once the patient awakens  I highly recommend that she reestablish care with Dr. Declan Lira at [facility] neurology.  I attempted to dissuade the patient's mother to let the patient continue seeking multiple other opinions including the neurology appointment that she has scheduled at [facility].  I think she needs to be discontinued off of all antiepileptics.  I believe that the binder that she carries with her that list all of her medications and medical conditions needs to be corrected so that it reads that she has only psychogenic nonepileptic spells.  I believe that she does not need a rescue medicine even though she has a bag by her bedside that is listed as a rescue medication and has nasal benzodiazepines listed on her home medication list.  She has a medical alert bracelet that she reportedly wears and is currently in her backpack that does read that she has epilepsy.  I recommend that this be discontinued immediately and that she receive another 1 that identifies her correctly is having psychogenic nonepileptic spells.  I believe continuing down this path of misinformation will lead her to unnecessary medical and surgical procedures including repeat intubations for psychogenic nonepileptic spells.      Medical Decision Making    Number/Complexity of Problems  Moderate  1 undiagnosed new problem with uncertain prognosis -   1 acute illness with systemic symptoms -   High  1 acute or chronic illness that pose a threat to life/body function -   High complexity    MDM Data  Moderate - 1/3 categories  Extensive - 2/3 categories    Category 1: 3 of the following  Review of external notes  Review of results  Ordering of each unique test  Independent historian  Category 2:  Independent  interpretation of test (ex: imaging)  Category 3:  Discussion of management with another provider    Discussion of management with primary provider.  Reviewed external notes, reviewed results, and interviewed an independent historian as I spoke with her mother     Treatment Plan  Moderate - Prescription Drug management  High  Drug therapy requiring intensive monitoring for toxicity  Decision regarding hospitalization or escalation of care  De-escalate care/DNR decisions         Oscar Martínez MD  07/11/24  11:00 T

## 2024-07-11 NOTE — PROGRESS NOTES
Camille Lam MD - General Surgery  Progress Note     LOS: 1 day   Patient Care Team:  Rosie Olvera MD as PCP - General (Family Medicine)      Subjective     Interval History:     Intubated and sedated. No acute events overnight.  Vital stable.     Objective     Vital Signs  Temp:  [97.4 °F (36.3 °C)-97.6 °F (36.4 °C)] 97.4 °F (36.3 °C)  Heart Rate:  [48-81] 67  Resp:  [18] 18  BP: ()/(46-72) 120/61  FiO2 (%):  [30 %] 30 %    Physical Exam:  General appearance - sedated and intubated   Mental status - sedated and intubated   Eyes - not examined  Mouth -  ETT in proper position  Chest - no tachypnea, retractions or cyanosis  Heart - normal rate and regular rhythm  Neurological - intubated   Extremities - no pedal edema noted  Skin - normal coloration and turgor, no rashes, no suspicious skin lesions noted  Abdomen - soft, non-distended, non-tender       Results Review:    Lab Results (last 24 hours)       Procedure Component Value Units Date/Time    Levetiracetam Level (Keppra) [[id number]]  (Abnormal) Collected: 07/09/24 0154    Specimen: Blood Updated: 07/11/24 1313     Levetiracetam 41.8 ug/mL     Narrative:      Performed at:   - [address]  [id number]  : Bhargavi Stanford MD, Phone:  [phone]    Blood Gas, Arterial - [[id number]]  (Abnormal) Collected: 07/11/24 0448    Specimen: Arterial Blood Updated: 07/11/24 0450     Site Right Radial     Ricky's Test Positive     pH, Arterial 7.430 pH units      pCO2, Arterial 37.3 mm Hg      pO2, Arterial 59.0 mm Hg      Comment: 84 Value below reference range        HCO3, Arterial 24.7 mmol/L      Base Excess, Arterial 0.5 mmol/L      O2 Saturation, Arterial 93.8 %      Comment: 84 Value below reference range        Temperature 37.0     Barometric Pressure for Blood Gas 753 mmHg      Modality Ventilator     FIO2 30 %      Ventilator Mode AC     Set Tidal Volume 450     Set Mech Resp Rate 18.0      PEEP 5.0     Collected by [id number]     Comment: Meter: [id number]     :  Lv Verduzco, CRT        pCO2, Temperature Corrected 37.3 mm Hg      pH, Temp Corrected 7.430 pH Units      pO2, Temperature Corrected 59.0 mm Hg      PO2/FIO2 197          Imaging Results (Last 24 Hours)       Procedure Component Value Units Date/Time    XR Abdomen 1 View [[id number]] Collected: 07/11/24 0645     Updated: 07/11/24 0650    Narrative:      EXAMINATION: XR ABDOMEN 1 VW-     7/11/2024 2:17 AM     HISTORY: eval for foreign body progression; T18.9XXA-Foreign body of  alimentary tract, part unspecified, initial encounter; R56.9-Unspecified  convulsions     A frontal projection of the abdomen is compared with the previous study  dated 7/10/2024.     There are radiopaque/metallic foreign bodies are reidentified and  appears to be located in the right lower abdomen in the ascending colon  similar to the previous study. No significant change in position.     There is moderate gas and stool in the colon. No significant dilatation  of small bowel loops. Moderate gas in the stomach.     Distal end of nasogastric tube is seen in the mid stomach. Distal  sideport is in the proximal stomach. No change in position.     Both kidneys are obscured by the bowel contents. No radiopaque calculi.     There is evidence of prior cholecystectomy.     No acute bony abnormality.       Impression:      1. No change in position of the radiopaque/metallic foreign body in the  right lower abdomen. The abdominal gas pattern is unremarkable.     This report was signed and finalized on 7/11/2024 6:47 AM by Dr. Medina Braun MD.                 Assessment & Plan     Foreign Body     Ms. Cooley is a 28 year old female s/p ingestion of two foreign bodies. They had passed the stomach by the time EGD was done. We are currently monitoring her with serial exams and AXRs to watch foreign body progression. WBC count normal today. AXR today stable  compared to yesterday. Will start golytely prep. Hopefully this will push the foreign body through. Repeat AXR in AM.       Camille Lam MD  07/11/24  14:11 CDT

## 2024-07-11 NOTE — PLAN OF CARE
Goal Outcome Evaluation:  Plan of Care Reviewed With: caregiver, patient        Progress: no change  Outcome Evaluation: Pt continues to be sedated on vent. Pt has swallowed a battery and magnet. Pt having pseudoseizures with sttus epilepticus and convulsive like activity. Pt was intubated to protect airways. Foreign objects remain in small intestine. Discussed enteral feeding in ICU rounds and Dr. Nguyen in agreement. Pt has OGT. Initiate Peptamen AF at 15 ml/hr x 22 hrs, increase by 15 ml/hr to a goal rate of 30 ml/hr. Free water flush of 25 ml/hr. Pro source liquid protein 1 pkt BID. Cont to follow for plan of care.

## 2024-07-11 NOTE — NURSING NOTE
[facility]  INPATIENT WOUND & OSTOMY CARE    Today's Date: 07/11/24    Patient Name: Duane Cooley  MRN: [id number]  [facility]: [id number]  PCP: Rosie Olvera MD  Attending Provider: Luisito Nguyen MD  Length of Stay: 1    Rounded on patient during multidisciplinary rounds. Patient is currently intubated and sedated. Her nurse Tawanna is at beside.     Assessed ET tube and ET tube keyes for signs of pressure. None identified at this time. Respiratory and nursing to continue assessments on devices frequently to prevent skin breakdown.     Patient has zuñiga catheter in place. Securement device is in place.     Silicone foam border dressing per protocol to sacral spine/bilateral heels for protection.  Nursing to change dressing every 3 days and PRN if soiled. Nursing is to peel back dressing with every assessment to assess skin underneath dressing. No barrier cream under dressing.     Patient is currently on a comfort glide with an absorbant pad. Wedges are at bedside for nursing to utilizing for turning. All pressure ulcer prevention measures have been ordered per protocol.     This document has been electronically signed by Nancy Watters RN on 7/11/2024 14:41 CDT

## 2024-07-11 NOTE — CASE MANAGEMENT/SOCIAL WORK
Discharge Planning Assessment  [facility]     Patient Name: Duane Cooley  MRN: [id number]  Today's Date: 7/11/2024    Admit Date: 7/8/2024        Discharge Needs Assessment       Row Name 07/11/24 1239       Discharge Needs Assessment    Discharge Coordination/Progress Patient admitted to ICU on vent.  Will assess for any needs once extubated.  Patient has a PCP and RX coverage, resides with spouse.      Row Name 07/11/24 1238       Living Environment    People in Home spouse                   Discharge Plan    No documentation.                 Continued Care and Services - Admitted Since 7/8/2024    No active coordination exists for this encounter.          Demographic Summary    No documentation.                  Functional Status    No documentation.                  Psychosocial    No documentation.                  Abuse/Neglect    No documentation.                  Legal    No documentation.                  Substance Abuse    No documentation.                  Patient Forms    No documentation.                     JESSI Dexter

## 2024-07-11 NOTE — PLAN OF CARE
Goal Outcome Evaluation:   The goal is to wean from vent.  Problem: Communication Impairment (Mechanical Ventilation, Invasive)  Goal: Effective Communication  Outcome: Ongoing, Progressing     Problem: Device-Related Complication Risk (Mechanical Ventilation, Invasive)  Goal: Optimal Device Function  Outcome: Ongoing, Progressing  Intervention: Optimize Device Care and Function  Recent Flowsheet Documentation  Taken 7/11/2024 1735 by Samanta Deutsch, VAN  Airway Safety Measures:   manual resuscitator/mask at bedside   oxygen flowmeter at bedside   suction at bedside     Problem: Inability to Wean (Mechanical Ventilation, Invasive)  Goal: Mechanical Ventilation Liberation  Outcome: Ongoing, Progressing     Problem: Nutrition Impairment (Mechanical Ventilation, Invasive)  Goal: Optimal Nutrition Delivery  Outcome: Ongoing, Progressing     Problem: Skin and Tissue Injury (Mechanical Ventilation, Invasive)  Goal: Absence of Device-Related Skin and Tissue Injury  Outcome: Ongoing, Progressing     Problem: Ventilator-Induced Lung Injury (Mechanical Ventilation, Invasive)  Goal: Absence of Ventilator-Induced Lung Injury  Outcome: Ongoing, Progressing  Intervention: Prevent Ventilator-Associated Pneumonia  Recent Flowsheet Documentation  Taken 7/11/2024 1735 by Samanta Deutsch, RRT  Head of Bed (HOB) Positioning: HOB elevated     Problem: Device-Related Complication Risk (Mechanical Ventilation, Invasive)  Goal: Optimal Device Function  Outcome: Ongoing, Progressing  Intervention: Optimize Device Care and Function  Recent Flowsheet Documentation  Taken 7/11/2024 1735 by Samanta Deutsch, RRT  Airway Safety Measures:   manual resuscitator/mask at bedside   oxygen flowmeter at bedside   suction at bedside

## 2024-07-12 ENCOUNTER — APPOINTMENT (OUTPATIENT)
Dept: GENERAL RADIOLOGY | Facility: HOSPITAL | Age: 29
End: 2024-07-12
Payer: MEDICAID

## 2024-07-12 VITALS
HEART RATE: 97 BPM | TEMPERATURE: 96.8 F | RESPIRATION RATE: 18 BRPM | HEIGHT: 68 IN | DIASTOLIC BLOOD PRESSURE: 66 MMHG | BODY MASS INDEX: 31.43 KG/M2 | OXYGEN SATURATION: 100 % | WEIGHT: 207.4 LBS | SYSTOLIC BLOOD PRESSURE: 101 MMHG

## 2024-07-12 LAB
ARTERIAL PATENCY WRIST A: POSITIVE
ATMOSPHERIC PRESS: 753 MMHG
BASE EXCESS BLDA CALC-SCNC: -3.3 MMOL/L (ref 0–2)
BDY SITE: ABNORMAL
BODY TEMPERATURE: 37
HCO3 BLDA-SCNC: 21.8 MMOL/L (ref 20–26)
INHALED O2 CONCENTRATION: 40 %
Lab: ABNORMAL
MODALITY: ABNORMAL
PCO2 BLDA: 38.6 MM HG (ref 35–45)
PCO2 TEMP ADJ BLD: 38.6 MM HG (ref 35–45)
PEEP RESPIRATORY: 5 CM[H2O]
PH BLDA: 7.36 PH UNITS (ref 7.35–7.45)
PH, TEMP CORRECTED: 7.36 PH UNITS (ref 7.35–7.45)
PO2 BLD: 260 MM[HG] (ref 0–500)
PO2 BLDA: 104 MM HG (ref 83–108)
PO2 TEMP ADJ BLD: 104 MM HG (ref 83–108)
SAO2 % BLDCOA: 98.4 % (ref 94–99)
SET MECH RESP RATE: 18
VENTILATOR MODE: AC
VT ON VENT VENT: 450 ML

## 2024-07-12 PROCEDURE — 25010000002 PROPOFOL 10 MG/ML EMULSION: Performed by: PHYSICIAN ASSISTANT

## 2024-07-12 PROCEDURE — 25010000002 METOCLOPRAMIDE PER 10 MG: Performed by: STUDENT IN AN ORGANIZED HEALTH CARE EDUCATION/TRAINING PROGRAM

## 2024-07-12 PROCEDURE — 94799 UNLISTED PULMONARY SVC/PX: CPT

## 2024-07-12 PROCEDURE — 82803 BLOOD GASES ANY COMBINATION: CPT

## 2024-07-12 PROCEDURE — 71045 X-RAY EXAM CHEST 1 VIEW: CPT

## 2024-07-12 PROCEDURE — 36600 WITHDRAWAL OF ARTERIAL BLOOD: CPT

## 2024-07-12 PROCEDURE — 94003 VENT MGMT INPAT SUBQ DAY: CPT

## 2024-07-12 PROCEDURE — 74018 RADEX ABDOMEN 1 VIEW: CPT

## 2024-07-12 PROCEDURE — 99232 SBSQ HOSP IP/OBS MODERATE 35: CPT | Performed by: INTERNAL MEDICINE

## 2024-07-12 PROCEDURE — 25810000003 SODIUM CHLORIDE 0.9 % SOLUTION: Performed by: ANESTHESIOLOGY

## 2024-07-12 RX ADMIN — PANTOPRAZOLE SODIUM 40 MG: 40 INJECTION, POWDER, FOR SOLUTION INTRAVENOUS at 09:33

## 2024-07-12 RX ADMIN — PROPOFOL INJECTABLE EMULSION 75 MCG/KG/MIN: 10 INJECTION, EMULSION INTRAVENOUS at 01:34

## 2024-07-12 RX ADMIN — CHLORHEXIDINE GLUCONATE 1 APPLICATION: 500 CLOTH TOPICAL at 03:55

## 2024-07-12 RX ADMIN — FLUOXETINE HYDROCHLORIDE 80 MG: 20 CAPSULE ORAL at 09:29

## 2024-07-12 RX ADMIN — Medication 10 ML: at 09:30

## 2024-07-12 RX ADMIN — OLANZAPINE 5 MG: 5 TABLET, FILM COATED ORAL at 09:33

## 2024-07-12 RX ADMIN — SODIUM CHLORIDE 100 ML/HR: 9 INJECTION, SOLUTION INTRAVENOUS at 01:37

## 2024-07-12 RX ADMIN — METOCLOPRAMIDE HYDROCHLORIDE 10 MG: 5 INJECTION INTRAMUSCULAR; INTRAVENOUS at 05:36

## 2024-07-12 RX ADMIN — Medication 1 APPLICATION: at 09:32

## 2024-07-12 RX ADMIN — CHLORHEXIDINE GLUCONATE 15 ML: 1.2 RINSE ORAL at 09:33

## 2024-07-12 RX ADMIN — PROPOFOL INJECTABLE EMULSION 75 MCG/KG/MIN: 10 INJECTION, EMULSION INTRAVENOUS at 08:19

## 2024-07-12 RX ADMIN — PROPOFOL INJECTABLE EMULSION 75 MCG/KG/MIN: 10 INJECTION, EMULSION INTRAVENOUS at 05:46

## 2024-07-12 RX ADMIN — POLYETHYLENE GLYCOL 3350, SODIUM SULFATE ANHYDROUS, SODIUM BICARBONATE, SODIUM CHLORIDE, POTASSIUM CHLORIDE 2000 ML: 236; 22.74; 6.74; 5.86; 2.97 POWDER, FOR SOLUTION ORAL at 06:15

## 2024-07-12 RX ADMIN — DEXMEDETOMIDINE HYDROCHLORIDE 0.6 MCG/KG/HR: 4 INJECTION, SOLUTION INTRAVENOUS at 03:55

## 2024-07-12 RX ADMIN — PROPOFOL INJECTABLE EMULSION 75 MCG/KG/MIN: 10 INJECTION, EMULSION INTRAVENOUS at 03:55

## 2024-07-12 RX ADMIN — METOCLOPRAMIDE HYDROCHLORIDE 10 MG: 5 INJECTION INTRAMUSCULAR; INTRAVENOUS at 00:11

## 2024-07-12 NOTE — PROGRESS NOTES
[facility]  Inpatient Progress Note  Today's date:  07/12/24    Duane Cooley  1995       Reason for Follow Up: Metallic foreign body ingestion    Subjective:   Asked to evaluate the patient by the intensivist for metallic foreign body ingestion which is now progressed to the transverse colon on abdominal x-ray.  The patient is still intubated on a ventilator with convulsive like activity noted per neurology notes.  The patient is unable to give a history and history is obtained per the nursing staff interview and chart review.    No Known Allergies    Current Facility-Administered Medications:     acetaminophen (TYLENOL) tablet 650 mg, 650 mg, Oral, Q4H PRN **OR** acetaminophen (TYLENOL) 160 MG/5ML oral solution 650 mg, 650 mg, Oral, Q4H PRN **OR** acetaminophen (TYLENOL) suppository 650 mg, 650 mg, Rectal, Q4H PRN, Eriberto Trejo MD    chlorhexidine (PERIDEX) 0.12 % solution 15 mL, 15 mL, Mouth/Throat, Q12H, Lv Garcia MD, 15 mL at 07/12/24 0933    Chlorhexidine Gluconate Cloth 2 % pads 1 Application, 1 Application, Topical, Q24H, Luisito Nguyen MD, 1 Application at 07/12/24 0355    dexmedetomidine (PRECEDEX) 400 mcg in 100 mL NS infusion, 0.2-1.5 mcg/kg/hr, Intravenous, Titrated, Dev Emanuel PA-C, Last Rate: 13.6 mL/hr at 07/12/24 0355, 0.6 mcg/kg/hr at 07/12/24 0355    FLUoxetine (PROzac) capsule 80 mg, 80 mg, Oral, Daily, Dev Emanuel PA-C, 80 mg at 07/12/24 0929    hydrOXYzine (ATARAX) tablet 25 mg, 25 mg, Oral, TID PRN, Dev Emanuel PA-C    lactated ringers infusion, 20 mL/hr, Intravenous, Continuous, Vanita Draper MD, Last Rate: 20 mL/hr at 07/09/24 0858, Currently Infusing at 07/09/24 0858    lactulose solution 20 g, 20 g, Nasogastric, TID, Alistair Torres APRN, 20 g at 07/11/24 2041    metoclopramide (REGLAN) injection 10 mg, 10 mg, Intravenous, Q6H, Camille Lam MD, 10 mg at 07/12/24 0536    montelukast (SINGULAIR) tablet  10 mg, 10 mg, Oral, Nightly, Dev Emanuel PA-C, 10 mg at 07/11/24 2041    mupirocin (BACTROBAN) 2 % nasal ointment 1 Application, 1 application , Each Nare, BID, Liusito Nguyen MD, 1 Application at 07/12/24 0932    OLANZapine (zyPREXA) tablet 5 mg, 5 mg, Oral, TID, Eriberto Trejo MD, 5 mg at 07/12/24 0933    ondansetron (ZOFRAN) injection 4 mg, 4 mg, Intravenous, Q6H PRN, Dev Emanuel PA-C    pantoprazole (PROTONIX) injection 40 mg, 40 mg, Intravenous, Daily, Luisito Nguyen MD, 40 mg at 07/12/24 0933    polyethylene glycol (MIRALAX) packet 17 g, 17 g, Oral, Daily, Luisito Nguyen MD, 17 g at 07/11/24 0837    propofol (DIPRIVAN) infusion 10 mg/mL 100 mL, 5-75 mcg/kg/min, Intravenous, Titrated, Dev Emanuel PA-C, Stopped at 07/12/24 1008    ProSource TF oral liquid 45 mL, 45 mL, Nasogastric, BID, Luisito Nguyen MD, 45 mL at 07/11/24 2042    senna (SENOKOT) tablet 1 tablet, 1 tablet, Oral, Daily, Luisito Nguyen MD, 1 tablet at 07/11/24 0837    sodium chloride 0.9 % flush 10 mL, 10 mL, Intravenous, Q12H, Eriberto Trejo MD, 10 mL at 07/12/24 0930    sodium chloride 0.9 % flush 10 mL, 10 mL, Intravenous, PRN, Eriberto Trejo MD    sodium chloride 0.9 % infusion 40 mL, 40 mL, Intravenous, PRN, Eriberto Trejo MD    sodium chloride 0.9 % infusion, 100 mL/hr, Intravenous, Continuous, Lance Esposito DO    sodium chloride 0.9 % infusion, 100 mL/hr, Intravenous, Continuous, Vanita Draper MD, Last Rate: 100 mL/hr at 07/12/24 0137, 100 mL/hr at 07/12/24 0137    terazosin (HYTRIN) capsule 1 mg, 1 mg, Oral, Nightly, Dev Emanuel PA-C, 1 mg at 07/11/24 2041    Review of Systems:   Review of Systems   Unobtainable  Vital Signs:  Temp:  [96.8 °F (36 °C)-97.5 °F (36.4 °C)] 96.8 °F (36 °C)  Heart Rate:  [51-83] 58  Resp:  [18] 18  BP: ()/(50-79) 123/64  FiO2 (%):  [30 %] 30 %  Body mass index is 31.54 kg/m².     Intake/Output Summary (Last 24 hours) at  7/12/2024 1024  Last data filed at 7/12/2024 0750  Gross per 24 hour   Intake 5706.73 ml   Output 3800 ml   Net 1906.73 ml     I/O this shift:  In: 1388 [I.V.:568; Other:75; NG/GT:745]  Out: 700 [Urine:700]  Physical Exam:  Physical Exam  Patient remains intubated on a ventilator.  Noncommunicative.    Lungs: Clear to auscultation    Cardiac: Regular rhythm without murmurs    Abdomen: Soft, nontender, positive bowel sounds.   Results Review:   I have reviewed all of the patient's current test results    Results from last 7 days   Lab Units 07/10/24  0209 07/08/24  2350   WBC 10*3/mm3 7.35 9.36   HEMOGLOBIN g/dL 9.7* 9.6*   HEMATOCRIT % 33.2* 31.7*   PLATELETS 10*3/mm3 242 249       Results from last 7 days   Lab Units 07/10/24  0209 07/08/24  2350   SODIUM mmol/L 138 139   POTASSIUM mmol/L 4.6 4.0   CHLORIDE mmol/L 104 103   CO2 mmol/L 25.0 22.0   BUN mg/dL 6 6   CREATININE mg/dL 0.60 0.77   CALCIUM mg/dL 9.1 9.3   BILIRUBIN mg/dL  --  <0.2   ALK PHOS U/L  --  99   ALT (SGPT) U/L  --  12   AST (SGOT) U/L  --  14   GLUCOSE mg/dL 103* 111*             Lab Results   Lab Value Date/Time    LIPASE 15 07/08/2024 2350       Radiology Review:  Imaging Results (Last 24 Hours)       Procedure Component Value Units Date/Time    XR Abdomen 1 View [[id number]] Collected: 07/12/24 0705     Updated: 07/12/24 0711    Narrative:      EXAMINATION: XR ABDOMEN 1 VW-     7/12/2024 2:12 AM     HISTORY: eval progression of foreign body; T18.9XXA-Foreign body of  alimentary tract, part unspecified, initial encounter; R56.9-Unspecified  convulsions     A frontal projection of the abdomen is compared with the previous study  dated 7/11/2024.     The previously seen metallic objects are now seen in the proximal  transverse colon. It was in the proximal ascending colon in the previous  study.     Moderately dilated entire large bowel, measuring up to 5.8 cm in  diameter, is seen. No finding to suggest obstruction. No evidence of  small bowel  dilatation.     The distal end of nasogastric tube is in place.     No acute bony abnormality.       Impression:      1. The metallic foreign bodies are now in the proximal transverse colon.  2. Moderate diffuse dilatation of the entire large bowel probably  represent ileus. Further follow-up may be obtained.              This report was signed and finalized on 7/12/2024 7:08 AM by Dr. Medina Braun MD.       XR Chest 1 View [[id number]] Collected: 07/12/24 0701     Updated: 07/12/24 0707    Narrative:      EXAM/TECHNIQUE: XR CHEST 1 VW-     INDICATION: intubated; T18.9XXA-Foreign body of alimentary tract, part  unspecified, initial encounter; R56.9-Unspecified convulsions     COMPARISON: 7/9/2024     FINDINGS:     Endotracheal tube is 3.8 cm above the elizabeth. Enteric tube terminates  below the diaphragm out of the field-of-view.     Cardiac silhouette is within normal limits. No pleural effusion or  visible pneumothorax. No focal consolidation. No acute osseous finding.       Impression:      No change in appearance of the chest.     This report was signed and finalized on 7/12/2024 7:04 AM by Dr. Carlos Manuel Jasmine MD.               Impression/Plan:    Foreign body ingestion    Pseudoseizures    Believe the risks outweigh the benefits for colonoscopy with foreign body retrieval at this time as they appear to be progressing through the colon without any difficulty.  In addition, the patient remains intubated on a ventilator with seizure-like activity/pseudoseizures.  Would recommend serial abdominal x-rays every 1 to 2 days and if the patient does not pass the foreign body in the next several days could consider endoscopic retrieval however, there is a good chance that this will pass on its own without the risk of colonoscopy with sedation as the foreign bodies of past the lower esophageal sphincter, pylorus and ileocecal valve without difficulty.  The above was discussed in detail with the intensivist and ICU  nursing staff.  Please call with any questions or concerns.  GI service will not see the patient over the weekend unless requested.  The patient should follow-up with a gastroenterologist soon after hospital release as well.  The patient may have a diet as tolerated from a gastrointestinal standpoint once she is able to take oral nutrition and extubated.  Thank you    Tian Morris MD  07/12/24  10:24 CDT    DISCLAIMER:    This physician works through a [employer] as an inpatient consultant gastroenterologist only and has no outpatient clinic for patient follow up.  Any results not available at time of inpatient discharge and/or GI clinic follow up should be managed by the hospitalist team, PCP, or outpatient gastroenterologist.

## 2024-07-12 NOTE — SIGNIFICANT NOTE
07/12/24 0816   Readings   Plateau Pressure (cm H2O) 15 cm H2O   Driving Pressure (cm H2O) 10.1 cm H2O   Static Compliance (L/cm H2O) 44   Dynamic Compliance (L/cm H2O) 49 L/cm H2O

## 2024-07-12 NOTE — NURSING NOTE
[facility]  INPATIENT WOUND & OSTOMY CARE    Today's Date: 07/12/24    Patient Name: Duane Cooley  MRN: [id number]  [facility]: [id number]  PCP: Rosie Olvera MD  Attending Provider: Luisito Nguyen MD  Length of Stay: 2    Rounded on patient during multidisciplinary rounds. Patient is currently intubated and sedated.     Patient has zuñiga catheter in place. Securement device is in place.     Silicone foam border dressing per protocol to sacral spine/bilateral heels for protection.  Nursing to change dressing every 3 days and PRN if soiled. Nursing is to peel back dressing with every assessment to assess skin underneath dressing. No barrier cream under dressing.     Assessed ET tube and ET tube keyes for signs of pressure. None identified at this time. Respiratory and nursing to continue assessments on devices frequently to prevent skin breakdown. Patient to be extubated this morning.     Patient is currently on a comfort glide with an absorbant pad. Wedges are at bedside for nursing to utilizing for turning. All pressure ulcer prevention measures have been ordered per protocol.     This document has been electronically signed by Nancy Watters RN on 7/12/2024 10:15 CDT

## 2024-07-12 NOTE — PLAN OF CARE
Goal Outcome Evaluation:              Outcome Evaluation: Golytely infusing via kangaroo pump. Pt has had multiple large liquid BMs, no obvious foreign objects noted in the stool. Propofol at 75 and precedex at 0.6, pt still easily arousable. 2mg versed given at the beginning of the shift for agitation.         Problem: Adult Inpatient Plan of Care  Goal: Plan of Care Review  Outcome: Ongoing, Progressing  Flowsheets (Taken 7/12/2024 0643)  Outcome Evaluation: Golytely infusing via kangaroo pump. Pt has had multiple large liquid BMs, no obvious foreign objects noted in the stool. Propofol at 75 and precedex at 0.6, pt still easily arousable. 2mg versed given at the beginning of the shift for agitation.  Goal: Patient-Specific Goal (Individualized)  Outcome: Ongoing, Progressing  Goal: Absence of Hospital-Acquired Illness or Injury  Outcome: Ongoing, Progressing  Intervention: Identify and Manage Fall Risk  Recent Flowsheet Documentation  Taken 7/12/2024 0600 by Luis Alberto Soriano RN  Safety Promotion/Fall Prevention: safety round/check completed  Taken 7/12/2024 0500 by Luis Alberto Soriano RN  Safety Promotion/Fall Prevention: safety round/check completed  Taken 7/12/2024 0200 by Luis Alberto Soriano RN  Safety Promotion/Fall Prevention: safety round/check completed  Taken 7/12/2024 0100 by Luis Alberto Soriano RN  Safety Promotion/Fall Prevention: safety round/check completed  Taken 7/12/2024 0000 by Luis Alberto Soriano RN  Safety Promotion/Fall Prevention: safety round/check completed  Taken 7/11/2024 2300 by Luis Alberto Soriano RN  Safety Promotion/Fall Prevention: safety round/check completed  Taken 7/11/2024 2200 by Luis Alberto Soriano RN  Safety Promotion/Fall Prevention: safety round/check completed  Taken 7/11/2024 2100 by Luis Alberto Soriano RN  Safety Promotion/Fall Prevention: safety round/check completed  Taken 7/11/2024 2000 by Luis Alberto Soriano RN  Safety Promotion/Fall Prevention: safety round/check completed  Taken 7/11/2024 1900 by  Christie, Luis Alberto, RN  Safety Promotion/Fall Prevention: safety round/check completed  Intervention: Prevent Skin Injury  Recent Flowsheet Documentation  Taken 7/12/2024 0500 by Luis Alberto Soriano RN  Body Position: position changed independently  Taken 7/12/2024 0100 by Luis Alberto Soriano RN  Body Position:   turned   left   side-lying  Taken 7/11/2024 2300 by Luis Alberto Soriano RN  Body Position: supine  Taken 7/11/2024 2100 by Luis Alberto Soriano RN  Body Position:   turned   right   side-lying  Taken 7/11/2024 2000 by Luis Alberto Soriano RN  Skin Protection: adhesive use limited  Taken 7/11/2024 1900 by Luis Alberto Soriano RN  Body Position:   turned   left   side-lying  Goal: Optimal Comfort and Wellbeing  Outcome: Ongoing, Progressing  Intervention: Provide Person-Centered Care  Recent Flowsheet Documentation  Taken 7/11/2024 2000 by Luis Alberto Soriano RN  Trust Relationship/Rapport: care explained  Goal: Readiness for Transition of Care  Outcome: Ongoing, Progressing     Problem: Communication Impairment (Mechanical Ventilation, Invasive)  Goal: Effective Communication  Outcome: Ongoing, Progressing     Problem: Device-Related Complication Risk (Mechanical Ventilation, Invasive)  Goal: Optimal Device Function  Outcome: Ongoing, Progressing     Problem: Inability to Wean (Mechanical Ventilation, Invasive)  Goal: Mechanical Ventilation Liberation  Outcome: Ongoing, Progressing  Intervention: Promote Extubation and Mechanical Ventilation Liberation  Recent Flowsheet Documentation  Taken 7/12/2024 0600 by Luis Alberto Soriano RN  Medication Review/Management: medications reviewed  Taken 7/12/2024 0500 by Luis Alberto Soriano RN  Medication Review/Management: medications reviewed  Taken 7/12/2024 0200 by Luis Alberto Soriano RN  Medication Review/Management: medications reviewed  Taken 7/12/2024 0100 by Luis Alberto Soriano RN  Medication Review/Management: medications reviewed  Taken 7/12/2024 0000 by Luis Alberto Soriano RN  Medication Review/Management:  medications reviewed  Taken 7/11/2024 2300 by Luis Alberto Soriano RN  Medication Review/Management: medications reviewed  Taken 7/11/2024 2200 by Luis Alberto Soriano RN  Medication Review/Management: medications reviewed  Taken 7/11/2024 2100 by Luis Alberto Soriano RN  Medication Review/Management: medications reviewed  Taken 7/11/2024 2000 by Luis Alberto Soriano RN  Medication Review/Management: medications reviewed  Taken 7/11/2024 1900 by Luis Alberto Soriano RN  Medication Review/Management: medications reviewed     Problem: Nutrition Impairment (Mechanical Ventilation, Invasive)  Goal: Optimal Nutrition Delivery  Outcome: Ongoing, Progressing     Problem: Skin and Tissue Injury (Mechanical Ventilation, Invasive)  Goal: Absence of Device-Related Skin and Tissue Injury  Outcome: Ongoing, Progressing  Intervention: Maintain Skin and Tissue Health  Recent Flowsheet Documentation  Taken 7/11/2024 2000 by Luis Alberto Soriano RN  Device Skin Pressure Protection: skin-to-device areas padded     Problem: Ventilator-Induced Lung Injury (Mechanical Ventilation, Invasive)  Goal: Absence of Ventilator-Induced Lung Injury  Outcome: Ongoing, Progressing  Intervention: Prevent Ventilator-Associated Pneumonia  Recent Flowsheet Documentation  Taken 7/12/2024 0500 by Luis Alberto Soriano RN  Head of Bed (HOB) Positioning: HOB at 30-45 degrees  Taken 7/12/2024 0400 by Luis Alberto Soriano RN  Oral Care: teeth brushed - suction toothbrush  Taken 7/12/2024 0100 by Luis Alberto Soriano RN  Head of Bed (HOB) Positioning: HOB at 30 degrees  Taken 7/11/2024 2300 by Luis Alberto Soriano RN  Head of Bed (HOB) Positioning: HOB at 30-45 degrees  Taken 7/11/2024 2100 by Luis Alberto Soriano RN  Head of Bed (HOB) Positioning: HOB at 30-45 degrees  Taken 7/11/2024 1900 by Luis Alberot Soriano RN  Head of Bed (HOB) Positioning: HOB at 30-45 degrees     Problem: Skin Injury Risk Increased  Goal: Skin Health and Integrity  Outcome: Ongoing, Progressing  Intervention: Optimize Skin Protection  Recent  Flowsheet Documentation  Taken 7/12/2024 0500 by Luis Alberto Soriano RN  Head of Bed (HOB) Positioning: HOB at 30-45 degrees  Taken 7/12/2024 0100 by Luis Alberto Soriano RN  Head of Bed (HOB) Positioning: HOB at 30 degrees  Taken 7/11/2024 2300 by Luis Alberto Soriano RN  Head of Bed (HOB) Positioning: HOB at 30-45 degrees  Taken 7/11/2024 2100 by Luis Alberto Soriano RN  Head of Bed (HOB) Positioning: HOB at 30-45 degrees  Taken 7/11/2024 2000 by Luis Alberto Soriano RN  Pressure Reduction Techniques: heels elevated off bed  Pressure Reduction Devices: specialty bed utilized  Skin Protection: adhesive use limited  Taken 7/11/2024 1900 by Luis Alberto Soriano RN  Head of Bed (HOB) Positioning: HOB at 30-45 degrees     Problem: Restraint, Nonviolent  Goal: Absence of Harm or Injury  Outcome: Ongoing, Progressing  Intervention: Implement Least Restrictive Safety Strategies  Recent Flowsheet Documentation  Taken 7/12/2024 0600 by Luis Alberto Soriano RN  Medical Device Protection:   torso covered   tubing secured  Less Restrictive Alternative: environment adjusted  De-Escalation Techniques: stimulation decreased  Diversional Activities: television  Taken 7/12/2024 0400 by Luis Alberto Soriano RN  Medical Device Protection:   torso covered   tubing secured  Less Restrictive Alternative: environment adjusted  De-Escalation Techniques: stimulation decreased  Diversional Activities: television  Taken 7/12/2024 0200 by Luis Alberto Soriano RN  Medical Device Protection:   torso covered   tubing secured  Less Restrictive Alternative: environment adjusted  De-Escalation Techniques: stimulation decreased  Diversional Activities: television  Taken 7/12/2024 0000 by Luis Alberto Soriano RN  Medical Device Protection:   torso covered   tubing secured  Less Restrictive Alternative: environment adjusted  De-Escalation Techniques: stimulation decreased  Diversional Activities: television  Taken 7/11/2024 2200 by Luis Alberto Soriano RN  Medical Device Protection:   torso covered    tubing secured  Less Restrictive Alternative: environment adjusted  De-Escalation Techniques: stimulation decreased  Diversional Activities: television  Taken 7/11/2024 2000 by Luis Alberto Soriano RN  Medical Device Protection:   torso covered   tubing secured  Less Restrictive Alternative: environment adjusted  De-Escalation Techniques: stimulation decreased  Diversional Activities: television  Intervention: Protect Dignity, Rights, and Personal Wellbeing  Recent Flowsheet Documentation  Taken 7/11/2024 2000 by Luis Alberto Soriano RN  Trust Relationship/Rapport: care explained  Intervention: Protect Skin and Joint Integrity  Recent Flowsheet Documentation  Taken 7/12/2024 0500 by Luis Alberto Soriano RN  Body Position: position changed independently  Taken 7/12/2024 0100 by Luis Alberto Soriano RN  Body Position:   turned   left   side-lying  Taken 7/11/2024 2300 by Luis Alberto Soriano RN  Body Position: supine  Taken 7/11/2024 2100 by Luis Alberto Soriano RN  Body Position:   turned   right   side-lying  Taken 7/11/2024 1900 by Luis Alberto Soriano RN  Body Position:   turned   left   side-lying     Problem: Fall Injury Risk  Goal: Absence of Fall and Fall-Related Injury  Outcome: Ongoing, Progressing  Intervention: Identify and Manage Contributors  Recent Flowsheet Documentation  Taken 7/12/2024 0600 by Luis Alberto Soriano RN  Medication Review/Management: medications reviewed  Taken 7/12/2024 0500 by Luis Alberto Soriano RN  Medication Review/Management: medications reviewed  Taken 7/12/2024 0200 by Luis Alberto Soriano RN  Medication Review/Management: medications reviewed  Taken 7/12/2024 0100 by Luis Alberto Soriano RN  Medication Review/Management: medications reviewed  Taken 7/12/2024 0000 by Luis Alberto Soriano RN  Medication Review/Management: medications reviewed  Taken 7/11/2024 2300 by Luis Alberto Soriano RN  Medication Review/Management: medications reviewed  Taken 7/11/2024 2200 by Luis Alberto Soriano RN  Medication Review/Management: medications reviewed  Taken  7/11/2024 2100 by Luis Alberto Soriano RN  Medication Review/Management: medications reviewed  Taken 7/11/2024 2000 by Luis Alberto Soriano RN  Medication Review/Management: medications reviewed  Taken 7/11/2024 1900 by Luis Alberto Soriano RN  Medication Review/Management: medications reviewed  Intervention: Promote Injury-Free Environment  Recent Flowsheet Documentation  Taken 7/12/2024 0600 by Luis Alberto Soriano RN  Safety Promotion/Fall Prevention: safety round/check completed  Taken 7/12/2024 0500 by Luis Alberto Soriano RN  Safety Promotion/Fall Prevention: safety round/check completed  Taken 7/12/2024 0200 by Luis Alberto Soriano RN  Safety Promotion/Fall Prevention: safety round/check completed  Taken 7/12/2024 0100 by Luis Alberto Soriano RN  Safety Promotion/Fall Prevention: safety round/check completed  Taken 7/12/2024 0000 by Luis Alberto Soriano RN  Safety Promotion/Fall Prevention: safety round/check completed  Taken 7/11/2024 2300 by Luis Alberto Soriano RN  Safety Promotion/Fall Prevention: safety round/check completed  Taken 7/11/2024 2200 by Luis Alberto Soriano RN  Safety Promotion/Fall Prevention: safety round/check completed  Taken 7/11/2024 2100 by Luis Alberto Soriano RN  Safety Promotion/Fall Prevention: safety round/check completed  Taken 7/11/2024 2000 by Luis Alberto Soriano RN  Safety Promotion/Fall Prevention: safety round/check completed  Taken 7/11/2024 1900 by Luis Alberto Soriano RN  Safety Promotion/Fall Prevention: safety round/check completed     Problem: Aspiration (Enteral Nutrition)  Goal: Absence of Aspiration Signs and Symptoms  Outcome: Ongoing, Progressing  Intervention: Minimize Aspiration Risk  Recent Flowsheet Documentation  Taken 7/12/2024 0500 by Luis Alberto Soriano RN  Head of Bed (HOB) Positioning: HOB at 30-45 degrees  Taken 7/12/2024 0400 by Luis Alberto Soriano RN  Oral Care: teeth brushed - suction toothbrush  Taken 7/12/2024 0100 by Luis Alberto Soriano RN  Head of Bed (HOB) Positioning: HOB at 30 degrees  Taken 7/11/2024 2300 by  Luis Alberto Soriano, RN  Head of Bed (HOB) Positioning: HOB at 30-45 degrees  Taken 7/11/2024 2100 by Luis Alberto Soriano RN  Head of Bed (HOB) Positioning: HOB at 30-45 degrees  Taken 7/11/2024 1900 by Luis Alberto Soriano, RN  Head of Bed (HOB) Positioning: HOB at 30-45 degrees     Problem: Device-Related Complication Risk (Enteral Nutrition)  Goal: Safe, Effective Therapy Delivery  Outcome: Ongoing, Progressing     Problem: Feeding Intolerance (Enteral Nutrition)  Goal: Feeding Tolerance  Outcome: Ongoing, Progressing

## 2024-07-12 NOTE — DISCHARGE SUMMARY
"      [facility] Intensivist Services  DISCHARGE SUMMARY       Date of Admission: 7/8/2024  Date of Discharge:  7/12/2024  Primary Care Physician: Rosie Olvera MD    Presenting Problem/History of Present Illness:  28-year-old female with past medical history of autism, and pseudoseizures that presents to the emergency room with jerking movements that were stopped once a nasopharyngeal airway was placed.  She had received Versed in transport by EMS.  She had hide hospitalizations as being treated aggressively for what was thought to be seizures and required intubation and propofol drip several locations in the past.  On her last visit in February of last year she was evaluated by Dr. Schreiber who recommended her neurologist neurologist wean her off antiepileptic medications.     After this jerking movements episode was stopped, she reported that she might of swallowed a battery and some mild minutes she was chewing on.  X-ray of the abdomen shows 2 metallic foreign objects in the left mid abdomen, and CT abdomen showed 2 foreign bodies present the posterior wall of the stomach without pneumoperitoneum favoring an intraluminal foreign body.  Gastroenterologist was called and recommended admission to our services.  The patient is chewing on a marker does not appear distressed no complaints of pain, she is interested in reporting that she has \"epilepsy\".     The patient went for endoscopy with gastroenterology on 7/9/2024 to further evaluate the 2 foreign objects seen on x-ray of the abdomen and CT of the abdomen.  However, while the patient was recovering in PACU, she began to display seizure-like activity and was unable to protect her airway.  Therefore, she was intubated by anesthesia.  She was then transferred to the ICU for closer monitoring.     I saw this patient shortly after arrival to the ICU.  She reportedly had seizure-like activity upon her initial arrival to " the ICU.  However, saw her within 1 to 2 minutes of her arrival, and the seizure-like activity had ceased.  Neurology has been consulted, and they believe this is likely psychogenic nonepileptic spells, and not true seizure activity.  General surgery was also consulted as her endoscopy showed that the 2 foreign objects she had swallowed had both passed her stomach.  We will obtain a daily KUB.  We will follow any recommendations per general surgery.  We have kept the patient intubated at this time in case patient will need surgery in the near future.       Final Discharge Diagnoses:  Active Hospital Problems    Diagnosis     **Foreign body ingestion     Pseudoseizures        Consults: Neurology, General Surgery, Gastroenterology    Procedures Performed:      Tian Morris MD   Physician  Gastroenterology     Procedures      Signed     Date of Service: 07/09/24 0932  Creation Time: 07/09/24 0932     Signed         ESOPHAGOGASTRODUODENOSCOPY     Date:  7/9/2024     Indications: Ingested metallic foreign body     Postprocedure diagnosis: Normal upper endoscopy to descending duodenum, metallic foreign body not seen procedure: ESOPHAGOGASTRODUODENOSCOPY      Sedation: As per anesthesia.     Surgeon: Jamie Morris MD     Anesthesia: General anesthesia with endotracheal intubation     Procedure  Description: After informed consent was obtained, a timeout was called in the endoscopy suite to confirm the correct patient and appropriate procedure.  Thereafter with the patient in the left lateral position, esophagus was intubated under direct vision with adult Olympus gastroscope.  Thereafter scope was slowly advanced into the second portion of the duodenum under direct vision.  Careful examination of the duodenum, stomach and esophagus was performed while slowly withdrawing the scope.  Retroflex examination of the gastric cardia and fundus were also performed.     Findings: After informed consent and medications and  endotracheal intubation per the anesthesia service, the Olympus diagnostic video upper endoscope was advanced into the esophagus under direct visualization without difficulty.  The esophagus appeared unremarkable.  The squamocolumnar junction and proximal gastric folds were noted at approximately 39 cm.  The endoscope passed easily into an easily distensible stomach which appeared normal.  Retroflexed view of the cardia and fundus were normal.  The pylorus was patent and the duodenum was normal to the descending portion within the reach of the endoscope.  No metallic foreign bodies were seen and these most likely have passed distally into the small bowel.     Complications: No immediate complications.     Recommendations: Would recommend a general surgical consultation for an opinion related to ingestion of suspected button battery and magnets which appear to be associated with each other and passed into the small bowel.  Hopefully, these foreign objects will passed through the ileocecal valve and anal canal into the stool.  The risk of pressure necrosis of the bowel is much lower if the magnets and the batteries stay together as they passed through the intestinal tract.  Would recommend daily abdominal x-rays to monitor progress.  May advance diet as tolerated from a gastrointestinal standpoint if approved by the general surgical service.  The inpatient GI service will follow the patient peripherally at this point.  The above was discussed in detail with the patient's admitting physician.  Thank you     Procedure CPT code: Unknown     Post-Op Diagnosis Codes:     * Swallowed foreign body, initial encounter [T18.9XXA]     Tian Morris MD     DISCLAIMER:    This physician works through a [employer] as an inpatient consultant gastroenterologist only and has no outpatient clinic for patient follow up.  Any results not available at time of inpatient discharge and/or GI clinic follow up should be managed by  the hospitalist team, PCP, or outpatient gastroenterologist.                        Lv Garcia MD   Physician  Specialty: Anesthesiology     Anesthesia Procedure Notes     Signed     Date of Service: 07/09/24 1110  Creation Time: 07/09/24 1110  Procedure Orders   Airway [[id number]] ordered by Lv Garcia MD          Signed          Airway  Urgency: emergent     Date/Time: 7/9/2024 9:10 AM  End Time:7/9/2024 11:10 AM  Airway not difficult     General Information and Staff     Patient location during procedure: PACU  Anesthesiologist: Lv Garcia MD     Indications and Patient Condition  Indications for airway management: airway protection     Preoxygenated: yes  Mask difficulty assessment: 1 - vent by mask     Final Airway Details  Final airway type: endotracheal airway        Successful airway: ETT  Cuffed: yes   Successful intubation technique: direct laryngoscopy  Facilitating devices/methods: intubating stylet  Endotracheal tube insertion site: oral  Blade: Guallpa  Blade size: 3  ETT size (mm): 8.0  Cormack-Lehane Classification: grade I - full view of glottis  Placement verified by: chest auscultation and capnometry   Measured from: lips  ETT/EBT  to lips (cm): 22  Number of attempts at approach: 1  Assessment: lips, teeth, and gum same as pre-op and atraumatic intubation     Additional Comments  Pt with continued apparent seizure activity with shortening break free episodes thus requiring airway protection. Called Dr Esposito                              Pertinent Test Results:       Imaging Results (All)       Procedure Component Value Units Date/Time    XR Abdomen 1 View [[id number]] Collected: 07/12/24 0705     Updated: 07/12/24 0711    Narrative:      EXAMINATION: XR ABDOMEN 1 VW-     7/12/2024 2:12 AM     HISTORY: eval progression of foreign body; T18.9XXA-Foreign body of  alimentary tract, part unspecified, initial encounter; R56.9-Unspecified  convulsions     A frontal projection of the abdomen  is compared with the previous study  dated 7/11/2024.     The previously seen metallic objects are now seen in the proximal  transverse colon. It was in the proximal ascending colon in the previous  study.     Moderately dilated entire large bowel, measuring up to 5.8 cm in  diameter, is seen. No finding to suggest obstruction. No evidence of  small bowel dilatation.     The distal end of nasogastric tube is in place.     No acute bony abnormality.       Impression:      1. The metallic foreign bodies are now in the proximal transverse colon.  2. Moderate diffuse dilatation of the entire large bowel probably  represent ileus. Further follow-up may be obtained.              This report was signed and finalized on 7/12/2024 7:08 AM by Dr. Medina Braun MD.       XR Chest 1 View [[id number]] Collected: 07/12/24 0701     Updated: 07/12/24 0707    Narrative:      EXAM/TECHNIQUE: XR CHEST 1 VW-     INDICATION: intubated; T18.9XXA-Foreign body of alimentary tract, part  unspecified, initial encounter; R56.9-Unspecified convulsions     COMPARISON: 7/9/2024     FINDINGS:     Endotracheal tube is 3.8 cm above the elizabeth. Enteric tube terminates  below the diaphragm out of the field-of-view.     Cardiac silhouette is within normal limits. No pleural effusion or  visible pneumothorax. No focal consolidation. No acute osseous finding.       Impression:      No change in appearance of the chest.     This report was signed and finalized on 7/12/2024 7:04 AM by Dr. Carlos Manuel Jasmine MD.       XR Abdomen 1 View [[id number]] Collected: 07/11/24 0645     Updated: 07/11/24 0650    Narrative:      EXAMINATION: XR ABDOMEN 1 VW-     7/11/2024 2:17 AM     HISTORY: eval for foreign body progression; T18.9XXA-Foreign body of  alimentary tract, part unspecified, initial encounter; R56.9-Unspecified  convulsions     A frontal projection of the abdomen is compared with the previous study  dated 7/10/2024.     There are radiopaque/metallic  foreign bodies are reidentified and  appears to be located in the right lower abdomen in the ascending colon  similar to the previous study. No significant change in position.     There is moderate gas and stool in the colon. No significant dilatation  of small bowel loops. Moderate gas in the stomach.     Distal end of nasogastric tube is seen in the mid stomach. Distal  sideport is in the proximal stomach. No change in position.     Both kidneys are obscured by the bowel contents. No radiopaque calculi.     There is evidence of prior cholecystectomy.     No acute bony abnormality.       Impression:      1. No change in position of the radiopaque/metallic foreign body in the  right lower abdomen. The abdominal gas pattern is unremarkable.     This report was signed and finalized on 7/11/2024 6:47 AM by Dr. Medina Braun MD.       XR Abdomen KUB [[id number]] Collected: 07/10/24 0620     Updated: 07/10/24 0627    Narrative:      EXAMINATION: XR ABDOMEN KUB-     7/10/2024 2:20 AM     HISTORY: f/u xray for passage of swallowed metallic objects;  T18.9XXA-Foreign body of alimentary tract, part unspecified, initial  encounter; R56.9-Unspecified convulsions     A frontal projection of the abdomen is compared with the previous study  dated 7/9/2024.     The previously seen metallic objects are now seen in the right lower  abdomen in the area of the proximal ascending colon.     There is moderate gas and stool in the colon.     Distal end of nasogastric tube is in the mid to distal stomach. The  distal sideport is in the proximal stomach. The position of the tube has  not significantly changed in the previous study.     Both kidneys are obscured by the bowel contents. No definite radiopaque  calculi.     There is evidence of prior cholecystectomy.     No acute bony abnormality.       Impression:      1. The metallic foreign bodies are seen in the right lower abdomen in  the area of the proximal ascending colon. The  abdominal gas pattern is  unremarkable. No evidence of obstruction or ileus.  2. Nasogastric tube in place. No change since the previous study.        This report was signed and finalized on 7/10/2024 6:23 AM by Dr. Medina Braun MD.       XR Abdomen KUB [[id number]] Collected: 07/09/24 1848     Updated: 07/09/24 1857    Narrative:      XR ABDOMEN KUB-     HISTORY: OG placement verification; T18.9XXA-Foreign body of alimentary  tract, part unspecified, initial encounter; R56.9-Unspecified  convulsions     COMPARISON: 7/9/2024 3:59 p.m.     FINDINGS: Frontal view of the upper abdomen obtained.     The enteric tube tip is present within the distal body of the stomach.  Cholecystectomy clips. No dilated loops of bowel seen within the upper  abdomen. Lung bases appear clear.       Impression:      1. Enteric tube tip in the distal body of the stomach.        This report was signed and finalized on 7/9/2024 6:54 PM by Dr. Neelam Hogue MD.       XR Abdomen 1 View [[id number]] Collected: 07/09/24 1625     Updated: 07/09/24 1631    Narrative:      EXAM: XR ABDOMEN 1 VW-      DATE: 7/9/2024 3:15 PM     HISTORY: eval progression of foreign body; T18.9XXA-Foreign body of  alimentary tract, part unspecified, initial encounter; R56.9-Unspecified  convulsions       COMPARISON: 7/9/2024 at 2:21 a.m.     TECHNIQUE:   Frontal view of the abdomen. 1 image.     FINDINGS:    Metallic foreign body projects to the left of midline at the level of  the upper sacrum. Bowel gas pattern is nonspecific but nonobstructive.  There is mild distention of the stomach with air.          Impression:         1. Metallic foreign body projects to the left of midline at the level of  the upper sacrum.     This report was signed and finalized on 7/9/2024 4:28 PM by Collins Lam.       XR Chest 1 View [[id number]] Collected: 07/09/24 1121     Updated: 07/09/24 1125    Narrative:      EXAM: XR CHEST 1 VW-      DATE: 7/9/2024 10:09 AM     HISTORY:  Confirm ET Tube Placement; T18.9XXA-Foreign body of alimentary  tract, part unspecified, initial encounter; R56.9-Unspecified  convulsions       COMPARISON: 7/9/2024 at 1:23 a.m.     TECHNIQUE:  Frontal view(s) of the chest submitted.     FINDINGS:    An ET tube has been placed with tip 4.2 cm above the elizabeth. Lungs are  grossly clear. No effusion or pneumothorax is seen. Heart and  mediastinum are unremarkable.          Impression:         1. ET tube placement with tip above the elizabeth and no active disease in  the chest.     This report was signed and finalized on 7/9/2024 11:22 AM by Collins Lam.       XR Chest 1 View [[id number]] Collected: 07/09/24 0551     Updated: 07/09/24 0556    Narrative:      EXAMINATION: XR CHEST 1 VW-     7/9/2024 12:29 AM     HISTORY: possible swallowed foreign body     A frontal projection of the chest is compared with the previous study  dated 2/1/2023.     The lungs are poorly expanded.     No radiopaque foreign in the chest and included proximal part of the  abdomen is noted.     There is no evidence of recent infiltrate, pleural effusion, pulmonary  congestion or pneumothorax.     The heart size is in the normal range.     No acute bony abnormality.       Impression:      1. No radiopaque foreign body in the chest or limited visualized  abdomen.  2. Poor lung expansion. No active cardiopulmonary disease.     This report was signed and finalized on 7/9/2024 5:53 AM by Dr. Medina Braun MD.       XR Abdomen KUB [[id number]] Collected: 07/09/24 0548     Updated: 07/09/24 0554    Narrative:      EXAMINATION: XR ABDOMEN KUB-     7/9/2024 1:23 AM     HISTORY: possible ingested fb     A frontal projection of the abdomen is compared with the previous study  obtained earlier today.     Previously seen 2 adjacently located metallic foreign bodies in the left  midabdomen are reidentified. No significant change in position of the  foreign bodies. The anatomical location of these  foreign objects is not  certain. This may be located in the dependent part of the fluid and food  filled distended stomach?.     There is moderate gas and stool in the colon. No evidence of dilatation  of the small or large bowel loops.     There is evidence of prior cholecystectomy.     There is no acute bony abnormality.       Impression:      1. 2 adjacent is located metallic foreign objects in the left mid  abdomen. The anatomical location of these objects is not certain.  Further follow-up may be obtained.  2. The remaining abdomen is unremarkable.              This report was signed and finalized on 7/9/2024 5:51 AM by Dr. Medina Braun MD.       XR Abdomen KUB [[id number]] Collected: 07/09/24 0541     Updated: 07/09/24 0551    Narrative:      EXAMINATION: XR ABDOMEN KUB-     7/9/2024 12:29 AM     HISTORY: possible swallowed foreign body     A frontal projection of the abdomen is compared with the previous study  dated 1/25/2023.     There are metallic objects projected over the mid central and left  paracentral/lower abdomen which may represent the ingested foreign  bodies as suggested in the history. This may represent  nut an  bolt?. The exact anatomical location of these foreign bodies is not  certain. There may be an independent part of this fluid-filled stomach  are in the proximal to mid small bowel?. There is moderate gas and stool  in the colon. There is small amount of gas in the gastric fundus.     There is evidence of prior cholecystectomy.     Both kidneys are obscured by the bowel contents. No definite radiopaque  calculi.     There is no acute bony abnormality.       Impression:      1. 2 metallic object projected over the central mid and left lower  abdomen which may represent the swallowed foreign body as suggested in  the history. The anatomical location of the foreign body is not certain.           This report was signed and finalized on 7/9/2024 5:48 AM by Dr. Medina CHRISTIE  MD Kade.       CT Abdomen Pelvis Without Contrast [[id number]] Collected: 07/09/24 0449     Updated: 07/09/24 0518    Narrative:      CT ABDOMEN PELVIS WO CONTRAST- 7/9/2024 1:49 AM     HISTORY: possible ingested foreign body (battery)      COMPARISON: KUB 7/9/2024     TOTAL DOSE LENGTH PRODUCT: 495.19 mGy.cm. Automated exposure control was  also utilized to decrease patient radiation dose.     TECHNIQUE: Axial images of the abdomen and pelvis are performed without  IV contrast.     FINDINGS: There are few scattered subcentimeter smooth margin  well-circumscribed nodules/masses of the bilateral breast parenchyma  supporting a benign process given the morphology and multiplicity. The  visible lung bases are unremarkable.     The nonenhanced liver, spleen, pancreas, and adrenal glands are  unremarkable. Cholecystectomy clips. No focal renal contour abnormality.  No abnormal perinephric fluid collection. No hydronephrosis. Bladder is  intact. Uterus and ovaries are unremarkable.     There are two foreign bodies within the upper abdomen. There is a  rounded 8 mm foreign body which may represent an ingested battery  positioned immediately right of a cylindrical rib 2.9 cm foreign body  with extensive streak artifact of uncertain etiology. These are  positioned along the posterior dependent portion of the distal stomach  (sagittal series 900 image 29 through 36). There is no free air to  suggest a perforation. Moderate fecal stasis. Normal appendix. No small  bowel dilatation. Fatty containing umbilical hernia. No pathologic  lymphadenopathy identified. Normal caliber abdominal aorta.     No focal destructive regional bony lesion.       Impression:      1. There are 2 foreign bodies present along the dependent posterior wall  of the distal stomach (8 mm rounded foreign body which may represent an  ingested battery with an adjacent 2.9 cm cylindrical ribbed foreign body  of uncertain etiology causing extensive streak  artifact) without  pneumoperitoneum favoring a dependent intraluminal position within the  distal stomach. Moderate fecal stasis. No evidence for gastric or bowel  obstruction. Small fatty containing umbilical hernia. Prior  cholecystectomy.  2. Few scattered well-circumscribed bilateral subcentimeter nodules of  the visible breast parenchyma supporting a benign process given the  morphology and multiplicity.     This report was signed and finalized on 7/9/2024 5:15 AM by Dr. Neelam Hogue MD.               Result Review    Result Review:  I have personally reviewed the results from the time of this admission to 7/12/2024 13:24 CDT and agree with these findings:  [x]  Laboratory list / accordion  []  Microbiology  [x]  Radiology  [x]  EKG/Telemetry   []  Cardiology/Vascular   []  Pathology  [x]  Old records  []  Other:    Hospital Course:   28-year-old female with autism, major depressive disorder, anxiety disorder, and pseudoseizures/psychogenic nonepileptic spells, presented to the hospital on 7/8/2024 after having pseudoseizure spell and telling her mother she might have swallowed a battery or magnet.  She underwent endoscopy as above for retrieval of this foreign body, but none was found.  An obvious metallic foreign body was noted on abdominal x-ray.  Patient had another seizure-like spell prompting intubation in the PACU.  Patient remained in the ICU intubated on mechanical ventilator while general surgery, gastroenterology evaluated for this foreign body.  It was deemed appropriate for the foreign body to pass naturally.  It status was tracked on serial abdominal x-ray with the last being in the transverse colon.  GI recommends follow-up in a few days with a repeat abdominal x-ray.  Neurology evaluated while inpatient.  They reviewed all previous records and noted patient to have nonepileptic psychogenic spells.  They recommend that she follow-up with her primary neurologist Dr. Declan Lira for weaning  "off of her antiepileptic medications.  Patient was extubated on 7/12/2024.  Her mentation was baseline with no evidence of seizure-like activity.  She has been cleared by neurology and gastroenterology with recommendations for outpatient follow-up.      Physical Exam on Discharge:  /66   Pulse 97   Temp 96.8 °F (36 °C) (Axillary)   Resp 18   Ht 172.7 cm (68\")   Wt 94.1 kg (207 lb 6.4 oz)   LMP  (LMP Unknown) Comment: neg hcg  SpO2 100%   BMI 31.54 kg/m²   Physical Exam  Constitutional:       General: She is not in acute distress.  Eyes:      Pupils: Pupils are equal, round, and reactive to light.   Cardiovascular:      Rate and Rhythm: Normal rate and regular rhythm.   Pulmonary:      Effort: Pulmonary effort is normal.      Breath sounds: Normal breath sounds.   Abdominal:      General: Bowel sounds are normal.      Palpations: Abdomen is soft.   Skin:     General: Skin is warm and dry.      Capillary Refill: Capillary refill takes less than 2 seconds.   Neurological:      General: No focal deficit present.      Mental Status: She is alert.   Psychiatric:      Comments: Patient with flat affect, currently nonverbal and not responding to questions         Condition on Discharge: Stable    Discharge Disposition:  Home or Self Care    Discharge Medications:     Discharge Medications        Continue These Medications        Instructions Start Date   acetaminophen 500 MG tablet  Commonly known as: TYLENOL   500 mg, Oral, Every 6 Hours PRN      albuterol sulfate  (90 Base) MCG/ACT inhaler  Commonly known as: PROVENTIL HFA;VENTOLIN HFA;PROAIR HFA   2 puffs, Inhalation, Every 4 Hours PRN      doxazosin 1 MG tablet  Commonly known as: CARDURA   1 mg, Oral, 2 Times Daily      FLUoxetine 40 MG capsule  Commonly known as: PROzac   80 mg, Oral, Daily      hydrOXYzine 25 MG tablet  Commonly known as: ATARAX   25 mg, Oral, 3 Times Daily PRN      lacosamide 100 MG tablet tablet  Commonly known as: VIMPAT   100 " mg, Oral, 2 Times Daily      lamoTRIgine 100 MG tablet  Commonly known as: LaMICtal   250 mg, Oral, Daily      levETIRAcetam 1000 MG tablet  Commonly known as: KEPPRA   1,000 mg, Oral, 2 Times Daily      montelukast 10 MG tablet  Commonly known as: SINGULAIR   10 mg, Oral, Nightly      OLANZapine zydis 5 MG disintegrating tablet  Commonly known as: zyPREXA   5 mg, Oral, 3 times daily      pantoprazole 40 MG EC tablet  Commonly known as: PROTONIX   40 mg, Oral, Daily      senna 8.6 MG tablet  Commonly known as: SENOKOT   2 tablets, Oral, Daily      VALTOCO 15 MG DOSE NA   15 mg, Nasal, As Needed             Discharge Diet: Regular    Activity at Discharge: Ad michi.    Follow-up Appointments:   With neurologist Dr. Declan Lira within 1 week, recommend for weaning from antiepileptic regimen    With gastroenterology in 1 to 2 days with abdominal x-ray to follow foreign body    Test Results Pending at Discharge: None    Electronically signed by TELMA Feldman on 7/12/2024 at 13:34 CDT    Time: 37 minutes.

## 2024-07-12 NOTE — PROGRESS NOTES
Neuro following peripherally as patient is still sedated and intubated.  Please call with any questions or if we can be of any assistance.    Electronically signed by Oscra Martínez MD, 07/12/24, 9:08 AM CDT.

## 2024-07-12 NOTE — PROGRESS NOTES
RT EQUIPMENT DEVICE RELATED - SKIN ASSESSMENT    Nikko Score:  Nikko Score: 13     RT Medical Equipment/Device:     ETT [place]/Anchorfast    Skin Assessment:      Cheek:  Intact  Lips:  Intact  Mouth:  Intact    Device Skin Pressure Protection:  Skin-to-device areas padded:  Anchorfast    Nurse Notification:  Milady Adam, RRT

## 2024-07-13 ENCOUNTER — READMISSION MANAGEMENT (OUTPATIENT)
Dept: CALL CENTER | Facility: HOSPITAL | Age: 29
End: 2024-07-13
Payer: MEDICAID

## 2024-07-13 NOTE — OUTREACH NOTE
Prep Survey      Flowsheet Row Responses   [facility] facility patient discharged from? [facility]   Is LACE score < 7 ? No   Eligibility Readm Mgmt   Discharge diagnosis Foreign body ingestion   Does the patient have one of the following disease processes/diagnoses(primary or secondary)? Other   Prep survey completed? Yes            Yoly ROSALES - Registered Nurse

## 2024-07-15 NOTE — PAYOR COMM NOTE
"REF:  [id number]/ [id number]     [facility]  FAX   [phone]      Earl Cooley (28 y.o. Female)       Date of Birth   1995    Social Security Number       Address   [address] [place] [address] [place] [address]    Home Phone   [phone]    MRN   [id number]       Caodaism   Other    Marital Status                               Admission Date   7/8/24    Admission Type   Emergency    Admitting Provider   Luisito Nguyen MD    Attending Provider       Department, Room/Bed   [facility] INTENSIVE CARE, I009/1       Discharge Date   7/12/2024    Discharge Disposition   Home or Self Care    Discharge Destination   Home                              Attending Provider: (none)   Allergies: No Known Allergies    Isolation: None   Infection: None   Code Status: Prior    Ht: 172.7 cm (68\")   Wt: 94.1 kg (207 lb 6.4 oz)    Admission Cmt: None   Principal Problem: Foreign body ingestion [T18.9XXA]                   Active Insurance as of 7/8/2024       Primary Coverage       Payor Plan Insurance Group Employer/Plan Group    [facility] COMMUNITY PLAN [facility] COMMUNITY PLAN [facility]       Payor Plan Address Payor Plan Phone Number Payor Plan Fax Number Effective Dates    [address]   1/1/2024 - [place]    [facility] [id number]         Subscriber Name Subscriber Birth Date Member ID       EARL COOLEY 1995 [id number]                     Emergency Contacts        (Rel.) Home Phone Work Phone Mobile Phone    Kelli Cooley (Mother) -- -- [phone]    SARBJIT FUNG (Spouse) [phone] -- [phone]    Kelli Cooley (Mother) [phone] -- --                 Discharge Summary        Alistair Torres APRN at 07/12/24 1323       Attestation signed by Luisito Nguyen MD at 07/12/24 1700    I have reviewed this documentation and agree.    I have seen and examined the patient and reviewed the chart and the blood work and radiology and discussed the patient " "with the midlevel and the team and put the plan of the management and I agree with the above      We have discussed the case in details with gastroenterology about the foreign body in the progress of movement in the bowel and they were completely okay with her going chasity    Time spent on discharge was more than 30 minutes                      [facility] Intensivist Services  DISCHARGE SUMMARY       Date of Admission: 7/8/2024  Date of Discharge:  7/12/2024  Primary Care Physician: Rosie Olvera MD    Presenting Problem/History of Present Illness:  28-year-old female with past medical history of autism, and pseudoseizures that presents to the emergency room with jerking movements that were stopped once a nasopharyngeal airway was placed.  She had received Versed in transport by EMS.  She had hide hospitalizations as being treated aggressively for what was thought to be seizures and required intubation and propofol drip several locations in the past.  On her last visit in February of last year she was evaluated by Dr. Schreiber who recommended her neurologist neurologist wean her off antiepileptic medications.     After this jerking movements episode was stopped, she reported that she might of swallowed a battery and some mild minutes she was chewing on.  X-ray of the abdomen shows 2 metallic foreign objects in the left mid abdomen, and CT abdomen showed 2 foreign bodies present the posterior wall of the stomach without pneumoperitoneum favoring an intraluminal foreign body.  Gastroenterologist was called and recommended admission to our services.  The patient is chewing on a marker does not appear distressed no complaints of pain, she is interested in reporting that she has \"epilepsy\".     The patient went for endoscopy with gastroenterology on 7/9/2024 to further evaluate the 2 foreign objects seen on x-ray of the abdomen and CT of the abdomen.  However, while the patient " was recovering in PACU, she began to display seizure-like activity and was unable to protect her airway.  Therefore, she was intubated by anesthesia.  She was then transferred to the ICU for closer monitoring.     I saw this patient shortly after arrival to the ICU.  She reportedly had seizure-like activity upon her initial arrival to the ICU.  However, saw her within 1 to 2 minutes of her arrival, and the seizure-like activity had ceased.  Neurology has been consulted, and they believe this is likely psychogenic nonepileptic spells, and not true seizure activity.  General surgery was also consulted as her endoscopy showed that the 2 foreign objects she had swallowed had both passed her stomach.  We will obtain a daily KUB.  We will follow any recommendations per general surgery.  We have kept the patient intubated at this time in case patient will need surgery in the near future.       Final Discharge Diagnoses:  Active Hospital Problems    Diagnosis     **Foreign body ingestion     Pseudoseizures        Consults: Neurology, General Surgery, Gastroenterology    Procedures Performed:      Tian Morris MD   Physician  Gastroenterology     Procedures      Signed     Date of Service: 07/09/24 0932  Creation Time: 07/09/24 0932     Signed         ESOPHAGOGASTRODUODENOSCOPY     Date:  7/9/2024     Indications: Ingested metallic foreign body     Postprocedure diagnosis: Normal upper endoscopy to descending duodenum, metallic foreign body not seen procedure: ESOPHAGOGASTRODUODENOSCOPY      Sedation: As per anesthesia.     Surgeon: Jamie Morris MD     Anesthesia: General anesthesia with endotracheal intubation     Procedure  Description: After informed consent was obtained, a timeout was called in the endoscopy suite to confirm the correct patient and appropriate procedure.  Thereafter with the patient in the left lateral position, esophagus was intubated under direct vision with adult Olympus gastroscope.  Thereafter  scope was slowly advanced into the second portion of the duodenum under direct vision.  Careful examination of the duodenum, stomach and esophagus was performed while slowly withdrawing the scope.  Retroflex examination of the gastric cardia and fundus were also performed.     Findings: After informed consent and medications and endotracheal intubation per the anesthesia service, the Olympus diagnostic video upper endoscope was advanced into the esophagus under direct visualization without difficulty.  The esophagus appeared unremarkable.  The squamocolumnar junction and proximal gastric folds were noted at approximately 39 cm.  The endoscope passed easily into an easily distensible stomach which appeared normal.  Retroflexed view of the cardia and fundus were normal.  The pylorus was patent and the duodenum was normal to the descending portion within the reach of the endoscope.  No metallic foreign bodies were seen and these most likely have passed distally into the small bowel.     Complications: No immediate complications.     Recommendations: Would recommend a general surgical consultation for an opinion related to ingestion of suspected button battery and magnets which appear to be associated with each other and passed into the small bowel.  Hopefully, these foreign objects will passed through the ileocecal valve and anal canal into the stool.  The risk of pressure necrosis of the bowel is much lower if the magnets and the batteries stay together as they passed through the intestinal tract.  Would recommend daily abdominal x-rays to monitor progress.  May advance diet as tolerated from a gastrointestinal standpoint if approved by the general surgical service.  The inpatient GI service will follow the patient peripherally at this point.  The above was discussed in detail with the patient's admitting physician.  Thank you     Procedure CPT code: Unknown     Post-Op Diagnosis Codes:     * Swallowed foreign body,  initial encounter [T18.9XXA]     Tian Morris MD     DISCLAIMER:    This physician works through a [employer] as an inpatient consultant gastroenterologist only and has no outpatient clinic for patient follow up.  Any results not available at time of inpatient discharge and/or GI clinic follow up should be managed by the hospitalist team, PCP, or outpatient gastroenterologist.                        Lv Garcia MD   Physician  Specialty: Anesthesiology     Anesthesia Procedure Notes     Signed     Date of Service: 07/09/24 1110  Creation Time: 07/09/24 1110  Procedure Orders   Airway [[id number]] ordered by Lv Garcia MD          Signed          Airway  Urgency: emergent     Date/Time: 7/9/2024 9:10 AM  End Time:7/9/2024 11:10 AM  Airway not difficult     General Information and Staff     Patient location during procedure: PACU  Anesthesiologist: Lv Garcia MD     Indications and Patient Condition  Indications for airway management: airway protection     Preoxygenated: yes  Mask difficulty assessment: 1 - vent by mask     Final Airway Details  Final airway type: endotracheal airway        Successful airway: ETT  Cuffed: yes   Successful intubation technique: direct laryngoscopy  Facilitating devices/methods: intubating stylet  Endotracheal tube insertion site: oral  Blade: Guallpa  Blade size: 3  ETT size (mm): 8.0  Cormack-Lehane Classification: grade I - full view of glottis  Placement verified by: chest auscultation and capnometry   Measured from: lips  ETT/EBT  to lips (cm): 22  Number of attempts at approach: 1  Assessment: lips, teeth, and gum same as pre-op and atraumatic intubation     Additional Comments  Pt with continued apparent seizure activity with shortening break free episodes thus requiring airway protection. Called Dr Esposito                              Pertinent Test Results:       Imaging Results (All)       Procedure Component Value Units Date/Time    XR Abdomen 1 View  [[id number]] Collected: 07/12/24 0705     Updated: 07/12/24 0711    Narrative:      EXAMINATION: XR ABDOMEN 1 VW-     7/12/2024 2:12 AM     HISTORY: eval progression of foreign body; T18.9XXA-Foreign body of  alimentary tract, part unspecified, initial encounter; R56.9-Unspecified  convulsions     A frontal projection of the abdomen is compared with the previous study  dated 7/11/2024.     The previously seen metallic objects are now seen in the proximal  transverse colon. It was in the proximal ascending colon in the previous  study.     Moderately dilated entire large bowel, measuring up to 5.8 cm in  diameter, is seen. No finding to suggest obstruction. No evidence of  small bowel dilatation.     The distal end of nasogastric tube is in place.     No acute bony abnormality.       Impression:      1. The metallic foreign bodies are now in the proximal transverse colon.  2. Moderate diffuse dilatation of the entire large bowel probably  represent ileus. Further follow-up may be obtained.              This report was signed and finalized on 7/12/2024 7:08 AM by Dr. Medina Braun MD.       XR Chest 1 View [[id number]] Collected: 07/12/24 0701     Updated: 07/12/24 0707    Narrative:      EXAM/TECHNIQUE: XR CHEST 1 VW-     INDICATION: intubated; T18.9XXA-Foreign body of alimentary tract, part  unspecified, initial encounter; R56.9-Unspecified convulsions     COMPARISON: 7/9/2024     FINDINGS:     Endotracheal tube is 3.8 cm above the elizabeth. Enteric tube terminates  below the diaphragm out of the field-of-view.     Cardiac silhouette is within normal limits. No pleural effusion or  visible pneumothorax. No focal consolidation. No acute osseous finding.       Impression:      No change in appearance of the chest.     This report was signed and finalized on 7/12/2024 7:04 AM by Dr. Carlos Manuel Jasmine MD.       XR Abdomen 1 View [[id number]] Collected: 07/11/24 0645     Updated: 07/11/24 0650    Narrative:       EXAMINATION: XR ABDOMEN 1 VW-     7/11/2024 2:17 AM     HISTORY: eval for foreign body progression; T18.9XXA-Foreign body of  alimentary tract, part unspecified, initial encounter; R56.9-Unspecified  convulsions     A frontal projection of the abdomen is compared with the previous study  dated 7/10/2024.     There are radiopaque/metallic foreign bodies are reidentified and  appears to be located in the right lower abdomen in the ascending colon  similar to the previous study. No significant change in position.     There is moderate gas and stool in the colon. No significant dilatation  of small bowel loops. Moderate gas in the stomach.     Distal end of nasogastric tube is seen in the mid stomach. Distal  sideport is in the proximal stomach. No change in position.     Both kidneys are obscured by the bowel contents. No radiopaque calculi.     There is evidence of prior cholecystectomy.     No acute bony abnormality.       Impression:      1. No change in position of the radiopaque/metallic foreign body in the  right lower abdomen. The abdominal gas pattern is unremarkable.     This report was signed and finalized on 7/11/2024 6:47 AM by Dr. Medina Braun MD.       XR Abdomen KUB [[id number]] Collected: 07/10/24 0620     Updated: 07/10/24 0627    Narrative:      EXAMINATION: XR ABDOMEN KUB-     7/10/2024 2:20 AM     HISTORY: f/u xray for passage of swallowed metallic objects;  T18.9XXA-Foreign body of alimentary tract, part unspecified, initial  encounter; R56.9-Unspecified convulsions     A frontal projection of the abdomen is compared with the previous study  dated 7/9/2024.     The previously seen metallic objects are now seen in the right lower  abdomen in the area of the proximal ascending colon.     There is moderate gas and stool in the colon.     Distal end of nasogastric tube is in the mid to distal stomach. The  distal sideport is in the proximal stomach. The position of the tube has  not significantly  changed in the previous study.     Both kidneys are obscured by the bowel contents. No definite radiopaque  calculi.     There is evidence of prior cholecystectomy.     No acute bony abnormality.       Impression:      1. The metallic foreign bodies are seen in the right lower abdomen in  the area of the proximal ascending colon. The abdominal gas pattern is  unremarkable. No evidence of obstruction or ileus.  2. Nasogastric tube in place. No change since the previous study.        This report was signed and finalized on 7/10/2024 6:23 AM by Dr. Medina Braun MD.       XR Abdomen KUB [[id number]] Collected: 07/09/24 1848     Updated: 07/09/24 1857    Narrative:      XR ABDOMEN KUB-     HISTORY: OG placement verification; T18.9XXA-Foreign body of alimentary  tract, part unspecified, initial encounter; R56.9-Unspecified  convulsions     COMPARISON: 7/9/2024 3:59 p.m.     FINDINGS: Frontal view of the upper abdomen obtained.     The enteric tube tip is present within the distal body of the stomach.  Cholecystectomy clips. No dilated loops of bowel seen within the upper  abdomen. Lung bases appear clear.       Impression:      1. Enteric tube tip in the distal body of the stomach.        This report was signed and finalized on 7/9/2024 6:54 PM by Dr. Neelam Hogue MD.       XR Abdomen 1 View [[id number]] Collected: 07/09/24 1625     Updated: 07/09/24 1631    Narrative:      EXAM: XR ABDOMEN 1 VW-      DATE: 7/9/2024 3:15 PM     HISTORY: eval progression of foreign body; T18.9XXA-Foreign body of  alimentary tract, part unspecified, initial encounter; R56.9-Unspecified  convulsions       COMPARISON: 7/9/2024 at 2:21 a.m.     TECHNIQUE:   Frontal view of the abdomen. 1 image.     FINDINGS:    Metallic foreign body projects to the left of midline at the level of  the upper sacrum. Bowel gas pattern is nonspecific but nonobstructive.  There is mild distention of the stomach with air.          Impression:         1.  Metallic foreign body projects to the left of midline at the level of  the upper sacrum.     This report was signed and finalized on 7/9/2024 4:28 PM by Collins Lam.       XR Chest 1 View [[id number]] Collected: 07/09/24 1121     Updated: 07/09/24 1125    Narrative:      EXAM: XR CHEST 1 VW-      DATE: 7/9/2024 10:09 AM     HISTORY: Confirm ET Tube Placement; T18.9XXA-Foreign body of alimentary  tract, part unspecified, initial encounter; R56.9-Unspecified  convulsions       COMPARISON: 7/9/2024 at 1:23 a.m.     TECHNIQUE:  Frontal view(s) of the chest submitted.     FINDINGS:    An ET tube has been placed with tip 4.2 cm above the elizabeth. Lungs are  grossly clear. No effusion or pneumothorax is seen. Heart and  mediastinum are unremarkable.          Impression:         1. ET tube placement with tip above the elizabeth and no active disease in  the chest.     This report was signed and finalized on 7/9/2024 11:22 AM by Collins Lam.       XR Chest 1 View [[id number]] Collected: 07/09/24 0551     Updated: 07/09/24 0556    Narrative:      EXAMINATION: XR CHEST 1 VW-     7/9/2024 12:29 AM     HISTORY: possible swallowed foreign body     A frontal projection of the chest is compared with the previous study  dated 2/1/2023.     The lungs are poorly expanded.     No radiopaque foreign in the chest and included proximal part of the  abdomen is noted.     There is no evidence of recent infiltrate, pleural effusion, pulmonary  congestion or pneumothorax.     The heart size is in the normal range.     No acute bony abnormality.       Impression:      1. No radiopaque foreign body in the chest or limited visualized  abdomen.  2. Poor lung expansion. No active cardiopulmonary disease.     This report was signed and finalized on 7/9/2024 5:53 AM by Dr. Medina Braun MD.       XR Abdomen KUB [[id number]] Collected: 07/09/24 0548     Updated: 07/09/24 0554    Narrative:      EXAMINATION: XR ABDOMEN KUB-     7/9/2024 1:23  AM     HISTORY: possible ingested fb     A frontal projection of the abdomen is compared with the previous study  obtained earlier today.     Previously seen 2 adjacently located metallic foreign bodies in the left  midabdomen are reidentified. No significant change in position of the  foreign bodies. The anatomical location of these foreign objects is not  certain. This may be located in the dependent part of the fluid and food  filled distended stomach?.     There is moderate gas and stool in the colon. No evidence of dilatation  of the small or large bowel loops.     There is evidence of prior cholecystectomy.     There is no acute bony abnormality.       Impression:      1. 2 adjacent is located metallic foreign objects in the left mid  abdomen. The anatomical location of these objects is not certain.  Further follow-up may be obtained.  2. The remaining abdomen is unremarkable.              This report was signed and finalized on 7/9/2024 5:51 AM by Dr. Medina Braun MD.       XR Abdomen KUB [[id number]] Collected: 07/09/24 0541     Updated: 07/09/24 0551    Narrative:      EXAMINATION: XR ABDOMEN KUB-     7/9/2024 12:29 AM     HISTORY: possible swallowed foreign body     A frontal projection of the abdomen is compared with the previous study  dated 1/25/2023.     There are metallic objects projected over the mid central and left  paracentral/lower abdomen which may represent the ingested foreign  bodies as suggested in the history. This may represent  nut an  bolt?. The exact anatomical location of these foreign bodies is not  certain. There may be an independent part of this fluid-filled stomach  are in the proximal to mid small bowel?. There is moderate gas and stool  in the colon. There is small amount of gas in the gastric fundus.     There is evidence of prior cholecystectomy.     Both kidneys are obscured by the bowel contents. No definite radiopaque  calculi.     There is no acute bony  abnormality.       Impression:      1. 2 metallic object projected over the central mid and left lower  abdomen which may represent the swallowed foreign body as suggested in  the history. The anatomical location of the foreign body is not certain.           This report was signed and finalized on 7/9/2024 5:48 AM by Dr. Medina Braun MD.       CT Abdomen Pelvis Without Contrast [[id number]] Collected: 07/09/24 0449     Updated: 07/09/24 0518    Narrative:      CT ABDOMEN PELVIS WO CONTRAST- 7/9/2024 1:49 AM     HISTORY: possible ingested foreign body (battery)      COMPARISON: KUB 7/9/2024     TOTAL DOSE LENGTH PRODUCT: 495.19 mGy.cm. Automated exposure control was  also utilized to decrease patient radiation dose.     TECHNIQUE: Axial images of the abdomen and pelvis are performed without  IV contrast.     FINDINGS: There are few scattered subcentimeter smooth margin  well-circumscribed nodules/masses of the bilateral breast parenchyma  supporting a benign process given the morphology and multiplicity. The  visible lung bases are unremarkable.     The nonenhanced liver, spleen, pancreas, and adrenal glands are  unremarkable. Cholecystectomy clips. No focal renal contour abnormality.  No abnormal perinephric fluid collection. No hydronephrosis. Bladder is  intact. Uterus and ovaries are unremarkable.     There are two foreign bodies within the upper abdomen. There is a  rounded 8 mm foreign body which may represent an ingested battery  positioned immediately right of a cylindrical rib 2.9 cm foreign body  with extensive streak artifact of uncertain etiology. These are  positioned along the posterior dependent portion of the distal stomach  (sagittal series 900 image 29 through 36). There is no free air to  suggest a perforation. Moderate fecal stasis. Normal appendix. No small  bowel dilatation. Fatty containing umbilical hernia. No pathologic  lymphadenopathy identified. Normal caliber abdominal aorta.     No  focal destructive regional bony lesion.       Impression:      1. There are 2 foreign bodies present along the dependent posterior wall  of the distal stomach (8 mm rounded foreign body which may represent an  ingested battery with an adjacent 2.9 cm cylindrical ribbed foreign body  of uncertain etiology causing extensive streak artifact) without  pneumoperitoneum favoring a dependent intraluminal position within the  distal stomach. Moderate fecal stasis. No evidence for gastric or bowel  obstruction. Small fatty containing umbilical hernia. Prior  cholecystectomy.  2. Few scattered well-circumscribed bilateral subcentimeter nodules of  the visible breast parenchyma supporting a benign process given the  morphology and multiplicity.     This report was signed and finalized on 7/9/2024 5:15 AM by Dr. Neelam Hogue MD.               Result Review    Result Review:  I have personally reviewed the results from the time of this admission to 7/12/2024 13:24 CDT and agree with these findings:  [x]  Laboratory list / accordion  []  Microbiology  [x]  Radiology  [x]  EKG/Telemetry   []  Cardiology/Vascular   []  Pathology  [x]  Old records  []  Other:    Hospital Course:   28-year-old female with autism, major depressive disorder, anxiety disorder, and pseudoseizures/psychogenic nonepileptic spells, presented to the hospital on 7/8/2024 after having pseudoseizure spell and telling her mother she might have swallowed a battery or magnet.  She underwent endoscopy as above for retrieval of this foreign body, but none was found.  An obvious metallic foreign body was noted on abdominal x-ray.  Patient had another seizure-like spell prompting intubation in the PACU.  Patient remained in the ICU intubated on mechanical ventilator while general surgery, gastroenterology evaluated for this foreign body.  It was deemed appropriate for the foreign body to pass naturally.  It status was tracked on serial abdominal x-ray with the  "last being in the transverse colon.  GI recommends follow-up in a few days with a repeat abdominal x-ray.  Neurology evaluated while inpatient.  They reviewed all previous records and noted patient to have nonepileptic psychogenic spells.  They recommend that she follow-up with her primary neurologist Dr. Declan Lira for weaning off of her antiepileptic medications.  Patient was extubated on 7/12/2024.  Her mentation was baseline with no evidence of seizure-like activity.  She has been cleared by neurology and gastroenterology with recommendations for outpatient follow-up.      Physical Exam on Discharge:  /66   Pulse 97   Temp 96.8 °F (36 °C) (Axillary)   Resp 18   Ht 172.7 cm (68\")   Wt 94.1 kg (207 lb 6.4 oz)   LMP  (LMP Unknown) Comment: neg hcg  SpO2 100%   BMI 31.54 kg/m²   Physical Exam  Constitutional:       General: She is not in acute distress.  Eyes:      Pupils: Pupils are equal, round, and reactive to light.   Cardiovascular:      Rate and Rhythm: Normal rate and regular rhythm.   Pulmonary:      Effort: Pulmonary effort is normal.      Breath sounds: Normal breath sounds.   Abdominal:      General: Bowel sounds are normal.      Palpations: Abdomen is soft.   Skin:     General: Skin is warm and dry.      Capillary Refill: Capillary refill takes less than 2 seconds.   Neurological:      General: No focal deficit present.      Mental Status: She is alert.   Psychiatric:      Comments: Patient with flat affect, currently nonverbal and not responding to questions         Condition on Discharge: Stable    Discharge Disposition:  Home or Self Care    Discharge Medications:     Discharge Medications        Continue These Medications        Instructions Start Date   acetaminophen 500 MG tablet  Commonly known as: TYLENOL   500 mg, Oral, Every 6 Hours PRN      albuterol sulfate  (90 Base) MCG/ACT inhaler  Commonly known as: PROVENTIL HFA;VENTOLIN HFA;PROAIR HFA   2 puffs, Inhalation, Every " 4 Hours PRN      doxazosin 1 MG tablet  Commonly known as: CARDURA   1 mg, Oral, 2 Times Daily      FLUoxetine 40 MG capsule  Commonly known as: PROzac   80 mg, Oral, Daily      hydrOXYzine 25 MG tablet  Commonly known as: ATARAX   25 mg, Oral, 3 Times Daily PRN      lacosamide 100 MG tablet tablet  Commonly known as: VIMPAT   100 mg, Oral, 2 Times Daily      lamoTRIgine 100 MG tablet  Commonly known as: LaMICtal   250 mg, Oral, Daily      levETIRAcetam 1000 MG tablet  Commonly known as: KEPPRA   1,000 mg, Oral, 2 Times Daily      montelukast 10 MG tablet  Commonly known as: SINGULAIR   10 mg, Oral, Nightly      OLANZapine zydis 5 MG disintegrating tablet  Commonly known as: zyPREXA   5 mg, Oral, 3 times daily      pantoprazole 40 MG EC tablet  Commonly known as: PROTONIX   40 mg, Oral, Daily      senna 8.6 MG tablet  Commonly known as: SENOKOT   2 tablets, Oral, Daily      VALTOCO 15 MG DOSE NA   15 mg, Nasal, As Needed             Discharge Diet: Regular    Activity at Discharge: Ad michi.    Follow-up Appointments:   With neurologist Dr. Declan Lira within 1 week, recommend for weaning from antiepileptic regimen    With gastroenterology in 1 to 2 days with abdominal x-ray to follow foreign body    Test Results Pending at Discharge: None    Electronically signed by TELMA Feldman on 7/12/2024 at 13:34 CDT    Time: 37 minutes.          Electronically signed by Luisito Nguyen MD at 07/12/24 1704       Discharge Order (From admission, onward)       Start     Ordered    07/12/24 1305  Discharge patient  Once        Expected Discharge Date: 07/12/24   Discharge Disposition: Home or Self Care   Physician of Record for Attribution - Please select from Treatment Team: LUISITO NGUYEN [[id number]]   Review needed by CMO to determine Physician of Record: No      Question Answer Comment   Physician of Record for Attribution - Please select from Treatment Team LUISITO NGUYEN    Review needed by CMO to determine Physician of  Record No        07/12/24 1326

## 2024-07-18 ENCOUNTER — READMISSION MANAGEMENT (OUTPATIENT)
Dept: CALL CENTER | Facility: HOSPITAL | Age: 29
End: 2024-07-18
Payer: MEDICAID

## 2024-07-18 NOTE — OUTREACH NOTE
Medical Week 1 Survey      Flowsheet Row Responses   [facility] facility patient discharged from? [place]   Does the patient have one of the following disease processes/diagnoses(primary or secondary)? Other   Week 1 attempt successful? No   Unsuccessful attempts Attempt 1            Pepe SMITH - Registered Nurse

## 2024-07-25 ENCOUNTER — READMISSION MANAGEMENT (OUTPATIENT)
Dept: CALL CENTER | Facility: HOSPITAL | Age: 29
End: 2024-07-25
Payer: MEDICAID

## 2024-07-25 NOTE — OUTREACH NOTE
Medical Week 2 Survey      Flowsheet Row Responses   [facility], [place], operated by [facility] facility patient discharged from? [place]   Does the patient have one of the following disease processes/diagnoses(primary or secondary)? Other   Week 2 attempt successful? No   Unsuccessful attempts Attempt 1            Pepe SMITH - Registered Nurse

## 2024-07-30 ENCOUNTER — READMISSION MANAGEMENT (OUTPATIENT)
Dept: CALL CENTER | Facility: HOSPITAL | Age: 29
End: 2024-07-30
Payer: MEDICAID

## 2024-07-30 NOTE — OUTREACH NOTE
Medical Week 2 Survey      Flowsheet Row Responses   [facility] patient discharged from? [place]   Does the patient have one of the following disease processes/diagnoses(primary or secondary)? Other   Week 2 attempt successful? No   Unsuccessful attempts Attempt 2   Revoke Other  [No answer after 3 attempts]            SARAH ISRAEL - Registered Nurse

## 2024-08-01 ENCOUNTER — APPOINTMENT (OUTPATIENT)
Dept: GENERAL RADIOLOGY | Facility: HOSPITAL | Age: 29
End: 2024-08-01
Payer: MEDICAID

## 2024-08-01 ENCOUNTER — ANESTHESIA (OUTPATIENT)
Dept: PERIOP | Facility: HOSPITAL | Age: 29
End: 2024-08-01
Payer: MEDICAID

## 2024-08-01 ENCOUNTER — HOSPITAL ENCOUNTER (INPATIENT)
Facility: HOSPITAL | Age: 29
LOS: 1 days | Discharge: HOME | End: 2024-08-02
Attending: FAMILY MEDICINE | Admitting: INTERNAL MEDICINE
Payer: MEDICAID

## 2024-08-01 ENCOUNTER — APPOINTMENT (OUTPATIENT)
Dept: CT IMAGING | Facility: HOSPITAL | Age: 29
End: 2024-08-01
Payer: MEDICAID

## 2024-08-01 ENCOUNTER — ANESTHESIA EVENT (OUTPATIENT)
Dept: PERIOP | Facility: HOSPITAL | Age: 29
End: 2024-08-01
Payer: MEDICAID

## 2024-08-01 DIAGNOSIS — G40.901 STATUS EPILEPTICUS: Primary | ICD-10-CM

## 2024-08-01 DIAGNOSIS — T18.9XXA FOREIGN BODY INGESTION, INITIAL ENCOUNTER: ICD-10-CM

## 2024-08-01 DIAGNOSIS — R56.9 SEIZURE-LIKE ACTIVITY: ICD-10-CM

## 2024-08-01 DIAGNOSIS — F44.5 PSYCHOGENIC NONEPILEPTIC SEIZURE: ICD-10-CM

## 2024-08-01 LAB
ALBUMIN SERPL-MCNC: 4.2 G/DL (ref 3.5–5.2)
ALBUMIN/GLOB SERPL: 1.3 G/DL
ALP SERPL-CCNC: 93 U/L (ref 39–117)
ALT SERPL W P-5'-P-CCNC: 11 U/L (ref 1–33)
ANION GAP SERPL CALCULATED.3IONS-SCNC: 14 MMOL/L (ref 5–15)
AST SERPL-CCNC: 19 U/L (ref 1–32)
BASOPHILS # BLD AUTO: 0.03 10*3/MM3 (ref 0–0.2)
BASOPHILS NFR BLD AUTO: 0.4 % (ref 0–1.5)
BILIRUB SERPL-MCNC: <0.2 MG/DL (ref 0–1.2)
BUN SERPL-MCNC: 7 MG/DL (ref 6–20)
BUN/CREAT SERPL: 9.7 (ref 7–25)
CALCIUM SPEC-SCNC: 9.1 MG/DL (ref 8.6–10.5)
CHLORIDE SERPL-SCNC: 104 MMOL/L (ref 98–107)
CO2 SERPL-SCNC: 20 MMOL/L (ref 22–29)
CREAT SERPL-MCNC: 0.72 MG/DL (ref 0.57–1)
DEPRECATED RDW RBC AUTO: 46.4 FL (ref 37–54)
EGFRCR SERPLBLD CKD-EPI 2021: 117.7 ML/MIN/1.73
EOSINOPHIL # BLD AUTO: 0.05 10*3/MM3 (ref 0–0.4)
EOSINOPHIL NFR BLD AUTO: 0.7 % (ref 0.3–6.2)
ERYTHROCYTE [DISTWIDTH] IN BLOOD BY AUTOMATED COUNT: 18 % (ref 12.3–15.4)
GLOBULIN UR ELPH-MCNC: 3.3 GM/DL
GLUCOSE SERPL-MCNC: 103 MG/DL (ref 65–99)
HCT VFR BLD AUTO: 30.7 % (ref 34–46.6)
HGB BLD-MCNC: 9.2 G/DL (ref 12–15.9)
IMM GRANULOCYTES # BLD AUTO: 0.01 10*3/MM3 (ref 0–0.05)
IMM GRANULOCYTES NFR BLD AUTO: 0.1 % (ref 0–0.5)
LYMPHOCYTES # BLD AUTO: 1.28 10*3/MM3 (ref 0.7–3.1)
LYMPHOCYTES NFR BLD AUTO: 18.8 % (ref 19.6–45.3)
MAGNESIUM SERPL-MCNC: 1.8 MG/DL (ref 1.6–2.6)
MCH RBC QN AUTO: 21.6 PG (ref 26.6–33)
MCHC RBC AUTO-ENTMCNC: 30 G/DL (ref 31.5–35.7)
MCV RBC AUTO: 72.2 FL (ref 79–97)
MONOCYTES # BLD AUTO: 0.58 10*3/MM3 (ref 0.1–0.9)
MONOCYTES NFR BLD AUTO: 8.5 % (ref 5–12)
NEUTROPHILS NFR BLD AUTO: 4.87 10*3/MM3 (ref 1.7–7)
NEUTROPHILS NFR BLD AUTO: 71.5 % (ref 42.7–76)
PLATELET # BLD AUTO: 234 10*3/MM3 (ref 140–450)
PMV BLD AUTO: 12.6 FL (ref 6–12)
POTASSIUM SERPL-SCNC: 4.2 MMOL/L (ref 3.5–5.2)
PROT SERPL-MCNC: 7.5 G/DL (ref 6–8.5)
QT INTERVAL: 334 MS
QTC INTERVAL: 474 MS
RBC # BLD AUTO: 4.25 10*6/MM3 (ref 3.77–5.28)
SODIUM SERPL-SCNC: 138 MMOL/L (ref 136–145)
WBC NRBC COR # BLD AUTO: 6.82 10*3/MM3 (ref 3.4–10.8)

## 2024-08-01 PROCEDURE — 99285 EMERGENCY DEPT VISIT HI MDM: CPT

## 2024-08-01 PROCEDURE — 74176 CT ABD & PELVIS W/O CONTRAST: CPT

## 2024-08-01 PROCEDURE — 74018 RADEX ABDOMEN 1 VIEW: CPT

## 2024-08-01 PROCEDURE — 25010000002 DEXAMETHASONE PER 1 MG

## 2024-08-01 PROCEDURE — 85025 COMPLETE CBC W/AUTO DIFF WBC: CPT | Performed by: FAMILY MEDICINE

## 2024-08-01 PROCEDURE — 25810000003 SODIUM CHLORIDE 0.9 % SOLUTION: Performed by: FAMILY MEDICINE

## 2024-08-01 PROCEDURE — 25810000003 LACTATED RINGERS PER 1000 ML

## 2024-08-01 PROCEDURE — 0DJ08ZZ INSPECTION OF UPPER INTESTINAL TRACT, VIA NATURAL OR ARTIFICIAL OPENING ENDOSCOPIC: ICD-10-PCS | Performed by: INTERNAL MEDICINE

## 2024-08-01 PROCEDURE — 74022 RADEX COMPL AQT ABD SERIES: CPT

## 2024-08-01 PROCEDURE — 25010000002 DIAZEPAM PER 5 MG: Performed by: FAMILY MEDICINE

## 2024-08-01 PROCEDURE — 25010000002 LEVETRIRACETAM PER 10 MG: Performed by: FAMILY MEDICINE

## 2024-08-01 PROCEDURE — 43247 EGD REMOVE FOREIGN BODY: CPT | Performed by: INTERNAL MEDICINE

## 2024-08-01 PROCEDURE — 93010 ELECTROCARDIOGRAM REPORT: CPT | Performed by: HOSPITALIST

## 2024-08-01 PROCEDURE — 25010000002 ONDANSETRON PER 1 MG

## 2024-08-01 PROCEDURE — S0260 H&P FOR SURGERY: HCPCS | Performed by: INTERNAL MEDICINE

## 2024-08-01 PROCEDURE — 80299 QUANTITATIVE ASSAY DRUG: CPT | Performed by: FAMILY MEDICINE

## 2024-08-01 PROCEDURE — 25010000002 FOSPHENYTOIN 500 MG PE/10ML SOLUTION 10 ML VIAL: Performed by: FAMILY MEDICINE

## 2024-08-01 PROCEDURE — 80053 COMPREHEN METABOLIC PANEL: CPT | Performed by: FAMILY MEDICINE

## 2024-08-01 PROCEDURE — 25010000002 LORAZEPAM PER 2 MG

## 2024-08-01 PROCEDURE — 80235 DRUG ASSAY LACOSAMIDE: CPT | Performed by: FAMILY MEDICINE

## 2024-08-01 PROCEDURE — 36415 COLL VENOUS BLD VENIPUNCTURE: CPT

## 2024-08-01 PROCEDURE — 25010000002 DIPHENHYDRAMINE PER 50 MG: Performed by: SPECIALIST

## 2024-08-01 PROCEDURE — 25010000002 PROPOFOL 10 MG/ML EMULSION

## 2024-08-01 PROCEDURE — 99254 IP/OBS CNSLTJ NEW/EST MOD 60: CPT | Performed by: SPECIALIST

## 2024-08-01 PROCEDURE — 93005 ELECTROCARDIOGRAM TRACING: CPT | Performed by: FAMILY MEDICINE

## 2024-08-01 PROCEDURE — 80177 DRUG SCRN QUAN LEVETIRACETAM: CPT | Performed by: FAMILY MEDICINE

## 2024-08-01 PROCEDURE — 83735 ASSAY OF MAGNESIUM: CPT | Performed by: FAMILY MEDICINE

## 2024-08-01 PROCEDURE — 25010000002 LORAZEPAM PER 2 MG: Performed by: FAMILY MEDICINE

## 2024-08-01 RX ORDER — IBUPROFEN 600 MG/1
600 TABLET ORAL EVERY 6 HOURS PRN
Status: DISCONTINUED | OUTPATIENT
Start: 2024-08-01 | End: 2024-08-01

## 2024-08-01 RX ORDER — NALOXONE HCL 0.4 MG/ML
0.04 VIAL (ML) INJECTION AS NEEDED
Status: DISCONTINUED | OUTPATIENT
Start: 2024-08-01 | End: 2024-08-01

## 2024-08-01 RX ORDER — POLYETHYLENE GLYCOL 3350 17 G/17G
17 POWDER, FOR SOLUTION ORAL DAILY PRN
Status: DISCONTINUED | OUTPATIENT
Start: 2024-08-01 | End: 2024-08-02 | Stop reason: HOSPADM

## 2024-08-01 RX ORDER — SODIUM CHLORIDE, SODIUM LACTATE, POTASSIUM CHLORIDE, CALCIUM CHLORIDE 600; 310; 30; 20 MG/100ML; MG/100ML; MG/100ML; MG/100ML
INJECTION, SOLUTION INTRAVENOUS CONTINUOUS PRN
Status: DISCONTINUED | OUTPATIENT
Start: 2024-08-01 | End: 2024-08-01 | Stop reason: SURG

## 2024-08-01 RX ORDER — SODIUM CHLORIDE 0.9 % (FLUSH) 0.9 %
10 SYRINGE (ML) INJECTION EVERY 12 HOURS SCHEDULED
Status: DISCONTINUED | OUTPATIENT
Start: 2024-08-01 | End: 2024-08-02 | Stop reason: HOSPADM

## 2024-08-01 RX ORDER — LORAZEPAM 2 MG/ML
1 INJECTION INTRAMUSCULAR ONCE
Status: COMPLETED | OUTPATIENT
Start: 2024-08-01 | End: 2024-08-01

## 2024-08-01 RX ORDER — ONDANSETRON 2 MG/ML
4 INJECTION INTRAMUSCULAR; INTRAVENOUS
Status: DISCONTINUED | OUTPATIENT
Start: 2024-08-01 | End: 2024-08-01

## 2024-08-01 RX ORDER — HYDROMORPHONE HYDROCHLORIDE 1 MG/ML
0.5 INJECTION, SOLUTION INTRAMUSCULAR; INTRAVENOUS; SUBCUTANEOUS
Status: DISCONTINUED | OUTPATIENT
Start: 2024-08-01 | End: 2024-08-01

## 2024-08-01 RX ORDER — LORAZEPAM 2 MG/ML
INJECTION INTRAMUSCULAR
Status: DISPENSED
Start: 2024-08-01 | End: 2024-08-01

## 2024-08-01 RX ORDER — ONDANSETRON 2 MG/ML
INJECTION INTRAMUSCULAR; INTRAVENOUS AS NEEDED
Status: DISCONTINUED | OUTPATIENT
Start: 2024-08-01 | End: 2024-08-01 | Stop reason: SURG

## 2024-08-01 RX ORDER — AMOXICILLIN 250 MG
2 CAPSULE ORAL 2 TIMES DAILY
Status: DISCONTINUED | OUTPATIENT
Start: 2024-08-01 | End: 2024-08-02 | Stop reason: HOSPADM

## 2024-08-01 RX ORDER — DIAZEPAM 5 MG/ML
5 INJECTION, SOLUTION INTRAMUSCULAR; INTRAVENOUS ONCE
Status: COMPLETED | OUTPATIENT
Start: 2024-08-01 | End: 2024-08-01

## 2024-08-01 RX ORDER — LORAZEPAM 2 MG/ML
INJECTION INTRAMUSCULAR
Status: COMPLETED
Start: 2024-08-01 | End: 2024-08-01

## 2024-08-01 RX ORDER — LABETALOL HYDROCHLORIDE 5 MG/ML
5 INJECTION, SOLUTION INTRAVENOUS
Status: DISCONTINUED | OUTPATIENT
Start: 2024-08-01 | End: 2024-08-01

## 2024-08-01 RX ORDER — PROPOFOL 10 MG/ML
VIAL (ML) INTRAVENOUS AS NEEDED
Status: DISCONTINUED | OUTPATIENT
Start: 2024-08-01 | End: 2024-08-01 | Stop reason: SURG

## 2024-08-01 RX ORDER — DEXAMETHASONE SODIUM PHOSPHATE 4 MG/ML
INJECTION, SOLUTION INTRA-ARTICULAR; INTRALESIONAL; INTRAMUSCULAR; INTRAVENOUS; SOFT TISSUE AS NEEDED
Status: DISCONTINUED | OUTPATIENT
Start: 2024-08-01 | End: 2024-08-01 | Stop reason: SURG

## 2024-08-01 RX ORDER — LEVETIRACETAM 500 MG/5ML
1000 INJECTION, SOLUTION, CONCENTRATE INTRAVENOUS ONCE
Status: COMPLETED | OUTPATIENT
Start: 2024-08-01 | End: 2024-08-01

## 2024-08-01 RX ORDER — SODIUM CHLORIDE 9 MG/ML
40 INJECTION, SOLUTION INTRAVENOUS AS NEEDED
Status: DISCONTINUED | OUTPATIENT
Start: 2024-08-01 | End: 2024-08-02 | Stop reason: HOSPADM

## 2024-08-01 RX ORDER — SUCCINYLCHOLINE/SOD CL,ISO/PF 200MG/10ML
SYRINGE (ML) INTRAVENOUS AS NEEDED
Status: DISCONTINUED | OUTPATIENT
Start: 2024-08-01 | End: 2024-08-01 | Stop reason: SURG

## 2024-08-01 RX ORDER — BISACODYL 5 MG/1
5 TABLET, DELAYED RELEASE ORAL DAILY PRN
Status: DISCONTINUED | OUTPATIENT
Start: 2024-08-01 | End: 2024-08-02 | Stop reason: HOSPADM

## 2024-08-01 RX ORDER — LIDOCAINE HYDROCHLORIDE 20 MG/ML
INJECTION, SOLUTION EPIDURAL; INFILTRATION; INTRACAUDAL; PERINEURAL AS NEEDED
Status: DISCONTINUED | OUTPATIENT
Start: 2024-08-01 | End: 2024-08-01

## 2024-08-01 RX ORDER — BISACODYL 10 MG
10 SUPPOSITORY, RECTAL RECTAL DAILY PRN
Status: DISCONTINUED | OUTPATIENT
Start: 2024-08-01 | End: 2024-08-02 | Stop reason: HOSPADM

## 2024-08-01 RX ORDER — CHLORHEXIDINE GLUCONATE 500 MG/1
1 CLOTH TOPICAL EVERY 24 HOURS
Status: DISCONTINUED | OUTPATIENT
Start: 2024-08-02 | End: 2024-08-02 | Stop reason: HOSPADM

## 2024-08-01 RX ORDER — FENTANYL CITRATE 50 UG/ML
50 INJECTION, SOLUTION INTRAMUSCULAR; INTRAVENOUS
Status: DISCONTINUED | OUTPATIENT
Start: 2024-08-01 | End: 2024-08-01

## 2024-08-01 RX ORDER — DROPERIDOL 2.5 MG/ML
0.62 INJECTION, SOLUTION INTRAMUSCULAR; INTRAVENOUS ONCE AS NEEDED
Status: DISCONTINUED | OUTPATIENT
Start: 2024-08-01 | End: 2024-08-01

## 2024-08-01 RX ORDER — SODIUM CHLORIDE 0.9 % (FLUSH) 0.9 %
10 SYRINGE (ML) INJECTION AS NEEDED
Status: DISCONTINUED | OUTPATIENT
Start: 2024-08-01 | End: 2024-08-02 | Stop reason: HOSPADM

## 2024-08-01 RX ORDER — QUETIAPINE FUMARATE 25 MG/1
25 TABLET, FILM COATED ORAL 2 TIMES DAILY
COMMUNITY

## 2024-08-01 RX ORDER — CHLORHEXIDINE GLUCONATE 500 MG/1
1 CLOTH TOPICAL ONCE
Status: COMPLETED | OUTPATIENT
Start: 2024-08-01 | End: 2024-08-01

## 2024-08-01 RX ORDER — LACOSAMIDE 50 MG/1
100 TABLET ORAL EVERY 12 HOURS SCHEDULED
Status: DISCONTINUED | OUTPATIENT
Start: 2024-08-01 | End: 2024-08-02 | Stop reason: HOSPADM

## 2024-08-01 RX ORDER — ROCURONIUM BROMIDE 10 MG/ML
INJECTION, SOLUTION INTRAVENOUS AS NEEDED
Status: DISCONTINUED | OUTPATIENT
Start: 2024-08-01 | End: 2024-08-01 | Stop reason: SURG

## 2024-08-01 RX ORDER — DIPHENHYDRAMINE HYDROCHLORIDE 50 MG/ML
12.5 INJECTION INTRAMUSCULAR; INTRAVENOUS EVERY 4 HOURS PRN
Status: DISCONTINUED | OUTPATIENT
Start: 2024-08-01 | End: 2024-08-02 | Stop reason: HOSPADM

## 2024-08-01 RX ORDER — FLUMAZENIL 0.1 MG/ML
0.2 INJECTION INTRAVENOUS AS NEEDED
Status: DISCONTINUED | OUTPATIENT
Start: 2024-08-01 | End: 2024-08-01

## 2024-08-01 RX ORDER — OXYCODONE AND ACETAMINOPHEN 10; 325 MG/1; MG/1
1 TABLET ORAL EVERY 4 HOURS PRN
Status: DISCONTINUED | OUTPATIENT
Start: 2024-08-01 | End: 2024-08-01

## 2024-08-01 RX ADMIN — DOCUSATE SODIUM 50 MG AND SENNOSIDES 8.6 MG 2 TABLET: 8.6; 5 TABLET, FILM COATED ORAL at 09:12

## 2024-08-01 RX ADMIN — Medication 10 ML: at 13:14

## 2024-08-01 RX ADMIN — DIAZEPAM 5 MG: 10 INJECTION, SOLUTION INTRAMUSCULAR; INTRAVENOUS at 03:35

## 2024-08-01 RX ADMIN — CHLORHEXIDINE GLUCONATE 1 APPLICATION: 500 CLOTH TOPICAL at 11:52

## 2024-08-01 RX ADMIN — DOCUSATE SODIUM 50 MG AND SENNOSIDES 8.6 MG 2 TABLET: 8.6; 5 TABLET, FILM COATED ORAL at 20:02

## 2024-08-01 RX ADMIN — LORAZEPAM 1 MG: 2 INJECTION INTRAMUSCULAR; INTRAVENOUS at 03:28

## 2024-08-01 RX ADMIN — Medication 120 MG: at 04:28

## 2024-08-01 RX ADMIN — LORAZEPAM 1 MG: 2 INJECTION INTRAMUSCULAR; INTRAVENOUS at 03:10

## 2024-08-01 RX ADMIN — DIPHENHYDRAMINE HYDROCHLORIDE 12.5 MG: 50 INJECTION, SOLUTION INTRAMUSCULAR; INTRAVENOUS at 11:06

## 2024-08-01 RX ADMIN — LORAZEPAM 1 MG: 2 INJECTION INTRAMUSCULAR at 03:10

## 2024-08-01 RX ADMIN — ROCURONIUM BROMIDE 5 MG: 10 INJECTION, SOLUTION INTRAVENOUS at 04:28

## 2024-08-01 RX ADMIN — Medication 1 APPLICATION: at 09:12

## 2024-08-01 RX ADMIN — PROPOFOL 200 MG: 10 INJECTION, EMULSION INTRAVENOUS at 04:28

## 2024-08-01 RX ADMIN — DIAZEPAM 5 MG: 10 INJECTION, SOLUTION INTRAMUSCULAR; INTRAVENOUS at 03:56

## 2024-08-01 RX ADMIN — SODIUM CHLORIDE 1000 ML: 9 INJECTION, SOLUTION INTRAVENOUS at 03:00

## 2024-08-01 RX ADMIN — SODIUM CHLORIDE 1000 MG PE: 9 INJECTION, SOLUTION INTRAVENOUS at 05:24

## 2024-08-01 RX ADMIN — Medication 10 ML: at 20:03

## 2024-08-01 RX ADMIN — ONDANSETRON 4 MG: 2 INJECTION INTRAMUSCULAR; INTRAVENOUS at 04:51

## 2024-08-01 RX ADMIN — SODIUM CHLORIDE, POTASSIUM CHLORIDE, SODIUM LACTATE AND CALCIUM CHLORIDE: 600; 310; 30; 20 INJECTION, SOLUTION INTRAVENOUS at 04:28

## 2024-08-01 RX ADMIN — LACOSAMIDE 100 MG: 50 TABLET, FILM COATED ORAL at 09:12

## 2024-08-01 RX ADMIN — DEXAMETHASONE SODIUM PHOSPHATE 4 MG: 4 INJECTION, SOLUTION INTRAMUSCULAR; INTRAVENOUS at 04:51

## 2024-08-01 RX ADMIN — LEVETIRACETAM 1000 MG: 100 INJECTION, SOLUTION INTRAVENOUS at 03:01

## 2024-08-01 RX ADMIN — DIPHENHYDRAMINE HYDROCHLORIDE 12.5 MG: 50 INJECTION, SOLUTION INTRAMUSCULAR; INTRAVENOUS at 21:30

## 2024-08-01 RX ADMIN — LACOSAMIDE 100 MG: 50 TABLET, FILM COATED ORAL at 20:02

## 2024-08-01 NOTE — H&P
"     [facility] Intens[facility]    Date of Admission: 2024  Date of Note: 24  Primary Care Physician: Rosie Olvera MD    History   ER HPI  Patient is a 28-year-old female with known history of autism who again has taken a foreign body and ingested it.  Apparently she states that she took 4 button batteries and swallowed them.  This was around 2300 or midnight tonight.  Patient also had a seizure yesterday and then also had a seizure and route by EMS today.  And route she was given to milligrams of Versed IV and then a total of 8 IM to help stop the seizures.  She does have a seizure disorder is on multiple medications for this such as Keppra, and Vimpat.     ED course: total of 10 mg valium, 10 mg ativan, 1000 mg keppra, and 1000 mg fosphenytoin.   In addition to these medications she was administered unknown  dosage of propofol for procedural sedation of upper GI endoscopy.   X-ray abdomen 2 view w/ chest: 6 round metallic foreign bodies    The ICU team was asked to evaluate the patient for concerns for status epilepticus    I have seen and evaluated patient upon her arrival to CCU bed 3 arriving from the OR following upper GI endoscopy that was unsuccessful at retrieving 6 small round metallic foreign objects believed to be \"button batteries\".  She is non verbal at baseline. She withdraw from pain but is not follow commands. During exam she has 2 episodes of seizure like activity, lasting in total 2 minutes. Airway is maintained and 02 saturation maintains >93%.    Past Medical History     Active and Resolved Problems  Active Hospital Problems    Diagnosis  POA    **Status epilepticus [G40.901]  Yes    Seizure-like activity [R56.9]  Yes      Resolved Hospital Problems   No resolved problems to display.       Past Medical History:   Past Medical History:   Diagnosis Date    Anxiety     Depression     History of  2017    History of " congenital abnormality 2017    Late prenatal care affecting pregnancy in second trimester 2017    Mental deficiency 2017    Previous  delivery affecting pregnancy 2017    Seizures        Prior Surgeries: She  has a past surgical history that includes  section and Esophagogastroduodenoscopy (N/A, 2024).    Past Surgical History:   Past Surgical History:   Procedure Laterality Date     SECTION      ENDOSCOPY N/A 2024    Procedure: ESOPHAGOGASTRODUODENOSCOPY WITH ANESTHESIA;  Surgeon: Tian Morris MD;  Location: [facility];  Service: Gastroenterology;  Laterality: N/A;  pre foreign body in stomach  post foreign body passed out of stomach  dr kaiden [place]       Social and Family History     Family History:  family history is not on file.    Tobacco/Social History:  reports that she has an unknown smoking status. She has never used smokeless tobacco. Alcohol use questions deferred to the physician. Drug use questions deferred to the physician.    Allergies     Allergies:   She has No Known Allergies.    No Known Allergies    Labs     Basic Labs:  Common labs          2024    23:50 7/10/2024    02:09 2024    02:28   Common Labs   Glucose 111  103  103    BUN 6  6  7    Creatinine 0.77  0.60  0.72    Sodium 139  138  138    Potassium 4.0  4.6  4.2    Chloride 103  104  104    Calcium 9.3  9.1  9.1    Albumin 4.1   4.2    Total Bilirubin <0.2   <0.2    Alkaline Phosphatase 99   93    AST (SGOT) 14   19    ALT (SGPT) 12   11    WBC 9.36  7.35  6.82    Hemoglobin 9.6  9.7  9.2    Hematocrit 31.7  33.2  30.7    Platelets 249  242  234        Diabetic:      Inpatient Medications     Medications: Scheduled Meds:LORazepam, , ,   Chlorhexidine Gluconate Cloth, 1 Application, Topical, Once  [START ON 2024] Chlorhexidine Gluconate Cloth, 1 Application, Topical, Q24H  mupirocin, 1 Application, Each Nare, BID  senna-docusate sodium, 2 tablet, Oral, BID  sodium  "chloride, 10 mL, Intravenous, Q12H      Continuous Infusions:   PRN Meds:.  LORazepam    senna-docusate sodium **AND** polyethylene glycol **AND** bisacodyl **AND** bisacodyl    Calcium Replacement - Follow Nurse / BPA Driven Protocol    Magnesium Standard Dose Replacement - Follow Nurse / BPA Driven Protocol    Phosphorus Replacement - Follow Nurse / BPA Driven Protocol    Potassium Replacement - Follow Nurse / BPA Driven Protocol    sodium chloride    sodium chloride    I have reviewed the patient's current medications.   Outpatient Medications     Outpatient Medications:   No outpatient medications have been marked as taking for the 8/1/24 encounter (Hospital Encounter).       Current Antibiotics     This patient does not have an active medication from one of the medication groupers.    Exam     Vitals: Her  height is 172.7 cm (68\") and weight is 93 kg (205 lb). Her oral temperature is 98.2 °F (36.8 °C). Her blood pressure is 126/101 (abnormal) and her pulse is 100. Her respiration is 26 and oxygen saturation is 94%.     GENERAL:  non verbal, no acute distress.   SKIN:  Warm, dry  EYES:  Pupils equal, round and reactive to light.  EOMs intact.    HEAD:  Normocephalic.  NECK:  Supple   RESP:  Lungs clear to auscultation. Good airflow. Normal respiratory effort.   CARDIAC:  Regular rate and rhythm. Normal S1,S2. No edema  GI:  Soft, nontender, normal bowel sounds  MSK:  Normal muscle bulk, tone  NEUROLOGICAL: non verbal, will not follow commands to attempt neuro exam.   PSYCHIATRIC: flat affect     Results and Cultures Review     Result Review:  I have personally reviewed the results from the time of this admission to 8/1/2024 06:22 CDT and agree with these findings:  [x]  Laboratory list / accordion  []  Microbiology  [x]  Radiology  []  EKG/Telemetry   []  Cardiology/Vascular   []  Pathology  [x]  Old records  []  Other:  Most notable findings include: see HPI      Culture Data:   No results found for: \"BLOODCX\", " "\"URINECX\", \"WOUNDCX\", \"MRSACX\", \"RESPCX\", \"STOOLCX\"    Assessment/Plan   This is a 28-year-old female with past medical history of autism and pseudoseizures presents the emergency room with seizure-like activity and reported ingestion of batteries. ED course workup was concerning for 6 round, metallic, foreign bodies identified in the GI tract on plain film, and several episodes of \" seizure-like activity\" for which she was administered multiple medications in attempt at terminating seizures.  She was then taken to OR for EGD in attempt at retrieval of foreign bodies which was unsuccessful.  Critical care team was asked to further manage seizure-like activity with the belief that she was in status epilepticus.     She will be admitted under the intensivist MD, Dr. Nguyen, case will be discussed, and further recommendations on or modifications to the treatment plan are ultimately at his discretion.    Treatment Plan    Seizure-like activity  -On review of past medical records and most recent specialization for similar complaint it is well-documented that patient has longstanding history of pseudoseizures and no documented history of epilepsy. She has been seen and evaluated by several neurologist and it was recommended to wean her from anti-seizure medications.  -Please see neurologist, Dr. Martínez's note from last hospitalization, who also agrees and advises avoiding intubation for any seizure-like activity if able.   -we will continue to monitor and place on seizure precautions    Foreign body  -6 round metallic foreign bodies noted in GI tract on plain film x-ray obtained in ED  -EGD by Dr. Gonzales, unsuccessful at retrieval   -will obtain CT abd/pel w/o to further evaluate location  -Contact poison control      VTE Prophylaxis:    Mechanical VTE prophylaxis orders are present.        Total critical care time: Approximately 52 minutes    Due to a high probability of clinically significant, life threatening " deterioration, the patient required my highest level of preparedness to intervene emergently and I personally spent this critical care time directly and personally managing the patient.     This critical care time included obtaining a history; examining the patient; pulse oximetry; ordering and review of studies; arranging urgent treatment with development of a management plan; evaluation of patient's response to treatment; frequent reassessment; and, discussions with other providers.    This critical care time was performed to assess and manage the high probability of imminent, life-threatening deterioration that could result in multi-organ failure. It was exclusive of separately billable procedures and treating other patients and teaching time.    Please see MDM section and the rest of the note for further information on patient assessment and treatment.    Part of this note may be an electronic transcription/translation of spoken language to printed text using the Dragon Dictation System.    Electronically signed by TELMA Hair on 8/1/2024 at 06:22 CDT

## 2024-08-01 NOTE — ED PROVIDER NOTES
HPI:     Patient is a 28-year-old female with known history of autism who again has taken a foreign body and ingested it.  Apparently she states that she took 4 button batteries and swallowed them.  This was around 2300 or midnight tonight.  Patient also had a seizure yesterday and then also had a seizure and route by EMS today.  And route she was given to milligrams of Versed IV and then a total of 8 IM to help stop the seizures.  She does have a seizure disorder is on multiple medications for this such as Keppra, and Vimpat.       REVIEW OF SYSTEMS  CONSTITUTIONAL:  No complaints of fever, chills,or weakness  EYES:  No complaints of discharge   ENT: No complaints of sore throat or ear pain  CARDIOVASCULAR:  No complaints of chest pain, palpitations, or swelling  RESPIRATORY:  No complaints of cough or shortness of breath  GI: Positive for ingestion of foreign body: four  button batteries  MUSCULOSKELETAL:  No complaints of back pain  SKIN:  No complaints of rash  NEUROLOGIC: Positive for seizure now resolved   ENDOCRINE:  No complaints of polyuria or polydipsia  LYMPHATIC:  No complaints of swollen glands  GENITOURINARY: No complaints of urinary frequency or hematuria      PAST MEDICAL HISTORY  Past Medical History:   Diagnosis Date    Anxiety     Depression     History of  2017    History of congenital abnormality 2017    Late prenatal care affecting pregnancy in second trimester 2017    Mental deficiency 2017    Previous  delivery affecting pregnancy 2017    Seizures        FAMILY HISTORY  History reviewed. No pertinent family history.    SOCIAL HISTORY  Social History     Socioeconomic History    Marital status:    Tobacco Use    Smoking status: Unknown    Smokeless tobacco: Never   Vaping Use    Vaping status: Never Used   Substance and Sexual Activity    Alcohol use: Defer    Drug use: Defer    Sexual activity: Defer     Partners: Male       IMMUNIZATION  HISTORY  Deferred to primary care physician.    SURGICAL HISTORY  Past Surgical History:   Procedure Laterality Date     SECTION      ENDOSCOPY N/A 2024    Procedure: ESOPHAGOGASTRODUODENOSCOPY WITH ANESTHESIA;  Surgeon: Tian Morris MD;  Location: [facility] OR;  Service: Gastroenterology;  Laterality: N/A;  pre foreign body in stomach  post foreign body passed out of stomach  dr kaiden houston       CURRENT MEDICATIONS    Current Facility-Administered Medications:     fosphenytoin (Cerebyx) 1,000 mg PE in sodium chloride 0.9 % 100 mL IVPB, 1,000 mg PE, Intravenous, Once, Jay Kohli Jr., MD    Current Outpatient Medications:     acetaminophen (TYLENOL) 500 MG tablet, Take 1 tablet by mouth Every 6 (Six) Hours As Needed for Mild Pain., Disp: , Rfl:     albuterol sulfate  (90 Base) MCG/ACT inhaler, Inhale 2 puffs Every 4 (Four) Hours As Needed for Wheezing., Disp: , Rfl:     diazePAM (VALTOCO 15 MG DOSE NA), 15 mg into the nostril(s) as directed by provider As Needed (seizure lasting longer than 5 minutes)., Disp: , Rfl:     doxazosin (CARDURA) 1 MG tablet, Take 1 tablet by mouth 2 (Two) Times a Day., Disp: , Rfl:     FLUoxetine (PROzac) 40 MG capsule, Take 2 capsules by mouth Daily., Disp: , Rfl:     hydrOXYzine (ATARAX) 25 MG tablet, Take 1 tablet by mouth 3 (Three) Times a Day As Needed for Itching., Disp: , Rfl:     lacosamide (VIMPAT) 100 MG tablet tablet, Take 1 tablet by mouth 2 (Two) Times a Day., Disp: , Rfl:     lamoTRIgine (LaMICtal) 100 MG tablet, Take 2.5 tablets by mouth Daily., Disp: , Rfl:     levETIRAcetam (KEPPRA) 1000 MG tablet, Take 1 tablet by mouth 2 (Two) Times a Day., Disp: , Rfl:     montelukast (SINGULAIR) 10 MG tablet, Take 1 tablet by mouth Every Night., Disp: , Rfl:     OLANZapine zydis (zyPREXA) 5 MG disintegrating tablet, Take 1 tablet by mouth 3 times a day., Disp: , Rfl:     pantoprazole (PROTONIX) 40 MG EC tablet, Take 1 tablet by mouth Daily.,  "Disp: , Rfl:     senna 8.6 MG tablet, Take 2 tablets by mouth Daily., Disp: , Rfl:     ALLERGIES  No Known Allergies    ABDOMINAL EXAM    VITAL SIGNS:   /85   Pulse 110   Temp 98.2 °F (36.8 °C) (Oral)   Resp 20   Ht 172.7 cm (68\")   Wt 93 kg (205 lb)   LMP  (LMP Unknown) Comment: neg hcg  SpO2 97%   BMI 31.17 kg/m²     Constitutional: Patient is alert and in no distress.  Patient with no current discomfort.    ENT: There is a normal pharynx with no acute erythema or exudate and oral mucosa is moist.  Nose is clear with no drainage.  Tympanic membranes intact and non-erythemic    Cardiovascular: S1-S2 regular rate and rhythm. No murmurs, rubs or gallops.  Pulses are equal bilaterally and there is no pitting edema.    Respiratory: Patient is clear to auscultation bilaterally with no wheezing or rhonchi.  Chest wall is nontender.  There are no external lesions on the chest.  There is no crepitance.    Gastrointestinal: No tenderness to palpation.  Abdomen with no fluid wave.  No rebound or guarding.  Bowel sounds normal in all 4 quadrants    Genitourinary: Patient is voiding appropriately.    Integument: No acute lesions noted.  Color appears to be normal.    [place] Coma Scale: Total score 15    Neurological: Patient is alert  No evidence of acute CVA.  Cranial nerves II through XII intact.  Patient with normal motor function as well as reflexes and sensation    Psychiatric: Normal affect and mood.            RADIOLOGY/PROCEDURES        XR Abdomen 2+ VW with Chest 1 VW    (Results Pending)          FUTURE APPOINTMENTS     No future appointments.               COURSE & MEDICAL DECISION MAKING       Patient's partial differential diagnosis can include:    Foreign body ingestion, better ingestion, seizure, electrolyte abnormality, and others    KUB and upright chest shows 6 metallic fingers in the shape of button batteries noted in either the distal stomach or the first portions of the small intestines.  " Will call gastroenterologist Dr. Murry.    Discussed case with gastroenterologist Dr. Murry who states that the patient will need to go to the OR and he will call the house supervisor to call in the OR team      Preliminary radiological read by radiologist Dr. Benjamín Garcia is rounded hyperdense foreign bodies noted in presence of the chest and left of the midline potentially in the gastric antrum    Patient started having episodes of seizure gave 1 mg of IV Ativan.    Patient's seizure stopped within 5 minutes of the initial seizure and Ativan.Keppra 1000 mg IV will be also given.    Patient now seizing again approximately 20 minutes after the initial seizure.  Will give him Ativan 1 mg IV x 1 now again.      Patient now with her third seizure after a the second 1 is finished.  Patient has had 2 mg of Ativan prior will give Valium 5 mg IV.  Still awaiting for the OR team to arrive.      Patient started having another seizure 5 mg of IV Valium was again given.  Awaiting for return call from neurologist Dr. Fox.    Neurologist Dr. Fox has returned the phone call and recommends the patient on fosphenytoin 1000 mg IV load now.  She also recommends that the patient be admitted to the ICU and monitored after her endoscopy.  [facility] consultation has been placed in the system.  She recommends continued treatment of her seizure medications of Keppra.    Dr. Murry and the OR team are here to take the patient to the OR to remove the button batteries.  Spoke with Maico Guallpa the APC for the ICU who will admit the patient under Dr. Nguyen due to the patient's continued status epilepticus.          Patient's level of risk: High       CRITICAL CARE    CRITICAL CARE: No    CRITICAL CARE TIME: None      Recent Results (from the past 24 hour(s))   Comprehensive Metabolic Panel    Collection Time: 08/01/24  2:28 AM    Specimen: Blood   Result Value Ref Range    Glucose 103 (H) 65 - 99 mg/dL    BUN 7 6 - 20 mg/dL    Creatinine  0.72 0.57 - 1.00 mg/dL    Sodium 138 136 - 145 mmol/L    Potassium 4.2 3.5 - 5.2 mmol/L    Chloride 104 98 - 107 mmol/L    CO2 20.0 (L) 22.0 - 29.0 mmol/L    Calcium 9.1 8.6 - 10.5 mg/dL    Total Protein 7.5 6.0 - 8.5 g/dL    Albumin 4.2 3.5 - 5.2 g/dL    ALT (SGPT) 11 1 - 33 U/L    AST (SGOT) 19 1 - 32 U/L    Alkaline Phosphatase 93 39 - 117 U/L    Total Bilirubin <0.2 0.0 - 1.2 mg/dL    Globulin 3.3 gm/dL    A/G Ratio 1.3 g/dL    BUN/Creatinine Ratio 9.7 7.0 - 25.0    Anion Gap 14.0 5.0 - 15.0 mmol/L    eGFR 117.7 >60.0 mL/min/1.73   CBC Auto Differential    Collection Time: 08/01/24  2:28 AM    Specimen: Blood   Result Value Ref Range    WBC 6.82 3.40 - 10.80 10*3/mm3    RBC 4.25 3.77 - 5.28 10*6/mm3    Hemoglobin 9.2 (L) 12.0 - 15.9 g/dL    Hematocrit 30.7 (L) 34.0 - 46.6 %    MCV 72.2 (L) 79.0 - 97.0 fL    MCH 21.6 (L) 26.6 - 33.0 pg    MCHC 30.0 (L) 31.5 - 35.7 g/dL    RDW 18.0 (H) 12.3 - 15.4 %    RDW-SD 46.4 37.0 - 54.0 fl    MPV 12.6 (H) 6.0 - 12.0 fL    Platelets 234 140 - 450 10*3/mm3    Neutrophil % 71.5 42.7 - 76.0 %    Lymphocyte % 18.8 (L) 19.6 - 45.3 %    Monocyte % 8.5 5.0 - 12.0 %    Eosinophil % 0.7 0.3 - 6.2 %    Basophil % 0.4 0.0 - 1.5 %    Immature Grans % 0.1 0.0 - 0.5 %    Neutrophils, Absolute 4.87 1.70 - 7.00 10*3/mm3    Lymphocytes, Absolute 1.28 0.70 - 3.10 10*3/mm3    Monocytes, Absolute 0.58 0.10 - 0.90 10*3/mm3    Eosinophils, Absolute 0.05 0.00 - 0.40 10*3/mm3    Basophils, Absolute 0.03 0.00 - 0.20 10*3/mm3    Immature Grans, Absolute 0.01 0.00 - 0.05 10*3/mm3   Magnesium    Collection Time: 08/01/24  2:28 AM    Specimen: Blood   Result Value Ref Range    Magnesium 1.8 1.6 - 2.6 mg/dL   ECG 12 Lead Tachycardia    Collection Time: 08/01/24  2:31 AM   Result Value Ref Range    QT Interval 334 ms    QTC Interval 474 ms          Old charts were reviewed per [facility] EMR.  Pertinent details are summarized above.  All laboratory, radiologic, and EKG studies that were performed in the  Emergency Department were a necessary part of the evaluation needed to exclude unstable or  emergent medical conditions.     Patient was hemodynamically and neurologically stable in the ED.   Pertinent studies were reviewed as above.     The patient received:  Medications   fosphenytoin (Cerebyx) 1,000 mg PE in sodium chloride 0.9 % 100 mL IVPB (has no administration in time range)   sodium chloride 0.9 % bolus 1,000 mL (1,000 mL Intravenous New Bag 8/1/24 0300)   levETIRAcetam (KEPPRA) injection 1,000 mg (1,000 mg Intravenous Given 8/1/24 0301)   LORazepam (ATIVAN) injection 1 mg (1 mg Intravenous Given 8/1/24 0310)   LORazepam (ATIVAN) injection 1 mg (1 mg Intravenous Given 8/1/24 0328)   diazePAM (VALIUM) injection 5 mg (5 mg Intravenous Given 8/1/24 0335)   diazePAM (VALIUM) injection 5 mg (5 mg Intravenous Given 8/1/24 0356)            Diagnoses that have been ruled out:   None   Diagnoses that are still under consideration:   None   Final diagnoses:   Foreign body ingestion, initial encounter   Status epilepticus        FINAL IMPRESSION   Diagnosis Plan   1. Status epilepticus        2. Foreign body ingestion, initial encounter      6 button batteries            Jay Kohli Jr, MD        ED Disposition       ED Disposition   Send to OR    Condition   --    Comment   Level of Care: Critical Care [6]  Diagnosis: Status epilepticus [253546]  Admitting Physician: SOBIA JONES [[id number]]  Attending Physician: SOBIA JONES [[id number]]                   Dragon disclaimer:  Part of this note may be an electronic transcription/translation of spoken language to printed text using the Dragon Dictation System.     I have reviewed the patient’s prescription history via a prescription monitoring program.  This information is consistent with my knowledge of the patient’s controlled substance use history.        Jay Kohli Jr., MD  08/01/24 0418

## 2024-08-01 NOTE — NURSING NOTE
Pt transferred to CCU with assistance of Endo RN, 2 CCU Rns and Tech from ED. Pt has had seizures approximately every 3=5 min sin 0529. Cerebyx infusing. No orders for other meds.Pts belongings taken to CCU with her. She has backpack and plastic hospital bag. Tablet and cell phone both to CCU with pt. Intensivist in room after arrival to CCU.

## 2024-08-01 NOTE — NURSING NOTE
[facility]  INPATIENT WOUND & OSTOMY CARE    Today's Date: 08/01/24    Patient Name: Duane Cooley  MRN: [id number]  [facility]: [id number]  PCP: Rosie Olvera MD  Attending Provider: Luisito Nguyen MD  Length of Stay: 0    I placed pressure injury prevention measure orders per protocol due to patient being at risk for skin breakdown and being admitted to the unit.     Apply silicone foam border dressing per protocol to sacral spine/bilateral heels for protection.  Nursing to change dressing every 3 days and PRN if soiled. Nursing is to peel back dressing with every assessment to assess skin underneath dressing. No barrier cream under dressing.    Please reach out to wound care nurse if any skin issues arise.     This document has been electronically signed by Nancy Watters RN on 8/1/2024 09:05 CDT

## 2024-08-01 NOTE — PLAN OF CARE
"Goal Outcome Evaluation:      Pt retrieved from pacu while having seizure activity post endo to retrieve 6 \"watch batteries\" unable to retrieve at this time. Pt states that  gives her the batteries. Pt running low grade fever of 99.2 ax. Communicated with neuro concerning seizure like activity via ipad. Initial assessment completed pt tachycardic                                        "

## 2024-08-01 NOTE — BRIEF OP NOTE
ESOPHAGOGASTRODUODENOSCOPY WITH ANESTHESIA  Progress Note    Duane Cooley  8/1/2024    Pre-op Diagnosis:   Ingested button batteries       Post-Op Diagnosis Codes:   No batteries seen. No GI tract injury noted.     Procedure/CPT® Codes:        Procedure(s):  ESOPHAGOGASTRODUODENOSCOPY WITH ANESTHESIA              Surgeon(s):  Herman Murry MD    Anesthesia: Monitored Anesthesia Care    Staff:   Endo Technician: Malachi Aguilera Kelli A  Endo Nurse: Joceline Guallpa RN         Estimated Blood Loss: none    Urine Voided: * No values recorded between 8/1/2024  4:25 AM and 8/1/2024  4:49 AM *    Specimens:                None          Drains: * No LDAs found *    Findings: Amorphous white material in the stomach, much of which was removed with Sorto basket. No metallic objects seen.         Complications: None          Herman Murry MD     Date: 8/1/2024  Time: 04:55 CDT

## 2024-08-01 NOTE — ANESTHESIA PROCEDURE NOTES
Airway  Urgency: elective    Date/Time: 8/1/2024 4:28 AM  Airway not difficult    General Information and Staff    Patient location during procedure: OR  CRNA/CAA: Calderon Bosch CRNA    Indications and Patient Condition  Indications for airway management: airway protection    Preoxygenated: yes  Mask difficulty assessment: 0 - not attempted    Final Airway Details  Final airway type: endotracheal airway      Successful airway: ETT  Cuffed: yes   Successful intubation technique: RSI and video laryngoscopy  Facilitating devices/methods: intubating stylet and cricoid pressure  Blade: Elliott  Blade size: 3  ETT size (mm): 7.5  Cormack-Lehane Classification: grade I - full view of glottis  Placement verified by: chest auscultation and capnometry   Measured from: lips  ETT/EBT  to lips (cm): 21  Number of attempts at approach: 1  Assessment: lips, teeth, and gum same as pre-op and atraumatic intubation

## 2024-08-01 NOTE — NURSING NOTE
Pt belongings received in PACU when pt transferred to OR. Black backpack and plastic hospital bag with pts hospital stickers on both. Contents not examined.

## 2024-08-01 NOTE — CASE MANAGEMENT/SOCIAL WORK
Discharge Planning Assessment  [facility]     Patient Name: Duane Cooley  MRN: [id number]  Today's Date: 8/1/2024    Admit Date: 8/1/2024        Discharge Needs Assessment       Row Name 08/01/24 0930       Living Environment    People in Home parent(s)    Name(s) of People in Home Mother Rosalind Cooley    Current Living Arrangements home    Potentially Unsafe Housing Conditions none    In the past 12 months has the electric, gas, oil, or water company threatened to shut off services in your home? No    Primary Care Provided by self;other (see comments)  Mother Rosalind Cooley    Provides Primary Care For no one, unable/limited ability to care for self    Family Caregiver if Needed parent(s)    Family Caregiver Names Mother Rosalind Cooley    Quality of Family Relationships supportive;helpful;involved    Able to Return to Prior Arrangements yes       Resource/Environmental Concerns    Resource/Environmental Concerns none    Transportation Concerns none       Transportation Needs    In the past 12 months, has lack of transportation kept you from medical appointments or from getting medications? no    In the past 12 months, has lack of transportation kept you from meetings, work, or from getting things needed for daily living? No       Food Insecurity    Within the past 12 months, you worried that your food would run out before you got the money to buy more. Never true    Within the past 12 months, the food you bought just didn't last and you didn't have money to get more. Never true       Transition Planning    Patient/Family Anticipates Transition to home with family    Transportation Anticipated family or friend will provide       Discharge Needs Assessment    Readmission Within the Last 30 Days other (see comments)    Equipment Currently Used at Home none    Concerns to be Addressed discharge planning    Anticipated Changes Related to Illness none    Equipment Needed After Discharge none    Current  Discharge Risk other (see comments)  self harm    Discharge Coordination/Progress Patient currently admitted to CCU due to ingestion of foreign body.  Patient residing with her mother.  Patient does not drive and her mother takes her to her appointments, etc.  Events noted from this am.  SW following and will assist as needed.                   Discharge Plan    No documentation.                 Continued Care and Services - Admitted Since 8/1/2024    No active coordination exists for this encounter.          Demographic Summary    No documentation.                  Functional Status    No documentation.                  Psychosocial    No documentation.                  Abuse/Neglect    No documentation.                  Legal    No documentation.                  Substance Abuse    No documentation.                  Patient Forms    No documentation.                     JESSI Dexter

## 2024-08-01 NOTE — ED NOTES
Patient was given 2 mg IV versed and a total of 8mg IM versed via [facility].

## 2024-08-01 NOTE — ANESTHESIA POSTPROCEDURE EVALUATION
"Patient: Duane Cooley    Procedure Summary       Date: 08/01/24 Room / Location:  [facility] OR 02 /  [facility] OR    Anesthesia Start: 0426 Anesthesia Stop: 0459    Procedure: ESOPHAGOGASTRODUODENOSCOPY WITH ANESTHESIA Diagnosis:     Surgeons: Herman Murry MD Provider: Calderon Bosch CRNA    Anesthesia Type: general ASA Status: 3 - Emergent            Anesthesia Type: general    Vitals  Vitals Value Taken Time   /67 08/01/24 0555   Temp 99.2 °F (37.3 °C) 08/01/24 0555   Pulse 120 08/01/24 0558   Resp 22 08/01/24 0555   SpO2 99 % 08/01/24 0558   Vitals shown include unfiled device data.        Post Anesthesia Care and Evaluation    Patient location during evaluation: PACU  Patient participation: complete - patient participated  Level of consciousness: awake and alert  Pain management: adequate    Airway patency: patent  Anesthetic complications: No anesthetic complications  PONV Status: none  Cardiovascular status: acceptable and hemodynamically stable  Respiratory status: acceptable  Hydration status: acceptable    Comments: Blood pressure 113/98, pulse (!) 122, temperature 99.2 °F (37.3 °C), temperature source Axillary, resp. rate 22, height 170.2 cm (67\"), weight 92.5 kg (203 lb 14.8 oz), SpO2 100%, not currently breastfeeding.    Patient discharged from PACU based upon Marielle score. Please see RN notes for further details    "

## 2024-08-01 NOTE — PAYOR COMM NOTE
"8/1/24 [facility] [phone]  [phone]    ER ADMIT TO INPATIENT ON 8/1/24 TO CCU.  FALAKEISHAG KACIE.            Earl Cooley (28 y.o. Female)       Date of Birth   1995    Social Security Number       Address   [address] [place] [address]    Home Phone   [phone]    MRN   [id number]       Taoist   Other    Marital Status                               Admission Date   8/1/24    Admission Type   Emergency    Admitting Provider   Luisito Nguyen MD    Attending Provider   Luisito Nguyen MD    Department, Room/Bed   [facility] CARDIAC CARE, C003/1       Discharge Date       Discharge Disposition       Discharge Destination                                 Attending Provider: Luisito Nguyen MD    Allergies: No Known Allergies    Isolation: None   Infection: None   Code Status: CPR    Ht: 170.2 cm (67\")   Wt: 92.5 kg (203 lb 14.8 oz)    Admission Cmt: None   Principal Problem: Psychogenic nonepileptic seizure [F44.5]                   Active Insurance as of 8/1/2024       Primary Coverage       Payor Plan Insurance Group Employer/Plan Group    [facility] COMMUNITY PLAN [facility] COMMUNITY PLAN [facility]       Payor Plan Address Payor Plan Phone Number Payor Plan Fax Number Effective Dates    [address]   1/1/2024 - None Entered    [facility] [id number]         Subscriber Name Subscriber Birth Date Member ID       EARL COOLEY 1995 [id number]                     Emergency Contacts        (Rel.) Home Phone Work Phone Mobile Phone    Kelli Cooley (Mother) -- -- [phone]    SARBJIT FUNG (Spouse) [phone] -- [phone]    Kelli Cooley (Mother) [phone] -- --             [facility] Encounter Date/Time: 8/1/2024 0221   Hospital Account: [id number]    MRN: [id number]   Patient:  Earl Cooley   Contact Serial #: [id number]   SSN:          ENCOUNTER             Patient Class: " Inpatient   Unit: [facility] CCU   Hospital Service: Intensivist     Bed: C003/1   Admitting Provider: Luisito Nguyen MD   Referring Physician:     Attending Provider: Luisito Nguyen MD   Adm Diagnosis: Status epilepticus [G40.*               PATIENT             Name: Earl Cooley : 1995 (28 yrs)   Address: [address] [place] [address] Sex: Female   City: [place] [address]   County: [place]   Marital Status:  Ethnicity: NOT                                                                         Race: WHITE   Primary Care Provider: Rosie Olvera* Patients Phone: Home Phone: [phone]     Mobile Phone: [phone]     EMERGENCY CONTACT   Contact Name Legal Guardian? Relationship to Patient Home Phone Work Phone Mobile Phone   1. Kelli Cooley  2. SARBJIT FUNG    No Mother  Spouse    [phone]   GUARANTOR             Guarantor: Earl Cooley     : 1995   Address: [address] [place] [address] Sex: Female     MAN [facility]     Relation to Patient: Self       Home Phone: [phone]   Guarantor ID: [id number]       Work Phone:     GUARANTOR EMPLOYER   Employer:           Status: NOT EMPLO*   COVERAGE          PRIMARY INSURANCE   Payor: [facility] [place] Plan[place] [facility] [place]   Group Number: [id number] Insurance Type: INDEMNITY   Subscriber Name: EARL COOLEY Subscriber : 1995   Subscriber ID: [id number] Coverage Address: [address] [address]   Pat. Rel. to Subscriber: Self Coverage Phone:     SECONDARY INSURANCE   Payor: N/A Plan: N/A   Group Number:   Insurance Type:     Subscriber Name:   Subscriber :     Subscriber ID:   Coverage Address:     Pat. Rel. to Subscriber:   Coverage Phone:        Contact Serial # ([id number])         2024    Chart ID ([id number])         Jay Kohli Jr., MD   Physician  Emergency Medicine     ED Provider Notes     Signed     Date of Service: 24  0227  Creation Time: 24 0227     Signed       Expand All Collapse All    HPI:      Patient is a 28-year-old female with known history of autism who again has taken a foreign body and ingested it.  Apparently she states that she took 4 button batteries and swallowed them.  This was around 2300 or midnight tonight.  Patient also had a seizure yesterday and then also had a seizure and route by EMS today.  And route she was given to milligrams of Versed IV and then a total of 8 IM to help stop the seizures.  She does have a seizure disorder is on multiple medications for this such as Keppra, and Vimpat.         REVIEW OF SYSTEMS  CONSTITUTIONAL:  No complaints of fever, chills,or weakness  EYES:  No complaints of discharge   ENT: No complaints of sore throat or ear pain  CARDIOVASCULAR:  No complaints of chest pain, palpitations, or swelling  RESPIRATORY:  No complaints of cough or shortness of breath  GI: Positive for ingestion of foreign body: four  button batteries  MUSCULOSKELETAL:  No complaints of back pain  SKIN:  No complaints of rash  NEUROLOGIC: Positive for seizure now resolved   ENDOCRINE:  No complaints of polyuria or polydipsia  LYMPHATIC:  No complaints of swollen glands  GENITOURINARY: No complaints of urinary frequency or hematuria       PAST MEDICAL HISTORY  Medical History        Past Medical History:   Diagnosis Date    Anxiety      Depression      History of  2017    History of congenital abnormality 2017    Late prenatal care affecting pregnancy in second trimester 2017    Mental deficiency 2017    Previous  delivery affecting pregnancy 2017    Seizures              FAMILY HISTORY  History reviewed. No pertinent family history.     SOCIAL HISTORY  Social History   Social History            Socioeconomic History    Marital status:    Tobacco Use    Smoking status: Unknown    Smokeless tobacco: Never   Vaping Use    Vaping status: Never Used    Substance and Sexual Activity    Alcohol use: Defer    Drug use: Defer    Sexual activity: Defer       Partners: Male            IMMUNIZATION HISTORY  Deferred to primary care physician.     SURGICAL HISTORY  Surgical History         Past Surgical History:   Procedure Laterality Date     SECTION        ENDOSCOPY N/A 2024     Procedure: ESOPHAGOGASTRODUODENOSCOPY WITH ANESTHESIA;  Surgeon: Tian Morris MD;  Location: [facility];  Service: Gastroenterology;  Laterality: N/A;  pre foreign body in stomach  post foreign body passed out of stomach  dr kaiden houston            CURRENT MEDICATIONS    Current Medications      Current Facility-Administered Medications:     fosphenytoin (Cerebyx) 1,000 mg PE in sodium chloride 0.9 % 100 mL IVPB, 1,000 mg PE, Intravenous, Once, Jay Kohli Jr., MD     Current Outpatient Medications:     acetaminophen (TYLENOL) 500 MG tablet, Take 1 tablet by mouth Every 6 (Six) Hours As Needed for Mild Pain., Disp: , Rfl:     albuterol sulfate  (90 Base) MCG/ACT inhaler, Inhale 2 puffs Every 4 (Four) Hours As Needed for Wheezing., Disp: , Rfl:     diazePAM (VALTOCO 15 MG DOSE NA), 15 mg into the nostril(s) as directed by provider As Needed (seizure lasting longer than 5 minutes)., Disp: , Rfl:     doxazosin (CARDURA) 1 MG tablet, Take 1 tablet by mouth 2 (Two) Times a Day., Disp: , Rfl:     FLUoxetine (PROzac) 40 MG capsule, Take 2 capsules by mouth Daily., Disp: , Rfl:     hydrOXYzine (ATARAX) 25 MG tablet, Take 1 tablet by mouth 3 (Three) Times a Day As Needed for Itching., Disp: , Rfl:     lacosamide (VIMPAT) 100 MG tablet tablet, Take 1 tablet by mouth 2 (Two) Times a Day., Disp: , Rfl:     lamoTRIgine (LaMICtal) 100 MG tablet, Take 2.5 tablets by mouth Daily., Disp: , Rfl:     levETIRAcetam (KEPPRA) 1000 MG tablet, Take 1 tablet by mouth 2 (Two) Times a Day., Disp: , Rfl:     montelukast (SINGULAIR) 10 MG tablet, Take 1 tablet by mouth Every Night.,  "Disp: , Rfl:     OLANZapine zydis (zyPREXA) 5 MG disintegrating tablet, Take 1 tablet by mouth 3 times a day., Disp: , Rfl:     pantoprazole (PROTONIX) 40 MG EC tablet, Take 1 tablet by mouth Daily., Disp: , Rfl:     senna 8.6 MG tablet, Take 2 tablets by mouth Daily., Disp: , Rfl:         ALLERGIES  Allergies   No Known Allergies        ABDOMINAL EXAM     VITAL SIGNS:   /85   Pulse 110   Temp 98.2 °F (36.8 °C) (Oral)   Resp 20   Ht 172.7 cm (68\")   Wt 93 kg (205 lb)   LMP  (LMP Unknown) Comment: neg hcg  SpO2 97%   BMI 31.17 kg/m²      Constitutional: Patient is alert and in no distress.  Patient with no current discomfort.     ENT: There is a normal pharynx with no acute erythema or exudate and oral mucosa is moist.  Nose is clear with no drainage.  Tympanic membranes intact and non-erythemic     Cardiovascular: S1-S2 regular rate and rhythm. No murmurs, rubs or gallops.  Pulses are equal bilaterally and there is no pitting edema.     Respiratory: Patient is clear to auscultation bilaterally with no wheezing or rhonchi.  Chest wall is nontender.  There are no external lesions on the chest.  There is no crepitance.     Gastrointestinal: No tenderness to palpation.  Abdomen with no fluid wave.  No rebound or guarding.  Bowel sounds normal in all 4 quadrants     Genitourinary: Patient is voiding appropriately.     Integument: No acute lesions noted.  Color appears to be normal.     Timur Coma Scale: Total score 15     Neurological: Patient is alert  No evidence of acute CVA.  Cranial nerves II through XII intact.  Patient with normal motor function as well as reflexes and sensation     Psychiatric: Normal affect and mood.                 RADIOLOGY/PROCEDURES           XR Abdomen 2+ VW with Chest 1 VW    (Results Pending)            FUTURE APPOINTMENTS     No future appointments.                    COURSE & MEDICAL DECISION MAKING        Patient's partial differential diagnosis can include:     Foreign " body ingestion, better ingestion, seizure, electrolyte abnormality, and others     KUB and upright chest shows 6 metallic fingers in the shape of button batteries noted in either the distal stomach or the first portions of the small intestines.  Will call gastroenterologist Dr. Murry.     Discussed case with gastroenterologist Dr. Murry who states that the patient will need to go to the OR and he will call the house supervisor to call in the OR team        Preliminary radiological read by radiologist Dr. Benjamín Garcia is rounded hyperdense foreign bodies noted in presence of the chest and left of the midline potentially in the gastric antrum     Patient started having episodes of seizure gave 1 mg of IV Ativan.     Patient's seizure stopped within 5 minutes of the initial seizure and Ativan.Keppra 1000 mg IV will be also given.     Patient now seizing again approximately 20 minutes after the initial seizure.  Will give him Ativan 1 mg IV x 1 now again.       Patient now with her third seizure after a the second 1 is finished.  Patient has had 2 mg of Ativan prior will give Valium 5 mg IV.  Still awaiting for the OR team to arrive.        Patient started having another seizure 5 mg of IV Valium was again given.  Awaiting for return call from neurologist Dr. Fox.     Neurologist Dr. Fox has returned the phone call and recommends the patient on fosphenytoin 1000 mg IV load now.  She also recommends that the patient be admitted to the ICU and monitored after her endoscopy.  Carmen consultation has been placed in the system.  She recommends continued treatment of her seizure medications of Keppra.     Dr. Murry and the OR team are here to take the patient to the OR to remove the button batteries.  Spoke with Maico Guallpa the APC for the ICU who will admit the patient under Dr. Nguyen due to the patient's continued status epilepticus.              Patient's level of risk: High        CRITICAL CARE     CRITICAL CARE:  No    CRITICAL CARE TIME: None        Recent Results         Recent Results (from the past 24 hour(s))   Comprehensive Metabolic Panel     Collection Time: 08/01/24  2:28 AM     Specimen: Blood   Result Value Ref Range     Glucose 103 (H) 65 - 99 mg/dL     BUN 7 6 - 20 mg/dL     Creatinine 0.72 0.57 - 1.00 mg/dL     Sodium 138 136 - 145 mmol/L     Potassium 4.2 3.5 - 5.2 mmol/L     Chloride 104 98 - 107 mmol/L     CO2 20.0 (L) 22.0 - 29.0 mmol/L     Calcium 9.1 8.6 - 10.5 mg/dL     Total Protein 7.5 6.0 - 8.5 g/dL     Albumin 4.2 3.5 - 5.2 g/dL     ALT (SGPT) 11 1 - 33 U/L     AST (SGOT) 19 1 - 32 U/L     Alkaline Phosphatase 93 39 - 117 U/L     Total Bilirubin <0.2 0.0 - 1.2 mg/dL     Globulin 3.3 gm/dL     A/G Ratio 1.3 g/dL     BUN/Creatinine Ratio 9.7 7.0 - 25.0     Anion Gap 14.0 5.0 - 15.0 mmol/L     eGFR 117.7 >60.0 mL/min/1.73   CBC Auto Differential     Collection Time: 08/01/24  2:28 AM     Specimen: Blood   Result Value Ref Range     WBC 6.82 3.40 - 10.80 10*3/mm3     RBC 4.25 3.77 - 5.28 10*6/mm3     Hemoglobin 9.2 (L) 12.0 - 15.9 g/dL     Hematocrit 30.7 (L) 34.0 - 46.6 %     MCV 72.2 (L) 79.0 - 97.0 fL     MCH 21.6 (L) 26.6 - 33.0 pg     MCHC 30.0 (L) 31.5 - 35.7 g/dL     RDW 18.0 (H) 12.3 - 15.4 %     RDW-SD 46.4 37.0 - 54.0 fl     MPV 12.6 (H) 6.0 - 12.0 fL     Platelets 234 140 - 450 10*3/mm3     Neutrophil % 71.5 42.7 - 76.0 %     Lymphocyte % 18.8 (L) 19.6 - 45.3 %     Monocyte % 8.5 5.0 - 12.0 %     Eosinophil % 0.7 0.3 - 6.2 %     Basophil % 0.4 0.0 - 1.5 %     Immature Grans % 0.1 0.0 - 0.5 %     Neutrophils, Absolute 4.87 1.70 - 7.00 10*3/mm3     Lymphocytes, Absolute 1.28 0.70 - 3.10 10*3/mm3     Monocytes, Absolute 0.58 0.10 - 0.90 10*3/mm3     Eosinophils, Absolute 0.05 0.00 - 0.40 10*3/mm3     Basophils, Absolute 0.03 0.00 - 0.20 10*3/mm3     Immature Grans, Absolute 0.01 0.00 - 0.05 10*3/mm3   Magnesium     Collection Time: 08/01/24  2:28 AM     Specimen: Blood   Result Value Ref Range      Magnesium 1.8 1.6 - 2.6 mg/dL   ECG 12 Lead Tachycardia     Collection Time: 08/01/24  2:31 AM   Result Value Ref Range     QT Interval 334 ms     QTC Interval 474 ms               Old charts were reviewed per [facility] EMR.  Pertinent details are summarized above.  All laboratory, radiologic, and EKG studies that were performed in the Emergency Department were a necessary part of the evaluation needed to exclude unstable or  emergent medical conditions.      Patient was hemodynamically and neurologically stable in the ED.   Pertinent studies were reviewed as above.      The patient received:  Medications   fosphenytoin (Cerebyx) 1,000 mg PE in sodium chloride 0.9 % 100 mL IVPB (has no administration in time range)   sodium chloride 0.9 % bolus 1,000 mL (1,000 mL Intravenous New Bag 8/1/24 0300)   levETIRAcetam (KEPPRA) injection 1,000 mg (1,000 mg Intravenous Given 8/1/24 0301)   LORazepam (ATIVAN) injection 1 mg (1 mg Intravenous Given 8/1/24 0310)   LORazepam (ATIVAN) injection 1 mg (1 mg Intravenous Given 8/1/24 0328)   diazePAM (VALIUM) injection 5 mg (5 mg Intravenous Given 8/1/24 0335)   diazePAM (VALIUM) injection 5 mg (5 mg Intravenous Given 8/1/24 0356)            Diagnoses that have been ruled out:   None   Diagnoses that are still under consideration:   None   Final diagnoses:   Foreign body ingestion, initial encounter   Status epilepticus         FINAL IMPRESSION    Diagnosis Plan   1. Status epilepticus          2. Foreign body ingestion, initial encounter        6 button batteries                Jay Kohli Jr, MD           ED Disposition         ED Disposition   Send to OR    Condition   --    Comment   Level of Care: Critical Care [6]  Diagnosis: Status epilepticus [363818]  Admitting Physician: SOBIA JONES [[id number]]  Attending Physician: SOBIA JONES [[id number]]                         Dragon disclaimer:  Part of this note may be an electronic transcription/translation of spoken language to  printed text using the Dragon Dictation System.      I have reviewed the patient’s prescription history via a prescription monitoring program.  This information is consistent with my knowledge of the patient’s controlled substance use history.        Jay Kohli Jr., MD  248                    Herman Murry MD   Physician  Gastroenterology     H&P     Signed     Date of Service: 24  Creation Time: 24     Signed       Expand All Collapse All    CC: Here for endoscopic evaluation.      HPI: Swallowed 6 button batteries this evening.      Medical History        Past Medical History:   Diagnosis Date    Anxiety      Depression      History of  2017    History of congenital abnormality 2017    Late prenatal care affecting pregnancy in second trimester 2017    Mental deficiency 2017    Previous  delivery affecting pregnancy 2017    Seizures              Surgical History[]Expand by Default         Past Surgical History:   Procedure Laterality Date     SECTION        ENDOSCOPY N/A 2024     Procedure: ESOPHAGOGASTRODUODENOSCOPY WITH ANESTHESIA;  Surgeon: Tian Morris MD;  Location: [facility];  Service: Gastroenterology;  Laterality: N/A;  pre foreign body in stomach  post foreign body passed out of stomach  dr kaiden houston                    Prior to Admission medications    Medication Sig Start Date End Date Taking? Authorizing Provider   acetaminophen (TYLENOL) 500 MG tablet Take 1 tablet by mouth Every 6 (Six) Hours As Needed for Mild Pain.       ProviderRayray MD   albuterol sulfate  (90 Base) MCG/ACT inhaler Inhale 2 puffs Every 4 (Four) Hours As Needed for Wheezing.       Rayray Jean MD   diazePAM (VALTOCO 15 MG DOSE NA) 15 mg into the nostril(s) as directed by provider As Needed (seizure lasting longer than 5 minutes).       Rayray Jean MD   doxazosin (CARDURA) 1 MG tablet  Take 1 tablet by mouth 2 (Two) Times a Day.       Rayray Jean MD   FLUoxetine (PROzac) 40 MG capsule Take 2 capsules by mouth Daily.       Rayray Jean MD   hydrOXYzine (ATARAX) 25 MG tablet Take 1 tablet by mouth 3 (Three) Times a Day As Needed for Itching.       Rayray Jean MD   lacosamide (VIMPAT) 100 MG tablet tablet Take 1 tablet by mouth 2 (Two) Times a Day.       Rayray Jean MD   lamoTRIgine (LaMICtal) 100 MG tablet Take 2.5 tablets by mouth Daily.       Rayray Jean MD   levETIRAcetam (KEPPRA) 1000 MG tablet Take 1 tablet by mouth 2 (Two) Times a Day.       Rayray Jean MD   montelukast (SINGULAIR) 10 MG tablet Take 1 tablet by mouth Every Night.       Rayray Jean MD   OLANZapine zydis (zyPREXA) 5 MG disintegrating tablet Take 1 tablet by mouth 3 times a day.       Rayray Jean MD   pantoprazole (PROTONIX) 40 MG EC tablet Take 1 tablet by mouth Daily.       Raryay Jean MD   senna 8.6 MG tablet Take 2 tablets by mouth Daily.       Rayray Jean MD         Allergies   No Known Allergies        History reviewed. No pertinent family history.     OBJECTIVE:     Patient's vital signs reviewed. No acute distress.      Chest is clear, no wheezing or rales. Normal symmetric air entry throughout both lung fields. No chest wall deformities or tenderness.     S1 and S2 normal, no murmurs, clicks, gallops or rubs. Regular rate and rhythm. Chest is clear; no wheezes or rales. No edema or JVD.     The abdomen is soft without tenderness, guarding, mass, rebound or organomegaly. Bowel sounds are normal. No CVA tenderness or inguinal adenopathy noted.     Patient is alert, oriented and with an intact memory.     Assessment: Button batteries in stomach     Plan: EGD                      ef Op Note     Signed     Date of Service: 08/01/24 0426  Creation Time: 08/01/24 0455  Case Time: Procedures: Surgeons:   08/01/24 0426  ESOPHAGOGASTRODUODENOSCOPY WITH ANESTHESIA    Herman Murry MD               Signed         ESOPHAGOGASTRODUODENOSCOPY WITH ANESTHESIA  Progress Note     Duane Cooley  8/1/2024     Pre-op Diagnosis:   Ingested button batteries       Post-Op Diagnosis Codes:   No batteries seen. No GI tract injury noted.      Procedure/CPT® Codes:           Procedure(s):  ESOPHAGOGASTRODUODENOSCOPY WITH ANESTHESIA                    Surgeon(s):  Herman Murry MD     Anesthesia: Monitored Anesthesia Care     Staff:   Endo Technician: Malachi Aguilera Kelli A  Endo Nurse: Joceline Guallpa RN           Estimated Blood Loss: none     Urine Voided: * No values recorded between 8/1/2024  4:25 AM and 8/1/2024  4:49 AM *     Specimens:                None            Drains: * No LDAs found *     Findings: Amorphous white material in the stomach, much of which was removed with Sorto basket. No metallic objects seen.            Complications: None              Herman Murry MD      Date: 8/1/2024  Time: 04:55 CDT                    Jose Alfredo Guallpa APRN   Nurse Practitioner  Intensivist     H&P      Cosign Needed     Date of Service: 08/01/24 0600  Creation Time: 08/01/24 0621     Cosign Needed       Expand All Collapse All         [facility] Intensivist Services     Date of Admission: 8/1/2024  Date of Note: 08/01/24  Primary Care Physician: Rosie Olvera MD     History   ER HPI  Patient is a 28-year-old female with known history of autism who again has taken a foreign body and ingested it.  Apparently she states that she took 4 button batteries and swallowed them.  This was around 2300 or midnight tonight.  Patient also had a seizure yesterday and then also had a seizure and route by EMS today.  And route she was given to milligrams of Versed IV and then a total of 8 IM to help stop the seizures.  She does have a seizure disorder is on multiple medications for this such as  "Keppra, and Vimpat.      ED course: total of 10 mg valium, 10 mg ativan, 1000 mg keppra, and 1000 mg fosphenytoin.   In addition to these medications she was administered unknown  dosage of propofol for procedural sedation of upper GI endoscopy.   X-ray abdomen 2 view w/ chest: 6 round metallic foreign bodies     The ICU team was asked to evaluate the patient for concerns for status epilepticus     I have seen and evaluated patient upon her arrival to CCU bed 3 arriving from the OR following upper GI endoscopy that was unsuccessful at retrieving 6 small round metallic foreign objects believed to be \"button batteries\".  She is non verbal at baseline. She withdraw from pain but is not follow commands. During exam she has 2 episodes of seizure like activity, lasting in total 2 minutes. Airway is maintained and 02 saturation maintains >93%.     Past Medical History      Active and Resolved Problems        Active Hospital Problems     Diagnosis   POA    **Status epilepticus [G40.901]   Yes    Seizure-like activity [R56.9]   Yes       Resolved Hospital Problems   No resolved problems to display.         Past Medical History:   Medical History        Past Medical History:   Diagnosis Date    Anxiety      Depression      History of  2017    History of congenital abnormality 2017    Late prenatal care affecting pregnancy in second trimester 2017    Mental deficiency 2017    Previous  delivery affecting pregnancy 2017    Seizures              Prior Surgeries: She  has a past surgical history that includes  section and Esophagogastroduodenoscopy (N/A, 2024).     Past Surgical History:   Surgical History         Past Surgical History:   Procedure Laterality Date     SECTION        ENDOSCOPY N/A 2024     Procedure: ESOPHAGOGASTRODUODENOSCOPY WITH ANESTHESIA;  Surgeon: Tian Morris MD;  Location: [facility];  Service: Gastroenterology;  Laterality: N/A;  pre " foreign body in stomach  post foreign body passed out of stomach  dr kaiden houston            Social and Family History      Family History:  family history is not on file.     Tobacco/Social History:  reports that she has an unknown smoking status. She has never used smokeless tobacco. Alcohol use questions deferred to the physician. Drug use questions deferred to the physician.     Allergies      Allergies:   She has No Known Allergies.     Allergies   No Known Allergies        Labs      Basic Labs:  Common Labs:[]Expand by Default   Common labs            7/8/2024    23:50 7/10/2024    02:09 8/1/2024    02:28   Common Labs   Glucose 111  103  103    BUN 6  6  7    Creatinine 0.77  0.60  0.72    Sodium 139  138  138    Potassium 4.0  4.6  4.2    Chloride 103  104  104    Calcium 9.3  9.1  9.1    Albumin 4.1    4.2    Total Bilirubin <0.2    <0.2    Alkaline Phosphatase 99    93    AST (SGOT) 14    19    ALT (SGPT) 12    11    WBC 9.36  7.35  6.82    Hemoglobin 9.6  9.7  9.2    Hematocrit 31.7  33.2  30.7    Platelets 249  242  234             Diabetic:  Last 3 A1C Results:            Inpatient Medications      Medications: Scheduled Meds:  Scheduled Medication   LORazepam, , ,   Chlorhexidine Gluconate Cloth, 1 Application, Topical, Once  [START ON 8/2/2024] Chlorhexidine Gluconate Cloth, 1 Application, Topical, Q24H  mupirocin, 1 Application, Each Nare, BID  senna-docusate sodium, 2 tablet, Oral, BID  sodium chloride, 10 mL, Intravenous, Q12H         Continuous Infusions:  Infusion Medications         PRN Meds:.  PRN Medication     LORazepam    senna-docusate sodium **AND** polyethylene glycol **AND** bisacodyl **AND** bisacodyl    Calcium Replacement - Follow Nurse / BPA Driven Protocol    Magnesium Standard Dose Replacement - Follow Nurse / BPA Driven Protocol    Phosphorus Replacement - Follow Nurse / BPA Driven Protocol    Potassium Replacement - Follow Nurse / BPA Driven Protocol    sodium  "chloride    sodium chloride        I have reviewed the patient's current medications.   Outpatient Medications      Outpatient Medications:   Medications Taking   No outpatient medications have been marked as taking for the 8/1/24 encounter (Hospital Encounter).            Current Antibiotics      This patient does not have an active medication from one of the medication groupers.     Exam      Vitals: Her  height is 172.7 cm (68\") and weight is 93 kg (205 lb). Her oral temperature is 98.2 °F (36.8 °C). Her blood pressure is 126/101 (abnormal) and her pulse is 100. Her respiration is 26 and oxygen saturation is 94%.      GENERAL:  non verbal, no acute distress.   SKIN:  Warm, dry  EYES:  Pupils equal, round and reactive to light.  EOMs intact.    HEAD:  Normocephalic.  NECK:  Supple   RESP:  Lungs clear to auscultation. Good airflow. Normal respiratory effort.   CARDIAC:  Regular rate and rhythm. Normal S1,S2. No edema  GI:  Soft, nontender, normal bowel sounds  MSK:  Normal muscle bulk, tone  NEUROLOGICAL: non verbal, will not follow commands to attempt neuro exam.   PSYCHIATRIC: flat affect      Results and Cultures Review      Result Review:  I have personally reviewed the results from the time of this admission to 8/1/2024 06:22 CDT and agree with these findings:  [x]  Laboratory list / accordion  []  Microbiology  [x]  Radiology  []  EKG/Telemetry   []  Cardiology/Vascular   []  Pathology  [x]  Old records  []  Other:  Most notable findings include: see HPI        Culture Data:   No results found for: \"BLOODCX\", \"URINECX\", \"WOUNDCX\", \"MRSACX\", \"RESPCX\", \"STOOLCX\"     Assessment/Plan   This is a 28-year-old female with past medical history of autism and pseudoseizures presents the emergency room with seizure-like activity and reported ingestion of batteries. ED course workup was concerning for 6 round, metallic, foreign bodies identified in the GI tract on plain film, and several episodes of \" seizure-like " "activity\" for which she was administered multiple medications in attempt at terminating seizures.  She was then taken to OR for EGD in attempt at retrieval of foreign bodies which was unsuccessful.  Critical care team was asked to further manage seizure-like activity with the belief that she was in status epilepticus.      She will be admitted under the intensivist MD, Dr. Nguyen, case will be discussed, and further recommendations on or modifications to the treatment plan are ultimately at his discretion.     Treatment Plan     Seizure-like activity  -On review of past medical records and most recent specialization for similar complaint it is well-documented that patient has longstanding history of pseudoseizures and no documented history of epilepsy. She has been seen and evaluated by several neurologist and it was recommended to wean her from anti-seizure medications.  -Please see neurologist, Dr. Martínez's note from last hospitalization, who also agrees and advises avoiding intubation for any seizure-like activity if able.   -we will continue to monitor and place on seizure precautions     Foreign body  -6 round metallic foreign bodies noted in GI tract on plain film x-ray obtained in ED  -EGD by Dr. Gonzales, unsuccessful at retrieval   -will obtain CT abd/pel w/o to further evaluate location  -Contact poison control        VTE Prophylaxis:     Mechanical VTE prophylaxis orders are present.           Total critical care time: Approximately 52 minutes     Due to a high probability of clinically significant, life threatening deterioration, the patient required my highest level of preparedness to intervene emergently and I personally spent this critical care time directly and personally managing the patient.      This critical care time included obtaining a history; examining the patient; pulse oximetry; ordering and review of studies; arranging urgent treatment with development of a management plan; evaluation of " patient's response to treatment; frequent reassessment; and, discussions with other providers.     This critical care time was performed to assess and manage the high probability of imminent, life-threatening deterioration that could result in multi-organ failure. It was exclusive of separately billable procedures and treating other patients and teaching time.     Please see MDM section and the rest of the note for further information on patient assessment and treatment.     Part of this note may be an electronic transcription/translation of spoken language to printed text using the Dragon Dictation System.     Electronically signed by TELMA Hair on 2024 at 06:22 CDT                  Kamala Fox MD   Physician  Neurology     Consults     Signed     Date of Service: 24  Creation Time: 24     Signed       Expand All Collapse All      Neurology Consult Note     Consult Date: 2024  Referring MD: No ref. provider found  Reason for Consult: status epilepticus     Patient: Duane Cooley (28 y.o. female)  MRN: [id number]  : 1995     History of Present Illness:   Duane Cooley is a 28 y.o. female with PMH sig for autism, behavioral disorder and psychogenic nonepileptic spells.  She also has a h/o foreign body ingestions.     She presented to ED  around 022 after EMS was called after she ingested 4 button batteries around midnight.  She also reported having a seizure yesterday and then had multiple seizures en route with EMS and was given2 mg of Versed IV and then a total of 8 IM.  Following arrival to ED she continued to have 1-3 minutes of nonrhythmic UE/LE tensing and jerking.  Was given ativan 1 mg x 2, valium, keppra IV load and cerebyx IV load.   She was then taken for endoscopy for attempted battery removal but no batteries were visualized.  Per surgery nurse note she had seizures approximately every 3-5 minutes since 0529.  No further since arrival to CCU.    "  The patient reports to me that she knows she has nonepileptic spells but also feels like she has \"epilepsy\" as well and thinks she can tell the difference between her spells based on a headache preceding the \"epilepsy.\"   All her spells involve arm/leg shaking with no tongue biting or incontinence.  She can hear what people are saying during her spells.  She has stopped taking her lamictal and keppra about 1 week ago but has continued taking the vimpat 100 mg BID without any missed doses.  She has valtoco prn that she uses about 1/month and did get this last night.  She is going to be seeing a new neurologist in [place] on 8/20 to establish care.  When asked about recent stressors that may have precipitated more spells she does report getting a divorce and excharitosband has been bringing her magnets and batteries.   ICU RN aware of this and he will not be allowed to visit.  She denies any recent fevers/chills/infectious symptoms.  No headache currently.       Dr. Martínez saw her in consultation 7/9/24 with similar admission for \"status epilepticus\" and foreign body ingestion.  In PACU after endoscopy she had seizure-like activity and intubated for airway protection that admission.  She was weaned and extubated and discharged 7/12/24.   Dr. Martínez saw her that admission and recommended that she re-establish local care with Dr. Lira at [facility] and attempted to dissuade mother from continuing to seek other medical opinions but was unsuccessful.     Below are excerpts from [facility] consult note from 2023 (in blue) and Dr. Martínez's consult note from 7/9/24 (in black):     This is a 27-year old female routinely cared for by Rosie Pagan MD, who has been followed by Declan Lira MD, at [facility] neurology for possible seizure disorder.  Patient has a known history of autism, is described in medical records as having a \"static encephalopathy,\" depression, and anxiety.     The patient was " "hospitalized at [facility] from 1/6/2023-1/14/2023 with what was initially felt to be status epilepticus, however, none of the screening or continuous EEG studies demonstrated any epileptiform activity.     Dr. Srinivas Acevedo MD, saw the patient on 1/14/2023 after the patient had been successfully extubated.  At that time, she was being managed with Keppra 1500 mg twice daily, Lamictal 200 mg in the morning, Dilantin 100 mg 3 times daily and Vimpat 100 mg twice daily.  Despite this, she was given multiple doses of Versed in the emergency room at the beginning of that admission leading to eventual intubation due to seizure-like activity.     Dr. Acevedo's assessment at that time was that \"at least some of the events, if not all appear to be pseudoseizures.\"  Recommendation was for the patient to then follow-up with Dr. Declan Lira MD, as an outpatient.     Unfortunately, the patient returned to the emergency room again at [facility] and was subsequently transferred to [facility] in [place], [place].  We have requested these records for review as well.     Yesterday, the patient apparently had a witnessed seizure-like event at home for which EMS was summoned.  They presented her to [facility] this time for evaluation where she was felt to have signs of status epilepticus.  The patient was treated, however, with multiple doses of Valium and Versed by EMS prior to arrival.  Despite this, the patient was demonstrating convulsive-like activity eventually leading to intubation for airway protection.     The patient reportedly was discharged on January 26, 2023.  She was supposed to follow-up with Dr. Declan Lira for continued care of her seizure-like events.  There are no records that she ever followed up with him subsequent to that appointment.  She has been getting refills from [facility] according to pharmacologic reports.     She also has a known history of multiple " developmental disorders including autism.  She has been prone to pathologically swallowing things in the past.  She presented to our medical facility late last night.  She was having jerking like episodes.  When the jerking activity stopped she reported to the medical staff that she may have swallowed a battery.  An x-ray of the abdomen showed 2 metallic foreign objects in the left mid abdomen GI was consulted and they recommended admitting for endoscopy.  She reportedly had an endoscopy this morning at around 09 30.  An endoscopy showed no signs of a foreign object.  After the endoscopy the patient demonstrated seizure-like activity and anesthesia intubated the patient.  She currently is in the intensive care unit awaiting a general surgery evaluation.  She is intubated and sedated.  No seizure activity is currently seen.     I was now able to speak to her mother by phone a Ms. Kelli Cooley.  She tells me that after I saw the patient in January 2023 that she elected not to follow-up with [facility] neurology.  She wanted another opinion.  She was seen by neurologist at [facility].  That neurologist said that she did not need to be on any of her antiepileptics.  By report she stopped all of her seizure meds and then started having multiple seizures after this.  She ended up at [facility].  The timeline is unclear because her mother tells me that was as recent as 1 month ago.  At [facility] they restarted her antiepileptics not because they believe that they were actual epileptic events but instead suggested a slower taper to avoid any seizure activity.  Her mother tells me that she often swallows things.  She has not done in several months but yesterday reported that she is swallowed either some watch batteries or several magnets.  It is unclear whether she takes her meds according to her mother.  I asked whether she has establish care with a neurologist.  She tells me that she has a new  "neurologist that she is scheduled with now in [facility] as she wants a \"second opinion.\"  That is despite her having 5-6 neurologic opinions prior to this that all pointed towards psychogenic nonepileptic spells.  Her mother does know that she does not have true epileptic events.  She calls the episodes \"spells.\"  She has been told that by multiple neurologist.  She tells me that when the patient has one of her spells if she will leave her alone long enough the patient will snap out of it.     2/1/23 note from Dr. Martínez:  I have reviewed this documentation and agree. Patient seen and examined by me. I am very familiar with this patient. I was able to speak to her mother over the phone again this morning. She tells me yesterday Duane told her mother that she felt like she was going to have a seizure. She had approximately 4-5 events with her mother reassuring her. She then told her mother that she demanded that she call emergency services. Her mother opposed this saying that there was nothing that needed to be done. At that, the patient threatened her saying that if she did not call emergency services that she would alert Adult Protective Services on her mother. She was taken to [facility]. They reportedly did have previous records from [facility] documenting that these were pseudoseizures. Despite this they called our transfer center and elected to share this information regarding her. She was transferred to us after intubation and multiple sedating agents. When she got here we saw her this morning and neurologic consultation. She was extubated immediately. She was attempting another pseudoseizure but I told her that I wanted to \"change her meds.\" In the past she has been resistant to this. She immediately woke up and said that she did not want her medications changed. I told her mother that she was free to come get her and the patient immediately started ripping things " off. I spoke with her mother at length. I expressed concern that if she continues to show up at outside hospitals with pseudoseizure activity she will continue to be intubated I think there is a great risk for this. It is my believe that the patient is now seeking secondary gain. I believe that she has a goal to be intubated and placed on propofol drips. She must receive some sort of euphoria from this. I am recommending that she invest in a medical alert bracelet that can alert anyone seeing her that these are not real seizures. I spoke over the phone with Dr. Declan Lira, her primary neurologist. He has agreed to see her in clinic with hopes to admit her to an epilepsy monitoring unit and wean off all 4 of her antiepileptics.      Medical History:   Past Medical/Surgical Hx:     Surgical History         Past Surgical History:   Procedure Laterality Date     SECTION        ENDOSCOPY N/A 2024     Procedure: ESOPHAGOGASTRODUODENOSCOPY WITH ANESTHESIA;  Surgeon: Tian Morris MD;  Location: [facility];  Service: Gastroenterology;  Laterality: N/A;  pre foreign body in stomach  post foreign body passed out of stomach  dr kaiden h[place]            Medications On Admission:  Prescriptions Prior to Admission           Medications Prior to Admission   Medication Sig Dispense Refill Last Dose    acetaminophen (TYLENOL) 500 MG tablet Take 1 tablet by mouth Every 6 (Six) Hours As Needed for Mild Pain.          albuterol sulfate  (90 Base) MCG/ACT inhaler Inhale 2 puffs Every 4 (Four) Hours As Needed for Wheezing.          diazePAM (VALTOCO 15 MG DOSE NA) 15 mg into the nostril(s) as directed by provider As Needed (seizure lasting longer than 5 minutes).          doxazosin (CARDURA) 1 MG tablet Take 1 tablet by mouth 2 (Two) Times a Day.          FLUoxetine (PROzac) 40 MG capsule Take 2 capsules by mouth Daily.          hydrOXYzine (ATARAX) 25 MG tablet Take 1 tablet by mouth 3 (Three) Times a Day  As Needed for Itching.          lacosamide (VIMPAT) 100 MG tablet tablet Take 1 tablet by mouth 2 (Two) Times a Day.          lamoTRIgine (LaMICtal) 100 MG tablet Take 2.5 tablets by mouth Daily.          levETIRAcetam (KEPPRA) 1000 MG tablet Take 1 tablet by mouth 2 (Two) Times a Day.          montelukast (SINGULAIR) 10 MG tablet Take 1 tablet by mouth Every Night.          OLANZapine zydis (zyPREXA) 5 MG disintegrating tablet Take 1 tablet by mouth 3 times a day.          pantoprazole (PROTONIX) 40 MG EC tablet Take 1 tablet by mouth Daily.          senna 8.6 MG tablet Take 2 tablets by mouth Daily.                  Current Medications:    Current Medications      Current Facility-Administered Medications:     sennosides-docusate (PERICOLACE) 8.6-50 MG per tablet 2 tablet, 2 tablet, Oral, BID **AND** polyethylene glycol (MIRALAX) packet 17 g, 17 g, Oral, Daily PRN **AND** bisacodyl (DULCOLAX) EC tablet 5 mg, 5 mg, Oral, Daily PRN **AND** bisacodyl (DULCOLAX) suppository 10 mg, 10 mg, Rectal, Daily PRN, Luisito Nguyen MD    Calcium Replacement - Follow Nurse / BPA Driven Protocol, , Does not apply, PRN, Luisito Nguyen MD    Chlorhexidine Gluconate Cloth 2 % pads 1 Application, 1 Application, Topical, Once, Luisito Nguyen MD    [START ON 8/2/2024] Chlorhexidine Gluconate Cloth 2 % pads 1 Application, 1 Application, Topical, Q24H, Luisito Nguyen MD    diphenhydrAMINE (BENADRYL) injection 12.5 mg, 12.5 mg, Intravenous, Q4H PRN, Kamala Fox MD    lacosamide (VIMPAT) tablet 100 mg, 100 mg, Oral, Q12H, Kamala Fox MD    Magnesium Standard Dose Replacement - Follow Nurse / BPA Driven Protocol, , Does not apply, PRN, Luisito Nguyen MD    mupirocin (BACTROBAN) 2 % nasal ointment 1 Application, 1 Application, Each Nare, BID, Ginaage, Thar Y, MD    Phosphorus Replacement - Follow Nurse / BPA Driven Protocol, , Does not apply, Patrick ALMANZA Thar Y, MD    Potassium Replacement - Follow Nurse / BPA Driven  "Protocol, , Does not apply, PRN, Luisito Nguyen MD    sodium chloride 0.9 % flush 10 mL, 10 mL, Intravenous, Q12H, Luisito Nguyen MD    sodium chloride 0.9 % flush 10 mL, 10 mL, Intravenous, Patrick ALMANZA Thar Y, MD    sodium chloride 0.9 % infusion 40 mL, 40 mL, Intravenous, Patrick ALMANZA Thar Y, MD         Allergies:  Allergies   No Known Allergies        Social Hx:  Social History   Social History            Socioeconomic History    Marital status:    Tobacco Use    Smoking status: Unknown    Smokeless tobacco: Never   Vaping Use    Vaping status: Never Used   Substance and Sexual Activity    Alcohol use: Defer    Drug use: Defer    Sexual activity: Defer       Partners: Male            Family Hx:  History reviewed. No pertinent family history.  Physical Examination:   Vital Signs:  Vitals          Vitals:     08/01/24 0510 08/01/24 0520 08/01/24 0555 08/01/24 0701   BP: 112/55 (!) 126/101 119/67 113/98   Pulse: 69 100 101 (!) 122   Resp: 15 26 22     Temp:     99.2 °F (37.3 °C)     TempSrc:     Axillary     SpO2: 100% 94% 100% 100%   Weight:     92.5 kg (203 lb 14.8 oz)     Height:     170.2 cm (67\")              General Exam:  Head:  Normocephalic, atraumatic  HEENT:  Neck supple  Skin:  No rashes     Neurologic Exam:     Mental Status:    -Awake, Alert, nonverbal other than grunting but communicates quickly via typing on her ipad     CN:  Pupils 3-2 OU, EOM full without nystagmus, face symmetric, tongue ML with no trauma, chewing on teether toy     Motor: nl bulk and tone, no tremor, power 5/5 and symmetric     DTR:  2+ and symmetric     Sensory:  -Intact to light touch all 4     Coordination:  -Finger to nose intact        Gait  deferred     Recent Diagnostics:   Laboratory Results:   - Reviewed in EMR        Lab Results   Component Value Date     GLUCOSE 103 (H) 08/01/2024     CALCIUM 9.1 08/01/2024      08/01/2024     K 4.2 08/01/2024     CO2 20.0 (L) 08/01/2024      08/01/2024     " "BUN 7 08/01/2024     CREATININE 0.72 08/01/2024     BCR 9.7 08/01/2024     ANIONGAP 14.0 08/01/2024            Lab Results   Component Value Date     WBC 6.82 08/01/2024     HGB 9.2 (L) 08/01/2024     HCT 30.7 (L) 08/01/2024     MCV 72.2 (L) 08/01/2024      08/01/2024      No results found for: \"PTT\", \"INR\"  No results found for: \"CHOLTOT\", \"TRIG\", \"HDL\", \"LDL\"  No results found for: \"HGBA1C\"     Imaging Results:  Imaging Results (Last 24 Hours)         Procedure Component Value Units Date/Time     XR Abdomen 2+ VW with Chest 1 VW [[id number]] Collected: 08/01/24 0609       Updated: 08/01/24 0616     Narrative:       EXAMINATION: XR ABDOMEN 2+ VIEWS W CHEST 1 VW-     8/1/2024 1:31 AM     HISTORY: Possible injection of 4 button batteries     Supine and upright views of the abdomen and a frontal projection of the  chest are obtained. Comparison is made with the previous study dated  7/12/2024.     The lungs are poorly expanded with crowding of the bronchovascular  structures and atelectatic changes.     There is no evidence of recent infiltrate, pleural effusion, pulmonary  congestion or pneumothorax.     Moderate enlargement the cardiac silhouette is probably due to  magnification of AP projection.     There is a cluster/group of 6, rounded, metallic foreign bodies in the  mid to distal stomach. Each metallic foreign body measures approximately  8 mm in diameter. These represent the vertebral arteries at suggested in  the history.     There is moderate gas and stool in the colon.     There is no evidence of free air.     Both kidneys are obscured by the bowel contents. No radiopaque calculi.     No acute bony abnormality.        Impression:       1. 6 radiopaque rounded metallic foreign bodies in the mid to distal  stomach.  2. Abdominal gas pattern is unremarkable. No finding to suggest  obstruction or ileus. No free air.  3. The lungs are poorly expanded. No active cardiopulmonary disease.     The above study " "was initially reviewed and reported by [employer]. I do not  find any discrepancies.     This report was signed and finalized on 8/1/2024 6:13 AM by Dr. Medina Braun MD.                      Assessment & Plan:   Patient with a known history of  psychogenic nonepileptic spells who presented to ED 8/1 am after foreign body ingestion with multiple events c/w previous PNES.     Impression:  Psychogenic nonepileptic spells  Foreign body ingestion  Behavioral disorder        Plan:   I have reviewed notes re: multiple, extensive previous neurologic work-ups.  No new neurologic workup required this admission.  Continue vimpat 100 mg po BID for now.  She has self-discontinued lamictal and keppra and will not resume (>1 week ago).  I have encouraged her to avoid using her valtoco prn.  She has an upcoming appt with a new neurologist in [place].  We discussed needing to establish care a neurologist she trusts and make a plan for safely weaning the vimpat and working with mental health team on alternative ways to manage any break through events.  She believes headache precedes her \"epilepsy\" and will try diphenhydramine 12.5 mg IV q 4 hours prn any events while admitted.  She can also try this po at home prn headache to attempt to prevent full episodes from evolving.  D/w nursing--no prn ativan/valium recommended  Plan for foreign body ingestion per primary team/GI/surgery     D/w pt and nursing.  Pt requests that I not update her mother and will respect her wishes.  She also requests that braeden not be allowed to visit and nursing aware.  Okay for discharge from neurology standpoint when cleared by other consultants.     Kamala Fox MD  08/01/24  08:39 CDT     Medical Decision Making     Number/Complexity of Problems  Moderate  1 undiagnosed new problem with uncertain prognosis -   1 acute illness with systemic symptoms -   High  1 acute or chronic illness that pose a threat to life/body function -   high     MDM " Data  Moderate - 1/3 categories  Extensive - 2/3 categories     Category 1: 3 of the following  Review of external notes  Review of results  Ordering of each unique test  Independent historian  Category 2:  Independent interpretation of test (ex: imaging)  Category 3:  Discussion of management with another provider     extensive     Treatment Plan  Moderate - Prescription Drug management  High  Drug therapy requiring intensive monitoring for toxicity  Decision regarding hospitalization or escalation of care  De-escalate care/DNR decisions  high        24 0701 -- 122 Abnormal  -- 113/98 -- -- 100   08/01/24 0555 99.2 (37.3) 101 22 119/67 room air -- 100   08/01/24 0520 -- 100 26 126/101 Abnormal  room air -- 94   08/01/24 0510 -- 69 15 112/55 simple face mask 8 100   08/01/24 0505 -- 70 20 112/55 simple face mask 8 100   08/01/24 0500 -- 71 19 121/54 simple face mask 8 100   08/0                                                   Component  Ref Range & Units 02:28  (8/1/24) 3 wk ago  (7/11/24) 3 wk ago  (7/10/24) 3 wk ago  (7/8/24) 1 yr ago  (2/1/23) 1 yr ago  (1/25/23) 1 yr ago  (1/24/23)   Glucose  65 - 99 mg/dL 103 High  78 R,  High  111 High  81 83 100 High    BUN  6 - 20 mg/dL 7  6 6 5 Low  6 7   Creatinine  0.57 - 1.00 mg/dL 0.72  0.60 0.77 0.47 Low  0.47 Low  0.45 Low    Sodium  136 - 145 mmol/L 138  138 139 142 138 138   Potassium  3.5 - 5.2 mmol/L 4.2  4.6 4.0 5.0 CM 3.1 Low  3.8 CM   Comment: Slight hemolysis detected by analyzer. Result may be falsely elevated.   Chloride  98 - 107 mmol/L 104  104 103 108 High  105 105   CO2  22.0 - 29.0 mmol/L 20.0 Low   25.0 22.0 18.0 Low  22.0 22.0   Calcium  8.6 - 10.5 mg/dL 9.1                            Component  Ref Range & Units 02:28  (8/1/24)  [facility] 3 wk ago  (7/10/24)  [facility] 3 wk ago  (7/8/24)  [facility] 3 wk ago  (7/8/24)  [facility]/[place] 1 mo ago  (6/23/24)  [facility] 1 yr ago  (2/1/23)  [facility]/[place] 1 yr  ago  (1/25/23)  [facility]/[place]   WBC  3.40 - 10.80 10*3/mm3 6.82 7.35  9.36 9.9 R 7.27 7.16   RBC  3.77 - 5.28 10*6/mm3 4.25 4.45  4.30 4.26 R 4.45 3.98   Hemoglobin  12.0 - 15.9 g/dL 9.2 Low  9.7 Low   9.6 Low  9.7 Low  R 11.4 Low  10.0 Low    Hematocrit  34.0 - 46.6 % 30.7 Low  33.2 Low   31.7 Low  30.8 Low  36.9 32.0 Low    MCV  79.0 - 97.0 fL 72.2 Low  74.6 Low   73.7 Low  72.3 Low  R 82.9 80.4   MCH  26.6 - 33.0 pg 21.6 Low  21.8 Low   22.3 Low  22.8 Low  R 25.6 Low  25.1 Low    MCHC  31.5 - 35.7 g/dL 30.0 Low  29.2 Low   30.3 Low  31.5 R 30.9 Low  31.3 Low    RDW  12.3 - 15.4 % 18.0 High  17.0 High   17.2 High  17.2 High  19.9 High  17.9 High    RDW-SD  37.0 - 54.0 fl 46.4 45.7  45.6 44.5 R 58.4 High  50.2   MPV  6.0 - 12.0 fL 12.6 High  11.4  11.7 11.6 R 12.6 High  13.0 High    Platelets  140 - 450 10*3/mm3 234 242  249 222 R 224 203   Neutrophil %  42.7 - 76.0 % 71.5 64.0 81.5 High   79.6 High  62.6 50.7   Lymphocyte %  19.6 - 45.3 % 18.8 Low  24.1 12.9 Low   12.4 Low  26.4 37.6   Monocyte %  5.0 - 12.0 % 8.5                          Current Facility-Administered Medications   Medication Dose Route Frequency Provider Last Rate Last Admin    atropine sulfate injection 0.5 mg  0.5 mg Intravenous Once PRN Calderon Bosch CRNA        sennosides-docusate (PERICOLACE) 8.6-50 MG per tablet 2 tablet  2 tablet Oral BID Luisito Nguyen MD   2 tablet at 08/01/24 0912    And    polyethylene glycol (MIRALAX) packet 17 g  17 g Oral Daily PRN Luisito Nguyen MD        And    bisacodyl (DULCOLAX) EC tablet 5 mg  5 mg Oral Daily PRN Luisito Nguyen MD        And    bisacodyl (DULCOLAX) suppository 10 mg  10 mg Rectal Daily PRN Luisito Nguyen MD        Calcium Replacement - Follow Nurse / BPA Driven Protocol   Does not apply PRN Luisito Nguyen MD        Chlorhexidine Gluconate Cloth 2 % pads 1 Application  1 Application Topical Once Luisito Nguyen MD        [START ON 8/2/2024] Chlorhexidine Gluconate Cloth 2 % pads 1  Application  1 Application Topical Q24H Luisito Nguyen MD        diphenhydrAMINE (BENADRYL) injection 12.5 mg  12.5 mg Intravenous Q4H PRN Kamala Fox MD        droperidol (INAPSINE) injection 0.625 mg  0.625 mg Intravenous Once PRN Calderon Bosch CRNA        Or    droperidol (INAPSINE) injection 0.625 mg  0.625 mg Intramuscular Once PRN Calderon Bosch CRNA        fentaNYL citrate (PF) (SUBLIMAZE) injection 50 mcg  50 mcg Intravenous Q10 Min PRN Calderon Bosch CRNA        flumazenil (ROMAZICON) injection 0.2 mg  0.2 mg Intravenous PRN Calderon Bosch CRNA        HYDROmorphone (DILAUDID) injection 0.5 mg  0.5 mg Intravenous Q10 Min PRN Calderon Bosch CRNA        ibuprofen (ADVIL,MOTRIN) tablet 600 mg  600 mg Oral Q6H PRN Calderon Bosch CRNA        labetalol (NORMODYNE,TRANDATE) injection 5 mg  5 mg Intravenous Q5 Min PRN Calderon Bosch CRNA        lacosamide (VIMPAT) tablet 100 mg  100 mg Oral Q12H Kamala Fox MD   100 mg at 08/01/24 0912    Magnesium Standard Dose Replacement - Follow Nurse / BPA Driven Protocol   Does not apply PRN Luisito Nguyen MD        mupirocin (BACTROBAN) 2 % nasal ointment 1 Application  1 Application Each Nare BID Luisito Nguyen MD   1 Application at 08/01/24 0912    naloxone (NARCAN) injection 0.04 mg  0.04 mg Intravenous PRN Calderon Bosch CRNA        ondansetron (ZOFRAN) injection 4 mg  4 mg Intravenous Q15 Min PRN Calderon Bosch CRNA        oxyCODONE-acetaminophen (PERCOCET)  MG per tablet 1 tablet  1 tablet Oral Q4H PRN Calderon Bosch CRNA        Phosphorus Replacement - Follow Nurse / BPA Driven Protocol   Does not apply Luisito Jensen MD        Potassium Replacement - Follow Nurse / BPA Driven Protocol   Does not apply PRLuisito Love MD        sodium chloride 0.9 % flush 10 mL  10 mL Intravenous Q12H Luisito Nguyen MD        sodium chloride 0.9 % flush 10 mL  10 mL Intravenous PRN Luisito Nguyen MD        sodium chloride 0.9 % infusion 40 mL  40 mL  Intravenous PRN Luisito Nguyen MD

## 2024-08-01 NOTE — CONSULTS
"  Neurology Consult Note    Consult Date: 2024  Referring MD: No ref. provider found  Reason for Consult: status epilepticus    Patient: Duane Cooley (28 y.o. female)  MRN: [id number]  : 1995    History of Present Illness:   Duane Cooley is a 28 y.o. female with PMH sig for autism, behavioral disorder and psychogenic nonepileptic spells.  She also has a h/o foreign body ingestions.    She presented to ED  early am after EMS was called after she ingested 4 button batteries around midnight.  She also reported having a seizure yesterday and then had multiple seizures en route with EMS and was given 2 mg of Versed IV and then a total of 8 mg IM.  Following arrival to ED she continued to have 1-3 minutes of nonrhythmic UE/LE tensing and jerking.  Was given ativan 1 mg x 2, valium, keppra IV load and cerebyx IV load.   She was then taken for endoscopy for attempted battery removal but no batteries were visualized.  Per surgery nurse note she had seizures approximately every 3-5 minutes since 529.  No further since arrival to CCU.    The patient reports to me that she knows she has nonepileptic spells but also feels like she has \"epilepsy\" as well and thinks she can tell the difference between her spells based on a headache preceding the \"epilepsy.\"   All her spells involve arm/leg shaking with no tongue biting or incontinence.  She can hear what people are saying during her spells.  She has stopped taking her lamictal and keppra about 1 week ago but has continued taking the vimpat 100 mg BID without any missed doses.  She has valtoco prn that she uses about 1/month and did get this last night.  She is going to be seeing a new neurologist in [place] on  to establish care.  When asked about recent stressors that may have precipitated more spells she does report getting a divorce and exhusband has been bringing her magnets and batteries.   ICU RN aware of this and he will not be allowed to visit.  " "She denies any recent fevers/chills/infectious symptoms.  No headache currently.      Dr. Martínez saw her in consultation 7/9/24 with similar admission for \"status epilepticus\" and foreign body ingestion.  In PACU after endoscopy she had seizure-like activity and intubated for airway protection that admission.  She was weaned and extubated and discharged 7/12/24.   Dr. Martínez  recommended that she re-establish local care with Dr. Lira at [facility] and attempted to dissuade mother from continuing to seek other medical opinions but was unsuccessful.    Below are excerpts from [facility] Neurology consult note from 2023 (in blue) and Dr. Martínez's consult note from 7/9/24 (in black) and note from 2/2023 at the bottom indicating some manipulative behaviors by the patient:     This is a 27-year old female routinely cared for by Rosie Pagan MD, who has been followed by Declan Lira MD, at [facility] neurology for possible seizure disorder.  Patient has a known history of autism, is described in medical records as having a \"static encephalopathy,\" depression, and anxiety.     The patient was hospitalized at [facility] from 1/6/2023-1/14/2023 with what was initially felt to be status epilepticus, however, none of the screening or continuous EEG studies demonstrated any epileptiform activity.     Dr. Srinivas Acevedo MD, saw the patient on 1/14/2023 after the patient had been successfully extubated.  At that time, she was being managed with Keppra 1500 mg twice daily, Lamictal 200 mg in the morning, Dilantin 100 mg 3 times daily and Vimpat 100 mg twice daily.  Despite this, she was given multiple doses of Versed in the emergency room at the beginning of that admission leading to eventual intubation due to seizure-like activity.     Dr. Acevedo's assessment at that time was that \"at least some of the events, if not all appear to be pseudoseizures.\"  Recommendation was for the patient to then follow-up " with Dr. Declan Lira MD, as an outpatient.     Unfortunately, the patient returned to the emergency room again at [facility] and was subsequently transferred to [facility] in [place], [place].  We have requested these records for review as well.     Yesterday, the patient apparently had a witnessed seizure-like event at home for which EMS was summoned.  They presented her to [facility] this time for evaluation where she was felt to have signs of status epilepticus.  The patient was treated, however, with multiple doses of Valium and Versed by EMS prior to arrival.  Despite this, the patient was demonstrating convulsive-like activity eventually leading to intubation for airway protection.     The patient reportedly was discharged on January 26, 2023.  She was supposed to follow-up with Dr. Declan Lria for continued care of her seizure-like events.  There are no records that she ever followed up with him subsequent to that appointment.  [facility].     She also has a known history of multiple developmental disorders including autism.  She has been prone to pathologically swallowing things in the past.  She presented to our medical facility late last night.  She was having jerking like episodes.  When the jerking activity stopped she reported to the medical staff that she may have swallowed a battery.  An x-ray of the abdomen showed 2 metallic foreign objects in the left mid abdomen GI was consulted and they recommended admitting for endoscopy.  She reportedly had an endoscopy this morning at around 09 30.  An endoscopy showed no signs of a foreign object.  After the endoscopy the patient demonstrated seizure-like activity and anesthesia intubated the patient.  She currently is in the intensive care unit awaiting a general surgery evaluation.  She is intubated and sedated.  No seizure activity is currently seen.    "  I was now able to speak to her mother by phone a Ms. Kelli Cooley.  She tells me that after I saw the patient in January 2023 that she elected not to follow-up with [facility] neurology.  She wanted another opinion.  She was seen by neurologist at [facility].  That neurologist said that she did not need to be on any of her antiepileptics.  By report she stopped all of her seizure meds and then started having multiple seizures after this.  She ended up at [facility].  The timeline is unclear because her mother tells me that was as recent as 1 month ago.  At [facility] they restarted her antiepileptics not because they believe that they were actual epileptic events but instead suggested a slower taper to avoid any seizure activity.  Her mother tells me that she often swallows things.  She has not done in several months but yesterday reported that she is swallowed either some watch batteries or several magnets.  It is unclear whether she takes her meds according to her mother.  I asked whether she has establish care with a neurologist.  She tells me that she has a new neurologist that she is scheduled with now in [facility] as she wants a \"second opinion.\"  That is despite her having 5-6 neurologic opinions prior to this that all pointed towards psychogenic nonepileptic spells.  Her mother does know that she does not have true epileptic events.  She calls the episodes \"spells.\"  She has been told that by multiple neurologist.  She tells me that when the patient has one of her spells if she will leave her alone long enough the patient will snap out of it.    2/1/23 note from Dr. Martínez:  I have reviewed this documentation and agree. Patient seen and examined by me. I am very familiar with this patient. I was able to speak to her mother over the phone again this morning. She tells me yesterday Duane told her mother that she felt like she was going to have a seizure. She had " "approximately 4-5 events with her mother reassuring her. She then told her mother that she demanded that she call emergency services. Her mother opposed this saying that there was nothing that needed to be done. At that, the patient threatened her saying that if she did not call emergency services that she would alert Adult Protective Services on her mother. She was taken to [facility]. They reportedly did have previous records from [facility] documenting that these were pseudoseizures. Despite this they called our transfer center and elected to share this information regarding her. She was transferred to us after intubation and multiple sedating agents. When she got here we saw her this morning and neurologic consultation. She was extubated immediately. She was attempting another pseudoseizure but I told her that I wanted to \"change her meds.\" In the past she has been resistant to this. She immediately woke up and said that she did not want her medications changed. I told her mother that she was free to come get her and the patient immediately started ripping things off. I spoke with her mother at length. I expressed concern that if she continues to show up at outside hospitals with pseudoseizure activity she will continue to be intubated I think there is a great risk for this. It is my believe that the patient is now seeking secondary gain. I believe that she has a goal to be intubated and placed on propofol drips. She must receive some sort of euphoria from this. I am recommending that she invest in a medical alert bracelet that can alert anyone seeing her that these are not real seizures. I spoke over the phone with Dr. Declan Lira, her primary neurologist. He has agreed to see her in clinic with hopes to admit her to an epilepsy monitoring unit and wean off all 4 of her antiepileptics.     Medical History:   Past Medical/Surgical Hx:    Past Surgical History:   Procedure " Laterality Date     SECTION      ENDOSCOPY N/A 2024    Procedure: ESOPHAGOGASTRODUODENOSCOPY WITH ANESTHESIA;  Surgeon: Tian Morris MD;  Location: [facility];  Service: Gastroenterology;  Laterality: N/A;  pre foreign body in stomach  post foreign body passed out of stomach  dr kaiden h[place]       Medications On Admission:  Medications Prior to Admission   Medication Sig Dispense Refill Last Dose    acetaminophen (TYLENOL) 500 MG tablet Take 1 tablet by mouth Every 6 (Six) Hours As Needed for Mild Pain.       albuterol sulfate  (90 Base) MCG/ACT inhaler Inhale 2 puffs Every 4 (Four) Hours As Needed for Wheezing.       diazePAM (VALTOCO 15 MG DOSE NA) 15 mg into the nostril(s) as directed by provider As Needed (seizure lasting longer than 5 minutes).       doxazosin (CARDURA) 1 MG tablet Take 1 tablet by mouth 2 (Two) Times a Day.       FLUoxetine (PROzac) 40 MG capsule Take 2 capsules by mouth Daily.       hydrOXYzine (ATARAX) 25 MG tablet Take 1 tablet by mouth 3 (Three) Times a Day As Needed for Itching.       lacosamide (VIMPAT) 100 MG tablet tablet Take 1 tablet by mouth 2 (Two) Times a Day.       lamoTRIgine (LaMICtal) 100 MG tablet Take 2.5 tablets by mouth Daily.       levETIRAcetam (KEPPRA) 1000 MG tablet Take 1 tablet by mouth 2 (Two) Times a Day.       montelukast (SINGULAIR) 10 MG tablet Take 1 tablet by mouth Every Night.       OLANZapine zydis (zyPREXA) 5 MG disintegrating tablet Take 1 tablet by mouth 3 times a day.       pantoprazole (PROTONIX) 40 MG EC tablet Take 1 tablet by mouth Daily.       senna 8.6 MG tablet Take 2 tablets by mouth Daily.          Current Medications:    Current Facility-Administered Medications:     sennosides-docusate (PERICOLACE) 8.6-50 MG per tablet 2 tablet, 2 tablet, Oral, BID **AND** polyethylene glycol (MIRALAX) packet 17 g, 17 g, Oral, Daily PRN **AND** bisacodyl (DULCOLAX) EC tablet 5 mg, 5 mg, Oral, Daily PRN **AND** bisacodyl  (DULCOLAX) suppository 10 mg, 10 mg, Rectal, Daily PRN, Luisito Nguyen MD    Calcium Replacement - Follow Nurse / BPA Driven Protocol, , Does not apply, PRN, Luisito Nguyen MD    Chlorhexidine Gluconate Cloth 2 % pads 1 Application, 1 Application, Topical, Once, Luisito Nguyen MD    [START ON 8/2/2024] Chlorhexidine Gluconate Cloth 2 % pads 1 Application, 1 Application, Topical, Q24H, Luisito Nguyen MD    diphenhydrAMINE (BENADRYL) injection 12.5 mg, 12.5 mg, Intravenous, Q4H PRN, Kamala Fox MD    lacosamide (VIMPAT) tablet 100 mg, 100 mg, Oral, Q12H, Kamala Fox MD    Magnesium Standard Dose Replacement - Follow Nurse / BPA Driven Protocol, , Does not apply, PRPatrick NEWELL Thar Y, MD    mupirocin (BACTROBAN) 2 % nasal ointment 1 Application, 1 Application, Each Nare, BID, Luisito Nguyen MD    Phosphorus Replacement - Follow Nurse / BPA Driven Protocol, , Does not apply, Patrick ALMANZA Thar Y, MD    Potassium Replacement - Follow Nurse / BPA Driven Protocol, , Does not apply, Patrick ALMANZA Thar Y, MD    sodium chloride 0.9 % flush 10 mL, 10 mL, Intravenous, Q12H, Luisito Nguyen MD    sodium chloride 0.9 % flush 10 mL, 10 mL, Intravenous, PRNPatrick Thar Y, MD    sodium chloride 0.9 % infusion 40 mL, 40 mL, Intravenous, Patrick ALMANZA Thar Y, MD     Allergies:  No Known Allergies    Social Hx:  Social History     Socioeconomic History    Marital status:    Tobacco Use    Smoking status: Unknown    Smokeless tobacco: Never   Vaping Use    Vaping status: Never Used   Substance and Sexual Activity    Alcohol use: Defer    Drug use: Defer    Sexual activity: Defer     Partners: Male       Family Hx:  History reviewed. No pertinent family history.  Physical Examination:   Vital Signs:  Vitals:    08/01/24 0510 08/01/24 0520 08/01/24 0555 08/01/24 0701   BP: 112/55 (!) 126/101 119/67 113/98   Pulse: 69 100 101 (!) 122   Resp: 15 26 22    Temp:   99.2 °F (37.3 °C)    TempSrc:   Axillary    SpO2:  "100% 94% 100% 100%   Weight:   92.5 kg (203 lb 14.8 oz)    Height:   170.2 cm (67\")        General Exam:  Head:  Normocephalic, atraumatic  HEENT:  Neck supple  Skin:  No rashes    Neurologic Exam:    Mental Status:    -Awake, Alert, nonverbal other than grunting but communicates quickly via typing on her ipad    CN:  Pupils 3-2 OU, EOM full without nystagmus, face symmetric, tongue ML with no trauma, chewing on teether toy    Motor: nl bulk and tone, no tremor, power 5/5 and symmetric    DTR:  2+ and symmetric    Sensory:  -Intact to light touch all 4    Coordination:  -Finger to nose intact      Gait  deferred    Recent Diagnostics:   Laboratory Results:   - Reviewed in EMR  Lab Results   Component Value Date    GLUCOSE 103 (H) 08/01/2024    CALCIUM 9.1 08/01/2024     08/01/2024    K 4.2 08/01/2024    CO2 20.0 (L) 08/01/2024     08/01/2024    BUN 7 08/01/2024    CREATININE 0.72 08/01/2024    BCR 9.7 08/01/2024    ANIONGAP 14.0 08/01/2024     Lab Results   Component Value Date    WBC 6.82 08/01/2024    HGB 9.2 (L) 08/01/2024    HCT 30.7 (L) 08/01/2024    MCV 72.2 (L) 08/01/2024     08/01/2024     No results found for: \"PTT\", \"INR\"  No results found for: \"CHOLTOT\", \"TRIG\", \"HDL\", \"LDL\"  No results found for: \"HGBA1C\"    Imaging Results:  Imaging Results (Last 24 Hours)       Procedure Component Value Units Date/Time    XR Abdomen 2+ VW with Chest 1 VW [[id number]] Collected: 08/01/24 0609     Updated: 08/01/24 0616    Narrative:      EXAMINATION: XR ABDOMEN 2+ VIEWS W CHEST 1 VW-     8/1/2024 1:31 AM     HISTORY: Possible injection of 4 button batteries     Supine and upright views of the abdomen and a frontal projection of the  chest are obtained. Comparison is made with the previous study dated  7/12/2024.     The lungs are poorly expanded with crowding of the bronchovascular  structures and atelectatic changes.     There is no evidence of recent infiltrate, pleural effusion, " pulmonary  congestion or pneumothorax.     Moderate enlargement the cardiac silhouette is probably due to  magnification of AP projection.     There is a cluster/group of 6, rounded, metallic foreign bodies in the  mid to distal stomach. Each metallic foreign body measures approximately  8 mm in diameter. These represent the vertebral arteries at suggested in  the history.     There is moderate gas and stool in the colon.     There is no evidence of free air.     Both kidneys are obscured by the bowel contents. No radiopaque calculi.     No acute bony abnormality.       Impression:      1. 6 radiopaque rounded metallic foreign bodies in the mid to distal  stomach.  2. Abdominal gas pattern is unremarkable. No finding to suggest  obstruction or ileus. No free air.  3. The lungs are poorly expanded. No active cardiopulmonary disease.     The above study was initially reviewed and reported by [employer]. I do not  find any discrepancies.     This report was signed and finalized on 8/1/2024 6:13 AM by Dr. Medina Braun MD.                  Assessment & Plan:   Patient with a known history of  psychogenic nonepileptic spells who presented to ED 8/1 am after foreign body ingestion with multiple events c/w previous PNES.     Impression:  Psychogenic nonepileptic spells  Foreign body ingestion  Behavioral disorder        Plan:   I have reviewed notes re: multiple, extensive previous neurologic work-ups.  No new neurologic workup required this admission.  Continue vimpat 100 mg po BID for now.  She has self-discontinued lamictal and keppra and will not resume (>1 week ago).  I have encouraged her to avoid using her valtoco prn.  She has an upcoming appt with a new neurologist in [place].  We discussed needing to establish care a neurologist she trusts and make a plan for safely weaning the vimpat and working with mental health team on alternative ways to manage any break through events.  She believes headache precedes  "her \"epilepsy\" and will try diphenhydramine 12.5 mg IV q 4 hours prn any events while admitted.  She can also try this po at home prn headache or try ibuprofen prn to attempt to prevent full episodes from evolving.  D/w nursing--no prn ativan/valium recommended  Plan for foreign body ingestion per primary team/GI/surgery    D/w pt and nursing.  Pt requests that I not update her mother and will respect her wishes.  She also requests that braeden not be allowed to visit and nursing aware.  Okay for discharge from neurology standpoint when cleared by other consultants.    Kamala Fox MD  08/01/24  08:39 CDT    Medical Decision Making    Number/Complexity of Problems  Moderate  1 undiagnosed new problem with uncertain prognosis -   1 acute illness with systemic symptoms -   High  1 acute or chronic illness that pose a threat to life/body function -   high     MDM Data  Moderate - 1/3 categories  Extensive - 2/3 categories    Category 1: 3 of the following  Review of external notes  Review of results  Ordering of each unique test  Independent historian  Category 2:  Independent interpretation of test (ex: imaging)  Category 3:  Discussion of management with another provider    extensive     Treatment Plan  Moderate - Prescription Drug management  High  Drug therapy requiring intensive monitoring for toxicity  Decision regarding hospitalization or escalation of care  De-escalate care/DNR decisions  high        "

## 2024-08-01 NOTE — H&P
CC: Here for endoscopic evaluation.     HPI: Swallowed 6 button batteries this evening.     Past Medical History:   Diagnosis Date    Anxiety     Depression     History of  2017    History of congenital abnormality 2017    Late prenatal care affecting pregnancy in second trimester 2017    Mental deficiency 2017    Previous  delivery affecting pregnancy 2017    Seizures        Past Surgical History:   Procedure Laterality Date     SECTION      ENDOSCOPY N/A 2024    Procedure: ESOPHAGOGASTRODUODENOSCOPY WITH ANESTHESIA;  Surgeon: Tian Morris MD;  Location: [facility];  Service: Gastroenterology;  Laterality: N/A;  pre foreign body in stomach  post foreign body passed out of stomach  dr kaiden houston       Prior to Admission medications    Medication Sig Start Date End Date Taking? Authorizing Provider   acetaminophen (TYLENOL) 500 MG tablet Take 1 tablet by mouth Every 6 (Six) Hours As Needed for Mild Pain.    Rayray Jean MD   albuterol sulfate  (90 Base) MCG/ACT inhaler Inhale 2 puffs Every 4 (Four) Hours As Needed for Wheezing.    Rayray Jean MD   diazePAM (VALTOCO 15 MG DOSE NA) 15 mg into the nostril(s) as directed by provider As Needed (seizure lasting longer than 5 minutes).    Rayray Jean MD   doxazosin (CARDURA) 1 MG tablet Take 1 tablet by mouth 2 (Two) Times a Day.    Rayray Jean MD   FLUoxetine (PROzac) 40 MG capsule Take 2 capsules by mouth Daily.    Ryaray Jean MD   hydrOXYzine (ATARAX) 25 MG tablet Take 1 tablet by mouth 3 (Three) Times a Day As Needed for Itching.    Rayray Jean MD   lacosamide (VIMPAT) 100 MG tablet tablet Take 1 tablet by mouth 2 (Two) Times a Day.    Rayray Jean MD   lamoTRIgine (LaMICtal) 100 MG tablet Take 2.5 tablets by mouth Daily.    Rayray Jean MD   levETIRAcetam (KEPPRA) 1000 MG tablet Take 1 tablet by mouth 2 (Two) Times a  Day.    ProviderRayray MD   montelukast (SINGULAIR) 10 MG tablet Take 1 tablet by mouth Every Night.    ProviderRayray MD   OLANZapine zydis (zyPREXA) 5 MG disintegrating tablet Take 1 tablet by mouth 3 times a day.    ProviderRayray MD   pantoprazole (PROTONIX) 40 MG EC tablet Take 1 tablet by mouth Daily.    ProviderRayray MD   senna 8.6 MG tablet Take 2 tablets by mouth Daily.    Provider, MD Rayray       No Known Allergies    History reviewed. No pertinent family history.    OBJECTIVE:    Patient's vital signs reviewed. No acute distress.     Chest is clear, no wheezing or rales. Normal symmetric air entry throughout both lung fields. No chest wall deformities or tenderness.    S1 and S2 normal, no murmurs, clicks, gallops or rubs. Regular rate and rhythm. Chest is clear; no wheezes or rales. No edema or JVD.    The abdomen is soft without tenderness, guarding, mass, rebound or organomegaly. Bowel sounds are normal. No CVA tenderness or inguinal adenopathy noted.    Patient is alert, oriented and with an intact memory.    Assessment: Button batteries in stomach    Plan: EGD

## 2024-08-01 NOTE — Clinical Note
Level of Care: Critical Care [6]   Diagnosis: Status epilepticus [426397]   Admitting Physician: SOBIA JONES [[id number]]   Attending Physician: SOBIA JONES [[id number]]

## 2024-08-01 NOTE — PLAN OF CARE
"Goal Outcome Evaluation:      One episode of \"seizure like activity\" lasting just a few minutes. HR spikes to 140-160 bpm during these episodes and is incontinent of urine.  She also regurgitated a small battery that was immediately removed from her room.                                        "

## 2024-08-01 NOTE — NURSING NOTE
"POISON CONTROL CONTACTED: Spoke with MARC Jorge they recommend \" as long as the batteries have past the esophagus the only thing they recommend is close monitoring for increased abdominal pain, n/v, loss of appetite, bloody stools, and fever.  They also recommend repeat of abdominal xray in 3 days to make sure the batteries have passed.   "

## 2024-08-01 NOTE — ANESTHESIA PREPROCEDURE EVALUATION
" Anesthesia Evaluation     Patient summary reviewed   no history of anesthetic complications:   NPO Solid Status: Waived due to emergency  NPO Liquid Status: Waived due to emergency           Airway   Mallampati: II  TM distance: >3 FB  Neck ROM: full  No difficulty expected  Dental - normal exam     Pulmonary    (+) asthma,  Cardiovascular - negative cardio ROS        Neuro/Psych  (+) seizures (epilepsy and FND), psychiatric history Anxiety and Depression    ROS Comment: Autism   GI/Hepatic/Renal/Endo    (+) obesity    Musculoskeletal (-) negative ROS    Abdominal    Substance History      OB/GYN    (+) Pregnant        Other        ROS/Med Hx Other: Foreign body                Anesthesia Plan    ASA 3 - emergent     general   Rapid sequence  (Patient swallowed \"batteries\"  Patient autistic and nonverbal communicates with phone)    Anesthetic plan, risks, benefits, and alternatives have been provided, discussed and informed consent has been obtained with: mother and patient.      CODE STATUS:           "

## 2024-08-02 VITALS
TEMPERATURE: 98.3 F | OXYGEN SATURATION: 99 % | SYSTOLIC BLOOD PRESSURE: 91 MMHG | WEIGHT: 206.7 LBS | BODY MASS INDEX: 32.44 KG/M2 | RESPIRATION RATE: 18 BRPM | HEIGHT: 67 IN | DIASTOLIC BLOOD PRESSURE: 51 MMHG | HEART RATE: 79 BPM

## 2024-08-02 PROCEDURE — 99232 SBSQ HOSP IP/OBS MODERATE 35: CPT | Performed by: SPECIALIST

## 2024-08-02 RX ORDER — LACOSAMIDE 100 MG/1
100 TABLET ORAL EVERY 12 HOURS SCHEDULED
Qty: 60 TABLET | Refills: 2 | Status: SHIPPED | OUTPATIENT
Start: 2024-08-02

## 2024-08-02 RX ADMIN — DOCUSATE SODIUM 50 MG AND SENNOSIDES 8.6 MG 2 TABLET: 8.6; 5 TABLET, FILM COATED ORAL at 09:47

## 2024-08-02 RX ADMIN — LACOSAMIDE 100 MG: 50 TABLET, FILM COATED ORAL at 09:47

## 2024-08-02 RX ADMIN — POLYETHYLENE GLYCOL 3350 17 G: 17 POWDER, FOR SOLUTION ORAL at 06:06

## 2024-08-02 NOTE — PROGRESS NOTES
"  Neurology Progress Note      Chief Complaint: \"seizure\"      Interval History:  D/w ICU nursing several times during day 8/1--ingested more batteries and backpack removed.  Had brief \"seizure\" around 11 and expelled 1 battery.  Had another episode later in afternoon and regurgitated another battery.  Transferred to floor and had an about 30 minute episode of seizure like activity with stable VS other than tachycardia.  Given benadryl.  No further.  Sitting up in bed this am.  No complaints.  Ate breakfast.     Medications:  Current Facility-Administered Medications   Medication Dose Route Frequency Provider Last Rate Last Admin    sennosides-docusate (PERICOLACE) 8.6-50 MG per tablet 2 tablet  2 tablet Oral BID Luisito Nguyen MD   2 tablet at 08/02/24 0947    And    polyethylene glycol (MIRALAX) packet 17 g  17 g Oral Daily PRN Luisito Nguyen MD   17 g at 08/02/24 0606    And    bisacodyl (DULCOLAX) EC tablet 5 mg  5 mg Oral Daily PRN Luisito Nguyen MD        And    bisacodyl (DULCOLAX) suppository 10 mg  10 mg Rectal Daily PRN Luisito Nguyen MD        Calcium Replacement - Follow Nurse / BPA Driven Protocol   Does not apply Luisito Jensen MD        Chlorhexidine Gluconate Cloth 2 % pads 1 Application  1 Application Topical Q24H Luisito Nguyen MD        diphenhydrAMINE (BENADRYL) injection 12.5 mg  12.5 mg Intravenous Q4H PRN Kamala Fox MD   12.5 mg at 08/01/24 2130    lacosamide (VIMPAT) tablet 100 mg  100 mg Oral Q12H Kamala Fox MD   100 mg at 08/02/24 0947    Magnesium Standard Dose Replacement - Follow Nurse / BPA Driven Protocol   Does not apply Luisito Jensen MD        mupirocin (BACTROBAN) 2 % nasal ointment 1 Application  1 Application Each Nare BID Luisito Nguyen MD   1 Application at 08/01/24 0912    Phosphorus Replacement - Follow Nurse / BPA Driven Protocol   Does not apply Luisito Jensen MD        Potassium Replacement - Follow Nurse / BPA Driven Protocol   Does " "not apply PRN Luisito Nguyen MD        sodium chloride 0.9 % flush 10 mL  10 mL Intravenous Q12H Luisito Nguyen MD   10 mL at 08/01/24 2003    sodium chloride 0.9 % flush 10 mL  10 mL Intravenous PRN Luisito Nguyen MD        sodium chloride 0.9 % infusion 40 mL  40 mL Intravenous PRN Luisito Nguyen MD           Vital Signs  Temp:  [98 °F (36.7 °C)-98.8 °F (37.1 °C)] 98.5 °F (36.9 °C)  Heart Rate:  [] 84  Resp:  [20-26] 20  BP: ()/(49-80) 99/55    General Exam:  Head:  Normocephalic, atraumatic  HEENT:  Neck supple  Skin:  No rashes     Neurologic Exam:     Mental Status:    -Awake, Alert, nonverbal other than grunting but communicates quickly via typing on her ipad     CN:  Pupils 3-2 OU, EOM full without nystagmus, face symmetric, tongue ML with no trauma, chewing on teether toy     Motor: nl bulk and tone, no tremor, power 5/5 and symmetric        Gait  deferred       Results Review:    I reviewed the patient's new clinical results.    Results from last 7 days   Lab Units 08/01/24  0228   WBC 10*3/mm3 6.82   HEMOGLOBIN g/dL 9.2*   HEMATOCRIT % 30.7*   PLATELETS 10*3/mm3 234        Results from last 7 days   Lab Units 08/01/24 0228   SODIUM mmol/L 138   POTASSIUM mmol/L 4.2   CHLORIDE mmol/L 104   CO2 mmol/L 20.0*   BUN mg/dL 7   CREATININE mg/dL 0.72   CALCIUM mg/dL 9.1   BILIRUBIN mg/dL <0.2   ALK PHOS U/L 93   ALT (SGPT) U/L 11   AST (SGOT) U/L 19   GLUCOSE mg/dL 103*        Lab Results   Component Value Date    MG 1.8 08/01/2024     No components found for: \"POCGLUC\"  No components found for: \"A1C\"  No results found for: \"HDL\", \"LDL\"  No components found for: \"B12\"  Lab Results   Component Value Date    TSH 1.340 01/16/2023       Imaging Results (Last 24 Hours)       Procedure Component Value Units Date/Time    CT Abdomen Pelvis Without Contrast [[id number]] Collected: 08/01/24 1526     Updated: 08/01/24 1536    Narrative:      EXAMINATION: CT ABDOMEN PELVIS WO CONTRAST-      8/1/2024 2:20 " PM     HISTORY: foreign body; G40.901-Epilepsy, unspecified, not intractable,  with status epilepticus; T18.9XXA-Foreign body of alimentary tract, part  unspecified, initial encounter     In order to have a CT radiation dose as low as reasonably achievable  Automated Exposure Control was utilized for adjustment of the mA and/or  KV according to patient size.     CT Dose DLP = 945.95 mGy.cm.  (If there are multiple studies performed at the same time this  represents the total dose).     Noncontrast abdomen/pelvis CT.  Axial, sagittal, and coronal sequences.     Heart size is within normal limits.  No acute abnormality is seen at the lung bases.     Cholecystectomy clips are present.  Normal noncontrast appearance of the liver, pancreas, spleen, adrenal  glands, and kidneys.  Punctate nonobstructing stone within the midportion of the LEFT kidney.  Spleen size is within normal limits at 10 x 5 x 9 cm.     Ten small round 10 mm metal foreign bodies are present within the GI  tract compatible with the suspected ingested batteries.  1 foreign body is within the distal ileum.  7 foreign bodies are present within the cecum.  1 foreign body is present within the colon at the hepatic flexure.  1 foreign body is present within the mid transverse colon.     There is no bowel obstruction, no free air, and no free fluid.  There is moderate fecal material throughout the colon.  No pelvic mass or fluid.  There is a normal appearance of the uterus and urinary bladder.       Impression:      1. 10 small round metal foreign bodies are present within the GI tract.  1 of these is within the terminal ileum and 9 are within the colon.  2. No bowel obstruction or sign of perforation.     This report was signed and finalized on 8/1/2024 3:33 PM by Dr. Santiago Edge MD.               Assessment/Plan     Patient with a known history of  psychogenic nonepileptic spells who presented to ED 8/1 am after foreign body ingestion with multiple  "events c/w previous PNES.     Impression:  Psychogenic nonepileptic spells  Foreign body ingestion  Behavioral disorder        Plan:   I have reviewed notes re: multiple, extensive previous neurologic work-ups.  No new neurologic workup required this admission.  Continue vimpat 100 mg po BID for now.  She has self-discontinued lamictal and keppra and will not resume (>1 week ago).  I have encouraged her to avoid using her valtoco prn.  She has an upcoming appt with a new neurologist in [place].  We discussed needing to establish care a neurologist she trusts and make a plan for safely weaning the vimpat and working with mental health team on alternative ways to manage any break through events.  She believes headache precedes her \"epilepsy\" and will try diphenhydramine 12.5 mg IV q 4 hours prn any events while admitted.  She can also try this po at home prn headache or try ibuprofen prn to attempt to prevent full episodes from evolving.  D/w nursing--no prn ativan/valium recommended  Follow up foreign body ingestion per primary team     Okay for discharge from neurology standpoint when cleared by other consultants.      Kamala Fox MD  08/02/24  10:01 CDT    "

## 2024-08-02 NOTE — PLAN OF CARE
Goal Outcome Evaluation:  Plan of Care Reviewed With: patient        Progress: no change  Outcome Evaluation: Pt reported feeling HA and dizzy. Prn Benedryl given. Seizure like episodes from 2662-0483. Rapid called on pt. HR elevated to 160.  No new orders. Seizure pads in place. Pt appears to be resting well. Communicates with iphone. Stool softner given, awaiting results. Prefers pills with milk.

## 2024-08-02 NOTE — DISCHARGE SUMMARY
"    [facility]  DISCHARGE SUMMARY       Date of Admission: 8/1/2024  Date of Discharge:  8/2/2024  Primary Care Physician: Rosie Olvera MD    Discharge Diagnoses:  Active Hospital Problems    Diagnosis     **Psychogenic nonepileptic seizure     Seizure-like activity     Foreign body ingestion     Autism          Presenting Problem/History of Present Illness:  Status epilepticus [G40.901]  Seizure-like activity [R56.9]     Chief Complaint on Day of Discharge:   Complaint    History of Present Illness on Day of Discharge:   The patient appears to be doing well today.  Neurology has seen and evaluated the patient and notes that the patient stable for discharge home.  Hospitalist service also agrees with that disposition recommendation.    Hospital Course  Patient is a 28-year-old female with known history of autism who again has taken a foreign body and ingested it.  Apparently she states that she took 4 button batteries and swallowed them.  This was around 2300 or midnight tonight.  Patient also had a seizure yesterday and then also had a seizure and route by EMS today.  And route she was given to milligrams of Versed IV and then a total of 8 IM to help stop the seizures.  She does have a seizure disorder is on multiple medications for this such as Keppra, and Vimpat.      ED course: total of 10 mg valium, 10 mg ativan, 1000 mg keppra, and 1000 mg fosphenytoin.   In addition to these medications she was administered unknown  dosage of propofol for procedural sedation of upper GI endoscopy.   X-ray abdomen 2 view w/ chest: 6 round metallic foreign bodies     The ICU team was asked to evaluate the patient for concerns for status epilepticus     I have seen and evaluated patient upon her arrival to CCU bed 3 arriving from the OR following upper GI endoscopy that was unsuccessful at retrieving 6 small round metallic foreign objects believed to be \"button " "batteries\".  She is non verbal at baseline. She withdraw from pain but is not follow commands. During exam she has 2 episodes of seizure like activity, lasting in total 2 minutes. Airway is maintained and 02 saturation maintains >93%.  Assessment/Plan   This is a 28-year-old female with past medical history of autism and pseudoseizures presents the emergency room with seizure-like activity and reported ingestion of batteries. ED course workup was concerning for 6 round, metallic, foreign bodies identified in the GI tract on plain film, and several episodes of \" seizure-like activity\" for which she was administered multiple medications in attempt at terminating seizures.  She was then taken to OR for EGD in attempt at retrieval of foreign bodies which was unsuccessful.  Critical care team was asked to further manage seizure-like activity with the belief that she was in status epilepticus.      She will be admitted under the intensivist MD, Dr. Nguyen, case will be discussed, and further recommendations on or modifications to the treatment plan are ultimately at his discretion.     Treatment Plan     Seizure-like activity  -On review of past medical records and most recent specialization for similar complaint it is well-documented that patient has longstanding history of pseudoseizures and no documented history of epilepsy. She has been seen and evaluated by several neurologist and it was recommended to wean her from anti-seizure medications.  -Please see neurologist, Dr. Martínez's note from last hospitalization, who also agrees and advises avoiding intubation for any seizure-like activity if able.   -we will continue to monitor and place on seizure precautions     Foreign body  -6 round metallic foreign bodies noted in GI tract on plain film x-ray obtained in ED  -EGD by Dr. Gonzales, unsuccessful at retrieval   -will obtain CT abd/pel w/o to further evaluate location  -Contact poison control        VTE Prophylaxis:   " "  Mechanical VTE prophylaxis orders are present.        Electronically signed by TELMA Hair    After endoscopic evaluation and failure to reveal foreign bodies, the patient was transferred to the medical surgical floor.  CT scan of the abdomen pelvis was obtained noting 10 small nonmobile foreign bodies present within the GI tract.  1 within the terminal ileum and 9 were within the colon.  His items should pass easily without difficulty.  The patient is alert, conversant and appropriate for discharge home today.  Case discussed with neurology.  Both services agreed that the patient is stable for discharge.  The patient has been placed on Vimpat.  She has been noncompliant with Keppra and Lamictal on an outpatient basis.    Procedures Performed:   ESOPHAGOGASTRODUODENOSCOPY WITH ANESTHESIA     Consults:   Gastroenterology:     Assessment: Button batteries in stomach     Plan: EGD    Neurology:  Assessment & Plan:   Patient with a known history of  psychogenic nonepileptic spells who presented to ED 8/1 am after foreign body ingestion with multiple events c/w previous PNES.     Impression:  Psychogenic nonepileptic spells  Foreign body ingestion  Behavioral disorder        Plan:   I have reviewed notes re: multiple, extensive previous neurologic work-ups.  No new neurologic workup required this admission.  Continue vimpat 100 mg po BID for now.  She has self-discontinued lamictal and keppra and will not resume (>1 week ago).  I have encouraged her to avoid using her valtoco prn.  She has an upcoming appt with a new neurologist in [place].  We discussed needing to establish care a neurologist she trusts and make a plan for safely weaning the vimpat and working with mental health team on alternative ways to manage any break through events.  She believes headache precedes her \"epilepsy\" and will try diphenhydramine 12.5 mg IV q 4 hours prn any events while admitted.  She can also try this po at home prn " "headache or try ibuprofen prn to attempt to prevent full episodes from evolving.  D/w nursing--no prn ativan/valium recommended  Plan for foreign body ingestion per primary team/GI/surgery     D/w pt and nursing.  Pt requests that I not update her mother and will respect her wishes.  She also requests that braeden not be allowed to visit and nursing aware.  Okay for discharge from neurology standpoint when cleared by other consultants.     Kamala Fox MD    Result Review    Result Review:  I have personally reviewed the results from the time of this admission to 8/2/2024 11:34 CDT and agree with these findings:  []  Laboratory  []  Microbiology  []  Radiology  []  EKG/Telemetry   []  Cardiology/Vascular   []  Pathology  []  Old records  []  Other:    Condition on Discharge:    Stable and at baseline    Physical Exam on Discharge:  BP 91/51 (BP Location: Right arm, Patient Position: Lying)   Pulse 79   Temp 98.3 °F (36.8 °C) (Oral)   Resp 18   Ht 170.2 cm (67\")   Wt 93.8 kg (206 lb 11.2 oz)   LMP  (LMP Unknown) Comment: neg hcg  SpO2 99%   Breastfeeding No   BMI 32.37 kg/m²   Physical Exam  Constitutional:       Appearance: Normal appearance.   HENT:      Head: Normocephalic and atraumatic.      Right Ear: External ear normal.      Left Ear: External ear normal.      Nose: Nose normal.      Mouth/Throat:      Pharynx: Oropharynx is clear.   Eyes:      General: No scleral icterus.     Conjunctiva/sclera: Conjunctivae normal.   Cardiovascular:      Rate and Rhythm: Normal rate and regular rhythm.      Heart sounds: Normal heart sounds.   Pulmonary:      Effort: Pulmonary effort is normal.      Breath sounds: Normal breath sounds.   Abdominal:      General: Abdomen is flat.      Palpations: Abdomen is soft.      Tenderness: There is no abdominal tenderness.   Musculoskeletal:         General: Normal range of motion.   Skin:     General: Skin is warm and dry.   Neurological:      General: No focal deficit " present.      Mental Status: She is alert and oriented to person, place, and time. Mental status is at baseline.   Psychiatric:         Mood and Affect: Mood normal.          Discharge Disposition:  Home or Self Care    Discharge Medications:     Discharge Medications        New Medications        Instructions Start Date   lacosamide 100 MG tablet tablet  Commonly known as: VIMPAT   100 mg, Oral, Every 12 Hours Scheduled             Continue These Medications        Instructions Start Date   acetaminophen 500 MG tablet  Commonly known as: TYLENOL   500 mg, Oral, Daily PRN      doxazosin 1 MG tablet  Commonly known as: CARDURA   1 mg, Oral, 2 Times Daily      hydrOXYzine 25 MG tablet  Commonly known as: ATARAX   25 mg, Oral, 3 Times Daily PRN      montelukast 10 MG tablet  Commonly known as: SINGULAIR   10 mg, Oral, Nightly      pantoprazole 40 MG EC tablet  Commonly known as: PROTONIX   40 mg, Oral, Daily      QUEtiapine 25 MG tablet  Commonly known as: SEROquel   25 mg, Oral, 2 Times Daily      senna 8.6 MG tablet  Commonly known as: SENOKOT   2 tablets, Oral, Daily PRN      VALTOCO 15 MG DOSE NA   15 mg, Nasal, As Needed             Stop These Medications      lamoTRIgine 100 MG tablet  Commonly known as: LaMICtal              Discharge Diet:   Diet Instructions       Diet: Regular/House Diet; Regular (IDDSI 7); Thin (IDDSI 0)      Discharge Diet: Regular/House Diet    Texture: Regular (IDDSI 7)    Fluid Consistency: Thin (IDDSI 0)            Discharge Care Plan / Instructions:   Discharge home    Activity at Discharge:   Activity Instructions       Activity as Tolerated              Follow-up Appointments:  Follow-up with PCP next week    Electronically signed by Tucker Washington DO, 08/02/24, 11:34 CDT.    Time: Discharge less than 30 min    Part of this note may be an electronic transcription/translation of spoken language to printed text using the Dragon Dictation system.

## 2024-08-02 NOTE — PLAN OF CARE
Goal Outcome Evaluation:              Outcome Evaluation: Pt D/C home with mother. Belongings sent home with mother and pt. IV removed. Tele removed. Went over AVS with mother, reviewed new medications. Educated mother on looking at pt's stool to see when she passes batteries. Informed pt's mother that Poison Control would be in touch with her about case.

## 2024-08-03 ENCOUNTER — READMISSION MANAGEMENT (OUTPATIENT)
Dept: CALL CENTER | Facility: HOSPITAL | Age: 29
End: 2024-08-03
Payer: MEDICAID

## 2024-08-03 LAB — LEVETIRACETAM SERPL-MCNC: <2 UG/ML (ref 10–40)

## 2024-08-03 NOTE — OUTREACH NOTE
Prep Survey      Flowsheet Row Responses   [facility] patient discharged from? [place]   Is LACE score < 7 ? No   Eligibility Readm Mgmt   Discharge diagnosis Psychogenic nonepileptic seizure   Does the patient have one of the following disease processes/diagnoses(primary or secondary)? Other   Does the patient have Home health ordered? No   Is there a DME ordered? No   Prep survey completed? Yes            Yoly ROSALES - Registered Nurse

## 2024-08-05 LAB — OLANZAPINE SERPL-MCNC: <5 NG/ML (ref 10–80)

## 2024-08-06 LAB — LACOSAMIDE SERPL-MCNC: 3.9 UG/ML (ref 5–10)

## 2024-08-14 ENCOUNTER — READMISSION MANAGEMENT (OUTPATIENT)
Dept: CALL CENTER | Facility: HOSPITAL | Age: 29
End: 2024-08-14
Payer: MEDICAID

## 2024-08-14 NOTE — OUTREACH NOTE
Medical Week 2 Survey      Flowsheet Row Responses   [facility] patient discharged from? [place]   Does the patient have one of the following disease processes/diagnoses(primary or secondary)? Other   Week 2 attempt successful? No   Unsuccessful attempts Attempt 1   Revoke Galo LLOYD - Registered Nurse

## 2024-08-23 ENCOUNTER — APPOINTMENT (OUTPATIENT)
Dept: GENERAL RADIOLOGY | Facility: HOSPITAL | Age: 29
End: 2024-08-23
Payer: MEDICAID

## 2024-08-23 ENCOUNTER — HOSPITAL ENCOUNTER (OUTPATIENT)
Facility: HOSPITAL | Age: 29
Setting detail: OBSERVATION
Discharge: HOME | End: 2024-08-24
Attending: EMERGENCY MEDICINE | Admitting: FAMILY MEDICINE
Payer: MEDICAID

## 2024-08-23 DIAGNOSIS — T18.9XXA FOREIGN BODY IN DIGESTIVE TRACT, INITIAL ENCOUNTER: ICD-10-CM

## 2024-08-23 DIAGNOSIS — R56.9 SEIZURE: Primary | ICD-10-CM

## 2024-08-23 LAB
ALBUMIN SERPL-MCNC: 4 G/DL (ref 3.5–5.2)
ALBUMIN/GLOB SERPL: 1.4 G/DL
ALP SERPL-CCNC: 87 U/L (ref 39–117)
ALT SERPL W P-5'-P-CCNC: 9 U/L (ref 1–33)
ANION GAP SERPL CALCULATED.3IONS-SCNC: 13 MMOL/L (ref 5–15)
AST SERPL-CCNC: 12 U/L (ref 1–32)
BASOPHILS # BLD AUTO: 0.02 10*3/MM3 (ref 0–0.2)
BASOPHILS NFR BLD AUTO: 0.3 % (ref 0–1.5)
BILIRUB SERPL-MCNC: 0.2 MG/DL (ref 0–1.2)
BUN SERPL-MCNC: 4 MG/DL (ref 6–20)
BUN/CREAT SERPL: 5.7 (ref 7–25)
CALCIUM SPEC-SCNC: 9.1 MG/DL (ref 8.6–10.5)
CHLORIDE SERPL-SCNC: 104 MMOL/L (ref 98–107)
CO2 SERPL-SCNC: 23 MMOL/L (ref 22–29)
CREAT SERPL-MCNC: 0.7 MG/DL (ref 0.57–1)
DEPRECATED RDW RBC AUTO: 46.1 FL (ref 37–54)
EGFRCR SERPLBLD CKD-EPI 2021: 121.7 ML/MIN/1.73
EOSINOPHIL # BLD AUTO: 0.08 10*3/MM3 (ref 0–0.4)
EOSINOPHIL NFR BLD AUTO: 1.1 % (ref 0.3–6.2)
ERYTHROCYTE [DISTWIDTH] IN BLOOD BY AUTOMATED COUNT: 18.5 % (ref 12.3–15.4)
GLOBULIN UR ELPH-MCNC: 2.8 GM/DL
GLUCOSE SERPL-MCNC: 98 MG/DL (ref 65–99)
HCG SERPL QL: NEGATIVE
HCT VFR BLD AUTO: 29.4 % (ref 34–46.6)
HGB BLD-MCNC: 8.7 G/DL (ref 12–15.9)
IMM GRANULOCYTES # BLD AUTO: 0.01 10*3/MM3 (ref 0–0.05)
IMM GRANULOCYTES NFR BLD AUTO: 0.1 % (ref 0–0.5)
INR PPP: 1.26 (ref 0.91–1.09)
LYMPHOCYTES # BLD AUTO: 1.27 10*3/MM3 (ref 0.7–3.1)
LYMPHOCYTES NFR BLD AUTO: 17 % (ref 19.6–45.3)
MAGNESIUM SERPL-MCNC: 1.8 MG/DL (ref 1.6–2.6)
MCH RBC QN AUTO: 20.9 PG (ref 26.6–33)
MCHC RBC AUTO-ENTMCNC: 29.6 G/DL (ref 31.5–35.7)
MCV RBC AUTO: 70.7 FL (ref 79–97)
MONOCYTES # BLD AUTO: 0.55 10*3/MM3 (ref 0.1–0.9)
MONOCYTES NFR BLD AUTO: 7.4 % (ref 5–12)
NEUTROPHILS NFR BLD AUTO: 5.52 10*3/MM3 (ref 1.7–7)
NEUTROPHILS NFR BLD AUTO: 74.1 % (ref 42.7–76)
NRBC BLD AUTO-RTO: 0 /100 WBC (ref 0–0.2)
PLATELET # BLD AUTO: 206 10*3/MM3 (ref 140–450)
PMV BLD AUTO: 11.9 FL (ref 6–12)
POTASSIUM SERPL-SCNC: 3.8 MMOL/L (ref 3.5–5.2)
PROT SERPL-MCNC: 6.8 G/DL (ref 6–8.5)
PROTHROMBIN TIME: 16.3 SECONDS (ref 11.8–14.8)
RBC # BLD AUTO: 4.16 10*6/MM3 (ref 3.77–5.28)
SODIUM SERPL-SCNC: 140 MMOL/L (ref 136–145)
WBC NRBC COR # BLD AUTO: 7.45 10*3/MM3 (ref 3.4–10.8)

## 2024-08-23 PROCEDURE — 85610 PROTHROMBIN TIME: CPT | Performed by: EMERGENCY MEDICINE

## 2024-08-23 PROCEDURE — 74018 RADEX ABDOMEN 1 VIEW: CPT

## 2024-08-23 PROCEDURE — 80053 COMPREHEN METABOLIC PANEL: CPT | Performed by: EMERGENCY MEDICINE

## 2024-08-23 PROCEDURE — 84703 CHORIONIC GONADOTROPIN ASSAY: CPT | Performed by: EMERGENCY MEDICINE

## 2024-08-23 PROCEDURE — 85025 COMPLETE CBC W/AUTO DIFF WBC: CPT | Performed by: EMERGENCY MEDICINE

## 2024-08-23 PROCEDURE — 83735 ASSAY OF MAGNESIUM: CPT | Performed by: EMERGENCY MEDICINE

## 2024-08-23 PROCEDURE — 99285 EMERGENCY DEPT VISIT HI MDM: CPT

## 2024-08-23 PROCEDURE — 71045 X-RAY EXAM CHEST 1 VIEW: CPT

## 2024-08-23 RX ORDER — SODIUM CHLORIDE 0.9 % (FLUSH) 0.9 %
10 SYRINGE (ML) INJECTION AS NEEDED
Status: DISCONTINUED | OUTPATIENT
Start: 2024-08-23 | End: 2024-08-24 | Stop reason: HOSPADM

## 2024-08-24 ENCOUNTER — APPOINTMENT (OUTPATIENT)
Dept: GENERAL RADIOLOGY | Facility: HOSPITAL | Age: 29
End: 2024-08-24
Payer: MEDICAID

## 2024-08-24 VITALS
OXYGEN SATURATION: 98 % | DIASTOLIC BLOOD PRESSURE: 68 MMHG | WEIGHT: 198.5 LBS | BODY MASS INDEX: 31.16 KG/M2 | TEMPERATURE: 99.2 F | HEIGHT: 67 IN | SYSTOLIC BLOOD PRESSURE: 115 MMHG | RESPIRATION RATE: 16 BRPM | HEART RATE: 70 BPM

## 2024-08-24 PROBLEM — T18.9XXA FOREIGN BODY ALIMENTARY TRACT: Status: ACTIVE | Noted: 2024-08-24

## 2024-08-24 PROCEDURE — G0378 HOSPITAL OBSERVATION PER HR: HCPCS

## 2024-08-24 PROCEDURE — 25810000003 SODIUM CHLORIDE 0.9 % SOLUTION: Performed by: FAMILY MEDICINE

## 2024-08-24 PROCEDURE — 74018 RADEX ABDOMEN 1 VIEW: CPT

## 2024-08-24 RX ORDER — VENLAFAXINE HYDROCHLORIDE 37.5 MG/1
37.5 CAPSULE, EXTENDED RELEASE ORAL DAILY
COMMUNITY

## 2024-08-24 RX ORDER — QUETIAPINE FUMARATE 25 MG/1
25 TABLET, FILM COATED ORAL 2 TIMES DAILY
Status: DISCONTINUED | OUTPATIENT
Start: 2024-08-24 | End: 2024-08-24 | Stop reason: HOSPADM

## 2024-08-24 RX ORDER — AMOXICILLIN 250 MG
2 CAPSULE ORAL 2 TIMES DAILY PRN
Status: DISCONTINUED | OUTPATIENT
Start: 2024-08-24 | End: 2024-08-24 | Stop reason: HOSPADM

## 2024-08-24 RX ORDER — HYDROXYZINE HYDROCHLORIDE 25 MG/1
25 TABLET, FILM COATED ORAL 3 TIMES DAILY PRN
Status: DISCONTINUED | OUTPATIENT
Start: 2024-08-24 | End: 2024-08-24 | Stop reason: HOSPADM

## 2024-08-24 RX ORDER — MONTELUKAST SODIUM 10 MG/1
10 TABLET ORAL NIGHTLY
Status: DISCONTINUED | OUTPATIENT
Start: 2024-08-24 | End: 2024-08-24 | Stop reason: HOSPADM

## 2024-08-24 RX ORDER — POLYETHYLENE GLYCOL 3350 17 G/17G
17 POWDER, FOR SOLUTION ORAL DAILY PRN
Status: DISCONTINUED | OUTPATIENT
Start: 2024-08-24 | End: 2024-08-24 | Stop reason: HOSPADM

## 2024-08-24 RX ORDER — SODIUM CHLORIDE 0.9 % (FLUSH) 0.9 %
10 SYRINGE (ML) INJECTION AS NEEDED
Status: DISCONTINUED | OUTPATIENT
Start: 2024-08-24 | End: 2024-08-24 | Stop reason: HOSPADM

## 2024-08-24 RX ORDER — PANTOPRAZOLE SODIUM 40 MG/1
40 TABLET, DELAYED RELEASE ORAL DAILY
Status: DISCONTINUED | OUTPATIENT
Start: 2024-08-24 | End: 2024-08-24 | Stop reason: HOSPADM

## 2024-08-24 RX ORDER — ONDANSETRON 4 MG/1
4 TABLET, ORALLY DISINTEGRATING ORAL EVERY 6 HOURS PRN
Status: DISCONTINUED | OUTPATIENT
Start: 2024-08-24 | End: 2024-08-24 | Stop reason: HOSPADM

## 2024-08-24 RX ORDER — SODIUM CHLORIDE 0.9 % (FLUSH) 0.9 %
10 SYRINGE (ML) INJECTION EVERY 12 HOURS SCHEDULED
Status: DISCONTINUED | OUTPATIENT
Start: 2024-08-24 | End: 2024-08-24 | Stop reason: HOSPADM

## 2024-08-24 RX ORDER — LAMOTRIGINE 100 MG/1
250 TABLET ORAL DAILY
COMMUNITY

## 2024-08-24 RX ORDER — BISACODYL 10 MG
10 SUPPOSITORY, RECTAL RECTAL DAILY PRN
Status: DISCONTINUED | OUTPATIENT
Start: 2024-08-24 | End: 2024-08-24 | Stop reason: HOSPADM

## 2024-08-24 RX ORDER — ACETAMINOPHEN 160 MG/5ML
650 SOLUTION ORAL EVERY 4 HOURS PRN
Status: DISCONTINUED | OUTPATIENT
Start: 2024-08-24 | End: 2024-08-24 | Stop reason: HOSPADM

## 2024-08-24 RX ORDER — LACOSAMIDE 50 MG/1
100 TABLET ORAL EVERY 12 HOURS SCHEDULED
Status: DISCONTINUED | OUTPATIENT
Start: 2024-08-24 | End: 2024-08-24 | Stop reason: HOSPADM

## 2024-08-24 RX ORDER — SODIUM CHLORIDE 9 MG/ML
40 INJECTION, SOLUTION INTRAVENOUS AS NEEDED
Status: DISCONTINUED | OUTPATIENT
Start: 2024-08-24 | End: 2024-08-24 | Stop reason: HOSPADM

## 2024-08-24 RX ORDER — BISACODYL 5 MG/1
5 TABLET, DELAYED RELEASE ORAL DAILY PRN
Status: DISCONTINUED | OUTPATIENT
Start: 2024-08-24 | End: 2024-08-24 | Stop reason: HOSPADM

## 2024-08-24 RX ORDER — ACETAMINOPHEN 325 MG/1
650 TABLET ORAL EVERY 4 HOURS PRN
Status: DISCONTINUED | OUTPATIENT
Start: 2024-08-24 | End: 2024-08-24 | Stop reason: HOSPADM

## 2024-08-24 RX ORDER — SODIUM CHLORIDE 9 MG/ML
75 INJECTION, SOLUTION INTRAVENOUS CONTINUOUS
Status: DISCONTINUED | OUTPATIENT
Start: 2024-08-24 | End: 2024-08-24 | Stop reason: HOSPADM

## 2024-08-24 RX ORDER — ONDANSETRON 2 MG/ML
4 INJECTION INTRAMUSCULAR; INTRAVENOUS EVERY 6 HOURS PRN
Status: DISCONTINUED | OUTPATIENT
Start: 2024-08-24 | End: 2024-08-24 | Stop reason: HOSPADM

## 2024-08-24 RX ORDER — ACETAMINOPHEN 650 MG/1
650 SUPPOSITORY RECTAL EVERY 4 HOURS PRN
Status: DISCONTINUED | OUTPATIENT
Start: 2024-08-24 | End: 2024-08-24 | Stop reason: HOSPADM

## 2024-08-24 RX ADMIN — SODIUM CHLORIDE 75 ML/HR: 9 INJECTION, SOLUTION INTRAVENOUS at 01:13

## 2024-08-24 RX ADMIN — Medication 10 ML: at 08:20

## 2024-08-24 RX ADMIN — LACOSAMIDE 100 MG: 50 TABLET, FILM COATED ORAL at 08:20

## 2024-08-24 RX ADMIN — Medication 10 ML: at 01:13

## 2024-08-24 RX ADMIN — PANTOPRAZOLE SODIUM 40 MG: 40 TABLET, DELAYED RELEASE ORAL at 08:20

## 2024-08-24 RX ADMIN — QUETIAPINE FUMARATE 25 MG: 25 TABLET, FILM COATED ORAL at 08:20

## 2024-08-24 NOTE — ED PROVIDER NOTES
Subjective   History of Present Illness  28-year-old female presents to the ER for evaluation 28-year-old female presents to the ED with generalized seizure activity.  History of seizure disorder/pseudoseizures.  Patient also has history of autism, depression, anxiety, and history of swallowing foreign objects.  Family called EMS tonight after patient had a witnessed seizure activity at home.  They gave benzodiazepines and called EMS.  Patient to the hospital she was drowsy.  Had a couple brief episodes of muscle tightening/questionable seizure activity.  History limited due to patient's inability to speak.  However, she would communicate some through writing messages on her phone.  After arriving to the emergency department she admitted to swallowing either button battery threes or magnets.  Complained of some burning abdominal discomfort.  No vomiting.  No additional information able to be obtained.    History limited by:  Patient nonverbal      Review of Systems   Unable to perform ROS: Patient nonverbal       Past Medical History:   Diagnosis Date    Anxiety     Depression     History of  2017    History of congenital abnormality 2017    Late prenatal care affecting pregnancy in second trimester 2017    Mental deficiency 2017    Previous  delivery affecting pregnancy 2017    Psychogenic nonepileptic seizure        No Known Allergies    Past Surgical History:   Procedure Laterality Date     SECTION      ENDOSCOPY N/A 2024    Procedure: ESOPHAGOGASTRODUODENOSCOPY WITH ANESTHESIA;  Surgeon: Tian Morris MD;  Location: [facility] OR;  Service: Gastroenterology;  Laterality: N/A;  pre foreign body in stomach  post foreign body passed out of stomach  dr kaiden houston    ENDOSCOPY N/A 2024    Procedure: ESOPHAGOGASTRODUODENOSCOPY WITH ANESTHESIA;  Surgeon: Herman Murry MD;  Location: [facility] OR;  Service: Gastroenterology;  Laterality: N/A;  pre  foreign body  post no foreign body       History reviewed. No pertinent family history.    Social History     Socioeconomic History    Marital status:    Tobacco Use    Smoking status: Unknown    Smokeless tobacco: Never   Vaping Use    Vaping status: Never Used   Substance and Sexual Activity    Alcohol use: Defer    Drug use: Defer    Sexual activity: Defer     Partners: Male           Objective   Physical Exam  Vitals and nursing note reviewed.   Constitutional:       Comments: Drowsy appearing young adult female, nonverbal, no acute distress   HENT:      Head: Normocephalic and atraumatic.      Nose: Nose normal. No congestion or rhinorrhea.   Eyes:      Extraocular Movements: Extraocular movements intact.      Conjunctiva/sclera: Conjunctivae normal.      Pupils: Pupils are equal, round, and reactive to light.   Cardiovascular:      Rate and Rhythm: Normal rate and regular rhythm.      Heart sounds: Normal heart sounds.   Pulmonary:      Effort: Pulmonary effort is normal.      Breath sounds: Normal breath sounds. No wheezing.   Abdominal:      General: Abdomen is flat. Bowel sounds are normal.      Palpations: Abdomen is soft.   Skin:     General: Skin is warm and dry.      Capillary Refill: Capillary refill takes less than 2 seconds.   Neurological:      Mental Status: She is lethargic.      Cranial Nerves: No facial asymmetry.      Sensory: Sensation is intact.      Motor: Motor function is intact.         Procedures         Lab Results (last 24 hours)       Procedure Component Value Units Date/Time    CBC & Differential [[id number]]  (Abnormal) Collected: 08/23/24 2123    Specimen: Blood Updated: 08/23/24 2134    Narrative:      The following orders were created for panel order CBC & Differential.  Procedure                               Abnormality         Status                     ---------                               -----------         ------                     CBC Auto Differential[[id number]]         Abnormal            Final result                 Please view results for these tests on the individual orders.    Comprehensive Metabolic Panel [[id number]]  (Abnormal) Collected: 08/23/24 2123    Specimen: Blood Updated: 08/23/24 2150     Glucose 98 mg/dL      BUN 4 mg/dL      Creatinine 0.70 mg/dL      Sodium 140 mmol/L      Potassium 3.8 mmol/L      Comment: Slight hemolysis detected by analyzer. Result may be falsely elevated.        Chloride 104 mmol/L      CO2 23.0 mmol/L      Calcium 9.1 mg/dL      Total Protein 6.8 g/dL      Albumin 4.0 g/dL      ALT (SGPT) 9 U/L      AST (SGOT) 12 U/L      Alkaline Phosphatase 87 U/L      Total Bilirubin 0.2 mg/dL      Globulin 2.8 gm/dL      A/G Ratio 1.4 g/dL      BUN/Creatinine Ratio 5.7     Anion Gap 13.0 mmol/L      eGFR 121.7 mL/min/1.73     Narrative:      GFR Normal >60  Chronic Kidney Disease <60  Kidney Failure <15      Protime-INR [[id number]]  (Abnormal) Collected: 08/23/24 2123    Specimen: Blood Updated: 08/23/24 2140     Protime 16.3 Seconds      INR 1.26    Magnesium [[id number]]  (Normal) Collected: 08/23/24 2123    Specimen: Blood Updated: 08/23/24 2150     Magnesium 1.8 mg/dL     hCG, Serum, Qualitative [[id number]]  (Normal) Collected: 08/23/24 2123    Specimen: Blood Updated: 08/23/24 2143     HCG Qualitative Negative    CBC Auto Differential [[id number]]  (Abnormal) Collected: 08/23/24 2123    Specimen: Blood Updated: 08/23/24 2134     WBC 7.45 10*3/mm3      RBC 4.16 10*6/mm3      Hemoglobin 8.7 g/dL      Hematocrit 29.4 %      MCV 70.7 fL      MCH 20.9 pg      MCHC 29.6 g/dL      RDW 18.5 %      RDW-SD 46.1 fl      MPV 11.9 fL      Platelets 206 10*3/mm3      Neutrophil % 74.1 %      Lymphocyte % 17.0 %      Monocyte % 7.4 %      Eosinophil % 1.1 %      Basophil % 0.3 %      Immature Grans % 0.1 %      Neutrophils, Absolute 5.52 10*3/mm3      Lymphocytes, Absolute 1.27 10*3/mm3      Monocytes, Absolute 0.55 10*3/mm3      Eosinophils, Absolute 0.08  10*3/mm3      Basophils, Absolute 0.02 10*3/mm3      Immature Grans, Absolute 0.01 10*3/mm3      nRBC 0.0 /100 WBC          XR Abdomen KUB    Result Date: 8/23/2024  EXAMINATION: XR ABDOMEN KUB-  8/23/2024 8:34 PM  HISTORY: foreign body ingestion  1 view abdomen/KUB.  Normal bones and bowel gas pattern.  No obstruction and no sign of free air.  Square metallic foreign body measuring 10 x 6 mm in the RIGHT lower quadrant.  In this location this could be within distal small bowel or cecum or possibly sigmoid colon.      1. 10 x 6 mm metal foreign body in the RIGHT lower quadrant.    This report was signed and finalized on 8/23/2024 9:47 PM by Dr. Santiago Edge MD.      XR Chest 1 View    Result Date: 8/23/2024  EXAMINATION: XR CHEST 1 VW-  8/23/2024 8:24 PM  HISTORY: foreign body ingestin  1 view chest x-ray.  COMPARISON: 7/12/2024.  Normal heart and mediastinum.  Adequately expanded lungs.  No pneumothorax.  Normal bones.  No visible foreign body.      1. No acute abnormality.    This report was signed and finalized on 8/23/2024 9:44 PM by Dr. Santiago Edge MD.      ED Course  ED Course as of 08/24/24 0103   Sat Aug 24, 2024   0057 28-year-old female with history of autism, pseudoseizures, anxiety depression, history of swallowing foreign objects presents to the ED following seizure activity.  After arrival to the ED patient admitted to swallowing some Italian obsessed which she states with either batteries or magnets.  Chest x-ray and KUB ordered, revealed a 10 mm x 6 mm metallic foreign object in the right lower quadrant.  Unclear if this is a button battery or 2 magnets.  Patient states she swallowed 2 items, suspect magnets.  Will admit for observation and to ensure movement of metallic foreign objects in the intestinal tract. [AW]      ED Course User Index  [AW] Herman Boyer MD                                             Medical Decision Making  Amount and/or Complexity of Data Reviewed  Labs:  ordered.  Radiology: ordered.    Risk  Prescription drug management.  Decision regarding hospitalization.        Final diagnoses:   Seizure   Foreign body in digestive tract, initial encounter       ED Disposition  ED Disposition       ED Disposition   Decision to Admit    Condition   --    Comment   Level of Care: Med/Surg [1]   Diagnosis: Foreign body alimentary tract [968079]   Admitting Physician: MADIHA RABAGO [[id number]]   Attending Physician: MADIHA RABAGO [[id number]]                 No follow-up provider specified.       Medication List      No changes were made to your prescriptions during this visit.            Herman Boyer MD  08/24/24 0103

## 2024-08-24 NOTE — H&P
[facility] Medicine Services  HISTORY AND PHYSICAL    Date of Admission: 2024  Primary Care Physician: Rosie Olvera MD    Subjective   Primary Historian: Report by ER    Chief Complaint: Ingested foreign body    History of Present Illness  28-year-old female with history of autism, pica with recurrent foreign body ingestion, psychogenic nonepileptic seizures, patient was admitted recently to the hospital on 2024 with questionable seizures and foreign body ingestion.  At that time had upper endoscopy but there were no retrievable foreign bodies.  Patient states that she has been diagnosed with pica, and today ingested either button battery versus magnets, she does not recall.  She has been evaluated multiple times by neurology, considering that patient has pseudoseizures and not necessary to give benzodiazepines.  She is on Vimpat.        Review of Systems   Otherwise complete ROS reviewed and negative except as mentioned in the HPI.    Past Medical History:   Past Medical History:   Diagnosis Date    Anxiety     Depression     History of  2017    History of congenital abnormality 2017    Late prenatal care affecting pregnancy in second trimester 2017    Mental deficiency 2017    Previous  delivery affecting pregnancy 2017    Psychogenic nonepileptic seizure      Past Surgical History:  Past Surgical History:   Procedure Laterality Date     SECTION      ENDOSCOPY N/A 2024    Procedure: ESOPHAGOGASTRODUODENOSCOPY WITH ANESTHESIA;  Surgeon: Tian Morris MD;  Location: [facility] OR;  Service: Gastroenterology;  Laterality: N/A;  pre foreign body in stomach  post foreign body passed out of stomach  dr kaiden houston    ENDOSCOPY N/A 2024    Procedure: ESOPHAGOGASTRODUODENOSCOPY WITH ANESTHESIA;  Surgeon: Herman Murry MD;  Location: [facility] OR;  Service: Gastroenterology;   "Laterality: N/A;  pre foreign body  post no foreign body     Social History:  reports that she has an unknown smoking status. She has never used smokeless tobacco. Alcohol use questions deferred to the physician. Drug use questions deferred to the physician.    Family History: family history is not on file.   Reviewed    Allergies:  No Known Allergies    Medications:  Prior to Admission medications    Medication Sig Start Date End Date Taking? Authorizing Provider   acetaminophen (TYLENOL) 500 MG tablet Take 1 tablet by mouth Daily As Needed for Mild Pain.    Rayray Jean MD   diazePAM (VALTOCO 15 MG DOSE NA) 15 mg into the nostril(s) as directed by provider As Needed (seizure lasting longer than 5 minutes).    Rayray Jean MD   doxazosin (CARDURA) 1 MG tablet Take 1 tablet by mouth 2 (Two) Times a Day.    Rayray Jean MD   hydrOXYzine (ATARAX) 25 MG tablet Take 1 tablet by mouth 3 (Three) Times a Day As Needed for Anxiety.    Rayray Jean MD   lacosamide (VIMPAT) 100 MG tablet tablet Take 1 tablet by mouth Every 12 (Twelve) Hours. 8/2/24   Tucker Washington DO   montelukast (SINGULAIR) 10 MG tablet Take 1 tablet by mouth Every Night.    Rayray Jean MD   pantoprazole (PROTONIX) 40 MG EC tablet Take 1 tablet by mouth Daily.    Rayray Jean MD   QUEtiapine (SEROquel) 25 MG tablet Take 1 tablet by mouth 2 (Two) Times a Day.    Rayray Jean MD   senna 8.6 MG tablet Take 2 tablets by mouth Daily As Needed for Constipation.    Rayray Jean MD     I have utilized all available immediate resources to obtain, update, or review the patient's current medications (including all prescriptions, over-the-counter products, herbals, cannabis/cannabidiol products, and vitamin/mineral/dietary (nutritional) supplements).    Objective     Vital Signs: /76   Pulse 114   Temp 99.7 °F (37.6 °C) (Axillary)   Resp 21   Ht 170.2 cm (67\")   Wt 90 kg (198 lb " 8 oz)   SpO2 99%   BMI 31.09 kg/m²   Physical Exam   Constitutional:       Appearance: Alert, nonverbal.  She follows commands.  HENT:      Head: Normocephalic and atraumatic.      Nose: Nose normal.      Mouth/Throat:      Mouth: Mucous membranes are moist.      Pharynx: Oropharynx is clear.   Eyes:      Extraocular Movements: Extraocular movements intact.      Conjunctiva/sclera: Conjunctivae normal.      Pupils: Pupils are equal, round, and reactive to light.   Cardiovascular:      Rate and Rhythm: Normal rate and regular rhythm.      Pulses: Normal pulses.   Pulmonary:      Effort: No respiratory distress.      Breath sounds: Normal breath sounds. No wheezing, rhonchi or rales.   Abdominal:      General: Abdomen is flat. Bowel sounds are normal.      Palpations: Abdomen is soft.      Tenderness: There is no guarding or rebound.   Musculoskeletal:         General: Normal range of motion.      Cervical back: Normal range of motion and neck supple.   Extremities:  No lower extremity edema.  Skin:     Capillary Refill: Capillary refill takes less than 2 seconds.      Coloration: Skin is not jaundiced.      Findings: No rash.   Neurological:   alert, Nonverbal.  Psychiatric:      Unable to evaluate.          Results Reviewed:  Lab Results (last 24 hours)       Procedure Component Value Units Date/Time    Comprehensive Metabolic Panel [[id number]]  (Abnormal) Collected: 08/23/24 2123    Specimen: Blood Updated: 08/23/24 2150     Glucose 98 mg/dL      BUN 4 mg/dL      Creatinine 0.70 mg/dL      Sodium 140 mmol/L      Potassium 3.8 mmol/L      Comment: Slight hemolysis detected by analyzer. Result may be falsely elevated.        Chloride 104 mmol/L      CO2 23.0 mmol/L      Calcium 9.1 mg/dL      Total Protein 6.8 g/dL      Albumin 4.0 g/dL      ALT (SGPT) 9 U/L      AST (SGOT) 12 U/L      Alkaline Phosphatase 87 U/L      Total Bilirubin 0.2 mg/dL      Globulin 2.8 gm/dL      A/G Ratio 1.4 g/dL      BUN/Creatinine  Ratio 5.7     Anion Gap 13.0 mmol/L      eGFR 121.7 mL/min/1.73     Narrative:      GFR Normal >60  Chronic Kidney Disease <60  Kidney Failure <15      Magnesium [[id number]]  (Normal) Collected: 08/23/24 2123    Specimen: Blood Updated: 08/23/24 2150     Magnesium 1.8 mg/dL     hCG, Serum, Qualitative [[id number]]  (Normal) Collected: 08/23/24 2123    Specimen: Blood Updated: 08/23/24 2143     HCG Qualitative Negative    Protime-INR [[id number]]  (Abnormal) Collected: 08/23/24 2123    Specimen: Blood Updated: 08/23/24 2140     Protime 16.3 Seconds      INR 1.26    CBC & Differential [[id number]]  (Abnormal) Collected: 08/23/24 2123    Specimen: Blood Updated: 08/23/24 2134    Narrative:      The following orders were created for panel order CBC & Differential.  Procedure                               Abnormality         Status                     ---------                               -----------         ------                     CBC Auto Differential[[id number]]        Abnormal            Final result                 Please view results for these tests on the individual orders.    CBC Auto Differential [[id number]]  (Abnormal) Collected: 08/23/24 2123    Specimen: Blood Updated: 08/23/24 2134     WBC 7.45 10*3/mm3      RBC 4.16 10*6/mm3      Hemoglobin 8.7 g/dL      Hematocrit 29.4 %      MCV 70.7 fL      MCH 20.9 pg      MCHC 29.6 g/dL      RDW 18.5 %      RDW-SD 46.1 fl      MPV 11.9 fL      Platelets 206 10*3/mm3      Neutrophil % 74.1 %      Lymphocyte % 17.0 %      Monocyte % 7.4 %      Eosinophil % 1.1 %      Basophil % 0.3 %      Immature Grans % 0.1 %      Neutrophils, Absolute 5.52 10*3/mm3      Lymphocytes, Absolute 1.27 10*3/mm3      Monocytes, Absolute 0.55 10*3/mm3      Eosinophils, Absolute 0.08 10*3/mm3      Basophils, Absolute 0.02 10*3/mm3      Immature Grans, Absolute 0.01 10*3/mm3      nRBC 0.0 /100 WBC           Imaging Results (Last 24 Hours)       Procedure Component Value Units Date/Time    XR  Abdomen KUB [[id number]] Collected: 08/23/24 2146     Updated: 08/23/24 2150    Narrative:      EXAMINATION: XR ABDOMEN KUB-     8/23/2024 8:34 PM     HISTORY: foreign body ingestion     1 view abdomen/KUB.     Normal bones and bowel gas pattern.     No obstruction and no sign of free air.     Square metallic foreign body measuring 10 x 6 mm in the RIGHT lower  quadrant.     In this location this could be within distal small bowel or cecum or  possibly sigmoid colon.       Impression:      1. 10 x 6 mm metal foreign body in the RIGHT lower quadrant.           This report was signed and finalized on 8/23/2024 9:47 PM by Dr. Santiago Edge MD.       XR Chest 1 View [[id number]] Collected: 08/23/24 2143     Updated: 08/23/24 2147    Narrative:      EXAMINATION: XR CHEST 1 VW-     8/23/2024 8:24 PM     HISTORY: foreign body ingestin     1 view chest x-ray.     COMPARISON:  7/12/2024.     Normal heart and mediastinum.     Adequately expanded lungs.     No pneumothorax.     Normal bones.     No visible foreign body.       Impression:      1. No acute abnormality.           This report was signed and finalized on 8/23/2024 9:44 PM by Dr. Santiago Edge MD.             I have personally reviewed and interpreted the radiology studies and ECG obtained at time of admission.     Assessment / Plan   Assessment:   Active Hospital Problems    Diagnosis     **Foreign body alimentary tract      Ingested foreign body (battery versus magnets)  Pica  Autism  Pseudoseizures      No abdominal pain.  Abdomen benign on examination.  Admitted for observation to monitor for any signs or symptoms of GI tract complications.    Treatment Plan  The patient will be admitted to my service here at [facility].  Patient will be n.p.o. for tonight.  Will monitor any symptomatology.  Serial KUBs.  Continue home medications  Outpatient psychiatric management recommended    Medical Decision Making  Number and Complexity of problems: 4, moderate  complexity  Differential Diagnosis: See above    Conditions and Status        Condition is unchanged.     [facility] Data  External documents reviewed: None  Cardiac tracing (EKG, telemetry) interpretation: No  Radiology interpretation: Radiology reports reviewed  Labs reviewed: Yes  Any tests that were considered but not ordered: No     Decision rules/scores evaluated (example [id number], Wells, etc): None     Discussed with: Patient     Care Planning  Shared decision making: With patient  Code status and discussions: Full code    Disposition  Social Determinants of Health that impact treatment or disposition: Autism with behavioral disturbance  Estimated length of stay is 1 to 2 days.     I confirmed that the patient's advanced care plan is present, code status is documented, and a surrogate decision maker is listed in the patient's medical record.     The patient's surrogate decision maker is family member.     The patient was seen and examined by me on 8/24/2024 at 12:20 AM.    Electronically signed by Marshall Sharpe MD, 08/24/24, 00:27 CDT.

## 2024-08-24 NOTE — ED NOTES
Patient used cell phone to communicate to this nurse that she swallowed batteries and magnets with milk before having the seizure at home. Patient complains of a burning pain in her stomach. Dr. Boyer made aware.

## 2024-08-24 NOTE — CASE MANAGEMENT/SOCIAL WORK
"Continued Stay Note  [facility]     Patient Name: Duane Cooley  MRN: [id number]  Today's Date: 8/24/2024    Admit Date: 8/23/2024        Discharge Plan       Row Name 08/24/24 1319       Plan    Plan Comments SW received consult stating \"home services/assistance, patient frequently swallowing non food items\". There are not any in home services in this area in regards to swallowing foreign objects. Per Physician notes pt has been admitted with recurrent foreign body ingestion. Pt may benefit from outpt mental health services, if agreed to.    Final Discharge Disposition Code 01 - home or self-care                   Discharge Codes    No documentation.                 Expected Discharge Date and Time       Expected Discharge Date Expected Discharge Time    Aug 25, 2024               YANA Grady    "

## 2024-08-24 NOTE — ED NOTES
Witnessed seizure. Patient has head turned to the left, arms and legs extended. Patient is shaking slightly and tongue movements. Seizure lasted approximately one minute.

## 2024-08-24 NOTE — PLAN OF CARE
Goal Outcome Evaluation:  Plan of Care Reviewed With: patient        Progress: no change  Received pt from ED to room 345 for swallowing batteries and magnets.and seizure.  Pt is nonverbal and communicates with iphone. NPO. IVF. Entered room at 0445 and witnessed pt  head turned to lt, tense and stiff for about 2 minutes.

## 2024-08-24 NOTE — DISCHARGE SUMMARY
[facility]  DISCHARGE SUMMARY       Date of Admission: 8/23/2024  Date of Discharge:  8/24/2024  Primary Care Physician: Rosie Olvera MD    Discharge Diagnoses:  Active Hospital Problems    Diagnosis     **Foreign body alimentary tract     Autism     Pseudoseizures          Presenting Problem/History of Present Illness:  Foreign body alimentary tract [T18.9XXA]     Chief Complaint on Day of Discharge:   No complaint    History of Present Illness on Day of Discharge:   This 28-year-old female was admitted yesterday after swallowing either magnets or button batteries.  She has done this on multiple occasions previously.  She has never had an obstruction as a result.  She apparently swallows compulsively.   notes that the patient frequently swallows nonfood items at home and she is receiving home services and assistance.  Outpatient mental health services have been recommended at discharge.  The patient is to follow-up with her PCP for referral to mental health services.  The likelihood of bowel obstruction as a result of swallowing these tiny items is extraordinarily low.    Hospital Course  28-year-old female with history of autism, pica with recurrent foreign body ingestion, psychogenic nonepileptic seizures, patient was admitted recently to the hospital on August 2, 2024 with questionable seizures and foreign body ingestion.  At that time had upper endoscopy but there were no retrievable foreign bodies.  Patient states that she has been diagnosed with pica, and today ingested either button battery versus magnets, she does not recall.  She has been evaluated multiple times by neurology, considering that patient has pseudoseizures and not necessary to give benzodiazepines.  She is on Vimpat.  Treatment Plan  The patient will be admitted to my service here at [facility].  Patient will be n.p.o. for tonight.  Will monitor  "any symptomatology.  Serial KUBs.  Continue home medications  Outpatient psychiatric management recommended    Result Review    Result Review:  I have personally reviewed the results from the time of this admission to 8/24/2024 14:48 CDT and agree with these findings:  []  Laboratory  []  Microbiology  []  Radiology  []  EKG/Telemetry   []  Cardiology/Vascular   []  Pathology  []  Old records  []  Other:    Condition on Discharge:    Stable    Physical Exam on Discharge:  /68 (BP Location: Right arm, Patient Position: Lying)   Pulse 70   Temp 99.2 °F (37.3 °C) (Oral)   Resp 16   Ht 170.2 cm (67\")   Wt 90 kg (198 lb 8 oz)   SpO2 98%   BMI 31.09 kg/m²   Physical Exam       Constitutional:       Appearance: Alert, nonverbal.  She follows commands.  HENT:      Head: Normocephalic and atraumatic.      Nose: Nose normal.      Mouth/Throat:      Mouth: Mucous membranes are moist.      Pharynx: Oropharynx is clear.   Eyes:      Extraocular Movements: Extraocular movements intact.      Conjunctiva/sclera: Conjunctivae normal.      Pupils: Pupils are equal, round, and reactive to light.   Cardiovascular:      Rate and Rhythm: Normal rate and regular rhythm.      Pulses: Normal pulses.   Pulmonary:      Effort: No respiratory distress.      Breath sounds: Normal breath sounds. No wheezing, rhonchi or rales.   Abdominal:      General: Abdomen is flat. Bowel sounds are normal.      Palpations: Abdomen is soft.      Tenderness: There is no guarding or rebound.   Musculoskeletal:         General: Normal range of motion.      Cervical back: Normal range of motion and neck supple.   Extremities:  No lower extremity edema.  Skin:     Capillary Refill: Capillary refill takes less than 2 seconds.      Coloration: Skin is not jaundiced.      Findings: No rash.   Neurological:   alert, Nonverbal.  Psychiatric:      Unable to evaluate.    Discharge Disposition:  Home or Self Care    Discharge Medications:     Discharge " Medications        Continue These Medications        Instructions Start Date   acetaminophen 500 MG tablet  Commonly known as: TYLENOL   500 mg, Oral, Daily PRN      doxazosin 1 MG tablet  Commonly known as: CARDURA   1 mg, Oral, 2 Times Daily      hydrOXYzine 25 MG tablet  Commonly known as: ATARAX   25 mg, Oral, 3 Times Daily PRN      lacosamide 100 MG tablet tablet  Commonly known as: VIMPAT   100 mg, Oral, Every 12 Hours Scheduled      lamoTRIgine 100 MG tablet  Commonly known as: LaMICtal   250 mg, Oral, Daily      montelukast 10 MG tablet  Commonly known as: SINGULAIR   10 mg, Oral, Nightly      pantoprazole 40 MG EC tablet  Commonly known as: PROTONIX   40 mg, Oral, Daily      QUEtiapine 25 MG tablet  Commonly known as: SEROquel   25 mg, Oral, 2 Times Daily      senna 8.6 MG tablet  Commonly known as: SENOKOT   2 tablets, Oral, Daily PRN      VALTOCO 15 MG DOSE NA   15 mg, Nasal, As Needed      venlafaxine XR 37.5 MG 24 hr capsule  Commonly known as: EFFEXOR-XR   37.5 mg, Oral, Daily               Discharge Diet:   Diet Instructions       Diet: Regular/House Diet; Regular (IDDSI 7); Thin (IDDSI 0)      Discharge Diet: Regular/House Diet    Texture: Regular (IDDSI 7)    Fluid Consistency: Thin (IDDSI 0)            Discharge Care Plan / Instructions:   Discharge home    Activity at Discharge:   Activity Instructions       Activity as Tolerated              Follow-up Appointments:  Follow-up with PCP ASAP for psychiatric referral    Electronically signed by Tucker Washington DO, 08/24/24, 14:48 CDT.    Time: Discharge less than 30 min    Part of this note may be an electronic transcription/translation of spoken language to printed text using the Dragon Dictation system.

## 2025-05-06 ENCOUNTER — HOSPITAL ENCOUNTER (OUTPATIENT)
Facility: HOSPITAL | Age: 30
Discharge: HOME | End: 2025-05-07
Attending: EMERGENCY MEDICINE | Admitting: FAMILY MEDICINE
Payer: MEDICAID

## 2025-05-06 DIAGNOSIS — T18.2XXA FOREIGN BODY IN STOMACH, INITIAL ENCOUNTER: Primary | ICD-10-CM

## 2025-05-06 PROCEDURE — 99285 EMERGENCY DEPT VISIT HI MDM: CPT | Performed by: EMERGENCY MEDICINE

## 2025-05-07 ENCOUNTER — ANESTHESIA EVENT (OUTPATIENT)
Dept: PERIOP | Facility: HOSPITAL | Age: 30
End: 2025-05-07
Payer: MEDICAID

## 2025-05-07 ENCOUNTER — APPOINTMENT (OUTPATIENT)
Dept: CT IMAGING | Facility: HOSPITAL | Age: 30
End: 2025-05-07
Payer: MEDICAID

## 2025-05-07 ENCOUNTER — APPOINTMENT (OUTPATIENT)
Dept: GENERAL RADIOLOGY | Facility: HOSPITAL | Age: 30
End: 2025-05-07
Payer: MEDICAID

## 2025-05-07 ENCOUNTER — ANESTHESIA (OUTPATIENT)
Dept: PERIOP | Facility: HOSPITAL | Age: 30
End: 2025-05-07
Payer: MEDICAID

## 2025-05-07 VITALS
DIASTOLIC BLOOD PRESSURE: 61 MMHG | SYSTOLIC BLOOD PRESSURE: 106 MMHG | BODY MASS INDEX: 33.4 KG/M2 | TEMPERATURE: 98.3 F | WEIGHT: 207.8 LBS | OXYGEN SATURATION: 96 % | RESPIRATION RATE: 16 BRPM | HEIGHT: 66 IN | HEART RATE: 74 BPM

## 2025-05-07 PROBLEM — T18.2XXA FOREIGN BODY IN STOMACH: Status: ACTIVE | Noted: 2025-05-07

## 2025-05-07 PROCEDURE — 43247 EGD REMOVE FOREIGN BODY: CPT | Performed by: INTERNAL MEDICINE

## 2025-05-07 PROCEDURE — 99253 IP/OBS CNSLTJ NEW/EST LOW 45: CPT | Performed by: INTERNAL MEDICINE

## 2025-05-07 PROCEDURE — G0378 HOSPITAL OBSERVATION PER HR: HCPCS

## 2025-05-07 PROCEDURE — 25810000003 LACTATED RINGERS PER 1000 ML: Performed by: NURSE ANESTHETIST, CERTIFIED REGISTERED

## 2025-05-07 PROCEDURE — 25010000002 PROPOFOL 10 MG/ML EMULSION: Performed by: NURSE ANESTHETIST, CERTIFIED REGISTERED

## 2025-05-07 PROCEDURE — 25010000002 LIDOCAINE PF 2% 2 % SOLUTION: Performed by: NURSE ANESTHETIST, CERTIFIED REGISTERED

## 2025-05-07 PROCEDURE — 74018 RADEX ABDOMEN 1 VIEW: CPT

## 2025-05-07 PROCEDURE — 74176 CT ABD & PELVIS W/O CONTRAST: CPT

## 2025-05-07 RX ORDER — NALOXONE HCL 0.4 MG/ML
0.04 VIAL (ML) INJECTION AS NEEDED
Status: DISCONTINUED | OUTPATIENT
Start: 2025-05-07 | End: 2025-05-07 | Stop reason: HOSPADM

## 2025-05-07 RX ORDER — SODIUM CHLORIDE 9 MG/ML
40 INJECTION, SOLUTION INTRAVENOUS AS NEEDED
Status: DISCONTINUED | OUTPATIENT
Start: 2025-05-07 | End: 2025-05-07 | Stop reason: HOSPADM

## 2025-05-07 RX ORDER — BISACODYL 5 MG/1
5 TABLET, DELAYED RELEASE ORAL DAILY PRN
Status: DISCONTINUED | OUTPATIENT
Start: 2025-05-07 | End: 2025-05-07 | Stop reason: HOSPADM

## 2025-05-07 RX ORDER — QUETIAPINE FUMARATE 200 MG/1
200 TABLET, FILM COATED ORAL NIGHTLY
Status: ON HOLD | COMMUNITY

## 2025-05-07 RX ORDER — SODIUM CHLORIDE, SODIUM LACTATE, POTASSIUM CHLORIDE, CALCIUM CHLORIDE 600; 310; 30; 20 MG/100ML; MG/100ML; MG/100ML; MG/100ML
INJECTION, SOLUTION INTRAVENOUS CONTINUOUS PRN
Status: DISCONTINUED | OUTPATIENT
Start: 2025-05-07 | End: 2025-05-07 | Stop reason: SURG

## 2025-05-07 RX ORDER — MIDAZOLAM 5 MG/.1ML
5 SPRAY NASAL DAILY PRN
Status: ON HOLD | COMMUNITY

## 2025-05-07 RX ORDER — SODIUM CHLORIDE 0.9 % (FLUSH) 0.9 %
10 SYRINGE (ML) INJECTION EVERY 12 HOURS SCHEDULED
Status: DISCONTINUED | OUTPATIENT
Start: 2025-05-07 | End: 2025-05-07 | Stop reason: HOSPADM

## 2025-05-07 RX ORDER — LABETALOL HYDROCHLORIDE 5 MG/ML
5 INJECTION, SOLUTION INTRAVENOUS
Status: DISCONTINUED | OUTPATIENT
Start: 2025-05-07 | End: 2025-05-07 | Stop reason: HOSPADM

## 2025-05-07 RX ORDER — LAMOTRIGINE 150 MG/1
300 TABLET ORAL 2 TIMES DAILY
Status: ON HOLD | COMMUNITY

## 2025-05-07 RX ORDER — SODIUM CHLORIDE 0.9 % (FLUSH) 0.9 %
10 SYRINGE (ML) INJECTION AS NEEDED
Status: DISCONTINUED | OUTPATIENT
Start: 2025-05-07 | End: 2025-05-07 | Stop reason: HOSPADM

## 2025-05-07 RX ORDER — FLUMAZENIL 0.1 MG/ML
0.2 INJECTION INTRAVENOUS AS NEEDED
Status: DISCONTINUED | OUTPATIENT
Start: 2025-05-07 | End: 2025-05-07 | Stop reason: HOSPADM

## 2025-05-07 RX ORDER — HYDROMORPHONE HYDROCHLORIDE 1 MG/ML
0.5 INJECTION, SOLUTION INTRAMUSCULAR; INTRAVENOUS; SUBCUTANEOUS
Status: DISCONTINUED | OUTPATIENT
Start: 2025-05-07 | End: 2025-05-07 | Stop reason: HOSPADM

## 2025-05-07 RX ORDER — ONDANSETRON 2 MG/ML
4 INJECTION INTRAMUSCULAR; INTRAVENOUS
Status: DISCONTINUED | OUTPATIENT
Start: 2025-05-07 | End: 2025-05-07 | Stop reason: HOSPADM

## 2025-05-07 RX ORDER — BISACODYL 10 MG
10 SUPPOSITORY, RECTAL RECTAL DAILY PRN
Status: DISCONTINUED | OUTPATIENT
Start: 2025-05-07 | End: 2025-05-07 | Stop reason: HOSPADM

## 2025-05-07 RX ORDER — SODIUM CHLORIDE 9 MG/ML
50 INJECTION, SOLUTION INTRAVENOUS CONTINUOUS
Status: DISCONTINUED | OUTPATIENT
Start: 2025-05-07 | End: 2025-05-07

## 2025-05-07 RX ORDER — OXYCODONE AND ACETAMINOPHEN 10; 325 MG/1; MG/1
1 TABLET ORAL EVERY 4 HOURS PRN
Status: DISCONTINUED | OUTPATIENT
Start: 2025-05-07 | End: 2025-05-07 | Stop reason: HOSPADM

## 2025-05-07 RX ORDER — LIDOCAINE HYDROCHLORIDE 20 MG/ML
INJECTION, SOLUTION EPIDURAL; INFILTRATION; INTRACAUDAL; PERINEURAL AS NEEDED
Status: DISCONTINUED | OUTPATIENT
Start: 2025-05-07 | End: 2025-05-07 | Stop reason: SURG

## 2025-05-07 RX ORDER — PROPOFOL 10 MG/ML
VIAL (ML) INTRAVENOUS AS NEEDED
Status: DISCONTINUED | OUTPATIENT
Start: 2025-05-07 | End: 2025-05-07 | Stop reason: SURG

## 2025-05-07 RX ORDER — DOXAZOSIN 2 MG/1
2 TABLET ORAL NIGHTLY
Status: ON HOLD | COMMUNITY

## 2025-05-07 RX ORDER — QUETIAPINE FUMARATE 100 MG/1
100 TABLET, FILM COATED ORAL EVERY MORNING
Status: ON HOLD | COMMUNITY

## 2025-05-07 RX ORDER — AMOXICILLIN 250 MG
2 CAPSULE ORAL 2 TIMES DAILY PRN
Status: DISCONTINUED | OUTPATIENT
Start: 2025-05-07 | End: 2025-05-07 | Stop reason: HOSPADM

## 2025-05-07 RX ORDER — POLYETHYLENE GLYCOL 3350 17 G/17G
17 POWDER, FOR SOLUTION ORAL DAILY PRN
Status: DISCONTINUED | OUTPATIENT
Start: 2025-05-07 | End: 2025-05-07 | Stop reason: HOSPADM

## 2025-05-07 RX ORDER — ROCURONIUM BROMIDE 10 MG/ML
INJECTION, SOLUTION INTRAVENOUS AS NEEDED
Status: DISCONTINUED | OUTPATIENT
Start: 2025-05-07 | End: 2025-05-07 | Stop reason: SURG

## 2025-05-07 RX ORDER — FENTANYL CITRATE 50 UG/ML
50 INJECTION, SOLUTION INTRAMUSCULAR; INTRAVENOUS
Status: DISCONTINUED | OUTPATIENT
Start: 2025-05-07 | End: 2025-05-07 | Stop reason: HOSPADM

## 2025-05-07 RX ORDER — IBUPROFEN 600 MG/1
600 TABLET, FILM COATED ORAL EVERY 6 HOURS PRN
Status: DISCONTINUED | OUTPATIENT
Start: 2025-05-07 | End: 2025-05-07 | Stop reason: HOSPADM

## 2025-05-07 RX ADMIN — SODIUM CHLORIDE, POTASSIUM CHLORIDE, SODIUM LACTATE AND CALCIUM CHLORIDE: 600; 310; 30; 20 INJECTION, SOLUTION INTRAVENOUS at 06:17

## 2025-05-07 RX ADMIN — Medication 10 ML: at 08:53

## 2025-05-07 RX ADMIN — LIDOCAINE HYDROCHLORIDE 60 MG: 20 INJECTION, SOLUTION EPIDURAL; INFILTRATION; INTRACAUDAL; PERINEURAL at 06:17

## 2025-05-07 RX ADMIN — PROPOFOL 150 MG: 10 INJECTION, EMULSION INTRAVENOUS at 06:17

## 2025-05-07 RX ADMIN — ROCURONIUM BROMIDE 40 MG: 10 INJECTION, SOLUTION INTRAVENOUS at 06:17

## 2025-05-07 NOTE — PROGRESS NOTES
Gastroenterology Note:  EGD for foreign body removal. There were 8 small magnets stuck together in the stomach. Successfully removed with navarro net.  Patient tolerated procedure well. Ok to discharge home from GI standpoint. Nothing further to add from GI at this time.

## 2025-05-07 NOTE — H&P
[facility]  HISTORY AND PHYSICAL    Date of Admission: 2025  Primary Care Physician: Rosie Olvera MD    Subjective   Primary Historian: Patient    Chief Complaint: swallowed magnets    History of Present Illness  21-year-old female with past medical history of pseudoseizures, autism and PICA, presents to the hospital after having complaint of ingesting several magnets.  CT scan abdomen pelvis showed a 1.4 cm disc shaped object in the stomach.  GI consulted and recommended for admission and EGD retrieval in a.m.    Review of Systems   Otherwise complete ROS reviewed and negative except as mentioned in the HPI.    Past Medical History:   Past Medical History:   Diagnosis Date    Anxiety     Depression     History of  2017    History of congenital abnormality 2017    Late prenatal care affecting pregnancy in second trimester 2017    Mental deficiency 2017    Previous  delivery affecting pregnancy 2017    Psychogenic nonepileptic seizure      Past Surgical History:  Past Surgical History:   Procedure Laterality Date     SECTION      ENDOSCOPY N/A 2024    Procedure: ESOPHAGOGASTRODUODENOSCOPY WITH ANESTHESIA;  Surgeon: Tian Morris MD;  Location: [facility];  Service: Gastroenterology;  Laterality: N/A;  pre foreign body in stomach  post foreign body passed out of stomach  dr kaiden houston    ENDOSCOPY N/A 2024    Procedure: ESOPHAGOGASTRODUODENOSCOPY WITH ANESTHESIA;  Surgeon: Herman Murry MD;  Location: [facility] OR;  Service: Gastroenterology;  Laterality: N/A;  pre foreign body  post no foreign body     Social History:  reports that she has an unknown smoking status. She has never used smokeless tobacco. Alcohol use questions deferred to the physician. Drug use questions deferred to the physician.    Family History: family history is not on file.       Allergies:  No  "Known Allergies    Medications:  Prior to Admission medications    Medication Sig Start Date End Date Taking? Authorizing Provider   acetaminophen (TYLENOL) 500 MG tablet Take 1 tablet by mouth Daily As Needed for Mild Pain.    Rayray Jean MD   diazePAM (VALTOCO 15 MG DOSE NA) 15 mg into the nostril(s) as directed by provider As Needed (seizure lasting longer than 5 minutes).    Rayray Jean MD   doxazosin (CARDURA) 1 MG tablet Take 1 tablet by mouth 2 (Two) Times a Day.    Rayray Jean MD   hydrOXYzine (ATARAX) 25 MG tablet Take 1 tablet by mouth 3 (Three) Times a Day As Needed for Anxiety.    Rayray Jean MD   lacosamide (VIMPAT) 100 MG tablet tablet Take 1 tablet by mouth Every 12 (Twelve) Hours. 8/2/24   Tucker Washington DO   lamoTRIgine (LaMICtal) 100 MG tablet Take 2.5 tablets by mouth Daily.    Rayray Jean MD   montelukast (SINGULAIR) 10 MG tablet Take 1 tablet by mouth Every Night.    Rayray Jean MD   pantoprazole (PROTONIX) 40 MG EC tablet Take 1 tablet by mouth Daily.    Rayray Jean MD   QUEtiapine (SEROquel) 25 MG tablet Take 1 tablet by mouth 2 (Two) Times a Day.    Rayray Jean MD   senna 8.6 MG tablet Take 2 tablets by mouth Daily As Needed for Constipation.    Rayray Jean MD   venlafaxine XR (EFFEXOR-XR) 37.5 MG 24 hr capsule Take 1 capsule by mouth Daily.    Rayray Jean MD     I have utilized all available immediate resources to obtain, update, or review the patient's current medications (including all prescriptions, over-the-counter products, herbals, cannabis/cannabidiol products, and vitamin/mineral/dietary (nutritional) supplements).    Objective     Vital Signs: /74 (BP Location: Left arm, Patient Position: Sitting)   Pulse 100   Temp 98.4 °F (36.9 °C) (Oral)   Resp 20   Ht 167.6 cm (65.98\")   Wt 94.3 kg (207 lb 12.8 oz)   LMP  (LMP Unknown)   SpO2 99%   BMI 33.56 kg/m²   Physical " Exam  Constitutional:       Appearance: Normal appearance.   HENT:      Head: Normocephalic and atraumatic.      Nose: Nose normal.      Mouth/Throat:      Mouth: Mucous membranes are moist. Mucous membranes are dry.   Eyes:      Extraocular Movements: Extraocular movements intact.      Conjunctiva/sclera: Conjunctivae normal.      Pupils: Pupils are equal, round, and reactive to light.   Cardiovascular:      Rate and Rhythm: Normal rate and regular rhythm.   Pulmonary:      Effort: Pulmonary effort is normal.      Breath sounds: Normal breath sounds. No wheezing or rales.   Abdominal:      General: Abdomen is flat.      Palpations: Abdomen is soft.      Tenderness: There is no abdominal tenderness. There is no guarding.   Musculoskeletal:         General: Normal range of motion.      Cervical back: Normal range of motion and neck supple.   Skin:     General: Skin is warm and dry.      Capillary Refill: Capillary refill takes less than 2 seconds.   Neurological:      Mental Status: She is alert. Mental status is at baseline.     Results Reviewed:  Lab Results (last 24 hours)       ** No results found for the last 24 hours. **          Imaging Results (Last 24 Hours)       Procedure Component Value Units Date/Time    CT Abdomen Pelvis Without Contrast [[id number]] Resulted: 05/07/25 0054     Updated: 05/07/25 0100    XR Abdomen KUB [[id number]] Resulted: 05/07/25 0006     Updated: 05/07/25 0011          I have personally reviewed and interpreted the radiology studies and ECG obtained at time of admission.     Assessment / Plan   Assessment:   Active Hospital Problems    Diagnosis     **Foreign body in stomach      Treatment Plan  The patient will be admitted to my service here at [facility].   Observe medical floor  Vitals every 4 hours  Diet n.p.o.  Activity as tolerated  IV fluids KVO    GI consult in a.m. for EGD    Outpatient psychiatric evaluation ongoing    DVT prophylaxis SCDs    The patient was seen  and examined by me on 5/07/2025 at 0322.    Electronically signed by Eriberto Trejo MD, 05/07/25, 03:22 CDT.

## 2025-05-07 NOTE — ANESTHESIA POSTPROCEDURE EVALUATION
"Patient: Duane Cooley    Procedure Summary       Date: 05/07/25 Room / Location:  [facility] OR 03 /  [facility]    Anesthesia Start: 0612 Anesthesia Stop: 0633    Procedure: ESOPHAGOGASTRODUODENOSCOPY Diagnosis:       Foreign body in stomach, initial encounter      (Foreign body in stomach, initial encounter [T18.2XXA])    Surgeons: Brennen White MD Provider: Irvin Frank CRNA    Anesthesia Type: general ASA Status: 3 - Emergent            Anesthesia Type: general    Vitals  Vitals Value Taken Time   /67 05/07/25 06:48   Temp 98.3 °F (36.8 °C) 05/07/25 06:48   Pulse 89 05/07/25 06:48   Resp 16 05/07/25 06:48   SpO2 97 % 05/07/25 06:48           Post Anesthesia Care and Evaluation    Patient location during evaluation: PACU  Patient participation: complete - patient participated  Level of consciousness: awake and alert  Pain management: adequate    Airway patency: patent  Anesthetic complications: No anesthetic complications    Cardiovascular status: acceptable  Respiratory status: acceptable  Hydration status: acceptable    Comments: Blood pressure 109/67, pulse 89, temperature 98.3 °F (36.8 °C), temperature source Oral, resp. rate 16, height 167.6 cm (65.98\"), weight 94.3 kg (207 lb 12.8 oz), SpO2 97%, not currently breastfeeding.    Pt discharged from PACU based on pablo score >8    "

## 2025-05-07 NOTE — PLAN OF CARE
Goal Outcome Evaluation:               Patient having Egd this am. NPO since midnight. Family aware of procedure. AAOx4, VSS, call light within reach. Safety maintained. Family at bedside.

## 2025-05-07 NOTE — ANESTHESIA PREPROCEDURE EVALUATION
" Anesthesia Evaluation     Patient summary reviewed   no history of anesthetic complications:   NPO Solid Status: Waived due to emergency  NPO Liquid Status: Waived due to emergency           Airway   Mallampati: II  TM distance: >3 FB  Neck ROM: full  No difficulty expected  Dental - normal exam     Pulmonary    (+) asthma,  Cardiovascular - negative cardio ROS        Neuro/Psych  (+) seizures (epilepsy and FND), psychiatric history Anxiety and Depression    ROS Comment: Autism   GI/Hepatic/Renal/Endo    (+) obesity    Musculoskeletal (-) negative ROS    Abdominal    Substance History      OB/GYN    (+) Pregnant        Other        ROS/Med Hx Other: Foreign body                    Anesthesia Plan    ASA 3 - emergent     general   Rapid sequence  (Patient swallowed \"batteries\"  Patient autistic and nonverbal communicates with phone)    Anesthetic plan, risks, benefits, and alternatives have been provided, discussed and informed consent has been obtained with: mother and patient.        CODE STATUS:    Code Status (Patient has no pulse and is not breathing): CPR (Attempt to Resuscitate)  Medical Interventions (Patient has pulse or is breathing): Full Support        "

## 2025-05-07 NOTE — ED PROVIDER NOTES
Subjective   History of Present Illness  Pt presents to the ED with report that she swallowed several magnets around lunchtime today - 5-6 per friend.  Pt has hx of autism d/o and friend provides hx.  Pt has had no vomiting.  Has c/o some abdominal pain tonight.  + hx of the same.  They aren't sure what type of magnets these were - friend says they were button sized but unsure beyond that.  No cough/SOB.        Review of Systems   Constitutional:  Negative for fever.   Respiratory:  Negative for cough and shortness of breath.    Cardiovascular:  Negative for chest pain.   Gastrointestinal:  Positive for abdominal pain. Negative for nausea and vomiting.   All other systems reviewed and are negative.      Past Medical History:   Diagnosis Date    Anxiety     Depression     History of  2017    History of congenital abnormality 2017    Late prenatal care affecting pregnancy in second trimester 2017    Mental deficiency 2017    Previous  delivery affecting pregnancy 2017    Psychogenic nonepileptic seizure        No Known Allergies    Past Surgical History:   Procedure Laterality Date     SECTION      ENDOSCOPY N/A 2024    Procedure: ESOPHAGOGASTRODUODENOSCOPY WITH ANESTHESIA;  Surgeon: Tian Morris MD;  Location: [facility] OR;  Service: Gastroenterology;  Laterality: N/A;  pre foreign body in stomach  post foreign body passed out of stomach  dr kaiden houston    ENDOSCOPY N/A 2024    Procedure: ESOPHAGOGASTRODUODENOSCOPY WITH ANESTHESIA;  Surgeon: Herman Murry MD;  Location: [facility] OR;  Service: Gastroenterology;  Laterality: N/A;  pre foreign body  post no foreign body       No family history on file.    Social History     Socioeconomic History    Marital status:    Tobacco Use    Smoking status: Unknown    Smokeless tobacco: Never   Vaping Use    Vaping status: Never Used   Substance and Sexual Activity    Alcohol use: Defer    Drug  use: Defer    Sexual activity: Defer     Partners: Male           Objective   Physical Exam  Vitals and nursing note reviewed.   Constitutional:       General: She is not in acute distress.  HENT:      Head: Normocephalic.   Cardiovascular:      Rate and Rhythm: Normal rate and regular rhythm.      Pulses: Normal pulses.      Heart sounds: Normal heart sounds.   Pulmonary:      Effort: Pulmonary effort is normal.      Breath sounds: Normal breath sounds.   Abdominal:      General: Abdomen is flat.      Palpations: Abdomen is soft.      Tenderness: There is no abdominal tenderness.   Skin:     General: Skin is warm and dry.   Neurological:      Mental Status: She is alert.         Procedures           ED Course      XR Abdomen KUB    (Results Pending)   CT Abdomen Pelvis Without Contrast    (Results Pending)                                                        Medical Decision Making  Pt stable in ED - NAD att.  + ingested fb - magnets per hx.  Details of type cannot be verified.  These are in abdomen per CT.  D/w Dr. White - recommends admit for endoscopy today.  D/w Dr. Trejo - will admit    Amount and/or Complexity of Data Reviewed  Radiology: ordered.    Risk  Prescription drug management.        Final diagnoses:   Foreign body in stomach, initial encounter       ED Disposition  ED Disposition       ED Disposition   Decision to Admit    Condition   --    Comment   --               No follow-up provider specified.       Medication List      No changes were made to your prescriptions during this visit.            Lv Ruiz,   05/07/25 0007       Lv Ruiz,   05/07/25 0211

## 2025-05-07 NOTE — ANESTHESIA PROCEDURE NOTES
Airway  Reason: elective    Date/Time: 5/7/2025 6:17 AM    General Information and Staff    Patient location during procedure: OR  CRNA/CAA: Jose Alfredo Niño CRNA    Indications and Patient Condition  Indications for airway management: airway protection    Preoxygenated: yes  MILS maintained throughout    Mask difficulty assessment: 1 - vent by mask    Final Airway Details    Final airway type: endotracheal airway      Successful airway: ETT  Cuffed: yes   Successful intubation technique: direct laryngoscopy  Endotracheal tube insertion site: oral  Blade: Guallpa  Blade size: 2  ETT size (mm): 7.5  Cormack-Lehane Classification: grade I - full view of glottis  Placement verified by: chest auscultation and capnometry   Cuff volume (mL): 5  Measured from: lips  Number of attempts at approach: 1

## 2025-05-07 NOTE — DISCHARGE SUMMARY
[facility] Medicine Services  DISCHARGE SUMMARY       Date of Admission: 5/6/2025  Date of Discharge:  5/7/2025  Primary Care Physician: Rosie Olvera MD    Presenting Problem/History of Present Illness:  29-year-old female with history of pseudoseizures, autism, came to the hospital after ingesting several magnets.    Final Discharge Diagnoses:  Active Hospital Problems    Diagnosis     **Foreign body in stomach     Autism        Consults: Gastroenterology    Procedures Performed: Upper endoscopy    Pertinent Test Results:       Imaging Results (All)       Procedure Component Value Units Date/Time    CT Abdomen Pelvis Without Contrast [[id number]] Collected: 05/07/25 0543     Updated: 05/07/25 0735    Narrative:      EXAMINATION: CT ABDOMEN PELVIS WO CONTRAST-      5/6/2025 11:54 PM     HISTORY: swallowed foreign body; T18.2XXA-Foreign body in stomach,  initial encounter     In order to have a CT radiation dose as low as reasonably achievable  Automated Exposure Control was utilized for adjustment of the mA and/or  KV according to patient size.     Total DLP = 521.32 mGy.cm     CT scan of the abdomen and pelvis is performed without intravenous  contrast enhancement.     Images are acquired in axial plane and subsequent reconstruction in the  coronal and sagittal planes.     The comparison is made with the previous study dated 8/1/2024.     There are significant motion artifacts which lowers the diagnostic  quality of the study.     The lung bases included in the study show atelectatic change in the  lower lungs. No acute process.     Small nodular opacity in the left breast are similar to the previous  study in 2024. No additional nodules.     Unenhanced liver and spleen appear normal. An isodense abnormality or  lesion may not be evaluated or excluded.     The gallbladder is surgically absent.     Unenhanced pancreas is unremarkable.     The adrenal glands are  normal.     Unenhanced kidneys bilaterally appear unremarkable. An isodense mass or  lesion may not be evaluated or excluded. A punctate  calcification/calculus in the left mid kidney is seen. No calculi in the  right kidney. No hydronephrosis on either side. The ureters as  visualized appear unremarkable. Urinary bladder is decompressed and not  optimally visualized or evaluated.     Normal size uterus is seen. Follicular changes in both ovaries right  more than the left. No single dominant follicle is visualized.     There is small free fluid in the pelvis.     There is small fat-containing umbilical hernia.     The stomach is unremarkable. There is a metallic object in the stomach  with extensive streak artifact. Duodenum is normal. Small bowel is  nondilated. A retrocecal appendix is normal. Small amount of stool and  moderate gas in the colon is seen.     Normal abdominal aorta and iliac arteries.     No evidence of abdominal or pelvic lymphadenopathy.     Images reviewed in bone mineral show no acute bony abnormality.       Impression:      1. A metallic foreign body in the stomach. The size of the metallic  foreign body could not be optimally measured due to extensive streak  artifacts. The measurements from the radiograph of the abdomen is 15 mm  x 6 mm.  2. The remaining abdominal pelvis is unremarkable. No acute abnormality.     The above study was initially reviewed and reported by [employer]. I do not  find any discrepancies.                                                  This report was signed and finalized on 5/7/2025 7:32 AM by Dr. Medina Braun MD.       XR Abdomen KUB [[id number]] Collected: 05/07/25 0643     Updated: 05/07/25 0649    Narrative:      EXAMINATION: XR ABDOMEN KUB-     5/6/2025 11:05 PM     HISTORY: reported swallowed foreign body; T18.2XXA-Foreign body in  stomach, initial encounter     A frontal projection of the abdomen is compared with the previous study  dated 8/24/2024.      There is a rectangular metallic object projected in the left upper  abdomen in the area of stomach. It measures 16 mm x 6 mm.     The previously seen rectangular object seen in the abdomen or not  visualized on the present study.     There is moderate gas and stool in the colon. No evidence of dilatation  of small or large bowel loops.     Both kidneys are obscured by the bowel contents. No radiopaque calculi.     There is evidence of prior cholecystectomy.     No acute bony abnormality.       Impression:      1. A metallic foreign body in the stomach.  2. The abdominal gas pattern is unremarkable. No evidence of obstruction  or ileus.        This report was signed and finalized on 5/7/2025 6:46 AM by Dr. Medina Braun MD.             LAB RESULTS:                              Brief Urine Lab Results  (Last result in the past 365 days)        Color   Clarity   Blood   Leuk Est   Nitrite   Protein   CREAT   Urine HCG        07/09/24 0141               Negative       07/09/24 0141 Yellow   Clear   Negative   Moderate (2+)   Negative   Negative                 Microbiology Results (last 10 days)       ** No results found for the last 240 hours. **            Hospital Course: The patient was admitted in the early morning of May 7, 2025.  Evaluated by night hospitalist.  CT scan of the abdomen and pelvis showed Metallic foreign bodies in the stomach.  Had evaluation by gastroenterology, she had upper endoscopy earlier today, with the following report: Several magnets were found in the gastric body. There were 8 magnets stuck together. Removal was accomplished with a Sorto net. No other foreign bodies were seen in the stomach. The first portion of the duodenum and second portion of the duodenum were normal; the patient was transferred to the regular floor.  She was able to tolerate diet.  Given adequate progress patient was discharged home with family.      Physical Exam on Discharge:  /61 (BP Location: Left  "arm, Patient Position: Lying)   Pulse 74   Temp 98.3 °F (36.8 °C) (Oral)   Resp 16   Ht 167.6 cm (65.98\")   Wt 94.3 kg (207 lb 12.8 oz)   LMP  (LMP Unknown)   SpO2 96%   BMI 33.56 kg/m²   Physical Exam  Constitutional:       Appearance: Alert.  Unable to assess orientation.  HENT:      Head: Normocephalic and atraumatic.      Nose: Nose normal.   Eyes:      Extraocular Movements: Extraocular movements intact.   Cardiovascular:      Rate and Rhythm: Normal rate and regular rhythm.   Pulmonary:      Effort: No respiratory distress.      Breath sounds: Normal breath sounds  Abdominal:      General: Abdomen is flat. Bowel sounds are normal.      Palpations: Abdomen is soft.    Extremities:  No lower extremity edema.      Condition on Discharge: Stable    Discharge Disposition:  Home or Self Care    Discharge Medications:     Discharge Medications        Continue These Medications        Instructions Start Date   acetaminophen 500 MG tablet  Commonly known as: TYLENOL   500 mg, Oral, Daily PRN      doxazosin 1 MG tablet  Commonly known as: CARDURA   1 mg, Daily      doxazosin 2 MG tablet  Commonly known as: CARDURA   2 mg, Oral, Daily, Take with 1mg for a total of 3mg daily      hydrOXYzine 25 MG tablet  Commonly known as: ATARAX   25 mg, Oral, 3 Times Daily PRN      LaMICtal 150 MG tablet  Generic drug: lamoTRIgine   300 mg, Oral, 2 Times Daily      Nayzilam 5 MG/0.1ML solution  Generic drug: Midazolam   5 mg, Nasal, Daily PRN      QUEtiapine 25 MG tablet  Commonly known as: SEROquel   25 mg, Oral, Daily PRN      QUEtiapine 100 MG tablet  Commonly known as: SEROquel   100 mg, Oral, Every Morning      QUEtiapine 200 MG tablet  Commonly known as: SEROquel   200 mg, Oral, Nightly      senna 8.6 MG tablet  Commonly known as: SENOKOT   2 tablets, Oral, Daily PRN             Stop These Medications      lacosamide 100 MG tablet tablet  Commonly known as: VIMPAT     montelukast 10 MG tablet  Commonly known as: " SINGULAIR     pantoprazole 40 MG EC tablet  Commonly known as: PROTONIX     VALTOCO 15 MG DOSE NA     venlafaxine XR 37.5 MG 24 hr capsule  Commonly known as: EFFEXOR-XR              Discharge Diet:   Diet Instructions       Diet: Regular/House Diet; Regular (IDDSI 7); Thin (IDDSI 0)      Discharge Diet: Regular/House Diet    Texture: Regular (IDDSI 7)    Fluid Consistency: Thin (IDDSI 0)            Activity at Discharge: Resume prior activity    Follow-up Appointments:   No future appointments.    Test Results Pending at Discharge: no    Electronically signed by Marshall Sharpe MD, 05/07/25, 10:09 CDT.    Time: 30 minutes.

## 2025-05-07 NOTE — CONSULTS
[facility] Gastroenterology  Inpatient Consult Note  Today's date:  25    Duane Cooley  1995       Referring Provider: No ref. provider found  Primary Physician: Rosie Olvera MD     Date of Admission: 2025  Date of Service:  25    Reason for Consultation/Chief Complaint:   Foreign body ingestion    History of present illness:    Duane is a 29 y/ female that presented to the emergency room for foreign body ingestion. Patient reports swallowing several magnets around lunchtime. Exact type ot magent is unclear, but they report that they were disc shaped and button sized magnets. Patient has history of foreign body ingestion in the past. She had KUB and CT scan in the emergency room. Per CT report, foreign body is seen in the stomach.     Past Medical History:   Diagnosis Date    Anxiety     Depression     History of  2017    History of congenital abnormality 2017    Late prenatal care affecting pregnancy in second trimester 2017    Mental deficiency 2017    Previous  delivery affecting pregnancy 2017    Psychogenic nonepileptic seizure          Past Surgical History:   Procedure Laterality Date     SECTION      ENDOSCOPY N/A 2024    Procedure: ESOPHAGOGASTRODUODENOSCOPY WITH ANESTHESIA;  Surgeon: Tian Morris MD;  Location: [facility] OR;  Service: Gastroenterology;  Laterality: N/A;  pre foreign body in stomach  post foreign body passed out of stomach  dr kaiden [place]    ENDOSCOPY N/A 2024    Procedure: ESOPHAGOGASTRODUODENOSCOPY WITH ANESTHESIA;  Surgeon: Herman Murry MD;  Location:  [facility] OR;  Service: Gastroenterology;  Laterality: N/A;  pre foreign body  post no foreign body          No Known Allergies      Social History     Tobacco Use    Smoking status: Unknown    Smokeless tobacco: Never   Substance Use Topics    Alcohol use: Defer          History reviewed. No pertinent family  history.      Medications Prior to Admission   Medication Sig Dispense Refill Last Dose/Taking    acetaminophen (TYLENOL) 500 MG tablet Take 1 tablet by mouth Daily As Needed for Mild Pain.       diazePAM (VALTOCO 15 MG DOSE NA) 15 mg into the nostril(s) as directed by provider As Needed (seizure lasting longer than 5 minutes).       doxazosin (CARDURA) 1 MG tablet Take 1 tablet by mouth 2 (Two) Times a Day.       hydrOXYzine (ATARAX) 25 MG tablet Take 1 tablet by mouth 3 (Three) Times a Day As Needed for Anxiety.       lacosamide (VIMPAT) 100 MG tablet tablet Take 1 tablet by mouth Every 12 (Twelve) Hours. 60 tablet 2     lamoTRIgine (LaMICtal) 100 MG tablet Take 2.5 tablets by mouth Daily.       montelukast (SINGULAIR) 10 MG tablet Take 1 tablet by mouth Every Night.       pantoprazole (PROTONIX) 40 MG EC tablet Take 1 tablet by mouth Daily.       QUEtiapine (SEROquel) 25 MG tablet Take 1 tablet by mouth 2 (Two) Times a Day.       senna 8.6 MG tablet Take 2 tablets by mouth Daily As Needed for Constipation.       venlafaxine XR (EFFEXOR-XR) 37.5 MG 24 hr capsule Take 1 capsule by mouth Daily.                Review of Systems:  Constitutional: No unexpected weight change, no fatigue, no unexplained fever, no sweats or chills.   HEENT: No icteric sclera.  No hearing or visual deficits.  No sore throat.  No chronic nasal discharge.  Pulmonary: No chronic cough.  No hemoptysis.  No shortness of breath.  Cardiovascular: No chest pain.  No palpitations.  No shortness of breath.  Gastrointestinal: As above.  Musculoskeletal/extremities: No peripheral edema.  No cyanosis.  No claudications.  No back pain.  Genitourinary: No dysuria.  No blood in stool.  No urethral discharges.  Neurologic: No seizures.  No headaches.  No dizziness.  No gait problems.  Skin: No rash.  No icterus.  Mental: No psychosis.  No confusions.  No hallucinations.      Physical Exam:  Temp:  [98.4 °F (36.9 °C)-99.1 °F (37.3 °C)] 98.4 °F (36.9  °C)  Heart Rate:  [] 100  Resp:  [18-20] 20  BP: (118-126)/(66-86) 120/74    General:   HEENT: Nonicteric sclerae.  Moist oral mucosa.  PERRLA.  EOMI.  Clear pharynx.  Lungs: Clear to auscultation bilaterally.  No wheezing, rales or rhonchi.  Heart: Regular rate and rhythm.  Normal S1 and S2, no S3, S4 or murmur.  Abdomen: Soft, nondistended, nontender to palpation, with normoactive bowel sounds, no hepatosplenomegaly, no palpable masses.  Extremities: No cyanosis, edema or pulse deficits.  Skin: No rash or jaundice.      Results Review:  Lab Results (last 24 hours)       ** No results found for the last 24 hours. **              Radiology Review:  Imaging Results (Last 72 Hours)       Procedure Component Value Units Date/Time    CT Abdomen Pelvis Without Contrast [[id number]] Resulted: 05/07/25 0054     Updated: 05/07/25 0100    XR Abdomen KUB [[id number]] Resulted: 05/07/25 0006     Updated: 05/07/25 0011            Impression:  -Foreign body ingestion with suspected several magnets in the stomach.    Plan:  -Will plan for EGD for attempted removal of foreign body.       Brennen White MD  05/07/25   04:54 CDT

## 2025-05-18 ENCOUNTER — APPOINTMENT (OUTPATIENT)
Dept: GENERAL RADIOLOGY | Facility: HOSPITAL | Age: 30
End: 2025-05-18
Payer: MEDICAID

## 2025-05-18 ENCOUNTER — HOSPITAL ENCOUNTER (INPATIENT)
Facility: HOSPITAL | Age: 30
LOS: 1 days | Discharge: HOME | End: 2025-05-20
Attending: STUDENT IN AN ORGANIZED HEALTH CARE EDUCATION/TRAINING PROGRAM | Admitting: INTERNAL MEDICINE
Payer: MEDICAID

## 2025-05-18 DIAGNOSIS — G40.901 STATUS EPILEPTICUS: Primary | ICD-10-CM

## 2025-05-18 PROCEDURE — 25010000002 LORAZEPAM PER 2 MG: Performed by: STUDENT IN AN ORGANIZED HEALTH CARE EDUCATION/TRAINING PROGRAM

## 2025-05-18 PROCEDURE — 5A1935Z RESPIRATORY VENTILATION, LESS THAN 24 CONSECUTIVE HOURS: ICD-10-PCS | Performed by: STUDENT IN AN ORGANIZED HEALTH CARE EDUCATION/TRAINING PROGRAM

## 2025-05-18 PROCEDURE — P9612 CATHETERIZE FOR URINE SPEC: HCPCS

## 2025-05-18 PROCEDURE — 0BH17EZ INSERTION OF ENDOTRACHEAL AIRWAY INTO TRACHEA, VIA NATURAL OR ARTIFICIAL OPENING: ICD-10-PCS | Performed by: STUDENT IN AN ORGANIZED HEALTH CARE EDUCATION/TRAINING PROGRAM

## 2025-05-18 PROCEDURE — 99285 EMERGENCY DEPT VISIT HI MDM: CPT | Performed by: STUDENT IN AN ORGANIZED HEALTH CARE EDUCATION/TRAINING PROGRAM

## 2025-05-18 PROCEDURE — 31500 INSERT EMERGENCY AIRWAY: CPT

## 2025-05-18 RX ORDER — LORAZEPAM 2 MG/ML
2 INJECTION INTRAMUSCULAR ONCE
Status: COMPLETED | OUTPATIENT
Start: 2025-05-18 | End: 2025-05-19

## 2025-05-18 RX ORDER — LORAZEPAM 2 MG/ML
1 INJECTION INTRAMUSCULAR ONCE
Status: COMPLETED | OUTPATIENT
Start: 2025-05-19 | End: 2025-05-18

## 2025-05-18 RX ADMIN — LORAZEPAM 1 MG: 2 INJECTION INTRAMUSCULAR; INTRAVENOUS at 23:49

## 2025-05-19 ENCOUNTER — APPOINTMENT (OUTPATIENT)
Dept: GENERAL RADIOLOGY | Facility: HOSPITAL | Age: 30
End: 2025-05-19
Payer: MEDICAID

## 2025-05-19 ENCOUNTER — APPOINTMENT (OUTPATIENT)
Dept: CT IMAGING | Facility: HOSPITAL | Age: 30
End: 2025-05-19
Payer: MEDICAID

## 2025-05-19 PROBLEM — G40.901 STATUS EPILEPTICUS: Status: ACTIVE | Noted: 2025-05-19

## 2025-05-19 LAB
ALBUMIN SERPL-MCNC: 3.7 G/DL (ref 3.5–5.2)
ALBUMIN SERPL-MCNC: 4.5 G/DL (ref 3.5–5.2)
ALBUMIN/GLOB SERPL: 1.5 G/DL
ALBUMIN/GLOB SERPL: 1.6 G/DL
ALP SERPL-CCNC: 77 U/L (ref 39–117)
ALP SERPL-CCNC: 87 U/L (ref 39–117)
ALT SERPL W P-5'-P-CCNC: 12 U/L (ref 1–33)
ALT SERPL W P-5'-P-CCNC: 15 U/L (ref 1–33)
ANION GAP SERPL CALCULATED.3IONS-SCNC: 13 MMOL/L (ref 5–15)
ANION GAP SERPL CALCULATED.3IONS-SCNC: 15 MMOL/L (ref 5–15)
ANISOCYTOSIS BLD QL: ABNORMAL
ARTERIAL PATENCY WRIST A: POSITIVE
AST SERPL-CCNC: 17 U/L (ref 1–32)
AST SERPL-CCNC: 18 U/L (ref 1–32)
ATMOSPHERIC PRESS: 750 MMHG
BASE EXCESS BLDA CALC-SCNC: 0.6 MMOL/L (ref 0–2)
BASOPHILS # BLD AUTO: 0.06 10*3/MM3 (ref 0–0.2)
BASOPHILS # BLD MANUAL: 0 10*3/MM3 (ref 0–0.2)
BASOPHILS NFR BLD AUTO: 0.6 % (ref 0–1.5)
BASOPHILS NFR BLD MANUAL: 0 % (ref 0–1.5)
BDY SITE: ABNORMAL
BILIRUB SERPL-MCNC: 0.2 MG/DL (ref 0–1.2)
BILIRUB SERPL-MCNC: <0.2 MG/DL (ref 0–1.2)
BILIRUB UR QL STRIP: NEGATIVE
BODY TEMPERATURE: 37
BUN SERPL-MCNC: 6 MG/DL (ref 6–20)
BUN SERPL-MCNC: 6 MG/DL (ref 6–20)
BUN/CREAT SERPL: 8.1 (ref 7–25)
BUN/CREAT SERPL: 9.4 (ref 7–25)
CALCIUM SPEC-SCNC: 8.6 MG/DL (ref 8.6–10.5)
CALCIUM SPEC-SCNC: 9.6 MG/DL (ref 8.6–10.5)
CHLORIDE SERPL-SCNC: 102 MMOL/L (ref 98–107)
CHLORIDE SERPL-SCNC: 104 MMOL/L (ref 98–107)
CK SERPL-CCNC: 56 U/L (ref 20–180)
CLARITY UR: CLEAR
CO2 SERPL-SCNC: 21 MMOL/L (ref 22–29)
CO2 SERPL-SCNC: 22 MMOL/L (ref 22–29)
COHGB MFR BLD: 1.4 % (ref 0–5)
COLOR UR: YELLOW
CREAT SERPL-MCNC: 0.64 MG/DL (ref 0.57–1)
CREAT SERPL-MCNC: 0.74 MG/DL (ref 0.57–1)
D-LACTATE SERPL-SCNC: 1.6 MMOL/L (ref 0.5–2)
D-LACTATE SERPL-SCNC: 3.2 MMOL/L (ref 0.5–2)
DEPRECATED RDW RBC AUTO: ABNORMAL FL
EGFRCR SERPLBLD CKD-EPI 2021: 113.2 ML/MIN/1.73
EGFRCR SERPLBLD CKD-EPI 2021: 123.6 ML/MIN/1.73
EOSINOPHIL # BLD AUTO: 0.02 10*3/MM3 (ref 0–0.4)
EOSINOPHIL # BLD MANUAL: 0 10*3/MM3 (ref 0–0.4)
EOSINOPHIL NFR BLD AUTO: 0.2 % (ref 0.3–6.2)
EOSINOPHIL NFR BLD MANUAL: 0 % (ref 0.3–6.2)
ERYTHROCYTE [DISTWIDTH] IN BLOOD BY AUTOMATED COUNT: ABNORMAL %
ERYTHROCYTE [DISTWIDTH] IN BLOOD BY AUTOMATED COUNT: ABNORMAL %
GLOBULIN UR ELPH-MCNC: 2.5 GM/DL
GLOBULIN UR ELPH-MCNC: 2.8 GM/DL
GLUCOSE BLDC GLUCOMTR-MCNC: 97 MG/DL (ref 70–130)
GLUCOSE SERPL-MCNC: 107 MG/DL (ref 65–99)
GLUCOSE SERPL-MCNC: 95 MG/DL (ref 65–99)
GLUCOSE UR STRIP-MCNC: NEGATIVE MG/DL
HCG SERPL QL: NEGATIVE
HCO3 BLDA-SCNC: 25.3 MMOL/L (ref 20–26)
HCT VFR BLD AUTO: 38.2 % (ref 34–46.6)
HCT VFR BLD AUTO: 40 % (ref 34–46.6)
HCT VFR BLD CALC: 38.6 % (ref 38–51)
HGB BLD-MCNC: 11.3 G/DL (ref 12–15.9)
HGB BLD-MCNC: 12.2 G/DL (ref 12–15.9)
HGB BLDA-MCNC: 12.6 G/DL (ref 12–16)
HGB UR QL STRIP.AUTO: NEGATIVE
IMM GRANULOCYTES # BLD AUTO: 0.03 10*3/MM3 (ref 0–0.05)
IMM GRANULOCYTES NFR BLD AUTO: 0.3 % (ref 0–0.5)
INHALED O2 CONCENTRATION: 30 %
KETONES UR QL STRIP: NEGATIVE
LEUKOCYTE ESTERASE UR QL STRIP.AUTO: NEGATIVE
LYMPHOCYTES # BLD AUTO: 2.18 10*3/MM3 (ref 0.7–3.1)
LYMPHOCYTES # BLD MANUAL: 1.57 10*3/MM3 (ref 0.7–3.1)
LYMPHOCYTES NFR BLD AUTO: 22.7 % (ref 19.6–45.3)
LYMPHOCYTES NFR BLD MANUAL: 2.1 % (ref 5–12)
Lab: ABNORMAL
MAGNESIUM SERPL-MCNC: 2 MG/DL (ref 1.6–2.6)
MCH RBC QN AUTO: 23.2 PG (ref 26.6–33)
MCH RBC QN AUTO: 23.4 PG (ref 26.6–33)
MCHC RBC AUTO-ENTMCNC: 29.6 G/DL (ref 31.5–35.7)
MCHC RBC AUTO-ENTMCNC: 30.5 G/DL (ref 31.5–35.7)
MCV RBC AUTO: 76.8 FL (ref 79–97)
MCV RBC AUTO: 78.4 FL (ref 79–97)
METAMYELOCYTES NFR BLD MANUAL: 2.1 % (ref 0–0)
METHGB BLD QL: 0.3 % (ref 0–3)
MICROCYTES BLD QL: ABNORMAL
MODALITY: ABNORMAL
MONOCYTES # BLD AUTO: 0.68 10*3/MM3 (ref 0.1–0.9)
MONOCYTES # BLD: 0.21 10*3/MM3 (ref 0.1–0.9)
MONOCYTES NFR BLD AUTO: 7.1 % (ref 5–12)
NEUTROPHILS # BLD AUTO: 7.97 10*3/MM3 (ref 1.7–7)
NEUTROPHILS NFR BLD AUTO: 6.63 10*3/MM3 (ref 1.7–7)
NEUTROPHILS NFR BLD AUTO: 69.1 % (ref 42.7–76)
NEUTROPHILS NFR BLD MANUAL: 76.8 % (ref 42.7–76)
NEUTS BAND NFR BLD MANUAL: 3.2 % (ref 0–5)
NITRITE UR QL STRIP: NEGATIVE
NRBC BLD AUTO-RTO: 0 /100 WBC (ref 0–0.2)
OXYHGB MFR BLDV: 93.6 % (ref 94–99)
PCO2 BLDA: 40 MM HG (ref 35–45)
PCO2 TEMP ADJ BLD: 40 MM HG (ref 35–45)
PEEP RESPIRATORY: 5 CM[H2O]
PH BLDA: 7.41 PH UNITS (ref 7.35–7.45)
PH UR STRIP.AUTO: 7.5 [PH] (ref 5–8)
PH, TEMP CORRECTED: 7.41 PH UNITS (ref 7.35–7.45)
PHOSPHATE SERPL-MCNC: 4.4 MG/DL (ref 2.5–4.5)
PLAT MORPH BLD: NORMAL
PLATELET # BLD AUTO: 200 10*3/MM3 (ref 140–450)
PLATELET # BLD AUTO: 212 10*3/MM3 (ref 140–450)
PMV BLD AUTO: ABNORMAL FL
PO2 BLDA: 71.9 MM HG (ref 83–108)
PO2 TEMP ADJ BLD: 71.9 MM HG (ref 83–108)
POIKILOCYTOSIS BLD QL SMEAR: ABNORMAL
POTASSIUM BLDA-SCNC: 3.9 MMOL/L (ref 3.5–5.2)
POTASSIUM SERPL-SCNC: 3.9 MMOL/L (ref 3.5–5.2)
POTASSIUM SERPL-SCNC: 4 MMOL/L (ref 3.5–5.2)
PROT SERPL-MCNC: 6.2 G/DL (ref 6–8.5)
PROT SERPL-MCNC: 7.3 G/DL (ref 6–8.5)
PROT UR QL STRIP: NEGATIVE
RBC # BLD AUTO: 4.87 10*6/MM3 (ref 3.77–5.28)
RBC # BLD AUTO: 5.21 10*6/MM3 (ref 3.77–5.28)
SAO2 % BLDCOA: 95.2 % (ref 94–99)
SET MECH RESP RATE: 16
SODIUM BLDA-SCNC: 140 MMOL/L (ref 136–145)
SODIUM SERPL-SCNC: 138 MMOL/L (ref 136–145)
SODIUM SERPL-SCNC: 139 MMOL/L (ref 136–145)
SP GR UR STRIP: 1.01 (ref 1–1.03)
UROBILINOGEN UR QL STRIP: NORMAL
VARIANT LYMPHS NFR BLD MANUAL: 13.7 % (ref 19.6–45.3)
VARIANT LYMPHS NFR BLD MANUAL: 2.1 % (ref 0–5)
VENTILATOR MODE: AC
VT ON VENT VENT: 450 ML
WBC MORPH BLD: NORMAL
WBC NRBC COR # BLD AUTO: 9.6 10*3/MM3 (ref 3.4–10.8)
WBC NRBC COR # BLD AUTO: 9.96 10*3/MM3 (ref 3.4–10.8)

## 2025-05-19 PROCEDURE — 83735 ASSAY OF MAGNESIUM: CPT

## 2025-05-19 PROCEDURE — 94799 UNLISTED PULMONARY SVC/PX: CPT

## 2025-05-19 PROCEDURE — 84100 ASSAY OF PHOSPHORUS: CPT

## 2025-05-19 PROCEDURE — 85025 COMPLETE CBC W/AUTO DIFF WBC: CPT | Performed by: STUDENT IN AN ORGANIZED HEALTH CARE EDUCATION/TRAINING PROGRAM

## 2025-05-19 PROCEDURE — 83605 ASSAY OF LACTIC ACID: CPT | Performed by: STUDENT IN AN ORGANIZED HEALTH CARE EDUCATION/TRAINING PROGRAM

## 2025-05-19 PROCEDURE — 82375 ASSAY CARBOXYHB QUANT: CPT

## 2025-05-19 PROCEDURE — 80053 COMPREHEN METABOLIC PANEL: CPT

## 2025-05-19 PROCEDURE — 25010000002 LORAZEPAM PER 2 MG: Performed by: STUDENT IN AN ORGANIZED HEALTH CARE EDUCATION/TRAINING PROGRAM

## 2025-05-19 PROCEDURE — 82948 REAGENT STRIP/BLOOD GLUCOSE: CPT

## 2025-05-19 PROCEDURE — 83050 HGB METHEMOGLOBIN QUAN: CPT

## 2025-05-19 PROCEDURE — 25010000002 FENTANYL CITRATE (PF) 50 MCG/ML SOLUTION: Performed by: STUDENT IN AN ORGANIZED HEALTH CARE EDUCATION/TRAINING PROGRAM

## 2025-05-19 PROCEDURE — 71045 X-RAY EXAM CHEST 1 VIEW: CPT

## 2025-05-19 PROCEDURE — 25010000002 PROPOFOL 10 MG/ML EMULSION: Performed by: STUDENT IN AN ORGANIZED HEALTH CARE EDUCATION/TRAINING PROGRAM

## 2025-05-19 PROCEDURE — 94002 VENT MGMT INPAT INIT DAY: CPT

## 2025-05-19 PROCEDURE — 80053 COMPREHEN METABOLIC PANEL: CPT | Performed by: STUDENT IN AN ORGANIZED HEALTH CARE EDUCATION/TRAINING PROGRAM

## 2025-05-19 PROCEDURE — 85025 COMPLETE CBC W/AUTO DIFF WBC: CPT

## 2025-05-19 PROCEDURE — 94761 N-INVAS EAR/PLS OXIMETRY MLT: CPT

## 2025-05-19 PROCEDURE — 25010000002 PROPOFOL 1000 MG/100ML EMULSION

## 2025-05-19 PROCEDURE — 82550 ASSAY OF CK (CPK): CPT | Performed by: STUDENT IN AN ORGANIZED HEALTH CARE EDUCATION/TRAINING PROGRAM

## 2025-05-19 PROCEDURE — 25010000002 LORAZEPAM PER 2 MG: Performed by: FAMILY MEDICINE

## 2025-05-19 PROCEDURE — 25010000002 SUCCINYLCHOLINE PER 20 MG: Performed by: STUDENT IN AN ORGANIZED HEALTH CARE EDUCATION/TRAINING PROGRAM

## 2025-05-19 PROCEDURE — 36600 WITHDRAWAL OF ARTERIAL BLOOD: CPT

## 2025-05-19 PROCEDURE — 84703 CHORIONIC GONADOTROPIN ASSAY: CPT | Performed by: STUDENT IN AN ORGANIZED HEALTH CARE EDUCATION/TRAINING PROGRAM

## 2025-05-19 PROCEDURE — 25010000002 LEVETRIRACETAM PER 10 MG: Performed by: STUDENT IN AN ORGANIZED HEALTH CARE EDUCATION/TRAINING PROGRAM

## 2025-05-19 PROCEDURE — 70450 CT HEAD/BRAIN W/O DYE: CPT

## 2025-05-19 PROCEDURE — 74018 RADEX ABDOMEN 1 VIEW: CPT

## 2025-05-19 PROCEDURE — 85007 BL SMEAR W/DIFF WBC COUNT: CPT | Performed by: STUDENT IN AN ORGANIZED HEALTH CARE EDUCATION/TRAINING PROGRAM

## 2025-05-19 PROCEDURE — 25010000002 ENOXAPARIN PER 10 MG

## 2025-05-19 PROCEDURE — 82805 BLOOD GASES W/O2 SATURATION: CPT

## 2025-05-19 PROCEDURE — 81003 URINALYSIS AUTO W/O SCOPE: CPT | Performed by: STUDENT IN AN ORGANIZED HEALTH CARE EDUCATION/TRAINING PROGRAM

## 2025-05-19 PROCEDURE — 25810000003 SODIUM CHLORIDE 0.9 % SOLUTION: Performed by: STUDENT IN AN ORGANIZED HEALTH CARE EDUCATION/TRAINING PROGRAM

## 2025-05-19 PROCEDURE — 25010000002 ONDANSETRON PER 1 MG: Performed by: STUDENT IN AN ORGANIZED HEALTH CARE EDUCATION/TRAINING PROGRAM

## 2025-05-19 RX ORDER — LEVETIRACETAM 500 MG/5ML
1500 INJECTION, SOLUTION, CONCENTRATE INTRAVENOUS ONCE
Status: COMPLETED | OUTPATIENT
Start: 2025-05-20 | End: 2025-05-19

## 2025-05-19 RX ORDER — MIDAZOLAM HYDROCHLORIDE 5 MG/5ML
INJECTION, SOLUTION INTRAMUSCULAR; INTRAVENOUS
Status: DISCONTINUED
Start: 2025-05-19 | End: 2025-05-19 | Stop reason: WASHOUT

## 2025-05-19 RX ORDER — QUETIAPINE FUMARATE 25 MG/1
100 TABLET, FILM COATED ORAL DAILY
Status: DISCONTINUED | OUTPATIENT
Start: 2025-05-19 | End: 2025-05-20 | Stop reason: HOSPADM

## 2025-05-19 RX ORDER — CHLORHEXIDINE GLUCONATE 500 MG/1
1 CLOTH TOPICAL EVERY 24 HOURS
Status: DISCONTINUED | OUTPATIENT
Start: 2025-05-20 | End: 2025-05-20

## 2025-05-19 RX ORDER — NITROGLYCERIN 0.4 MG/1
0.4 TABLET SUBLINGUAL
Status: DISCONTINUED | OUTPATIENT
Start: 2025-05-19 | End: 2025-05-20 | Stop reason: HOSPADM

## 2025-05-19 RX ORDER — FENTANYL CITRATE 50 UG/ML
1 INJECTION, SOLUTION INTRAMUSCULAR; INTRAVENOUS ONCE
Refills: 0 | Status: COMPLETED | OUTPATIENT
Start: 2025-05-19 | End: 2025-05-19

## 2025-05-19 RX ORDER — LAMOTRIGINE 100 MG/1
300 TABLET ORAL EVERY 12 HOURS SCHEDULED
Status: DISCONTINUED | OUTPATIENT
Start: 2025-05-19 | End: 2025-05-20 | Stop reason: HOSPADM

## 2025-05-19 RX ORDER — LORAZEPAM 2 MG/ML
2 INJECTION INTRAMUSCULAR ONCE
Status: COMPLETED | OUTPATIENT
Start: 2025-05-19 | End: 2025-05-19

## 2025-05-19 RX ORDER — PROPOFOL 10 MG/ML
INJECTION, EMULSION INTRAVENOUS
Status: COMPLETED
Start: 2025-05-19 | End: 2025-05-19

## 2025-05-19 RX ORDER — SODIUM CHLORIDE 0.9 % (FLUSH) 0.9 %
10 SYRINGE (ML) INJECTION AS NEEDED
Status: DISCONTINUED | OUTPATIENT
Start: 2025-05-19 | End: 2025-05-20 | Stop reason: HOSPADM

## 2025-05-19 RX ORDER — SODIUM CHLORIDE 9 MG/ML
40 INJECTION, SOLUTION INTRAVENOUS AS NEEDED
Status: DISCONTINUED | OUTPATIENT
Start: 2025-05-19 | End: 2025-05-20 | Stop reason: HOSPADM

## 2025-05-19 RX ORDER — ETOMIDATE 2 MG/ML
INJECTION INTRAVENOUS
Status: COMPLETED | OUTPATIENT
Start: 2025-05-19 | End: 2025-05-19

## 2025-05-19 RX ORDER — SODIUM CHLORIDE 0.9 % (FLUSH) 0.9 %
10 SYRINGE (ML) INJECTION EVERY 12 HOURS SCHEDULED
Status: DISCONTINUED | OUTPATIENT
Start: 2025-05-19 | End: 2025-05-20 | Stop reason: HOSPADM

## 2025-05-19 RX ORDER — AMOXICILLIN 250 MG
2 CAPSULE ORAL 2 TIMES DAILY
Status: DISCONTINUED | OUTPATIENT
Start: 2025-05-19 | End: 2025-05-20 | Stop reason: HOSPADM

## 2025-05-19 RX ORDER — LORAZEPAM 2 MG/ML
2 INJECTION INTRAMUSCULAR ONCE
Status: DISCONTINUED | OUTPATIENT
Start: 2025-05-19 | End: 2025-05-19

## 2025-05-19 RX ORDER — ENOXAPARIN SODIUM 100 MG/ML
40 INJECTION SUBCUTANEOUS DAILY
Status: DISCONTINUED | OUTPATIENT
Start: 2025-05-19 | End: 2025-05-20 | Stop reason: HOSPADM

## 2025-05-19 RX ORDER — POLYETHYLENE GLYCOL 3350 17 G/17G
17 POWDER, FOR SOLUTION ORAL DAILY PRN
Status: DISCONTINUED | OUTPATIENT
Start: 2025-05-19 | End: 2025-05-20 | Stop reason: HOSPADM

## 2025-05-19 RX ORDER — SUCCINYLCHOLINE CHLORIDE 20 MG/ML
INJECTION INTRAMUSCULAR; INTRAVENOUS
Status: COMPLETED | OUTPATIENT
Start: 2025-05-19 | End: 2025-05-19

## 2025-05-19 RX ORDER — ONDANSETRON 2 MG/ML
4 INJECTION INTRAMUSCULAR; INTRAVENOUS ONCE
Status: COMPLETED | OUTPATIENT
Start: 2025-05-19 | End: 2025-05-19

## 2025-05-19 RX ORDER — CHLORHEXIDINE GLUCONATE 500 MG/1
1 CLOTH TOPICAL ONCE
Status: COMPLETED | OUTPATIENT
Start: 2025-05-19 | End: 2025-05-19

## 2025-05-19 RX ORDER — BISACODYL 5 MG/1
5 TABLET, DELAYED RELEASE ORAL DAILY PRN
Status: DISCONTINUED | OUTPATIENT
Start: 2025-05-19 | End: 2025-05-20 | Stop reason: HOSPADM

## 2025-05-19 RX ORDER — FLUOXETINE HYDROCHLORIDE 40 MG/1
40 CAPSULE ORAL DAILY
Status: ON HOLD | COMMUNITY

## 2025-05-19 RX ORDER — BISACODYL 10 MG
10 SUPPOSITORY, RECTAL RECTAL DAILY PRN
Status: DISCONTINUED | OUTPATIENT
Start: 2025-05-19 | End: 2025-05-20 | Stop reason: HOSPADM

## 2025-05-19 RX ORDER — OMEPRAZOLE 20 MG/1
20 CAPSULE, DELAYED RELEASE ORAL DAILY PRN
Status: ON HOLD | COMMUNITY

## 2025-05-19 RX ORDER — ROCURONIUM BROMIDE 10 MG/ML
80 INJECTION, SOLUTION INTRAVENOUS ONCE
Status: COMPLETED | OUTPATIENT
Start: 2025-05-19 | End: 2025-05-19

## 2025-05-19 RX ORDER — QUETIAPINE FUMARATE 100 MG/1
200 TABLET, FILM COATED ORAL NIGHTLY
Status: DISCONTINUED | OUTPATIENT
Start: 2025-05-19 | End: 2025-05-20 | Stop reason: HOSPADM

## 2025-05-19 RX ADMIN — SENNOSIDES, DOCUSATE SODIUM 2 TABLET: 50; 8.6 TABLET, FILM COATED ORAL at 23:16

## 2025-05-19 RX ADMIN — Medication 10 ML: at 23:17

## 2025-05-19 RX ADMIN — QUETIAPINE FUMARATE 200 MG: 100 TABLET ORAL at 23:16

## 2025-05-19 RX ADMIN — PROPOFOL 40 MCG/KG/MIN: 10 INJECTION, EMULSION INTRAVENOUS at 06:14

## 2025-05-19 RX ADMIN — CHLORHEXIDINE GLUCONATE 1 APPLICATION: 500 CLOTH TOPICAL at 04:00

## 2025-05-19 RX ADMIN — ONDANSETRON 4 MG: 2 INJECTION INTRAMUSCULAR; INTRAVENOUS at 02:09

## 2025-05-19 RX ADMIN — PROPOFOL INJECTABLE EMULSION 5 MCG/KG/MIN: 10 INJECTION, EMULSION INTRAVENOUS at 00:28

## 2025-05-19 RX ADMIN — LAMOTRIGINE 300 MG: 100 TABLET ORAL at 08:06

## 2025-05-19 RX ADMIN — LORAZEPAM 2 MG: 2 INJECTION INTRAMUSCULAR; INTRAVENOUS at 00:16

## 2025-05-19 RX ADMIN — ENOXAPARIN SODIUM 40 MG: 100 INJECTION SUBCUTANEOUS at 08:06

## 2025-05-19 RX ADMIN — LEVETIRACETAM 1500 MG: 100 INJECTION INTRAVENOUS at 23:43

## 2025-05-19 RX ADMIN — Medication 10 ML: at 08:06

## 2025-05-19 RX ADMIN — SENNOSIDES, DOCUSATE SODIUM 2 TABLET: 50; 8.6 TABLET, FILM COATED ORAL at 08:06

## 2025-05-19 RX ADMIN — LAMOTRIGINE 300 MG: 100 TABLET ORAL at 23:16

## 2025-05-19 RX ADMIN — ROCURONIUM 80 MG: 50 INJECTION, SOLUTION INTRAVENOUS at 00:33

## 2025-05-19 RX ADMIN — QUETIAPINE FUMARATE 100 MG: 25 TABLET ORAL at 08:06

## 2025-05-19 RX ADMIN — LORAZEPAM 2 MG: 2 INJECTION INTRAMUSCULAR; INTRAVENOUS at 14:21

## 2025-05-19 RX ADMIN — SODIUM CHLORIDE 1000 ML: 9 INJECTION, SOLUTION INTRAVENOUS at 01:31

## 2025-05-19 RX ADMIN — FENTANYL CITRATE 102 MCG: 50 INJECTION, SOLUTION INTRAMUSCULAR; INTRAVENOUS at 02:53

## 2025-05-19 RX ADMIN — PROPOFOL 5 MCG/KG/MIN: 10 INJECTION, EMULSION INTRAVENOUS at 00:28

## 2025-05-19 RX ADMIN — SUCCINYLCHOLINE CHLORIDE 100 MG: 20 INJECTION, SOLUTION INTRAMUSCULAR; INTRAVENOUS at 00:22

## 2025-05-19 RX ADMIN — PROPOFOL 50 MCG/KG/MIN: 10 INJECTION, EMULSION INTRAVENOUS at 03:20

## 2025-05-19 RX ADMIN — Medication 1 APPLICATION: at 06:13

## 2025-05-19 RX ADMIN — ETOMIDATE 20 MG: 2 INJECTION, SOLUTION INTRAVENOUS at 00:22

## 2025-05-19 NOTE — ED NOTES
THE FOLLOWING SEIZURE PRECAUTIONS WERE INITIATED:             [ x]   MAINTAIN PATIENT'S SAFETY                       [ x]   PLACE ON CARDIAC, BP, AND PULSE OXIMETRY MONITOR                [x ] MAINTAIN 02 SAT> 94%             [ x] LYDIA SUCTION AT BEDSIDE             [ x] BED RAILS ELEVATED, SEIZURE PADS IN PLACE             [ x] CALL LIGHT IN REACH             [ x] KEEP BED IN LOW POSTION             [ x] DECREASE STIMULATION: DIM LIGHT, TURN OF TV OFF             [ x] EDUCATE FAMILY ON SEIZURE ACTIVITY AND NOTIFY STAFF WITH NEW ACTIVITY

## 2025-05-19 NOTE — PLAN OF CARE
Goal Outcome Evaluation: Patient remains stable on current ventilator settings. No changes made at this time. Will work towards liberation as patient status continues to improve.      Problem: Mechanical Ventilation Invasive  Goal: Effective Communication  Outcome: Progressing  Goal: Optimal Device Function  Outcome: Progressing  Goal: Mechanical Ventilation Liberation  Outcome: Progressing  Goal: Optimal Nutrition Delivery  Outcome: Progressing  Goal: Absence of Device-Related Skin and Tissue Injury  Outcome: Progressing  Goal: Absence of Ventilator-Induced Lung Injury  Outcome: Progressing

## 2025-05-19 NOTE — NURSING NOTE
Patient caregiver called out to staff and said patient was having a seizure. Staff entered room and observed patient clenching fists, twitching eyelids and laying on right side. Episode lasted approximated 3-4 minutes. MD was paged. No new orders received. Seizures precautions maintained.

## 2025-05-19 NOTE — PROGRESS NOTES
Dr Adams at bedside and stated to extubate patient after a few mins weaning trail due to patients history.    Michele Burleson received a viral test for COVID-19. They were educated on isolation and quarantine as appropriate. For any symptoms, they were directed to seek care from their PCP, given contact information to establish with a doctor, directed to an urgent care or the emergency room.

## 2025-05-19 NOTE — PLAN OF CARE
Goal Outcome Evaluation:              Outcome Evaluation: Dietitian visited pt and found pt actively seizing. Nurse was immediately notified. Food ambassador had previously contacted dietitian regarding pt’s food preferences. Pt has a PMH of autism and is a very selective eater. Current diet order is consistent carb, which limits preferred food options. Dietitian reviewed PMH and medical notes; no documented A1C on record. Will order A1C and change diet to regular to promote PO intake while inpatient. Will monitor per protocol.

## 2025-05-19 NOTE — SIGNIFICANT NOTE
Notified earlier of seizure like activity. When I visited the patient she had finished eating lunch and was at her baseline neurological status.    She had another episode this afternoon and RRT was called.     Patient has history of seizures and pseudo seizures.   Ativan 2 mg IVP once.   Neurology consult considered but patient was recently admitted to [facility] on 11/5/2024 to 11/7/2024 where she was diagnosed with pseudoseizures had EEG and outpatient video EEG both negative for seizures.  I do think she would benefit from neurology input at this time being these seizure-like activities consistent with pseudoseizures.    Will need to consider transferring to room closer to the nurses stations.  Also advised nursing to keep objects away from the patient due to her tendency to ingest objects and batteries.    Electronically signed by Eriberto Trejo MD, 05/19/25, 2:25 PM CDT.

## 2025-05-19 NOTE — H&P
[facility] Intensivist Services    Date of Admission: 5/18/2025  Date of Note: 05/19/25  Primary Care Physician: Rosie Olvera MD    History   Ms. Cooley is a 29-year-old female with past medical history significant for pseudoseizures, autism, and pica who is well known to this facility unfortunately for foreign body ingestion and seizure-like activity for which she subsequently required mechanical ventilation and has had multiple evaluations by the many of our neurologist colleagues and follows with neurologist Dr. Perkins with [facility] in [facility].  In the evening hours of 5/18/2025 she presented to [facility] emergency department with her caretaker after reporting newly having multiple seizures at home and witnessed seizure-like activity upon arrival to the emergency department. Unfortunately, she was once again intubated for airway protection.     ED course workup was relatively unremarkable however did show an elevated serum lactate at 3.2.  CT head unremarkable    Intensivist service was consulted for admission and further medical management of intubated and mechanically ventilated patient following seizure-like activity.     I have seen and evaluated Ms. Cooley upon arrival to ICU.  She is intubated, mechanically ventilated and adequately sedated and tolerating the ventilator without issue.  Vital signs are unremarkable.     Home medication list includes doxazosin, fluoxetine, hydroxyzine, lamotrigine, Nayzilam, quetiapine    Chart review     Most recent office visit with [facility] neurolgist, Dr. 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 At her last visit, Lamictal was increased to 200 mg BID. She denies  "convulsive seizure. Her mother is monitoring her seizure event. Also re-prescribed Nayzilam 5 mg/0.1 ml nasal spray for rescue treatment.  At today's follow up visit, she reports effective cognitive biofeedback by psychologist, significantly decreased frequency of PNES.  She however reports an episode of epileptic seizure on 4/20/2025, in the setting of missing one dose of Lamictal, and she was sleep deprived. Fortunately, her SZ was successfully aborted with Nayzilam spray.   Lamictal increased from 200 mg to 300 mg BID    2/1/23 note from Dr. Martínez:  I have reviewed this documentation and agree. Patient seen and examined by me. I am very familiar with this patient. I was able to speak to her mother over the phone again this morning. She tells me yesterday Duane told her mother that she felt like she was going to have a seizure. She had approximately 4-5 events with her mother reassuring her. She then told her mother that she demanded that she call emergency services. Her mother opposed this saying that there was nothing that needed to be done. At that, the patient threatened her saying that if she did not call emergency services that she would alert Adult Protective Services on her mother. She was taken to [facility]. They reportedly did have previous records from [facility] documenting that these were pseudoseizures. Despite this they called our transfer center and elected to share this information regarding her. She was transferred to us after intubation and multiple sedating agents. When she got here we saw her this morning and neurologic consultation. She was extubated immediately. She was attempting another pseudoseizure but I told her that I wanted to \"change her meds.\" In the past she has been resistant to this. She immediately woke up and said that she did not want her medications changed. I told her mother that she was free to come get her and the patient immediately " started ripping things off. I spoke with her mother at length. I expressed concern that if she continues to show up at outside hospitals with pseudoseizure activity she will continue to be intubated I think there is a great risk for this. It is my believe that the patient is now seeking secondary gain. I believe that she has a goal to be intubated and placed on propofol drips. She must receive some sort of euphoria from this. I am recommending that she invest in a medical alert bracelet that can alert anyone seeing her that these are not real seizures. I spoke over the phone with Dr. Declan Lira, her primary neurologist. He has agreed to see her in clinic with hopes to admit her to an epilepsy monitoring unit and wean off all 4 of her antiepileptics.     Past Medical History     Active and Resolved Problems  Active Hospital Problems    Diagnosis  POA    **Status epilepticus [G40.901]  Yes      Resolved Hospital Problems   No resolved problems to display.       Past Medical History:   Past Medical History:   Diagnosis Date    Anxiety     Depression     History of  2017    History of congenital abnormality 2017    Late prenatal care affecting pregnancy in second trimester 2017    Mental deficiency 2017    Previous  delivery affecting pregnancy 2017    Psychogenic nonepileptic seizure        Prior Surgeries: She  has a past surgical history that includes  section; Esophagogastroduodenoscopy (N/A, 2024); Esophagogastroduodenoscopy (N/A, 2024); and Esophagogastroduodenoscopy (N/A, 2025).    Past Surgical History:   Past Surgical History:   Procedure Laterality Date     SECTION      ENDOSCOPY N/A 2024    Procedure: ESOPHAGOGASTRODUODENOSCOPY WITH ANESTHESIA;  Surgeon: Tian Morris MD;  Location: [facility];  Service: Gastroenterology;  Laterality: N/A;  pre foreign body in stomach  post foreign body passed out of stomach  dr kaiden patton  [facility]    ENDOSCOPY N/A 8/1/2024    Procedure: ESOPHAGOGASTRODUODENOSCOPY WITH ANESTHESIA;  Surgeon: Herman Murry MD;  Location: [facility] OR;  Service: Gastroenterology;  Laterality: N/A;  pre foreign body  post no foreign body    ENDOSCOPY N/A 5/7/2025    Procedure: ESOPHAGOGASTRODUODENOSCOPY;  Surgeon: Brennen White MD;  Location:  [facility] OR;  Service: Gastroenterology;  Laterality: N/A;  pre: foreign body  post: foreign body       Social and Family History     Family History:  family history is not on file.    Tobacco/Social History:  reports that she has an unknown smoking status. She has never used smokeless tobacco. Alcohol use questions deferred to the physician. Drug use questions deferred to the physician.    Allergies     Allergies:   She has no known allergies.    No Known Allergies    Labs     Basic Labs:  Common labs          11/5/2024    18:57 11/7/2024    05:41 5/19/2025    00:00 5/19/2025    01:15   Common Labs   Glucose   107     BUN   6     Creatinine   0.74     Sodium   138  140    Potassium   3.9     Chloride   102     Calcium   9.6     Albumin   4.5     Total Bilirubin   <0.2     Alkaline Phosphatase   87     AST (SGOT)   17     ALT (SGPT)   15     WBC 11.4     7.5     9.96     Hemoglobin 8.5     8.6     12.2     Hematocrit 29.4     29.1     40.0     Platelets 245     231     200        Details          This result is from an external source.               Diabetic:      Inpatient Medications     Medications: Scheduled Meds:fentaNYL citrate (PF), 1 mcg/kg, Intravenous, Once  LORazepam, 2 mg, Intravenous, Once      Continuous Infusions:propofol, 5-50 mcg/kg/min, Last Rate: 50 mcg/kg/min (05/19/25 0208)      PRN Meds:.  etomidate    succinylcholine    I have reviewed the patient's current medications.   Outpatient Medications     Outpatient Medications:   No outpatient medications have been marked as taking for the 5/18/25 encounter (Hospital Encounter).       Current Antibiotics     This  "patient does not have an active medication from one of the medication groupers.    Exam     Vitals: Her  height is 167.6 cm (66\") and weight is 102 kg (224 lb 3 oz). Her oral temperature is 98.1 °F (36.7 °C). Her blood pressure is 97/66 and her pulse is 100. Her respiration is 16 and oxygen saturation is 99%.     GENERAL: On ventilator and sedated, no acute distress.  Nonverbal at baseline.  SKIN:  Warm, dry  EYES:  Pupils equal, round and reactive to light.    HEAD:  Normocephalic.  NECK:  Supple   RESP:  Lungs clear to auscultation. Good airflow. Normal respiratory effort.   CARDIAC:  Regular rate and rhythm. Normal S1,S2. No edema  GI:  Soft, nontender, normal bowel sounds  MSK:  Normal muscle bulk, tone  NEUROLOGICAL: Sedated  PSYCHIATRIC: Sedated    Results and Cultures Review     Result Review:  I have personally reviewed the results from the time of this admission to 5/19/2025 02:31 CDT and agree with these findings:  [x]  Laboratory list / accordion  [x]  Microbiology  [x]  Radiology  [x]  EKG/Telemetry   []  Cardiology/Vascular   []  Pathology  [x]  Old records  []  Other:  Most notable findings include: Per HPI      Culture Data:   No results found for: \"BLOODCX\", \"URINECX\", \"WOUNDCX\", \"MRSACX\", \"RESPCX\", \"STOOLCX\"    Assessment/Plan   29-year-old female with past medical history significant for pseudoseizures, autism, and pica who is well known to this facility unfortunately for foreign body ingestion and seizure-like activity for which she subsequently required mechanical ventilation and has had multiple evaluations by the many of our neurologist colleagues and follows with neurologist Dr. Perkins with [facility] in [facility].  In the evening hours of 5/18/2025 she presented to [facility] emergency department with her caretaker after reporting newly having multiple seizures at home and witnessed seizure-like activity upon arrival to the emergency department. " Unfortunately, she was once again intubated for airway protection.     Acute problems  *Acute respiratory failure   *seizure like activity    Chronic problems  *autism  *pseudoseizures  *pica    Plan     #1 acute respiratory failure  - Secondary to seizure-like activity with concern for loss of airway intubated for airway protection  - maintain mechanical ventilation and sedation with plan for early AM evaluation, SBT and hopefully successful extubation  -ABG in AM    #2 seizure-like activity  - Difficult to fully know if this was truly a epileptic seizure but more likely pseudoseizure with her history and several neurological evaluations in the past with no eeg evidence of elliptic form  - continue home medication Lamictal 300 mg twice daily   - neurology evaluation likely not needed but with discuss with supervising MD in the AM    #3 autism  - supportive care  - review and continue pertinent home medications as able      VTE Prophylaxis:    SCDs and lovenox       Total critical care time: Approximately 40 minutes    Due to a high probability of clinically significant, life threatening deterioration, the patient required my highest level of preparedness to intervene emergently and I personally spent this critical care time directly and personally managing the patient.     This critical care time included obtaining a history; examining the patient; pulse oximetry; ordering and review of studies; arranging urgent treatment with development of a management plan; evaluation of patient's response to treatment; frequent reassessment; and, discussions with other providers.    This critical care time was performed to assess and manage the high probability of imminent, life-threatening deterioration that could result in multi-organ failure. It was exclusive of separately billable procedures and treating other patients and teaching time.    Please see MDM section and the rest of the note for further information on patient  assessment and treatment.    Part of this note may be an electronic transcription/translation of spoken language to printed text using the Dragon Dictation System.    Electronically signed by TELMA Hair on 5/19/2025 at 031

## 2025-05-19 NOTE — ED PROVIDER NOTES
Subjective   History of Present Illness  This is a 21-year-old female with history of seizures, depression, in the emergency room with caretaker, the patient has a history of autism, nonverbal, also has history of psychogenic nonepileptic seizures, she is on lamotrigine, in the emergency room for multiple seizures at home.  She had a potential witnessed seizure in the emergency room on arrival.        Review of Systems   All other systems reviewed and are negative.      Past Medical History:   Diagnosis Date    Anxiety     Depression     History of  2017    History of congenital abnormality 2017    Late prenatal care affecting pregnancy in second trimester 2017    Mental deficiency 2017    Previous  delivery affecting pregnancy 2017    Psychogenic nonepileptic seizure        No Known Allergies    Past Surgical History:   Procedure Laterality Date     SECTION      ENDOSCOPY N/A 2024    Procedure: ESOPHAGOGASTRODUODENOSCOPY WITH ANESTHESIA;  Surgeon: Tian Morris MD;  Location: [facility] OR;  Service: Gastroenterology;  Laterality: N/A;  pre foreign body in stomach  post foreign body passed out of stomach  dr kaiden [place]    ENDOSCOPY N/A 2024    Procedure: ESOPHAGOGASTRODUODENOSCOPY WITH ANESTHESIA;  Surgeon: Herman Murry MD;  Location: [facility] OR;  Service: Gastroenterology;  Laterality: N/A;  pre foreign body  post no foreign body    ENDOSCOPY N/A 2025    Procedure: ESOPHAGOGASTRODUODENOSCOPY;  Surgeon: Brennen White MD;  Location: [facility] OR;  Service: Gastroenterology;  Laterality: N/A;  pre: foreign body  post: foreign body       History reviewed. No pertinent family history.    Social History     Socioeconomic History    Marital status:    Tobacco Use    Smoking status: Unknown    Smokeless tobacco: Never   Vaping Use    Vaping status: Never Used   Substance and Sexual Activity    Alcohol use: Defer    Drug use: Defer     Sexual activity: Defer     Partners: Male           Objective   Physical Exam  Constitutional:       Comments: Is following command.   HENT:      Head: Normocephalic.      Nose: No congestion.   Eyes:      Extraocular Movements: Extraocular movements intact.      Pupils: Pupils are equal, round, and reactive to light.   Cardiovascular:      Rate and Rhythm: Regular rhythm. Tachycardia present.      Heart sounds: No murmur heard.  Pulmonary:      Effort: Pulmonary effort is normal. No respiratory distress.      Breath sounds: Normal breath sounds. No wheezing or rales.   Chest:      Chest wall: No tenderness.   Abdominal:      General: Bowel sounds are normal. There is no distension.      Palpations: Abdomen is soft.      Tenderness: There is no abdominal tenderness. There is no right CVA tenderness, left CVA tenderness or guarding.   Musculoskeletal:         General: Normal range of motion.      Cervical back: Neck supple. No rigidity or tenderness.   Lymphadenopathy:      Cervical: No cervical adenopathy.   Skin:     General: Skin is warm.   Neurological:      General: No focal deficit present.      Mental Status: She is alert and oriented to person, place, and time. Mental status is at baseline.      Comments: No focal deficits present.         Intubation    Date/Time: 5/19/2025 5:43 AM    Performed by: Hank Holland MD  Authorized by: Sebastian Cifuentes DO    Consent:     Consent obtained:  Emergent situation  Universal protocol:     Patient identity confirmed:  Arm band  Pre-procedure details:     Indications: airway obstruction and altered consciousness      Patient status:  Unresponsive    Look externally: no concerns      Pharmacologic strategy: RSI      Induction agents:  Etomidate    Paralytics:  Succinylcholine  Procedure details:     Preoxygenation:  Nasal cannula    Number of attempts:  1  Successful intubation attempt details:     Intubation method:  Oral    Intubation technique: video  assisted      Laryngoscope blade:  Mac 3    Tube size (mm):  7.5    Tube type:  Cuffed    Tube visualized through cords: yes    Placement assessment:     Placement verification: chest rise, colorimetric ETCO2 and CXR verification    Post-procedure details:     Procedure completion:  Tolerated well, no immediate complications             ED Course  ED Course as of 05/19/25 0546   Mon May 19, 2025   0026 Patient required intubation for concerns of seizures concerns for status epilepticus as the patient did not come back to her baseline despite of getting Ativan.  She has had status epilepticus in the past. [SG]   0207 ET tube in place.  CT head negative for acute findings.  Will go ahead and call ICU.  He remained stable with a propofol drip. [SG]   0211 Dr. Cifuentes accepted admit.  [SG]   0213 lactic acid elevation is likely due to seizures, no concerns for infection as being the cause. [SG]      ED Course User Index  [SG] Hank Holland MD                                                       Medical Decision Making  21-year-old female with potential seizure epileptic versus nonepileptic psychogenic.  She was given 1 mg of Ativan, IV fluids.  Pending hCG, CBC, chemistry, CT of the brain.  No concerns for fall or trauma.  Pending electrolytes, CBC, lactic acid as well as CPK for evaluation of seizure.      Blood work nonactionable in the emergency room.  CT of the brain was negative for acute findings.  There was clinical concerns for status epilepticus in the emergency room, as the patient never went back to her baseline.  She had multiple episodes of jerking movement despite of receiving Ativan, with significant elevation of her heart rate concerning for a tonic-clonic seizure.  As such decision was made to protect airway, for concerns for status epilepticus.  Patient was admitted to the ICU, she was started on a propofol drip    Problems Addressed:  Status epilepticus: complicated acute illness or  injury    Amount and/or Complexity of Data Reviewed  Labs: ordered.  Radiology: ordered.    Risk  Prescription drug management.  Decision regarding hospitalization.        Final diagnoses:   Status epilepticus       ED Disposition  ED Disposition       ED Disposition   Decision to Admit    Condition   --    Comment   Level of Care: Critical Care [6]   Diagnosis: Status epilepticus [887396]   Admitting Physician: KWAKU GONZALES [[id number]]   Attending Physician: KWAKU GONZALES [[id number]]   Certification: I Certify That Inpatient Hospital Services Are Medically Necessary For Greater Than 2 Midnights                 No follow-up provider specified.       Medication List      No changes were made to your prescriptions during this visit.            Hank Holland MD  05/19/25 0559

## 2025-05-19 NOTE — PROGRESS NOTES
RT EQUIPMENT DEVICE RELATED - SKIN ASSESSMENT    Nikko Score:        RT Medical Equipment/Device:     ETT [place]/Anchorfast    Skin Assessment:      Cheek:  Intact  Neck:  Intact  Lips:  Intact  Mouth:  Intact    Device Skin Pressure Protection:  Skin-to-device areas padded:  Anchorfast    Nurse Notification:  Milady Rogers, RRT

## 2025-05-19 NOTE — PAYOR COMM NOTE
"REF:  [id number]    [facility]  KELLI,  [phone]  OR  FAX  [phone]       Earl Cooley (29 y.o. Female)       Date of Birth   1995    Social Security Number       Address   [address] [place] [address]    Home Phone   [phone]    MRN   [id number]       Ev[employer]   Other    Marital Status                               Admission Date   5/18/2025    Admission Type   Emergency    Admitting Provider   Alistair Narayan MD    Attending Provider   Eriberto Trejo MD    Department, Room/Bed   [facility] 3C, 396/1       Discharge Date       Discharge Disposition       Discharge Destination                                 Attending Provider: Eriberto Trejo MD    Allergies: No Known Allergies    Isolation: None   Infection: None   Code Status: CPR    Ht: 167.6 cm (66\")   Wt: 94.8 kg (208 lb 15.9 oz)    Admission Cmt: None   Principal Problem: Status epilepticus [G40.901]                   Active Insurance as of 5/18/2025       Primary Coverage       Payor Plan Insurance Group Employer/Plan Group    [place] COMMUNITY PLAN [facility] COMMUNITY PLAN [facility]       Payor Plan Address Payor Plan Phone Number Payor Plan Fax Number Effective Dates    [address]   1/1/2024 - None Entered    [facility] [id number]         Subscriber Name Subscriber Birth Date Member ID       EARL COOLEY 1995 [id number]                     Emergency Contacts        (Rel.) Home Phone Work Phone Mobile Phone    Kelli Cooley (Mother) -- -- [phone]    Lana Avila (Friend) [phone] -- [phone]          5/18/2025 Event Details User   23:29 Patient arrival  Kika Keller RN   23:29 Patient roomed in ED To room 43 Kika Keller RN   23:29 HPI HPI  Stated Reason for Visit: ems was called due to patient having seizures. first seizure resulted in patient rolling off the couch. patient had 3 seizures en route with ems. 2 were 2 " min in [facility], last one lasted 1 min. ems gave 5mg versed IM Kika Keller RN   23:29:19 Arrival Complaint seizures    23:29:39 Trigger for Triage Start  Kika Keller RN   23:29:39 Triage Started  Kika Keller RN   23:29:39 Chief Complaints Updated Seizures Kika Keller RN     00:10 Seizures Assessments Respiratory WDL  Respiratory WDL: WDL  Breath Sounds  All Lung Fields Breath Sounds: All Fields  All Lung Fields Breath Sounds: Anterior:; clear; equal bilaterally  Cardiac WDL  Cardiac WDL: .WDL except; rhythm  Cardiac Rhythm: tachycardic  Peripheral/Neurovascular WDL  Peripheral Neurovascular WDL: WDL  Cognitive/Neuro/Behavioral WDL  Cognitive/Neuro/Behavioral WDL: .WDL except; speech  Speech: other (see comments) (nonverbal - nodding appropriately, uses tablet/phone naya for communication at baseline.)  Pupils  Pupil PERRLA: yes  Gastrointestinal WDL  Gastrointestinal WDL: WDL  Genitourinary WDL  Genitourinary WDL: WDL  Safety WDL  Safety WDL: WDL Janneth Mckeon RN   00:13:05 ED Quick Updates Quick Updates  Quick Updates - Free Text: Dr Holland at bedside Connie Coates RN     00:15 Devices Testing Template Device Data  Heart Rate: 168 Abnormal   SpO2: 82 % Abnormal  Janneth Mckeon RN   00:15 Ventilation Ventilation  Airway: Ambu bag to mask Janneth Mckeon RN   00:16 Medication Given LORazepam (ATIVAN) injection 2 mg - Dose: 2 mg ; Route: Intravenous ; Line: Peripheral IV 05/18/25 2345 22 G Left;Posterior Hand ; Scheduled Time: 0016 ; Comment: rapid response, seizure Janneth Mckeon RN     00:16 Neuro Cognitive Neuro Cognitive (Adult)  Additional Documentation: Seizure Assessment (Group)  Seizure Assessment  Seizure Activity: witnessed  Seizure Presentation: eylid flutter/blinking; stiffening; repetitive blinking; unresponsive; sucking/smacking lips  Seizure Duration: 2 minutes 40 seconds  Seizure Areas Involved: generalized  Seizure Movement: rapid  Seizure Extraocular Movements:  both eyes  Seizure Associated Symptoms: tachycardia; altered respiratory rate (specify in comment); dilated pupils; drooling Janneth Mckeon RN   00:17 Neuro Cognitive Seizure Assessment  Seizure Activity: witnessed  Seizure Presentation: drooling; eye deviation; eylid flutter/blinking; stiffening; unresponsive  Seizure Duration: 70 seconds Janneth Mckeon RN     00:22:23 Medication Ordered and Given etomidate (AMIDATE) injection - Dose: 20 mg ; Route: Intravenous ; Line: Peripheral IV 05/18/25 2345 22 G Left;Posterior Hand  Ordered by: Hank Holland MD Collins, Amanda, RN     00:23 Ventilator Documentation Vent Information  $ Adult Vent Initial Day: Yes  NIV or MV initiated: Yes  $ Vent Patient Assesment: yes  Ventilation Day(s): 1  Interface: Invasive  Equipment Type: [id number]  Vent ID: [id number]  Mode: VC/AC  Ventilator On: Yes  $ Intubation Assist: Yes  Settings  FiO2 (%): 30 %  Resp Rate (Set): 16  Vt (Set, mL): 450 mL  Waveform: Decelerating ramp  PEEP/CPAP (cm H2O): 5 cm H20  Peak Flow (L/min): 53 L/min  Humidification: Heat and moisture exchanger  Alarms  Insp Pressure High (cm H2O): 40 cm H2O  Insp Pressure Low (cm H2O): 0 cm H2O  MV High (L/min): 15 L/min  MV Low (L/min): 2.45 L/min  Vt High (ML): 1000 ML  Vt Low (ML): 250 ML  Resp Rate High (bpm): 0 bpm Lv Verduzco, VAN     00:26 Free Text Patient required intubation for concerns of seizures concerns for status epilepticus as the patient did not come back to her baseline despite of getting Ativan.  She has had status epilepticus in the past. Hank Holland MD     00:28 Medication New Bag propofol (DIPRIVAN) infusion 10 mg/mL 100 mL - Dose: 5 mcg/kg/min ; Rate: 2.83 mL/hr ; Route: Intravenous ; Line: Peripheral IV 05/18/25 2345 22 G Left;Posterior Hand ; Scheduled Time: 0025 ; Linked override order: Propofol (DIPRIVAN) 1000 MG/100ML injection  - ADS Override Pull Janneth Mckeon RN     00:33 Medication Rate/Dose Change propofol (DIPRIVAN)  infusion 10 mg/mL 100 mL - Dose: 10 mcg/kg/min ; Rate: 5.66 mL/hr ; Route: Intravenous ; Line: Peripheral IV 05/18/25 2345 22 G Left;Posterior Hand ; Scheduled Time: 0033 Janneth Mckeon RN   00:33 Medication Given rocuronium (ZEMURON) injection 80 mg - Dose: 80 mg ; Route: Intravenous ; Line: Peripheral IV 05/18/25 2345 22 G Left;Posterior Hand ; Scheduled Time: 0051 Kika Keller RN     00:50 Medication Rate/Dose Change propofol (DIPRIVAN) infusion 10 mg/mL 100 mL - Dose: 15 mcg/kg/min ; Rate: 8.49 mL/hr ; Route: Intravenous ; Line: Peripheral IV 05/18/25 2345 22 G Left;Posterior Hand ; Scheduled Time: 0050 Janneth Mckeon RN     00:55 Medication Rate/Dose Change propofol (DIPRIVAN) infusion 10 mg/mL 100 mL - Dose: 20 mcg/kg/min ; Rate: 11.32 mL/hr ; Route: Intravenous ; Line: Peripheral IV 05/18/25 2345 22 G Left;Posterior Hand ; Scheduled Time: 0055 Janneth Mckeon RN     01:10 Medication Rate/Dose Change propofol (DIPRIVAN) infusion 10 mg/mL 100 mL - Dose: 25 mcg/kg/min ; Rate: 14.15 mL/hr ; Route: Intravenous ; Line: Peripheral IV 05/18/25 2345 22 G Left;Posterior Hand ; Scheduled Time: 0110 Janneth Mckeon RN     01:20 Medication Rate/Dose Change propofol (DIPRIVAN) infusion 10 mg/mL 100 mL - Dose: 30 mcg/kg/min ; Rate: 16.97 mL/hr ; Route: Intravenous ; Line: Peripheral IV 05/18/25 2345 22 G Left;Posterior Hand ; Scheduled Time: 0120 Janneth Mckeon RN     01:25 Medication Rate/Dose Change propofol (DIPRIVAN) infusion 10 mg/mL 100 mL - Dose: 30 mcg/kg/min ; Rate: 16.97 mL/hr ; Route: Intravenous ; Line: Peripheral IV 05/18/25 2345 22 G Left;Posterior Hand ; Scheduled Time: 0125 Mckeon, Janneth N, RN     01:30 Medication Rate/Dose Change propofol (DIPRIVAN) infusion 10 mg/mL 100 mL - Dose: 35 mcg/kg/min ; Rate: 19.8 mL/hr ; Route: Intravenous ; Line: Peripheral IV 05/18/25 2345 22 G Left;Posterior Hand ; Scheduled Time: 0130 Janneth Mckeon, RN     01:31 Medication New Bag  sodium chloride 0.9 % bolus 1,000 mL - Dose: 1,000 mL ; Rate: 2,000 mL/hr ; Route: Intravenous ; Line: Peripheral IV 05/19/25 0040 18 G Anterior;Left Forearm ; Scheduled Time: 0002 Janneth Mckeon RN     01:35 Medication Rate/Dose Change propofol (DIPRIVAN) infusion 10 mg/mL 100 mL - Dose: 40 mcg/kg/min ; Rate: 22.6 mL/hr ; Route: Intravenous ; Line: Peripheral IV 05/18/25 2345 22 G Left;Posterior Hand ; Scheduled Time: 0135 Janneth Mckeon RN     01:59 Medication Rate/Dose Change propofol (DIPRIVAN) infusion 10 mg/mL 100 mL - Dose: 45 mcg/kg/min ; Rate: 25.5 mL/hr ; Route: Intravenous ; Line: Peripheral IV 05/18/25 2345 22 G Left;Posterior Hand ; Scheduled Time: 0159 Janneth Mckeon RN     02:08 Medication Rate/Dose Change propofol (DIPRIVAN) infusion 10 mg/mL 100 mL - Dose: 50 mcg/kg/min ; Rate: 28.3 mL/hr ; Route: Intravenous ; Line: Peripheral IV 05/18/25 2345 22 G Left;Posterior Hand ; Scheduled Time: 0208 Janneth Mckeon RN   02:08 Data C= Choice of Analgesia and Sedation  RASS (Medina Agitation-Sedation Scale): 1-->restless Janneth Mckeon RN   02:09 Medication Given ondansetron (ZOFRAN) injection 4 mg - Dose: 4 mg ; Route: Intravenous ; Line: Peripheral IV 05/19/25 0040 18 G Anterior;Left Forearm ; Scheduled Time: 0216 Janneth Mckeon RN     02:53 Medication Given fentaNYL citrate (PF) (SUBLIMAZE) injection 102 mcg - Dose: 102 mcg ; Route: Intravenous ; Line: Peripheral IV 05/19/25 0040 18 G Anterior;Left Forearm ; Scheduled Time: 0229 Janneth Mckeon RN     03:00 Patient admitted To department [facility] INTENSIVE CARE Nuno Damon RN Martin, Elaina J, VAN   Respiratory Therapist  Respiratory Therapy     Plan of Care     Signed     Date of Service: 05/19/25 0347  Creation Time: 05/19/25 0347     Signed         Goal Outcome Evaluation: Patient remains stable on current ventilator settings. No changes made at this time. Will work towards liberation as patient status  continues to improve.            Audelia Miller, RN   Registered Nurse  Nursing     Nursing Note     Signed     Date of Service: 05/19/25 1225  Creation Time: 05/19/25 1225     Signed         Patient caregiver called out to staff and said patient was having a seizure. Staff entered room and observed patient clenching fists, twitching eyelids and laying on right side. Episode lasted approximated 3-4 minutes. MD was paged. No new orders received. Seizures precautions maintained.                       History & Physical        Jose Alfredo Guallpa APRN at 05/19/25 0315       Attestation signed by Alistair Narayan MD at 05/19/25 1031      I have reviewed this documentation and agree.                           [facility] Intensivist Services    Date of Admission: 5/18/2025  Date of Note: 05/19/25  Primary Care Physician: Rosie Olvera MD    History   Ms. Cooley is a 29-year-old female with past medical history significant for pseudoseizures, autism, and pica who is well known to this facility unfortunately for foreign body ingestion and seizure-like activity for which she subsequently required mechanical ventilation and has had multiple evaluations by the many of our neurologist colleagues and follows with neurologist Dr. Perkins with [facility] in [facility].  In the evening hours of 5/18/2025 she presented to [facility] emergency department with her caretaker after reporting newly having multiple seizures at home and witnessed seizure-like activity upon arrival to the emergency department. Unfortunately, she was once again intubated for airway protection.     ED course workup was relatively unremarkable however did show an elevated serum lactate at 3.2.  CT head unremarkable    Intensivist service was consulted for admission and further medical management of intubated and mechanically ventilated patient following seizure-like  activity.     I have seen and evaluated Ms. Cooley upon arrival to ICU.  She is intubated, mechanically ventilated and adequately sedated and tolerating the ventilator without issue.  Vital signs are unremarkable.     Home medication list includes doxazosin, fluoxetine, hydroxyzine, lamotrigine, Nayzilam, quetiapine    Chart review     Most recent office visit with [facility] neurolgist, Dr. 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 At her last visit, Lamictal was increased to 200 mg BID. She denies convulsive seizure. Her mother is monitoring her seizure event. Also re-prescribed Nayzilam 5 mg/0.1 ml nasal spray for rescue treatment.  At today's follow up visit, she reports effective cognitive biofeedback by psychologist, significantly decreased frequency of PNES.  She however reports an episode of epileptic seizure on 4/20/2025, in the setting of missing one dose of Lamictal, and she was sleep deprived. Fortunately, her SZ was successfully aborted with Nayzilam spray.   Lamictal increased from 200 mg to 300 mg BID    2/1/23 note from Dr. Martínez:  I have reviewed this documentation and agree. Patient seen and examined by me. I am very familiar with this patient. I was able to speak to her mother over the phone again this morning. She tells me yesterday Duane told her mother that she felt like she was going to have a seizure. She had approximately 4-5 events with her mother reassuring her. She then told her mother that she demanded that she call emergency services. Her mother opposed this saying that there was nothing that needed to be done. At that, the patient threatened her saying that if she did not call emergency services that she would alert Adult Protective Services on her mother. She was taken to  "[facility]. They reportedly did have previous records from [facility] documenting that these were pseudoseizures. Despite this they called our transfer center and elected to share this information regarding her. She was transferred to us after intubation and multiple sedating agents. When she got here we saw her this morning and neurologic consultation. She was extubated immediately. She was attempting another pseudoseizure but I told her that I wanted to \"change her meds.\" In the past she has been resistant to this. She immediately woke up and said that she did not want her medications changed. I told her mother that she was free to come get her and the patient immediately started ripping things off. I spoke with her mother at length. I expressed concern that if she continues to show up at outside hospitals with pseudoseizure activity she will continue to be intubated I think there is a great risk for this. It is my believe that the patient is now seeking secondary gain. I believe that she has a goal to be intubated and placed on propofol drips. She must receive some sort of euphoria from this. I am recommending that she invest in a medical alert bracelet that can alert anyone seeing her that these are not real seizures. I spoke over the phone with Dr. Declan Lira, her primary neurologist. He has agreed to see her in clinic with hopes to admit her to an epilepsy monitoring unit and wean off all 4 of her antiepileptics.     Past Medical History     Active and Resolved Problems  Active Hospital Problems    Diagnosis  POA    **Status epilepticus [G40.901]  Yes      Resolved Hospital Problems   No resolved problems to display.       Past Medical History:   Past Medical History:   Diagnosis Date    Anxiety     Depression     History of  2017    History of congenital abnormality 2017    Late prenatal care affecting pregnancy in second trimester 2017    Mental " deficiency 2017    Previous  delivery affecting pregnancy 2017    Psychogenic nonepileptic seizure        Prior Surgeries: She  has a past surgical history that includes  section; Esophagogastroduodenoscopy (N/A, 2024); Esophagogastroduodenoscopy (N/A, 2024); and Esophagogastroduodenoscopy (N/A, 2025).    Past Surgical History:   Past Surgical History:   Procedure Laterality Date     SECTION      ENDOSCOPY N/A 2024    Procedure: ESOPHAGOGASTRODUODENOSCOPY WITH ANESTHESIA;  Surgeon: Tian Morris MD;  Location: [facility] OR;  Service: Gastroenterology;  Laterality: N/A;  pre foreign body in stomach  post foreign body passed out of stomach  dr kaiden h[place]    ENDOSCOPY N/A 2024    Procedure: ESOPHAGOGASTRODUODENOSCOPY WITH ANESTHESIA;  Surgeon: Herman Murry MD;  Location:  [facility] OR;  Service: Gastroenterology;  Laterality: N/A;  pre foreign body  post no foreign body    ENDOSCOPY N/A 2025    Procedure: ESOPHAGOGASTRODUODENOSCOPY;  Surgeon: Brennen White MD;  Location: [facility] OR;  Service: Gastroenterology;  Laterality: N/A;  pre: foreign body  post: foreign body       Social and Family History     Family History:  family history is not on file.    Tobacco/Social History:  reports that she has an unknown smoking status. She has never used smokeless tobacco. Alcohol use questions deferred to the physician. Drug use questions deferred to the physician.    Allergies     Allergies:   She has no known allergies.    No Known Allergies    Labs     Basic Labs:  Common labs          2024    18:57 2024    05:41 2025    00:00 2025    01:15   Common Labs   Glucose   107     BUN   6     Creatinine   0.74     Sodium   138  140    Potassium   3.9     Chloride   102     Calcium   9.6     Albumin   4.5     Total Bilirubin   <0.2     Alkaline Phosphatase   87     AST (SGOT)   17     ALT (SGPT)   15     WBC 11.4     7.5     9.96    "  Hemoglobin 8.5     8.6     12.2     Hematocrit 29.4     29.1     40.0     Platelets 245     231     200        Details          This result is from an external source.               Diabetic:      Inpatient Medications     Medications: Scheduled Meds:fentaNYL citrate (PF), 1 mcg/kg, Intravenous, Once  LORazepam, 2 mg, Intravenous, Once      Continuous Infusions:propofol, 5-50 mcg/kg/min, Last Rate: 50 mcg/kg/min (05/19/25 0208)      PRN Meds:.  etomidate    succinylcholine    I have reviewed the patient's current medications.   Outpatient Medications     Outpatient Medications:   No outpatient medications have been marked as taking for the 5/18/25 encounter (Hospital Encounter).       Current Antibiotics     This patient does not have an active medication from one of the medication groupers.    Exam     Vitals: Her  height is 167.6 cm (66\") and weight is 102 kg (224 lb 3 oz). Her oral temperature is 98.1 °F (36.7 °C). Her blood pressure is 97/66 and her pulse is 100. Her respiration is 16 and oxygen saturation is 99%.     GENERAL: On ventilator and sedated, no acute distress.  Nonverbal at baseline.  SKIN:  Warm, dry  EYES:  Pupils equal, round and reactive to light.    HEAD:  Normocephalic.  NECK:  Supple   RESP:  Lungs clear to auscultation. Good airflow. Normal respiratory effort.   CARDIAC:  Regular rate and rhythm. Normal S1,S2. No edema  GI:  Soft, nontender, normal bowel sounds  MSK:  Normal muscle bulk, tone  NEUROLOGICAL: Sedated  PSYCHIATRIC: Sedated    Results and Cultures Review     Result Review:  I have personally reviewed the results from the time of this admission to 5/19/2025 02:31 CDT and agree with these findings:  [x]  Laboratory list / accordion  [x]  Microbiology  [x]  Radiology  [x]  EKG/Telemetry   []  Cardiology/Vascular   []  Pathology  [x]  Old records  []  Other:  Most notable findings include: Per HPI      Culture Data:   No results found for: \"BLOODCX\", \"URINECX\", \"WOUNDCX\", \"MRSACX\", " "\"RESPCX\", \"STOOLCX\"    Assessment/Plan   29-year-old female with past medical history significant for pseudoseizures, autism, and pica who is well known to this facility unfortunately for foreign body ingestion and seizure-like activity for which she subsequently required mechanical ventilation and has had multiple evaluations by the many of our neurologist colleagues and follows with neurologist Dr. Perkins with [facility] in [facility].  In the evening hours of 5/18/2025 she presented to [facility] emergency department with her caretaker after reporting newly having multiple seizures at home and witnessed seizure-like activity upon arrival to the emergency department. Unfortunately, she was once again intubated for airway protection.     Acute problems  *Acute respiratory failure   *seizure like activity    Chronic problems  *autism  *pseudoseizures  *pica    Plan     #1 acute respiratory failure  - Secondary to seizure-like activity with concern for loss of airway intubated for airway protection  - maintain mechanical ventilation and sedation with plan for early AM evaluation, SBT and hopefully successful extubation  -ABG in AM    #2 seizure-like activity  - Difficult to fully know if this was truly a epileptic seizure but more likely pseudoseizure with her history and several neurological evaluations in the past with no eeg evidence of elliptic form  - continue home medication Lamictal 300 mg twice daily   - neurology evaluation likely not needed but with discuss with supervising MD in the AM    #3 autism  - supportive care  - review and continue pertinent home medications as able      VTE Prophylaxis:    SCDs and lovenox       Total critical care time: Approximately 40 minutes    Due to a high probability of clinically significant, life threatening deterioration, the patient required my highest level of preparedness to intervene emergently and I personally spent this critical " care time directly and personally managing the patient.     This critical care time included obtaining a history; examining the patient; pulse oximetry; ordering and review of studies; arranging urgent treatment with development of a management plan; evaluation of patient's response to treatment; frequent reassessment; and, discussions with other providers.    This critical care time was performed to assess and manage the high probability of imminent, life-threatening deterioration that could result in multi-organ failure. It was exclusive of separately billable procedures and treating other patients and teaching time.    Please see MDM section and the rest of the note for further information on patient assessment and treatment.    Part of this note may be an electronic transcription/translation of spoken language to printed text using the Dragon Dictation System.    Electronically signed by TELMA Hair on 5/19/2025 at 0315    Electronically signed by Alistair Narayan MD at 05/19/25 1031          Emergency Department Notes        Janneth Mckeon RN at 05/19/25 0030                THE FOLLOWING SEIZURE PRECAUTIONS WERE INITIATED:             [ x]   MAINTAIN PATIENT'S SAFETY                       [ x]   PLACE ON CARDIAC, BP, AND PULSE OXIMETRY MONITOR                [x ] MAINTAIN 02 SAT> 94%             [ x] YANKAUER SUCTION AT BEDSIDE             [ x] BED RAILS ELEVATED, SEIZURE PADS IN PLACE             [ x] CALL LIGHT IN REACH             [ x] KEEP BED IN LOW POSTION             [ x] DECREASE STIMULATION: DIM LIGHT, TURN OF TV OFF             [ x] EDUCATE FAMILY ON SEIZURE ACTIVITY AND NOTIFY STAFF WITH NEW ACTIVITY                 Electronically signed by Janneth Mckeon RN at 05/19/25 0205       Hank Holland MD at 05/18/25 2348        Procedure Orders    1. Intubation [[id number]] ordered by Hank Holland MD                 Subjective   History of Present Illness  This is a  21-year-old female with history of seizures, depression, in the emergency room with caretaker, the patient has a history of autism, nonverbal, also has history of psychogenic nonepileptic seizures, she is on lamotrigine, in the emergency room for multiple seizures at home.  She had a potential witnessed seizure in the emergency room on arrival.        Review of Systems   All other systems reviewed and are negative.      Past Medical History:   Diagnosis Date    Anxiety     Depression     History of  2017    History of congenital abnormality 2017    Late prenatal care affecting pregnancy in second trimester 2017    Mental deficiency 2017    Previous  delivery affecting pregnancy 2017    Psychogenic nonepileptic seizure        No Known Allergies    Past Surgical History:   Procedure Laterality Date     SECTION      ENDOSCOPY N/A 2024    Procedure: ESOPHAGOGASTRODUODENOSCOPY WITH ANESTHESIA;  Surgeon: Tian Morris MD;  Location: [facility] OR;  Service: Gastroenterology;  Laterality: N/A;  pre foreign body in stomach  post foreign body passed out of stomach  dr kaiden houston    ENDOSCOPY N/A 2024    Procedure: ESOPHAGOGASTRODUODENOSCOPY WITH ANESTHESIA;  Surgeon: Herman Murry MD;  Location: [facility] OR;  Service: Gastroenterology;  Laterality: N/A;  pre foreign body  post no foreign body    ENDOSCOPY N/A 2025    Procedure: ESOPHAGOGASTRODUODENOSCOPY;  Surgeon: Brennen White MD;  Location: [facility] OR;  Service: Gastroenterology;  Laterality: N/A;  pre: foreign body  post: foreign body       History reviewed. No pertinent family history.    Social History     Socioeconomic History    Marital status:    Tobacco Use    Smoking status: Unknown    Smokeless tobacco: Never   Vaping Use    Vaping status: Never Used   Substance and Sexual Activity    Alcohol use: Defer    Drug use: Defer    Sexual activity: Defer     Partners: Male            Objective   Physical Exam  Constitutional:       Comments: Is following command.   HENT:      Head: Normocephalic.      Nose: No congestion.   Eyes:      Extraocular Movements: Extraocular movements intact.      Pupils: Pupils are equal, round, and reactive to light.   Cardiovascular:      Rate and Rhythm: Regular rhythm. Tachycardia present.      Heart sounds: No murmur heard.  Pulmonary:      Effort: Pulmonary effort is normal. No respiratory distress.      Breath sounds: Normal breath sounds. No wheezing or rales.   Chest:      Chest wall: No tenderness.   Abdominal:      General: Bowel sounds are normal. There is no distension.      Palpations: Abdomen is soft.      Tenderness: There is no abdominal tenderness. There is no right CVA tenderness, left CVA tenderness or guarding.   Musculoskeletal:         General: Normal range of motion.      Cervical back: Neck supple. No rigidity or tenderness.   Lymphadenopathy:      Cervical: No cervical adenopathy.   Skin:     General: Skin is warm.   Neurological:      General: No focal deficit present.      Mental Status: She is alert and oriented to person, place, and time. Mental status is at baseline.      Comments: No focal deficits present.         Intubation    Date/Time: 5/19/2025 5:43 AM    Performed by: Hank Holland MD  Authorized by: Sebastian Cifuentes DO    Consent:     Consent obtained:  Emergent situation  Universal protocol:     Patient identity confirmed:  Arm band  Pre-procedure details:     Indications: airway obstruction and altered consciousness      Patient status:  Unresponsive    Look externally: no concerns      Pharmacologic strategy: RSI      Induction agents:  Etomidate    Paralytics:  Succinylcholine  Procedure details:     Preoxygenation:  Nasal cannula    Number of attempts:  1  Successful intubation attempt details:     Intubation method:  Oral    Intubation technique: video assisted      Laryngoscope blade:  Mac 3    Tube  size (mm):  7.5    Tube type:  Cuffed    Tube visualized through cords: yes    Placement assessment:     Placement verification: chest rise, colorimetric ETCO2 and CXR verification    Post-procedure details:     Procedure completion:  Tolerated well, no immediate complications            ED Course  ED Course as of 05/19/25 0546   Mon May 19, 2025   0026 Patient required intubation for concerns of seizures concerns for status epilepticus as the patient did not come back to her baseline despite of getting Ativan.  She has had status epilepticus in the past. [SG]   0207 ET tube in place.  CT head negative for acute findings.  Will go ahead and call ICU.  He remained stable with a propofol drip. [SG]   0211 Dr. Cifuentes accepted admit.  [SG]   0213 lactic acid elevation is likely due to seizures, no concerns for infection as being the cause. [SG]      ED Course User Index  [SG] Hank Holland MD                                                       Medical Decision Making  21-year-old female with potential seizure epileptic versus nonepileptic psychogenic.  She was given 1 mg of Ativan, IV fluids.  Pending hCG, CBC, chemistry, CT of the brain.  No concerns for fall or trauma.  Pending electrolytes, CBC, lactic acid as well as CPK for evaluation of seizure.      Blood work nonactionable in the emergency room.  CT of the brain was negative for acute findings.  There was clinical concerns for status epilepticus in the emergency room, as the patient never went back to her baseline.  She had multiple episodes of jerking movement despite of receiving Ativan, with significant elevation of her heart rate concerning for a tonic-clonic seizure.  As such decision was made to protect airway, for concerns for status epilepticus.  Patient was admitted to the ICU, she was started on a propofol drip    Problems Addressed:  Status epilepticus: complicated acute illness or injury    Amount and/or Complexity of Data Reviewed  Labs:  ordered.  Radiology: ordered.    Risk  Prescription drug management.  Decision regarding hospitalization.        Final diagnoses:   Status epilepticus       ED Disposition  ED Disposition       ED Disposition   Decision to Admit    Condition   --    Comment   Level of Care: Critical Care [6]   Diagnosis: Status epilepticus [681818]   Admitting Physician: KWAKU GONZALES [[id number]]   Attending Physician: KWAKU GONZALES [[id number]]   Certification: I Certify That Inpatient Hospital Services Are Medically Necessary For Greater Than 2 Midnights                 No follow-up provider specified.       Medication List      No changes were made to your prescriptions during this visit.            Hank Holland MD  05/19/25 0546      Electronically signed by Hank Holland MD at 05/19/25 0546       Vital Signs (last 2 days)       Date/Time Temp Temp src Pulse Resp BP Patient Position SpO2    05/19/25 1124 -- -- 115 -- 110/59 Lying --    05/19/25 1058 97.4 (36.3) Axillary 119 18 107/57 Lying 96    05/19/25 0915 -- -- 85 -- 103/69 -- 96    05/19/25 0900 -- -- 87 -- 104/64 -- 95    05/19/25 0845 -- -- 99 -- 103/74 -- 98    05/19/25 0830 -- -- 93 -- 107/95 -- 98    05/19/25 0815 -- -- 113 -- 106/70 -- 99    05/19/25 0800 97.6 (36.4) Axillary 87 19 98/62 -- 95    05/19/25 0745 -- -- 89 -- 99/62 -- 95    05/19/25 0730 -- -- 88 -- 111/73 -- 96    05/19/25 0715 -- -- 87 -- 115/69 -- 97    05/19/25 0700 -- -- 98 -- 113/78 -- 97    05/19/25 0645 -- -- 114 -- 110/51 -- 98    05/19/25 0600 -- -- 73 -- 89/63 -- 99    05/19/25 0500 -- -- 83 -- 92/63 -- 100    05/19/25 0431 -- -- 78 16 -- -- 99    05/19/25 0400 -- -- 79 -- 93/65 -- 99    05/19/25 0315 -- -- 79 -- 93/60 -- 100    05/19/25 0311 -- -- 83 -- -- -- 99    05/19/25 0305 97.2 (36.2) Axillary 65 -- 96/62 -- 98    05/19/25 0231 -- -- 86 -- 94/62 -- --    05/19/25 0216 -- -- 100 16 97/66 -- 99    05/19/25 0201 -- -- 108 19 104/75 -- 95    05/19/25 0131 -- -- 101 22  92/68 -- 95    05/19/25 0116 -- -- 103 -- 90/63 -- --    05/19/25 0115 98.1 (36.7) Oral -- -- -- -- --    05/19/25 0111 -- -- 110 20 92/56 -- 98    05/19/25 0100 -- -- 134 -- 92/56 -- --    05/19/25 0015 -- -- 168 -- -- -- 82          Oxygen Therapy (last 2 days)       Date/Time SpO2 Device (Oxygen Therapy) Flow (L/min) (Oxygen Therapy) Oxygen Concentration (%) ETCO2 (mmHg)    05/19/25 1058 96 room air -- -- --    05/19/25 0915 96 -- -- -- --    05/19/25 0900 95 -- -- -- --    05/19/25 0845 98 -- -- -- --    05/19/25 0830 98 -- -- -- --    05/19/25 0815 99 -- -- -- --    05/19/25 0800 95 room air -- -- --    05/19/25 0745 95 -- -- -- --    05/19/25 0730 96 -- -- -- --    05/19/25 0715 97 -- -- -- --    05/19/25 0700 97 -- -- -- --    05/19/25 0645 98 room air -- -- --    05/19/25 0600 99 ventilator -- -- 40    05/19/25 0500 100 -- -- -- --    05/19/25 0431 99 ventilator -- 30 --    05/19/25 0400 99 ventilator -- -- 40    05/19/25 0315 100 -- -- -- --    05/19/25 0311 99 -- -- -- --    05/19/25 0305 98 ventilator -- -- --    05/19/25 0231 -- -- -- -- 39    05/19/25 0216 99 ventilator -- 30 39    05/19/25 0201 95 ventilator -- 30 39    05/19/25 0131 95 ventilator -- -- 36    05/19/25 0116 -- -- -- -- 35    05/19/25 0111 98 ventilator -- 30 --    05/19/25 0100 -- -- -- -- 38    05/19/25 0015 82 -- -- -- --          Intake & Output (last 2 days)         05/17 0701 05/18 0700 05/18 0701 05/19 0700 05/19 0701 05/20 0700    I.V. (mL/kg)  106.2 (1.1)     IV Piggyback  1000     Total Intake(mL/kg)  1106.2 (11.7)     Urine (mL/kg/hr)  750     Total Output  750     Net  +356.2                  Facility-Administered Medications as of 5/19/2025   Medication Dose Route Frequency Provider Last Rate Last Admin    sennosides-docusate (PERICOLACE) 8.6-50 MG per tablet 2 tablet  2 tablet Oral BID Jose Alfredo Guallpa APRN   2 tablet at 05/19/25 0806    And    polyethylene glycol (MIRALAX) packet 17 g  17 g Oral Daily PRN Ramu  TELMA Veliz        And    bisacodyl (DULCOLAX) EC tablet 5 mg  5 mg Oral Daily PRN Jose Alfredo Guallpa APRN        And    bisacodyl (DULCOLAX) suppository 10 mg  10 mg Rectal Daily PRN Jose Alfredo Guallpa APRN        Calcium Replacement - Follow Nurse / BPA Driven Protocol   Not Applicable PRN Jose Alfredo Guallpa APRN        [COMPLETED] Chlorhexidine Gluconate Cloth 2 % pads 1 Application  1 Application Topical Once Jose Alfredo Guallpa APRN   1 Application at 05/19/25 0400    [START ON 5/20/2025] Chlorhexidine Gluconate Cloth 2 % pads 1 Application  1 Application Topical Q24H Jose Alfredo Guallpa APRN        enoxaparin sodium (LOVENOX) syringe 40 mg  40 mg Subcutaneous Daily Jose Alfredo Guallpa APRN   40 mg at 05/19/25 0806    [COMPLETED] etomidate (AMIDATE) injection   Intravenous Code / Trauma / Sedation Medication Hank Holland MD   20 mg at 05/19/25 0022    [COMPLETED] fentaNYL citrate (PF) (SUBLIMAZE) injection 102 mcg  1 mcg/kg Intravenous Once Hank Holland MD   102 mcg at 05/19/25 0253    lamoTRIgine (LaMICtal) tablet 300 mg  300 mg Oral Q12H Jose Alfredo Guallpa APRN   300 mg at 05/19/25 0806    [COMPLETED] LORazepam (ATIVAN) injection 1 mg  1 mg Intravenous Once Hank Holland MD   1 mg at 05/18/25 2349    [COMPLETED] LORazepam (ATIVAN) injection 2 mg  2 mg Intravenous Once Hank Holland MD   2 mg at 05/19/25 0016    Magnesium Standard Dose Replacement - Follow Nurse / BPA Driven Protocol   Not Applicable PRN Jose Alfredo Guallpa APRN        mupirocin (BACTROBAN) 2 % nasal ointment 1 Application  1 Application Each Nare BID Jose Alfredo Guallpa APRN   1 Application at 05/19/25 0613    nitroglycerin (NITROSTAT) SL tablet 0.4 mg  0.4 mg Sublingual Q5 Min PRN Jose Alfredo Guallpa APRN        [COMPLETED] ondansetron (ZOFRAN) injection 4 mg  4 mg Intravenous Once Hank Holland MD   4 mg at 05/19/25 0209    Phosphorus Replacement - Follow Nurse / BPA Driven Protocol   Not Applicable PRN Jose Alfredo Guallpa, TELMA        Potassium  "Replacement - Follow Nurse / BPA Driven Protocol   Not Applicable PRN Jose Alfredo Guallpa APRN        QUEtiapine (SEROquel) tablet 100 mg  100 mg Oral Daily Jose Alfredo Guallpa APRN   100 mg at 05/19/25 0806    QUEtiapine (SEROquel) tablet 200 mg  200 mg Oral Nightly Jose Alfredo Guallpa APRN        [COMPLETED] rocuronium (ZEMURON) injection 80 mg  80 mg Intravenous Once Hank Holland MD   80 mg at 05/19/25 0033    [COMPLETED] sodium chloride 0.9 % bolus 1,000 mL  1,000 mL Intravenous Once Hank Holland MD   Stopped at 05/19/25 0255    sodium chloride 0.9 % flush 10 mL  10 mL Intravenous Q12H Jose Alfredo Guallpa APRN   10 mL at 05/19/25 0806    sodium chloride 0.9 % flush 10 mL  10 mL Intravenous PRN Jose Alfredo Guallpa APRN        sodium chloride 0.9 % infusion 40 mL  40 mL Intravenous PRN Jose Alfredo Guallpa APRN        [COMPLETED] succinylcholine (ANECTINE) injection   Intravenous Code / Trauma / Sedation Medication Hank Holland MD   100 mg at 05/19/25 0022     Orders (last 48 hrs)        Start     Ordered    05/20/25 0400  Chlorhexidine Gluconate Cloth 2 % pads 1 Application  Every 24 Hours         05/19/25 0330    05/19/25 2100  QUEtiapine (SEROquel) tablet 200 mg  Nightly         05/19/25 0449    05/19/25 1013  Discontinue Cardiac Monitoring  Once         05/19/25 1012    05/19/25 1012  Transfer Patient  Once         05/19/25 1012    05/19/25 0942  Diet: Diabetic; Consistent Carbohydrate; Fluid Consistency: Thin (IDDSI 0)  Diet Effective Now         05/19/25 0941    05/19/25 0900  sodium chloride 0.9 % flush 10 mL  Every 12 Hours Scheduled         05/19/25 0330    05/19/25 0900  sennosides-docusate (PERICOLACE) 8.6-50 MG per tablet 2 tablet  2 Times Daily        Placed in \"And\" Linked Group    05/19/25 0330    05/19/25 0900  enoxaparin sodium (LOVENOX) syringe 40 mg  Daily         05/19/25 0330    05/19/25 0900  lamoTRIgine (LaMICtal) tablet 300 mg  Every 12 Hours Scheduled         05/19/25 0449 05/19/25 0900  " QUEtiapine (SEROquel) tablet 100 mg  Daily         05/19/25 0449    05/19/25 0630  Extubation  Once         05/19/25 0630    05/19/25 0600  Blood Gas, Arterial -  Morning Draw         05/19/25 0329    05/19/25 0600  CBC & Differential  Daily       05/19/25 0329    05/19/25 0600  Comprehensive Metabolic Panel  Daily       05/19/25 0329    05/19/25 0600  Phosphorus  Daily       05/19/25 0329    05/19/25 0600  Magnesium  Daily       05/19/25 0329    05/19/25 0600  CBC Auto Differential  PROCEDURE ONCE         05/19/25 0329    05/19/25 0544  Intubation  Once        Comments: This order was created via procedure documentation    05/19/25 0543    05/19/25 0450  Restraints Non-Violent or Non-Self Destructive  Calendar Day         05/19/25 0450    05/19/25 0430  mupirocin (BACTROBAN) 2 % nasal ointment 1 Application  2 Times Daily         05/19/25 0330    05/19/25 0430  Chlorhexidine Gluconate Cloth 2 % pads 1 Application  Once         05/19/25 0330    05/19/25 0400  Vital Signs Every Hour and Per Hospital Policy Based on Patient Condition  Every Hour       05/19/25 0330    05/19/25 0400  Intake & Output  Every Hour       05/19/25 0330    05/19/25 0400  Manual Differential  Once,   Status:  Canceled         05/19/25 0359    05/19/25 0340  XR Abdomen KUB  1 Time Imaging         05/19/25 0342    05/19/25 0336  Inpatient Admission  Once         05/19/25 0336    05/19/25 0331  Daily Weights  Daily       05/19/25 0330    05/19/25 0331  Code Status and Medical Interventions: CPR (Attempt to Resuscitate); Full Support  Continuous         05/19/25 0330    05/19/25 0331  If Patient has BG Less Than 80 & is Symptomatic But Not on IV Insulin Protocol - Use Adult Hypoglycemia Treatment Orders  Continuous         05/19/25 0330    05/19/25 0331  NPO Diet NPO Type: Strict NPO  Diet Effective Now,   Status:  Canceled         05/19/25 0330    05/19/25 0330  Potassium Replacement - Follow Nurse / BPA Driven Protocol  As Needed          "05/19/25 0330    05/19/25 0330  Magnesium Standard Dose Replacement - Follow Nurse / BPA Driven Protocol  As Needed         05/19/25 0330    05/19/25 0330  Phosphorus Replacement - Follow Nurse / BPA Driven Protocol  As Needed         05/19/25 0330    05/19/25 0330  Calcium Replacement - Follow Nurse / BPA Driven Protocol  As Needed         05/19/25 0330    05/19/25 0330  Continuous Cardiac Monitoring  Continuous,   Status:  Canceled        Comments: Follow Standing Orders As Outlined in Process Instructions (Open Order Report to View Full Instructions)    05/19/25 0330    05/19/25 0330  Maintain IV Access  Continuous,   Status:  Canceled         05/19/25 0330    05/19/25 0330  Telemetry - Place Orders & Notify Provider of Results When Patient Experiences Acute Chest Pain, Dysrhythmia or Respiratory Distress  Continuous        Comments: Open Order Report to View Parameters Requiring Provider Notification    05/19/25 0330    05/19/25 0330  Continuous Pulse Oximetry  Continuous         05/19/25 0330    05/19/25 0330  Height & Weight  Once         05/19/25 0330    05/19/25 0330  Oral Care - Patient Not on NPPV & Not Intubated  Every Shift       05/19/25 0330    05/19/25 0330  Target Arousal Level RASS 0 to -2  Continuous         05/19/25 0330    05/19/25 0330  Use Mobility Guidelines for Advancement of Activity  Continuous         05/19/25 0330    05/19/25 0330  Insert Peripheral IV  Once         05/19/25 0330    05/19/25 0330  Saline Lock & Maintain IV Access  Continuous         05/19/25 0330    05/19/25 0330  Place Sequential Compression Device  Once         05/19/25 0330    05/19/25 0330  Maintain Sequential Compression Device  Continuous         05/19/25 0330    05/19/25 0329  bisacodyl (DULCOLAX) suppository 10 mg  Daily PRN        Placed in \"And\" Linked Group    05/19/25 0330    05/19/25 0329  nitroglycerin (NITROSTAT) SL tablet 0.4 mg  Every 5 Minutes PRN         05/19/25 0330    05/19/25 0329  sodium chloride " "0.9 % flush 10 mL  As Needed         05/19/25 0330    05/19/25 0329  sodium chloride 0.9 % infusion 40 mL  As Needed         05/19/25 0330    05/19/25 0329  polyethylene glycol (MIRALAX) packet 17 g  Daily PRN        Placed in \"And\" Linked Group    05/19/25 0330    05/19/25 0329  bisacodyl (DULCOLAX) EC tablet 5 mg  Daily PRN        Placed in \"And\" Linked Group    05/19/25 0330    05/19/25 0322  Continuous Pulse Oximetry  Continuous,   Status:  Canceled         05/19/25 0321    05/19/25 0300  STAT Lactic Acid, Reflex  PROCEDURE ONCE         05/19/25 0025    05/19/25 0229  fentaNYL citrate (PF) (SUBLIMAZE) injection 102 mcg  Once         05/19/25 0213    05/19/25 0216  ondansetron (ZOFRAN) injection 4 mg  Once         05/19/25 0200    05/19/25 0213  Inpatient Admission  Once         05/19/25 0213    05/19/25 0213  Cardiac Monitoring  Continuous,   Status:  Canceled        Comments: Follow Standing Orders As Outlined in Process Instructions (Open Order Report to View Full Instructions)    05/19/25 0213    05/19/25 0213  Target Arousal Level RASS -1 to -2  Continuous,   Status:  Canceled         05/19/25 0213    05/19/25 0210  Ventilator - Vent Mode: AC/VC; Rate: Other; Rate: 16; FiO2: Titrate Per SpO2; Titrate Oxygen for SpO2: 90 - 95%; PEEP: 5; Tidal Volume: mL; TV: 450  Continuous,   Status:  Canceled         05/19/25 0210    05/19/25 0152  XR Chest 1 View  1 Time Imaging         05/19/25 0152    05/19/25 0114  Blood Gas, Arterial With Co-Ox  PROCEDURE ONCE         05/19/25 0115    05/19/25 0114  Blood Gas, Arterial With Co-Ox  Once         05/19/25 0114    05/19/25 0051  rocuronium (ZEMURON) injection 80 mg  Once         05/19/25 0035    05/19/25 0035  propofol (DIPRIVAN) infusion 10 mg/mL 100 mL  Titrated,   Status:  Discontinued         05/19/25 0019    05/19/25 0031  Midazolam HCl (PF) (VERSED) 5 MG/5ML injection  - ADS Override Pull  Status:  Discontinued        Note to Pharmacy: Created by cabinet override    " 05/19/25 0031    05/19/25 0029  LORazepam (ATIVAN) injection 2 mg  Once,   Status:  Discontinued         05/19/25 0013    05/19/25 0022  succinylcholine (ANECTINE) injection  Code / Trauma / Sedation Medication         05/19/25 0022    05/19/25 0022  etomidate (AMIDATE) injection  Code / Trauma / Sedation Medication         05/19/25 0022    05/19/25 0008  Manual Differential  Once         05/19/25 0007    05/19/25 0002  sodium chloride 0.9 % bolus 1,000 mL  Once         05/18/25 2346    05/19/25 0002  LORazepam (ATIVAN) injection 1 mg  Once         05/18/25 2346    05/18/25 2354  LORazepam (ATIVAN) injection 2 mg  Once         05/18/25 2338    05/18/25 2349  hCG, Serum, Qualitative  STAT         05/18/25 2348    05/18/25 2348  CK  STAT         05/18/25 2348    05/18/25 2348  Urinalysis With Culture If Indicated - Straight Cath  STAT         05/18/25 2348    05/18/25 2348  XR Chest 1 View  1 Time Imaging         05/18/25 2348    05/18/25 2348  CT Head Without Contrast  1 Time Imaging         05/18/25 2348    05/18/25 2347  CBC & Differential  Once         05/18/25 2348    05/18/25 2347  Comprehensive Metabolic Panel  Once         05/18/25 2348    05/18/25 2347  Lactic Acid, Plasma  STAT         05/18/25 2348    05/18/25 2347  CBC Auto Differential  PROCEDURE ONCE         05/18/25 2348    Unscheduled  Blood Gas, Arterial -  As Needed       05/19/25 0322    --  FLUoxetine (PROzac) 20 MG capsule  Daily         05/19/25 0929    --  FLUoxetine (PROzac) 40 MG capsule  Daily         05/19/25 0929    --  omeprazole (priLOSEC) 20 MG capsule  Daily PRN         05/19/25 0929

## 2025-05-19 NOTE — NURSING NOTE
Patient found seizing. Patient seizing approximately 8 minutes. RRT was called. MD at bedside. Patient given ativan and patient moved closer to nurses station. Seizure precautions in place.

## 2025-05-19 NOTE — CASE MANAGEMENT/SOCIAL WORK
Discharge Planning Assessment  [facility]     Patient Name: Duane Cooley  MRN: [id number]  Today's Date: 5/19/2025    Admit Date: 5/18/2025        Discharge Needs Assessment       Row Name 05/19/25 1349       Discharge Needs Assessment    Discharge Coordination/Progress SDOH completed. [place] Community Resource packet given to patient.                   Discharge Plan    No documentation.                      Demographic Summary    No documentation.                  Functional Status    No documentation.                  Psychosocial    No documentation.                  Abuse/Neglect    No documentation.                  Legal    No documentation.                  Substance Abuse    No documentation.                  Patient Forms    No documentation.                     Merlina A Fletcher, RN

## 2025-05-20 ENCOUNTER — APPOINTMENT (OUTPATIENT)
Dept: NEUROLOGY | Facility: HOSPITAL | Age: 30
End: 2025-05-20
Payer: MEDICAID

## 2025-05-20 VITALS
OXYGEN SATURATION: 96 % | BODY MASS INDEX: 36.47 KG/M2 | SYSTOLIC BLOOD PRESSURE: 107 MMHG | HEART RATE: 81 BPM | DIASTOLIC BLOOD PRESSURE: 63 MMHG | HEIGHT: 66 IN | TEMPERATURE: 97.9 F | RESPIRATION RATE: 18 BRPM | WEIGHT: 226.9 LBS

## 2025-05-20 PROBLEM — G40.901 STATUS EPILEPTICUS: Status: RESOLVED | Noted: 2025-05-19 | Resolved: 2025-05-20

## 2025-05-20 LAB
ALBUMIN SERPL-MCNC: 4.2 G/DL (ref 3.5–5.2)
ALBUMIN/GLOB SERPL: 1.8 G/DL
ALP SERPL-CCNC: 87 U/L (ref 39–117)
ALT SERPL W P-5'-P-CCNC: 12 U/L (ref 1–33)
ANION GAP SERPL CALCULATED.3IONS-SCNC: 13 MMOL/L (ref 5–15)
ANISOCYTOSIS BLD QL: ABNORMAL
AST SERPL-CCNC: 14 U/L (ref 1–32)
BASOPHILS # BLD MANUAL: 0.07 10*3/MM3 (ref 0–0.2)
BASOPHILS NFR BLD MANUAL: 1 % (ref 0–1.5)
BILIRUB SERPL-MCNC: 0.2 MG/DL (ref 0–1.2)
BUN SERPL-MCNC: 7 MG/DL (ref 6–20)
BUN/CREAT SERPL: 9.7 (ref 7–25)
BURR CELLS BLD QL SMEAR: ABNORMAL
CALCIUM SPEC-SCNC: 9.2 MG/DL (ref 8.6–10.5)
CHLORIDE SERPL-SCNC: 101 MMOL/L (ref 98–107)
CO2 SERPL-SCNC: 24 MMOL/L (ref 22–29)
CREAT SERPL-MCNC: 0.72 MG/DL (ref 0.57–1)
DEPRECATED RDW RBC AUTO: ABNORMAL FL
EGFRCR SERPLBLD CKD-EPI 2021: 117 ML/MIN/1.73
EOSINOPHIL # BLD MANUAL: 0.14 10*3/MM3 (ref 0–0.4)
EOSINOPHIL NFR BLD MANUAL: 2 % (ref 0.3–6.2)
ERYTHROCYTE [DISTWIDTH] IN BLOOD BY AUTOMATED COUNT: ABNORMAL %
GLOBULIN UR ELPH-MCNC: 2.4 GM/DL
GLUCOSE SERPL-MCNC: 105 MG/DL (ref 65–99)
HCT VFR BLD AUTO: 38.1 % (ref 34–46.6)
HGB BLD-MCNC: 12 G/DL (ref 12–15.9)
LYMPHOCYTES # BLD MANUAL: 3.15 10*3/MM3 (ref 0.7–3.1)
LYMPHOCYTES NFR BLD MANUAL: 4 % (ref 5–12)
MAGNESIUM SERPL-MCNC: 2.1 MG/DL (ref 1.6–2.6)
MCH RBC QN AUTO: 23.9 PG (ref 26.6–33)
MCHC RBC AUTO-ENTMCNC: 31.5 G/DL (ref 31.5–35.7)
MCV RBC AUTO: 75.9 FL (ref 79–97)
MICROCYTES BLD QL: ABNORMAL
MONOCYTES # BLD: 0.28 10*3/MM3 (ref 0.1–0.9)
NEUTROPHILS # BLD AUTO: 3.36 10*3/MM3 (ref 1.7–7)
NEUTROPHILS NFR BLD MANUAL: 48 % (ref 42.7–76)
PHOSPHATE SERPL-MCNC: 4.3 MG/DL (ref 2.5–4.5)
PLAT MORPH BLD: NORMAL
PLATELET # BLD AUTO: 190 10*3/MM3 (ref 140–450)
PMV BLD AUTO: ABNORMAL FL
POIKILOCYTOSIS BLD QL SMEAR: ABNORMAL
POLYCHROMASIA BLD QL SMEAR: ABNORMAL
POTASSIUM SERPL-SCNC: 3.5 MMOL/L (ref 3.5–5.2)
PROT SERPL-MCNC: 6.6 G/DL (ref 6–8.5)
RBC # BLD AUTO: 5.02 10*6/MM3 (ref 3.77–5.28)
SODIUM SERPL-SCNC: 138 MMOL/L (ref 136–145)
SPHEROCYTES BLD QL SMEAR: ABNORMAL
TARGETS BLD QL SMEAR: ABNORMAL
VARIANT LYMPHS NFR BLD MANUAL: 36 % (ref 19.6–45.3)
VARIANT LYMPHS NFR BLD MANUAL: 9 % (ref 0–5)
WBC MORPH BLD: NORMAL
WBC NRBC COR # BLD AUTO: 7 10*3/MM3 (ref 3.4–10.8)

## 2025-05-20 PROCEDURE — 83735 ASSAY OF MAGNESIUM: CPT

## 2025-05-20 PROCEDURE — 85025 COMPLETE CBC W/AUTO DIFF WBC: CPT

## 2025-05-20 PROCEDURE — 80053 COMPREHEN METABOLIC PANEL: CPT

## 2025-05-20 PROCEDURE — 85007 BL SMEAR W/DIFF WBC COUNT: CPT

## 2025-05-20 PROCEDURE — 84100 ASSAY OF PHOSPHORUS: CPT

## 2025-05-20 PROCEDURE — 25010000002 ENOXAPARIN PER 10 MG

## 2025-05-20 RX ORDER — LEVETIRACETAM 500 MG/1
500 TABLET ORAL 2 TIMES DAILY
Qty: 60 TABLET | Refills: 0 | Status: SHIPPED | OUTPATIENT
Start: 2025-05-20 | End: 2025-05-20 | Stop reason: HOSPADM

## 2025-05-20 RX ORDER — POTASSIUM CHLORIDE 1500 MG/1
40 TABLET, EXTENDED RELEASE ORAL EVERY 4 HOURS
Status: COMPLETED | OUTPATIENT
Start: 2025-05-20 | End: 2025-05-20

## 2025-05-20 RX ADMIN — Medication 10 ML: at 08:41

## 2025-05-20 RX ADMIN — SENNOSIDES, DOCUSATE SODIUM 2 TABLET: 50; 8.6 TABLET, FILM COATED ORAL at 08:41

## 2025-05-20 RX ADMIN — POTASSIUM CHLORIDE 40 MEQ: 1500 TABLET, EXTENDED RELEASE ORAL at 06:39

## 2025-05-20 RX ADMIN — QUETIAPINE FUMARATE 100 MG: 25 TABLET ORAL at 08:40

## 2025-05-20 RX ADMIN — POTASSIUM CHLORIDE 40 MEQ: 1500 TABLET, EXTENDED RELEASE ORAL at 11:38

## 2025-05-20 RX ADMIN — LAMOTRIGINE 300 MG: 100 TABLET ORAL at 08:41

## 2025-05-20 RX ADMIN — ENOXAPARIN SODIUM 40 MG: 100 INJECTION SUBCUTANEOUS at 08:40

## 2025-05-20 NOTE — PROGRESS NOTES
Neurology Progress Note      Chief Complaint:  spells  Length of Stay:  1   Subjective     Subjective:    The patient has had no spells this morning.  Her caretaker is at her side.  She is laying sideways across the bed.  She refuses to speak to me but mumbles occasionally.  She answers my questions using her cell phone and typing on a notes naya.  She tells me that this morning her legs will not work.  She gives no effort when I ask for her to push or pull for formal muscle testing.  I asked if she would like an EEG to discover whether there is any evidence of epileptiform activity that could cause muscle weakness.  She refuses to have this.  Her caretaker also informs me that they have no ride home.  They are out of gas money and need rides back to some [place].  Medications:  Current Facility-Administered Medications   Medication Dose Route Frequency Provider Last Rate Last Admin    sennosides-docusate (PERICOLACE) 8.6-50 MG per tablet 2 tablet  2 tablet Oral BID Jose Alfredo Guallpa APRN   2 tablet at 05/20/25 0841    And    polyethylene glycol (MIRALAX) packet 17 g  17 g Oral Daily PRN Jose Alfredo Guallpa APRN        And    bisacodyl (DULCOLAX) EC tablet 5 mg  5 mg Oral Daily PRN Jose Alfredo Guallpa APRN        And    bisacodyl (DULCOLAX) suppository 10 mg  10 mg Rectal Daily PRN Jose Alfredo Guallpa APRN        Calcium Replacement - Follow Nurse / BPA Driven Protocol   Not Applicable PRN Jose Alfredo Guallpa APRN        enoxaparin sodium (LOVENOX) syringe 40 mg  40 mg Subcutaneous Daily Jose Alfredo Guallpa APRN   40 mg at 05/20/25 0840    lamoTRIgine (LaMICtal) tablet 300 mg  300 mg Oral Q12H Jose Alfredo Guallpa APRN   300 mg at 05/20/25 0841    Magnesium Standard Dose Replacement - Follow Nurse / BPA Driven Protocol   Not Applicable PRJose Alfredo Deshpande APRN        nitroglycerin (NITROSTAT) SL tablet 0.4 mg  0.4 mg Sublingual Q5 Min PRN Jose Alfredo Guallpa APRN        Phosphorus Replacement - Follow Nurse / BPA Driven Protocol   Not  Applicable PRN Jose Alfredo Guallpa APRN        potassium chloride (KLOR-CON M20) CR tablet 40 mEq  40 mEq Oral Q4H Eriberto Trejo MD   40 mEq at 05/20/25 0639    Potassium Replacement - Follow Nurse / BPA Driven Protocol   Not Applicable PRN Jose Alfredo Guallpa APRN        QUEtiapine (SEROquel) tablet 100 mg  100 mg Oral Daily Jose Alfredo Guallpa APRN   100 mg at 05/20/25 0840    QUEtiapine (SEROquel) tablet 200 mg  200 mg Oral Nightly Jose Alfredo Guallpa APRN   200 mg at 05/19/25 2316    sodium chloride 0.9 % flush 10 mL  10 mL Intravenous Q12H Jose Alfredo Guallpa APRN   10 mL at 05/20/25 0841    sodium chloride 0.9 % flush 10 mL  10 mL Intravenous PRN Jose Alfredo Guallpa APRN        sodium chloride 0.9 % infusion 40 mL  40 mL Intravenous PRN Jose Alfredo Guallpa APRN                 Objective      Vital Signs  Temp:  [97.4 °F (36.3 °C)-98.5 °F (36.9 °C)] 97.9 °F (36.6 °C)  Heart Rate:  [] 81  Resp:  [16-18] 18  BP: (104-178)/() 107/63    Physical Exam:    HEENT:  Neck is supple  CVS:  RRR  Lungs:  CTA - B/L  Abd:  NT/ND  Ext:  No edema  Skin:  No rashes    Pertinent Neuro Exam:  Extremely functional exam  Severe dysarthria  Unable to speak but able to text on phone appropriately  Giveaway weakness in all extremities  No tremors    Results Review:      Labs:  CBC unremarkable  No electrolyte abnormalities  LAB RESULTS:      Lab 05/20/25  0552 05/19/25  0334 05/19/25  0253 05/19/25  0000   WBC 7.00 9.60  --  9.96   HEMOGLOBIN 12.0 11.3*  --  12.2   HEMATOCRIT 38.1 38.2  --  40.0   PLATELETS 190 212  --  200   NEUTROS ABS 3.36 6.63  --  7.97*   IMMATURE GRANS (ABS)  --  0.03  --   --    LYMPHS ABS  --  2.18  --   --    MONOS ABS  --  0.68  --   --    EOS ABS 0.14 0.02  --  0.00   MCV 75.9* 78.4*  --  76.8*   LACTATE  --   --  1.6 3.2*         Lab 05/20/25  0434 05/19/25  0334 05/19/25  0115 05/19/25  0000   SODIUM 138 139  --  138   SODIUM, ARTERIAL  --   --  140  --    POTASSIUM 3.5 4.0  --  3.9   CHLORIDE 101 104   --  102   CO2 24.0 22.0  --  21.0*   ANION GAP 13.0 13.0  --  15.0   BUN 7 6  --  6   CREATININE 0.72 0.64  --  0.74   EGFR 117.0 123.6  --  113.2   GLUCOSE 105* 95  --  107*   CALCIUM 9.2 8.6  --  9.6   MAGNESIUM 2.1 2.0  --   --    PHOSPHORUS 4.3 4.4  --   --          Lab 05/20/25  0434 05/19/25  0334 05/19/25  0000   TOTAL PROTEIN 6.6 6.2 7.3   ALBUMIN 4.2 3.7 4.5   GLOBULIN 2.4 2.5 2.8   ALT (SGPT) 12 12 15   AST (SGOT) 14 18 17   BILIRUBIN 0.2 0.2 <0.2   ALK PHOS 87 77 87                     Lab 05/19/25  0115   PH, ARTERIAL 7.409   PCO2, ARTERIAL 40.0   PO2 ART 71.9*   O2 SATURATION ART 95.2   FIO2 30   HCO3 ART 25.3   BASE EXCESS ART 0.6   CARBOXYHEMOGLOBIN 1.4     Brief Urine Lab Results  (Last result in the past 365 days)        Color   Clarity   Blood   Leuk Est   Nitrite   Protein   CREAT   Urine HCG        05/19/25 0052 Yellow   Clear   Negative   Negative   Negative   Negative                 Microbiology Results (last 10 days)       ** No results found for the last 240 hours. **           Imaging:  CT of head negative    Assessment/Plan     Hospital Problem List      Status epilepticus    Seizure-like activity    Impression:  This is a patient who is extremely well-known to me.  She has been worked up many times for spells including prolonged spells that have required ICU admissions and even intubations.  My most comprehensive note on her is dated 7/9/2024.  At that time I give a detailed description of her multiple workups in multiple facilities with multiple neurologist.  I described how she has been diagnosed with pseudoseizures only and has no formal evidence of actual epilepsy.  I describe multiple times where she has been recommended to see epilepsy monitoring units and refuses to have these done.  At that time I recommended complete cessation of all antiepileptics so that she presented no confusion and her presentations that suggested that she required additional antiepileptics.  Like this she  has gone on to seek multiple other neurologic opinions as I warned her against.  Her most recent neurologist is part of the [facility] system.  Dr. MCCORMACK documents that she has both pseudo seizures and epileptic seizures; however, I I can see no evidence of spells captured on EEGs.  All EEGs performed appeared been negative.    Plan:  I am unsure how to manage this patient going further.  I continue to have no objective evidence that the patient is having epileptic seizures.  My previous recommendation of avoiding all antiepileptics has clearly failed as the patient is back on them and has even had more added to her during this admission.  I recommend discharge without the new medication of Keppra.  She is to follow-up with her current neurologist.  Ultimately, my recommendation is that she submit to an epilepsy monitoring unit stay with drastic reductions of her antiepileptics in an attempt to capture a spell to confirm that she does or does not have true epilepsy.  We do not offer epilepsy monitoring units at this facility.      Medical Decision Making    Number/Complexity of Problems  Moderate  1 undiagnosed new problem with uncertain prognosis -   1 acute illness with systemic symptoms -   High  1 acute or chronic illness that poses a threat to life/body function -   High    MDM Data  Moderate - 1/3 categories  Extensive - 2/3 categories    Category 1: 3 of the following  Review of external notes  Review of results  Ordering of each unique test  Independent historian  Category 2:  Independent interpretation of test (ex: imaging)  Category 3:  Discussion of management with another provider    Extensive     Treatment Plan  Moderate - Prescription Drug management  High  Drug therapy requiring intensive monitoring for toxicity  Decision regarding hospitalization or escalation of care  De-escalate care/DNR decisions         Oscar Martínez MD  05/20/25  10:30 CDT

## 2025-05-20 NOTE — PLAN OF CARE
Goal Outcome Evaluation:  Plan of Care Reviewed With: patient        Progress: no change  Outcome Evaluation: Patient resting quietly after 1.5 hour seizure. A+OX4 RA, Communicating through text. sig other at bedside. Service dog present in bed with patient. Patient tolerating diet. Neuro consult complete, see note. c/o throat pain. patient reports weakness in BLE. voiding per bedside commode. call light within reach and safety maintained.

## 2025-05-20 NOTE — NURSING NOTE
Patient found seizing by caregiver and notified staff. Seizure started at approximately 1830. MD was called . Md stated to monitor patient and to call back if continued at 10 minutes. Monitored patient called MD back at 10 minutes and 15 minutes. MD reported to room and remained at bedside with patient for remainder of episode. MD placed orders. On Call neurology consulted and notified.

## 2025-05-20 NOTE — PAYOR COMM NOTE
"DC  HOME 5-20-25    Earl Cooley (29 y.o. Female)       Date of Birth   1995    Social Security Number       Address   [address] [place] [address]    Home Phone   [phone]    MRN   [id number]       Uatsdin   Other    Marital Status                               Admission Date   5/18/2025    Admission Type   Emergency    Admitting Provider   Alistair Narayan MD    Attending Provider       Department, Room/Bed   [facility] 3C, 365/1       Discharge Date   5/20/2025    Discharge Disposition   Home or Self Care    Discharge Destination                                 Attending Provider: (none)   Allergies: No Known Allergies    Isolation: None   Infection: None   Code Status: Prior    Ht: 167.6 cm (66\")   Wt: 103 kg (226 lb 14.4 oz)    Admission Cmt: None   Principal Problem: Status epilepticus [G40.901]                   Active Insurance as of 5/18/2025       Primary Coverage       Payor Plan Insurance Group Employer/Plan Group    [facility] COMMUNITY PLAN [facility] COMMUNITY PLAN [facility]       Payor Plan Address Payor Plan Phone Number Payor Plan Fax Number Effective Dates    [address]   1/1/2024 - None Entered    [facility] [id number]         Subscriber Name Subscriber Birth Date Member ID       EARL COOLEY 1995 [id number]                     Emergency Contacts        (Rel.) Home Phone Work Phone Mobile Phone    Kelli Cooley (Mother) -- -- [phone]    CandisLaan nieto (Friend) [phone] -- [phone]                 Discharge Summary        Eriberto Trejo MD at 05/20/25 0958                [facility]  DISCHARGE SUMMARY       Date of Admission: 5/18/2025  Date of Discharge:  5/20/2025  Primary Care Physician: Rosie Olvera MD    Presenting Problem/History of Present Illness:  Ms. Cooley is a 29-year-old female with past medical history significant for " pseudoseizures, autism, and pica who is well known to this facility unfortunately for foreign body ingestion and seizure-like activity for which she subsequently required mechanical ventilation and has had multiple evaluations by the many of our neurologist colleagues and follows with neurologist Dr. Perkins with [facility] in [facility].  In the evening hours of 5/18/2025 she presented to [facility] emergency department with her caretaker after reporting newly having multiple seizures at home and witnessed seizure-like activity upon arrival to the emergency department. Unfortunately, she was once again intubated for airway protection.      ED course workup was relatively unremarkable however did show an elevated serum lactate at 3.2.  CT head unremarkable     Intensivist service was consulted for admission and further medical management of intubated and mechanically ventilated patient following seizure-like activity.      I have seen and evaluated Ms. Cooley upon arrival to ICU.  She is intubated, mechanically ventilated and adequately sedated and tolerating the ventilator without issue.  Vital signs are unremarkable.      Home medication list includes doxazosin, fluoxetine, hydroxyzine, lamotrigine, Nayzilam, quetiapine     Chart review      Most recent office visit with [facility] neurolgist, Dr. 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 At her last visit, Lamictal was increased to 200 mg BID. She denies convulsive seizure. Her mother is monitoring her seizure event. Also re-prescribed Nayzilam 5 mg/0.1 ml nasal spray for rescue treatment.  At today's follow up visit, she reports effective cognitive biofeedback by psychologist, significantly decreased frequency of  "PNES.  She however reports an episode of epileptic seizure on 4/20/2025, in the setting of missing one dose of Lamictal, and she was sleep deprived. Fortunately, her SZ was successfully aborted with Nayzilam spray.   Lamictal increased from 200 mg to 300 mg BID     2/1/23 note from Dr. Martínez:  I have reviewed this documentation and agree. Patient seen and examined by me. I am very familiar with this patient. I was able to speak to her mother over the phone again this morning. She tells me yesterday Duane told her mother that she felt like she was going to have a seizure. She had approximately 4-5 events with her mother reassuring her. She then told her mother that she demanded that she call emergency services. Her mother opposed this saying that there was nothing that needed to be done. At that, the patient threatened her saying that if she did not call emergency services that she would alert Adult Protective Services on her mother. She was taken to [facility]. They reportedly did have previous records from [facility] documenting that these were pseudoseizures. Despite this they called our transfer center and elected to share this information regarding her. She was transferred to us after intubation and multiple sedating agents. When she got here we saw her this morning and neurologic consultation. She was extubated immediately. She was attempting another pseudoseizure but I told her that I wanted to \"change her meds.\" In the past she has been resistant to this. She immediately woke up and said that she did not want her medications changed. I told her mother that she was free to come get her and the patient immediately started ripping things off. I spoke with her mother at length. I expressed concern that if she continues to show up at outside hospitals with pseudoseizure activity she will continue to be intubated I think there is a great risk for this. It is my believe that the " patient is now seeking secondary gain. I believe that she has a goal to be intubated and placed on propofol drips. She must receive some sort of euphoria from this. I am recommending that she invest in a medical alert bracelet that can alert anyone seeing her that these are not real seizures. I spoke over the phone with Dr. Declan Lira, her primary neurologist. He has agreed to see her in clinic with hopes to admit her to an epilepsy monitoring unit and wean off all 4 of her antiepileptics.        Final Discharge Diagnoses:  Active Hospital Problems    Diagnosis     **Status epilepticus     Seizure-like activity        Consults:   Intensivist  Tele neurology    Procedures Performed: -    Pertinent Test Results:       Imaging Results (All)       Procedure Component Value Units Date/Time    XR Abdomen KUB [[id number]] Collected: 05/19/25 0714     Updated: 05/19/25 0720    Narrative:      EXAM/TECHNIQUE: XR ABDOMEN KUB-     INDICATION: OG placement; G40.901-Epilepsy, unspecified, not  intractable, with status epilepticus     COMPARISON: 5/6/2025     FINDINGS:     Enteric tube tip is in the mid stomach. Visualized bowel gas pattern is  nonobstructive. Visualized portion of the lower lungs are clear. No  acute osseous finding. Cholecystectomy clips.       Impression:      Enteric tube tip is in the mid stomach.     This report was signed and finalized on 5/19/2025 7:17 AM by Dr. Carlos Manuel Jasmine MD.       CT Head Without Contrast [[id number]] Collected: 05/19/25 0625     Updated: 05/19/25 0631    Narrative:      EXAMINATION: CT HEAD WO CONTRAST-        HISTORY:seizure; G40.901-Epilepsy, unspecified, not intractable, with  status epilepticus     In order to have a CT radiation dose as low as reasonably achievable  Automated Exposure Control was utilized for adjustment of the mA and/or  KV according to patient size.     Total DLP = 745.27 mGy.cm     The CT scan of the head is performed without intravenous  contrast  enhancement.     The images are acquired in axial plane and subsequent reconstruction in  coronal and sagittal planes.     The comparison is made with the previous study dated 1/24/2023.     There is no evidence of a mass. No midline shift.     There is no evidence of intracranial hemorrhage or hematoma.     The ventricles, the basal cisterns and the cortical sulci are normal.     The gray-white matter differentiation is maintained.     Images reviewed in bone window show no evidence of a skull fracture.  Limited visualized paranasal sinuses and mastoid air cells are clear.       Impression:      1. No acute intracranial abnormality.     The above study was initially reviewed and reported by [facility]. I do not  find any discrepancies.                                      This report was signed and finalized on 5/19/2025 6:28 AM by Dr. Medina Braun MD.       XR Chest 1 View [[id number]] Collected: 05/19/25 0624     Updated: 05/19/25 0628    Narrative:      EXAM/TECHNIQUE: XR CHEST 1 VW-     INDICATION: post intubation; G40.901-Epilepsy, unspecified, not  intractable, with status epilepticus     COMPARISON: Same-day radiograph     FINDINGS:     Endotracheal tube is 2 cm above the elizabeth. Lung volumes have improved.  No pleural effusion, pneumothorax, or consolidation. Cardiac silhouette  is within normal limits. No acute osseous finding.       Impression:      1.  Interval intubation with endotracheal tube 2 cm above the elizabeth.  2.  Lungs are clear.     This report was signed and finalized on 5/19/2025 6:25 AM by Dr. Carlos Manuel Jasmine MD.       XR Chest 1 View [[id number]] Collected: 05/19/25 0623     Updated: 05/19/25 0627    Narrative:      EXAM/TECHNIQUE: XR CHEST 1 VW-     INDICATION: rule out pneumonia; G40.901-Epilepsy, unspecified, not  intractable, with status epilepticus     COMPARISON: 8/23/2024     FINDINGS:     Lung volumes are low which causes prominence of the cardiac silhouette  and  crowding of the central vasculature. No mediastinal widening. No  pleural effusion or pneumothorax. No definite consolidation. No acute  osseous finding.       Impression:         Low lung volumes with central vascular crowding. No definite  consolidation or other acute finding.     This report was signed and finalized on 5/19/2025 6:24 AM by Dr. Carlos Manuel Jasmine MD.             LAB RESULTS:      Lab 05/20/25  0552 05/19/25  0334 05/19/25  0253 05/19/25  0000   WBC 7.00 9.60  --  9.96   HEMOGLOBIN 12.0 11.3*  --  12.2   HEMATOCRIT 38.1 38.2  --  40.0   PLATELETS 190 212  --  200   NEUTROS ABS 3.36 6.63  --  7.97*   IMMATURE GRANS (ABS)  --  0.03  --   --    LYMPHS ABS  --  2.18  --   --    MONOS ABS  --  0.68  --   --    EOS ABS 0.14 0.02  --  0.00   MCV 75.9* 78.4*  --  76.8*   LACTATE  --   --  1.6 3.2*         Lab 05/20/25  0434 05/19/25  0334 05/19/25  0115 05/19/25  0000   SODIUM 138 139  --  138   SODIUM, ARTERIAL  --   --  140  --    POTASSIUM 3.5 4.0  --  3.9   CHLORIDE 101 104  --  102   CO2 24.0 22.0  --  21.0*   ANION GAP 13.0 13.0  --  15.0   BUN 7 6  --  6   CREATININE 0.72 0.64  --  0.74   EGFR 117.0 123.6  --  113.2   GLUCOSE 105* 95  --  107*   CALCIUM 9.2 8.6  --  9.6   MAGNESIUM 2.1 2.0  --   --    PHOSPHORUS 4.3 4.4  --   --          Lab 05/20/25  0434 05/19/25  0334 05/19/25  0000   TOTAL PROTEIN 6.6 6.2 7.3   ALBUMIN 4.2 3.7 4.5   GLOBULIN 2.4 2.5 2.8   ALT (SGPT) 12 12 15   AST (SGOT) 14 18 17   BILIRUBIN 0.2 0.2 <0.2   ALK PHOS 87 77 87                     Lab 05/19/25  0115   PH, ARTERIAL 7.409   PCO2, ARTERIAL 40.0   PO2 ART 71.9*   O2 SATURATION ART 95.2   FIO2 30   HCO3 ART 25.3   BASE EXCESS ART 0.6   CARBOXYHEMOGLOBIN 1.4     Brief Urine Lab Results  (Last result in the past 365 days)        Color   Clarity   Blood   Leuk Est   Nitrite   Protein   CREAT   Urine HCG        05/19/25 0052 Yellow   Clear   Negative   Negative   Negative   Negative                 Microbiology Results (last  "10 days)       ** No results found for the last 240 hours. **            Hospital Course:   29-year-old female with a history of pseudoseizures but was admitted for status intubated in the ER and placed in the intensive care unit overnight.  She was given propofol sedation weaned off in the morning extubated transferred to medical floor.  Yesterday she had 3 episodes of seizure-like activity the first 1 about 4 minutes, second 1 about 20 and the third 1 about 60.  She was evaluated by teleneurologist recommended for Keppra loading dose which she received in Keppra 500 mg daily.  EEG was ordered but patient this morning does not want to have EEG and wants to go home she is refusing further testing.  She is alert and oriented no seizure activity overnight.     Case further discussed with Dr. Martínez this morning.  He has reviewed the case several times in the past and reports there is no evidence of seizure on any EEG testing from the patient.  He firmly believes he does not have epileptic seizures and is all pseudoseizures.  He is recommending against adding Keppra or other antiepileptic medications.  He knows the patient much deeper and thoroughly than I do and I think his assessment is correct.  Will not prescribe Keppra on discharge.  Recommend against antiepileptic treatment of the pseudoseizure activities.    Physical Exam on Discharge:  /63 (BP Location: Left arm, Patient Position: Lying)   Pulse 81   Temp 97.9 °F (36.6 °C) (Axillary)   Resp 18   Ht 167.6 cm (66\")   Wt 103 kg (226 lb 14.4 oz)   LMP 05/14/2025 (Exact Date)   SpO2 96%   BMI 36.62 kg/m²   Physical Exam  Constitutional:       General: She is not in acute distress.     Appearance: Normal appearance. She is not ill-appearing or toxic-appearing.   HENT:      Nose: Nose normal.      Mouth/Throat:      Mouth: Mucous membranes are moist.   Eyes:      Extraocular Movements: Extraocular movements intact.      Conjunctiva/sclera: Conjunctivae " normal.      Pupils: Pupils are equal, round, and reactive to light.   Cardiovascular:      Rate and Rhythm: Normal rate and regular rhythm.      Pulses: Normal pulses.   Pulmonary:      Effort: Pulmonary effort is normal. No respiratory distress.      Breath sounds: Normal breath sounds. No wheezing.   Abdominal:      General: Abdomen is flat. Bowel sounds are normal.      Palpations: Abdomen is soft.   Musculoskeletal:         General: Normal range of motion.      Cervical back: Normal range of motion and neck supple.   Skin:     General: Skin is warm and dry.   Neurological:      Mental Status: She is alert and oriented to person, place, and time. Mental status is at baseline.      Cranial Nerves: No cranial nerve deficit.   Psychiatric:         Mood and Affect: Mood normal.         Behavior: Behavior normal.     Condition on Discharge:   Stable    Discharge Disposition:  Home    Discharge Medications:     Discharge Medications        Continue These Medications        Instructions Start Date   acetaminophen 500 MG tablet  Commonly known as: TYLENOL   500 mg, Daily PRN      doxazosin 1 MG tablet  Commonly known as: CARDURA   1 mg, Oral, Nightly, Take with 2mg for total of 3mg nightly       doxazosin 2 MG tablet  Commonly known as: CARDURA   2 mg, Oral, Nightly, Take with 1mg for a total of 3mg nightly      FLUoxetine 20 MG capsule  Commonly known as: PROzac   20 mg, Oral, Daily, Take with 40mg for a total of 60mg      FLUoxetine 40 MG capsule  Commonly known as: PROzac   40 mg, Oral, Daily, Take with 20mg for a total of 60mg      hydrOXYzine 25 MG tablet  Commonly known as: ATARAX   25 mg, Oral, 3 Times Daily PRN      LaMICtal 150 MG tablet  Generic drug: lamoTRIgine   300 mg, Oral, 2 Times Daily      Nayzilam 5 MG/0.1ML solution  Generic drug: Midazolam   5 mg, Nasal, Daily PRN      omeprazole 20 MG capsule  Commonly known as: priLOSEC   20 mg, Oral, Daily PRN      QUEtiapine 25 MG tablet  Commonly known as:  SEROquel   25 mg, Oral, Daily PRN      QUEtiapine 100 MG tablet  Commonly known as: SEROquel   100 mg, Oral, Every Morning      QUEtiapine 200 MG tablet  Commonly known as: SEROquel   200 mg, Oral, Nightly      senna 8.6 MG tablet  Commonly known as: SENOKOT   2 tablets, Oral, Daily PRN             Discharge Diet:   Regular    Activity at Discharge:   Resume usual activity    Follow-up Appointments:   No future appointments.    Test Results Pending at Discharge: -    Electronically signed by Eriberto Trejo MD, 05/20/25, 09:58 CDT.    Time: 33 minutes.         Electronically signed by Eriberto Trejo MD at 05/20/25 1022

## 2025-05-20 NOTE — CONSULTS
"Neurology Consult Note    Consult Date: 2025    Referring MD: No ref. provider found    Reason for Consult I have been asked to see the patient in neurological consultation to render advice and opinion regarding Seizures     Duane Cooley is a 29 y.o. female with past medical history of epilepsy on Lamictal 200 mg twice daily for seizures she also has a history of nonepileptic spells, anxiety/depression, autism, pica.  Patient initially presented to the ED 2025 with breakthrough seizures she was intubated initially for airway protection her lactic was 3.2 she had a CT head which was unremarkable for acute pathology.  After extubation patient started having multiple seizures again and neurology has been consulted.    As per documentation by other medical staff her last seizure was at 6:30 PM patient was given Ativan, apparently she also had a seizure around 1 PM to 2 PM.    As per the significant other at bedside she has various types of seizures and some episodes she tenses her whole entire body and other times of seizures she is unresponsive, she also has episodes of facial twitching and right arm twitching.    Today she had 3-4 seizures the longest 1 was at 6:30 PM which lasted an hour of twitching and unresponsiveness.    She is nonverbal at baseline can say yes at times but most of the communication is through her phone where she types out messages      Past Medical History:   Diagnosis Date    Anxiety     Depression     History of  2017    History of congenital abnormality 2017    Late prenatal care affecting pregnancy in second trimester 2017    Mental deficiency 2017    Previous  delivery affecting pregnancy 2017    Psychogenic nonepileptic seizure        Exam  /72 (BP Location: Left leg, Patient Position: Lying)   Pulse 97   Temp 97.5 °F (36.4 °C) (Axillary)   Resp 16   Ht 167.6 cm (66\")   Wt 94.8 kg (208 lb 15.9 oz)   LMP 2025 " (Exact Date)   SpO2 96%   BMI 33.73 kg/m²   Gen: NAD, vitals reviewed  MS: Patient is awake alert oriented, she has nonsensical speech and able to say yes but most of the communication is through typing on her phone  CN: visual acuity grossly normal, PERRL, EOMI, no facial droop  Motor: Bilateral upper extremity strength 4+/5 and bilateral lower extremity strength 2/5    DATA:    Lab Results   Component Value Date    GLUCOSE 95 05/19/2025    CALCIUM 8.6 05/19/2025     05/19/2025    K 4.0 05/19/2025    CO2 22.0 05/19/2025     05/19/2025    BUN 6 05/19/2025    CREATININE 0.64 05/19/2025    BCR 9.4 05/19/2025    ANIONGAP 13.0 05/19/2025     Lab Results   Component Value Date    WBC 9.60 05/19/2025    HGB 11.3 (L) 05/19/2025    HCT 38.2 05/19/2025    MCV 78.4 (L) 05/19/2025     05/19/2025       Lab review:   Sodium 139  Creatinine 0.64  Blood glucose 97  Normal LFTs    WBC 9.6  Hemoglobin 11.3 and platelets 212  Urine analysis negative    Imaging review:   CT head done on admission showed no acute hypodensity or hyperensity    Diagnoses:  Epilepsy with breakthrough seizures  Nonepileptic seizures  Autism  Nonverbal  Anxiety/depression    Pre-stroke MRS: 2    Etiology of patient's breakthrough seizures unclear can be from her epilepsy and also this might be nonepileptic events provoked by stress    Patient states that she was on multiple medications including phenytoin and Keppra of Vimpat in the past which were weaned off because she did not have any more seizures, she states that she did not develop any side effects from this medications they were only weaned off because she did not have any more seizures    Considering she is having more breakthrough seizures I think we need to add another agent we will go with Keppra 1000 mg twice daily and see how she does    Since [facility] does not have continuous EEG capabilities at night if she has any more seizures she needs to be transferred to a  tertiary center where there is capability of doing continuous EEG monitoring to determine when these are true seizures or nonepileptic.    Plan  Continue home dose lamotrigine 300 mg twice daily?  Versus 200 mg twice daily ?  Give her Keppra 2 g load followed by 5 mg daily from tomorrow  Routine EEG tomorrow morning  If any more seizure patient needs to be transferred to a tertiary center for continuous EEG monitoring  I do not think she requires an MRI as she has known epilepsy  No provoking factors for seizures identified on her labs or vitals.  No signs of CNS infection to warrant lumbar puncture      MDM   Reviewed: Previous charts, nursing notes and vitals   Reviewed: Previous labs and CT scan    Interpretation: Labs and CT scan   Total time providing care is :30-74 minutes. This excluded time spent performing separately reportable procedures and services  Consults :Neurology/Stroke    Please note that portions of this note were completed with a voice recognition program.     Rai Lynne MD  Neuro Hospitalist /Vascular Neurology.

## 2025-05-20 NOTE — SIGNIFICANT NOTE
Notified by nurse that patient was having seizure activity again this afternoon.  Similar episode to the 1 at about 2 PM.  The onset was about 630.  I assisted to bedside 640 and has remained in the bedside until 8 PM.  She would blink once when I tried to touch her sclera but on other occasions she did not respond.  I have tried to stimulate with pain in her left index finger and pinching her chest without response.  I tried to place video-EEG order but I was told there are no on-call tonight after 7:30 PM.  I have placed a consult and discussed with Melissa neurologist who will complete a consult at bedside.  I reviewed previous records from her neurologist from [facility].  Waiting for neurology recommendations.  When I left the room the patient was sleeping still had some twitching in her left side of her face and chin and no other activity noted.    Electronically signed by Eriberto Trejo MD, 05/19/25, 8:04 PM CDT.

## 2025-05-20 NOTE — DISCHARGE SUMMARY
[facility] Services  DISCHARGE SUMMARY       Date of Admission: 5/18/2025  Date of Discharge:  5/20/2025  Primary Care Physician: Rosie Olvera MD    Presenting Problem/History of Present Illness:  Ms. Cooley is a 29-year-old female with past medical history significant for pseudoseizures, autism, and pica who is well known to this facility unfortunately for foreign body ingestion and seizure-like activity for which she subsequently required mechanical ventilation and has had multiple evaluations by the many of our neurologist colleagues and follows with neurologist Dr. Perkins with [facility] in [facility].  In the evening hours of 5/18/2025 she presented to [facility] emergency department with her caretaker after reporting newly having multiple seizures at home and witnessed seizure-like activity upon arrival to the emergency department. Unfortunately, she was once again intubated for airway protection.      ED course workup was relatively unremarkable however did show an elevated serum lactate at 3.2.  CT head unremarkable     Intensivist service was consulted for admission and further medical management of intubated and mechanically ventilated patient following seizure-like activity.      I have seen and evaluated Ms. Cooley upon arrival to ICU.  She is intubated, mechanically ventilated and adequately sedated and tolerating the ventilator without issue.  Vital signs are unremarkable.      Home medication list includes doxazosin, fluoxetine, hydroxyzine, lamotrigine, Nayzilam, quetiapine     Chart review      Most recent office visit with [facility] neurolgist, Dr. 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 At her last visit, Lamictal was increased to 200 mg BID. She denies convulsive seizure. Her mother is monitoring her seizure event. Also re-prescribed Nayzilam 5 mg/0.1 ml nasal spray for rescue treatment.  At today's follow up visit, she reports effective cognitive biofeedback by psychologist, significantly decreased frequency of PNES.  She however reports an episode of epileptic seizure on 4/20/2025, in the setting of missing one dose of Lamictal, and she was sleep deprived. Fortunately, her SZ was successfully aborted with Nayzilam spray.   Lamictal increased from 200 mg to 300 mg BID     2/1/23 note from Dr. Martínez:  I have reviewed this documentation and agree. Patient seen and examined by me. I am very familiar with this patient. I was able to speak to her mother over the phone again this morning. She tells me yesterday Duane told her mother that she felt like she was going to have a seizure. She had approximately 4-5 events with her mother reassuring her. She then told her mother that she demanded that she call emergency services. Her mother opposed this saying that there was nothing that needed to be done. At that, the patient threatened her saying that if she did not call emergency services that she would alert Adult Protective Services on her mother. She was taken to [facility]. They reportedly did have previous records from [facility] documenting that these were pseudoseizures. Despite this they called our transfer center and elected to share this information regarding her. She was transferred to us after intubation and multiple sedating agents. When she got here we saw her this morning and neurologic consultation. She was extubated immediately. She was attempting another pseudoseizure but I told her that I wanted to \"change her meds.\" In the past she has been resistant to this. She immediately woke up and said that she did not want her medications changed. I told " her mother that she was free to come get her and the patient immediately started ripping things off. I spoke with her mother at length. I expressed concern that if she continues to show up at outside hospitals with pseudoseizure activity she will continue to be intubated I think there is a great risk for this. It is my believe that the patient is now seeking secondary gain. I believe that she has a goal to be intubated and placed on propofol drips. She must receive some sort of euphoria from this. I am recommending that she invest in a medical alert bracelet that can alert anyone seeing her that these are not real seizures. I spoke over the phone with Dr. Declan Lira, her primary neurologist. He has agreed to see her in clinic with hopes to admit her to an epilepsy monitoring unit and wean off all 4 of her antiepileptics.        Final Discharge Diagnoses:  Active Hospital Problems    Diagnosis     **Status epilepticus     Seizure-like activity        Consults:   Intensivist  Tele neurology    Procedures Performed: -    Pertinent Test Results:       Imaging Results (All)       Procedure Component Value Units Date/Time    XR Abdomen KUB [[id number]] Collected: 05/19/25 0714     Updated: 05/19/25 0720    Narrative:      EXAM/TECHNIQUE: XR ABDOMEN KUB-     INDICATION: OG placement; G40.901-Epilepsy, unspecified, not  intractable, with status epilepticus     COMPARISON: 5/6/2025     FINDINGS:     Enteric tube tip is in the mid stomach. Visualized bowel gas pattern is  nonobstructive. Visualized portion of the lower lungs are clear. No  acute osseous finding. Cholecystectomy clips.       Impression:      Enteric tube tip is in the mid stomach.     This report was signed and finalized on 5/19/2025 7:17 AM by Dr. Carlos Manuel Jasmine MD.       CT Head Without Contrast [[id number]] Collected: 05/19/25 0625     Updated: 05/19/25 0631    Narrative:      EXAMINATION: CT HEAD WO CONTRAST-        HISTORY:seizure; G40.901-Epilepsy,  unspecified, not intractable, with  status epilepticus     In order to have a CT radiation dose as low as reasonably achievable  Automated Exposure Control was utilized for adjustment of the mA and/or  KV according to patient size.     Total DLP = 745.27 mGy.cm     The CT scan of the head is performed without intravenous contrast  enhancement.     The images are acquired in axial plane and subsequent reconstruction in  coronal and sagittal planes.     The comparison is made with the previous study dated 1/24/2023.     There is no evidence of a mass. No midline shift.     There is no evidence of intracranial hemorrhage or hematoma.     The ventricles, the basal cisterns and the cortical sulci are normal.     The gray-white matter differentiation is maintained.     Images reviewed in bone window show no evidence of a skull fracture.  Limited visualized paranasal sinuses and mastoid air cells are clear.       Impression:      1. No acute intracranial abnormality.     The above study was initially reviewed and reported by [employer]. I do not  find any discrepancies.                                      This report was signed and finalized on 5/19/2025 6:28 AM by Dr. Medina Braun MD.       XR Chest 1 View [[id number]] Collected: 05/19/25 0624     Updated: 05/19/25 0628    Narrative:      EXAM/TECHNIQUE: XR CHEST 1 VW-     INDICATION: post intubation; G40.901-Epilepsy, unspecified, not  intractable, with status epilepticus     COMPARISON: Same-day radiograph     FINDINGS:     Endotracheal tube is 2 cm above the elizabeth. Lung volumes have improved.  No pleural effusion, pneumothorax, or consolidation. Cardiac silhouette  is within normal limits. No acute osseous finding.       Impression:      1.  Interval intubation with endotracheal tube 2 cm above the elizabeth.  2.  Lungs are clear.     This report was signed and finalized on 5/19/2025 6:25 AM by Dr. Carlos Manuel Jasmine MD.       XR Chest 1 View [[id number]] Collected:  05/19/25 0623     Updated: 05/19/25 0627    Narrative:      EXAM/TECHNIQUE: XR CHEST 1 VW-     INDICATION: rule out pneumonia; G40.901-Epilepsy, unspecified, not  intractable, with status epilepticus     COMPARISON: 8/23/2024     FINDINGS:     Lung volumes are low which causes prominence of the cardiac silhouette  and crowding of the central vasculature. No mediastinal widening. No  pleural effusion or pneumothorax. No definite consolidation. No acute  osseous finding.       Impression:         Low lung volumes with central vascular crowding. No definite  consolidation or other acute finding.     This report was signed and finalized on 5/19/2025 6:24 AM by Dr. Carlos Manuel Jasmine MD.             LAB RESULTS:      Lab 05/20/25  0552 05/19/25 0334 05/19/25  0253 05/19/25  0000   WBC 7.00 9.60  --  9.96   HEMOGLOBIN 12.0 11.3*  --  12.2   HEMATOCRIT 38.1 38.2  --  40.0   PLATELETS 190 212  --  200   NEUTROS ABS 3.36 6.63  --  7.97*   IMMATURE GRANS (ABS)  --  0.03  --   --    LYMPHS ABS  --  2.18  --   --    MONOS ABS  --  0.68  --   --    EOS ABS 0.14 0.02  --  0.00   MCV 75.9* 78.4*  --  76.8*   LACTATE  --   --  1.6 3.2*         Lab 05/20/25 0434 05/19/25 0334 05/19/25  0115 05/19/25  0000   SODIUM 138 139  --  138   SODIUM, ARTERIAL  --   --  140  --    POTASSIUM 3.5 4.0  --  3.9   CHLORIDE 101 104  --  102   CO2 24.0 22.0  --  21.0*   ANION GAP 13.0 13.0  --  15.0   BUN 7 6  --  6   CREATININE 0.72 0.64  --  0.74   EGFR 117.0 123.6  --  113.2   GLUCOSE 105* 95  --  107*   CALCIUM 9.2 8.6  --  9.6   MAGNESIUM 2.1 2.0  --   --    PHOSPHORUS 4.3 4.4  --   --          Lab 05/20/25 0434 05/19/25  0334 05/19/25  0000   TOTAL PROTEIN 6.6 6.2 7.3   ALBUMIN 4.2 3.7 4.5   GLOBULIN 2.4 2.5 2.8   ALT (SGPT) 12 12 15   AST (SGOT) 14 18 17   BILIRUBIN 0.2 0.2 <0.2   ALK PHOS 87 77 87                     Lab 05/19/25  0115   PH, ARTERIAL 7.409   PCO2, ARTERIAL 40.0   PO2 ART 71.9*   O2 SATURATION ART 95.2   FIO2 30   HCO3 ART  "25.3   BASE EXCESS ART 0.6   CARBOXYHEMOGLOBIN 1.4     Brief Urine Lab Results  (Last result in the past 365 days)        Color   Clarity   Blood   Leuk Est   Nitrite   Protein   CREAT   Urine HCG        05/19/25 0052 Yellow   Clear   Negative   Negative   Negative   Negative                 Microbiology Results (last 10 days)       ** No results found for the last 240 hours. **            Hospital Course:   29-year-old female with a history of pseudoseizures but was admitted for status intubated in the ER and placed in the intensive care unit overnight.  She was given propofol sedation weaned off in the morning extubated transferred to medical floor.  Yesterday she had 3 episodes of seizure-like activity the first 1 about 4 minutes, second 1 about 20 and the third 1 about 60.  She was evaluated by teleneurologist recommended for Keppra loading dose which she received in Keppra 500 mg daily.  EEG was ordered but patient this morning does not want to have EEG and wants to go home she is refusing further testing.  She is alert and oriented no seizure activity overnight.     Case further discussed with Dr. Martínez this morning.  He has reviewed the case several times in the past and reports there is no evidence of seizure on any EEG testing from the patient.  He firmly believes he does not have epileptic seizures and is all pseudoseizures.  He is recommending against adding Keppra or other antiepileptic medications.  He knows the patient much deeper and thoroughly than I do and I think his assessment is correct.  Will not prescribe Keppra on discharge.  Recommend against antiepileptic treatment of the pseudoseizure activities.    Physical Exam on Discharge:  /63 (BP Location: Left arm, Patient Position: Lying)   Pulse 81   Temp 97.9 °F (36.6 °C) (Axillary)   Resp 18   Ht 167.6 cm (66\")   Wt 103 kg (226 lb 14.4 oz)   LMP 05/14/2025 (Exact Date)   SpO2 96%   BMI 36.62 kg/m²   Physical Exam  Constitutional:  "      General: She is not in acute distress.     Appearance: Normal appearance. She is not ill-appearing or toxic-appearing.   HENT:      Nose: Nose normal.      Mouth/Throat:      Mouth: Mucous membranes are moist.   Eyes:      Extraocular Movements: Extraocular movements intact.      Conjunctiva/sclera: Conjunctivae normal.      Pupils: Pupils are equal, round, and reactive to light.   Cardiovascular:      Rate and Rhythm: Normal rate and regular rhythm.      Pulses: Normal pulses.   Pulmonary:      Effort: Pulmonary effort is normal. No respiratory distress.      Breath sounds: Normal breath sounds. No wheezing.   Abdominal:      General: Abdomen is flat. Bowel sounds are normal.      Palpations: Abdomen is soft.   Musculoskeletal:         General: Normal range of motion.      Cervical back: Normal range of motion and neck supple.   Skin:     General: Skin is warm and dry.   Neurological:      Mental Status: She is alert and oriented to person, place, and time. Mental status is at baseline.      Cranial Nerves: No cranial nerve deficit.   Psychiatric:         Mood and Affect: Mood normal.         Behavior: Behavior normal.     Condition on Discharge:   Stable    Discharge Disposition:  Home    Discharge Medications:     Discharge Medications        Continue These Medications        Instructions Start Date   acetaminophen 500 MG tablet  Commonly known as: TYLENOL   500 mg, Daily PRN      doxazosin 1 MG tablet  Commonly known as: CARDURA   1 mg, Oral, Nightly, Take with 2mg for total of 3mg nightly       doxazosin 2 MG tablet  Commonly known as: CARDURA   2 mg, Oral, Nightly, Take with 1mg for a total of 3mg nightly      FLUoxetine 20 MG capsule  Commonly known as: PROzac   20 mg, Oral, Daily, Take with 40mg for a total of 60mg      FLUoxetine 40 MG capsule  Commonly known as: PROzac   40 mg, Oral, Daily, Take with 20mg for a total of 60mg      hydrOXYzine 25 MG tablet  Commonly known as: ATARAX   25 mg, Oral, 3  Times Daily PRN      LaMICtal 150 MG tablet  Generic drug: lamoTRIgine   300 mg, Oral, 2 Times Daily      Nayzilam 5 MG/0.1ML solution  Generic drug: Midazolam   5 mg, Nasal, Daily PRN      omeprazole 20 MG capsule  Commonly known as: priLOSEC   20 mg, Oral, Daily PRN      QUEtiapine 25 MG tablet  Commonly known as: SEROquel   25 mg, Oral, Daily PRN      QUEtiapine 100 MG tablet  Commonly known as: SEROquel   100 mg, Oral, Every Morning      QUEtiapine 200 MG tablet  Commonly known as: SEROquel   200 mg, Oral, Nightly      senna 8.6 MG tablet  Commonly known as: SENOKOT   2 tablets, Oral, Daily PRN             Discharge Diet:   Regular    Activity at Discharge:   Resume usual activity    Follow-up Appointments:   No future appointments.    Test Results Pending at Discharge: -    Electronically signed by Eriberto Trejo MD, 05/20/25, 09:58 CDT.    Time: 33 minutes.

## 2025-05-21 NOTE — PAYOR COMM NOTE
"Ref:  [id number]     [facility]  FAX  [phone]      Earl Cooley (29 y.o. Female)       Date of Birth   1995    Social Security Number       Address   [address] [place] [id number]    Home Phone   [phone]    MRN   [id number]       Protestant   Other    Marital Status                               Admission Date   5/18/2025    Admission Type   Emergency    Admitting Provider   Alistair Narayan MD    Attending Provider       Department, Room/Bed   [facility] 3C, 365/1       Discharge Date   5/20/2025    Discharge Disposition   Home or Self Care    Discharge Destination                                 Attending Provider: (none)   Allergies: No Known Allergies    Isolation: None   Infection: None   Code Status: Prior    Ht: 167.6 cm (66\")   Wt: 103 kg (226 lb 14.4 oz)    Admission Cmt: None   Principal Problem: Status epilepticus [G40.901]                   Active Insurance as of 5/18/2025       Primary Coverage       Payor Plan Insurance Group Employer/Plan Group    [facility] COMMUNITY PLAN [facility] COMMUNITY PLAN [facility] [place]       Payor Plan Address Payor Plan Phone Number Payor Plan Fax Number Effective Dates    [address]   1/1/2024 - None Entered    [facility] [id number]         Subscriber Name Subscriber Birth Date Member ID       EARL COOLEY 1995 [id number]                     Emergency Contacts        (Rel.) Home Phone Work Phone Mobile Phone    Kelli Cooley (Mother) -- -- [phone]    Lana Avila (Friend) [phone] -- [phone]                 Discharge Summary        Eriberto Trejo MD at 05/20/25 0958                [facility] Medicine Services  DISCHARGE SUMMARY       Date of Admission: 5/18/2025  Date of Discharge:  5/20/2025  Primary Care Physician: Rosie Olvera MD    Presenting Problem/History of Present Illness:  Ms. Cooley is a 29-year-old " female with past medical history significant for pseudoseizures, autism, and pica who is well known to this facility unfortunately for foreign body ingestion and seizure-like activity for which she subsequently required mechanical ventilation and has had multiple evaluations by the many of our neurologist colleagues and follows with neurologist Dr. Perkins with [facility] in [facility].  In the evening hours of 5/18/2025 she presented to [facility] emergency department with her caretaker after reporting newly having multiple seizures at home and witnessed seizure-like activity upon arrival to the emergency department. Unfortunately, she was once again intubated for airway protection.      ED course workup was relatively unremarkable however did show an elevated serum lactate at 3.2.  CT head unremarkable     Intensivist service was consulted for admission and further medical management of intubated and mechanically ventilated patient following seizure-like activity.      I have seen and evaluated Ms. Cooley upon arrival to ICU.  She is intubated, mechanically ventilated and adequately sedated and tolerating the ventilator without issue.  Vital signs are unremarkable.      Home medication list includes doxazosin, fluoxetine, hydroxyzine, lamotrigine, Nayzilam, quetiapine     Chart review      Most recent office visit with [facility] neurolgist, Dr. 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 At her last visit, Lamictal was increased to 200 mg BID. She denies convulsive seizure. Her mother is monitoring her seizure event. Also re-prescribed Nayzilam 5 mg/0.1 ml nasal spray for rescue treatment.  At today's follow up visit, she reports effective cognitive biofeedback by  "psychologist, significantly decreased frequency of PNES.  She however reports an episode of epileptic seizure on 4/20/2025, in the setting of missing one dose of Lamictal, and she was sleep deprived. Fortunately, her SZ was successfully aborted with Nayzilam spray.   Lamictal increased from 200 mg to 300 mg BID     2/1/23 note from Dr. Martínez:  I have reviewed this documentation and agree. Patient seen and examined by me. I am very familiar with this patient. I was able to speak to her mother over the phone again this morning. She tells me yesterday Duane told her mother that she felt like she was going to have a seizure. She had approximately 4-5 events with her mother reassuring her. She then told her mother that she demanded that she call emergency services. Her mother opposed this saying that there was nothing that needed to be done. At that, the patient threatened her saying that if she did not call emergency services that she would alert Adult Protective Services on her mother. She was taken to [facility]. They reportedly did have previous records from [facility] documenting that these were pseudoseizures. Despite this they called our transfer center and elected to share this information regarding her. She was transferred to us after intubation and multiple sedating agents. When she got here we saw her this morning and neurologic consultation. She was extubated immediately. She was attempting another pseudoseizure but I told her that I wanted to \"change her meds.\" In the past she has been resistant to this. She immediately woke up and said that she did not want her medications changed. I told her mother that she was free to come get her and the patient immediately started ripping things off. I spoke with her mother at length. I expressed concern that if she continues to show up at outside hospitals with pseudoseizure activity she will continue to be intubated I think there is a " great risk for this. It is my believe that the patient is now seeking secondary gain. I believe that she has a goal to be intubated and placed on propofol drips. She must receive some sort of euphoria from this. I am recommending that she invest in a medical alert bracelet that can alert anyone seeing her that these are not real seizures. I spoke over the phone with Dr. Declan Lira, her primary neurologist. He has agreed to see her in clinic with hopes to admit her to an epilepsy monitoring unit and wean off all 4 of her antiepileptics.        Final Discharge Diagnoses:  Active Hospital Problems    Diagnosis     **Status epilepticus     Seizure-like activity        Consults:   Intensivist  Tele neurology    Procedures Performed: -    Pertinent Test Results:       Imaging Results (All)       Procedure Component Value Units Date/Time    XR Abdomen KUB [[id number]] Collected: 05/19/25 0714     Updated: 05/19/25 0720    Narrative:      EXAM/TECHNIQUE: XR ABDOMEN KUB-     INDICATION: OG placement; G40.901-Epilepsy, unspecified, not  intractable, with status epilepticus     COMPARISON: 5/6/2025     FINDINGS:     Enteric tube tip is in the mid stomach. Visualized bowel gas pattern is  nonobstructive. Visualized portion of the lower lungs are clear. No  acute osseous finding. Cholecystectomy clips.       Impression:      Enteric tube tip is in the mid stomach.     This report was signed and finalized on 5/19/2025 7:17 AM by Dr. Carlos Manuel Jasmine MD.       CT Head Without Contrast [[id number]] Collected: 05/19/25 0625     Updated: 05/19/25 0631    Narrative:      EXAMINATION: CT HEAD WO CONTRAST-        HISTORY:seizure; G40.901-Epilepsy, unspecified, not intractable, with  status epilepticus     In order to have a CT radiation dose as low as reasonably achievable  Automated Exposure Control was utilized for adjustment of the mA and/or  KV according to patient size.     Total DLP = 745.27 mGy.cm     The CT scan of the head is  performed without intravenous contrast  enhancement.     The images are acquired in axial plane and subsequent reconstruction in  coronal and sagittal planes.     The comparison is made with the previous study dated 1/24/2023.     There is no evidence of a mass. No midline shift.     There is no evidence of intracranial hemorrhage or hematoma.     The ventricles, the basal cisterns and the cortical sulci are normal.     The gray-white matter differentiation is maintained.     Images reviewed in bone window show no evidence of a skull fracture.  Limited visualized paranasal sinuses and mastoid air cells are clear.       Impression:      1. No acute intracranial abnormality.     The above study was initially reviewed and reported by [facility]. I do not  find any discrepancies.                                      This report was signed and finalized on 5/19/2025 6:28 AM by Dr. Medina Braun MD.       XR Chest 1 View [[id number]] Collected: 05/19/25 0624     Updated: 05/19/25 0628    Narrative:      EXAM/TECHNIQUE: XR CHEST 1 VW-     INDICATION: post intubation; G40.901-Epilepsy, unspecified, not  intractable, with status epilepticus     COMPARISON: Same-day radiograph     FINDINGS:     Endotracheal tube is 2 cm above the elizabeth. Lung volumes have improved.  No pleural effusion, pneumothorax, or consolidation. Cardiac silhouette  is within normal limits. No acute osseous finding.       Impression:      1.  Interval intubation with endotracheal tube 2 cm above the elizabeth.  2.  Lungs are clear.     This report was signed and finalized on 5/19/2025 6:25 AM by Dr. Carlos Manuel Jasmine MD.       XR Chest 1 View [[id number]] Collected: 05/19/25 0623     Updated: 05/19/25 0627    Narrative:      EXAM/TECHNIQUE: XR CHEST 1 VW-     INDICATION: rule out pneumonia; G40.901-Epilepsy, unspecified, not  intractable, with status epilepticus     COMPARISON: 8/23/2024     FINDINGS:     Lung volumes are low which causes prominence of the  cardiac silhouette  and crowding of the central vasculature. No mediastinal widening. No  pleural effusion or pneumothorax. No definite consolidation. No acute  osseous finding.       Impression:         Low lung volumes with central vascular crowding. No definite  consolidation or other acute finding.     This report was signed and finalized on 5/19/2025 6:24 AM by Dr. Carlos Manuel Jasmine MD.             LAB RESULTS:      Lab 05/20/25  0552 05/19/25  0334 05/19/25  0253 05/19/25  0000   WBC 7.00 9.60  --  9.96   HEMOGLOBIN 12.0 11.3*  --  12.2   HEMATOCRIT 38.1 38.2  --  40.0   PLATELETS 190 212  --  200   NEUTROS ABS 3.36 6.63  --  7.97*   IMMATURE GRANS (ABS)  --  0.03  --   --    LYMPHS ABS  --  2.18  --   --    MONOS ABS  --  0.68  --   --    EOS ABS 0.14 0.02  --  0.00   MCV 75.9* 78.4*  --  76.8*   LACTATE  --   --  1.6 3.2*         Lab 05/20/25  0434 05/19/25  0334 05/19/25  0115 05/19/25  0000   SODIUM 138 139  --  138   SODIUM, ARTERIAL  --   --  140  --    POTASSIUM 3.5 4.0  --  3.9   CHLORIDE 101 104  --  102   CO2 24.0 22.0  --  21.0*   ANION GAP 13.0 13.0  --  15.0   BUN 7 6  --  6   CREATININE 0.72 0.64  --  0.74   EGFR 117.0 123.6  --  113.2   GLUCOSE 105* 95  --  107*   CALCIUM 9.2 8.6  --  9.6   MAGNESIUM 2.1 2.0  --   --    PHOSPHORUS 4.3 4.4  --   --          Lab 05/20/25  0434 05/19/25  0334 05/19/25  0000   TOTAL PROTEIN 6.6 6.2 7.3   ALBUMIN 4.2 3.7 4.5   GLOBULIN 2.4 2.5 2.8   ALT (SGPT) 12 12 15   AST (SGOT) 14 18 17   BILIRUBIN 0.2 0.2 <0.2   ALK PHOS 87 77 87                     Lab 05/19/25  0115   PH, ARTERIAL 7.409   PCO2, ARTERIAL 40.0   PO2 ART 71.9*   O2 SATURATION ART 95.2   FIO2 30   HCO3 ART 25.3   BASE EXCESS ART 0.6   CARBOXYHEMOGLOBIN 1.4     Brief Urine Lab Results  (Last result in the past 365 days)        Color   Clarity   Blood   Leuk Est   Nitrite   Protein   CREAT   Urine HCG        05/19/25 0052 Yellow   Clear   Negative   Negative   Negative   Negative              "    Microbiology Results (last 10 days)       ** No results found for the last 240 hours. **            Hospital Course:   29-year-old female with a history of pseudoseizures but was admitted for status intubated in the ER and placed in the intensive care unit overnight.  She was given propofol sedation weaned off in the morning extubated transferred to medical floor.  Yesterday she had 3 episodes of seizure-like activity the first 1 about 4 minutes, second 1 about 20 and the third 1 about 60.  She was evaluated by teleneurologist recommended for Keppra loading dose which she received in Keppra 500 mg daily.  EEG was ordered but patient this morning does not want to have EEG and wants to go home she is refusing further testing.  She is alert and oriented no seizure activity overnight.     Case further discussed with Dr. Martínez this morning.  He has reviewed the case several times in the past and reports there is no evidence of seizure on any EEG testing from the patient.  He firmly believes he does not have epileptic seizures and is all pseudoseizures.  He is recommending against adding Keppra or other antiepileptic medications.  He knows the patient much deeper and thoroughly than I do and I think his assessment is correct.  Will not prescribe Keppra on discharge.  Recommend against antiepileptic treatment of the pseudoseizure activities.    Physical Exam on Discharge:  /63 (BP Location: Left arm, Patient Position: Lying)   Pulse 81   Temp 97.9 °F (36.6 °C) (Axillary)   Resp 18   Ht 167.6 cm (66\")   Wt 103 kg (226 lb 14.4 oz)   LMP 05/14/2025 (Exact Date)   SpO2 96%   BMI 36.62 kg/m²   Physical Exam  Constitutional:       General: She is not in acute distress.     Appearance: Normal appearance. She is not ill-appearing or toxic-appearing.   HENT:      Nose: Nose normal.      Mouth/Throat:      Mouth: Mucous membranes are moist.   Eyes:      Extraocular Movements: Extraocular movements intact.      " Conjunctiva/sclera: Conjunctivae normal.      Pupils: Pupils are equal, round, and reactive to light.   Cardiovascular:      Rate and Rhythm: Normal rate and regular rhythm.      Pulses: Normal pulses.   Pulmonary:      Effort: Pulmonary effort is normal. No respiratory distress.      Breath sounds: Normal breath sounds. No wheezing.   Abdominal:      General: Abdomen is flat. Bowel sounds are normal.      Palpations: Abdomen is soft.   Musculoskeletal:         General: Normal range of motion.      Cervical back: Normal range of motion and neck supple.   Skin:     General: Skin is warm and dry.   Neurological:      Mental Status: She is alert and oriented to person, place, and time. Mental status is at baseline.      Cranial Nerves: No cranial nerve deficit.   Psychiatric:         Mood and Affect: Mood normal.         Behavior: Behavior normal.     Condition on Discharge:   Stable    Discharge Disposition:  Home    Discharge Medications:     Discharge Medications        Continue These Medications        Instructions Start Date   acetaminophen 500 MG tablet  Commonly known as: TYLENOL   500 mg, Daily PRN      doxazosin 1 MG tablet  Commonly known as: CARDURA   1 mg, Oral, Nightly, Take with 2mg for total of 3mg nightly       doxazosin 2 MG tablet  Commonly known as: CARDURA   2 mg, Oral, Nightly, Take with 1mg for a total of 3mg nightly      FLUoxetine 20 MG capsule  Commonly known as: PROzac   20 mg, Oral, Daily, Take with 40mg for a total of 60mg      FLUoxetine 40 MG capsule  Commonly known as: PROzac   40 mg, Oral, Daily, Take with 20mg for a total of 60mg      hydrOXYzine 25 MG tablet  Commonly known as: ATARAX   25 mg, Oral, 3 Times Daily PRN      LaMICtal 150 MG tablet  Generic drug: lamoTRIgine   300 mg, Oral, 2 Times Daily      Nayzilam 5 MG/0.1ML solution  Generic drug: Midazolam   5 mg, Nasal, Daily PRN      omeprazole 20 MG capsule  Commonly known as: priLOSEC   20 mg, Oral, Daily PRN      QUEtiapine 25  MG tablet  Commonly known as: SEROquel   25 mg, Oral, Daily PRN      QUEtiapine 100 MG tablet  Commonly known as: SEROquel   100 mg, Oral, Every Morning      QUEtiapine 200 MG tablet  Commonly known as: SEROquel   200 mg, Oral, Nightly      senna 8.6 MG tablet  Commonly known as: SENOKOT   2 tablets, Oral, Daily PRN             Discharge Diet:   Regular    Activity at Discharge:   Resume usual activity    Follow-up Appointments:   No future appointments.    Test Results Pending at Discharge: -    Electronically signed by Ladarius Rivas MD, 05/20/25, 09:58 CDT.    Time: 33 minutes.         Electronically signed by Ladarius Rivas MD at 05/20/25 1022       Discharge Order (From admission, onward)       Start     Ordered    05/20/25 1002  Discharge patient  Once        Expected Discharge Date: 05/20/25   Discharge Disposition: Home or Self Care   Physician of Record for Attribution - Please select from Treatment Team: LADARIUS RIVAS [[id number]]   Review needed by CMO to determine Physician of Record: No      Question Answer Comment   Physician of Record for Attribution - Please select from Treatment Team LADARIUS RIVAS    Review needed by CMO to determine Physician of Record No        05/20/25 1001

## 2025-06-01 ENCOUNTER — HOSPITAL ENCOUNTER (OUTPATIENT)
Facility: HOSPITAL | Age: 30
Discharge: HOME | End: 2025-06-05
Attending: STUDENT IN AN ORGANIZED HEALTH CARE EDUCATION/TRAINING PROGRAM | Admitting: SURGERY
Payer: MEDICAID

## 2025-06-01 ENCOUNTER — APPOINTMENT (OUTPATIENT)
Dept: GENERAL RADIOLOGY | Facility: HOSPITAL | Age: 30
End: 2025-06-01
Payer: MEDICAID

## 2025-06-01 ENCOUNTER — ANESTHESIA (OUTPATIENT)
Dept: PERIOP | Facility: HOSPITAL | Age: 30
End: 2025-06-01
Payer: MEDICAID

## 2025-06-01 ENCOUNTER — APPOINTMENT (OUTPATIENT)
Dept: CT IMAGING | Facility: HOSPITAL | Age: 30
End: 2025-06-01
Payer: MEDICAID

## 2025-06-01 ENCOUNTER — ANESTHESIA EVENT (OUTPATIENT)
Dept: PERIOP | Facility: HOSPITAL | Age: 30
End: 2025-06-01
Payer: MEDICAID

## 2025-06-01 DIAGNOSIS — T18.9XXA FOREIGN BODY ALIMENTARY TRACT, INITIAL ENCOUNTER: Primary | ICD-10-CM

## 2025-06-01 DIAGNOSIS — T18.9XXA SWALLOWED FOREIGN BODY, INITIAL ENCOUNTER: Primary | ICD-10-CM

## 2025-06-01 DIAGNOSIS — T18.9XXA FOREIGN BODY ALIMENTARY TRACT, INITIAL ENCOUNTER: ICD-10-CM

## 2025-06-01 LAB
ALBUMIN SERPL-MCNC: 4.4 G/DL (ref 3.5–5.2)
ALBUMIN/GLOB SERPL: 1.6 G/DL
ALP SERPL-CCNC: 80 U/L (ref 39–117)
ALT SERPL W P-5'-P-CCNC: 12 U/L (ref 1–33)
ANION GAP SERPL CALCULATED.3IONS-SCNC: 14 MMOL/L (ref 5–15)
ANISOCYTOSIS BLD QL: ABNORMAL
AST SERPL-CCNC: 17 U/L (ref 1–32)
BACTERIA UR QL AUTO: ABNORMAL /HPF
BASOPHILS # BLD MANUAL: 0 10*3/MM3 (ref 0–0.2)
BASOPHILS NFR BLD MANUAL: 0 % (ref 0–1.5)
BILIRUB SERPL-MCNC: 0.2 MG/DL (ref 0–1.2)
BILIRUB UR QL STRIP: NEGATIVE
BUN SERPL-MCNC: 8.9 MG/DL (ref 6–20)
BUN/CREAT SERPL: 14.1 (ref 7–25)
CALCIUM SPEC-SCNC: 9.8 MG/DL (ref 8.6–10.5)
CHLORIDE SERPL-SCNC: 103 MMOL/L (ref 98–107)
CLARITY UR: CLEAR
CO2 SERPL-SCNC: 22 MMOL/L (ref 22–29)
COLOR UR: YELLOW
CREAT SERPL-MCNC: 0.63 MG/DL (ref 0.57–1)
DEPRECATED RDW RBC AUTO: ABNORMAL FL
EGFRCR SERPLBLD CKD-EPI 2021: 124.1 ML/MIN/1.73
EOSINOPHIL # BLD MANUAL: 0 10*3/MM3 (ref 0–0.4)
EOSINOPHIL NFR BLD MANUAL: 0 % (ref 0.3–6.2)
ERYTHROCYTE [DISTWIDTH] IN BLOOD BY AUTOMATED COUNT: ABNORMAL %
GLOBULIN UR ELPH-MCNC: 2.8 GM/DL
GLUCOSE SERPL-MCNC: 101 MG/DL (ref 65–99)
GLUCOSE UR STRIP-MCNC: NEGATIVE MG/DL
HCG SERPL QL: NEGATIVE
HCT VFR BLD AUTO: 39.9 % (ref 34–46.6)
HGB BLD-MCNC: 12.5 G/DL (ref 12–15.9)
HGB UR QL STRIP.AUTO: NEGATIVE
HYALINE CASTS UR QL AUTO: ABNORMAL /LPF
IRON 24H UR-MRATE: 28 MCG/DL (ref 37–145)
IRON SATN MFR SERPL: 9 % (ref 20–50)
KETONES UR QL STRIP: NEGATIVE
LEUKOCYTE ESTERASE UR QL STRIP.AUTO: ABNORMAL
LIPASE SERPL-CCNC: 17 U/L (ref 13–60)
LYMPHOCYTES # BLD MANUAL: 4.06 10*3/MM3 (ref 0.7–3.1)
LYMPHOCYTES NFR BLD MANUAL: 3 % (ref 5–12)
MCH RBC QN AUTO: 24.9 PG (ref 26.6–33)
MCHC RBC AUTO-ENTMCNC: 31.3 G/DL (ref 31.5–35.7)
MCV RBC AUTO: 79.5 FL (ref 79–97)
MONOCYTES # BLD: 0.27 10*3/MM3 (ref 0.1–0.9)
NEUTROPHILS # BLD AUTO: 4.69 10*3/MM3 (ref 1.7–7)
NEUTROPHILS NFR BLD MANUAL: 52 % (ref 42.7–76)
NITRITE UR QL STRIP: NEGATIVE
OVALOCYTES BLD QL SMEAR: ABNORMAL
PH UR STRIP.AUTO: 7 [PH] (ref 5–8)
PLAT MORPH BLD: NORMAL
PLATELET # BLD AUTO: 183 10*3/MM3 (ref 140–450)
PMV BLD AUTO: ABNORMAL FL
POIKILOCYTOSIS BLD QL SMEAR: ABNORMAL
POLYCHROMASIA BLD QL SMEAR: ABNORMAL
POTASSIUM SERPL-SCNC: 4.3 MMOL/L (ref 3.5–5.2)
PROT SERPL-MCNC: 7.2 G/DL (ref 6–8.5)
PROT UR QL STRIP: NEGATIVE
RBC # BLD AUTO: 5.02 10*6/MM3 (ref 3.77–5.28)
RBC # UR STRIP: ABNORMAL /HPF
REF LAB TEST METHOD: ABNORMAL
SODIUM SERPL-SCNC: 139 MMOL/L (ref 136–145)
SP GR UR STRIP: 1.01 (ref 1–1.03)
SQUAMOUS #/AREA URNS HPF: ABNORMAL /HPF
STOMATOCYTES BLD QL SMEAR: ABNORMAL
TIBC SERPL-MCNC: 317 MCG/DL (ref 298–536)
TRANSFERRIN SERPL-MCNC: 213 MG/DL (ref 200–360)
UROBILINOGEN UR QL STRIP: ABNORMAL
VARIANT LYMPHS NFR BLD MANUAL: 1 % (ref 0–5)
VARIANT LYMPHS NFR BLD MANUAL: 44 % (ref 19.6–45.3)
WBC # UR STRIP: ABNORMAL /HPF
WBC MORPH BLD: NORMAL
WBC NRBC COR # BLD AUTO: 9.02 10*3/MM3 (ref 3.4–10.8)

## 2025-06-01 PROCEDURE — 25810000003 LACTATED RINGERS PER 1000 ML: Performed by: SURGERY

## 2025-06-01 PROCEDURE — 25810000003 LACTATED RINGERS PER 1000 ML

## 2025-06-01 PROCEDURE — 83540 ASSAY OF IRON: CPT | Performed by: SURGERY

## 2025-06-01 PROCEDURE — 84466 ASSAY OF TRANSFERRIN: CPT | Performed by: SURGERY

## 2025-06-01 PROCEDURE — 99285 EMERGENCY DEPT VISIT HI MDM: CPT | Performed by: STUDENT IN AN ORGANIZED HEALTH CARE EDUCATION/TRAINING PROGRAM

## 2025-06-01 PROCEDURE — 74177 CT ABD & PELVIS W/CONTRAST: CPT

## 2025-06-01 PROCEDURE — 25010000002 ONDANSETRON PER 1 MG

## 2025-06-01 PROCEDURE — 81001 URINALYSIS AUTO W/SCOPE: CPT | Performed by: STUDENT IN AN ORGANIZED HEALTH CARE EDUCATION/TRAINING PROGRAM

## 2025-06-01 PROCEDURE — 25010000002 FAMOTIDINE 10 MG/ML SOLUTION: Performed by: STUDENT IN AN ORGANIZED HEALTH CARE EDUCATION/TRAINING PROGRAM

## 2025-06-01 PROCEDURE — 25010000002 FENTANYL CITRATE (PF) 50 MCG/ML SOLUTION

## 2025-06-01 PROCEDURE — 25010000002 HYDROMORPHONE PER 4 MG

## 2025-06-01 PROCEDURE — 25010000002 PROPOFOL 10 MG/ML EMULSION

## 2025-06-01 PROCEDURE — 99214 OFFICE O/P EST MOD 30 MIN: CPT | Performed by: STUDENT IN AN ORGANIZED HEALTH CARE EDUCATION/TRAINING PROGRAM

## 2025-06-01 PROCEDURE — 25510000001 IOPAMIDOL 61 % SOLUTION: Performed by: STUDENT IN AN ORGANIZED HEALTH CARE EDUCATION/TRAINING PROGRAM

## 2025-06-01 PROCEDURE — 25010000002 DEXAMETHASONE PER 1 MG

## 2025-06-01 PROCEDURE — 84703 CHORIONIC GONADOTROPIN ASSAY: CPT | Performed by: STUDENT IN AN ORGANIZED HEALTH CARE EDUCATION/TRAINING PROGRAM

## 2025-06-01 PROCEDURE — 85025 COMPLETE CBC W/AUTO DIFF WBC: CPT | Performed by: STUDENT IN AN ORGANIZED HEALTH CARE EDUCATION/TRAINING PROGRAM

## 2025-06-01 PROCEDURE — 83690 ASSAY OF LIPASE: CPT | Performed by: STUDENT IN AN ORGANIZED HEALTH CARE EDUCATION/TRAINING PROGRAM

## 2025-06-01 PROCEDURE — 25810000003 SODIUM CHLORIDE 0.9 % SOLUTION: Performed by: STUDENT IN AN ORGANIZED HEALTH CARE EDUCATION/TRAINING PROGRAM

## 2025-06-01 PROCEDURE — 43235 EGD DIAGNOSTIC BRUSH WASH: CPT | Performed by: INTERNAL MEDICINE

## 2025-06-01 PROCEDURE — 99222 1ST HOSP IP/OBS MODERATE 55: CPT | Performed by: NURSE PRACTITIONER

## 2025-06-01 PROCEDURE — G0378 HOSPITAL OBSERVATION PER HR: HCPCS

## 2025-06-01 PROCEDURE — 96374 THER/PROPH/DIAG INJ IV PUSH: CPT

## 2025-06-01 PROCEDURE — 85007 BL SMEAR W/DIFF WBC COUNT: CPT | Performed by: STUDENT IN AN ORGANIZED HEALTH CARE EDUCATION/TRAINING PROGRAM

## 2025-06-01 PROCEDURE — 25010000002 LIDOCAINE PF 2% 2 % SOLUTION

## 2025-06-01 PROCEDURE — 74019 RADEX ABDOMEN 2 VIEWS: CPT

## 2025-06-01 PROCEDURE — 74018 RADEX ABDOMEN 1 VIEW: CPT

## 2025-06-01 PROCEDURE — 80053 COMPREHEN METABOLIC PANEL: CPT | Performed by: STUDENT IN AN ORGANIZED HEALTH CARE EDUCATION/TRAINING PROGRAM

## 2025-06-01 RX ORDER — ONDANSETRON 2 MG/ML
INJECTION INTRAMUSCULAR; INTRAVENOUS AS NEEDED
Status: DISCONTINUED | OUTPATIENT
Start: 2025-06-01 | End: 2025-06-01 | Stop reason: SURG

## 2025-06-01 RX ORDER — LIDOCAINE HYDROCHLORIDE 20 MG/ML
INJECTION, SOLUTION EPIDURAL; INFILTRATION; INTRACAUDAL; PERINEURAL AS NEEDED
Status: DISCONTINUED | OUTPATIENT
Start: 2025-06-01 | End: 2025-06-01 | Stop reason: SURG

## 2025-06-01 RX ORDER — ONDANSETRON 2 MG/ML
4 INJECTION INTRAMUSCULAR; INTRAVENOUS EVERY 6 HOURS PRN
Status: DISCONTINUED | OUTPATIENT
Start: 2025-06-01 | End: 2025-06-05 | Stop reason: HOSPADM

## 2025-06-01 RX ORDER — LAMOTRIGINE 100 MG/1
300 TABLET ORAL 2 TIMES DAILY
Status: DISCONTINUED | OUTPATIENT
Start: 2025-06-01 | End: 2025-06-05 | Stop reason: HOSPADM

## 2025-06-01 RX ORDER — SODIUM CHLORIDE, SODIUM LACTATE, POTASSIUM CHLORIDE, CALCIUM CHLORIDE 600; 310; 30; 20 MG/100ML; MG/100ML; MG/100ML; MG/100ML
100 INJECTION, SOLUTION INTRAVENOUS CONTINUOUS
Status: DISPENSED | OUTPATIENT
Start: 2025-06-01 | End: 2025-06-02

## 2025-06-01 RX ORDER — FENTANYL CITRATE 50 UG/ML
50 INJECTION, SOLUTION INTRAMUSCULAR; INTRAVENOUS
Status: COMPLETED | OUTPATIENT
Start: 2025-06-01 | End: 2025-06-01

## 2025-06-01 RX ORDER — SODIUM CHLORIDE 9 MG/ML
40 INJECTION, SOLUTION INTRAVENOUS AS NEEDED
Status: DISCONTINUED | OUTPATIENT
Start: 2025-06-01 | End: 2025-06-02

## 2025-06-01 RX ORDER — HYDROMORPHONE HYDROCHLORIDE 1 MG/ML
0.5 INJECTION, SOLUTION INTRAMUSCULAR; INTRAVENOUS; SUBCUTANEOUS
Status: DISCONTINUED | OUTPATIENT
Start: 2025-06-01 | End: 2025-06-01 | Stop reason: HOSPADM

## 2025-06-01 RX ORDER — QUETIAPINE FUMARATE 100 MG/1
200 TABLET, FILM COATED ORAL NIGHTLY
Status: DISCONTINUED | OUTPATIENT
Start: 2025-06-01 | End: 2025-06-05 | Stop reason: HOSPADM

## 2025-06-01 RX ORDER — ACETAMINOPHEN 500 MG
500 TABLET ORAL DAILY PRN
Status: DISCONTINUED | OUTPATIENT
Start: 2025-06-01 | End: 2025-06-05 | Stop reason: HOSPADM

## 2025-06-01 RX ORDER — SODIUM CHLORIDE 0.9 % (FLUSH) 0.9 %
10 SYRINGE (ML) INJECTION EVERY 12 HOURS SCHEDULED
Status: DISCONTINUED | OUTPATIENT
Start: 2025-06-01 | End: 2025-06-02

## 2025-06-01 RX ORDER — PANTOPRAZOLE SODIUM 40 MG/1
40 TABLET, DELAYED RELEASE ORAL
Status: DISCONTINUED | OUTPATIENT
Start: 2025-06-02 | End: 2025-06-05 | Stop reason: HOSPADM

## 2025-06-01 RX ORDER — FAMOTIDINE 10 MG/ML
20 INJECTION, SOLUTION INTRAVENOUS ONCE
Status: COMPLETED | OUTPATIENT
Start: 2025-06-01 | End: 2025-06-01

## 2025-06-01 RX ORDER — TERAZOSIN 2 MG/1
2 CAPSULE ORAL NIGHTLY
Status: DISCONTINUED | OUTPATIENT
Start: 2025-06-01 | End: 2025-06-05 | Stop reason: HOSPADM

## 2025-06-01 RX ORDER — SODIUM CHLORIDE, SODIUM LACTATE, POTASSIUM CHLORIDE, CALCIUM CHLORIDE 600; 310; 30; 20 MG/100ML; MG/100ML; MG/100ML; MG/100ML
100 INJECTION, SOLUTION INTRAVENOUS CONTINUOUS
Status: DISCONTINUED | OUTPATIENT
Start: 2025-06-01 | End: 2025-06-02

## 2025-06-01 RX ORDER — HYDROXYZINE HYDROCHLORIDE 25 MG/1
25 TABLET, FILM COATED ORAL 3 TIMES DAILY PRN
Status: DISCONTINUED | OUTPATIENT
Start: 2025-06-01 | End: 2025-06-05 | Stop reason: HOSPADM

## 2025-06-01 RX ORDER — IBUPROFEN 600 MG/1
600 TABLET, FILM COATED ORAL EVERY 6 HOURS PRN
Status: DISCONTINUED | OUTPATIENT
Start: 2025-06-01 | End: 2025-06-01 | Stop reason: HOSPADM

## 2025-06-01 RX ORDER — SODIUM CHLORIDE 0.9 % (FLUSH) 0.9 %
10 SYRINGE (ML) INJECTION AS NEEDED
Status: DISCONTINUED | OUTPATIENT
Start: 2025-06-01 | End: 2025-06-05 | Stop reason: HOSPADM

## 2025-06-01 RX ORDER — IOPAMIDOL 612 MG/ML
100 INJECTION, SOLUTION INTRAVASCULAR
Status: COMPLETED | OUTPATIENT
Start: 2025-06-01 | End: 2025-06-01

## 2025-06-01 RX ORDER — PROPOFOL 10 MG/ML
VIAL (ML) INTRAVENOUS AS NEEDED
Status: DISCONTINUED | OUTPATIENT
Start: 2025-06-01 | End: 2025-06-01 | Stop reason: SURG

## 2025-06-01 RX ORDER — QUETIAPINE FUMARATE 25 MG/1
25 TABLET, FILM COATED ORAL DAILY PRN
Status: DISCONTINUED | OUTPATIENT
Start: 2025-06-01 | End: 2025-06-05 | Stop reason: HOSPADM

## 2025-06-01 RX ORDER — SODIUM CHLORIDE 0.9 % (FLUSH) 0.9 %
10 SYRINGE (ML) INJECTION AS NEEDED
Status: DISCONTINUED | OUTPATIENT
Start: 2025-06-01 | End: 2025-06-02

## 2025-06-01 RX ORDER — NALOXONE HCL 0.4 MG/ML
0.4 VIAL (ML) INJECTION AS NEEDED
Status: DISCONTINUED | OUTPATIENT
Start: 2025-06-01 | End: 2025-06-01 | Stop reason: HOSPADM

## 2025-06-01 RX ORDER — SODIUM CHLORIDE, SODIUM LACTATE, POTASSIUM CHLORIDE, CALCIUM CHLORIDE 600; 310; 30; 20 MG/100ML; MG/100ML; MG/100ML; MG/100ML
INJECTION, SOLUTION INTRAVENOUS CONTINUOUS PRN
Status: DISCONTINUED | OUTPATIENT
Start: 2025-06-01 | End: 2025-06-01 | Stop reason: SURG

## 2025-06-01 RX ORDER — SODIUM CHLORIDE 9 MG/ML
40 INJECTION, SOLUTION INTRAVENOUS AS NEEDED
Status: DISCONTINUED | OUTPATIENT
Start: 2025-06-01 | End: 2025-06-05 | Stop reason: HOSPADM

## 2025-06-01 RX ORDER — HYDROCODONE BITARTRATE AND ACETAMINOPHEN 10; 325 MG/1; MG/1
1 TABLET ORAL EVERY 4 HOURS PRN
Status: DISCONTINUED | OUTPATIENT
Start: 2025-06-01 | End: 2025-06-01 | Stop reason: HOSPADM

## 2025-06-01 RX ORDER — LABETALOL HYDROCHLORIDE 5 MG/ML
5 INJECTION, SOLUTION INTRAVENOUS
Status: DISCONTINUED | OUTPATIENT
Start: 2025-06-01 | End: 2025-06-01 | Stop reason: HOSPADM

## 2025-06-01 RX ORDER — MIDAZOLAM HYDROCHLORIDE 2 MG/2ML
10 INJECTION, SOLUTION INTRAMUSCULAR; INTRAVENOUS ONCE AS NEEDED
Status: COMPLETED | OUTPATIENT
Start: 2025-06-01 | End: 2025-06-04

## 2025-06-01 RX ORDER — HYDROCODONE BITARTRATE AND ACETAMINOPHEN 5; 325 MG/1; MG/1
1 TABLET ORAL EVERY 4 HOURS PRN
Status: DISCONTINUED | OUTPATIENT
Start: 2025-06-01 | End: 2025-06-01 | Stop reason: HOSPADM

## 2025-06-01 RX ORDER — DEXAMETHASONE SODIUM PHOSPHATE 4 MG/ML
INJECTION, SOLUTION INTRA-ARTICULAR; INTRALESIONAL; INTRAMUSCULAR; INTRAVENOUS; SOFT TISSUE AS NEEDED
Status: DISCONTINUED | OUTPATIENT
Start: 2025-06-01 | End: 2025-06-01 | Stop reason: SURG

## 2025-06-01 RX ORDER — SENNOSIDES 8.6 MG/1
2 TABLET ORAL DAILY PRN
Status: DISCONTINUED | OUTPATIENT
Start: 2025-06-01 | End: 2025-06-05 | Stop reason: HOSPADM

## 2025-06-01 RX ORDER — ONDANSETRON 2 MG/ML
4 INJECTION INTRAMUSCULAR; INTRAVENOUS EVERY 6 HOURS PRN
Status: DISCONTINUED | OUTPATIENT
Start: 2025-06-01 | End: 2025-06-02

## 2025-06-01 RX ORDER — ONDANSETRON 2 MG/ML
4 INJECTION INTRAMUSCULAR; INTRAVENOUS ONCE AS NEEDED
Status: COMPLETED | OUTPATIENT
Start: 2025-06-01 | End: 2025-06-01

## 2025-06-01 RX ORDER — QUETIAPINE FUMARATE 25 MG/1
100 TABLET, FILM COATED ORAL EVERY MORNING
Status: DISCONTINUED | OUTPATIENT
Start: 2025-06-01 | End: 2025-06-05 | Stop reason: HOSPADM

## 2025-06-01 RX ORDER — FLUMAZENIL 0.1 MG/ML
0.2 INJECTION INTRAVENOUS AS NEEDED
Status: DISCONTINUED | OUTPATIENT
Start: 2025-06-01 | End: 2025-06-01 | Stop reason: HOSPADM

## 2025-06-01 RX ORDER — SODIUM CHLORIDE 0.9 % (FLUSH) 0.9 %
10 SYRINGE (ML) INJECTION EVERY 12 HOURS SCHEDULED
Status: DISCONTINUED | OUTPATIENT
Start: 2025-06-01 | End: 2025-06-05 | Stop reason: HOSPADM

## 2025-06-01 RX ORDER — TERAZOSIN 2 MG/1
2 CAPSULE ORAL NIGHTLY
Status: DISCONTINUED | OUTPATIENT
Start: 2025-06-01 | End: 2025-06-01

## 2025-06-01 RX ADMIN — SODIUM CHLORIDE, POTASSIUM CHLORIDE, SODIUM LACTATE AND CALCIUM CHLORIDE: 600; 310; 30; 20 INJECTION, SOLUTION INTRAVENOUS at 07:07

## 2025-06-01 RX ADMIN — TERAZOSIN HYDROCHLORIDE 2 MG: 2 CAPSULE ORAL at 20:28

## 2025-06-01 RX ADMIN — QUETIAPINE FUMARATE 200 MG: 100 TABLET ORAL at 20:28

## 2025-06-01 RX ADMIN — DEXAMETHASONE SODIUM PHOSPHATE 4 MG: 4 INJECTION, SOLUTION INTRA-ARTICULAR; INTRALESIONAL; INTRAMUSCULAR; INTRAVENOUS; SOFT TISSUE at 07:13

## 2025-06-01 RX ADMIN — ONDANSETRON 4 MG: 2 INJECTION INTRAMUSCULAR; INTRAVENOUS at 07:13

## 2025-06-01 RX ADMIN — Medication 10 ML: at 20:29

## 2025-06-01 RX ADMIN — Medication 10 ML: at 12:02

## 2025-06-01 RX ADMIN — FLUOXETINE HYDROCHLORIDE 20 MG: 20 CAPSULE ORAL at 12:02

## 2025-06-01 RX ADMIN — ACETAMINOPHEN 500 MG: 500 TABLET, FILM COATED ORAL at 20:29

## 2025-06-01 RX ADMIN — PROPOFOL 200 MG: 10 INJECTION, EMULSION INTRAVENOUS at 07:07

## 2025-06-01 RX ADMIN — FENTANYL CITRATE 50 MCG: 50 INJECTION, SOLUTION INTRAMUSCULAR; INTRAVENOUS at 08:02

## 2025-06-01 RX ADMIN — ONDANSETRON 4 MG: 2 INJECTION INTRAMUSCULAR; INTRAVENOUS at 07:45

## 2025-06-01 RX ADMIN — FAMOTIDINE 20 MG: 10 INJECTION INTRAVENOUS at 02:53

## 2025-06-01 RX ADMIN — IOPAMIDOL 100 ML: 612 INJECTION, SOLUTION INTRAVENOUS at 03:28

## 2025-06-01 RX ADMIN — SODIUM CHLORIDE 1000 ML: 9 INJECTION, SOLUTION INTRAVENOUS at 02:52

## 2025-06-01 RX ADMIN — HYDROMORPHONE HYDROCHLORIDE 0.5 MG: 1 INJECTION, SOLUTION INTRAMUSCULAR; INTRAVENOUS; SUBCUTANEOUS at 08:13

## 2025-06-01 RX ADMIN — LAMOTRIGINE 300 MG: 100 TABLET ORAL at 20:28

## 2025-06-01 RX ADMIN — LIDOCAINE HYDROCHLORIDE 100 MG: 20 INJECTION, SOLUTION EPIDURAL; INFILTRATION; INTRACAUDAL; PERINEURAL at 07:07

## 2025-06-01 RX ADMIN — FLUOXETINE HYDROCHLORIDE 40 MG: 20 CAPSULE ORAL at 12:02

## 2025-06-01 RX ADMIN — QUETIAPINE FUMARATE 100 MG: 25 TABLET ORAL at 12:02

## 2025-06-01 RX ADMIN — SODIUM CHLORIDE, POTASSIUM CHLORIDE, SODIUM LACTATE AND CALCIUM CHLORIDE 100 ML/HR: 600; 310; 30; 20 INJECTION, SOLUTION INTRAVENOUS at 23:35

## 2025-06-01 RX ADMIN — FENTANYL CITRATE 50 MCG: 50 INJECTION, SOLUTION INTRAMUSCULAR; INTRAVENOUS at 07:46

## 2025-06-01 RX ADMIN — LAMOTRIGINE 300 MG: 100 TABLET ORAL at 12:01

## 2025-06-01 RX ADMIN — SODIUM CHLORIDE, POTASSIUM CHLORIDE, SODIUM LACTATE AND CALCIUM CHLORIDE 100 ML/HR: 600; 310; 30; 20 INJECTION, SOLUTION INTRAVENOUS at 11:25

## 2025-06-01 NOTE — ANESTHESIA PROCEDURE NOTES
Airway  Reason: elective    Date/Time: 6/1/2025 7:07 AM  Airway not difficult    General Information and Staff    Patient location during procedure: OR  CRNA/CAA: Calderon Bosch CRNA    Indications and Patient Condition  Indications for airway management: airway protection    Preoxygenated: yes    Mask difficulty assessment: 0 - not attempted    Final Airway Details    Final airway type: endotracheal airway      Successful airway: ETT  Cuffed: yes   Successful intubation technique: RSI and video laryngoscopy  Adjuncts used in placement: intubating stylet  Blade: Elliott  Blade size: 3  ETT size (mm): 7.5  Cormack-Lehane Classification: grade I - full view of glottis  Placement verified by: chest auscultation and capnometry   Measured from: lips  ETT/EBT  to lips (cm): 21  Number of attempts at approach: 1  Assessment: lips, teeth, and gum same as pre-op and atraumatic intubation

## 2025-06-01 NOTE — H&P
General Surgery   History and Physical     Referring Provider: No ref. provider found    Patient Care Team:  Provider, No Known as PCP - General  Provider, No Known    Chief complaint: Ingestion of foreign object    Subjective      History of present illness:  The patient is a 29 y.o. female presents today due to eating what appears to have been explained as a magnet.  The patient has done this in the past.  Her past medical history is complicated with severe seizure history.   Duane Cooley was taken to have her foreign object removed by gastroenterology.  Unfortunately this was unable to be obtained.  Patient explains that she eats metal due to having abdominal pain.  She believes this will help alleviate her pain.  The patient also has a history of mental deficiency.    Review of Systems    Review of Systems - General ROS: negative  Gastrointestinal ROS: positive for - abdominal pain  Neurological ROS: positive for - seizures and speech problems      History  Past Medical History:   Diagnosis Date    Anxiety     Depression     History of  2017    History of congenital abnormality 2017    Late prenatal care affecting pregnancy in second trimester 2017    Mental deficiency 2017    Previous  delivery affecting pregnancy 2017    Psychogenic nonepileptic seizure    ,   Past Surgical History:   Procedure Laterality Date     SECTION      ENDOSCOPY N/A 2024    Procedure: ESOPHAGOGASTRODUODENOSCOPY WITH ANESTHESIA;  Surgeon: Tian Morris MD;  Location: [facility] OR;  Service: Gastroenterology;  Laterality: N/A;  pre foreign body in stomach  post foreign body passed out of stomach  dr kaiden houston    ENDOSCOPY N/A 2024    Procedure: ESOPHAGOGASTRODUODENOSCOPY WITH ANESTHESIA;  Surgeon: Herman Murry MD;  Location: [facility] OR;  Service: Gastroenterology;  Laterality: N/A;  pre foreign body  post no foreign body    ENDOSCOPY N/A 2025     Procedure: ESOPHAGOGASTRODUODENOSCOPY;  Surgeon: Brennen White MD;  Location: [facility];  Service: Gastroenterology;  Laterality: N/A;  pre: foreign body  post: foreign body   , History reviewed. No pertinent family history.,   Social History     Tobacco Use    Smoking status: Unknown    Smokeless tobacco: Never   Vaping Use    Vaping status: Never Used   Substance Use Topics    Alcohol use: Defer    Drug use: Defer   ,   Facility-Administered Medications Prior to Admission   Medication Dose Route Frequency Provider Last Rate Last Admin    lactated ringers infusion  100 mL/hr Intravenous Continuous Javon Almeida MD        ondansetron (ZOFRAN) injection 4 mg  4 mg Intravenous Q6H PRN Javon Almeida MD        sodium chloride 0.9 % flush 10 mL  10 mL Intravenous Q12H Javon Almeida MD        sodium chloride 0.9 % flush 10 mL  10 mL Intravenous PRN Javon Almeida MD        sodium chloride 0.9 % infusion 40 mL  40 mL Intravenous PRN Javon Almeida MD         Medications Prior to Admission   Medication Sig Dispense Refill Last Dose/Taking    acetaminophen (TYLENOL) 500 MG tablet Take 1 tablet by mouth Daily As Needed for Mild Pain.       doxazosin (CARDURA) 1 MG tablet Take 1 tablet by mouth Every Night. Take with 2mg for total of 3mg nightly       doxazosin (CARDURA) 2 MG tablet Take 1 tablet by mouth Every Night. Take with 1mg for a total of 3mg nightly       FLUoxetine (PROzac) 20 MG capsule Take 1 capsule by mouth Daily. Take with 40mg for a total of 60mg       FLUoxetine (PROzac) 40 MG capsule Take 1 capsule by mouth Daily. Take with 20mg for a total of 60mg       hydrOXYzine (ATARAX) 25 MG tablet Take 1 tablet by mouth 3 (Three) Times a Day As Needed for Anxiety.       lamoTRIgine (LaMICtal) 150 MG tablet Take 2 tablets by mouth 2 (Two) Times a Day.       Midazolam (Nayzilam) 5 MG/0.1ML solution Administer 0.1 mL into the nostril(s) as directed by provider Daily As Needed (seizure).        omeprazole (priLOSEC) 20 MG capsule Take 1 capsule by mouth Daily As Needed (Acid reflux).       QUEtiapine (SEROquel) 100 MG tablet Take 1 tablet by mouth Every Morning.       QUEtiapine (SEROquel) 200 MG tablet Take 1 tablet by mouth Every Night.       QUEtiapine (SEROquel) 25 MG tablet Take 1 tablet by mouth Daily As Needed (for agitation).       senna 8.6 MG tablet Take 2 tablets by mouth Daily As Needed for Constipation.       and Allergies:  Patient has no known allergies.    Current Facility-Administered Medications:     atropine sulfate (0.1 mg/mL) Intravenous 0.5 mg / 5 mL, 0.5 mg, Intravenous, Once PRN, Calderon Bosch CRNA    flumazenil (ROMAZICON) injection 0.2 mg, 0.2 mg, Intravenous, PRN, Calderon Bosch CRNA    HYDROcodone-acetaminophen (NORCO)  MG per tablet 1 tablet, 1 tablet, Oral, Q4H PRN, Calderon Bosch CRNA    HYDROcodone-acetaminophen (NORCO) 5-325 MG per tablet 1 tablet, 1 tablet, Oral, Q4H PRN, Calderon Bosch CRNA    HYDROmorphone (DILAUDID) injection 0.5 mg, 0.5 mg, Intravenous, Q10 Min PRN, Shirley Arias CRNA, 0.5 mg at 06/01/25 0813    ibuprofen (ADVIL,MOTRIN) tablet 600 mg, 600 mg, Oral, Q6H PRN, Calderon Bosch CRNA    labetalol (NORMODYNE,TRANDATE) injection 5 mg, 5 mg, Intravenous, Q5 Min PRN, Calderon Bosch CRNA    naloxone (NARCAN) injection 0.4 mg, 0.4 mg, Intravenous, PRN, Calderon Bosch CRNA    Objective     Vital Signs   Temp:  [98 °F (36.7 °C)-98.4 °F (36.9 °C)] 98 °F (36.7 °C)  Heart Rate:  [] 69  Resp:  [18-24] 20  BP: ()/(49-77) 112/63    Physical Exam:  Physical Exam  Constitutional:       General: She is not in acute distress.     Appearance: She is not ill-appearing.   Cardiovascular:      Rate and Rhythm: Normal rate.      Pulses: Normal pulses.   Abdominal:      General: Abdomen is flat. Bowel sounds are normal. There is no distension.      Palpations: Abdomen is soft. There is no mass.      Tenderness: There is abdominal tenderness in the  epigastric area. There is no guarding or rebound.       Neurological:      Mental Status: She is alert.          Results Review:     Lab Results (last 24 hours)       Procedure Component Value Units Date/Time    Comprehensive Metabolic Panel [[id number]]  (Abnormal) Collected: 06/01/25 0252    Specimen: Blood Updated: 06/01/25 0327     Glucose 101 mg/dL      BUN 8.9 mg/dL      Creatinine 0.63 mg/dL      Sodium 139 mmol/L      Potassium 4.3 mmol/L      Comment: Slight hemolysis detected by analyzer. Result may be falsely elevated.        Chloride 103 mmol/L      CO2 22.0 mmol/L      Calcium 9.8 mg/dL      Total Protein 7.2 g/dL      Albumin 4.4 g/dL      ALT (SGPT) 12 U/L      AST (SGOT) 17 U/L      Comment: Slight hemolysis detected by analyzer. Result may be falsely elevated.        Alkaline Phosphatase 80 U/L      Total Bilirubin 0.2 mg/dL      Globulin 2.8 gm/dL      A/G Ratio 1.6 g/dL      BUN/Creatinine Ratio 14.1     Anion Gap 14.0 mmol/L      eGFR 124.1 mL/min/1.73     Narrative:      GFR Categories in Chronic Kidney Disease (CKD)              GFR Category          GFR (mL/min/1.73)    Interpretation  G1                    90 or greater        Normal or high (1)  G2                    60-89                Mild decrease (1)  G3a                   45-59                Mild to moderate decrease  G3b                   30-44                Moderate to severe decrease  G4                    15-29                Severe decrease  G5                    14 or less           Kidney failure    (1)In the absence of evidence of kidney disease, neither GFR category G1 or G2 fulfill the criteria for CKD.    eGFR calculation 2021 CKD-EPI creatinine equation, which does not include race as a factor    Manual Differential [[id number]]  (Abnormal) Collected: 06/01/25 0252    Specimen: Blood Updated: 06/01/25 0327     Neutrophil % 52.0 %      Lymphocyte % 44.0 %      Monocyte % 3.0 %      Eosinophil % 0.0 %      Basophil % 0.0 %       Atypical Lymphocyte % 1.0 %      Neutrophils Absolute 4.69 10*3/mm3      Lymphocytes Absolute 4.06 10*3/mm3      Monocytes Absolute 0.27 10*3/mm3      Eosinophils Absolute 0.00 10*3/mm3      Basophils Absolute 0.00 10*3/mm3      Anisocytosis Mod/2+     Ovalocytes Slight/1+     Poikilocytes Mod/2+     Polychromasia Slight/1+     Stomatocytes Slight/1+     WBC Morphology Normal     Platelet Morphology Normal    hCG, Serum, Qualitative [[id number]]  (Normal) Collected: 06/01/25 0252    Specimen: Blood Updated: 06/01/25 0321     HCG Qualitative Negative    Urinalysis, Microscopic Only - Urine, Clean Catch [[id number]]  (Abnormal) Collected: 06/01/25 0252    Specimen: Urine, Clean Catch Updated: 06/01/25 0321     RBC, UA 0-2 /HPF      WBC, UA 3-5 /HPF      Comment: Urine culture not indicated.        Bacteria, UA 2+ /HPF      Squamous Epithelial Cells, UA 7-12 /HPF      Hyaline Casts, UA None Seen /LPF      Methodology Manual Light Microscopy    Lipase [[id number]]  (Normal) Collected: 06/01/25 0252    Specimen: Blood Updated: 06/01/25 0320     Lipase 17 U/L     Urinalysis With Culture If Indicated - Urine, Clean Catch [[id number]]  (Abnormal) Collected: 06/01/25 0252    Specimen: Urine, Clean Catch Updated: 06/01/25 0310     Color, UA Yellow     Appearance, UA Clear     pH, UA 7.0     Specific Gravity, UA 1.011     Glucose, UA Negative     Ketones, UA Negative     Bilirubin, UA Negative     Blood, UA Negative     Protein, UA Negative     Leuk Esterase, UA Small (1+)     Nitrite, UA Negative     Urobilinogen, UA 0.2 E.U./dL    Narrative:      In absence of clinical symptoms, the presence of pyuria, bacteria, and/or nitrites on the urinalysis result does not correlate with infection.    CBC & Differential [[id number]]  (Abnormal) Collected: 06/01/25 0252    Specimen: Blood Updated: 06/01/25 0305    Narrative:      The following orders were created for panel order CBC & Differential.  Procedure                                Abnormality         Status                     ---------                               -----------         ------                     CBC Auto Differential[[id number]]        Abnormal            Final result                 Please view results for these tests on the individual orders.    CBC Auto Differential [[id number]]  (Abnormal) Collected: 06/01/25 0252    Specimen: Blood Updated: 06/01/25 0305     WBC 9.02 10*3/mm3      RBC 5.02 10*6/mm3      Hemoglobin 12.5 g/dL      Hematocrit 39.9 %      MCV 79.5 fL      MCH 24.9 pg      MCHC 31.3 g/dL      RDW --     Comment: Unable to calculate        RDW-SD --     Comment: Unable to calculate        MPV --     Comment: Unable to calculate        Platelets 183 10*3/mm3           Imaging Results (Last 24 Hours)       Procedure Component Value Units Date/Time    XR Abdomen KUB [[id number]] Collected: 06/01/25 0815     Updated: 06/01/25 0828    Narrative:      EXAMINATION:  XR ABDOMEN KUB-  6/1/2025 6:46 AM     HISTORY: T18.9XXA-Foreign body of alimentary tract, part unspecified,  initial encounter. The patient had an upper endoscopy and the radiopaque  foreign body was not found in the stomach.     COMPARISON: CT on 6/1/2025.     TECHNIQUE: Supine abdomen.     FINDINGS:   There is a cylindrical 2.3 x 0.6 cm foreign body overlying  the left upper quadrant of the abdomen. It does not appear to be  overlying the gastric outline on the x-ray. Therefore, it is likely  within a proximal jejunal loop. The bowel gas pattern is within normal  limits. No free air is identified on this supine examination. There has  been prior cholecystectomy. There is no organomegaly. There is contrast  within the urinary bladder related to the CT that was performed  recently.          Impression:      1. There is a cylindrical radiopaque 2.3 x 0.6 cm foreign body overlying  the left upper quadrant. It does not appear to be within the gastric  outline on the x-ray. In addition, it was not found on upper  endoscopy.  Therefore, the device is likely located in the proximal jejunum.  2. No obvious free air is seen on this supine exam. The gas pattern is  within normal limits.        Results were discussed with Dr. Park.           This report was signed and finalized on 6/1/2025 8:25 AM by Dr. Abebe Hawkins MD.       CT Abdomen Pelvis With Contrast [[id number]] Collected: 06/01/25 0453     Updated: 06/01/25 0516    Narrative:      CT ABDOMEN PELVIS W CONTRAST- 6/1/2025 2:22 AM     HISTORY: abdominal pain, hx of magnet ingestion.       COMPARISON: 5/7/2025.     DLP: 537.8 mGy.cm. All CT scans are performed using dose optimization  techniques as appropriate to the performed exam and including at least  one of the following: Automated exposure control, adjustment of the mA  and/or kV according to size, and the use of the iterative reconstruction  technique.     TECHNIQUE: Following the intravenous administration of contrast, helical  CT tomographic images of the abdomen and pelvis were acquired. Coronal  reformatted images were also provided for review.     FINDINGS:  Trace right effusion is present. There is right basilar atelectasis. The  base of the heart is unremarkable. No pericardial effusion present..     LIVER: No focal liver lesion. The hepatic vasculature is patent.     BILIARY SYSTEM: The gallbladder is surgically absent. No biliary  dilatation is present..     PANCREAS: No focal pancreatic lesion.     SPLEEN: Unremarkable.     KIDNEYS AND ADRENALS: Bilateral kidneys and adrenal glands are  unremarkable. The ureters are decompressed and normal in appearance.     RETROPERITONEUM: No mass, lymphadenopathy or hemorrhage.     GI TRACT: There is a similar radiopaque foreign body noted along the  lesser curvature of the stomach at the junction of the body and antrum  of the stomach. This is identical in appearance to the previous ingested  foreign body dated 5/7/2025. The radiodensity is similar in position  from  the previous exam although the patient did undergo endoscopy and  metallic/magnetic foreign bodies were removed. KUB dated 5/19/2025  demonstrates no definite radiopaque foreign body as was demonstrated on  the KUB 5/6/2025. There is no adjacent inflammatory change or  extraluminal air on today's exam. No evidence of adjacent gastric wall  thickening or gastric outlet obstruction. The bowel gas pattern is  otherwise unremarkable... The appendix is visualized and unremarkable.     OTHER: There is no mesenteric mass, lymphadenopathy or fluid collection.  The abdominopelvic vasculature is patent. The osseous structures and  soft tissues demonstrate no worrisome lesions. There is a ventral wall  hernia above the umbilicus containing peritoneal fat with the abdominal  wall defect measuring 1.4 cm. A fat-containing periumbilical hernia is  also present. No herniated bowel loop or incarceration.     PELVIS: There is a peripherally enhancing 1.8 cm irregular cyst  associated with the right ovary suggesting a recently ruptured cyst. An  additional radiodensity measuring 1.6 cm in size associated with the  right ovary likely represents a collapsed cyst. There is some  enlargement of the right ovary. There is moderate free fluid in the  cul-de-sac and adjacent to the right ovary. The left ovary is  unremarkable.. The urinary bladder is normal in appearance.       Impression:      1. There are 2 areas of enhancement/increased radiodensity associated  with the right ovary suggesting recently ruptured ovarian cysts and/or  hemorrhagic cyst. There is mild enlargement of the right ovary with  torsion not entirely excluded. There is moderate free fluid in the  cul-de-sac and adjacent to the right ovary. The uterus and left ovary  are unremarkable.  2. There is a radiopaque foreign body along the lesser curvature the  stomach at the junction of the body and antrum of the stomach. This is  similar in position to a previous foreign  body described on CT abdomen  and pelvis dated 5/7/2025 which was consistent with an ingested magnet.  This was subsequently removed at endoscopy on the same day with hospital  admission. A KUB radiograph which was obtained on 5/6/2025 also  demonstrated the rectangular radiopaque foreign body. A subsequent KUB  which was obtained on 5/19/2025 no longer demonstrated  the foreign  body. This would suggest that the patient has likely re ingested a  similar foreign body. There is no adjacent gastric wall thickening or  extraluminal air to suggest perforation. The GI tract is otherwise  unremarkable. Normal appendix.  3. There is an incisional hernia within the midline above the umbilicus  as well as a fat-containing periumbilical hernia..           This report was signed and finalized on 6/1/2025 5:13 AM by Dr. Star Ruelas MD.               ASSESSMENT/PLAN   Diagnosis Plan   1. Swallowed foreign body, initial encounter        2. Foreign body alimentary tract, initial encounter  sodium chloride 0.9 % flush 10 mL    sodium chloride 0.9 % flush 10 mL    sodium chloride 0.9 % infusion 40 mL    lactated ringers infusion    ondansetron (ZOFRAN) injection 4 mg           Pseudoseizures    Foreign body ingestion    Psychogenic nonepileptic seizure    Foreign body alimentary tract  Most recent KUB shows progression of the foreign object in the alimentary tract.  Due to its size it is possible that this might continue to progress to the colon without difficulty.  Will repeat KUB to follow its course.  Patient will be admitted under general surgical services and set on ice chips and sips at this time.  Had a discussion with the patient's mother who is the power of  notifying her and updating her of her daughter's situation.      Javon Almeida MD  06/01/25  10:50 CDT

## 2025-06-01 NOTE — ED PROVIDER NOTES
Subjective   History of Present Illness  The patient is a female with autism, sensory disorder, PTSD, and history of seizures who presents to the ED with abdominal pain. Per caretaker, there is concern for possible magnet ingestion, specifically small button-type magnets. While the ingestion was not directly witnessed, the patient has a history of foreign body ingestion, and the caretaker notes that the patient typically complains of stomach pain after such incidents. The patient is nonverbal, which complicates history taking. Notably, there is no concern for button battery ingestion. The patient denies any medication allergies.        Review of Systems   All other systems reviewed and are negative.      Past Medical History:   Diagnosis Date    Anxiety     Depression     History of  2017    History of congenital abnormality 2017    Late prenatal care affecting pregnancy in second trimester 2017    Mental deficiency 2017    Previous  delivery affecting pregnancy 2017    Psychogenic nonepileptic seizure        No Known Allergies    Past Surgical History:   Procedure Laterality Date     SECTION      ENDOSCOPY N/A 2024    Procedure: ESOPHAGOGASTRODUODENOSCOPY WITH ANESTHESIA;  Surgeon: Tian Morris MD;  Location: [facility] OR;  Service: Gastroenterology;  Laterality: N/A;  pre foreign body in stomach  post foreign body passed out of stomach  dr kaiden houston    ENDOSCOPY N/A 2024    Procedure: ESOPHAGOGASTRODUODENOSCOPY WITH ANESTHESIA;  Surgeon: Herman Murry MD;  Location: [facility] OR;  Service: Gastroenterology;  Laterality: N/A;  pre foreign body  post no foreign body    ENDOSCOPY N/A 2025    Procedure: ESOPHAGOGASTRODUODENOSCOPY;  Surgeon: Brennen White MD;  Location: [facility] OR;  Service: Gastroenterology;  Laterality: N/A;  pre: foreign body  post: foreign body       Family History   Problem Relation Age of Onset    Diabetes Mother      Fibromyalgia Mother     Migraines Mother     Mental illness Father        Social History     Socioeconomic History    Marital status:    Tobacco Use    Smoking status: Unknown    Smokeless tobacco: Never   Vaping Use    Vaping status: Never Used   Substance and Sexual Activity    Alcohol use: Defer    Drug use: Defer    Sexual activity: Defer     Partners: Male           Objective   Physical Exam    Constitutional:  General: Patient is not in acute distress.  Appearance: Patient is not diaphoretic.    HENT:  Head: Normocephalic and atraumatic.  Right Ear: External ear normal.  Left Ear: External ear normal.  Nose: Nose normal.    Eyes:  General: No scleral icterus.  Conjunctiva/sclera: Conjunctivae normal.    Cardiovascular:  Rate and Rhythm: Normal rate and regular rhythm.  Heart sounds: No friction rub.    Pulmonary:  Effort: Pulmonary effort is normal. No respiratory distress.  Breath sounds: No stridor. No wheezing.    Abdominal:  Palpations: **Abdomen has mild tenderness to palpation**  Tenderness: **No guarding or rebound. No distension.**    Musculoskeletal:  General: No deformity.    Skin:  General: Skin is warm and dry. No rashes present. Normal color. Normal turgor.    Neurological:  General: No focal deficit present.  Mental Status: **Nonverbal, autistic**    Procedures           ED Course  ED Course as of 06/02/25 0007   Sun Jun 01, 2025   0510 Patient underwent endoscopy in May 6 to 7 for foreign body removal.  CAT scan today shows new metallic foreign bodies in the stomach.  Her labs are reassuring, she remains negative for concerns of drooling, significant abdominal pain, chest pain or shortness of breath.  No vomiting. [SG]   3810 I spoke to Dr. Park from GI PT to go for endoscopy for foreign body removal. Care taker aware.  [SG]      ED Course User Index  [SG] Hank Holland MD                                                       Medical Decision Making  Patient presents with  abdominal pain and suspected foreign body (magnet) ingestion with history of similar incidents.    Laboratory studies:  CBC, lipase, HCG, and UA were ordered and are pending at time of note.    Imaging studies:  CT abdomen and pelvis with contrast has been ordered for evaluation of possible foreign body and to assess for complications.    Medications/Interventions:  - IV fluids initiated  - Pepcid administered    Problems Addressed:  Swallowed foreign body, initial encounter: complicated acute illness or injury    Amount and/or Complexity of Data Reviewed  Labs: ordered.  Radiology: ordered.    Risk  Prescription drug management.        Final diagnoses:   Swallowed foreign body, initial encounter       ED Disposition  ED Disposition       ED Disposition   Send to OR    Condition   --    Comment   --               No follow-up provider specified.       Medication List      No changes were made to your prescriptions during this visit.            Hank Holland MD  06/02/25 0007

## 2025-06-01 NOTE — ANESTHESIA PROCEDURE NOTES
Peripheral IV    Start time: 6/1/2025 7:06 AM  Line placed for Fluids/Medication Admin.  Performed By   CRNA/CAA: Calderon Bosch CRNA  Preanesthetic Checklist  Completed: patient identified, IV checked, site marked, risks and benefits discussed, surgical consent, monitors and equipment checked, pre-op evaluation and timeout performed  Peripheral IV Prep   Patient position: supine   Prep: ChloraPrep  Patient monitoring: heart rate, cardiac monitor and continuous pulse ox  Peripheral IV Procedure   Laterality:left  Location:  Antecubital  Catheter size: 20 G  Guidance: ultrasound guided          Post Assessment   Dressing Type: tape.    IV Dressing/Site: clean, dry and intact

## 2025-06-01 NOTE — CONSULTS
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  She has had episodes in the past of this disorder.  She presented to [facility] ED with abdominal pain.  Patient has autism and is nonverbal, apparently she can communicate via phone.  Caretaker reported to ED provider there was a concern for possible magnet ingestion described as small button type magnets.  Ingestion was not directly witnessed.  She typically complains of abdominal pain after incidence of ingestion.  Workup reveals glucose 101, iron 28, iron saturation 9, normal hemoglobin and hematocrit, urine with small leukocytes, WBC 3-5, 2+ bacteria and 7-12 squamous epithelial cells.  CT of the abdomen pelvis with contrast reveals There is a radiopaque foreign body along the lesser curvature the stomach at the junction of the body and antrum of the stomach. This is similar in position to a previous foreign body described on CT abdomen and pelvis dated 5/7/2025 which was consistent with an ingested magnet.    Patient was admitted to general surgery who has consulted GI and hospitalist assist in care of this patient.  Patient did undergo EGD with normal findings.  Hospitalist consulted for management of seizure disorder. Patient awake and alert.  She is able to communicate via note texting on her phone.  She follows with a neurologist \"Dr. Fajardo\".  Lamictal and breakthrough nasal treatment Nayzilam ordered. Caregiver Lana at bedside to assist with monitoring seizure activity.  Patient complaining of generalized abdominal pain 7 on a scale of 1-10.  Discussed " patient care with Peg Elizabeth RN.  Oral care needed due to n.p.o. status however do not leave sponges in the room with the patient.  Condition is stable. No further recommendations for seizure unless breakthrough occurs.    Review of Systems   Otherwise complete ROS is negative except as mentioned above.    Past Medical History:   Past Medical History:   Diagnosis Date    Anxiety     Depression     History of  2017    History of congenital abnormality 2017    Late prenatal care affecting pregnancy in second trimester 2017    Mental deficiency 2017    Previous  delivery affecting pregnancy 2017    Psychogenic nonepileptic seizure      Past Surgical History:  Past Surgical History:   Procedure Laterality Date     SECTION      ENDOSCOPY N/A 2024    Procedure: ESOPHAGOGASTRODUODENOSCOPY WITH ANESTHESIA;  Surgeon: Tian Morris MD;  Location: [facility] OR;  Service: Gastroenterology;  Laterality: N/A;  pre foreign body in stomach  post foreign body passed out of stomach  dr kaiden houston    ENDOSCOPY N/A 2024    Procedure: ESOPHAGOGASTRODUODENOSCOPY WITH ANESTHESIA;  Surgeon: Herman Murry MD;  Location: [facility] OR;  Service: Gastroenterology;  Laterality: N/A;  pre foreign body  post no foreign body    ENDOSCOPY N/A 2025    Procedure: ESOPHAGOGASTRODUODENOSCOPY;  Surgeon: Brennen White MD;  Location: [facility] OR;  Service: Gastroenterology;  Laterality: N/A;  pre: foreign body  post: foreign body     Social History:  reports that she has an unknown smoking status. She has never used smokeless tobacco. Alcohol use questions deferred to the physician. Drug use questions deferred to the physician.    Family History: family history includes Diabetes in her mother; Fibromyalgia in her mother; Mental illness in her father; Migraines in her mother.     Allergies: No Known Allergies  Medications: Scheduled Meds:FLUoxetine, 20 mg, Oral,  Daily  FLUoxetine, 40 mg, Oral, Daily  lamoTRIgine, 300 mg, Oral, BID  [START ON 6/2/2025] pantoprazole, 40 mg, Oral, Q AM  QUEtiapine, 100 mg, Oral, QAM  QUEtiapine, 200 mg, Oral, Nightly  sodium chloride, 10 mL, Intravenous, Q12H  terazosin, 2 mg, Oral, Nightly      Continuous Infusions:lactated ringers, 100 mL/hr, Last Rate: 100 mL/hr (06/01/25 1125)      PRN Meds:.  acetaminophen    hydrOXYzine    midazolam    Non-Formulary / Patient Supplied Medication    ondansetron    QUEtiapine    senna    sodium chloride    sodium chloride    sodium chloride    I have utilized all available immediate resources to obtain, update, or review the patient's current medications (including all prescriptions, over-the-counter products, herbals, cannabis/cannabidiol products, and vitamin/mineral/dietary (nutritional) supplements).     Objective   Objective    Physical Exam:   Temp:  [97.9 °F (36.6 °C)-98.4 °F (36.9 °C)] 97.9 °F (36.6 °C)  Heart Rate:  [] 71  Resp:  [18-24] 20  BP: ()/(49-77) 112/70  Physical Exam  Vitals reviewed.   Constitutional:       Appearance: She is obese.   HENT:      Head: Normocephalic and atraumatic.      Mouth/Throat:      Mouth: Mucous membranes are moist.      Pharynx: Oropharynx is clear.   Eyes:      Comments: Disconjugate gaze, corrective lenses and use   Cardiovascular:      Rate and Rhythm: Normal rate and regular rhythm.   Pulmonary:      Effort: Pulmonary effort is normal.      Breath sounds: Normal breath sounds.   Abdominal:      General: Abdomen is protuberant. There is no distension.      Palpations: Abdomen is soft.      Tenderness: There is abdominal tenderness.   Musculoskeletal:         General: Normal range of motion.      Cervical back: Normal range of motion and neck supple.      Right lower leg: No edema.      Left lower leg: No edema.   Skin:     General: Skin is warm and dry.   Neurological:      Mental Status: She is alert. Mental status is at baseline.      Comments:  Nonverbal   Psychiatric:         Mood and Affect: Mood normal.         Behavior: Behavior normal.      Comments: Nonverbal, possibly autistic, appropriate affect and mood.  No behavioral outburst         Results Reviewed:  I have personally reviewed current lab, radiology, and data and agree with results.  Lab Results (last 24 hours)       Procedure Component Value Units Date/Time    Comprehensive Metabolic Panel [[id number]]  (Abnormal) Collected: 06/01/25 0252    Specimen: Blood Updated: 06/01/25 0327     Glucose 101 mg/dL      BUN 8.9 mg/dL      Creatinine 0.63 mg/dL      Sodium 139 mmol/L      Potassium 4.3 mmol/L      Comment: Slight hemolysis detected by analyzer. Result may be falsely elevated.        Chloride 103 mmol/L      CO2 22.0 mmol/L      Calcium 9.8 mg/dL      Total Protein 7.2 g/dL      Albumin 4.4 g/dL      ALT (SGPT) 12 U/L      AST (SGOT) 17 U/L      Comment: Slight hemolysis detected by analyzer. Result may be falsely elevated.        Alkaline Phosphatase 80 U/L      Total Bilirubin 0.2 mg/dL      Globulin 2.8 gm/dL      A/G Ratio 1.6 g/dL      BUN/Creatinine Ratio 14.1     Anion Gap 14.0 mmol/L      eGFR 124.1 mL/min/1.73     Narrative:      GFR Categories in Chronic Kidney Disease (CKD)              GFR Category          GFR (mL/min/1.73)    Interpretation  G1                    90 or greater        Normal or high (1)  G2                    60-89                Mild decrease (1)  G3a                   45-59                Mild to moderate decrease  G3b                   30-44                Moderate to severe decrease  G4                    15-29                Severe decrease  G5                    14 or less           Kidney failure    (1)In the absence of evidence of kidney disease, neither GFR category G1 or G2 fulfill the criteria for CKD.    eGFR calculation 2021 CKD-EPI creatinine equation, which does not include race as a factor    Manual Differential [[id number]]  (Abnormal) Collected:  06/01/25 0252    Specimen: Blood Updated: 06/01/25 0327     Neutrophil % 52.0 %      Lymphocyte % 44.0 %      Monocyte % 3.0 %      Eosinophil % 0.0 %      Basophil % 0.0 %      Atypical Lymphocyte % 1.0 %      Neutrophils Absolute 4.69 10*3/mm3      Lymphocytes Absolute 4.06 10*3/mm3      Monocytes Absolute 0.27 10*3/mm3      Eosinophils Absolute 0.00 10*3/mm3      Basophils Absolute 0.00 10*3/mm3      Anisocytosis Mod/2+     Ovalocytes Slight/1+     Poikilocytes Mod/2+     Polychromasia Slight/1+     Stomatocytes Slight/1+     WBC Morphology Normal     Platelet Morphology Normal    hCG, Serum, Qualitative [[id number]]  (Normal) Collected: 06/01/25 0252    Specimen: Blood Updated: 06/01/25 0321     HCG Qualitative Negative    Urinalysis, Microscopic Only - Urine, Clean Catch [[id number]]  (Abnormal) Collected: 06/01/25 0252    Specimen: Urine, Clean Catch Updated: 06/01/25 0321     RBC, UA 0-2 /HPF      WBC, UA 3-5 /HPF      Comment: Urine culture not indicated.        Bacteria, UA 2+ /HPF      Squamous Epithelial Cells, UA 7-12 /HPF      Hyaline Casts, UA None Seen /LPF      Methodology Manual Light Microscopy    Lipase [[id number]]  (Normal) Collected: 06/01/25 0252    Specimen: Blood Updated: 06/01/25 0320     Lipase 17 U/L     Urinalysis With Culture If Indicated - Urine, Clean Catch [[id number]]  (Abnormal) Collected: 06/01/25 0252    Specimen: Urine, Clean Catch Updated: 06/01/25 0310     Color, UA Yellow     Appearance, UA Clear     pH, UA 7.0     Specific Gravity, UA 1.011     Glucose, UA Negative     Ketones, UA Negative     Bilirubin, UA Negative     Blood, UA Negative     Protein, UA Negative     Leuk Esterase, UA Small (1+)     Nitrite, UA Negative     Urobilinogen, UA 0.2 E.U./dL    Narrative:      In absence of clinical symptoms, the presence of pyuria, bacteria, and/or nitrites on the urinalysis result does not correlate with infection.    CBC & Differential [[id number]]  (Abnormal) Collected: 06/01/25  0252    Specimen: Blood Updated: 06/01/25 0305    Narrative:      The following orders were created for panel order CBC & Differential.  Procedure                               Abnormality         Status                     ---------                               -----------         ------                     CBC Auto Differential[[id number]]        Abnormal            Final result                 Please view results for these tests on the individual orders.    CBC Auto Differential [[id number]]  (Abnormal) Collected: 06/01/25 0252    Specimen: Blood Updated: 06/01/25 0305     WBC 9.02 10*3/mm3      RBC 5.02 10*6/mm3      Hemoglobin 12.5 g/dL      Hematocrit 39.9 %      MCV 79.5 fL      MCH 24.9 pg      MCHC 31.3 g/dL      RDW --     Comment: Unable to calculate        RDW-SD --     Comment: Unable to calculate        MPV --     Comment: Unable to calculate        Platelets 183 10*3/mm3           Imaging Results (Last 24 Hours)       Procedure Component Value Units Date/Time    XR Abdomen KUB [[id number]] Collected: 06/01/25 0815     Updated: 06/01/25 0828    Narrative:      EXAMINATION:  XR ABDOMEN KUB-  6/1/2025 6:46 AM     HISTORY: T18.9XXA-Foreign body of alimentary tract, part unspecified,  initial encounter. The patient had an upper endoscopy and the radiopaque  foreign body was not found in the stomach.     COMPARISON: CT on 6/1/2025.     TECHNIQUE: Supine abdomen.     FINDINGS:   There is a cylindrical 2.3 x 0.6 cm foreign body overlying  the left upper quadrant of the abdomen. It does not appear to be  overlying the gastric outline on the x-ray. Therefore, it is likely  within a proximal jejunal loop. The bowel gas pattern is within normal  limits. No free air is identified on this supine examination. There has  been prior cholecystectomy. There is no organomegaly. There is contrast  within the urinary bladder related to the CT that was performed  recently.          Impression:      1. There is a cylindrical  radiopaque 2.3 x 0.6 cm foreign body overlying  the left upper quadrant. It does not appear to be within the gastric  outline on the x-ray. In addition, it was not found on upper endoscopy.  Therefore, the device is likely located in the proximal jejunum.  2. No obvious free air is seen on this supine exam. The gas pattern is  within normal limits.        Results were discussed with Dr. Park.           This report was signed and finalized on 6/1/2025 8:25 AM by Dr. Abebe Hawkins MD.       CT Abdomen Pelvis With Contrast [[id number]] Collected: 06/01/25 0453     Updated: 06/01/25 0516    Narrative:      CT ABDOMEN PELVIS W CONTRAST- 6/1/2025 2:22 AM     HISTORY: abdominal pain, hx of magnet ingestion.       COMPARISON: 5/7/2025.     DLP: 537.8 mGy.cm. All CT scans are performed using dose optimization  techniques as appropriate to the performed exam and including at least  one of the following: Automated exposure control, adjustment of the mA  and/or kV according to size, and the use of the iterative reconstruction  technique.     TECHNIQUE: Following the intravenous administration of contrast, helical  CT tomographic images of the abdomen and pelvis were acquired. Coronal  reformatted images were also provided for review.     FINDINGS:  Trace right effusion is present. There is right basilar atelectasis. The  base of the heart is unremarkable. No pericardial effusion present..     LIVER: No focal liver lesion. The hepatic vasculature is patent.     BILIARY SYSTEM: The gallbladder is surgically absent. No biliary  dilatation is present..     PANCREAS: No focal pancreatic lesion.     SPLEEN: Unremarkable.     KIDNEYS AND ADRENALS: Bilateral kidneys and adrenal glands are  unremarkable. The ureters are decompressed and normal in appearance.     RETROPERITONEUM: No mass, lymphadenopathy or hemorrhage.     GI TRACT: There is a similar radiopaque foreign body noted along the  lesser curvature of the stomach at the  junction of the body and antrum  of the stomach. This is identical in appearance to the previous ingested  foreign body dated 5/7/2025. The radiodensity is similar in position  from the previous exam although the patient did undergo endoscopy and  metallic/magnetic foreign bodies were removed. KUB dated 5/19/2025  demonstrates no definite radiopaque foreign body as was demonstrated on  the KUB 5/6/2025. There is no adjacent inflammatory change or  extraluminal air on today's exam. No evidence of adjacent gastric wall  thickening or gastric outlet obstruction. The bowel gas pattern is  otherwise unremarkable... The appendix is visualized and unremarkable.     OTHER: There is no mesenteric mass, lymphadenopathy or fluid collection.  The abdominopelvic vasculature is patent. The osseous structures and  soft tissues demonstrate no worrisome lesions. There is a ventral wall  hernia above the umbilicus containing peritoneal fat with the abdominal  wall defect measuring 1.4 cm. A fat-containing periumbilical hernia is  also present. No herniated bowel loop or incarceration.     PELVIS: There is a peripherally enhancing 1.8 cm irregular cyst  associated with the right ovary suggesting a recently ruptured cyst. An  additional radiodensity measuring 1.6 cm in size associated with the  right ovary likely represents a collapsed cyst. There is some  enlargement of the right ovary. There is moderate free fluid in the  cul-de-sac and adjacent to the right ovary. The left ovary is  unremarkable.. The urinary bladder is normal in appearance.       Impression:      1. There are 2 areas of enhancement/increased radiodensity associated  with the right ovary suggesting recently ruptured ovarian cysts and/or  hemorrhagic cyst. There is mild enlargement of the right ovary with  torsion not entirely excluded. There is moderate free fluid in the  cul-de-sac and adjacent to the right ovary. The uterus and left ovary  are unremarkable.  2.  There is a radiopaque foreign body along the lesser curvature the  stomach at the junction of the body and antrum of the stomach. This is  similar in position to a previous foreign body described on CT abdomen  and pelvis dated 5/7/2025 which was consistent with an ingested magnet.  This was subsequently removed at endoscopy on the same day with hospital  admission. A KUB radiograph which was obtained on 5/6/2025 also  demonstrated the rectangular radiopaque foreign body. A subsequent KUB  which was obtained on 5/19/2025 no longer demonstrated  the foreign  body. This would suggest that the patient has likely re ingested a  similar foreign body. There is no adjacent gastric wall thickening or  extraluminal air to suggest perforation. The GI tract is otherwise  unremarkable. Normal appendix.  3. There is an incisional hernia within the midline above the umbilicus  as well as a fat-containing periumbilical hernia..           This report was signed and finalized on 6/1/2025 5:13 AM by Dr. Star Ruelas MD.               Assessment / Plan   Assessment:   Active Hospital Problems    Diagnosis     **Foreign body in digestive system     Foreign body alimentary tract     Psychogenic nonepileptic seizure     Foreign body ingestion     Pseudoseizures         Treatment Plan  1.  Seizure disorder; Home medications reviewed, currently on Lamictal 300 mg twice daily, added Nayzilam nasal spray 5 mg - 1 spray for breakthrough seizure lasting longer than 3 minutes.  Patient to have caregiver at bedside at all times.  Service dog, Donna, on bed with the patient.  Patient communicates via texting notes.  Neurology consulted by admitting provider and has signed off, recommends mental health consult.    2.  Foreign body in digestive system; diet and pain management per admitting and GI.  We will follow along    Medical Decision Making  Number and Complexity of problems: 1  1 problem chronic, high complexity, stable  Differential  Diagnosis: None    Conditions and Status        Condition is unchanged.     [facility] Data  External documents reviewed: No  Cardiac tracing (EKG, telemetry) interpretation: No  Radiology interpretation: Reviewed  Labs reviewed: Yes  Any tests that were considered but not ordered: No     Decision rules/scores evaluated (example [id number], Wells, etc): nno     Discussed with: Patient and Dr. Aleman     Care Planning  Shared decision making: Patient and Dr. Aleman  Code status and discussions: Full    Disposition  Social Determinants of Health that impact treatment or disposition: None  I expect the patient to be discharged by the primary service.     I confirmed that the patient's Advance Care Plan is present, code status is documented, or surrogate decision maker is listed in the patient's medical record.     The patient's surrogate decision maker is Kelli.     Patient seen and examined by me on 6/1/2025 at 12:07.    Electronically signed by TELMA Paz, 06/01/25, 13:21 CDT.

## 2025-06-01 NOTE — ANESTHESIA PREPROCEDURE EVALUATION
Anesthesia Evaluation     Patient summary reviewed   no history of anesthetic complications:   NPO Solid Status: Waived due to emergency  NPO Liquid Status: Waived due to emergency           Airway   Mallampati: II  TM distance: >3 FB  Neck ROM: full  No difficulty expected  Dental - normal exam     Pulmonary    (+) asthma,  Cardiovascular - negative cardio ROS        Neuro/Psych  (+) seizures (epilepsy and FND), psychiatric history Anxiety and Depression    ROS Comment: Autism   GI/Hepatic/Renal/Endo    (+) obesity    Musculoskeletal (-) negative ROS    Abdominal    Substance History      OB/GYN          Other        ROS/Med Hx Other: Foreign body                Anesthesia Plan    ASA 3 - emergent     general   Rapid sequence  (  Patient autistic and nonverbal communicates with phone)    Anesthetic plan, risks, benefits, and alternatives have been provided, discussed and informed consent has been obtained with: mother and patient.    Plan discussed with CRNA.      CODE STATUS:

## 2025-06-01 NOTE — NURSING NOTE
Dr Park at bedside requesting Dr Amleida number needs to discuss pt's care with him due to possible battery noted but unable to remove due to location.

## 2025-06-01 NOTE — CONSULTS
General Neurology Consult Note    Patient Name: Duane Cooley   MRN: [id number]  Age: 29 y.o.  Sex: female  : 1995    Primary Care Physician: Provider, No Known  Referring Physician:  No ref. provider found          Chief Complaint/Reason for Consultation: spell     Subjective .  HPI:   The patient is a 29-year-old female with a known history of severe ? Spells and intellectual disability, presenting after reportedly ingesting a metallic object, suspected to be a magnet. This behavior appears recurrent, with prior episodes of foreign body ingestion. Per patient report, she ingests metal items to relieve abdominal pain, suggesting possible pica-like behavior or pain-driven compulsivity. Gastroenterology attempted but was unable to retrieve the foreign object endoscopically.    Neurology was consulted due to concern for possible seizure activity, particularly in the context of her known history disorder. No witnessed convulsive activity reported with this episode; further history limited due to baseline cognitive impairment.        Review of Systems     Temp:  [97.9 °F (36.6 °C)-98.4 °F (36.9 °C)] 97.9 °F (36.6 °C)  Heart Rate:  [] 71  Resp:  [18-24] 20  BP: ()/(49-77) 112/70        Non verbal   NEURO( Exam is performed with help of hospital staff at bed side and observed by me via audio-video interface)      CRANIAL NERVES:    Pupils equal and reactive, EOMI intact, no gaze deviation, no nystagmus  No facial droop, cough and gag intact, shoulder shrug intact, tongue midline     MOTOR:  Moves all extremities equally    SENSORY: Normal to light touch all throughout     COORDINATION: Normal finger to nose and heel to shin, no tremor, no dysmetria    STATION: Not assessed due to patient condition    GAIT: Not assessed due to patient condition         Past Medical History:   Diagnosis Date    Anxiety     Depression     History of  2017    History of congenital abnormality 2017     Late prenatal care affecting pregnancy in second trimester 2017    Mental deficiency 2017    Previous  delivery affecting pregnancy 2017    Psychogenic nonepileptic seizure      Past Surgical History:   Procedure Laterality Date     SECTION      ENDOSCOPY N/A 2024    Procedure: ESOPHAGOGASTRODUODENOSCOPY WITH ANESTHESIA;  Surgeon: Tian Morris MD;  Location: [facility] OR;  Service: Gastroenterology;  Laterality: N/A;  pre foreign body in stomach  post foreign body passed out of stomach  dr kaiden houston    ENDOSCOPY N/A 2024    Procedure: ESOPHAGOGASTRODUODENOSCOPY WITH ANESTHESIA;  Surgeon: Herman Murry MD;  Location:  [facility] OR;  Service: Gastroenterology;  Laterality: N/A;  pre foreign body  post no foreign body    ENDOSCOPY N/A 2025    Procedure: ESOPHAGOGASTRODUODENOSCOPY;  Surgeon: Brennen White MD;  Location:  [facility] OR;  Service: Gastroenterology;  Laterality: N/A;  pre: foreign body  post: foreign body     History reviewed. No pertinent family history.  Social History     Socioeconomic History    Marital status:    Tobacco Use    Smoking status: Unknown    Smokeless tobacco: Never   Vaping Use    Vaping status: Never Used   Substance and Sexual Activity    Alcohol use: Defer    Drug use: Defer    Sexual activity: Defer     Partners: Male     No Known Allergies  Prior to Admission medications    Medication Sig Start Date End Date Taking? Authorizing Provider   acetaminophen (TYLENOL) 500 MG tablet Take 1 tablet by mouth Daily As Needed for Mild Pain.    Rayray Jean MD   doxazosin (CARDURA) 1 MG tablet Take 1 tablet by mouth Every Night. Take with 2mg for total of 3mg nightly    Rayray Jean MD   doxazosin (CARDURA) 2 MG tablet Take 1 tablet by mouth Every Night. Take with 1mg for a total of 3mg nightly    Rayray Jean MD   FLUoxetine (PROzac) 20 MG capsule Take 1 capsule by mouth Daily. Take with 40mg for a total  of 60mg    Rayray Jean MD   FLUoxetine (PROzac) 40 MG capsule Take 1 capsule by mouth Daily. Take with 20mg for a total of 60mg    Rayray Jean MD   hydrOXYzine (ATARAX) 25 MG tablet Take 1 tablet by mouth 3 (Three) Times a Day As Needed for Anxiety.    Rayray Jean MD   lamoTRIgine (LaMICtal) 150 MG tablet Take 2 tablets by mouth 2 (Two) Times a Day.    Rayray Jean MD   Midazolam (Nayzilam) 5 MG/0.1ML solution Administer 0.1 mL into the nostril(s) as directed by provider Daily As Needed (seizure).    Rayray Jean MD   omeprazole (priLOSEC) 20 MG capsule Take 1 capsule by mouth Daily As Needed (Acid reflux).    Rayray Jean MD   QUEtiapine (SEROquel) 100 MG tablet Take 1 tablet by mouth Every Morning.    Rayray Jean MD   QUEtiapine (SEROquel) 200 MG tablet Take 1 tablet by mouth Every Night.    Rayray Jean MD   QUEtiapine (SEROquel) 25 MG tablet Take 1 tablet by mouth Daily As Needed (for agitation).    Rayray Jean MD   senna 8.6 MG tablet Take 2 tablets by mouth Daily As Needed for Constipation.    Rayray Jean MD       [facility] Meds:  Scheduled- FLUoxetine, 20 mg, Oral, Daily  FLUoxetine, 40 mg, Oral, Daily  lamoTRIgine, 300 mg, Oral, BID  [START ON 6/2/2025] pantoprazole, 40 mg, Oral, Q AM  QUEtiapine, 100 mg, Oral, QAM  QUEtiapine, 200 mg, Oral, Nightly  sodium chloride, 10 mL, Intravenous, Q12H  terazosin, 2 mg, Oral, Nightly      Infusions- lactated ringers, 100 mL/hr, Last Rate: 100 mL/hr (06/01/25 1125)       PRNs-   acetaminophen    hydrOXYzine    midazolam    ondansetron    QUEtiapine    senna    sodium chloride    sodium chloride    sodium chloride      Results Reviewed:  I have personally reviewed current lab, radiology, and data and agree with results.              Assessment/Plan:    Spells of Inattentiveness    The patient was recently evaluated by neurology and diagnosed with Idiopathic Generalized Epilepsy  as well as Functional Neurological Disorder (FND). She continues to experience intermittent spells of inattentiveness.    Plan:    I recommend that during future episodes, the patient seek evaluation at the facility where she was diagnosed with epilepsy, as they can facilitate continuity of care.    The patient expressed a desire to continue her current antiseizure medications.    Referrals to both Neurology and Psychiatry are warranted to ensure comprehensive management of both her seizure disorder and functional symptom      No further work up neurology stand point.     Marvin Li MD  June 1, 2025  12:09 CDT        This was an audio and video enabled telemedicine encounter.    The consult was conducted in real time using interactive audio and video technology. Patient/Family was informed of the technology being used for this visit and agreed to proceed. Patient located in hospital and I am the provider located at home/office based setting.

## 2025-06-01 NOTE — H&P
[facility] Gastroenterology  Initial Inpatient Consult Note    Referring Provider: No ref. provider found    Reason for Consultation: Probable Food Bolus    Subjective     History of present illness:      Duane Cooley is a 29 y.o. female that presented to [facility] ER with possible fB igestion GI was called to evaluate for probable food bolus.  She is unable to tolerate anything by mouth at this time.  No alleviating factors.   Duane Cooley has had similar episodes in the past.  She does not have difficulty with GERD related symptoms.  She does not have a history of peptic ulcer disease.        Past Medical History:  Past Medical History:   Diagnosis Date    Anxiety     Depression     History of  2017    History of congenital abnormality 2017    Late prenatal care affecting pregnancy in second trimester 2017    Mental deficiency 2017    Previous  delivery affecting pregnancy 2017    Psychogenic nonepileptic seizure        Past Surgical History:  Past Surgical History:   Procedure Laterality Date     SECTION      ENDOSCOPY N/A 2024    Procedure: ESOPHAGOGASTRODUODENOSCOPY WITH ANESTHESIA;  Surgeon: Tian Morris MD;  Location: [facility] OR;  Service: Gastroenterology;  Laterality: N/A;  pre foreign body in stomach  post foreign body passed out of stomach  dr kaiden houston    ENDOSCOPY N/A 2024    Procedure: ESOPHAGOGASTRODUODENOSCOPY WITH ANESTHESIA;  Surgeon: Herman Murry MD;  Location: [facility] OR;  Service: Gastroenterology;  Laterality: N/A;  pre foreign body  post no foreign body    ENDOSCOPY N/A 2025    Procedure: ESOPHAGOGASTRODUODENOSCOPY;  Surgeon: Brennen White MD;  Location: [facility] OR;  Service: Gastroenterology;  Laterality: N/A;  pre: foreign body  post: foreign body        Social History:   Social History     Tobacco Use    Smoking status: Unknown    Smokeless tobacco: Never   Substance Use Topics    Alcohol  use: Defer        Family History:  History reviewed. No pertinent family history.    Home Meds:  (Not in a hospital admission)      Current Meds:   No current facility-administered medications for this encounter.    Current Outpatient Medications:     acetaminophen (TYLENOL) 500 MG tablet, Take 1 tablet by mouth Daily As Needed for Mild Pain., Disp: , Rfl:     doxazosin (CARDURA) 1 MG tablet, Take 1 tablet by mouth Every Night. Take with 2mg for total of 3mg nightly, Disp: , Rfl:     doxazosin (CARDURA) 2 MG tablet, Take 1 tablet by mouth Every Night. Take with 1mg for a total of 3mg nightly, Disp: , Rfl:     FLUoxetine (PROzac) 20 MG capsule, Take 1 capsule by mouth Daily. Take with 40mg for a total of 60mg, Disp: , Rfl:     FLUoxetine (PROzac) 40 MG capsule, Take 1 capsule by mouth Daily. Take with 20mg for a total of 60mg, Disp: , Rfl:     hydrOXYzine (ATARAX) 25 MG tablet, Take 1 tablet by mouth 3 (Three) Times a Day As Needed for Anxiety., Disp: , Rfl:     lamoTRIgine (LaMICtal) 150 MG tablet, Take 2 tablets by mouth 2 (Two) Times a Day., Disp: , Rfl:     Midazolam (Nayzilam) 5 MG/0.1ML solution, Administer 0.1 mL into the nostril(s) as directed by provider Daily As Needed (seizure)., Disp: , Rfl:     omeprazole (priLOSEC) 20 MG capsule, Take 1 capsule by mouth Daily As Needed (Acid reflux)., Disp: , Rfl:     QUEtiapine (SEROquel) 100 MG tablet, Take 1 tablet by mouth Every Morning., Disp: , Rfl:     QUEtiapine (SEROquel) 200 MG tablet, Take 1 tablet by mouth Every Night., Disp: , Rfl:     QUEtiapine (SEROquel) 25 MG tablet, Take 1 tablet by mouth Daily As Needed (for agitation)., Disp: , Rfl:     senna 8.6 MG tablet, Take 2 tablets by mouth Daily As Needed for Constipation., Disp: , Rfl:     Allergies:  No Known Allergies    Vital Signs  Temp:  [98.4 °F (36.9 °C)] 98.4 °F (36.9 °C)  Heart Rate:  [] 81  Resp:  [18] 18  BP: ()/57-85) 97/58      Review of Systems     Constitution:  negative for  chills, fatigue and fevers  Eyes:  negative for blurriness and change of vision  ENT:   negative for sore throat and voice change  Respiratory: negative for  cough and shortness of air  Cardiovascular:  Negative for chest pain or palpitations  Gastrointestinal:  negative for  See HPI  Genitourinary:  negative for  blood in urine and painful urination  Integument: negative for  rash and redness  Hematologic / Lymphatic: negative for  excessive bleeding and easy bruising  Musculoskeletal: negative for  joint pain and joint stiffness out of the ordinary  Neurological:  negative for seizures and speech abnormal  Behavioral/Psych:  negative for anxiety and depression out of the ordinary  Endocrine: negative for  diabetes and weight loss, unintended  Allergies / Immunologic:  negative for  anaphylaxis and rash      Objective     Physical Exam:  General :    Alert, cooperative, in no acute distress   Head:    Normocephalic, without obvious abnormality, atraumatic   Eyes:            Lids and lashes normal, conjunctivae and sclerae normal, no   Icterus, conjunctival pallor   Throat:   No oral lesions, no thrush, oral mucosa moist, posterior oropharynx clear   Neck:   No adenopathy, supple, trachea midline, no thyromegaly, no   carotid bruit, no JVD   Lungs:     Clear to auscultation, respirations regular, even and                   unlabored    Heart:    Regular rhythm and normal rate, normal S1 and S2, no            murmur   Chest Wall:    No abnormalities observed   Abdomen:     Normal bowel sounds, no masses, no organomegaly, soft        nontender, nondistended, no guarding, no rebound                 tenderness   Rectal:     Deferred   Extremities:   no edema, no cyanosis   Skin:   No open lesions, bruising or rash   Lymph nodes:   No palpable cervical adenopathy   Psychiatric:  Judgement and insight: normal   Orientation to person place and time: normal   Mood and affect: normal        Results Review:    I have reviewed  all of the patient's current test results  Results from last 7 days   Lab Units 06/01/25  0252   WBC 10*3/mm3 9.02   HEMOGLOBIN g/dL 12.5   HEMATOCRIT % 39.9   PLATELETS 10*3/mm3 183       Results from last 7 days   Lab Units 06/01/25  0252   SODIUM mmol/L 139   POTASSIUM mmol/L 4.3   BUN mg/dL 8.9   CREATININE mg/dL 0.63               Assessment & Plan       * No active hospital problems. *        Impression/Plan    FB gastric Lumen    Plan for EGD in OR for removal.    Risks, benefits discussed with patient, She wishes to proceed.      Dima Park,   06/01/25  06:50 CDT

## 2025-06-02 ENCOUNTER — APPOINTMENT (OUTPATIENT)
Dept: GENERAL RADIOLOGY | Facility: HOSPITAL | Age: 30
End: 2025-06-02
Payer: MEDICAID

## 2025-06-02 LAB
ANION GAP SERPL CALCULATED.3IONS-SCNC: 15 MMOL/L (ref 5–15)
BUN SERPL-MCNC: 6.6 MG/DL (ref 6–20)
BUN/CREAT SERPL: 10.8 (ref 7–25)
CALCIUM SPEC-SCNC: 9 MG/DL (ref 8.6–10.5)
CHLORIDE SERPL-SCNC: 104 MMOL/L (ref 98–107)
CO2 SERPL-SCNC: 19 MMOL/L (ref 22–29)
CREAT SERPL-MCNC: 0.61 MG/DL (ref 0.57–1)
DEPRECATED RDW RBC AUTO: ABNORMAL FL
EGFRCR SERPLBLD CKD-EPI 2021: 125.1 ML/MIN/1.73
ERYTHROCYTE [DISTWIDTH] IN BLOOD BY AUTOMATED COUNT: ABNORMAL %
GLUCOSE SERPL-MCNC: 87 MG/DL (ref 65–99)
HCT VFR BLD AUTO: 37.9 % (ref 34–46.6)
HGB BLD-MCNC: 12.1 G/DL (ref 12–15.9)
MAGNESIUM SERPL-MCNC: 2.2 MG/DL (ref 1.6–2.6)
MCH RBC QN AUTO: 25.3 PG (ref 26.6–33)
MCHC RBC AUTO-ENTMCNC: 31.9 G/DL (ref 31.5–35.7)
MCV RBC AUTO: 79.3 FL (ref 79–97)
PLATELET # BLD AUTO: 164 10*3/MM3 (ref 140–450)
PMV BLD AUTO: ABNORMAL FL
POTASSIUM SERPL-SCNC: 3.6 MMOL/L (ref 3.5–5.2)
RBC # BLD AUTO: 4.78 10*6/MM3 (ref 3.77–5.28)
SODIUM SERPL-SCNC: 138 MMOL/L (ref 136–145)
WBC NRBC COR # BLD AUTO: 8.68 10*3/MM3 (ref 3.4–10.8)

## 2025-06-02 PROCEDURE — 80048 BASIC METABOLIC PNL TOTAL CA: CPT | Performed by: SURGERY

## 2025-06-02 PROCEDURE — 74019 RADEX ABDOMEN 2 VIEWS: CPT

## 2025-06-02 PROCEDURE — G0378 HOSPITAL OBSERVATION PER HR: HCPCS

## 2025-06-02 PROCEDURE — 99232 SBSQ HOSP IP/OBS MODERATE 35: CPT | Performed by: NURSE PRACTITIONER

## 2025-06-02 PROCEDURE — 85027 COMPLETE CBC AUTOMATED: CPT | Performed by: SURGERY

## 2025-06-02 PROCEDURE — 83735 ASSAY OF MAGNESIUM: CPT | Performed by: SURGERY

## 2025-06-02 RX ORDER — MIDAZOLAM HYDROCHLORIDE 2 MG/2ML
2 INJECTION, SOLUTION INTRAMUSCULAR; INTRAVENOUS EVERY 4 HOURS PRN
Status: DISCONTINUED | OUTPATIENT
Start: 2025-06-02 | End: 2025-06-02

## 2025-06-02 RX ORDER — DIAZEPAM 10 MG/2ML
5 INJECTION, SOLUTION INTRAMUSCULAR; INTRAVENOUS EVERY 4 HOURS PRN
Status: DISCONTINUED | OUTPATIENT
Start: 2025-06-02 | End: 2025-06-05 | Stop reason: HOSPADM

## 2025-06-02 RX ADMIN — QUETIAPINE FUMARATE 200 MG: 100 TABLET ORAL at 21:14

## 2025-06-02 RX ADMIN — Medication 10 ML: at 21:14

## 2025-06-02 RX ADMIN — FLUOXETINE HYDROCHLORIDE 20 MG: 20 CAPSULE ORAL at 09:13

## 2025-06-02 RX ADMIN — LAMOTRIGINE 300 MG: 100 TABLET ORAL at 21:14

## 2025-06-02 RX ADMIN — Medication 10 ML: at 09:14

## 2025-06-02 RX ADMIN — TERAZOSIN HYDROCHLORIDE 2 MG: 2 CAPSULE ORAL at 21:14

## 2025-06-02 RX ADMIN — FLUOXETINE HYDROCHLORIDE 40 MG: 20 CAPSULE ORAL at 09:14

## 2025-06-02 RX ADMIN — QUETIAPINE FUMARATE 100 MG: 25 TABLET ORAL at 06:18

## 2025-06-02 RX ADMIN — LAMOTRIGINE 300 MG: 100 TABLET ORAL at 09:13

## 2025-06-02 RX ADMIN — PANTOPRAZOLE SODIUM 40 MG: 40 TABLET, DELAYED RELEASE ORAL at 06:18

## 2025-06-02 NOTE — PROGRESS NOTES
[facility] Medicine Services  INPATIENT PROGRESS NOTE    Patient Name: Duane Cooley  Date of Admission: 6/1/2025  Today's Date: 06/02/25  Length of Stay: 0  Primary Care Physician: Provider, No Known    Subjective   Chief Complaint: Foreign body ingestion    HPI   Patient complains of some abdominal pain.  She does not speak but will be communicates by gestures.    Review of Systems   All pertinent negatives and positives are as above. All other systems have been reviewed and are negative unless otherwise stated.     Objective    Temp:  [97.2 °F (36.2 °C)-98.8 °F (37.1 °C)] 98.4 °F (36.9 °C)  Heart Rate:  [64-76] 67  Resp:  [18-20] 18  BP: (102-130)/(50-69) 109/69  Physical Exam  Constitutional:       General: She is not in acute distress.     Appearance: She is not ill-appearing or diaphoretic.   HENT:      Head: Normocephalic and atraumatic.      Right Ear: External ear normal.      Left Ear: External ear normal.      Nose: No congestion or rhinorrhea.      Mouth/Throat:      Mouth: Mucous membranes are moist.      Pharynx: No oropharyngeal exudate or posterior oropharyngeal erythema.   Eyes:      General: No scleral icterus.     Extraocular Movements: Extraocular movements intact.      Conjunctiva/sclera: Conjunctivae normal.   Cardiovascular:      Rate and Rhythm: Normal rate and regular rhythm.      Heart sounds: Normal heart sounds. No murmur heard.  Pulmonary:      Effort: Pulmonary effort is normal. No respiratory distress.      Breath sounds: Normal breath sounds. No wheezing, rhonchi or rales.   Abdominal:      General: Abdomen is flat. There is no distension.      Palpations: Abdomen is soft.      Tenderness: There is abdominal tenderness. There is no guarding.      Comments: Mild tenderness in right lower quadrant.   Musculoskeletal:         General: No swelling, tenderness or deformity.      Cervical back: Neck supple. No rigidity. No muscular tenderness.       "Right lower leg: No edema.      Left lower leg: No edema.   Lymphadenopathy:      Cervical: No cervical adenopathy.   Skin:     General: Skin is warm and dry.   Neurological:      General: No focal deficit present.      Mental Status: She is alert.      Cranial Nerves: No cranial nerve deficit.      Motor: No weakness.   Psychiatric:         Mood and Affect: Mood normal.      Comments: Abnormal behavior.         Results Review:  I have reviewed the labs, radiology results, and diagnostic studies.    Laboratory Data:   Results from last 7 days   Lab Units 06/02/25  0633 06/01/25  0252   WBC 10*3/mm3 8.68 9.02   HEMOGLOBIN g/dL 12.1 12.5   HEMATOCRIT % 37.9 39.9   PLATELETS 10*3/mm3 164 183        Results from last 7 days   Lab Units 06/02/25  0633 06/01/25  0252   SODIUM mmol/L 138 139   POTASSIUM mmol/L 3.6 4.3   CHLORIDE mmol/L 104 103   CO2 mmol/L 19.0* 22.0   BUN mg/dL 6.6 8.9   CREATININE mg/dL 0.61 0.63   CALCIUM mg/dL 9.0 9.8   BILIRUBIN mg/dL  --  0.2   ALK PHOS U/L  --  80   ALT (SGPT) U/L  --  12   AST (SGOT) U/L  --  17   GLUCOSE mg/dL 87 101*       Culture Data:   No results found for: \"BLOODCX\", \"URINECX\", \"WOUNDCX\", \"MRSACX\", \"RESPCX\", \"STOOLCX\"    Radiology Data:   Imaging Results (Last 24 Hours)       Procedure Component Value Units Date/Time    XR Abdomen Flat & Upright [[id number]] Collected: 06/02/25 1131     Updated: 06/02/25 1139    Narrative:      XR ABDOMEN FLAT AND UPRIGHT-      HISTORY: F B; T18.9XXA-Foreign body of alimentary tract, part  unspecified, initial encounter; T18.9XXA-Foreign body of alimentary  tract, part unspecified, initial encounter     COMPARISON: Radiographs dated 6/1/2025     FINDINGS:     There is a nonobstructive bowel gas pattern. Reidentified 2.3 cm  metallic density foreign body now projecting in the right lower quadrant  superimposed over the large bowel, either sigmoid colon or cecum. No  free air. Cholecystectomy clips. Bony elements are unremarkable.       " Impression:         1.  2.3 cm metallic density foreign body has continued to progress and  is now superimposed over the large bowel, either cecum or sigmoid colon.  2.  Bowel gas pattern is nonobstructive. No free air.        This report was signed and finalized on 6/2/2025 11:36 AM by Dr Brain George.       XR Abdomen Flat & Upright [[id number]] Collected: 06/01/25 1650     Updated: 06/01/25 1654    Narrative:      EXAMINATION: XR ABDOMEN FLAT AND UPRIGHT-  6/1/2025 4:50 PM     HISTORY: Foreign body.     FINDINGS: KUB radiograph demonstrates the rectangular foreign bodies now  project over the periumbilical region. Is difficult to ascertain whether  this is within the small bowel or colon. I would favor small bowel. No  obstruction or free air.       Impression:      Radiopaque foreign body projects over the mid abdomen that is likely  within the more distal small bowel.     This report was signed and finalized on 6/1/2025 4:51 PM by Dr. Star Ruelas MD.               I have reviewed the patient's current medications.     Assessment/Plan   Assessment  Active Hospital Problems    Diagnosis     **Foreign body in digestive system     Foreign body alimentary tract     Psychogenic nonepileptic seizure     Foreign body ingestion     Pseudoseizures    Seizure disorder    Treatment Plan  Management of ingested magnet as per general surgery.  Continue tracking magnets in the GI tract with serial imaging.    Continue antiepileptic medications with Lamictal and Nayzilam nasal spray 5 mg - 1 spray for breakthrough seizure lasting longer than 3 minutes.  Outpatient neurology and psychiatry follow-up recommended on discharge.    DVT prophylaxis with SCDs    CODE STATUS is full code    Medical Decision Making  Number and Complexity of problems: 3  Differential Diagnosis: None    Conditions and Status        Condition is unchanged.     [facility] Data  External documents reviewed: None  Cardiac tracing (EKG, telemetry)  interpretation: None  Radiology interpretation: X-ray abdomen noted magnet likely over large bowel  Labs reviewed: CBC, BMP reviewed by me  Any tests that were considered but not ordered: None     Decision rules/scores evaluated (example [id number], Wells, etc): None     Discussed with: Patient     Care Planning  Shared decision making: Patient  Code status and discussions: CODE STATUS is full code    Disposition  Social Determinants of Health that impact treatment or disposition: None  I expect the patient to be discharged to home in 2 to 3 days        Electronically signed by Bridgette Aleman MD, 06/02/25, 13:24 CDT.

## 2025-06-02 NOTE — PROGRESS NOTES
"    [facility] GENERAL SURGERY    PROGRESS NOTE    Today's Date: 25    Patient Name: Duane Cooley  MRN: [id number]  CSN: [id number]  PCP: Provider, No Known  Attending Provider: Javon Almeida MD  Length of Stay: 0    SUBJECTIVE     Duane Cooley presents resting in bed with caregiver in the room. Patient communicates by typing on phone. She reports pain of RUQ of abdomen is at 4 to 5 out of 10 which is the same as yesterday. She has a little nausea this morning which is new but doesn't feel it is to the extent of needing Zofran at this time.  Patient reports she ingested \"a few\" magnets . It appears they are stacked on imaging.  KUB completed yesterday showed them to be present in the mid abdomen which was noted to be in the distal small bowel so it appears they are moving through the digestive tract as one unit. Labs are normal this morning including WBC of 8.68    Visit Dx:    ICD-10-CM ICD-9-CM   1. Swallowed foreign body, initial encounter  T18.9XXA 938   2. Foreign body alimentary tract, initial encounter  T18.9XXA 938     [id number]       Hospital Problem List:     Foreign body in digestive system    Pseudoseizures    Foreign body ingestion    Psychogenic nonepileptic seizure    Foreign body alimentary tract      History:   Past Medical History:   Diagnosis Date    Anxiety     Depression     History of  2017    History of congenital abnormality 2017    Late prenatal care affecting pregnancy in second trimester 2017    Mental deficiency 2017    Previous  delivery affecting pregnancy 2017    Psychogenic nonepileptic seizure      Past Surgical History:   Procedure Laterality Date     SECTION      ENDOSCOPY N/A 2024    Procedure: ESOPHAGOGASTRODUODENOSCOPY WITH ANESTHESIA;  Surgeon: Tian Morris MD;  Location: [facility] OR;  Service: Gastroenterology;  Laterality: N/A;  pre foreign body in stomach  post foreign body passed out of " stomach  dr kaiden houston    ENDOSCOPY N/A 8/1/2024    Procedure: ESOPHAGOGASTRODUODENOSCOPY WITH ANESTHESIA;  Surgeon: Herman Murry MD;  Location: [facility] OR;  Service: Gastroenterology;  Laterality: N/A;  pre foreign body  post no foreign body    ENDOSCOPY N/A 5/7/2025    Procedure: ESOPHAGOGASTRODUODENOSCOPY;  Surgeon: Brennen White MD;  Location: [facility] OR;  Service: Gastroenterology;  Laterality: N/A;  pre: foreign body  post: foreign body     Social History     Socioeconomic History    Marital status:    Tobacco Use    Smoking status: Unknown    Smokeless tobacco: Never   Vaping Use    Vaping status: Never Used   Substance and Sexual Activity    Alcohol use: Defer    Drug use: Defer    Sexual activity: Defer     Partners: Male       Allergies:  No Known Allergies    Medications:    Current Facility-Administered Medications:     acetaminophen (TYLENOL) tablet 500 mg, 500 mg, Oral, Daily PRN, Javon Almeida MD, 500 mg at 06/01/25 2029    FLUoxetine (PROzac) capsule 20 mg, 20 mg, Oral, Daily, Javon Almeida MD, 20 mg at 06/01/25 1202    FLUoxetine (PROzac) capsule 40 mg, 40 mg, Oral, Daily, Javon Almeida MD, 40 mg at 06/01/25 1202    hydrOXYzine (ATARAX) tablet 25 mg, 25 mg, Oral, TID PRN, Javon Almeida MD    lactated ringers infusion, 100 mL/hr, Intravenous, Continuous, Javon Almeida MD, Last Rate: 100 mL/hr at 06/01/25 2335, 100 mL/hr at 06/01/25 2335    lamoTRIgine (LaMICtal) tablet 300 mg, 300 mg, Oral, BID, Javon Almeida MD, 300 mg at 06/01/25 2028    Midazolam HCl (PF) (VERSED) injection 10 mg, 10 mg, Intramuscular, Once PRN, Javon Almeida MD    Non-Formulary / Patient Supplied Medication, 1 dose, Nasal, PRN, Vee Botello, APRN    ondansetron (ZOFRAN) injection 4 mg, 4 mg, Intravenous, Q6H PRN, Javon Almeida MD    pantoprazole (PROTONIX) EC tablet 40 mg, 40 mg, Oral, Q AM, Javon Almeida MD, 40 mg at 06/02/25 0618    QUEtiapine (SEROquel) tablet  100 mg, 100 mg, Oral, QAM, Javon Almeida MD, 100 mg at 06/02/25 0618    QUEtiapine (SEROquel) tablet 200 mg, 200 mg, Oral, Nightly, Javon Almeida MD, 200 mg at 06/01/25 2028    QUEtiapine (SEROquel) tablet 25 mg, 25 mg, Oral, Daily PRN, Javon Almeida MD    senna (SENOKOT) tablet 2 tablet, 2 tablet, Oral, Daily PRN, Javon Almeida MD    sodium chloride 0.9 % flush 10 mL, 10 mL, Intravenous, Q12H, Javon Almeida MD, 10 mL at 06/01/25 2029    sodium chloride 0.9 % flush 10 mL, 10 mL, Intravenous, PRN, Javon Almeida MD    sodium chloride 0.9 % flush 10 mL, 10 mL, Intravenous, PRN, Javon Almeida MD    sodium chloride 0.9 % infusion 40 mL, 40 mL, Intravenous, PRN, Javon Almeida MD    terazosin (HYTRIN) capsule 2 mg, 2 mg, Oral, Nightly, Javon Almeida MD, 2 mg at 06/01/25 2028      OBJECTIVE     Vitals:    06/02/25 0447   BP: 130/68   Pulse: 67   Resp: 18   Temp: 97.2 °F (36.2 °C)   SpO2: 96%       Temp:  [97.2 °F (36.2 °C)-98.8 °F (37.1 °C)] 97.2 °F (36.2 °C)  Heart Rate:  [64-94] 67  Resp:  [18-24] 18  BP: ()/(49-77) 130/68  Flow (L/min) (Oxygen Therapy):  [8] 8     PHYSICAL EXAM   Physical Exam  Vitals and nursing note reviewed.   Constitutional:       General: She is awake.      Appearance: Normal appearance.      Comments: Body mass index is 35.45 kg/m².    HENT:      Head: Normocephalic and atraumatic.   Eyes:      General: Lids are normal. Gaze aligned appropriately.   Cardiovascular:      Rate and Rhythm: Normal rate and regular rhythm.   Pulmonary:      Effort: Pulmonary effort is normal. No respiratory distress.   Abdominal:      Palpations: Abdomen is soft.      Tenderness: There is abdominal tenderness in the right upper quadrant.   Musculoskeletal:      Cervical back: Normal range of motion and neck supple.   Skin:     General: Skin is warm and dry.   Neurological:      Mental Status: She is alert and oriented to person, place, and time.   Psychiatric:         Attention  and Perception: Attention normal.         Mood and Affect: Mood normal.         Speech: Speech normal.         Behavior: Behavior is cooperative.            Results Review:  Lab Results (last 48 hours)       Procedure Component Value Units Date/Time    Comprehensive Metabolic Panel [[id number]]  (Abnormal) Collected: 06/01/25 0252    Specimen: Blood Updated: 06/01/25 0327     Glucose 101 mg/dL      BUN 8.9 mg/dL      Creatinine 0.63 mg/dL      Sodium 139 mmol/L      Potassium 4.3 mmol/L      Comment: Slight hemolysis detected by analyzer. Result may be falsely elevated.        Chloride 103 mmol/L      CO2 22.0 mmol/L      Calcium 9.8 mg/dL      Total Protein 7.2 g/dL      Albumin 4.4 g/dL      ALT (SGPT) 12 U/L      AST (SGOT) 17 U/L      Comment: Slight hemolysis detected by analyzer. Result may be falsely elevated.        Alkaline Phosphatase 80 U/L      Total Bilirubin 0.2 mg/dL      Globulin 2.8 gm/dL      A/G Ratio 1.6 g/dL      BUN/Creatinine Ratio 14.1     Anion Gap 14.0 mmol/L      eGFR 124.1 mL/min/1.73     Narrative:      GFR Categories in Chronic Kidney Disease (CKD)              GFR Category          GFR (mL/min/1.73)    Interpretation  G1                    90 or greater        Normal or high (1)  G2                    60-89                Mild decrease (1)  G3a                   45-59                Mild to moderate decrease  G3b                   30-44                Moderate to severe decrease  G4                    15-29                Severe decrease  G5                    14 or less           Kidney failure    (1)In the absence of evidence of kidney disease, neither GFR category G1 or G2 fulfill the criteria for CKD.    eGFR calculation 2021 CKD-EPI creatinine equation, which does not include race as a factor    Manual Differential [[id number]]  (Abnormal) Collected: 06/01/25 0252    Specimen: Blood Updated: 06/01/25 0327     Neutrophil % 52.0 %      Lymphocyte % 44.0 %      Monocyte % 3.0 %       Eosinophil % 0.0 %      Basophil % 0.0 %      Atypical Lymphocyte % 1.0 %      Neutrophils Absolute 4.69 10*3/mm3      Lymphocytes Absolute 4.06 10*3/mm3      Monocytes Absolute 0.27 10*3/mm3      Eosinophils Absolute 0.00 10*3/mm3      Basophils Absolute 0.00 10*3/mm3      Anisocytosis Mod/2+     Ovalocytes Slight/1+     Poikilocytes Mod/2+     Polychromasia Slight/1+     Stomatocytes Slight/1+     WBC Morphology Normal     Platelet Morphology Normal    hCG, Serum, Qualitative [[id number]]  (Normal) Collected: 06/01/25 0252    Specimen: Blood Updated: 06/01/25 0321     HCG Qualitative Negative    Urinalysis, Microscopic Only - Urine, Clean Catch [[id number]]  (Abnormal) Collected: 06/01/25 0252    Specimen: Urine, Clean Catch Updated: 06/01/25 0321     RBC, UA 0-2 /HPF      WBC, UA 3-5 /HPF      Comment: Urine culture not indicated.        Bacteria, UA 2+ /HPF      Squamous Epithelial Cells, UA 7-12 /HPF      Hyaline Casts, UA None Seen /LPF      Methodology Manual Light Microscopy    Lipase [[id number]]  (Normal) Collected: 06/01/25 0252    Specimen: Blood Updated: 06/01/25 0320     Lipase 17 U/L     Urinalysis With Culture If Indicated - Urine, Clean Catch [[id number]]  (Abnormal) Collected: 06/01/25 0252    Specimen: Urine, Clean Catch Updated: 06/01/25 0310     Color, UA Yellow     Appearance, UA Clear     pH, UA 7.0     Specific Gravity, UA 1.011     Glucose, UA Negative     Ketones, UA Negative     Bilirubin, UA Negative     Blood, UA Negative     Protein, UA Negative     Leuk Esterase, UA Small (1+)     Nitrite, UA Negative     Urobilinogen, UA 0.2 E.U./dL    Narrative:      In absence of clinical symptoms, the presence of pyuria, bacteria, and/or nitrites on the urinalysis result does not correlate with infection.    CBC & Differential [[id number]]  (Abnormal) Collected: 06/01/25 0252    Specimen: Blood Updated: 06/01/25 0305    Narrative:      The following orders were created for panel order CBC &  Differential.  Procedure                               Abnormality         Status                     ---------                               -----------         ------                     CBC Auto Differential[[id number]]        Abnormal            Final result                 Please view results for these tests on the individual orders.    CBC Auto Differential [[id number]]  (Abnormal) Collected: 06/01/25 0252    Specimen: Blood Updated: 06/01/25 0305     WBC 9.02 10*3/mm3      RBC 5.02 10*6/mm3      Hemoglobin 12.5 g/dL      Hematocrit 39.9 %      MCV 79.5 fL      MCH 24.9 pg      MCHC 31.3 g/dL      RDW --     Comment: Unable to calculate        RDW-SD --     Comment: Unable to calculate        MPV --     Comment: Unable to calculate        Platelets 183 10*3/mm3           Imaging Results (Last 72 Hours)       Procedure Component Value Units Date/Time    XR Abdomen Flat & Upright [[id number]] Collected: 06/01/25 1650     Updated: 06/01/25 1654    Narrative:      EXAMINATION: XR ABDOMEN FLAT AND UPRIGHT-  6/1/2025 4:50 PM     HISTORY: Foreign body.     FINDINGS: KUB radiograph demonstrates the rectangular foreign bodies now  project over the periumbilical region. Is difficult to ascertain whether  this is within the small bowel or colon. I would favor small bowel. No  obstruction or free air.       Impression:      Radiopaque foreign body projects over the mid abdomen that is likely  within the more distal small bowel.     This report was signed and finalized on 6/1/2025 4:51 PM by Dr. Star Ruelas MD.       XR Abdomen KUB [[id number]] Collected: 06/01/25 0815     Updated: 06/01/25 0828    Narrative:      EXAMINATION:  XR ABDOMEN KUB-  6/1/2025 6:46 AM     HISTORY: T18.9XXA-Foreign body of alimentary tract, part unspecified,  initial encounter. The patient had an upper endoscopy and the radiopaque  foreign body was not found in the stomach.     COMPARISON: CT on 6/1/2025.     TECHNIQUE: Supine abdomen.      FINDINGS:   There is a cylindrical 2.3 x 0.6 cm foreign body overlying  the left upper quadrant of the abdomen. It does not appear to be  overlying the gastric outline on the x-ray. Therefore, it is likely  within a proximal jejunal loop. The bowel gas pattern is within normal  limits. No free air is identified on this supine examination. There has  been prior cholecystectomy. There is no organomegaly. There is contrast  within the urinary bladder related to the CT that was performed  recently.          Impression:      1. There is a cylindrical radiopaque 2.3 x 0.6 cm foreign body overlying  the left upper quadrant. It does not appear to be within the gastric  outline on the x-ray. In addition, it was not found on upper endoscopy.  Therefore, the device is likely located in the proximal jejunum.  2. No obvious free air is seen on this supine exam. The gas pattern is  within normal limits.        Results were discussed with Dr. Park.           This report was signed and finalized on 6/1/2025 8:25 AM by Dr. Abebe Hawkins MD.       CT Abdomen Pelvis With Contrast [[id number]] Collected: 06/01/25 0453     Updated: 06/01/25 0516    Narrative:      CT ABDOMEN PELVIS W CONTRAST- 6/1/2025 2:22 AM     HISTORY: abdominal pain, hx of magnet ingestion.       COMPARISON: 5/7/2025.     DLP: 537.8 mGy.cm. All CT scans are performed using dose optimization  techniques as appropriate to the performed exam and including at least  one of the following: Automated exposure control, adjustment of the mA  and/or kV according to size, and the use of the iterative reconstruction  technique.     TECHNIQUE: Following the intravenous administration of contrast, helical  CT tomographic images of the abdomen and pelvis were acquired. Coronal  reformatted images were also provided for review.     FINDINGS:  Trace right effusion is present. There is right basilar atelectasis. The  base of the heart is unremarkable. No pericardial effusion  present..     LIVER: No focal liver lesion. The hepatic vasculature is patent.     BILIARY SYSTEM: The gallbladder is surgically absent. No biliary  dilatation is present..     PANCREAS: No focal pancreatic lesion.     SPLEEN: Unremarkable.     KIDNEYS AND ADRENALS: Bilateral kidneys and adrenal glands are  unremarkable. The ureters are decompressed and normal in appearance.     RETROPERITONEUM: No mass, lymphadenopathy or hemorrhage.     GI TRACT: There is a similar radiopaque foreign body noted along the  lesser curvature of the stomach at the junction of the body and antrum  of the stomach. This is identical in appearance to the previous ingested  foreign body dated 5/7/2025. The radiodensity is similar in position  from the previous exam although the patient did undergo endoscopy and  metallic/magnetic foreign bodies were removed. KUB dated 5/19/2025  demonstrates no definite radiopaque foreign body as was demonstrated on  the KUB 5/6/2025. There is no adjacent inflammatory change or  extraluminal air on today's exam. No evidence of adjacent gastric wall  thickening or gastric outlet obstruction. The bowel gas pattern is  otherwise unremarkable... The appendix is visualized and unremarkable.     OTHER: There is no mesenteric mass, lymphadenopathy or fluid collection.  The abdominopelvic vasculature is patent. The osseous structures and  soft tissues demonstrate no worrisome lesions. There is a ventral wall  hernia above the umbilicus containing peritoneal fat with the abdominal  wall defect measuring 1.4 cm. A fat-containing periumbilical hernia is  also present. No herniated bowel loop or incarceration.     PELVIS: There is a peripherally enhancing 1.8 cm irregular cyst  associated with the right ovary suggesting a recently ruptured cyst. An  additional radiodensity measuring 1.6 cm in size associated with the  right ovary likely represents a collapsed cyst. There is some  enlargement of the right ovary. There  is moderate free fluid in the  cul-de-sac and adjacent to the right ovary. The left ovary is  unremarkable.. The urinary bladder is normal in appearance.       Impression:      1. There are 2 areas of enhancement/increased radiodensity associated  with the right ovary suggesting recently ruptured ovarian cysts and/or  hemorrhagic cyst. There is mild enlargement of the right ovary with  torsion not entirely excluded. There is moderate free fluid in the  cul-de-sac and adjacent to the right ovary. The uterus and left ovary  are unremarkable.  2. There is a radiopaque foreign body along the lesser curvature the  stomach at the junction of the body and antrum of the stomach. This is  similar in position to a previous foreign body described on CT abdomen  and pelvis dated 5/7/2025 which was consistent with an ingested magnet.  This was subsequently removed at endoscopy on the same day with hospital  admission. A KUB radiograph which was obtained on 5/6/2025 also  demonstrated the rectangular radiopaque foreign body. A subsequent KUB  which was obtained on 5/19/2025 no longer demonstrated  the foreign  body. This would suggest that the patient has likely re ingested a  similar foreign body. There is no adjacent gastric wall thickening or  extraluminal air to suggest perforation. The GI tract is otherwise  unremarkable. Normal appendix.  3. There is an incisional hernia within the midline above the umbilicus  as well as a fat-containing periumbilical hernia..           This report was signed and finalized on 6/1/2025 5:13 AM by Dr. Star Ruelas MD.                  ASSESSMENT/PLAN     Active Hospital Problems    Diagnosis     **Foreign body in digestive system     Foreign body alimentary tract     Psychogenic nonepileptic seizure     Foreign body ingestion     Pseudoseizures        PLAN:   Plan for repeat KUB today. Will continue to monitor magnets as they progress through digestive tract.     Discussed findings and  treatment plan including risks, benefits, and treatment options with patient in detail. Patient agreed with treatment plan.      This document has been electronically signed by TELMA Rogers on 6/2/2025 06:50 CDT

## 2025-06-02 NOTE — NURSING NOTE
This nurse attempted to start new IV without success, charge nurse, Amanda DUMAS Also attempted IV without success. VATS consult placed for IV access.

## 2025-06-02 NOTE — PLAN OF CARE
Goal Outcome Evaluation:  Plan of Care Reviewed With: patient, friend        Progress: no change  Outcome Evaluation: Pt aox4. NPO. Friend at bedside. Communicates via text on phone. Friend requesting food and stool softeners for patient. Patient texted on her phone to communicate to nurse that she was not requesting these things and did not want a stool softener. Safety maintained.

## 2025-06-02 NOTE — ANESTHESIA POSTPROCEDURE EVALUATION
"Patient: Duane Cooley    Procedure Summary       Date: 06/01/25 Room / Location:  [facility] OR 02 /  [facility] OR    Anesthesia Start: 0649 Anesthesia Stop: 0721    Procedure: ESOPHAGOGASTRODUODENOSCOPY WITH ANESTHESIA Diagnosis:     Surgeons: Dima Park DO Provider: Calderon Bosch CRNA    Anesthesia Type: general ASA Status: 3 - Emergent            Anesthesia Type: general    Vitals  Vitals Value Taken Time   /73 06/01/25 11:05   Temp 97.9 °F (36.6 °C) 06/01/25 11:00   Pulse 83 06/01/25 11:12   Resp 20 06/01/25 11:00   SpO2 97 % 06/01/25 11:12   Vitals shown include unfiled device data.        Post Anesthesia Care and Evaluation    Patient location during evaluation: PACU  Patient participation: complete - patient participated  Level of consciousness: awake and alert  Pain management: adequate    Airway patency: patent  Anesthetic complications: No anesthetic complications    Cardiovascular status: acceptable  Respiratory status: acceptable  Hydration status: acceptable    Comments: Blood pressure 130/68, pulse 67, temperature 97.2 °F (36.2 °C), temperature source Oral, resp. rate 18, height 165.1 cm (65\"), weight 96.6 kg (213 lb), last menstrual period 05/14/2025, SpO2 96%, not currently breastfeeding.    Pt discharged from PACU based on pablo score >8    "

## 2025-06-03 ENCOUNTER — APPOINTMENT (OUTPATIENT)
Dept: GENERAL RADIOLOGY | Facility: HOSPITAL | Age: 30
End: 2025-06-03
Payer: MEDICAID

## 2025-06-03 PROCEDURE — 99231 SBSQ HOSP IP/OBS SF/LOW 25: CPT | Performed by: NURSE PRACTITIONER

## 2025-06-03 PROCEDURE — 74018 RADEX ABDOMEN 1 VIEW: CPT

## 2025-06-03 PROCEDURE — G0378 HOSPITAL OBSERVATION PER HR: HCPCS

## 2025-06-03 RX ORDER — DOCUSATE SODIUM 100 MG/1
100 CAPSULE, LIQUID FILLED ORAL 2 TIMES DAILY
Status: DISCONTINUED | OUTPATIENT
Start: 2025-06-03 | End: 2025-06-05 | Stop reason: HOSPADM

## 2025-06-03 RX ORDER — POLYETHYLENE GLYCOL 3350 17 G/17G
17 POWDER, FOR SOLUTION ORAL DAILY
Status: DISCONTINUED | OUTPATIENT
Start: 2025-06-03 | End: 2025-06-04

## 2025-06-03 RX ADMIN — FLUOXETINE HYDROCHLORIDE 20 MG: 20 CAPSULE ORAL at 08:57

## 2025-06-03 RX ADMIN — Medication 10 ML: at 21:21

## 2025-06-03 RX ADMIN — DOCUSATE SODIUM 100 MG: 100 CAPSULE, LIQUID FILLED ORAL at 13:51

## 2025-06-03 RX ADMIN — FLUOXETINE HYDROCHLORIDE 40 MG: 20 CAPSULE ORAL at 08:57

## 2025-06-03 RX ADMIN — QUETIAPINE FUMARATE 200 MG: 100 TABLET ORAL at 21:20

## 2025-06-03 RX ADMIN — LAMOTRIGINE 300 MG: 100 TABLET ORAL at 21:20

## 2025-06-03 RX ADMIN — POLYETHYLENE GLYCOL 3350 17 G: 17 POWDER, FOR SOLUTION ORAL at 13:51

## 2025-06-03 RX ADMIN — LAMOTRIGINE 300 MG: 100 TABLET ORAL at 08:57

## 2025-06-03 RX ADMIN — Medication 10 ML: at 08:57

## 2025-06-03 RX ADMIN — PANTOPRAZOLE SODIUM 40 MG: 40 TABLET, DELAYED RELEASE ORAL at 06:40

## 2025-06-03 RX ADMIN — DOCUSATE SODIUM 100 MG: 100 CAPSULE, LIQUID FILLED ORAL at 21:21

## 2025-06-03 RX ADMIN — TERAZOSIN HYDROCHLORIDE 2 MG: 2 CAPSULE ORAL at 21:21

## 2025-06-03 RX ADMIN — QUETIAPINE FUMARATE 100 MG: 25 TABLET ORAL at 06:40

## 2025-06-03 NOTE — PLAN OF CARE
Goal Outcome Evaluation:  Plan of Care Reviewed With: patient  Progress: no change       Pt with no c/o pain thus far this shift. IV in place saline locked. Up ad michi. Voiding per BRP. Maintained NPO. Resting between care. VSS. Safety maintained.

## 2025-06-03 NOTE — PROGRESS NOTES
[facility] GENERAL SURGERY    PROGRESS NOTE    Today's Date: 25    Patient Name: Duane Cooley  MRN: [id number]  CSN: [id number]  PCP: Provider, No Known  Attending Provider: Javon Almeida MD  Length of Stay: 0    SUBJECTIVE     Duane Cooley presents resting in bed with caregiver and social support dog in the room. Patient communicates by typing on phone.  She denies pain this morning.  She denies nausea or vomiting.  She has not had a bowel movement.  Abdominal x-ray yesterday showed foreign body had progressed to large bowel either cecum or sigmoid colon.  Bowel gas pattern was nonobstructed.  Pending abdominal x-ray this morning.      Visit Dx:    ICD-10-CM ICD-9-CM   1. Swallowed foreign body, initial encounter  T18.9XXA 938   2. Foreign body alimentary tract, initial encounter  T18.9XXA 938     [id number]       Hospital Problem List:     Foreign body in digestive system    Pseudoseizures    Foreign body ingestion    Psychogenic nonepileptic seizure    Foreign body alimentary tract      History:   Past Medical History:   Diagnosis Date    Anxiety     Depression     History of  2017    History of congenital abnormality 2017    Late prenatal care affecting pregnancy in second trimester 2017    Mental deficiency 2017    Previous  delivery affecting pregnancy 2017    Psychogenic nonepileptic seizure      Past Surgical History:   Procedure Laterality Date     SECTION      ENDOSCOPY N/A 2024    Procedure: ESOPHAGOGASTRODUODENOSCOPY WITH ANESTHESIA;  Surgeon: Tian Morris MD;  Location: [facility] OR;  Service: Gastroenterology;  Laterality: N/A;  pre foreign body in stomach  post foreign body passed out of stomach  dr kaiden houston    ENDOSCOPY N/A 2024    Procedure: ESOPHAGOGASTRODUODENOSCOPY WITH ANESTHESIA;  Surgeon: Herman Murry MD;  Location: [facility] OR;  Service: Gastroenterology;  Laterality: N/A;  pre foreign body  post  no foreign body    ENDOSCOPY N/A 5/7/2025    Procedure: ESOPHAGOGASTRODUODENOSCOPY;  Surgeon: Brennen White MD;  Location:  [facility] OR;  Service: Gastroenterology;  Laterality: N/A;  pre: foreign body  post: foreign body    ENDOSCOPY N/A 6/1/2025    Procedure: ESOPHAGOGASTRODUODENOSCOPY WITH ANESTHESIA;  Surgeon: Dima Park DO;  Location:  [facility] OR;  Service: Gastroenterology;  Laterality: N/A;  pre op: foreign body removal  post op: foreign body none retrieved  pcp:     Social History     Socioeconomic History    Marital status:    Tobacco Use    Smoking status: Unknown    Smokeless tobacco: Never   Vaping Use    Vaping status: Never Used   Substance and Sexual Activity    Alcohol use: Defer    Drug use: Defer    Sexual activity: Defer     Partners: Male       Allergies:  No Known Allergies    Medications:    Current Facility-Administered Medications:     acetaminophen (TYLENOL) tablet 500 mg, 500 mg, Oral, Daily PRN, Javon Almeida MD, 500 mg at 06/01/25 2029    diazePAM (VALIUM) injection 5 mg, 5 mg, Intravenous, Q4H PRN, Bridgette Aleman MD    FLUoxetine (PROzac) capsule 20 mg, 20 mg, Oral, Daily, Javon Almeida MD, 20 mg at 06/02/25 0913    FLUoxetine (PROzac) capsule 40 mg, 40 mg, Oral, Daily, Javon Almeida MD, 40 mg at 06/02/25 0914    hydrOXYzine (ATARAX) tablet 25 mg, 25 mg, Oral, TID PRN, Javon Almeida MD    lamoTRIgine (LaMICtal) tablet 300 mg, 300 mg, Oral, BID, Javon Almeida MD, 300 mg at 06/02/25 2114    Midazolam HCl (PF) (VERSED) injection 10 mg, 10 mg, Intramuscular, Once PRN, Javon Almeida MD    ondansetron (ZOFRAN) injection 4 mg, 4 mg, Intravenous, Q6H PRN, Javon Almeida MD    pantoprazole (PROTONIX) EC tablet 40 mg, 40 mg, Oral, Q AM, Javon Almeida MD, 40 mg at 06/03/25 0640    QUEtiapine (SEROquel) tablet 100 mg, 100 mg, Oral, QAM, Javon Almeida, MD, 100 mg at 06/03/25 0640    QUEtiapine (SEROquel) tablet 200 mg, 200 mg, Oral, Nightly, Juan Pablo,  Javon BARAHONA MD, 200 mg at 06/02/25 2114    QUEtiapine (SEROquel) tablet 25 mg, 25 mg, Oral, Daily PRN, Javon Almeida MD    senna (SENOKOT) tablet 2 tablet, 2 tablet, Oral, Daily PRN, Javon Almeida MD    sodium chloride 0.9 % flush 10 mL, 10 mL, Intravenous, Q12H, Javon Almeida MD, 10 mL at 06/02/25 2114    sodium chloride 0.9 % flush 10 mL, 10 mL, Intravenous, PRN, Javon Almeida MD    sodium chloride 0.9 % flush 10 mL, 10 mL, Intravenous, PRN, Javon Almeida MD    sodium chloride 0.9 % infusion 40 mL, 40 mL, Intravenous, PRN, Javon Almeida MD    terazosin (HYTRIN) capsule 2 mg, 2 mg, Oral, Nightly, Javon Almeida MD, 2 mg at 06/02/25 2114      OBJECTIVE     Vitals:    06/03/25 0802   BP: 96/52   Pulse: 81   Resp: 16   Temp: 98 °F (36.7 °C)   SpO2: 96%       Temp:  [97.6 °F (36.4 °C)-98.9 °F (37.2 °C)] 98 °F (36.7 °C)  Heart Rate:  [] 81  Resp:  [16-18] 16  BP: ()/(47-69) 96/52     PHYSICAL EXAM   Physical Exam  Vitals and nursing note reviewed.   Constitutional:       General: She is awake.      Appearance: Normal appearance.      Comments: Body mass index is 35.45 kg/m².    HENT:      Head: Normocephalic and atraumatic.   Eyes:      General: Lids are normal. Gaze aligned appropriately.   Cardiovascular:      Rate and Rhythm: Normal rate and regular rhythm.   Pulmonary:      Effort: Pulmonary effort is normal. No respiratory distress.   Abdominal:      Palpations: Abdomen is soft.      Tenderness: There is no abdominal tenderness.   Musculoskeletal:      Cervical back: Normal range of motion and neck supple.   Skin:     General: Skin is warm and dry.   Neurological:      Mental Status: She is alert and oriented to person, place, and time.   Psychiatric:         Attention and Perception: Attention normal.         Mood and Affect: Mood normal.         Speech: Speech normal.         Behavior: Behavior is cooperative.            Results Review:  Lab Results (last 48 hours)        Procedure Component Value Units Date/Time    Comprehensive Metabolic Panel [[id number]]  (Abnormal) Collected: 06/01/25 0252    Specimen: Blood Updated: 06/01/25 0327     Glucose 101 mg/dL      BUN 8.9 mg/dL      Creatinine 0.63 mg/dL      Sodium 139 mmol/L      Potassium 4.3 mmol/L      Comment: Slight hemolysis detected by analyzer. Result may be falsely elevated.        Chloride 103 mmol/L      CO2 22.0 mmol/L      Calcium 9.8 mg/dL      Total Protein 7.2 g/dL      Albumin 4.4 g/dL      ALT (SGPT) 12 U/L      AST (SGOT) 17 U/L      Comment: Slight hemolysis detected by analyzer. Result may be falsely elevated.        Alkaline Phosphatase 80 U/L      Total Bilirubin 0.2 mg/dL      Globulin 2.8 gm/dL      A/G Ratio 1.6 g/dL      BUN/Creatinine Ratio 14.1     Anion Gap 14.0 mmol/L      eGFR 124.1 mL/min/1.73     Narrative:      GFR Categories in Chronic Kidney Disease (CKD)              GFR Category          GFR (mL/min/1.73)    Interpretation  G1                    90 or greater        Normal or high (1)  G2                    60-89                Mild decrease (1)  G3a                   45-59                Mild to moderate decrease  G3b                   30-44                Moderate to severe decrease  G4                    15-29                Severe decrease  G5                    14 or less           Kidney failure    (1)In the absence of evidence of kidney disease, neither GFR category G1 or G2 fulfill the criteria for CKD.    eGFR calculation 2021 CKD-EPI creatinine equation, which does not include race as a factor    Manual Differential [[id number]]  (Abnormal) Collected: 06/01/25 0252    Specimen: Blood Updated: 06/01/25 0327     Neutrophil % 52.0 %      Lymphocyte % 44.0 %      Monocyte % 3.0 %      Eosinophil % 0.0 %      Basophil % 0.0 %      Atypical Lymphocyte % 1.0 %      Neutrophils Absolute 4.69 10*3/mm3      Lymphocytes Absolute 4.06 10*3/mm3      Monocytes Absolute 0.27 10*3/mm3      Eosinophils  Absolute 0.00 10*3/mm3      Basophils Absolute 0.00 10*3/mm3      Anisocytosis Mod/2+     Ovalocytes Slight/1+     Poikilocytes Mod/2+     Polychromasia Slight/1+     Stomatocytes Slight/1+     WBC Morphology Normal     Platelet Morphology Normal    hCG, Serum, Qualitative [[id number]]  (Normal) Collected: 06/01/25 0252    Specimen: Blood Updated: 06/01/25 0321     HCG Qualitative Negative    Urinalysis, Microscopic Only - Urine, Clean Catch [[id number]]  (Abnormal) Collected: 06/01/25 0252    Specimen: Urine, Clean Catch Updated: 06/01/25 0321     RBC, UA 0-2 /HPF      WBC, UA 3-5 /HPF      Comment: Urine culture not indicated.        Bacteria, UA 2+ /HPF      Squamous Epithelial Cells, UA 7-12 /HPF      Hyaline Casts, UA None Seen /LPF      Methodology Manual Light Microscopy    Lipase [[id number]]  (Normal) Collected: 06/01/25 0252    Specimen: Blood Updated: 06/01/25 0320     Lipase 17 U/L     Urinalysis With Culture If Indicated - Urine, Clean Catch [[id number]]  (Abnormal) Collected: 06/01/25 0252    Specimen: Urine, Clean Catch Updated: 06/01/25 0310     Color, UA Yellow     Appearance, UA Clear     pH, UA 7.0     Specific Gravity, UA 1.011     Glucose, UA Negative     Ketones, UA Negative     Bilirubin, UA Negative     Blood, UA Negative     Protein, UA Negative     Leuk Esterase, UA Small (1+)     Nitrite, UA Negative     Urobilinogen, UA 0.2 E.U./dL    Narrative:      In absence of clinical symptoms, the presence of pyuria, bacteria, and/or nitrites on the urinalysis result does not correlate with infection.    CBC & Differential [[id number]]  (Abnormal) Collected: 06/01/25 0252    Specimen: Blood Updated: 06/01/25 0305    Narrative:      The following orders were created for panel order CBC & Differential.  Procedure                               Abnormality         Status                     ---------                               -----------         ------                     CBC Auto Differential[[id number]]         Abnormal            Final result                 Please view results for these tests on the individual orders.    CBC Auto Differential [[id number]]  (Abnormal) Collected: 06/01/25 0252    Specimen: Blood Updated: 06/01/25 0305     WBC 9.02 10*3/mm3      RBC 5.02 10*6/mm3      Hemoglobin 12.5 g/dL      Hematocrit 39.9 %      MCV 79.5 fL      MCH 24.9 pg      MCHC 31.3 g/dL      RDW --     Comment: Unable to calculate        RDW-SD --     Comment: Unable to calculate        MPV --     Comment: Unable to calculate        Platelets 183 10*3/mm3           Imaging Results (Last 72 Hours)       Procedure Component Value Units Date/Time    XR Abdomen Flat & Upright [[id number]] Collected: 06/02/25 1131     Updated: 06/02/25 1139    Narrative:      XR ABDOMEN FLAT AND UPRIGHT-      HISTORY: F B; T18.9XXA-Foreign body of alimentary tract, part  unspecified, initial encounter; T18.9XXA-Foreign body of alimentary  tract, part unspecified, initial encounter     COMPARISON: Radiographs dated 6/1/2025     FINDINGS:     There is a nonobstructive bowel gas pattern. Reidentified 2.3 cm  metallic density foreign body now projecting in the right lower quadrant  superimposed over the large bowel, either sigmoid colon or cecum. No  free air. Cholecystectomy clips. Bony elements are unremarkable.       Impression:         1.  2.3 cm metallic density foreign body has continued to progress and  is now superimposed over the large bowel, either cecum or sigmoid colon.  2.  Bowel gas pattern is nonobstructive. No free air.        This report was signed and finalized on 6/2/2025 11:36 AM by Dr Brain George.       XR Abdomen Flat & Upright [[id number]] Collected: 06/01/25 1650     Updated: 06/01/25 1654    Narrative:      EXAMINATION: XR ABDOMEN FLAT AND UPRIGHT-  6/1/2025 4:50 PM     HISTORY: Foreign body.     FINDINGS: KUB radiograph demonstrates the rectangular foreign bodies now  project over the periumbilical region. Is difficult to  ascertain whether  this is within the small bowel or colon. I would favor small bowel. No  obstruction or free air.       Impression:      Radiopaque foreign body projects over the mid abdomen that is likely  within the more distal small bowel.     This report was signed and finalized on 6/1/2025 4:51 PM by Dr. Star Ruelas MD.       XR Abdomen KUB [[id number]] Collected: 06/01/25 0815     Updated: 06/01/25 0828    Narrative:      EXAMINATION:  XR ABDOMEN KUB-  6/1/2025 6:46 AM     HISTORY: T18.9XXA-Foreign body of alimentary tract, part unspecified,  initial encounter. The patient had an upper endoscopy and the radiopaque  foreign body was not found in the stomach.     COMPARISON: CT on 6/1/2025.     TECHNIQUE: Supine abdomen.     FINDINGS:   There is a cylindrical 2.3 x 0.6 cm foreign body overlying  the left upper quadrant of the abdomen. It does not appear to be  overlying the gastric outline on the x-ray. Therefore, it is likely  within a proximal jejunal loop. The bowel gas pattern is within normal  limits. No free air is identified on this supine examination. There has  been prior cholecystectomy. There is no organomegaly. There is contrast  within the urinary bladder related to the CT that was performed  recently.          Impression:      1. There is a cylindrical radiopaque 2.3 x 0.6 cm foreign body overlying  the left upper quadrant. It does not appear to be within the gastric  outline on the x-ray. In addition, it was not found on upper endoscopy.  Therefore, the device is likely located in the proximal jejunum.  2. No obvious free air is seen on this supine exam. The gas pattern is  within normal limits.        Results were discussed with Dr. Park.           This report was signed and finalized on 6/1/2025 8:25 AM by Dr. Abebe Hawkins MD.       CT Abdomen Pelvis With Contrast [[id number]] Collected: 06/01/25 0453     Updated: 06/01/25 0516    Narrative:      CT ABDOMEN PELVIS W CONTRAST- 6/1/2025  2:22 AM     HISTORY: abdominal pain, hx of magnet ingestion.       COMPARISON: 5/7/2025.     DLP: 537.8 mGy.cm. All CT scans are performed using dose optimization  techniques as appropriate to the performed exam and including at least  one of the following: Automated exposure control, adjustment of the mA  and/or kV according to size, and the use of the iterative reconstruction  technique.     TECHNIQUE: Following the intravenous administration of contrast, helical  CT tomographic images of the abdomen and pelvis were acquired. Coronal  reformatted images were also provided for review.     FINDINGS:  Trace right effusion is present. There is right basilar atelectasis. The  base of the heart is unremarkable. No pericardial effusion present..     LIVER: No focal liver lesion. The hepatic vasculature is patent.     BILIARY SYSTEM: The gallbladder is surgically absent. No biliary  dilatation is present..     PANCREAS: No focal pancreatic lesion.     SPLEEN: Unremarkable.     KIDNEYS AND ADRENALS: Bilateral kidneys and adrenal glands are  unremarkable. The ureters are decompressed and normal in appearance.     RETROPERITONEUM: No mass, lymphadenopathy or hemorrhage.     GI TRACT: There is a similar radiopaque foreign body noted along the  lesser curvature of the stomach at the junction of the body and antrum  of the stomach. This is identical in appearance to the previous ingested  foreign body dated 5/7/2025. The radiodensity is similar in position  from the previous exam although the patient did undergo endoscopy and  metallic/magnetic foreign bodies were removed. KUB dated 5/19/2025  demonstrates no definite radiopaque foreign body as was demonstrated on  the KUB 5/6/2025. There is no adjacent inflammatory change or  extraluminal air on today's exam. No evidence of adjacent gastric wall  thickening or gastric outlet obstruction. The bowel gas pattern is  otherwise unremarkable... The appendix is visualized and  unremarkable.     OTHER: There is no mesenteric mass, lymphadenopathy or fluid collection.  The abdominopelvic vasculature is patent. The osseous structures and  soft tissues demonstrate no worrisome lesions. There is a ventral wall  hernia above the umbilicus containing peritoneal fat with the abdominal  wall defect measuring 1.4 cm. A fat-containing periumbilical hernia is  also present. No herniated bowel loop or incarceration.     PELVIS: There is a peripherally enhancing 1.8 cm irregular cyst  associated with the right ovary suggesting a recently ruptured cyst. An  additional radiodensity measuring 1.6 cm in size associated with the  right ovary likely represents a collapsed cyst. There is some  enlargement of the right ovary. There is moderate free fluid in the  cul-de-sac and adjacent to the right ovary. The left ovary is  unremarkable.. The urinary bladder is normal in appearance.       Impression:      1. There are 2 areas of enhancement/increased radiodensity associated  with the right ovary suggesting recently ruptured ovarian cysts and/or  hemorrhagic cyst. There is mild enlargement of the right ovary with  torsion not entirely excluded. There is moderate free fluid in the  cul-de-sac and adjacent to the right ovary. The uterus and left ovary  are unremarkable.  2. There is a radiopaque foreign body along the lesser curvature the  stomach at the junction of the body and antrum of the stomach. This is  similar in position to a previous foreign body described on CT abdomen  and pelvis dated 5/7/2025 which was consistent with an ingested magnet.  This was subsequently removed at endoscopy on the same day with hospital  admission. A KUB radiograph which was obtained on 5/6/2025 also  demonstrated the rectangular radiopaque foreign body. A subsequent KUB  which was obtained on 5/19/2025 no longer demonstrated  the foreign  body. This would suggest that the patient has likely re ingested a  similar foreign  body. There is no adjacent gastric wall thickening or  extraluminal air to suggest perforation. The GI tract is otherwise  unremarkable. Normal appendix.  3. There is an incisional hernia within the midline above the umbilicus  as well as a fat-containing periumbilical hernia..           This report was signed and finalized on 6/1/2025 5:13 AM by Dr. Star Ruelas MD.                  ASSESSMENT/PLAN     Active Hospital Problems    Diagnosis     **Foreign body in digestive system     Foreign body alimentary tract     Psychogenic nonepileptic seizure     Foreign body ingestion     Pseudoseizures        PLAN:   Pending abdominal x-ray this morning.  Last x-ray completed yesterday showed magnets had progressed to cecum or sigmoid colon with continued appearance of one unit.  Planning discharge once magnets have passed.  May advance diet to regular diet.     Discussed findings and treatment plan including risks, benefits, and treatment options with patient in detail. Patient agreed with treatment plan.      This document has been electronically signed by TELMA Rogers on 6/3/2025 08:52 CDT

## 2025-06-03 NOTE — PLAN OF CARE
Problem: Adult Inpatient Plan of Care  Goal: Plan of Care Review  Outcome: Progressing  Goal: Patient-Specific Goal (Individualized)  Outcome: Progressing  Goal: Absence of Hospital-Acquired Illness or Injury  Outcome: Progressing  Intervention: Identify and Manage Fall Risk  Recent Flowsheet Documentation  Taken 6/3/2025 0857 by Ludmila Jean RN  Safety Promotion/Fall Prevention:   safety round/check completed   nonskid shoes/slippers when out of bed   clutter free environment maintained   assistive device/personal items within reach  Intervention: Prevent Skin Injury  Recent Flowsheet Documentation  Taken 6/3/2025 0857 by Ludmila Jean RN  Body Position: position changed independently  Goal: Optimal Comfort and Wellbeing  Outcome: Progressing  Goal: Readiness for Transition of Care  Outcome: Progressing     Problem: Fall Injury Risk  Goal: Absence of Fall and Fall-Related Injury  Outcome: Progressing  Intervention: Promote Injury-Free Environment  Recent Flowsheet Documentation  Taken 6/3/2025 0857 by Ludmila Jean RN  Safety Promotion/Fall Prevention:   safety round/check completed   nonskid shoes/slippers when out of bed   clutter free environment maintained   assistive device/personal items within reach   Goal Outcome Evaluation:

## 2025-06-03 NOTE — PROGRESS NOTES
[facility] Medicine Services  INPATIENT PROGRESS NOTE    Patient Name: Duane Cooley  Date of Admission: 6/1/2025  Today's Date: 06/03/25  Length of Stay: 0  Primary Care Physician: Provider, No Known    Subjective   Chief Complaint: Foreign body ingestion    Swallowed Foreign Body       Patient denies abdominal pain. She is anxious to be discharged. She and her mother were counseled.      Review of Systems   All pertinent negatives and positives are as above. All other systems have been reviewed and are negative unless otherwise stated.     Objective    Temp:  [97.6 °F (36.4 °C)-98.9 °F (37.2 °C)] 98.1 °F (36.7 °C)  Heart Rate:  [] 107  Resp:  [16] 16  BP: ()/(47-71) 91/71  Physical Exam  Constitutional:       General: She is not in acute distress.     Appearance: She is not ill-appearing or diaphoretic.   HENT:      Head: Normocephalic and atraumatic.      Right Ear: External ear normal.      Left Ear: External ear normal.      Nose: No congestion or rhinorrhea.      Mouth/Throat:      Mouth: Mucous membranes are moist.      Pharynx: No oropharyngeal exudate or posterior oropharyngeal erythema.   Eyes:      General: No scleral icterus.     Extraocular Movements: Extraocular movements intact.      Conjunctiva/sclera: Conjunctivae normal.   Cardiovascular:      Rate and Rhythm: Normal rate and regular rhythm.      Heart sounds: Normal heart sounds. No murmur heard.  Pulmonary:      Effort: Pulmonary effort is normal. No respiratory distress.      Breath sounds: Normal breath sounds. No wheezing, rhonchi or rales.   Abdominal:      General: Abdomen is flat. There is no distension.      Palpations: Abdomen is soft.      Tenderness: There is no abdominal tenderness. There is no guarding.   Musculoskeletal:         General: No swelling, tenderness or deformity.      Cervical back: Neck supple. No rigidity. No muscular tenderness.      Right lower leg: No edema.      Left  "lower leg: No edema.   Lymphadenopathy:      Cervical: No cervical adenopathy.   Skin:     General: Skin is warm and dry.   Neurological:      General: No focal deficit present.      Mental Status: She is alert.      Cranial Nerves: No cranial nerve deficit.      Motor: No weakness.   Psychiatric:         Mood and Affect: Mood normal.      Comments: Abnormal behavior.       Results Review:  I have reviewed the labs, radiology results, and diagnostic studies.    Laboratory Data:   Results from last 7 days   Lab Units 06/02/25  0633 06/01/25  0252   WBC 10*3/mm3 8.68 9.02   HEMOGLOBIN g/dL 12.1 12.5   HEMATOCRIT % 37.9 39.9   PLATELETS 10*3/mm3 164 183        Results from last 7 days   Lab Units 06/02/25  0633 06/01/25  0252   SODIUM mmol/L 138 139   POTASSIUM mmol/L 3.6 4.3   CHLORIDE mmol/L 104 103   CO2 mmol/L 19.0* 22.0   BUN mg/dL 6.6 8.9   CREATININE mg/dL 0.61 0.63   CALCIUM mg/dL 9.0 9.8   BILIRUBIN mg/dL  --  0.2   ALK PHOS U/L  --  80   ALT (SGPT) U/L  --  12   AST (SGOT) U/L  --  17   GLUCOSE mg/dL 87 101*       Culture Data:   No results found for: \"BLOODCX\", \"URINECX\", \"WOUNDCX\", \"MRSACX\", \"RESPCX\", \"STOOLCX\"    Radiology Data:   Imaging Results (Last 24 Hours)       Procedure Component Value Units Date/Time    XR Abdomen KUB [[id number]] Collected: 06/03/25 1207     Updated: 06/03/25 1212    Narrative:      EXAM: XR ABDOMEN KUB-      DATE: 6/3/2025 9:57 AM     HISTORY: Tracking magnet; T18.9XXA-Foreign body of alimentary tract,  part unspecified, initial encounter; T18.9XXA-Foreign body of alimentary  tract, part unspecified, initial encounter       COMPARISON: 6/2/2025.     TECHNIQUE:   Frontal view of the abdomen. 1 image.     FINDINGS:    Cylinder shaped 2.3 cm metallic foreign body projects at the right mid  abdomen. Bowel gas pattern is nonobstructive. There are clips in the  right upper quadrant from cholecystectomy.          Impression:         1. Metallic foreign body projects at the right mid " abdomen.     This report was signed and finalized on 6/3/2025 12:09 PM by Collins Lam.               I have reviewed the patient's current medications.     Assessment/Plan   Assessment  Active Hospital Problems    Diagnosis     **Foreign body in digestive system     Foreign body alimentary tract     Psychogenic nonepileptic seizure     Foreign body ingestion     Pseudoseizures    Seizure disorder    Treatment Plan  Management of ingested magnet as per general surgery. Continue tracking magnets in the GI tract with serial imaging.    Continue antiepileptic medications with Lamictal and valium for breakthrough seizures. Outpatient neurology and psychiatry follow-up recommended on discharge.    DVT prophylaxis with SCDs    CODE STATUS is full code    Medical Decision Making  Number and Complexity of problems: 3  Differential Diagnosis: None    Conditions and Status        Condition is unchanged.     [facility] Data  External documents reviewed: None  Cardiac tracing (EKG, telemetry) interpretation: None  Radiology interpretation: X-ray abdomen noted in right mid abdomen  Labs reviewed: CBC, BMP reviewed by me  Any tests that were considered but not ordered: None     Decision rules/scores evaluated (example [id number], Wells, etc): None     Discussed with: Patient     Care Planning  Shared decision making: Patient  Code status and discussions: CODE STATUS is full code    Disposition  Social Determinants of Health that impact treatment or disposition: None  I expect the patient to be discharged to home in 2 to 3 days        Electronically signed by Bridgette Aleman MD, 06/03/25, 13:59 CDT.

## 2025-06-04 ENCOUNTER — APPOINTMENT (OUTPATIENT)
Dept: GENERAL RADIOLOGY | Facility: HOSPITAL | Age: 30
End: 2025-06-04
Payer: MEDICAID

## 2025-06-04 LAB
ALBUMIN SERPL-MCNC: 4.1 G/DL (ref 3.5–5.2)
ALBUMIN/GLOB SERPL: 1.6 G/DL
ALP SERPL-CCNC: 68 U/L (ref 39–117)
ALT SERPL W P-5'-P-CCNC: 8 U/L (ref 1–33)
ANION GAP SERPL CALCULATED.3IONS-SCNC: 14 MMOL/L (ref 5–15)
ANISOCYTOSIS BLD QL: ABNORMAL
AST SERPL-CCNC: 12 U/L (ref 1–32)
BASOPHILS # BLD MANUAL: 0 10*3/MM3 (ref 0–0.2)
BASOPHILS NFR BLD MANUAL: 0 % (ref 0–1.5)
BILIRUB SERPL-MCNC: 0.2 MG/DL (ref 0–1.2)
BUN SERPL-MCNC: 7.3 MG/DL (ref 6–20)
BUN/CREAT SERPL: 9.7 (ref 7–25)
CALCIUM SPEC-SCNC: 9.3 MG/DL (ref 8.6–10.5)
CHLORIDE SERPL-SCNC: 104 MMOL/L (ref 98–107)
CO2 SERPL-SCNC: 20 MMOL/L (ref 22–29)
CREAT SERPL-MCNC: 0.75 MG/DL (ref 0.57–1)
DACRYOCYTES BLD QL SMEAR: ABNORMAL
DEPRECATED RDW RBC AUTO: ABNORMAL FL
EGFRCR SERPLBLD CKD-EPI 2021: 111.4 ML/MIN/1.73
EOSINOPHIL # BLD MANUAL: 0 10*3/MM3 (ref 0–0.4)
EOSINOPHIL NFR BLD MANUAL: 0 % (ref 0.3–6.2)
ERYTHROCYTE [DISTWIDTH] IN BLOOD BY AUTOMATED COUNT: ABNORMAL %
GLOBULIN UR ELPH-MCNC: 2.6 GM/DL
GLUCOSE SERPL-MCNC: 85 MG/DL (ref 65–99)
HCT VFR BLD AUTO: 37.5 % (ref 34–46.6)
HGB BLD-MCNC: 12.3 G/DL (ref 12–15.9)
HYPOCHROMIA BLD QL: ABNORMAL
LYMPHOCYTES # BLD MANUAL: 2.64 10*3/MM3 (ref 0.7–3.1)
LYMPHOCYTES NFR BLD MANUAL: 11.1 % (ref 5–12)
MCH RBC QN AUTO: 25.8 PG (ref 26.6–33)
MCHC RBC AUTO-ENTMCNC: 32.8 G/DL (ref 31.5–35.7)
MCV RBC AUTO: 78.6 FL (ref 79–97)
MICROCYTES BLD QL: ABNORMAL
MONOCYTES # BLD: 1.04 10*3/MM3 (ref 0.1–0.9)
NEUTROPHILS # BLD AUTO: 5.65 10*3/MM3 (ref 1.7–7)
NEUTROPHILS NFR BLD MANUAL: 59.6 % (ref 42.7–76)
NEUTS BAND NFR BLD MANUAL: 1 % (ref 0–5)
PLAT MORPH BLD: NORMAL
PLATELET # BLD AUTO: 172 10*3/MM3 (ref 140–450)
PMV BLD AUTO: ABNORMAL FL
POIKILOCYTOSIS BLD QL SMEAR: ABNORMAL
POLYCHROMASIA BLD QL SMEAR: ABNORMAL
POTASSIUM SERPL-SCNC: 3.7 MMOL/L (ref 3.5–5.2)
PROT SERPL-MCNC: 6.7 G/DL (ref 6–8.5)
RBC # BLD AUTO: 4.77 10*6/MM3 (ref 3.77–5.28)
SODIUM SERPL-SCNC: 138 MMOL/L (ref 136–145)
SPHEROCYTES BLD QL SMEAR: ABNORMAL
VARIANT LYMPHS NFR BLD MANUAL: 28.3 % (ref 19.6–45.3)
WBC MORPH BLD: NORMAL
WBC NRBC COR # BLD AUTO: 9.33 10*3/MM3 (ref 3.4–10.8)

## 2025-06-04 PROCEDURE — 80053 COMPREHEN METABOLIC PANEL: CPT | Performed by: INTERNAL MEDICINE

## 2025-06-04 PROCEDURE — 25010000002 MIDAZOLAM PER 1MG: Performed by: SURGERY

## 2025-06-04 PROCEDURE — 85007 BL SMEAR W/DIFF WBC COUNT: CPT | Performed by: INTERNAL MEDICINE

## 2025-06-04 PROCEDURE — 74018 RADEX ABDOMEN 1 VIEW: CPT

## 2025-06-04 PROCEDURE — G0378 HOSPITAL OBSERVATION PER HR: HCPCS

## 2025-06-04 PROCEDURE — 99231 SBSQ HOSP IP/OBS SF/LOW 25: CPT | Performed by: NURSE PRACTITIONER

## 2025-06-04 PROCEDURE — 85025 COMPLETE CBC W/AUTO DIFF WBC: CPT | Performed by: INTERNAL MEDICINE

## 2025-06-04 RX ORDER — POLYETHYLENE GLYCOL 3350 17 G/17G
0.5 POWDER, FOR SOLUTION ORAL ONCE
Status: COMPLETED | OUTPATIENT
Start: 2025-06-04 | End: 2025-06-04

## 2025-06-04 RX ADMIN — QUETIAPINE FUMARATE 200 MG: 100 TABLET ORAL at 20:32

## 2025-06-04 RX ADMIN — MIDAZOLAM HYDROCHLORIDE 10 MG: 1 INJECTION, SOLUTION INTRAMUSCULAR; INTRAVENOUS at 01:27

## 2025-06-04 RX ADMIN — TERAZOSIN HYDROCHLORIDE 2 MG: 2 CAPSULE ORAL at 20:32

## 2025-06-04 RX ADMIN — Medication 10 ML: at 09:45

## 2025-06-04 RX ADMIN — Medication 10 ML: at 20:33

## 2025-06-04 RX ADMIN — LAMOTRIGINE 300 MG: 100 TABLET ORAL at 09:42

## 2025-06-04 RX ADMIN — LAMOTRIGINE 300 MG: 100 TABLET ORAL at 20:31

## 2025-06-04 RX ADMIN — PANTOPRAZOLE SODIUM 40 MG: 40 TABLET, DELAYED RELEASE ORAL at 09:42

## 2025-06-04 RX ADMIN — DOCUSATE SODIUM 100 MG: 100 CAPSULE, LIQUID FILLED ORAL at 09:43

## 2025-06-04 RX ADMIN — FLUOXETINE HYDROCHLORIDE 40 MG: 20 CAPSULE ORAL at 09:43

## 2025-06-04 RX ADMIN — POLYETHYLENE GLYCOL 3350 17 G: 17 POWDER, FOR SOLUTION ORAL at 09:42

## 2025-06-04 RX ADMIN — POLYETHYLENE GLYCOL 3350 0.5 BOTTLE: 17 POWDER, FOR SOLUTION ORAL at 12:38

## 2025-06-04 RX ADMIN — FLUOXETINE HYDROCHLORIDE 20 MG: 20 CAPSULE ORAL at 09:42

## 2025-06-04 RX ADMIN — DOCUSATE SODIUM 100 MG: 100 CAPSULE, LIQUID FILLED ORAL at 20:32

## 2025-06-04 RX ADMIN — QUETIAPINE FUMARATE 100 MG: 25 TABLET ORAL at 09:42

## 2025-06-04 NOTE — PROGRESS NOTES
"    [facility] Medicine Services  INPATIENT PROGRESS NOTE    Patient Name: Duane Cooley  Date of Admission: 6/1/2025  Today's Date: 06/04/25  Length of Stay: 0  Primary Care Physician: Provider, No Known    Subjective   Chief Complaint: f/u   HPI    Hospitalist  service consulted to help manage seizure disorder.  Patient was admitted to general surgery: Ingestion of foreign object.    GI on June 1 had seen patient with impression of foreign body gastric lumen.  Plan was EGD and Dianelys for removal.          Neurology has been consulted on June 1 for \"spell\"    General Surgery note reviewed.    Update: AXR showed magnets in the left mid abdomen and noted it may be in the descending colon. Adding 1/2 bottle of Miralax today to promote motility.       Labs reviewed  Hemodynamically stable.      Review of Systems   All pertinent negatives and positives are as above. All other systems have been reviewed and are negative unless otherwise stated.     Objective    Temp:  [98 °F (36.7 °C)-98.2 °F (36.8 °C)] 98.2 °F (36.8 °C)  Heart Rate:  [] 93  Resp:  [16-20] 16  BP: ()/(52-68) 111/67  Physical Exam  GEN: Awake, alert, interactive, in NAD  HEENT: Atraumatic, PERRLA, EOMI, Anicteric, Trachea midline  Lungs: Normal respiratory effort  Extremities: atraumatic, no cyanosis, no edema  Skin: no rashes or lesions  Neuro: Awake alert and appears to be not having any gross focal deficit  Psych: normal mood & affect  She just grunts at me as she types her answer to me on her phone.  Not in any apparent distress  Big dog on her bed.  Accompanied by another person    Results Review:  I have reviewed the labs, radiology results, and diagnostic studies.    Laboratory Data:   Results from last 7 days   Lab Units 06/04/25  0506 06/02/25  0633 06/01/25  0252   WBC 10*3/mm3 9.33 8.68 9.02   HEMOGLOBIN g/dL 12.3 12.1 12.5   HEMATOCRIT % 37.5 37.9 39.9   PLATELETS 10*3/mm3 172 164 183      " "  Results from last 7 days   Lab Units 06/04/25  0506 06/02/25  0633 06/01/25  0252   SODIUM mmol/L 138 138 139   POTASSIUM mmol/L 3.7 3.6 4.3   CHLORIDE mmol/L 104 104 103   CO2 mmol/L 20.0* 19.0* 22.0   BUN mg/dL 7.3 6.6 8.9   CREATININE mg/dL 0.75 0.61 0.63   CALCIUM mg/dL 9.3 9.0 9.8   BILIRUBIN mg/dL 0.2  --  0.2   ALK PHOS U/L 68  --  80   ALT (SGPT) U/L 8  --  12   AST (SGOT) U/L 12  --  17   GLUCOSE mg/dL 85 87 101*       Culture Data:   No results found for: \"BLOODCX\", \"URINECX\", \"WOUNDCX\", \"MRSACX\", \"RESPCX\", \"STOOLCX\"    Radiology Data:   Imaging Results (Last 24 Hours)       Procedure Component Value Units Date/Time    XR Abdomen KUB [[id number]] Collected: 06/04/25 0723     Updated: 06/04/25 0727    Narrative:      EXAM: XR ABDOMEN KUB-      DATE: 6/4/2025 5:54 AM     HISTORY: Eval foreign object; T18.9XXA-Foreign body of alimentary tract,  part unspecified, initial encounter; T18.9XXA-Foreign body of alimentary  tract, part unspecified, initial encounter       COMPARISON: 6/3/2025.     TECHNIQUE:   Frontal view of the abdomen. 1 image.     FINDINGS:    Cylindrical shaped metallic foreign body now projects over the left mid  abdomen may be in the descending colon. There are clips in the right  upper quadrant from cholecystectomy. Bowel gas pattern is nonspecific  but nonobstructive.          Impression:         1. Metallic foreign body projects at the left mid abdomen may be in the  descending colon.     This report was signed and finalized on 6/4/2025 7:24 AM by Collins Lam.               I have reviewed the patient's current medications.     Assessment/Plan   Assessment  Medical Decision Making  Number and Complexity of problems:   Active Hospital Problems    Diagnosis     **Foreign body in digestive system     Foreign body alimentary tract     Psychogenic nonepileptic seizure     Foreign body ingestion     Pseudoseizures                      Treatment Plan  Management of ingested magnet as per " general surgery. Continue tracking magnets in the GI tract with serial imaging.     I am informed by her nurse Yenifer that neurology had seen patient and did not believe that patient has seizure.  I reviewed neurology recommendation and as per their report she was recently evaluated and diagnosed with idiopathic generalized epilepsy as well as functional neurological disorder.  Service recommended to seek evaluation at the facility where she was diagnosed with epilepsy as they can facilitate continued the of care.      As to medication, according to neurology interaction with patient the patient expressed her desire to continue her current seizure medication     Will follow along with primary service      DVT prophylaxis with SCDs     CODE STATUS is full code     docusate sodium, 100 mg, Oral, BID  FLUoxetine, 20 mg, Oral, Daily  FLUoxetine, 40 mg, Oral, Daily  lamoTRIgine, 300 mg, Oral, BID  pantoprazole, 40 mg, Oral, Q AM  polyethylene glycol, 0.5 bottle, Oral, Once  QUEtiapine, 100 mg, Oral, QAM  QUEtiapine, 200 mg, Oral, Nightly  sodium chloride, 10 mL, Intravenous, Q12H  terazosin, 2 mg, Oral, Nightly        Medical Decision Making  Number and Complexity of problems: 3  Differential Diagnosis: None     Conditions and Status        Condition is unchanged.     [facility] Data  External documents reviewed: None  Cardiac tracing (EKG, telemetry) interpretation: None  Radiology interpretation: X-ray abdomen noted in right mid abdomen  Labs reviewed: CBC, BMP reviewed by me  Any tests that were considered but not ordered: None     Decision rules/scores evaluated (example [id number], Wells, etc): None     Discussed with: Patient     Care Planning  Shared decision making: Patient  Code status and discussions: CODE STATUS is full code     Disposition  Social Determinants of Health that impact treatment or disposition: None  I expect the patient to be discharged to home (?)         Electronically signed by Nick Euceda  MD [place], 06/04/25, 12:24 CDT.

## 2025-06-04 NOTE — PLAN OF CARE
Problem: Adult Inpatient Plan of Care  Goal: Plan of Care Review  Outcome: Progressing  Goal: Patient-Specific Goal (Individualized)  Outcome: Progressing  Goal: Absence of Hospital-Acquired Illness or Injury  Outcome: Progressing  Intervention: Identify and Manage Fall Risk  Recent Flowsheet Documentation  Taken 6/4/2025 0945 by Ludmila Jean RN  Safety Promotion/Fall Prevention:   safety round/check completed   nonskid shoes/slippers when out of bed   clutter free environment maintained   assistive device/personal items within reach  Intervention: Prevent Skin Injury  Recent Flowsheet Documentation  Taken 6/4/2025 0945 by Ludmila Jean RN  Body Position: position changed independently  Goal: Optimal Comfort and Wellbeing  Outcome: Progressing  Goal: Readiness for Transition of Care  Outcome: Progressing     Problem: Fall Injury Risk  Goal: Absence of Fall and Fall-Related Injury  Outcome: Progressing  Intervention: Promote Injury-Free Environment  Recent Flowsheet Documentation  Taken 6/4/2025 0945 by Ludmila Jean RN  Safety Promotion/Fall Prevention:   safety round/check completed   nonskid shoes/slippers when out of bed   clutter free environment maintained   assistive device/personal items within reach   Goal Outcome Evaluation:

## 2025-06-04 NOTE — NURSING NOTE
0122: call to room by family, patient having seizure. Upon arrival to room, patient shaking, and body stiff. Didn't respond to pain or voice. Turned patient on side.   0126: medications administered. Seizure activity stopped. Resting comfortably in bed. MD notified. Family at bedside.

## 2025-06-04 NOTE — PLAN OF CARE
Goal Outcome Evaluation:            Patient had 1 episode of seizure like activity. Meds given per order. Md aware. SO and service dog at bedside. Call light within reach. Safety maintained.

## 2025-06-04 NOTE — NURSING NOTE
0122: call to room by family, patient having seizure. Upon arrival to room, patient shaking, and body stiff. Didn't respond to pain or voice. Turned patient on side. Airway secure.   0126: medications administered. Seizure activity stopped. Resting comfortably in bed. MD notified. Family at bedside. Bed rails padded for safety. Will continue to monitor

## 2025-06-04 NOTE — PROGRESS NOTES
[facility] GENERAL SURGERY    PROGRESS NOTE    Today's Date: 25    Patient Name: Duane Cooley  MRN: [id number]  CSN: [id number]  PCP: Provider, No Known  Attending Provider: Irvin Gallardo MD  Length of Stay: 0    SUBJECTIVE     Duane Cooley presents resting in bed with caregiver and social support dog in the room. Patient communicates by gestures and/or typing on phone.  She denies pain this morning.  She denies nausea or vomiting.  She reports a bowel movement last night.  She did not see any signs of the magnet in stool. Pending abdominal x-ray this morning.      Visit Dx:    ICD-10-CM ICD-9-CM   1. Swallowed foreign body, initial encounter  T18.9XXA 938   2. Foreign body alimentary tract, initial encounter  T18.9XXA 938     [id number]       Hospital Problem List:     Foreign body in digestive system    Pseudoseizures    Foreign body ingestion    Psychogenic nonepileptic seizure    Foreign body alimentary tract      History:   Past Medical History:   Diagnosis Date    Anxiety     Depression     History of  2017    History of congenital abnormality 2017    Late prenatal care affecting pregnancy in second trimester 2017    Mental deficiency 2017    Previous  delivery affecting pregnancy 2017    Psychogenic nonepileptic seizure      Past Surgical History:   Procedure Laterality Date     SECTION      ENDOSCOPY N/A 2024    Procedure: ESOPHAGOGASTRODUODENOSCOPY WITH ANESTHESIA;  Surgeon: Tian Morrsi MD;  Location: [facility] OR;  Service: Gastroenterology;  Laterality: N/A;  pre foreign body in stomach  post foreign body passed out of stomach  dr kaiden houston    ENDOSCOPY N/A 2024    Procedure: ESOPHAGOGASTRODUODENOSCOPY WITH ANESTHESIA;  Surgeon: Herman Murry MD;  Location: [facility] OR;  Service: Gastroenterology;  Laterality: N/A;  pre foreign body  post no foreign body    ENDOSCOPY N/A 2025    Procedure:  ESOPHAGOGASTRODUODENOSCOPY;  Surgeon: Brennen White MD;  Location:  [facility] OR;  Service: Gastroenterology;  Laterality: N/A;  pre: foreign body  post: foreign body    ENDOSCOPY N/A 6/1/2025    Procedure: ESOPHAGOGASTRODUODENOSCOPY WITH ANESTHESIA;  Surgeon: Dima Park DO;  Location:  [facility] OR;  Service: Gastroenterology;  Laterality: N/A;  pre op: foreign body removal  post op: foreign body none retrieved  pcp:     Social History     Socioeconomic History    Marital status:    Tobacco Use    Smoking status: Unknown    Smokeless tobacco: Never   Vaping Use    Vaping status: Never Used   Substance and Sexual Activity    Alcohol use: Defer    Drug use: Defer    Sexual activity: Defer     Partners: Male       Allergies:  No Known Allergies    Medications:    Current Facility-Administered Medications:     acetaminophen (TYLENOL) tablet 500 mg, 500 mg, Oral, Daily PRN, Javon Almeida MD, 500 mg at 06/01/25 2029    diazePAM (VALIUM) injection 5 mg, 5 mg, Intravenous, Q4H PRN, Bridgette Aleman MD    docusate sodium (COLACE) capsule 100 mg, 100 mg, Oral, BID, Irvin Gallardo MD, 100 mg at 06/04/25 0943    FLUoxetine (PROzac) capsule 20 mg, 20 mg, Oral, Daily, Javon Almeida MD, 20 mg at 06/04/25 0942    FLUoxetine (PROzac) capsule 40 mg, 40 mg, Oral, Daily, Javon Almeida MD, 40 mg at 06/04/25 0943    hydrOXYzine (ATARAX) tablet 25 mg, 25 mg, Oral, TID PRN, Javon Almeida MD    lamoTRIgine (LaMICtal) tablet 300 mg, 300 mg, Oral, BID, Javon Almeida MD, 300 mg at 06/04/25 0942    ondansetron (ZOFRAN) injection 4 mg, 4 mg, Intravenous, Q6H PRN, Javon Almeida MD    pantoprazole (PROTONIX) EC tablet 40 mg, 40 mg, Oral, Q AM, Javon Almeida MD, 40 mg at 06/04/25 0942    polyethylene glycol (MIRALAX) powder 0.5 bottle, 0.5 bottle, Oral, Once, Figueroa, Peg R, APRN    QUEtiapine (SEROquel) tablet 100 mg, 100 mg, Oral, Juan Pablo ADLER Anthony K, MD, 100 mg at 06/04/25 0942    QUEtiapine  (SEROquel) tablet 200 mg, 200 mg, Oral, Nightly, Javon Almeida MD, 200 mg at 06/03/25 2120    QUEtiapine (SEROquel) tablet 25 mg, 25 mg, Oral, Daily PRN, Javon Almeida MD    senna (SENOKOT) tablet 2 tablet, 2 tablet, Oral, Daily PRN, Javon Almeida MD    sodium chloride 0.9 % flush 10 mL, 10 mL, Intravenous, Q12H, Javon Almeida MD, 10 mL at 06/04/25 0945    sodium chloride 0.9 % flush 10 mL, 10 mL, Intravenous, PRN, Javon Almeida MD    sodium chloride 0.9 % flush 10 mL, 10 mL, Intravenous, PRN, Javon Almeida MD    sodium chloride 0.9 % infusion 40 mL, 40 mL, Intravenous, PRN, Javon Almeida MD    terazosin (HYTRIN) capsule 2 mg, 2 mg, Oral, Nightly, Javon Almeida MD, 2 mg at 06/03/25 2121      OBJECTIVE     Vitals:    06/04/25 0817   BP: 99/54   Pulse: 91   Resp: 16   Temp: 98 °F (36.7 °C)   SpO2: 96%       Temp:  [98 °F (36.7 °C)-98.2 °F (36.8 °C)] 98 °F (36.7 °C)  Heart Rate:  [] 91  Resp:  [16-20] 16  BP: ()/(52-71) 99/54     PHYSICAL EXAM   Physical Exam  Vitals and nursing note reviewed.   Constitutional:       General: She is awake.      Appearance: Normal appearance.      Comments: Body mass index is 35.45 kg/m².    HENT:      Head: Normocephalic and atraumatic.   Eyes:      General: Lids are normal. Gaze aligned appropriately.   Cardiovascular:      Rate and Rhythm: Normal rate and regular rhythm.   Pulmonary:      Effort: Pulmonary effort is normal. No respiratory distress.   Abdominal:      Palpations: Abdomen is soft.      Tenderness: There is no abdominal tenderness.   Musculoskeletal:      Cervical back: Normal range of motion and neck supple.   Skin:     General: Skin is warm and dry.   Neurological:      Mental Status: She is alert and oriented to person, place, and time.   Psychiatric:         Attention and Perception: Attention normal.         Mood and Affect: Mood normal.         Speech: Speech normal.         Behavior: Behavior is cooperative.             Results Review:  Lab Results (last 48 hours)       Procedure Component Value Units Date/Time    Comprehensive Metabolic Panel [[id number]]  (Abnormal) Collected: 06/01/25 0252    Specimen: Blood Updated: 06/01/25 0327     Glucose 101 mg/dL      BUN 8.9 mg/dL      Creatinine 0.63 mg/dL      Sodium 139 mmol/L      Potassium 4.3 mmol/L      Comment: Slight hemolysis detected by analyzer. Result may be falsely elevated.        Chloride 103 mmol/L      CO2 22.0 mmol/L      Calcium 9.8 mg/dL      Total Protein 7.2 g/dL      Albumin 4.4 g/dL      ALT (SGPT) 12 U/L      AST (SGOT) 17 U/L      Comment: Slight hemolysis detected by analyzer. Result may be falsely elevated.        Alkaline Phosphatase 80 U/L      Total Bilirubin 0.2 mg/dL      Globulin 2.8 gm/dL      A/G Ratio 1.6 g/dL      BUN/Creatinine Ratio 14.1     Anion Gap 14.0 mmol/L      eGFR 124.1 mL/min/1.73     Narrative:      GFR Categories in Chronic Kidney Disease (CKD)              GFR Category          GFR (mL/min/1.73)    Interpretation  G1                    90 or greater        Normal or high (1)  G2                    60-89                Mild decrease (1)  G3a                   45-59                Mild to moderate decrease  G3b                   30-44                Moderate to severe decrease  G4                    15-29                Severe decrease  G5                    14 or less           Kidney failure    (1)In the absence of evidence of kidney disease, neither GFR category G1 or G2 fulfill the criteria for CKD.    eGFR calculation 2021 CKD-EPI creatinine equation, which does not include race as a factor    Manual Differential [[id number]]  (Abnormal) Collected: 06/01/25 0252    Specimen: Blood Updated: 06/01/25 0327     Neutrophil % 52.0 %      Lymphocyte % 44.0 %      Monocyte % 3.0 %      Eosinophil % 0.0 %      Basophil % 0.0 %      Atypical Lymphocyte % 1.0 %      Neutrophils Absolute 4.69 10*3/mm3      Lymphocytes Absolute 4.06 10*3/mm3       Monocytes Absolute 0.27 10*3/mm3      Eosinophils Absolute 0.00 10*3/mm3      Basophils Absolute 0.00 10*3/mm3      Anisocytosis Mod/2+     Ovalocytes Slight/1+     Poikilocytes Mod/2+     Polychromasia Slight/1+     Stomatocytes Slight/1+     WBC Morphology Normal     Platelet Morphology Normal    hCG, Serum, Qualitative [[id number]]  (Normal) Collected: 06/01/25 0252    Specimen: Blood Updated: 06/01/25 0321     HCG Qualitative Negative    Urinalysis, Microscopic Only - Urine, Clean Catch [[id number]]  (Abnormal) Collected: 06/01/25 0252    Specimen: Urine, Clean Catch Updated: 06/01/25 0321     RBC, UA 0-2 /HPF      WBC, UA 3-5 /HPF      Comment: Urine culture not indicated.        Bacteria, UA 2+ /HPF      Squamous Epithelial Cells, UA 7-12 /HPF      Hyaline Casts, UA None Seen /LPF      Methodology Manual Light Microscopy    Lipase [[id number]]  (Normal) Collected: 06/01/25 0252    Specimen: Blood Updated: 06/01/25 0320     Lipase 17 U/L     Urinalysis With Culture If Indicated - Urine, Clean Catch [[id number]]  (Abnormal) Collected: 06/01/25 0252    Specimen: Urine, Clean Catch Updated: 06/01/25 0310     Color, UA Yellow     Appearance, UA Clear     pH, UA 7.0     Specific Gravity, UA 1.011     Glucose, UA Negative     Ketones, UA Negative     Bilirubin, UA Negative     Blood, UA Negative     Protein, UA Negative     Leuk Esterase, UA Small (1+)     Nitrite, UA Negative     Urobilinogen, UA 0.2 E.U./dL    Narrative:      In absence of clinical symptoms, the presence of pyuria, bacteria, and/or nitrites on the urinalysis result does not correlate with infection.    CBC & Differential [[id number]]  (Abnormal) Collected: 06/01/25 0252    Specimen: Blood Updated: 06/01/25 0304    Narrative:      The following orders were created for panel order CBC & Differential.  Procedure                               Abnormality         Status                     ---------                               -----------         ------                      CBC Auto Differential[[id number]]        Abnormal            Final result                 Please view results for these tests on the individual orders.    CBC Auto Differential [[id number]]  (Abnormal) Collected: 06/01/25 0252    Specimen: Blood Updated: 06/01/25 0305     WBC 9.02 10*3/mm3      RBC 5.02 10*6/mm3      Hemoglobin 12.5 g/dL      Hematocrit 39.9 %      MCV 79.5 fL      MCH 24.9 pg      MCHC 31.3 g/dL      RDW --     Comment: Unable to calculate        RDW-SD --     Comment: Unable to calculate        MPV --     Comment: Unable to calculate        Platelets 183 10*3/mm3           Imaging Results (Last 72 Hours)       Procedure Component Value Units Date/Time    XR Abdomen KUB [[id number]] Collected: 06/04/25 0723     Updated: 06/04/25 0727    Narrative:      EXAM: XR ABDOMEN KUB-      DATE: 6/4/2025 5:54 AM     HISTORY: Eval foreign object; T18.9XXA-Foreign body of alimentary tract,  part unspecified, initial encounter; T18.9XXA-Foreign body of alimentary  tract, part unspecified, initial encounter       COMPARISON: 6/3/2025.     TECHNIQUE:   Frontal view of the abdomen. 1 image.     FINDINGS:    Cylindrical shaped metallic foreign body now projects over the left mid  abdomen may be in the descending colon. There are clips in the right  upper quadrant from cholecystectomy. Bowel gas pattern is nonspecific  but nonobstructive.          Impression:         1. Metallic foreign body projects at the left mid abdomen may be in the  descending colon.     This report was signed and finalized on 6/4/2025 7:24 AM by Collins Lam.       XR Abdomen KUB [[id number]] Collected: 06/03/25 1207     Updated: 06/03/25 1212    Narrative:      EXAM: XR ABDOMEN KUB-      DATE: 6/3/2025 9:57 AM     HISTORY: Tracking magnet; T18.9XXA-Foreign body of alimentary tract,  part unspecified, initial encounter; T18.9XXA-Foreign body of alimentary  tract, part unspecified, initial encounter       COMPARISON:  6/2/2025.     TECHNIQUE:   Frontal view of the abdomen. 1 image.     FINDINGS:    Cylinder shaped 2.3 cm metallic foreign body projects at the right mid  abdomen. Bowel gas pattern is nonobstructive. There are clips in the  right upper quadrant from cholecystectomy.          Impression:         1. Metallic foreign body projects at the right mid abdomen.     This report was signed and finalized on 6/3/2025 12:09 PM by Collins Lam.       XR Abdomen Flat & Upright [[id number]] Collected: 06/02/25 1131     Updated: 06/02/25 1139    Narrative:      XR ABDOMEN FLAT AND UPRIGHT-      HISTORY: F B; T18.9XXA-Foreign body of alimentary tract, part  unspecified, initial encounter; T18.9XXA-Foreign body of alimentary  tract, part unspecified, initial encounter     COMPARISON: Radiographs dated 6/1/2025     FINDINGS:     There is a nonobstructive bowel gas pattern. Reidentified 2.3 cm  metallic density foreign body now projecting in the right lower quadrant  superimposed over the large bowel, either sigmoid colon or cecum. No  free air. Cholecystectomy clips. Bony elements are unremarkable.       Impression:         1.  2.3 cm metallic density foreign body has continued to progress and  is now superimposed over the large bowel, either cecum or sigmoid colon.  2.  Bowel gas pattern is nonobstructive. No free air.        This report was signed and finalized on 6/2/2025 11:36 AM by Dr Brain George.       XR Abdomen Flat & Upright [[id number]] Collected: 06/01/25 1650     Updated: 06/01/25 1654    Narrative:      EXAMINATION: XR ABDOMEN FLAT AND UPRIGHT-  6/1/2025 4:50 PM     HISTORY: Foreign body.     FINDINGS: KUB radiograph demonstrates the rectangular foreign bodies now  project over the periumbilical region. Is difficult to ascertain whether  this is within the small bowel or colon. I would favor small bowel. No  obstruction or free air.       Impression:      Radiopaque foreign body projects over the mid abdomen that is  likely  within the more distal small bowel.     This report was signed and finalized on 6/1/2025 4:51 PM by Dr. Star Ruelas MD.                  ASSESSMENT/PLAN     Active Hospital Problems    Diagnosis     **Foreign body in digestive system     Foreign body alimentary tract     Psychogenic nonepileptic seizure     Foreign body ingestion     Pseudoseizures        PLAN:   Pending abdominal x-ray this morning.  Last x-ray completed yesterday showed magnets at the mid abdomen.  Planning discharge once magnets have passed.    Continue Miralax and regular diet.     Update: AXR showed magnets in the left mid abdomen and noted it may be in the descending colon. Adding 1/2 bottle of Miralax today to promote motility.     Discussed findings and treatment plan including risks, benefits, and treatment options with patient in detail. Patient agreed with treatment plan.      This document has been electronically signed by TELMA Rogers on 6/4/2025 10:34 CDT

## 2025-06-05 ENCOUNTER — APPOINTMENT (OUTPATIENT)
Dept: GENERAL RADIOLOGY | Facility: HOSPITAL | Age: 30
End: 2025-06-05
Payer: MEDICAID

## 2025-06-05 VITALS
HEIGHT: 65 IN | DIASTOLIC BLOOD PRESSURE: 61 MMHG | SYSTOLIC BLOOD PRESSURE: 116 MMHG | BODY MASS INDEX: 35.49 KG/M2 | OXYGEN SATURATION: 97 % | HEART RATE: 86 BPM | TEMPERATURE: 98.4 F | RESPIRATION RATE: 16 BRPM | WEIGHT: 213 LBS

## 2025-06-05 LAB
ALBUMIN SERPL-MCNC: 4.1 G/DL (ref 3.5–5.2)
ALBUMIN/GLOB SERPL: 1.6 G/DL
ALP SERPL-CCNC: 70 U/L (ref 39–117)
ALT SERPL W P-5'-P-CCNC: 8 U/L (ref 1–33)
ANION GAP SERPL CALCULATED.3IONS-SCNC: 11 MMOL/L (ref 5–15)
ANISOCYTOSIS BLD QL: ABNORMAL
AST SERPL-CCNC: 14 U/L (ref 1–32)
BASOPHILS # BLD MANUAL: 0.09 10*3/MM3 (ref 0–0.2)
BASOPHILS NFR BLD MANUAL: 1 % (ref 0–1.5)
BILIRUB SERPL-MCNC: <0.2 MG/DL (ref 0–1.2)
BUN SERPL-MCNC: 6.8 MG/DL (ref 6–20)
BUN/CREAT SERPL: 10.3 (ref 7–25)
CALCIUM SPEC-SCNC: 9.4 MG/DL (ref 8.6–10.5)
CHLORIDE SERPL-SCNC: 106 MMOL/L (ref 98–107)
CO2 SERPL-SCNC: 20 MMOL/L (ref 22–29)
CREAT SERPL-MCNC: 0.66 MG/DL (ref 0.57–1)
DEPRECATED RDW RBC AUTO: ABNORMAL FL
EGFRCR SERPLBLD CKD-EPI 2021: 122.7 ML/MIN/1.73
EOSINOPHIL # BLD MANUAL: 0 10*3/MM3 (ref 0–0.4)
EOSINOPHIL NFR BLD MANUAL: 0 % (ref 0.3–6.2)
ERYTHROCYTE [DISTWIDTH] IN BLOOD BY AUTOMATED COUNT: ABNORMAL %
GLOBULIN UR ELPH-MCNC: 2.6 GM/DL
GLUCOSE SERPL-MCNC: 97 MG/DL (ref 65–99)
HCT VFR BLD AUTO: 38.9 % (ref 34–46.6)
HGB BLD-MCNC: 12.7 G/DL (ref 12–15.9)
LYMPHOCYTES # BLD MANUAL: 3.68 10*3/MM3 (ref 0.7–3.1)
LYMPHOCYTES NFR BLD MANUAL: 5 % (ref 5–12)
MCH RBC QN AUTO: 25.9 PG (ref 26.6–33)
MCHC RBC AUTO-ENTMCNC: 32.6 G/DL (ref 31.5–35.7)
MCV RBC AUTO: 79.2 FL (ref 79–97)
MICROCYTES BLD QL: ABNORMAL
MONOCYTES # BLD: 0.45 10*3/MM3 (ref 0.1–0.9)
NEUTROPHILS # BLD AUTO: 4.75 10*3/MM3 (ref 1.7–7)
NEUTROPHILS NFR BLD MANUAL: 53 % (ref 42.7–76)
PLAT MORPH BLD: NORMAL
PLATELET # BLD AUTO: 183 10*3/MM3 (ref 140–450)
PMV BLD AUTO: ABNORMAL FL
POTASSIUM SERPL-SCNC: 4.5 MMOL/L (ref 3.5–5.2)
PROT SERPL-MCNC: 6.7 G/DL (ref 6–8.5)
RBC # BLD AUTO: 4.91 10*6/MM3 (ref 3.77–5.28)
SODIUM SERPL-SCNC: 137 MMOL/L (ref 136–145)
VARIANT LYMPHS NFR BLD MANUAL: 3 % (ref 0–5)
VARIANT LYMPHS NFR BLD MANUAL: 38 % (ref 19.6–45.3)
WBC MORPH BLD: NORMAL
WBC NRBC COR # BLD AUTO: 8.97 10*3/MM3 (ref 3.4–10.8)

## 2025-06-05 PROCEDURE — 99238 HOSP IP/OBS DSCHRG MGMT 30/<: CPT | Performed by: NURSE PRACTITIONER

## 2025-06-05 PROCEDURE — 85025 COMPLETE CBC W/AUTO DIFF WBC: CPT | Performed by: INTERNAL MEDICINE

## 2025-06-05 PROCEDURE — 74018 RADEX ABDOMEN 1 VIEW: CPT

## 2025-06-05 PROCEDURE — G0378 HOSPITAL OBSERVATION PER HR: HCPCS

## 2025-06-05 PROCEDURE — 80053 COMPREHEN METABOLIC PANEL: CPT | Performed by: INTERNAL MEDICINE

## 2025-06-05 PROCEDURE — 85007 BL SMEAR W/DIFF WBC COUNT: CPT | Performed by: INTERNAL MEDICINE

## 2025-06-05 RX ADMIN — PANTOPRAZOLE SODIUM 40 MG: 40 TABLET, DELAYED RELEASE ORAL at 06:29

## 2025-06-05 RX ADMIN — FLUOXETINE HYDROCHLORIDE 20 MG: 20 CAPSULE ORAL at 09:20

## 2025-06-05 RX ADMIN — QUETIAPINE FUMARATE 100 MG: 25 TABLET ORAL at 06:29

## 2025-06-05 RX ADMIN — Medication 10 ML: at 09:20

## 2025-06-05 RX ADMIN — LAMOTRIGINE 300 MG: 100 TABLET ORAL at 09:19

## 2025-06-05 RX ADMIN — DOCUSATE SODIUM 100 MG: 100 CAPSULE, LIQUID FILLED ORAL at 09:20

## 2025-06-05 RX ADMIN — FLUOXETINE HYDROCHLORIDE 40 MG: 20 CAPSULE ORAL at 09:20

## 2025-06-05 NOTE — DISCHARGE SUMMARY
Consults       Date and Time Order Name Status Description    6/1/2025 12:02 PM Inpatient Neurology Consult Other (see comments) Completed     6/1/2025 11:21 AM Inpatient Hospitalist Consult      5/19/2025  7:20 PM Inpatient Neurology Consult General Completed            General Surgery - Discharge Summary    Date of Discharge:  6/5/2025    Discharge Diagnosis: Foreign body in digestive system    Presenting Problem/History of Present Illness  Foreign body in digestive system [T18.9XXA]  Foreign body alimentary tract [T18.9XXA]     Hospital Course  Patient is a 29 y.o. female presented with reports of ingesting multiple magnets on 6/1.  Patient has history of nonverbal autism, seizure disorder, pica with need to ingest batteries and/or magnets.  Ingestion was not directly witnessed.  Patient reports it was more than 1 magnet but she is not sure how many.  Serial abdominal x-rays were completed showing multiple magnets as 1 unit moving through digestive system.  Bowel regimen was used and patient expelled magnets confirmed by x-ray this morning.  Patient is ready for discharge home.    Procedures Performed  Procedure(s):  ESOPHAGOGASTRODUODENOSCOPY WITH ANESTHESIA       Consults:   Consults       Date and Time Order Name Status Description    6/1/2025 12:02 PM Inpatient Neurology Consult Other (see comments) Completed     6/1/2025 11:21 AM Inpatient Hospitalist Consult      5/19/2025  7:20 PM Inpatient Neurology Consult General Completed             Condition on Discharge: Stable from discharge    Vital Signs  Temp:  [97.5 °F (36.4 °C)-98.4 °F (36.9 °C)] 98.4 °F (36.9 °C)  Heart Rate:  [] 86  Resp:  [16] 16  BP: ()/(56-81) 116/61    Physical Exam:  Physical Exam  Vitals and nursing note reviewed.   Constitutional:       General: She is awake.      Appearance: Normal appearance. She is obese.      Comments: Body mass index is 35.45 kg/m².    HENT:      Head: Normocephalic and atraumatic.   Eyes:       General: Lids are normal. Gaze aligned appropriately.   Cardiovascular:      Rate and Rhythm: Normal rate and regular rhythm.   Pulmonary:      Effort: Pulmonary effort is normal. No respiratory distress.   Abdominal:      Palpations: Abdomen is soft.      Tenderness: There is no abdominal tenderness.   Musculoskeletal:      Cervical back: Normal range of motion and neck supple.   Skin:     General: Skin is warm and dry.   Neurological:      Mental Status: She is alert and oriented to person, place, and time.   Psychiatric:         Mood and Affect: Affect is flat.         Speech: She is noncommunicative (Nonverbal-communicates through gestures or typing on phone.).         Behavior: Behavior is cooperative.           Discharge Disposition  Home or Self Care    Discharge Medications     Discharge Medications        Continue These Medications        Instructions Start Date   acetaminophen 500 MG tablet  Commonly known as: TYLENOL   500 mg, Daily PRN      doxazosin 1 MG tablet  Commonly known as: CARDURA   1 mg, Nightly      doxazosin 2 MG tablet  Commonly known as: CARDURA   2 mg, Nightly      FLUoxetine 20 MG capsule  Commonly known as: PROzac   20 mg, Daily      FLUoxetine 40 MG capsule  Commonly known as: PROzac   40 mg, Daily      hydrOXYzine 25 MG tablet  Commonly known as: ATARAX   25 mg, 3 Times Daily PRN      LaMICtal 150 MG tablet  Generic drug: lamoTRIgine   300 mg, 2 Times Daily      Nayzilam 5 MG/0.1ML solution  Generic drug: Midazolam   5 mg, Daily PRN      omeprazole 20 MG capsule  Commonly known as: priLOSEC   20 mg, Daily PRN      QUEtiapine 25 MG tablet  Commonly known as: SEROquel   25 mg, Daily PRN      QUEtiapine 100 MG tablet  Commonly known as: SEROquel   100 mg, Every Morning      QUEtiapine 200 MG tablet  Commonly known as: SEROquel   200 mg, Nightly      senna 8.6 MG tablet  Commonly known as: SENOKOT   2 tablets, Daily PRN               Discharge Diet: Regular diet    Activity at Discharge:  May return to regular activity    Follow-up Appointments  No future appointments.  Follow-up with general surgery as needed.    Test Results Pending at Discharge      Electronically signed by TELMA Rogers, 06/05/25, 9:23 AM CDT.

## 2025-06-05 NOTE — PROGRESS NOTES
"    [facility] Medicine Services  INPATIENT PROGRESS NOTE    Patient Name: Duane Cooley  Date of Admission: 6/1/2025  Today's Date: 06/05/25  Length of Stay: 0  Primary Care Physician: Provider, No Known    Subjective   Chief Complaint: f/u   HPI    Hospitalist  service consulted to help manage seizure disorder.  Patient was admitted to general surgery: Ingestion of foreign object.    GI on June 1 had seen patient with impression of foreign body gastric lumen.  Plan was EGD and Dianelys for removal.          Neurology has been consulted on June 1 for \"spell\"    General Surgery note reviewed.    Update: AXR showed magnets in the left mid abdomen and noted it may be in the descending colon. Adding 1/2 bottle of Miralax today to promote motility.       Labs reviewed  Hemodynamically stable.    June 5.   No new event  No new complaints  Lab reviewed as outlined below.  Follow-up abdominal x-ray showed metallic foreign body has passed.    Review of Systems   All pertinent negatives and positives are as above. All other systems have been reviewed and are negative unless otherwise stated.     Objective    Temp:  [97.5 °F (36.4 °C)-98.4 °F (36.9 °C)] 98.4 °F (36.9 °C)  Heart Rate:  [] 86  Resp:  [16] 16  BP: ()/(56-81) 116/61  Physical Exam  Stable   GEN: Awake, alert, interactive, in NAD  HEENT: Atraumatic, PERRLA, EOMI, Anicteric, Trachea midline  Lungs: Normal respiratory effort  Extremities: atraumatic, no cyanosis, no edema  Skin: no rashes or lesions  Neuro: Awake alert and appears to be not having any gross focal deficit  Psych: normal mood & affect  Not in any apparent distress  Big dog on her bed.  Accompanied by another person    Results Review:  I have reviewed the labs, radiology results, and diagnostic studies.    Laboratory Data:   Results from last 7 days   Lab Units 06/05/25  0437 06/04/25  0506 06/02/25  0633   WBC 10*3/mm3 8.97 9.33 8.68   HEMOGLOBIN g/dL 12.7 " "12.3 12.1   HEMATOCRIT % 38.9 37.5 37.9   PLATELETS 10*3/mm3 183 172 164        Results from last 7 days   Lab Units 06/05/25  0437 06/04/25  0506 06/02/25  0633 06/01/25  0252   SODIUM mmol/L 137 138 138 139   POTASSIUM mmol/L 4.5 3.7 3.6 4.3   CHLORIDE mmol/L 106 104 104 103   CO2 mmol/L 20.0* 20.0* 19.0* 22.0   BUN mg/dL 6.8 7.3 6.6 8.9   CREATININE mg/dL 0.66 0.75 0.61 0.63   CALCIUM mg/dL 9.4 9.3 9.0 9.8   BILIRUBIN mg/dL <0.2 0.2  --  0.2   ALK PHOS U/L 70 68  --  80   ALT (SGPT) U/L 8 8  --  12   AST (SGOT) U/L 14 12  --  17   GLUCOSE mg/dL 97 85 87 101*       Culture Data:   No results found for: \"BLOODCX\", \"URINECX\", \"WOUNDCX\", \"MRSACX\", \"RESPCX\", \"STOOLCX\"    Radiology Data:   Imaging Results (Last 24 Hours)       Procedure Component Value Units Date/Time    XR Abdomen KUB [[id number]] Collected: 06/05/25 0753     Updated: 06/05/25 0806    Narrative:      EXAM: XR ABDOMEN KUB-      DATE: 6/5/2025 6:35 AM     HISTORY: Evaluate migration of foreign object; T18.9XXA-Foreign body of  alimentary tract, part unspecified, initial encounter; T18.9XXA-Foreign  body of alimentary tract, part unspecified, initial encounter       COMPARISON: 6/4/2025.     TECHNIQUE:   Frontal views of the abdomen. 2 images.     FINDINGS:    The previously noted metallic foreign body is no longer identified.  Bowel gas pattern is nonspecific but nonobstructive. There is mild stool  in the right colon. Surgical clips in the right upper quadrant noted  from cholecystectomy.          Impression:         1. Metallic foreign body has passed.     This report was signed and finalized on 6/5/2025 8:03 AM by Collins Lam.       IMAGING SCANNED [[id number]] Resulted: 06/01/25     Updated: 06/05/25 0051            I have reviewed the patient's current medications.     Assessment/Plan   Assessment  Medical Decision Making  Number and Complexity of problems:   Active Hospital Problems    Diagnosis     **Foreign body in digestive system     " Foreign body alimentary tract     Psychogenic nonepileptic seizure     Foreign body ingestion     Pseudoseizures                      Treatment Plan  Management of ingested magnet as per general surgery.  Follow-up imaging study today reported to have metallic foreign body has passed.      I reviewed neurology recommendation and as per their report she was recently evaluated and diagnosed with idiopathic generalized epilepsy as well as functional neurological disorder.  Service recommended to seek evaluation at the facility where she was diagnosed with epilepsy as they can facilitate continued the of care.      As to medication, according to neurology interaction with patient the patient expressed her desire to continue her current seizure medication     Will follow along with primary service    No additional recommendation.  Okay to go home from my standpoint with follow-up to primary care provider and primary neurologist.    DVT prophylaxis with SCDs     CODE STATUS is full code     docusate sodium, 100 mg, Oral, BID  FLUoxetine, 20 mg, Oral, Daily  FLUoxetine, 40 mg, Oral, Daily  lamoTRIgine, 300 mg, Oral, BID  pantoprazole, 40 mg, Oral, Q AM  QUEtiapine, 100 mg, Oral, QAM  QUEtiapine, 200 mg, Oral, Nightly  sodium chloride, 10 mL, Intravenous, Q12H  terazosin, 2 mg, Oral, Nightly        Medical Decision Making  Number and Complexity of problems: 3  Differential Diagnosis: None     Conditions and Status        Condition is unchanged.     [facility] Data  External documents reviewed: None  Cardiac tracing (EKG, telemetry) interpretation: None  Radiology interpretation: Reviewed radiologist report   labs reviewed: y  Any tests that were considered but not ordered: None     Decision rules/scores evaluated (example [id number], Wells, etc): None     Discussed with: Patient     Care Planning  Shared decision making: Patient  Code status and discussions: CODE STATUS is full code     Disposition  Social Determinants of Health  that impact treatment or disposition: None  I expect the patient to be discharged to home when appropriate from primary service.         Electronically signed by Nick Hung MD, 06/05/25, 08:32 CDT.

## 2025-06-05 NOTE — PLAN OF CARE
Goal Outcome Evaluation:            Seizure like symptoms lasting about 2 1/2 minutes. Patient recovered well. Per patient, continues to have small liquid stools. KUB ordered for this morning. Safety maintained. No falls or injuries this shift. Call ligth within reach. Family at bedside.

## 2025-07-09 ENCOUNTER — HOSPITAL ENCOUNTER (OUTPATIENT)
Facility: HOSPITAL | Age: 30
Setting detail: OBSERVATION
Discharge: HOME | End: 2025-07-11
Attending: FAMILY MEDICINE | Admitting: INTERNAL MEDICINE
Payer: MEDICAID

## 2025-07-09 ENCOUNTER — APPOINTMENT (OUTPATIENT)
Dept: GENERAL RADIOLOGY | Facility: HOSPITAL | Age: 30
End: 2025-07-09
Payer: MEDICAID

## 2025-07-09 ENCOUNTER — APPOINTMENT (OUTPATIENT)
Dept: CT IMAGING | Facility: HOSPITAL | Age: 30
End: 2025-07-09
Payer: MEDICAID

## 2025-07-09 DIAGNOSIS — J96.01 ACUTE RESPIRATORY FAILURE WITH HYPOXIA: ICD-10-CM

## 2025-07-09 DIAGNOSIS — T18.9XXA SWALLOWED FOREIGN BODY, INITIAL ENCOUNTER: ICD-10-CM

## 2025-07-09 DIAGNOSIS — F44.9 CONVERSION DISORDER: ICD-10-CM

## 2025-07-09 DIAGNOSIS — T18.9XXD SWALLOWED FOREIGN BODY, SUBSEQUENT ENCOUNTER: ICD-10-CM

## 2025-07-09 DIAGNOSIS — R56.9 SEIZURE-LIKE ACTIVITY: Primary | ICD-10-CM

## 2025-07-09 PROBLEM — J96.91 RESPIRATORY FAILURE WITH HYPOXIA: Status: ACTIVE | Noted: 2025-07-09

## 2025-07-09 LAB
ALBUMIN SERPL-MCNC: 4.3 G/DL (ref 3.5–5.2)
ALBUMIN/GLOB SERPL: 1.3 G/DL
ALP SERPL-CCNC: 78 U/L (ref 39–117)
ALT SERPL W P-5'-P-CCNC: 11 U/L (ref 1–33)
ANION GAP SERPL CALCULATED.3IONS-SCNC: 16 MMOL/L (ref 5–15)
APTT PPP: 21.3 SECONDS (ref 24.5–36)
AST SERPL-CCNC: 15 U/L (ref 1–32)
BASOPHILS # BLD AUTO: 0.05 10*3/MM3 (ref 0–0.2)
BASOPHILS # BLD AUTO: 0.05 10*3/MM3 (ref 0–0.2)
BASOPHILS NFR BLD AUTO: 0.4 % (ref 0–1.5)
BASOPHILS NFR BLD AUTO: 0.5 % (ref 0–1.5)
BILIRUB SERPL-MCNC: 0.2 MG/DL (ref 0–1.2)
BILIRUB UR QL STRIP: NEGATIVE
BUN SERPL-MCNC: 8.5 MG/DL (ref 6–20)
BUN/CREAT SERPL: 13.3 (ref 7–25)
CALCIUM SPEC-SCNC: 9.7 MG/DL (ref 8.6–10.5)
CHLORIDE SERPL-SCNC: 102 MMOL/L (ref 98–107)
CK SERPL-CCNC: 60 U/L (ref 20–180)
CLARITY UR: CLEAR
CO2 SERPL-SCNC: 20 MMOL/L (ref 22–29)
COLOR UR: YELLOW
CREAT SERPL-MCNC: 0.64 MG/DL (ref 0.57–1)
DEPRECATED RDW RBC AUTO: 47.1 FL (ref 37–54)
DEPRECATED RDW RBC AUTO: 48.5 FL (ref 37–54)
EGFRCR SERPLBLD CKD-EPI 2021: 123.6 ML/MIN/1.73
EOSINOPHIL # BLD AUTO: 0.01 10*3/MM3 (ref 0–0.4)
EOSINOPHIL # BLD AUTO: 0.02 10*3/MM3 (ref 0–0.4)
EOSINOPHIL NFR BLD AUTO: 0.1 % (ref 0.3–6.2)
EOSINOPHIL NFR BLD AUTO: 0.2 % (ref 0.3–6.2)
ERYTHROCYTE [DISTWIDTH] IN BLOOD BY AUTOMATED COUNT: 18.9 % (ref 12.3–15.4)
ERYTHROCYTE [DISTWIDTH] IN BLOOD BY AUTOMATED COUNT: 19.1 % (ref 12.3–15.4)
GLOBULIN UR ELPH-MCNC: 3.2 GM/DL
GLUCOSE SERPL-MCNC: 103 MG/DL (ref 65–99)
GLUCOSE UR STRIP-MCNC: NEGATIVE MG/DL
HCT VFR BLD AUTO: 40.1 % (ref 34–46.6)
HCT VFR BLD AUTO: 42 % (ref 34–46.6)
HGB BLD-MCNC: 13.6 G/DL (ref 12–15.9)
HGB BLD-MCNC: 13.8 G/DL (ref 12–15.9)
HGB UR QL STRIP.AUTO: NEGATIVE
IMM GRANULOCYTES # BLD AUTO: 0.05 10*3/MM3 (ref 0–0.05)
IMM GRANULOCYTES # BLD AUTO: 0.06 10*3/MM3 (ref 0–0.05)
IMM GRANULOCYTES NFR BLD AUTO: 0.4 % (ref 0–0.5)
IMM GRANULOCYTES NFR BLD AUTO: 0.5 % (ref 0–0.5)
INR PPP: 1.03 (ref 0.91–1.09)
KETONES UR QL STRIP: NEGATIVE
LEUKOCYTE ESTERASE UR QL STRIP.AUTO: NEGATIVE
LYMPHOCYTES # BLD AUTO: 1.84 10*3/MM3 (ref 0.7–3.1)
LYMPHOCYTES # BLD AUTO: 1.93 10*3/MM3 (ref 0.7–3.1)
LYMPHOCYTES NFR BLD AUTO: 13.2 % (ref 19.6–45.3)
LYMPHOCYTES NFR BLD AUTO: 19.2 % (ref 19.6–45.3)
MAGNESIUM SERPL-MCNC: 1.8 MG/DL (ref 1.6–2.6)
MCH RBC QN AUTO: 28 PG (ref 26.6–33)
MCH RBC QN AUTO: 28.2 PG (ref 26.6–33)
MCHC RBC AUTO-ENTMCNC: 32.9 G/DL (ref 31.5–35.7)
MCHC RBC AUTO-ENTMCNC: 33.9 G/DL (ref 31.5–35.7)
MCV RBC AUTO: 82.5 FL (ref 79–97)
MCV RBC AUTO: 85.7 FL (ref 79–97)
MONOCYTES # BLD AUTO: 0.88 10*3/MM3 (ref 0.1–0.9)
MONOCYTES # BLD AUTO: 0.96 10*3/MM3 (ref 0.1–0.9)
MONOCYTES NFR BLD AUTO: 6.9 % (ref 5–12)
MONOCYTES NFR BLD AUTO: 8.8 % (ref 5–12)
NEUTROPHILS NFR BLD AUTO: 11.05 10*3/MM3 (ref 1.7–7)
NEUTROPHILS NFR BLD AUTO: 7.11 10*3/MM3 (ref 1.7–7)
NEUTROPHILS NFR BLD AUTO: 70.8 % (ref 42.7–76)
NEUTROPHILS NFR BLD AUTO: 79 % (ref 42.7–76)
NITRITE UR QL STRIP: NEGATIVE
PH UR STRIP.AUTO: 7.5 [PH] (ref 5–8)
PLATELET # BLD AUTO: 183 10*3/MM3 (ref 140–450)
PLATELET # BLD AUTO: 217 10*3/MM3 (ref 140–450)
PMV BLD AUTO: 10.1 FL (ref 6–12)
PMV BLD AUTO: 11 FL (ref 6–12)
POTASSIUM SERPL-SCNC: 4 MMOL/L (ref 3.5–5.2)
PROT SERPL-MCNC: 7.5 G/DL (ref 6–8.5)
PROT UR QL STRIP: NEGATIVE
PROTHROMBIN TIME: 14.1 SECONDS (ref 11.8–14.8)
RBC # BLD AUTO: 4.86 10*6/MM3 (ref 3.77–5.28)
RBC # BLD AUTO: 4.9 10*6/MM3 (ref 3.77–5.28)
SODIUM SERPL-SCNC: 138 MMOL/L (ref 136–145)
SP GR UR STRIP: 1.02 (ref 1–1.03)
TSH SERPL DL<=0.05 MIU/L-ACNC: 1.76 UIU/ML (ref 0.27–4.2)
UROBILINOGEN UR QL STRIP: NORMAL
WBC NRBC COR # BLD AUTO: 10.04 10*3/MM3 (ref 3.4–10.8)
WBC NRBC COR # BLD AUTO: 13.97 10*3/MM3 (ref 3.4–10.8)

## 2025-07-09 PROCEDURE — 71045 X-RAY EXAM CHEST 1 VIEW: CPT

## 2025-07-09 PROCEDURE — 85025 COMPLETE CBC W/AUTO DIFF WBC: CPT | Performed by: FAMILY MEDICINE

## 2025-07-09 PROCEDURE — 36415 COLL VENOUS BLD VENIPUNCTURE: CPT

## 2025-07-09 PROCEDURE — G0378 HOSPITAL OBSERVATION PER HR: HCPCS

## 2025-07-09 PROCEDURE — 25010000002 LEVETRIRACETAM PER 10 MG: Performed by: FAMILY MEDICINE

## 2025-07-09 PROCEDURE — 51702 INSERT TEMP BLADDER CATH: CPT

## 2025-07-09 PROCEDURE — 84443 ASSAY THYROID STIM HORMONE: CPT | Performed by: FAMILY MEDICINE

## 2025-07-09 PROCEDURE — 96372 THER/PROPH/DIAG INJ SC/IM: CPT

## 2025-07-09 PROCEDURE — 25010000002 MIDAZOLAM 50 MG/10ML SOLUTION 10 ML VIAL: Performed by: FAMILY MEDICINE

## 2025-07-09 PROCEDURE — 81003 URINALYSIS AUTO W/O SCOPE: CPT | Performed by: FAMILY MEDICINE

## 2025-07-09 PROCEDURE — 99285 EMERGENCY DEPT VISIT HI MDM: CPT | Performed by: FAMILY MEDICINE

## 2025-07-09 PROCEDURE — 85025 COMPLETE CBC W/AUTO DIFF WBC: CPT | Performed by: NURSE PRACTITIONER

## 2025-07-09 PROCEDURE — 74018 RADEX ABDOMEN 1 VIEW: CPT

## 2025-07-09 PROCEDURE — 80053 COMPREHEN METABOLIC PANEL: CPT | Performed by: FAMILY MEDICINE

## 2025-07-09 PROCEDURE — 96365 THER/PROPH/DIAG IV INF INIT: CPT

## 2025-07-09 PROCEDURE — 25010000002 ENOXAPARIN PER 10 MG: Performed by: NURSE PRACTITIONER

## 2025-07-09 PROCEDURE — 94799 UNLISTED PULMONARY SVC/PX: CPT

## 2025-07-09 PROCEDURE — 70450 CT HEAD/BRAIN W/O DYE: CPT

## 2025-07-09 PROCEDURE — 94002 VENT MGMT INPAT INIT DAY: CPT

## 2025-07-09 PROCEDURE — 25010000002 DIAZEPAM PER 5 MG

## 2025-07-09 PROCEDURE — 31500 INSERT EMERGENCY AIRWAY: CPT

## 2025-07-09 PROCEDURE — 25010000002 FOSPHENYTOIN 500 MG PE/10ML SOLUTION 10 ML VIAL: Performed by: FAMILY MEDICINE

## 2025-07-09 PROCEDURE — 96361 HYDRATE IV INFUSION ADD-ON: CPT

## 2025-07-09 PROCEDURE — 83735 ASSAY OF MAGNESIUM: CPT | Performed by: FAMILY MEDICINE

## 2025-07-09 PROCEDURE — 96375 TX/PRO/DX INJ NEW DRUG ADDON: CPT

## 2025-07-09 PROCEDURE — 85730 THROMBOPLASTIN TIME PARTIAL: CPT | Performed by: FAMILY MEDICINE

## 2025-07-09 PROCEDURE — 25010000002 DIAZEPAM PER 5 MG: Performed by: FAMILY MEDICINE

## 2025-07-09 PROCEDURE — 85610 PROTHROMBIN TIME: CPT | Performed by: FAMILY MEDICINE

## 2025-07-09 PROCEDURE — 25810000003 SODIUM CHLORIDE 0.9 % SOLUTION: Performed by: NURSE PRACTITIONER

## 2025-07-09 PROCEDURE — 82550 ASSAY OF CK (CPK): CPT | Performed by: FAMILY MEDICINE

## 2025-07-09 RX ORDER — SODIUM CHLORIDE 0.9 % (FLUSH) 0.9 %
10 SYRINGE (ML) INJECTION EVERY 12 HOURS SCHEDULED
Status: DISCONTINUED | OUTPATIENT
Start: 2025-07-09 | End: 2025-07-11 | Stop reason: HOSPADM

## 2025-07-09 RX ORDER — DIAZEPAM 10 MG/2ML
5 INJECTION, SOLUTION INTRAMUSCULAR; INTRAVENOUS ONCE
Status: COMPLETED | OUTPATIENT
Start: 2025-07-09 | End: 2025-07-09

## 2025-07-09 RX ORDER — QUETIAPINE FUMARATE 100 MG/1
100 TABLET, FILM COATED ORAL EVERY MORNING
Status: DISCONTINUED | OUTPATIENT
Start: 2025-07-09 | End: 2025-07-11 | Stop reason: HOSPADM

## 2025-07-09 RX ORDER — MIDAZOLAM HYDROCHLORIDE 1 MG/ML
5 INJECTION, SOLUTION INTRAMUSCULAR; INTRAVENOUS ONCE
Status: DISCONTINUED | OUTPATIENT
Start: 2025-07-09 | End: 2025-07-09

## 2025-07-09 RX ORDER — AMOXICILLIN 250 MG
2 CAPSULE ORAL 2 TIMES DAILY
Status: DISCONTINUED | OUTPATIENT
Start: 2025-07-09 | End: 2025-07-11 | Stop reason: HOSPADM

## 2025-07-09 RX ORDER — SODIUM CHLORIDE 9 MG/ML
100 INJECTION, SOLUTION INTRAVENOUS CONTINUOUS
Status: DISCONTINUED | OUTPATIENT
Start: 2025-07-09 | End: 2025-07-09

## 2025-07-09 RX ORDER — CHLORHEXIDINE GLUCONATE 500 MG/1
1 CLOTH TOPICAL ONCE
Status: COMPLETED | OUTPATIENT
Start: 2025-07-09 | End: 2025-07-09

## 2025-07-09 RX ORDER — CHLORHEXIDINE GLUCONATE 500 MG/1
1 CLOTH TOPICAL EVERY 24 HOURS
Status: DISCONTINUED | OUTPATIENT
Start: 2025-07-10 | End: 2025-07-09

## 2025-07-09 RX ORDER — LEVETIRACETAM 500 MG/5ML
1000 INJECTION, SOLUTION, CONCENTRATE INTRAVENOUS ONCE
Status: COMPLETED | OUTPATIENT
Start: 2025-07-09 | End: 2025-07-09

## 2025-07-09 RX ORDER — TERAZOSIN 2 MG/1
2 CAPSULE ORAL NIGHTLY
Status: DISCONTINUED | OUTPATIENT
Start: 2025-07-09 | End: 2025-07-11 | Stop reason: HOSPADM

## 2025-07-09 RX ORDER — LAMOTRIGINE 100 MG/1
300 TABLET ORAL 2 TIMES DAILY
Status: DISCONTINUED | OUTPATIENT
Start: 2025-07-09 | End: 2025-07-09

## 2025-07-09 RX ORDER — ONDANSETRON 2 MG/ML
4 INJECTION INTRAMUSCULAR; INTRAVENOUS EVERY 6 HOURS PRN
Status: DISCONTINUED | OUTPATIENT
Start: 2025-07-09 | End: 2025-07-11 | Stop reason: HOSPADM

## 2025-07-09 RX ORDER — BISACODYL 10 MG
10 SUPPOSITORY, RECTAL RECTAL DAILY PRN
Status: DISCONTINUED | OUTPATIENT
Start: 2025-07-09 | End: 2025-07-11 | Stop reason: HOSPADM

## 2025-07-09 RX ORDER — POLYETHYLENE GLYCOL 3350 17 G/17G
17 POWDER, FOR SOLUTION ORAL DAILY PRN
Status: DISCONTINUED | OUTPATIENT
Start: 2025-07-09 | End: 2025-07-11 | Stop reason: HOSPADM

## 2025-07-09 RX ORDER — TERAZOSIN 1 MG/1
1 CAPSULE ORAL NIGHTLY
Status: DISCONTINUED | OUTPATIENT
Start: 2025-07-09 | End: 2025-07-11 | Stop reason: HOSPADM

## 2025-07-09 RX ORDER — LAMOTRIGINE 100 MG/1
100 TABLET ORAL 2 TIMES DAILY
Status: DISCONTINUED | OUTPATIENT
Start: 2025-07-09 | End: 2025-07-11 | Stop reason: HOSPADM

## 2025-07-09 RX ORDER — SODIUM CHLORIDE 0.9 % (FLUSH) 0.9 %
10 SYRINGE (ML) INJECTION AS NEEDED
Status: DISCONTINUED | OUTPATIENT
Start: 2025-07-09 | End: 2025-07-11 | Stop reason: HOSPADM

## 2025-07-09 RX ORDER — ROCURONIUM BROMIDE 10 MG/ML
50 INJECTION, SOLUTION INTRAVENOUS ONCE
Status: COMPLETED | OUTPATIENT
Start: 2025-07-09 | End: 2025-07-09

## 2025-07-09 RX ORDER — QUETIAPINE FUMARATE 100 MG/1
200 TABLET, FILM COATED ORAL NIGHTLY
Status: DISCONTINUED | OUTPATIENT
Start: 2025-07-09 | End: 2025-07-11 | Stop reason: HOSPADM

## 2025-07-09 RX ORDER — ACETAMINOPHEN 650 MG/1
650 SUPPOSITORY RECTAL EVERY 4 HOURS PRN
Status: DISCONTINUED | OUTPATIENT
Start: 2025-07-09 | End: 2025-07-11 | Stop reason: HOSPADM

## 2025-07-09 RX ORDER — ENOXAPARIN SODIUM 100 MG/ML
40 INJECTION SUBCUTANEOUS EVERY 24 HOURS
Status: DISCONTINUED | OUTPATIENT
Start: 2025-07-09 | End: 2025-07-11 | Stop reason: HOSPADM

## 2025-07-09 RX ORDER — ACETAMINOPHEN 325 MG/1
650 TABLET ORAL EVERY 4 HOURS PRN
Status: DISCONTINUED | OUTPATIENT
Start: 2025-07-09 | End: 2025-07-11 | Stop reason: HOSPADM

## 2025-07-09 RX ORDER — LAMOTRIGINE 200 MG/1
100 TABLET ORAL 2 TIMES DAILY
COMMUNITY

## 2025-07-09 RX ORDER — DIAZEPAM 10 MG/2ML
5 INJECTION, SOLUTION INTRAMUSCULAR; INTRAVENOUS ONCE
Status: DISCONTINUED | OUTPATIENT
Start: 2025-07-09 | End: 2025-07-09

## 2025-07-09 RX ORDER — ETOMIDATE 2 MG/ML
20 INJECTION INTRAVENOUS ONCE
Status: COMPLETED | OUTPATIENT
Start: 2025-07-09 | End: 2025-07-09

## 2025-07-09 RX ORDER — MIDAZOLAM HYDROCHLORIDE 5 MG/5ML
5 INJECTION, SOLUTION INTRAMUSCULAR; INTRAVENOUS ONCE
Status: DISCONTINUED | OUTPATIENT
Start: 2025-07-09 | End: 2025-07-11 | Stop reason: HOSPADM

## 2025-07-09 RX ORDER — HYDROXYZINE HYDROCHLORIDE 25 MG/1
25 TABLET, FILM COATED ORAL 3 TIMES DAILY PRN
Status: DISCONTINUED | OUTPATIENT
Start: 2025-07-09 | End: 2025-07-11 | Stop reason: HOSPADM

## 2025-07-09 RX ORDER — POLYETHYLENE GLYCOL 3350 17 G/17G
17 POWDER, FOR SOLUTION ORAL 2 TIMES DAILY
Status: DISCONTINUED | OUTPATIENT
Start: 2025-07-09 | End: 2025-07-10

## 2025-07-09 RX ORDER — DIAZEPAM 10 MG/2ML
INJECTION, SOLUTION INTRAMUSCULAR; INTRAVENOUS
Status: COMPLETED
Start: 2025-07-09 | End: 2025-07-09

## 2025-07-09 RX ORDER — ACETAMINOPHEN 500 MG
1000 TABLET ORAL ONCE
Status: CANCELLED | OUTPATIENT
Start: 2025-07-09 | End: 2025-07-09

## 2025-07-09 RX ORDER — QUETIAPINE FUMARATE 25 MG/1
25 TABLET, FILM COATED ORAL DAILY PRN
Status: DISCONTINUED | OUTPATIENT
Start: 2025-07-09 | End: 2025-07-11 | Stop reason: HOSPADM

## 2025-07-09 RX ORDER — SODIUM CHLORIDE 9 MG/ML
40 INJECTION, SOLUTION INTRAVENOUS AS NEEDED
Status: DISCONTINUED | OUTPATIENT
Start: 2025-07-09 | End: 2025-07-11 | Stop reason: HOSPADM

## 2025-07-09 RX ORDER — PANTOPRAZOLE SODIUM 40 MG/1
40 TABLET, DELAYED RELEASE ORAL
Status: DISCONTINUED | OUTPATIENT
Start: 2025-07-09 | End: 2025-07-11 | Stop reason: HOSPADM

## 2025-07-09 RX ORDER — NITROGLYCERIN 0.4 MG/1
0.4 TABLET SUBLINGUAL
Status: DISCONTINUED | OUTPATIENT
Start: 2025-07-09 | End: 2025-07-11 | Stop reason: HOSPADM

## 2025-07-09 RX ORDER — BISACODYL 5 MG/1
5 TABLET, DELAYED RELEASE ORAL DAILY PRN
Status: DISCONTINUED | OUTPATIENT
Start: 2025-07-09 | End: 2025-07-11 | Stop reason: HOSPADM

## 2025-07-09 RX ADMIN — TERAZOSIN HYDROCHLORIDE 1 MG: 1 CAPSULE ORAL at 21:11

## 2025-07-09 RX ADMIN — QUETIAPINE FUMARATE 200 MG: 100 TABLET ORAL at 21:10

## 2025-07-09 RX ADMIN — LEVETIRACETAM 1000 MG: 100 INJECTION INTRAVENOUS at 02:02

## 2025-07-09 RX ADMIN — Medication 10 ML: at 08:41

## 2025-07-09 RX ADMIN — SODIUM CHLORIDE 1 MG/HR: 9 INJECTION, SOLUTION INTRAVENOUS at 03:49

## 2025-07-09 RX ADMIN — POLYETHYLENE GLYCOL 3350 17 G: 17 POWDER, FOR SOLUTION ORAL at 21:11

## 2025-07-09 RX ADMIN — CHLORHEXIDINE GLUCONATE 1 APPLICATION: 500 CLOTH TOPICAL at 05:21

## 2025-07-09 RX ADMIN — Medication 1 APPLICATION: at 05:52

## 2025-07-09 RX ADMIN — TERAZOSIN HYDROCHLORIDE 2 MG: 2 CAPSULE ORAL at 21:11

## 2025-07-09 RX ADMIN — DOCUSATE SODIUM 50 MG AND SENNOSIDES 8.6 MG 2 TABLET: 8.6; 5 TABLET, FILM COATED ORAL at 21:10

## 2025-07-09 RX ADMIN — SODIUM CHLORIDE 100 ML/HR: 9 INJECTION, SOLUTION INTRAVENOUS at 05:52

## 2025-07-09 RX ADMIN — DIAZEPAM 5 MG: 5 INJECTION, SOLUTION INTRAMUSCULAR; INTRAVENOUS at 02:31

## 2025-07-09 RX ADMIN — POLYETHYLENE GLYCOL 3350 17 G: 17 POWDER, FOR SOLUTION ORAL at 08:39

## 2025-07-09 RX ADMIN — SODIUM CHLORIDE 1500 MG PE: 9 INJECTION, SOLUTION INTRAVENOUS at 03:22

## 2025-07-09 RX ADMIN — ROCURONIUM 50 MG: 50 INJECTION, SOLUTION INTRAVENOUS at 03:12

## 2025-07-09 RX ADMIN — ETOMIDATE 20 MG: 2 INJECTION, SOLUTION INTRAVENOUS at 03:12

## 2025-07-09 RX ADMIN — DOCUSATE SODIUM 50 MG AND SENNOSIDES 8.6 MG 2 TABLET: 8.6; 5 TABLET, FILM COATED ORAL at 08:39

## 2025-07-09 RX ADMIN — LAMOTRIGINE 300 MG: 100 TABLET ORAL at 08:39

## 2025-07-09 RX ADMIN — QUETIAPINE FUMARATE 100 MG: 100 TABLET ORAL at 06:20

## 2025-07-09 RX ADMIN — FLUOXETINE 20 MG: 20 CAPSULE ORAL at 13:28

## 2025-07-09 RX ADMIN — ENOXAPARIN SODIUM 40 MG: 100 INJECTION SUBCUTANEOUS at 08:40

## 2025-07-09 RX ADMIN — PANTOPRAZOLE SODIUM 40 MG: 40 TABLET, DELAYED RELEASE ORAL at 06:23

## 2025-07-09 RX ADMIN — DIAZEPAM 5 MG: 5 INJECTION, SOLUTION INTRAMUSCULAR; INTRAVENOUS at 02:39

## 2025-07-09 RX ADMIN — Medication 1 APPLICATION: at 21:11

## 2025-07-09 RX ADMIN — LAMOTRIGINE 100 MG: 100 TABLET ORAL at 21:10

## 2025-07-09 RX ADMIN — DIAZEPAM 5 MG: 5 INJECTION, SOLUTION INTRAMUSCULAR; INTRAVENOUS at 02:29

## 2025-07-09 RX ADMIN — DIAZEPAM 5 MG: 10 INJECTION, SOLUTION INTRAMUSCULAR; INTRAVENOUS at 01:05

## 2025-07-09 RX ADMIN — DIAZEPAM 5 MG: 5 INJECTION, SOLUTION INTRAMUSCULAR; INTRAVENOUS at 01:05

## 2025-07-09 RX ADMIN — Medication 10 ML: at 21:11

## 2025-07-09 NOTE — CONSULTS
[facility] Gastroenterology  Initial Inpatient Consult Note    Referring Provider: No ref. provider found    Date of Admission: 2025  Date of Service:  25    Reason for Consultation: Ingestion of foreign body    Subjective     History of present illness:      This is a 29-year-old female with history of urge to ingest magnets and/or batteries.  She has had similar occurrences in the past.  She was hospitalized 2025 with similar occurrence.  She does have a history of autism.  Patient does not verbally communicate with me with the exception of answering yes or no and shaking her head inappropriate response.  She responded yes when asked if she ingested 1 battery.  She responded no when asked if she ingested more than 1 battery.  She responded yes to abdominal pain.  She responded no to bowel movement.  Responded no to nausea vomiting.  Responded no to signs of GI blood loss.  She responded no when asked if her abdomen hurt worse now compared to prior to ingesting the magnet.  She stated no when I asked if she had trouble swallowing solids or liquids.        Past Medical History:  Past Medical History:   Diagnosis Date    Anxiety     Depression     History of  2017    History of congenital abnormality 2017    Late prenatal care affecting pregnancy in second trimester 2017    Mental deficiency 2017    Previous  delivery affecting pregnancy 2017    Psychogenic nonepileptic seizure        Past Surgical History:  Past Surgical History:   Procedure Laterality Date     SECTION      ENDOSCOPY N/A 2024    Procedure: ESOPHAGOGASTRODUODENOSCOPY WITH ANESTHESIA;  Surgeon: Tian Morris MD;  Location: [facility];  Service: Gastroenterology;  Laterality: N/A;  pre foreign body in stomach  post foreign body passed out of stomach  dr kaiden houston    ENDOSCOPY N/A 2024    Procedure: ESOPHAGOGASTRODUODENOSCOPY WITH ANESTHESIA;  Surgeon:  Herman Murry MD;  Location: [facility] OR;  Service: Gastroenterology;  Laterality: N/A;  pre foreign body  post no foreign body    ENDOSCOPY N/A 5/7/2025    Procedure: ESOPHAGOGASTRODUODENOSCOPY;  Surgeon: Brennen White MD;  Location:  [facility] OR;  Service: Gastroenterology;  Laterality: N/A;  pre: foreign body  post: foreign body    ENDOSCOPY N/A 6/1/2025    Procedure: ESOPHAGOGASTRODUODENOSCOPY WITH ANESTHESIA;  Surgeon: Dima Park DO;  Location:  [facility] OR;  Service: Gastroenterology;  Laterality: N/A;  pre op: foreign body removal  post op: foreign body none retrieved  pcp:        Social History:   Social History     Tobacco Use    Smoking status: Unknown    Smokeless tobacco: Never   Substance Use Topics    Alcohol use: Defer        Family History:  Family History   Problem Relation Age of Onset    Diabetes Mother     Fibromyalgia Mother     Migraines Mother     Mental illness Father        Home Meds:  Medications Prior to Admission   Medication Sig Dispense Refill Last Dose/Taking    acetaminophen (TYLENOL) 500 MG tablet Take 1 tablet by mouth Daily As Needed for Mild Pain.       doxazosin (CARDURA) 1 MG tablet Take 1 tablet by mouth Every Night. Take with 2mg for total of 3mg nightly       doxazosin (CARDURA) 2 MG tablet Take 1 tablet by mouth Every Night. Take with 1mg for a total of 3mg nightly       FLUoxetine (PROzac) 20 MG capsule Take 1 capsule by mouth Daily. Take with 40mg for a total of 60mg       FLUoxetine (PROzac) 40 MG capsule Take 1 capsule by mouth Daily. Take with 20mg for a total of 60mg       hydrOXYzine (ATARAX) 25 MG tablet Take 1 tablet by mouth 3 (Three) Times a Day As Needed for Anxiety.       lamoTRIgine (LaMICtal) 150 MG tablet Take 2 tablets by mouth 2 (Two) Times a Day.       Midazolam (Nayzilam) 5 MG/0.1ML solution Administer 0.1 mL into the nostril(s) as directed by provider Daily As Needed (seizure).       omeprazole (priLOSEC) 20 MG capsule Take 1 capsule by mouth  Daily As Needed (Acid reflux).       QUEtiapine (SEROquel) 100 MG tablet Take 1 tablet by mouth Every Morning.       QUEtiapine (SEROquel) 200 MG tablet Take 1 tablet by mouth Every Night.       QUEtiapine (SEROquel) 25 MG tablet Take 1 tablet by mouth Daily As Needed (for agitation).       senna 8.6 MG tablet Take 2 tablets by mouth Daily As Needed for Constipation.          Current Meds:     Current Facility-Administered Medications:     acetaminophen (TYLENOL) tablet 650 mg, 650 mg, Oral, Q4H PRN **OR** acetaminophen (TYLENOL) suppository 650 mg, 650 mg, Rectal, Q4H PRN, Nelli Walker APRN    sennosides-docusate (PERICOLACE) 8.6-50 MG per tablet 2 tablet, 2 tablet, Oral, BID, 2 tablet at 07/09/25 0839 **AND** polyethylene glycol (MIRALAX) packet 17 g, 17 g, Oral, Daily PRN, 17 g at 07/09/25 0839 **AND** bisacodyl (DULCOLAX) EC tablet 5 mg, 5 mg, Oral, Daily PRN **AND** bisacodyl (DULCOLAX) suppository 10 mg, 10 mg, Rectal, Daily PRN, Nelli Walker APRN    Calcium Replacement - Follow Nurse / BPA Driven Protocol, , Not Applicable, PRN, Nelli Walker APRN    [START ON 7/10/2025] Chlorhexidine Gluconate Cloth 2 % pads 1 Application, 1 Application, Topical, Q24H, Nelli Walker APRN    enoxaparin sodium (LOVENOX) syringe 40 mg, 40 mg, Subcutaneous, Q24H, Nelli Walker APRN, 40 mg at 07/09/25 0840    FLUoxetine (PROzac) capsule 40 mg, 40 mg, Oral, Daily, Nelli Walker APRN    lamoTRIgine (LaMICtal) tablet 300 mg, 300 mg, Oral, BID, Nelli Walker APRN, 300 mg at 07/09/25 0839    Magnesium Standard Dose Replacement - Follow Nurse / BPA Driven Protocol, , Not Applicable, PRN, Nelli Walker APRN    midazolam (VERSED) 100 mg in sodium chloride 0.9 % 100 mL infusion, 1-10 mg/hr, Intravenous, Titrated, Jay Kohli Jr., MD, Stopped at 07/09/25 0407    Midazolam HCl (PF) (VERSED) injection 5 mg, 5 mg, Intravenous, Once, Jay Kohli Jr., MD    mupirocin (BACTROBAN) 2 % nasal  ointment 1 Application, 1 Application, Each Nare, BID, Nelli Walker, APRN, 1 Application at 07/09/25 0552    nitroglycerin (NITROSTAT) SL tablet 0.4 mg, 0.4 mg, Sublingual, Q5 Min PRN, Nelli Walker, APRN    ondansetron (ZOFRAN) injection 4 mg, 4 mg, Intravenous, Q6H PRN, Nelli Walker, APRN    pantoprazole (PROTONIX) EC tablet 40 mg, 40 mg, Oral, Q AM, Nelli Walker, APRN, 40 mg at 07/09/25 0623    Phosphorus Replacement - Follow Nurse / BPA Driven Protocol, , Not Applicable, PRN, Nelli Walker, APRN    Potassium Replacement - Follow Nurse / BPA Driven Protocol, , Not Applicable, PRN, Nelli Walker, APRN    QUEtiapine (SEROquel) tablet 100 mg, 100 mg, Oral, QAM, Nelli Walker, APRN, 100 mg at 07/09/25 0620    QUEtiapine (SEROquel) tablet 200 mg, 200 mg, Oral, Nightly, Nelli Walker, APRN    QUEtiapine (SEROquel) tablet 25 mg, 25 mg, Oral, Daily PRN, Nelli Walker, APRN    sodium chloride 0.9 % flush 10 mL, 10 mL, Intravenous, Q12H, Nelli Walker, APRN    sodium chloride 0.9 % flush 10 mL, 10 mL, Intravenous, PRN, Nelli Walker, APRN    sodium chloride 0.9 % infusion 40 mL, 40 mL, Intravenous, PRN, Nelli Walker, APRN    sodium chloride 0.9 % infusion, 100 mL/hr, Intravenous, Continuous, Nelli Walker, APRN, Last Rate: 100 mL/hr at 07/09/25 0552, 100 mL/hr at 07/09/25 0552    terazosin (HYTRIN) capsule 1 mg, 1 mg, Oral, Nightly, Nelli Walker, APRN    terazosin (HYTRIN) capsule 2 mg, 2 mg, Oral, Nightly, Nelli Walker, APRN    Allergies:  No Known Allergies    Vital Signs  Temp:  [98.1 °F (36.7 °C)-98.9 °F (37.2 °C)] 98.1 °F (36.7 °C)  Heart Rate:  [] 91  Resp:  [16-22] 16  BP: ()/() 98/57  FiO2 (%):  [30 %] 30 %  Body mass index is 35.7 kg/m².    Intake/Output Summary (Last 24 hours) at 7/9/2025 0840  Last data filed at 7/9/2025 0808  Gross per 24 hour   Intake 320.07 ml   Output 500 ml   Net -179.93 ml     I/O this shift:  In: 219.4 [I.V.:219.4]  Out:  -     Review of Systems     Unobtainable      Objective      Physical Exam:    General Appearance:  cooperative, in no acute distress  Eyes:            No icterus  Lungs:         Clear to auscultation, respiration regular, even, and unlabored  Heart::          No murmur  Abdomen:    Normal bowel sounds, no masses, soft, non tender, non distended, no guarding  Extremities: no edema    Results Review:    I have reviewed all of the patients current test results  Results from last 7 days   Lab Units 07/09/25  0758 07/09/25  0234   WBC 10*3/mm3 10.04 13.97*   HEMOGLOBIN g/dL 13.6 13.8   HEMATOCRIT % 40.1 42.0   PLATELETS 10*3/mm3 183 217       Results from last 7 days   Lab Units 07/09/25  0200   SODIUM mmol/L 138   POTASSIUM mmol/L 4.0   CHLORIDE mmol/L 102   CO2 mmol/L 20.0*   BUN mg/dL 8.5   CREATININE mg/dL 0.64   CALCIUM mg/dL 9.7   BILIRUBIN mg/dL 0.2   ALK PHOS U/L 78   ALT (SGPT) U/L 11   AST (SGOT) U/L 15   GLUCOSE mg/dL 103*       Results from last 7 days   Lab Units 07/09/25  0139   INR  1.03       Lab Results   Lab Value Date/Time    LIPASE 17 06/01/2025 0252    LIPASE 16 11/05/2024 1957    LIPASE 15 07/08/2024 2350       Radiology:    Imaging Results (Last 24 Hours)       Procedure Component Value Units Date/Time    XR Abdomen KUB [[id number]] Collected: 07/09/25 0608     Updated: 07/09/25 0615    Narrative:      EXAMINATION: XR ABDOMEN KUB-        HISTORY: swallowed foreign body; G40.909-Epilepsy, unspecified, not  intractable, without status epilepticus; T18.9XXA-Foreign body of  alimentary tract, part unspecified, initial encounter     A frontal projection of the abdomen is obtained and compared with the  previous study dated 6/5/2025.     A cylindrical metallic foreign body is seen projected over the right  upper abdomen. It may be in the distal part of the stomach, gastric  antrum, proximal duodenum or in the hepatic flexure of the colon. A  definite evaluation or position of this foreign object is not  possible  in this study.     Moderate gas and stool is seen in the colon. No evidence of dilatation  of small or large bowel loops. Small amount of gas in the stomach is  noted.     Both kidneys are obscured by the bowel contents. There are no definite  radiopaque renal calculi.     There is evidence of prior cholecystectomy.     No acute bony abnormality.       Impression:      1. A cylindrical metallic foreign body projecting over the right upper  abdomen medially. Exact anatomical location of the foreign body is not  certain in this study. Further follow-up is recommended.  2. Unremarkable abdominal gas pattern. No finding to suggest obstruction  or ileus. No radiopaque calculi.        This report was signed and finalized on 7/9/2025 6:12 AM by Dr. Medina Braun MD.       XR Chest 1 View [[id number]] Collected: 07/09/25 0602     Updated: 07/09/25 0607    Narrative:      EXAMINATION: XR CHEST 1 VW-        HISTORY: Postintubation; G40.901-Epilepsy, unspecified, not intractable,  with status epilepticus; T18.9XXA-Foreign body of alimentary tract, part  unspecified, initial encounter; J96.01-Acute respiratory failure with  hypoxia     A frontal projection of the chest is compared with the previous study  dated 5/19/2025.     The lungs are moderately well-expanded.     There is insertion of gastric tube since the previous study. Distal end  of the tube is in the proximal to mid stomach. The distal sideport is at  or just below the level of gastroesophageal junction. Endotracheal tube  is in place. Distal end is 3 cm from tracheal bifurcation.     There is no evidence of recent infiltrate, pleural effusion, pulmonary  congestion or pneumothorax.     The heart size is in the normal range.     No acute bony abnormality.       Impression:      1. No active cardiopulmonary disease.  2. Endotracheal and gastric tubes are in place.        This report was signed and finalized on 7/9/2025 6:04 AM by Dr. Medina Braun  MD.       CT Head Without Contrast [[id number]] Collected: 07/09/25 0525     Updated: 07/09/25 0531    Narrative:      EXAMINATION: CT HEAD WO CONTRAST-        HISTORY:Head injury; G40.909-Epilepsy, unspecified, not intractable,  without status epilepticus; T18.9XXA-Foreign body of alimentary tract,  part unspecified, initial encounter     In order to have a CT radiation dose as low as reasonably achievable  Automated Exposure Control was utilized for adjustment of the mA and/or  KV according to patient size.     Total DLP = 1711.29 mGy.cm     The CT scan of the head is performed after intravenous contrast  enhancement.     The images are acquired in axial plane and subsequent reconstruction in  coronal and sagittal planes.     The comparison is made with the previous study dated 5/19/2025.     There is no evidence of intracranial hemorrhage or hematoma. There is no  evidence of a mass. There is no midline shift.     The ventricles, the basal cisterns and the cortical sulci are normal.     The gray-white matter differentiation is maintained.     Posterior fossa structures appear normal as visualized.     A partially empty sella turcica is seen. No significant widening or bony  erosion.     The images reviewed in bone window show no evidence of an acute skull  fracture. The visualized paranasal sinuses and mastoid air cells are  clear.       Impression:      1. No acute intracranial abnormality.  2. No skull fracture.     The above study was initially reviewed and reported by [employer]. I do not  find any discrepancies.                                      This report was signed and finalized on 7/9/2025 5:28 AM by Dr. Medina Braun MD.                 Assessment & Plan       Respiratory failure with hypoxia      Impression/Plan    Foreign body  Mental deficiency   Psychogenic nonepileptic seizure     At this time we will elect remain conservative.  KUB shows foreign body in right upper quadrant.  Plan to repeat  VERÓNICA to follow its course.  I would also recommend that small objects be kept out of patient's room as precaution to prevent her from swallowing them.  I have added scheduled miralax to help stimulate bowels.     Electronically signed by TELMA Tobias, 07/09/25, 8:40 AM CDT.         TELMA Tobias  07/09/25  08:40 CDT

## 2025-07-09 NOTE — PROGRESS NOTES
"[facility] Clinical Pharmacy Services: Enoxaparin Consult  Duane Cooley is a 29 y.o. female who has been consulted to Initiate prophylactic enoxaparin.     Indication: VTE Prophylaxis  Home Anticoagulation: none     Relevant clinical data and objective history reviewed:  Ht: 165.1 cm (65\"); Wt: 97.3 kg (214 lb 8.1 oz); BMI: Body mass index is 35.7 kg/m².  Estimated Creatinine Clearance: 151 mL/min (by C-G formula based on SCr of 0.64 mg/dL).  Results from last 7 days   Lab Units 07/09/25  0234 07/09/25  0200   HEMOGLOBIN g/dL 13.8  --    HEMATOCRIT % 42.0  --    PLATELETS 10*3/mm3 217  --    CREATININE mg/dL  --  0.64       Indication(s) for therapeutic anti-Xa monitoring: No indication for anti-Xa level monitoring (delete below table)        Asessment/Plan:  Initiate 40mg subcutaneous every 24 hours.  Pharmacy will continue to monitor renal function and clinical status and adjust the dose and/or frequency as needed.    Pepe Ch, PharmD  7/9/2025  05:20 CDT         "

## 2025-07-09 NOTE — H&P
[facility] Intensivist Services  HISTORY AND PHYSICAL    Date of Admission: 7/9/2025  Primary Care Physician: Provider, No Known    Subjective   Primary Historian: Patient support person at bedside    Chief Complaint: Acute hypoxic respiratory failure require mechanical and intubation and foreign object ingestion    Seizures     Swallowed Foreign Body      29-year-old female patient past medical history anxiety, depression, pseudoseizures, psychiatric disorder presents to the ED with concerns of swallowing multiple magnets.  Patient presented via EMS and Versed IM was initiated and route for concerns of seizures.    ER workup consisted of identification of foreign body ingestion presumed to be magnets.  GI was consulted however no planned intervention plan to monitor to see if she passes the object.  KUB reveals objects are below the diaphragm.    She was intubated for concerns of ongoing episodes of hypoxemia in the setting of pseudoseizures.    Apparently prior to ICU admission the patient woke up and requested to be extubated.    I have seen and evaluated the patient immediately upon arriving to CCU room 9.  She is extubated does not appear to be in any acute distress.  Her support person and support dog is at bedside.    She does endorse swallowing magnets.  The support person reports this was a magnet that stays on the refrigerator and she ingested the small magnets from the refrigerator ornament.    Patient does not appear to be in any acute distress I feel the patient can probably be downgraded from the ICU since she is no longer intubated.  She is hemodynamically stable.  She is not hypoxic.  No seizure activity currently.        Review of Systems   Neurological:  Positive for seizures.      Otherwise complete ROS reviewed and negative except as mentioned in the HPI.    Past Medical History:   Past Medical History:   Diagnosis Date    Anxiety     Depression     History of   2017    History of congenital abnormality 2017    Late prenatal care affecting pregnancy in second trimester 2017    Mental deficiency 2017    Previous  delivery affecting pregnancy 2017    Psychogenic nonepileptic seizure      Past Surgical History:  Past Surgical History:   Procedure Laterality Date     SECTION      ENDOSCOPY N/A 2024    Procedure: ESOPHAGOGASTRODUODENOSCOPY WITH ANESTHESIA;  Surgeon: Tian Morris MD;  Location: [facility] OR;  Service: Gastroenterology;  Laterality: N/A;  pre foreign body in stomach  post foreign body passed out of stomach  dr kaiden houston    ENDOSCOPY N/A 2024    Procedure: ESOPHAGOGASTRODUODENOSCOPY WITH ANESTHESIA;  Surgeon: Herman Murry MD;  Location: [facility] OR;  Service: Gastroenterology;  Laterality: N/A;  pre foreign body  post no foreign body    ENDOSCOPY N/A 2025    Procedure: ESOPHAGOGASTRODUODENOSCOPY;  Surgeon: Brennen White MD;  Location: [facility] OR;  Service: Gastroenterology;  Laterality: N/A;  pre: foreign body  post: foreign body    ENDOSCOPY N/A 2025    Procedure: ESOPHAGOGASTRODUODENOSCOPY WITH ANESTHESIA;  Surgeon: Dima Park DO;  Location: [facility] OR;  Service: Gastroenterology;  Laterality: N/A;  pre op: foreign body removal  post op: foreign body none retrieved  pcp:     Social History:  reports that she has an unknown smoking status. She has never used smokeless tobacco. Alcohol use questions deferred to the physician. Drug use questions deferred to the physician.    Family History: family history includes Diabetes in her mother; Fibromyalgia in her mother; Mental illness in her father; Migraines in her mother.       Allergies:  No Known Allergies    Medications:  Prior to Admission medications    Medication Sig Start Date End Date Taking? Authorizing Provider   acetaminophen (TYLENOL) 500 MG tablet Take 1 tablet by mouth Daily As Needed for Mild Pain.     "Rayray Jean MD   doxazosin (CARDURA) 1 MG tablet Take 1 tablet by mouth Every Night. Take with 2mg for total of 3mg nightly    Rayray Jean MD   doxazosin (CARDURA) 2 MG tablet Take 1 tablet by mouth Every Night. Take with 1mg for a total of 3mg nightly    Rayray Jean MD   FLUoxetine (PROzac) 20 MG capsule Take 1 capsule by mouth Daily. Take with 40mg for a total of 60mg    Rayray Jean MD   FLUoxetine (PROzac) 40 MG capsule Take 1 capsule by mouth Daily. Take with 20mg for a total of 60mg    Rayray Jean MD   hydrOXYzine (ATARAX) 25 MG tablet Take 1 tablet by mouth 3 (Three) Times a Day As Needed for Anxiety.    Rayray Jean MD   lamoTRIgine (LaMICtal) 150 MG tablet Take 2 tablets by mouth 2 (Two) Times a Day.    Rayray Jean MD   Midazolam (Nayzilam) 5 MG/0.1ML solution Administer 0.1 mL into the nostril(s) as directed by provider Daily As Needed (seizure).    Rayray Jean MD   omeprazole (priLOSEC) 20 MG capsule Take 1 capsule by mouth Daily As Needed (Acid reflux).    Rayray Jean MD   QUEtiapine (SEROquel) 100 MG tablet Take 1 tablet by mouth Every Morning.    Rayray Jean MD   QUEtiapine (SEROquel) 200 MG tablet Take 1 tablet by mouth Every Night.    Rayray Jean MD   QUEtiapine (SEROquel) 25 MG tablet Take 1 tablet by mouth Daily As Needed (for agitation).    Rayray Jean MD   senna 8.6 MG tablet Take 2 tablets by mouth Daily As Needed for Constipation.    Rayray Jean MD     I have utilized all available immediate resources to obtain, update, or review the patient's current medications (including all prescriptions, over-the-counter products, herbals, cannabis/cannabidiol products, and vitamin/mineral/dietary (nutritional) supplements).    Objective     Vital Signs: /67   Pulse 101   Temp 98.4 °F (36.9 °C) (Axillary)   Resp 16   Ht 165.1 cm (65\")   Wt 97.3 kg (214 lb 8.1 oz)   LMP " 06/09/2025   SpO2 98%   BMI 35.70 kg/m²   Physical Exam  Vitals and nursing note reviewed.   Constitutional:       General: She is not in acute distress.     Appearance: She is not toxic-appearing.   HENT:      Head: Normocephalic.      Mouth/Throat:      Mouth: Mucous membranes are moist.   Eyes:      Extraocular Movements: Extraocular movements intact.      Pupils: Pupils are equal, round, and reactive to light.   Cardiovascular:      Rate and Rhythm: Normal rate and regular rhythm.      Pulses: Normal pulses.      Heart sounds: Normal heart sounds.   Pulmonary:      Effort: Pulmonary effort is normal. No respiratory distress.      Breath sounds: Normal breath sounds. No wheezing.   Abdominal:      Palpations: Abdomen is soft.      Tenderness: There is no abdominal tenderness.   Musculoskeletal:         General: Normal range of motion.      Right lower leg: No edema.      Left lower leg: No edema.   Skin:     Capillary Refill: Capillary refill takes 2 to 3 seconds.      Findings: No bruising or erythema.   Neurological:      General: No focal deficit present.      Mental Status: She is alert.              Results Reviewed:  Lab Results (last 24 hours)       Procedure Component Value Units Date/Time    Urinalysis With Culture If Indicated - Urine, Catheter [[id number]]  (Normal) Collected: 07/09/25 0324    Specimen: Urine, Catheter Updated: 07/09/25 0345     Color, UA Yellow     Appearance, UA Clear     pH, UA 7.5     Specific Gravity, UA 1.016     Glucose, UA Negative     Ketones, UA Negative     Bilirubin, UA Negative     Blood, UA Negative     Protein, UA Negative     Leuk Esterase, UA Negative     Nitrite, UA Negative     Urobilinogen, UA 0.2 E.U./dL    Narrative:      In absence of clinical symptoms, the presence of pyuria, bacteria, and/or nitrites on the urinalysis result does not correlate with infection.  Urine microscopic not indicated.    CBC & Differential [[id number]]  (Abnormal) Collected: 07/09/25  0234    Specimen: Blood from Hand, Left Updated: 07/09/25 0341    Narrative:      The following orders were created for panel order CBC & Differential.  Procedure                               Abnormality         Status                     ---------                               -----------         ------                     CBC Auto Differential[[id number]]        Abnormal            Final result                 Please view results for these tests on the individual orders.    CBC Auto Differential [[id number]]  (Abnormal) Collected: 07/09/25 0234    Specimen: Blood from Hand, Left Updated: 07/09/25 0341     WBC 13.97 10*3/mm3      RBC 4.90 10*6/mm3      Hemoglobin 13.8 g/dL      Hematocrit 42.0 %      MCV 85.7 fL      MCH 28.2 pg      MCHC 32.9 g/dL      RDW 19.1 %      RDW-SD 48.5 fl      MPV 11.0 fL      Platelets 217 10*3/mm3      Neutrophil % 79.0 %      Lymphocyte % 13.2 %      Monocyte % 6.9 %      Eosinophil % 0.1 %      Basophil % 0.4 %      Immature Grans % 0.4 %      Neutrophils, Absolute 11.05 10*3/mm3      Lymphocytes, Absolute 1.84 10*3/mm3      Monocytes, Absolute 0.96 10*3/mm3      Eosinophils, Absolute 0.01 10*3/mm3      Basophils, Absolute 0.05 10*3/mm3      Immature Grans, Absolute 0.06 10*3/mm3     TSH Rfx On Abnormal To Free T4 [[id number]]  (Normal) Collected: 07/09/25 0200    Specimen: Blood from Arm, Right Updated: 07/09/25 0240     TSH 1.760 uIU/mL     Comprehensive Metabolic Panel [[id number]]  (Abnormal) Collected: 07/09/25 0200    Specimen: Blood from Arm, Right Updated: 07/09/25 0237     Glucose 103 mg/dL      BUN 8.5 mg/dL      Creatinine 0.64 mg/dL      Sodium 138 mmol/L      Potassium 4.0 mmol/L      Comment: Slight hemolysis detected by analyzer. Result may be falsely elevated.        Chloride 102 mmol/L      CO2 20.0 mmol/L      Calcium 9.7 mg/dL      Total Protein 7.5 g/dL      Albumin 4.3 g/dL      ALT (SGPT) 11 U/L      AST (SGOT) 15 U/L      Alkaline Phosphatase 78 U/L      Total  Bilirubin 0.2 mg/dL      Globulin 3.2 gm/dL      A/G Ratio 1.3 g/dL      BUN/Creatinine Ratio 13.3     Anion Gap 16.0 mmol/L      eGFR 123.6 mL/min/1.73     Narrative:      GFR Categories in Chronic Kidney Disease (CKD)              GFR Category          GFR (mL/min/1.73)    Interpretation  G1                    90 or greater        Normal or high (1)  G2                    60-89                Mild decrease (1)  G3a                   45-59                Mild to moderate decrease  G3b                   30-44                Moderate to severe decrease  G4                    15-29                Severe decrease  G5                    14 or less           Kidney failure    (1)In the absence of evidence of kidney disease, neither GFR category G1 or G2 fulfill the criteria for CKD.    eGFR calculation 2021 CKD-EPI creatinine equation, which does not include race as a factor    CK [[id number]]  (Normal) Collected: 07/09/25 0200    Specimen: Blood from Arm, Right Updated: 07/09/25 0237     Creatine Kinase 60 U/L     Magnesium [[id number]]  (Normal) Collected: 07/09/25 0200    Specimen: Blood from Arm, Right Updated: 07/09/25 0237     Magnesium 1.8 mg/dL     Protime-INR [[id number]]  (Normal) Collected: 07/09/25 0139    Specimen: Blood from Arm, Left Updated: 07/09/25 0216     Protime 14.1 Seconds      INR 1.03    aPTT [[id number]]  (Abnormal) Collected: 07/09/25 0139    Specimen: Blood from Arm, Left Updated: 07/09/25 0216     PTT 21.3 seconds     Narrative:      PTT = The equivalent PTT values for the therapeutic range of heparin levels at 0.3 to 0.7 U/ml are 77 - 99 seconds.             No radiology results for the last day    Result Review:  I have personally reviewed the results from the time of this admission to 7/9/2025 05:29 CDT and agree with these findings:  [x]  Laboratory list / accordion  []  Microbiology  [x]  Radiology  []  EKG/Telemetry   []  Cardiology/Vascular   []  Pathology  []  Old records  []   Other:  Most notable findings include: Foreign object noted on the KUB and chest x-ray      I have personally reviewed and interpreted the radiology studies and ECG obtained at time of admission.     Assessment / Plan   Assessment:   Active Hospital Problems    Diagnosis     **Respiratory failure with hypoxia      Pseudoseizures  Continue with home medications patient is on Seroquel, olanzapine and doxepin.  No electrolyte abnormality or acute infection.    Acute hypoxic respiratory failure  Patient was intubated and extubated by ER physician prior to presenting to ICU, ICU was not updated that the patient was extubated prior to admission    Swallowed foreign body  Supportive care.  Monitor stool.  GI consulted however no immediate plans for retrieval.    VTE: Enoxaparin  GI: N/A   NTN: Diet  ABX: N/A  Lines/Tubes: Peripheral  CODE Status: Full code      Disposition: Anticipate 2 to 3 days of acute inpatient    50 minutes of critical care provided. This time excludes other billable procedures. Time does include preparation of documents, medical consultations, review of old records, and direct bedside care. Patient is at high risk for life-threatening deterioration due to acute hypoxic respiratory failure.       Treatment Plan  The patient will be admitted to my service here at [facility].  Admit to intensivist services under Dr. Dr. Cifuentes services    Medical Decision Making  Number and Complexity of problems: Moderate complexity      Conditions and Status        Condition is improving.     [facility] Data  External documents reviewed: Na  Cardiac tracing (EKG, telemetry) interpretation: Na  Radiology interpretation: Reviewed 1 view chest x-ray 1 view KUB contemporaneously by me.  Foreign objects identified  Labs reviewed: Reviewed admission labs  Any tests that were considered but not ordered: N/A          Discussed with: Dr. Cifuentes and  at bedside     I admitted the patient overnight to the  intensivist services.  Case will be discussed with Dr. Cifuentes, In the a.m.  Final treatment plan and recommendations will be at his discretion.     Electronically signed by TELMA Horne on 7/9/2025 at 05:29 CDT

## 2025-07-09 NOTE — ED NOTES
"Patient is now moving, fully aware of situation, trying to remove tubes from mouth/nose. Patient is mouthing \"out, out.\" ED nurses and Respiratory who are at bedside telling patient the tubes are in place to help her breath. Patient continues to mouth \"out, out\". Patient's support person at bedside asking patient if she wants the tubes to come out and patient shakes her head \"yes.\"    Provider informed of this situation.   "

## 2025-07-09 NOTE — CASE MANAGEMENT/SOCIAL WORK
Discharge Planning Assessment  [facility]     Patient Name: Duane Cooley  MRN: [id number]  Today's Date: 7/9/2025    Admit Date: 7/9/2025        Discharge Needs Assessment       Row Name 07/09/25 1047       Living Environment    People in Home parent(s)    Name(s) of People in Home Kelli Cooley (Mother)  [phone] (Mobile)    Current Living Arrangements home    Potentially Unsafe Housing Conditions none    In the past 12 months has the electric, gas, oil, or water company threatened to shut off services in your home? No    Primary Care Provided by self    Provides Primary Care For no one    Family Caregiver if Needed parent(s)    Family Caregiver Names Kelli Cooley (Mother)  [phone] (Mobile)    Quality of Family Relationships helpful;involved;supportive    Able to Return to Prior Arrangements yes       Resource/Environmental Concerns    Resource/Environmental Concerns none    Transportation Concerns none       Transportation Needs    In the past 12 months, has lack of transportation kept you from medical appointments or from getting medications? yes    In the past 12 months, has lack of transportation kept you from meetings, work, or from getting things needed for daily living? Yes       Food Insecurity    Within the past 12 months, you worried that your food would run out before you got the money to buy more. Sometimes    Within the past 12 months, the food you bought just didn't last and you didn't have money to get more. Sometimes       Transition Planning    Patient/Family Anticipates Transition to home with family    Patient/Family Anticipated Services at Transition none    Transportation Anticipated family or friend will provide       Discharge Needs Assessment    Equipment Currently Used at Home none    Concerns to be Addressed discharge planning;denies needs/concerns at this time    Do you want help finding or keeping work or a job? I do not need or want help    Do you want help with school  or training? For example, starting or completing job training or getting a high school diploma, GED or equivalent No    Anticipated Changes Related to Illness none    Equipment Needed After Discharge none    Discharge Coordination/Progress Lives at home with her mother. No DME used. Does not drive, but mother transports if needed. CM/SS will continue to follow and be available to assist with any discharge needs.                   Discharge Plan    No documentation.                 Continued Care and Services - Admitted Since 7/9/2025    No active coordination exists.          Demographic Summary    No documentation.                  Functional Status    No documentation.                  Psychosocial    No documentation.                  Abuse/Neglect    No documentation.                  Legal    No documentation.                  Substance Abuse    No documentation.                  Patient Forms    No documentation.                     Codie Ryan RN

## 2025-07-09 NOTE — PLAN OF CARE
Goal Outcome Evaluation:  Plan of Care Reviewed With: patient, friend        Progress: improving             Pt nonverbal at baseline. Follows commands. Pt on room air 96% O2. NSR. Partner to remain at bedside with service dog. Seizure and safety precautions maintained.

## 2025-07-09 NOTE — PROGRESS NOTES
RT EQUIPMENT DEVICE RELATED - SKIN ASSESSMENT    Nikko Score:        RT Medical Equipment/Device:     ETT Hemphill/Anchorfast    Skin Assessment:      Cheek:  Intact  Ears:  Intact  Lips:  Intact    Device Skin Pressure Protection:  Skin-to-device areas padded:  Anchorfast    Nurse Notification:  No    Ro Lam, RRT

## 2025-07-09 NOTE — ED PROVIDER NOTES
HPI:    29-year-old female with known history of seizure disorder and swallowing foreign body issues presents to the emergency room status post seizure and swallowing a foreign body of a magnet.  And route patient was given 5 mg of IM Versed to help with a seizure but did not stop the seizing.  Patient has a seizures quite often.  She has a service dog of this with her at this time.  She also allegedly swallowed this magnet earlier today sometime.  Normally when she has a seizure and swallow something she usually comes in to be evaluated and asked to have endoscopy for removal of these.      REVIEW OF SYSTEMS    CONSTITUTIONAL:  No complaints of fever, chills,or weakness  EYES:  No complaints of discharge   ENT: No complaints of sore throat or ear pain  CARDIOVASCULAR:  No complaints of chest pain, palpitations, or swelling  RESPIRATORY:  No complaints of cough or shortness of breath  GI: Positive for swallowing foreign body of a magnet MUSCULOSKELETAL:  No complaints of back pain  SKIN:  No complaints of rash  NEUROLOGIC: Positive for recurrent seizure ENDOCRINE:  No complaints of polyuria or polydipsia  LYMPHATIC:  No complaints of swollen glands  GENITOURINARY: No complaints of urinary frequency or hematuria        PAST MEDICAL HISTORY  Past Medical History:   Diagnosis Date    Anxiety     Depression     History of  2017    History of congenital abnormality 2017    Late prenatal care affecting pregnancy in second trimester 2017    Mental deficiency 2017    Previous  delivery affecting pregnancy 2017    Psychogenic nonepileptic seizure        FAMILY HISTORY  Family History   Problem Relation Age of Onset    Diabetes Mother     Fibromyalgia Mother     Migraines Mother     Mental illness Father        SOCIAL HISTORY  Social History     Socioeconomic History    Marital status:    Tobacco Use    Smoking status: Unknown    Smokeless tobacco: Never   Vaping Use     Vaping status: Never Used   Substance and Sexual Activity    Alcohol use: Defer    Drug use: Defer    Sexual activity: Defer     Partners: Male       IMMUNIZATION HISTORY  Deferred to primary care physician.    SURGICAL HISTORY  Past Surgical History:   Procedure Laterality Date     SECTION      ENDOSCOPY N/A 2024    Procedure: ESOPHAGOGASTRODUODENOSCOPY WITH ANESTHESIA;  Surgeon: Tian Morris MD;  Location:  [facility];  Service: Gastroenterology;  Laterality: N/A;  pre foreign body in stomach  post foreign body passed out of stomach  dr kaiden houston    ENDOSCOPY N/A 2024    Procedure: ESOPHAGOGASTRODUODENOSCOPY WITH ANESTHESIA;  Surgeon: Herman Murry MD;  Location:  [facility];  Service: Gastroenterology;  Laterality: N/A;  pre foreign body  post no foreign body    ENDOSCOPY N/A 2025    Procedure: ESOPHAGOGASTRODUODENOSCOPY;  Surgeon: Brennen White MD;  Location:  [facility];  Service: Gastroenterology;  Laterality: N/A;  pre: foreign body  post: foreign body    ENDOSCOPY N/A 2025    Procedure: ESOPHAGOGASTRODUODENOSCOPY WITH ANESTHESIA;  Surgeon: Dima Park DO;  Location:  [facility];  Service: Gastroenterology;  Laterality: N/A;  pre op: foreign body removal  post op: foreign body none retrieved  pcp:       CURRENT MEDICATIONS    Current Facility-Administered Medications:     midazolam (VERSED) 100 mg in sodium chloride 0.9 % 100 mL infusion, 1-10 mg/hr, Intravenous, Titrated, Jay Kohli Jr., MD, Last Rate: 3 mL/hr at 25, 3 mg/hr at 25    Midazolam HCl (PF) (VERSED) injection 5 mg, 5 mg, Intravenous, Once, Jay Kohli Jr., MD    Current Outpatient Medications:     acetaminophen (TYLENOL) 500 MG tablet, Take 1 tablet by mouth Daily As Needed for Mild Pain., Disp: , Rfl:     doxazosin (CARDURA) 1 MG tablet, Take 1 tablet by mouth Every Night. Take with 2mg for total of 3mg nightly, Disp: , Rfl:     doxazosin (CARDURA) 2 MG  "tablet, Take 1 tablet by mouth Every Night. Take with 1mg for a total of 3mg nightly, Disp: , Rfl:     FLUoxetine (PROzac) 20 MG capsule, Take 1 capsule by mouth Daily. Take with 40mg for a total of 60mg, Disp: , Rfl:     FLUoxetine (PROzac) 40 MG capsule, Take 1 capsule by mouth Daily. Take with 20mg for a total of 60mg, Disp: , Rfl:     hydrOXYzine (ATARAX) 25 MG tablet, Take 1 tablet by mouth 3 (Three) Times a Day As Needed for Anxiety., Disp: , Rfl:     lamoTRIgine (LaMICtal) 150 MG tablet, Take 2 tablets by mouth 2 (Two) Times a Day., Disp: , Rfl:     Midazolam (Nayzilam) 5 MG/0.1ML solution, Administer 0.1 mL into the nostril(s) as directed by provider Daily As Needed (seizure)., Disp: , Rfl:     omeprazole (priLOSEC) 20 MG capsule, Take 1 capsule by mouth Daily As Needed (Acid reflux)., Disp: , Rfl:     QUEtiapine (SEROquel) 100 MG tablet, Take 1 tablet by mouth Every Morning., Disp: , Rfl:     QUEtiapine (SEROquel) 200 MG tablet, Take 1 tablet by mouth Every Night., Disp: , Rfl:     QUEtiapine (SEROquel) 25 MG tablet, Take 1 tablet by mouth Daily As Needed (for agitation)., Disp: , Rfl:     senna 8.6 MG tablet, Take 2 tablets by mouth Daily As Needed for Constipation., Disp: , Rfl:     ALLERGIES  No Known Allergies    Neuro exam    VITAL SIGNS:   /96   Pulse (!) 155   Temp 98.9 °F (37.2 °C) (Oral)   Resp 17   Ht 165.1 cm (65\")   Wt 99.8 kg (220 lb)   LMP 06/09/2025   SpO2 100%   BMI 36.61 kg/m²     Constitutional: Patient is alert and in no distress.  Patient with generalized body discomfort.    ENT: There is a normal pharynx with no acute erythema or exudate and oral mucosa is moist.  Nose is clear with no drainage.  Tympanic membranes intact and non-erythemic.    Cardiovascular: S1-S2 regular rate and rhythm.  No murmurs, rubs or gallops are noted.    Respiratory: Patient is clear to auscultation bilaterally with no wheezing or rhonchi.  Chest wall is nontender.  There are no external lesions " on the chest.  There is no crepitance.    Abdomen: Soft, nontender.  Bowel sounds are normal in all 4 quadrants. There is no rebound or guarding noted.  There is no abdominal distention or hepatosplenomegaly.    Genitourinary: Patient is voiding appropriately.    Integument: No acute lesions noted.  Color appears to be normal.    Neurological: Patient nonverbal however has been having intermittent seizures.    Timur Coma Scale: 15    NIH SCORE:         RADIOLOGY/PROCEDURES    Due to patient's hypoxia patient was initially intubated after getting consent.    Patient was given etomidate 20 mg IV, rocuronium 50 mg IV, and intubated with a 7.5 ET tube on first attempt over bougie.  X-ray of the chest shows appropriate positioning of the ET tube.    Patient extubated at her request.    XR Abdomen KUB    (Results Pending)   CT Head Without Contrast    (Results Pending)   XR Chest 1 View    (Results Pending)           FUTURE APPOINTMENTS     No future appointments.       COURSE & MEDICAL DECISION MAKING     Patient's partial differential diagnosis can include:    Pseudoseizure, conversion disorder, recurrent seizure disorder, electrolyte abnormality, foreign body ingestion,atypical migraine, hemiplegic migraine, other headache, brain tumor, and others      CBC, CMP, magnesium, CT of the head, KUB, PT, PTT, EKG, and others    Will give Valium 5 mg IM x 1 now to help with the seizure while patient is getting an IV obtained.    The seizure slowed down but then began again within a few minutes.  IV has been made we will give her 5 mg IV x 1 now and get a CT scan of the head just to be sure there is no intracranial process causing this continued seizure as well as Keppra IV.    Patient again with seizures will give another 5 mg of IV Valium.  At this time workup will have to consider phenytoin or fosphenytoin or even Depakote for the status epilepticus.    Review of patient's chart shows that she has a history of  pseudoseizures and is seen neurologist Dr. Martínez Numerous times.  However the patient today while she is having the spells has episodes of hypoxia.    CT scan of the head does not show any intracranial process such as a bleed mass effect or tumor.    Call neurology for recommendation for the patient's continued suspected pseudoseizure but has not got a response.  Will give fosphenytoin for the status epilepticus and plan to intubate the patient to protect the airway due to the continued seizures.  Upon intubation we will start Versed.    Patient successfully intubated on initial attempt over bougie with a 7.5 ET tube.  Visualization of the ET tube passing between the cords with fogging of the ET tube and O2 sats return to 100% saturation with bilateral breath sounds.  Chest x-ray ordered.    Still awaiting return phone call back from neurologist Dr. Martínez.  Patient will need to get admitted to the ICU due hypoxia during her seizure-like episodes.    Spoke with GI doctor Dr. Taylor who looked at the films of felt that the area of where the patient had ingested a magnet is an area that can be watched closely with serial x-rays to see if it passes.  Does not feel that the patient needs undergo any type of urgent GI intervention or scoping.    Waiting for chest x-ray for placement of the ET tube.    Spoke with Dr. Martínez neurologist who states that these are indeed pseudoseizures and typically the patient has had it in the past need for admission to the ICU however he does not feel that what a pseudoseizure and that the airway should be compromised.  And usually does not require intubation.  However due to the patient's oxygenation and sats dropping as low as 77 during some of the episodes patient was intubated to provide adequate oxygenation.  Will call and speak with the intensivist for admission to the ICU for respiratory hypoxia, foreign body ingestion, and seizure-like activity likely pseudoseizure      Spoke  with Dr. Sebastian Cifuentes the ICU attending who will take the patient to the ICU.    Prior to the patient going to the ICU the patient had lightening of her sedation.  And awoke from sedation and requested the tube to be removed from her airway.  Talk to her about the risk and benefits of this being removed however the patient indicated she wanted it removed.  Patient was then extubated.        Patient's level of risk: High        CRITICAL CARE    CRITICAL CARE: No    CRITICAL CARE TIME: None      Also Old charts were reviewed per [facility] EMR.  Pertinent details are summarized above.  All laboratory, radiologic, and EKG studies that were performed in the Emergency Department were a necessary part of the evaluation needed to exclude unstable or  emergent medical conditions:     Patient was hemodynamically and neurologically stable in the ED.   Pertinent studies were reviewed as above.     Recent Results (from the past 24 hours)   Protime-INR    Collection Time: 07/09/25  1:39 AM    Specimen: Arm, Left; Blood   Result Value Ref Range    Protime 14.1 11.8 - 14.8 Seconds    INR 1.03 0.91 - 1.09   aPTT    Collection Time: 07/09/25  1:39 AM    Specimen: Arm, Left; Blood   Result Value Ref Range    PTT 21.3 (L) 24.5 - 36.0 seconds   Comprehensive Metabolic Panel    Collection Time: 07/09/25  2:00 AM    Specimen: Arm, Right; Blood   Result Value Ref Range    Glucose 103 (H) 65 - 99 mg/dL    BUN 8.5 6.0 - 20.0 mg/dL    Creatinine 0.64 0.57 - 1.00 mg/dL    Sodium 138 136 - 145 mmol/L    Potassium 4.0 3.5 - 5.2 mmol/L    Chloride 102 98 - 107 mmol/L    CO2 20.0 (L) 22.0 - 29.0 mmol/L    Calcium 9.7 8.6 - 10.5 mg/dL    Total Protein 7.5 6.0 - 8.5 g/dL    Albumin 4.3 3.5 - 5.2 g/dL    ALT (SGPT) 11 1 - 33 U/L    AST (SGOT) 15 1 - 32 U/L    Alkaline Phosphatase 78 39 - 117 U/L    Total Bilirubin 0.2 0.0 - 1.2 mg/dL    Globulin 3.2 gm/dL    A/G Ratio 1.3 g/dL    BUN/Creatinine Ratio 13.3 7.0 - 25.0    Anion Gap 16.0 (H) 5.0 - 15.0  mmol/L    eGFR 123.6 >60.0 mL/min/1.73   CK    Collection Time: 07/09/25  2:00 AM    Specimen: Arm, Right; Blood   Result Value Ref Range    Creatine Kinase 60 20 - 180 U/L   Magnesium    Collection Time: 07/09/25  2:00 AM    Specimen: Arm, Right; Blood   Result Value Ref Range    Magnesium 1.8 1.6 - 2.6 mg/dL   TSH Rfx On Abnormal To Free T4    Collection Time: 07/09/25  2:00 AM    Specimen: Arm, Right; Blood   Result Value Ref Range    TSH 1.760 0.270 - 4.200 uIU/mL   CBC Auto Differential    Collection Time: 07/09/25  2:34 AM    Specimen: Hand, Left; Blood   Result Value Ref Range    WBC 13.97 (H) 3.40 - 10.80 10*3/mm3    RBC 4.90 3.77 - 5.28 10*6/mm3    Hemoglobin 13.8 12.0 - 15.9 g/dL    Hematocrit 42.0 34.0 - 46.6 %    MCV 85.7 79.0 - 97.0 fL    MCH 28.2 26.6 - 33.0 pg    MCHC 32.9 31.5 - 35.7 g/dL    RDW 19.1 (H) 12.3 - 15.4 %    RDW-SD 48.5 37.0 - 54.0 fl    MPV 11.0 6.0 - 12.0 fL    Platelets 217 140 - 450 10*3/mm3    Neutrophil % 79.0 (H) 42.7 - 76.0 %    Lymphocyte % 13.2 (L) 19.6 - 45.3 %    Monocyte % 6.9 5.0 - 12.0 %    Eosinophil % 0.1 (L) 0.3 - 6.2 %    Basophil % 0.4 0.0 - 1.5 %    Immature Grans % 0.4 0.0 - 0.5 %    Neutrophils, Absolute 11.05 (H) 1.70 - 7.00 10*3/mm3    Lymphocytes, Absolute 1.84 0.70 - 3.10 10*3/mm3    Monocytes, Absolute 0.96 (H) 0.10 - 0.90 10*3/mm3    Eosinophils, Absolute 0.01 0.00 - 0.40 10*3/mm3    Basophils, Absolute 0.05 0.00 - 0.20 10*3/mm3    Immature Grans, Absolute 0.06 (H) 0.00 - 0.05 10*3/mm3   Urinalysis With Culture If Indicated - Urine, Catheter    Collection Time: 07/09/25  3:24 AM    Specimen: Urine, Catheter   Result Value Ref Range    Color, UA Yellow Yellow, Straw    Appearance, UA Clear Clear    pH, UA 7.5 5.0 - 8.0    Specific Gravity, UA 1.016 1.005 - 1.030    Glucose, UA Negative Negative    Ketones, UA Negative Negative    Bilirubin, UA Negative Negative    Blood, UA Negative Negative    Protein, UA Negative Negative    Leuk Esterase, UA Negative  Negative    Nitrite, UA Negative Negative    Urobilinogen, UA 0.2 E.U./dL 0.2 - 1.0 E.U./dL         The patient received:  Medications   midazolam (VERSED) 100 mg in sodium chloride 0.9 % 100 mL infusion (3 mg/hr Intravenous Rate/Dose Change 7/9/25 0356)   Midazolam HCl (PF) (VERSED) injection 5 mg (has no administration in time range)   diazePAM (VALIUM) injection 5 mg (5 mg Intramuscular Given 7/9/25 0105)   levETIRAcetam (KEPPRA) injection 1,000 mg (1,000 mg Intravenous Given 7/9/25 0202)   diazePAM (VALIUM) injection 5 mg (5 mg Intravenous Given 7/9/25 0239)   diazePAM (VALIUM) injection 5 mg (5 mg Intravenous Given 7/9/25 0229)   diazePAM (VALIUM) injection 5 mg (5 mg Intravenous Given 7/9/25 0231)   etomidate (AMIDATE) injection 20 mg (20 mg Intravenous Given 7/9/25 0312)   rocuronium (ZEMURON) injection 50 mg (50 mg Intravenous Given 7/9/25 0312)              ED Disposition       ED Disposition   Decision to Admit    Condition   --    Comment   Level of Care: Critical Care [6]   Diagnosis: Respiratory failure with hypoxia [[id number]]   Admitting Physician: KWAKU GONZALES [[id number]]   Attending Physician: KWAKU GONZALES [[id number]]                     Dragon disclaimer:  Part of this note may be an electronic transcription/translation of spoken language to printed text using the Dragon Dictation System.     This information is consistent with my knowledge of the patient’s controlled substance use history.      FINAL IMPRESSION   Diagnosis Plan   1. Seizure-like activity      With hypoxia      2. Swallowed foreign body, initial encounter        3. Acute respiratory failure with hypoxia        4. Conversion disorder              MD Seun Parker Jr, Thomas Mark Jr., MD  07/09/25 [id number]       Jay Kohli Jr., MD  07/09/25 [id number]

## 2025-07-09 NOTE — PROGRESS NOTES
"29-year-old female with history of anxiety, depression, pseudoseizures, and psychiatric disorder admitted on 7/9/2025 after presenting to ED with concerns of swallowing multiple magnets.  She was initially intubated in the emergency department, but was later extubated in the ED as well.  KUB revealed that the objects are below the diaphragm.  She was admitted to the CCU for close monitoring.    We have continued the patient's home medications for pseudoseizures.  Patient is now resting comfortably with normal vital signs on room air.  She had no complaints for me during time of my exam.  Patient had an admission about a month ago which was also for swallowing magnets.  The patient's support person stated that she \"had the magnets in her mouth and did not mean to swallow them, but then she had a seizure and swallowed them\".  I strongly suggested that patient no longer put magnets in her mouth, which would help to avoid unnecessary future trips to the emergency department and would also help to avoid hospitalizations.  The patient nodded her head, indicating her understanding. We will continue to get daily KUB X-rays to make sure the magnets have passed.    Patient is now being moved out of CCU to medical floor. Please see H&P from earlier today in regards to her diagnoses, current medical plan, and prognosis.     Patient is suitable to be transferred to the medical floor.  I have spoken with Dr. Botello, hospitalist, who has accepted this patient.  The patient will go to room 484.  Dr. Sharpe will be the attending physician.    "

## 2025-07-10 ENCOUNTER — APPOINTMENT (OUTPATIENT)
Dept: GENERAL RADIOLOGY | Facility: HOSPITAL | Age: 30
End: 2025-07-10
Payer: MEDICAID

## 2025-07-10 LAB
ANION GAP SERPL CALCULATED.3IONS-SCNC: 13 MMOL/L (ref 5–15)
BASOPHILS # BLD AUTO: 0.03 10*3/MM3 (ref 0–0.2)
BASOPHILS NFR BLD AUTO: 0.4 % (ref 0–1.5)
BUN SERPL-MCNC: 8.7 MG/DL (ref 6–20)
BUN/CREAT SERPL: 13.6 (ref 7–25)
CALCIUM SPEC-SCNC: 9.2 MG/DL (ref 8.6–10.5)
CHLORIDE SERPL-SCNC: 104 MMOL/L (ref 98–107)
CO2 SERPL-SCNC: 21 MMOL/L (ref 22–29)
CREAT SERPL-MCNC: 0.64 MG/DL (ref 0.57–1)
D-LACTATE SERPL-SCNC: 2.1 MMOL/L (ref 0.5–2)
DEPRECATED RDW RBC AUTO: 48.6 FL (ref 37–54)
EGFRCR SERPLBLD CKD-EPI 2021: 123.6 ML/MIN/1.73
EOSINOPHIL # BLD AUTO: 0.05 10*3/MM3 (ref 0–0.4)
EOSINOPHIL NFR BLD AUTO: 0.7 % (ref 0.3–6.2)
ERYTHROCYTE [DISTWIDTH] IN BLOOD BY AUTOMATED COUNT: 18.9 % (ref 12.3–15.4)
GLUCOSE SERPL-MCNC: 119 MG/DL (ref 65–99)
HCT VFR BLD AUTO: 37.4 % (ref 34–46.6)
HGB BLD-MCNC: 12.3 G/DL (ref 12–15.9)
IMM GRANULOCYTES # BLD AUTO: 0.02 10*3/MM3 (ref 0–0.05)
IMM GRANULOCYTES NFR BLD AUTO: 0.3 % (ref 0–0.5)
LYMPHOCYTES # BLD AUTO: 2.01 10*3/MM3 (ref 0.7–3.1)
LYMPHOCYTES NFR BLD AUTO: 30.1 % (ref 19.6–45.3)
MAGNESIUM SERPL-MCNC: 1.9 MG/DL (ref 1.6–2.6)
MCH RBC QN AUTO: 28.7 PG (ref 26.6–33)
MCHC RBC AUTO-ENTMCNC: 32.9 G/DL (ref 31.5–35.7)
MCV RBC AUTO: 87.2 FL (ref 79–97)
MONOCYTES # BLD AUTO: 0.51 10*3/MM3 (ref 0.1–0.9)
MONOCYTES NFR BLD AUTO: 7.6 % (ref 5–12)
NEUTROPHILS NFR BLD AUTO: 4.05 10*3/MM3 (ref 1.7–7)
NEUTROPHILS NFR BLD AUTO: 60.9 % (ref 42.7–76)
PLATELET # BLD AUTO: 174 10*3/MM3 (ref 140–450)
PMV BLD AUTO: 10.5 FL (ref 6–12)
POTASSIUM SERPL-SCNC: 3.6 MMOL/L (ref 3.5–5.2)
RBC # BLD AUTO: 4.29 10*6/MM3 (ref 3.77–5.28)
SODIUM SERPL-SCNC: 138 MMOL/L (ref 136–145)
WBC NRBC COR # BLD AUTO: 6.67 10*3/MM3 (ref 3.4–10.8)

## 2025-07-10 PROCEDURE — 85025 COMPLETE CBC W/AUTO DIFF WBC: CPT | Performed by: NURSE PRACTITIONER

## 2025-07-10 PROCEDURE — 80048 BASIC METABOLIC PNL TOTAL CA: CPT | Performed by: NURSE PRACTITIONER

## 2025-07-10 PROCEDURE — 83735 ASSAY OF MAGNESIUM: CPT | Performed by: NURSE PRACTITIONER

## 2025-07-10 PROCEDURE — 74018 RADEX ABDOMEN 1 VIEW: CPT

## 2025-07-10 PROCEDURE — 83605 ASSAY OF LACTIC ACID: CPT

## 2025-07-10 PROCEDURE — G0378 HOSPITAL OBSERVATION PER HR: HCPCS

## 2025-07-10 RX ORDER — POTASSIUM CHLORIDE 1500 MG/1
40 TABLET, EXTENDED RELEASE ORAL EVERY 4 HOURS
Status: DISPENSED | OUTPATIENT
Start: 2025-07-10 | End: 2025-07-10

## 2025-07-10 RX ORDER — POLYETHYLENE GLYCOL 3350 17 G/17G
17 POWDER, FOR SOLUTION ORAL 3 TIMES DAILY
Status: DISCONTINUED | OUTPATIENT
Start: 2025-07-10 | End: 2025-07-11 | Stop reason: HOSPADM

## 2025-07-10 RX ADMIN — QUETIAPINE FUMARATE 100 MG: 100 TABLET ORAL at 09:23

## 2025-07-10 RX ADMIN — HYDROXYZINE HYDROCHLORIDE 25 MG: 25 TABLET ORAL at 21:00

## 2025-07-10 RX ADMIN — POLYETHYLENE GLYCOL 3350 17 G: 17 POWDER, FOR SOLUTION ORAL at 14:58

## 2025-07-10 RX ADMIN — DOCUSATE SODIUM 50 MG AND SENNOSIDES 8.6 MG 2 TABLET: 8.6; 5 TABLET, FILM COATED ORAL at 21:00

## 2025-07-10 RX ADMIN — LAMOTRIGINE 100 MG: 100 TABLET ORAL at 21:00

## 2025-07-10 RX ADMIN — TERAZOSIN HYDROCHLORIDE 1 MG: 1 CAPSULE ORAL at 21:06

## 2025-07-10 RX ADMIN — POLYETHYLENE GLYCOL 3350 17 G: 17 POWDER, FOR SOLUTION ORAL at 20:59

## 2025-07-10 RX ADMIN — POLYETHYLENE GLYCOL 3350 17 G: 17 POWDER, FOR SOLUTION ORAL at 09:22

## 2025-07-10 RX ADMIN — Medication 10 ML: at 09:24

## 2025-07-10 RX ADMIN — TERAZOSIN HYDROCHLORIDE 2 MG: 2 CAPSULE ORAL at 21:00

## 2025-07-10 RX ADMIN — Medication 10 ML: at 21:07

## 2025-07-10 RX ADMIN — QUETIAPINE FUMARATE 200 MG: 100 TABLET ORAL at 21:00

## 2025-07-10 RX ADMIN — DOCUSATE SODIUM 50 MG AND SENNOSIDES 8.6 MG 2 TABLET: 8.6; 5 TABLET, FILM COATED ORAL at 09:22

## 2025-07-10 RX ADMIN — LAMOTRIGINE 100 MG: 100 TABLET ORAL at 09:23

## 2025-07-10 RX ADMIN — FLUOXETINE 20 MG: 20 CAPSULE ORAL at 09:23

## 2025-07-10 NOTE — PLAN OF CARE
Goal Outcome Evaluation:              Outcome Evaluation: VSS. BERTO orientation, patient nonverbal. Patient had one episode of seizure-like activity. RRT called. See previous nursing note. Seizure precautions in place. Safety maintained.

## 2025-07-10 NOTE — NURSING NOTE
RRT called at 0037 for seizure-like activity. Patient had activity in jaw and hands that were seizure-like. Vitals WDL. O2 96% on RA. Dr. Means ordered lactic on patient. ICU declined transfer at this time. RN to call Dr. Means in case of any other seizure-like activity. Seizure precautions in place.

## 2025-07-10 NOTE — PROGRESS NOTES
[facility]  Inpatient Progress Note      Date of Admission: 7/9/2025  Date of Service:  07/10/25    Reason for Follow Up: Fluid in body in the GI tract    Subjective     Subjective:   No new complaints  Denies abdominal pain  Abdominal x-ray showed that the magnets have moved from the right upper quadrant into the the right lower quadrant this        Current Facility-Administered Medications:     acetaminophen (TYLENOL) tablet 650 mg, 650 mg, Oral, Q4H PRN **OR** acetaminophen (TYLENOL) suppository 650 mg, 650 mg, Rectal, Q4H PRN, Nelli Walker APRN    sennosides-docusate (PERICOLACE) 8.6-50 MG per tablet 2 tablet, 2 tablet, Oral, BID, 2 tablet at 07/10/25 0922 **AND** polyethylene glycol (MIRALAX) packet 17 g, 17 g, Oral, Daily PRN, 17 g at 07/09/25 0839 **AND** bisacodyl (DULCOLAX) EC tablet 5 mg, 5 mg, Oral, Daily PRN **AND** bisacodyl (DULCOLAX) suppository 10 mg, 10 mg, Rectal, Daily PRN, Nelli Walker, APRN    Calcium Replacement - Follow Nurse / BPA Driven Protocol, , Not Applicable, PRN, Nelli Walker, APRN    enoxaparin sodium (LOVENOX) syringe 40 mg, 40 mg, Subcutaneous, Q24H, Nelli Walker APRN, 40 mg at 07/09/25 0840    FLUoxetine (PROzac) capsule 20 mg, 20 mg, Oral, Daily, Dev Emanuel PA-C, 20 mg at 07/10/25 0923    hydrOXYzine (ATARAX) tablet 25 mg, 25 mg, Oral, TID PRN, Dev Emanuel PA-C    lamoTRIgine (LaMICtal) tablet 100 mg, 100 mg, Oral, BID, Dev Emanuel PA-C, 100 mg at 07/10/25 0923    Magnesium Standard Dose Replacement - Follow Nurse / BPA Driven Protocol, , Not Applicable, PRN, Nelli Walker, APRN    Midazolam HCl (PF) (VERSED) injection 5 mg, 5 mg, Intravenous, Once, Seun, Jay Morro Jr., MD    mupirocin (BACTROBAN) 2 % nasal ointment 1 Application, 1 Application, Each Nare, BID, Nelli Walker, APRN, 1 Application at 07/09/25 2111    nitroglycerin (NITROSTAT) SL tablet 0.4 mg, 0.4 mg, Sublingual, Q5 Min PRN, Nelli Walker,  APRN    ondansetron (ZOFRAN) injection 4 mg, 4 mg, Intravenous, Q6H PRN, Nelli Walker D, APRN    pantoprazole (PROTONIX) EC tablet 40 mg, 40 mg, Oral, Q AM, Nelli Walker, APRN, 40 mg at 07/09/25 0623    Phosphorus Replacement - Follow Nurse / BPA Driven Protocol, , Not Applicable, PRN, Max Walkerya D, APRN    polyethylene glycol (MIRALAX) packet 17 g, 17 g, Oral, BID, Shay Suresh, APRN, 17 g at 07/10/25 0922    potassium chloride (KLOR-CON M20) CR tablet 40 mEq, 40 mEq, Oral, Q4H, Marshall Sharpe MD    Potassium Replacement - Follow Nurse / BPA Driven Protocol, , Not Applicable, PRN, Nelli Walker D, APRN    QUEtiapine (SEROquel) tablet 100 mg, 100 mg, Oral, QAM, Nelli Walker, APRN, 100 mg at 07/10/25 0923    QUEtiapine (SEROquel) tablet 200 mg, 200 mg, Oral, Nightly, Max Walkerya CONNIE, APRN, 200 mg at 07/09/25 2110    QUEtiapine (SEROquel) tablet 25 mg, 25 mg, Oral, Daily PRN, Nelli Walker, APRN    sodium chloride 0.9 % flush 10 mL, 10 mL, Intravenous, Q12H, Nelli Walker, APRN, 10 mL at 07/10/25 0924    sodium chloride 0.9 % flush 10 mL, 10 mL, Intravenous, PRN, Nelli Walker, APRN    sodium chloride 0.9 % infusion 40 mL, 40 mL, Intravenous, PRN, Nelli Walker D, APRN    terazosin (HYTRIN) capsule 1 mg, 1 mg, Oral, Nightly, Max Walkerya CONNIE, APRN, 1 mg at 07/09/25 2111    terazosin (HYTRIN) capsule 2 mg, 2 mg, Oral, Nightly, Nelli Walker, APRN, 2 mg at 07/09/25 2111    Review of Systems     As in history of present illness from body in the GI tract  1 magnet        Objective     Vital Signs  Temp:  [97.4 °F (36.3 °C)-98.4 °F (36.9 °C)] 97.8 °F (36.6 °C)  Heart Rate:  [] 90  Resp:  [18] 18  BP: ()/(50-93) 91/50  Body mass index is 35.79 kg/m².    Intake/Output Summary (Last 24 hours) at 7/10/2025 1053  Last data filed at 7/10/2025 0900  Gross per 24 hour   Intake 200 ml   Output 325 ml   Net -125 ml     I/O this shift:  In: 200 [P.O.:200]  Out: -        Physical  Exam:   General: patient awake, alert and cooperative   Eyes: Normal lids and lashes, no scleral icterus   Neck: supple, normal ROM   Skin: warm and dry, not jaundiced   Cardiovascular: regular rhythm and rate, no murmurs auscultated   Pulm: clear to auscultation bilaterally, regular and unlabored   Abdomen: soft, nontender, nondistended; normal bowel sounds   Rectal: deferred   Extremities: no rash or edema          Results Review:    I have reviewed all of the patients current test results  Results from last 7 days   Lab Units 07/10/25  0057 07/09/25  0758 07/09/25  0234   WBC 10*3/mm3 6.67 10.04 13.97*   HEMOGLOBIN g/dL 12.3 13.6 13.8   HEMATOCRIT % 37.4 40.1 42.0   PLATELETS 10*3/mm3 174 183 217       Results from last 7 days   Lab Units 07/10/25  0057 07/09/25  0200   SODIUM mmol/L 138 138   POTASSIUM mmol/L 3.6 4.0   CHLORIDE mmol/L 104 102   CO2 mmol/L 21.0* 20.0*   BUN mg/dL 8.7 8.5   CREATININE mg/dL 0.64 0.64   CALCIUM mg/dL 9.2 9.7   BILIRUBIN mg/dL  --  0.2   ALK PHOS U/L  --  78   ALT (SGPT) U/L  --  11   AST (SGOT) U/L  --  15   GLUCOSE mg/dL 119* 103*       Results from last 7 days   Lab Units 07/09/25  0139   INR  1.03       Lab Results   Lab Value Date/Time    LIPASE 17 06/01/2025 0252    LIPASE 16 11/05/2024 1957    LIPASE 15 07/08/2024 2350       Radiology:    Imaging Results (Last 24 Hours)       Procedure Component Value Units Date/Time    XR Abdomen KUB [[id number]] Collected: 07/10/25 0828     Updated: 07/10/25 0833    Narrative:      EXAMINATION: XR ABDOMEN KUB- 7/10/2025 8:28 AM     HISTORY: swallowed magnet; R56.9-Unspecified convulsions;  T18.9XXA-Foreign body of alimentary tract, part unspecified, initial  encounter; J96.01-Acute respiratory failure with hypoxia;  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     Images are stored in PACS per institutional protocols and regulatory  requirements.     REPORT: Supine imaging of the abdomen was performed.     COMPARISON: KUB  7/9/2025 0126 hours.     Cholecystectomy clips are present. The bowel gas pattern is normal.  Ingested foreign body, magnet, projects within the right mid abdomen,  probably within the proximal small intestine. The osseous structures are  unremarkable.       Impression:      Slight movement of the chest metallic foreign body, which is  probably within the small intestine in the right mid abdomen.     This report was signed and finalized on 7/10/2025 8:30 AM by Dr. Bradley Mike MD.                 Assessment & Plan       Respiratory failure with hypoxia      Impression/Plan    Foreign body in the GI tract  1 magnet  Being treated conservatively  Follow-up abdominal x-ray today showed that the magnet have moved from the right upper quadrant into the right lower quadrant  Will repeat abdominal x-ray in 1 to 2 days  Currently patient asymptomatic    Electronically signed by Catherine Taylor MD, 07/10/25, 10:53 AM CDT.     DISCLAIMER:    This physician works through a [employer] as an inpatient consultant gastroenterologist only and has no outpatient clinic for patient follow up.  Any results not available at time of inpatient discharge and/or GI clinic follow up should be managed by the hospitalist team, PCP, or outpatient gastroenterologist.    Catherine Taylor MD  07/10/25  10:53 CDT

## 2025-07-10 NOTE — PROGRESS NOTES
"    [facility] Services  INPATIENT PROGRESS NOTE    Patient Name: Duane Cooley  Date of Admission: 7/9/2025  Today's Date: 07/10/25  Length of Stay: 0  Primary Care Physician: Provider, No Known    Subjective   Chief Complaint: foreign body ingestion  HPI   29-year-old female with psychiatric disorder, nonepileptic psychogenic seizures, with prior admissions to the hospital, admitted on 7/9/2025 with concern of \"seizures\".  While evaluating the emergency department, there was identification of foreign body ingestion presumed to be magnets.  The patient was intubated in the ER, admitted to the intensive care unit.  She was extubated before arriving to the unit.  She reported swallowing magnets.  She was transferred to medical floor on 7/9/2025.  Has been evaluated by gastroenterology, recommending serial KUBs.  No report of abdominal pain.  No vomiting.  No rectal bleeding.  No melena.        Review of Systems   All pertinent negatives and positives are as above. All other systems have been reviewed and are negative unless otherwise stated.     Objective    Temp:  [97.4 °F (36.3 °C)-98.4 °F (36.9 °C)] 97.8 °F (36.6 °C)  Heart Rate:  [] 90  Resp:  [18] 18  BP: ()/(50-93) 91/50  Physical Exam  Constitutional:       Appearance: Alert.  No respiratory distress.  HENT:      Head: Normocephalic and atraumatic.      Nose: Nose normal.      Mouth/Throat:      Mouth: Mucous membranes are moist.   Eyes:      Extraocular Movements: Extraocular movements intact.      Conjunctiva/sclera: Conjunctivae normal.      Pupils: Pupils are equal, round  Cardiovascular:      Rate and Rhythm: Normal rate and regular rhythm.   Pulmonary:      Effort: No respiratory distress.      Breath sounds: Normal breath sounds.  Abdominal:      General: Abdomen is flat. Bowel sounds are normal.      Palpations: Abdomen is soft.   Extremities:  No lower extremity edema.  Skin:     Capillary Refill: " "Capillary refill takes less than 2 seconds.      Coloration: Skin is not jaundiced.         Results Review:  I have reviewed the labs, radiology results, and diagnostic studies.    Laboratory Data:   Results from last 7 days   Lab Units 07/10/25  0057 07/09/25  0758 07/09/25  0234   WBC 10*3/mm3 6.67 10.04 13.97*   HEMOGLOBIN g/dL 12.3 13.6 13.8   HEMATOCRIT % 37.4 40.1 42.0   PLATELETS 10*3/mm3 174 183 217        Results from last 7 days   Lab Units 07/10/25  0057 07/09/25  0200   SODIUM mmol/L 138 138   POTASSIUM mmol/L 3.6 4.0   CHLORIDE mmol/L 104 102   CO2 mmol/L 21.0* 20.0*   BUN mg/dL 8.7 8.5   CREATININE mg/dL 0.64 0.64   CALCIUM mg/dL 9.2 9.7   BILIRUBIN mg/dL  --  0.2   ALK PHOS U/L  --  78   ALT (SGPT) U/L  --  11   AST (SGOT) U/L  --  15   GLUCOSE mg/dL 119* 103*       Culture Data:   No results found for: \"BLOODCX\", \"URINECX\", \"WOUNDCX\", \"MRSACX\", \"RESPCX\", \"STOOLCX\"    Radiology Data:   Imaging Results (Last 24 Hours)       Procedure Component Value Units Date/Time    XR Abdomen KUB [[id number]] Collected: 07/10/25 0828     Updated: 07/10/25 0833    Narrative:      EXAMINATION: XR ABDOMEN KUB- 7/10/2025 8:28 AM     HISTORY: swallowed magnet; R56.9-Unspecified convulsions;  T18.9XXA-Foreign body of alimentary tract, part unspecified, initial  encounter; J96.01-Acute respiratory failure with hypoxia;  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     Images are stored in PACS per institutional protocols and regulatory  requirements.     REPORT: Supine imaging of the abdomen was performed.     COMPARISON: KUB 7/9/2025 0126 hours.     Cholecystectomy clips are present. The bowel gas pattern is normal.  Ingested foreign body, magnet, projects within the right mid abdomen,  probably within the proximal small intestine. The osseous structures are  unremarkable.       Impression:      Slight movement of the chest metallic foreign body, which is  probably within the small intestine in the right mid " abdomen.     This report was signed and finalized on 7/10/2025 8:30 AM by Dr. Bradley Mike MD.               I have reviewed the patient's current medications.     Assessment/Plan   Assessment  Active Hospital Problems    Diagnosis     **Respiratory failure with hypoxia      Transient acute respiratory failure with hypoxemia  Foreign body ingestion (magnets)  Psychiatric disorder      Electrolytes and renal function within normal limits.  Leukocytosis resolved.  Normal hemoglobin hematocrit and platelet count    Treatment Plan  Follow GI recommendations regarding foreign body.    Will continue serial KUBs.    The patient may be discharged once cleared by gastroenterology.  Discussed with patient's partner today.      Medical Decision Making  Number and Complexity of problems: 3, moderate complexity      Differential Diagnosis: see above    Conditions and Status        Condition is unchanged.     MDM Data  External documents reviewed: no  Cardiac tracing (EKG, telemetry) interpretation: no new  Radiology interpretation: Radiology reports reviewed  Labs reviewed: yes  Any tests that were considered but not ordered: no     Decision rules/scores evaluated (example [id number], Wells, etc): none     Discussed with: patient     Care Planning  Shared decision making: patient  Code status and discussions: FC    Disposition  Social Determinants of Health that impact treatment or disposition: none  I expect the patient to be discharged to home      Electronically signed by Marshall Sharpe MD, 07/10/25, 09:17 CDT.

## 2025-07-10 NOTE — PLAN OF CARE
Goal Outcome Evaluation:      Pt is on room air and afebrile. No seizures today and no acute events during the day. Spouse is at bedside. Seizure precautions are in place. No complaints of pain. Possible discharge in the AM.

## 2025-07-10 NOTE — SIGNIFICANT NOTE
Elavil      Last Written Prescription Date:  5/31/2023  Last Fill Quantity: 30,   # refills: 3  Last Office Visit: 11/30/2023  Future Office visit:       Lipitor       Last Written Prescription Date:  5/31/2023  Last Fill Quantity: 90,   # refills: 3  Last Office Visit: 11/30/2023  Future Office visit:       Potassium       Last Written Prescription Date:  5/31/2023  Last Fill Quantity: 90,   # refills: 3  Last Office Visit: 11/30/2023  Future Office visit:          Rapid Response Note:    Rapid Response was called on this patient for having a seizure-like activity.  Pt was admitted yesterday to the ICU for seizure like activity, and she was intubated, then extubated, and then downgraded to regular floor.  Understood it was felt that she was having nonepileptic psychogenic event.  Patient's event was brief. I called ICU and discussed whether we should send her to the unit for close monitoring, and I was advised we can continue observing her on the floor with low threshold to call icu if needed or if the event happens again.

## 2025-07-11 ENCOUNTER — APPOINTMENT (OUTPATIENT)
Dept: GENERAL RADIOLOGY | Facility: HOSPITAL | Age: 30
End: 2025-07-11
Payer: MEDICAID

## 2025-07-11 VITALS
TEMPERATURE: 97.9 F | HEIGHT: 65 IN | RESPIRATION RATE: 16 BRPM | HEART RATE: 101 BPM | WEIGHT: 211.8 LBS | BODY MASS INDEX: 35.29 KG/M2 | SYSTOLIC BLOOD PRESSURE: 104 MMHG | OXYGEN SATURATION: 98 % | DIASTOLIC BLOOD PRESSURE: 67 MMHG

## 2025-07-11 PROBLEM — J96.91 RESPIRATORY FAILURE WITH HYPOXIA: Status: RESOLVED | Noted: 2025-07-09 | Resolved: 2025-07-11

## 2025-07-11 PROCEDURE — 74018 RADEX ABDOMEN 1 VIEW: CPT

## 2025-07-11 PROCEDURE — G0378 HOSPITAL OBSERVATION PER HR: HCPCS

## 2025-07-11 RX ORDER — POLYETHYLENE GLYCOL 3350 17 G/17G
17 POWDER, FOR SOLUTION ORAL 2 TIMES DAILY
Qty: 238 G | Refills: 0 | Status: SHIPPED | OUTPATIENT
Start: 2025-07-11 | End: 2025-07-16

## 2025-07-11 RX ORDER — AMOXICILLIN 250 MG
2 CAPSULE ORAL 2 TIMES DAILY
Qty: 28 TABLET | Refills: 0 | Status: SHIPPED | OUTPATIENT
Start: 2025-07-11 | End: 2025-07-18

## 2025-07-11 RX ADMIN — PANTOPRAZOLE SODIUM 40 MG: 40 TABLET, DELAYED RELEASE ORAL at 06:40

## 2025-07-11 RX ADMIN — FLUOXETINE 20 MG: 20 CAPSULE ORAL at 08:34

## 2025-07-11 RX ADMIN — POLYETHYLENE GLYCOL 3350 17 G: 17 POWDER, FOR SOLUTION ORAL at 08:34

## 2025-07-11 RX ADMIN — DOCUSATE SODIUM 50 MG AND SENNOSIDES 8.6 MG 2 TABLET: 8.6; 5 TABLET, FILM COATED ORAL at 08:34

## 2025-07-11 RX ADMIN — Medication 10 ML: at 08:35

## 2025-07-11 RX ADMIN — QUETIAPINE FUMARATE 100 MG: 100 TABLET ORAL at 06:40

## 2025-07-11 RX ADMIN — LAMOTRIGINE 100 MG: 100 TABLET ORAL at 08:34

## 2025-07-11 NOTE — DISCHARGE SUMMARY
"      [facility] Medicine Services  DISCHARGE SUMMARY       Date of Admission: 7/9/2025  Date of Discharge:  7/11/2025  Primary Care Physician: Provider, No Known    Presenting Problem/History of Present Illness:  29-year-old female with psychiatric disorder, nonepileptic psychogenic seizures, with prior admissions to the hospital, admitted on 7/9/2025 with concern of \"seizures\". While evaluating the emergency department, there was identification of foreign body ingestion presumed to be magnets.   Final Discharge Diagnoses:  Active Hospital Problems    Diagnosis     Foreign body alimentary tract     Pseudoseizure     Autism        Consults: Gastroenterology    Procedures Performed: None    Pertinent Test Results:   No results found for this or any previous visit.      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     Images are stored in PACS per institutional protocols and regulatory  requirements.     REPORT: Supine imaging of the abdomen was performed.     COMPARISON: KUB 7/10/2025 0753 hours.     The bowel gas pattern remains normal. The ingested metallic foreign  body, magnet projects over the right lower quadrant slightly inferior to  the iliac crest level, this could be within the small bowel or proximal  colon. Cholecystectomy clips are present. The osseous structures are  unremarkable. The lung bases are clear.       Impression:      Interval movement of the ingested metallic foreign body  slightly more inferiorly in the right lower abdomen, this could " be  within the small bowel or proximal colon.     This report was signed and finalized on 7/11/2025 7:04 AM by Dr. Bradley Mike MD.       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     Images are stored in PACS per institutional protocols and regulatory  requirements.     REPORT: Supine imaging of the abdomen was performed.     COMPARISON: KUB 7/9/2025 0126 hours.     Cholecystectomy clips are present. The bowel gas pattern is normal.  Ingested foreign body, magnet, projects within the right mid abdomen,  probably within the proximal small intestine. The osseous structures are  unremarkable.       Impression:      Slight movement of the chest metallic foreign body, which is  probably within the small intestine in the right mid abdomen.     This report was signed and finalized on 7/10/2025 8:30 AM by Dr. Bradley Mike MD.       XR Abdomen KUB [[id number]] Collected: 07/09/25 0608     Updated: 07/09/25 0615    Narrative:      EXAMINATION: XR ABDOMEN KUB-        HISTORY: swallowed foreign body; G40.909-Epilepsy, unspecified, not  intractable, without status epilepticus; T18.9XXA-Foreign body of  alimentary tract, part unspecified, initial encounter     A frontal projection of the abdomen is obtained and compared with the  previous study dated 6/5/2025.     A cylindrical metallic foreign body is seen projected over the right  upper abdomen. It may be in the distal part of the stomach, gastric  antrum, proximal duodenum or in the hepatic flexure of the colon. A  definite evaluation or position of this foreign object is not possible  in this study.     Moderate gas and  stool is seen in the colon. No evidence of dilatation  of small or large bowel loops. Small amount of gas in the stomach is  noted.     Both kidneys are obscured by the bowel contents. There are no definite  radiopaque renal calculi.     There is evidence of prior cholecystectomy.     No acute bony abnormality.       Impression:      1. A cylindrical metallic foreign body projecting over the right upper  abdomen medially. Exact anatomical location of the foreign body is not  certain in this study. Further follow-up is recommended.  2. Unremarkable abdominal gas pattern. No finding to suggest obstruction  or ileus. No radiopaque calculi.        This report was signed and finalized on 7/9/2025 6:12 AM by Dr. Medina Braun MD.       XR Chest 1 View [[id number]] Collected: 07/09/25 0602     Updated: 07/09/25 0607    Narrative:      EXAMINATION: XR CHEST 1 VW-        HISTORY: Postintubation; G40.901-Epilepsy, unspecified, not intractable,  with status epilepticus; T18.9XXA-Foreign body of alimentary tract, part  unspecified, initial encounter; J96.01-Acute respiratory failure with  hypoxia     A frontal projection of the chest is compared with the previous study  dated 5/19/2025.     The lungs are moderately well-expanded.     There is insertion of gastric tube since the previous study. Distal end  of the tube is in the proximal to mid stomach. The distal sideport is at  or just below the level of gastroesophageal junction. Endotracheal tube  is in place. Distal end is 3 cm from tracheal bifurcation.     There is no evidence of recent infiltrate, pleural effusion, pulmonary  congestion or pneumothorax.     The heart size is in the normal range.     No acute bony abnormality.       Impression:      1. No active cardiopulmonary disease.  2. Endotracheal and gastric tubes are in place.        This report was signed and finalized on 7/9/2025 6:04 AM by Dr. Medina Braun MD.       CT Head Without Contrast [[id number]]  Collected: 07/09/25 0525     Updated: 07/09/25 0531    Narrative:      EXAMINATION: CT HEAD WO CONTRAST-        HISTORY:Head injury; G40.909-Epilepsy, unspecified, not intractable,  without status epilepticus; T18.9XXA-Foreign body of alimentary tract,  part unspecified, initial encounter     In order to have a CT radiation dose as low as reasonably achievable  Automated Exposure Control was utilized for adjustment of the mA and/or  KV according to patient size.     Total DLP = 1711.29 mGy.cm     The CT scan of the head is performed after intravenous contrast  enhancement.     The images are acquired in axial plane and subsequent reconstruction in  coronal and sagittal planes.     The comparison is made with the previous study dated 5/19/2025.     There is no evidence of intracranial hemorrhage or hematoma. There is no  evidence of a mass. There is no midline shift.     The ventricles, the basal cisterns and the cortical sulci are normal.     The gray-white matter differentiation is maintained.     Posterior fossa structures appear normal as visualized.     A partially empty sella turcica is seen. No significant widening or bony  erosion.     The images reviewed in bone window show no evidence of an acute skull  fracture. The visualized paranasal sinuses and mastoid air cells are  clear.       Impression:      1. No acute intracranial abnormality.  2. No skull fracture.     The above study was initially reviewed and reported by [employer]. I do not  find any discrepancies.                                      This report was signed and finalized on 7/9/2025 5:28 AM by Dr. Medina Braun MD.             LAB RESULTS:      Lab 07/10/25  0057 07/09/25  0758 07/09/25  0234 07/09/25  0139   WBC 6.67 10.04 13.97*  --    HEMOGLOBIN 12.3 13.6 13.8  --    HEMATOCRIT 37.4 40.1 42.0  --    PLATELETS 174 183 217  --    NEUTROS ABS 4.05 7.11* 11.05*  --    IMMATURE GRANS (ABS) 0.02 0.05 0.06*  --    LYMPHS ABS 2.01 1.93 1.84  --     MONOS ABS 0.51 0.88 0.96*  --    EOS ABS 0.05 0.02 0.01  --    MCV 87.2 82.5 85.7  --    LACTATE 2.1*  --   --   --    PROTIME  --   --   --  14.1   APTT  --   --   --  21.3*         Lab 07/10/25  0057 07/09/25  0200   SODIUM 138 138   POTASSIUM 3.6 4.0   CHLORIDE 104 102   CO2 21.0* 20.0*   ANION GAP 13.0 16.0*   BUN 8.7 8.5   CREATININE 0.64 0.64   EGFR 123.6 123.6   GLUCOSE 119* 103*   CALCIUM 9.2 9.7   MAGNESIUM 1.9 1.8   TSH  --  1.760         Lab 07/09/25  0200   TOTAL PROTEIN 7.5   ALBUMIN 4.3   GLOBULIN 3.2   ALT (SGPT) 11   AST (SGOT) 15   BILIRUBIN 0.2   ALK PHOS 78         Lab 07/09/25  0139   PROTIME 14.1   INR 1.03                 Brief Urine Lab Results  (Last result in the past 365 days)        Color   Clarity   Blood   Leuk Est   Nitrite   Protein   CREAT   Urine HCG        07/09/25 0324 Yellow   Clear   Negative   Negative   Negative   Negative                 Microbiology Results (last 10 days)       ** No results found for the last 240 hours. **            Hospital Course: The patient was hypoxic on initial evaluation, and was intubated in the ER, admitted to the intensive care unit. She was extubated before arriving to the unit. She reported swallowing magnets. She was transferred to medical floor on 7/9/2025. Has been evaluated by gastroenterology, recommending serial KUBs. No report of abdominal pain. No vomiting.  Patient was able to tolerate diet.  Serial KUBs showed no of management.  Patient asked for discharge today 7/11/25; plan discussed with gastroenterology.  Given there is only 1 magnet, and patient has no abdominal pain, no signs of bowel obstruction, no vomiting and tolerating diet, it was considered appropriate to discharge patient home; she has a follow-up with primary care provider next Monday 7/14/25.  I have ordered a KUB to be performed at that time to be reviewed by primary doctor.  Likely patient will pass magnet spontaneously.      Physical Exam on Discharge:  /67  "(BP Location: Left arm, Patient Position: Lying)   Pulse 101   Temp 97.9 °F (36.6 °C) (Oral)   Resp 16   Ht 165.1 cm (65\")   Wt 96.1 kg (211 lb 12.8 oz)   LMP 06/09/2025   SpO2 98%   BMI 35.25 kg/m²   Physical Exam  Constitutional:       Appearance: Alert.  No respiratory distress.  Follows commands.  HENT:      Head: Normocephalic and atraumatic.      Nose: Nose normal.      Mouth/Throat:      Mouth: Mucous membranes are moist.   Eyes:      Extraocular Movements: Extraocular movements intact.      Conjunctiva/sclera: Conjunctivae normal.      Pupils: Pupils are equal, round  Cardiovascular:      Rate and Rhythm: Normal rate and regular rhythm.   Pulmonary:      Effort: No respiratory distress.      Breath sounds: Normal breath sounds.  Abdominal:      General: Abdomen is flat. Bowel sounds are normal.      Palpations: Abdomen is soft.   Extremities:  No lower extremity edema.  Skin:     Capillary Refill: Capillary refill takes less than 2 seconds.      Coloration: Skin is not jaundiced.        Condition on Discharge: Stable    Discharge Disposition:  Home or Self Care    Discharge Medications:     Discharge Medications        New Medications        Instructions Start Date   polyethylene glycol 17 g packet  Commonly known as: MIRALAX   17 g, Oral, 2 Times Daily      sennosides-docusate 8.6-50 MG per tablet  Commonly known as: PERICOLACE   2 tablets, Oral, 2 Times Daily             Continue These Medications        Instructions Start Date   acetaminophen 500 MG tablet  Commonly known as: TYLENOL   500 mg, Daily PRN      doxazosin 1 MG tablet  Commonly known as: CARDURA   1 mg, Nightly      doxazosin 2 MG tablet  Commonly known as: CARDURA   2 mg, Nightly      FLUoxetine 20 MG capsule  Commonly known as: PROzac   20 mg, Daily      FLUoxetine 40 MG capsule  Commonly known as: PROzac   40 mg, Daily      hydrOXYzine 25 MG tablet  Commonly known as: ATARAX   25 mg, 3 Times Daily PRN      LaMICtal 200 MG " tablet  Generic drug: lamoTRIgine   100 mg, 2 Times Daily      omeprazole 20 MG capsule  Commonly known as: priLOSEC   20 mg, Daily      QUEtiapine 25 MG tablet  Commonly known as: SEROquel   25 mg, 2 Times Daily      senna 8.6 MG tablet  Commonly known as: SENOKOT   2 tablets, Daily             Stop These Medications      Nayzilam 5 MG/0.1ML solution  Generic drug: Midazolam              Discharge Diet:   Diet Instructions       Diet: Regular/House Diet; Regular (IDDSI 7); Thin (IDDSI 0)      Discharge Diet: Regular/House Diet    Texture: Regular (IDDSI 7)    Fluid Consistency: Thin (IDDSI 0)            Activity at Discharge:  patient to resume prior activity    Follow-up Appointments:   No future appointments.    Test Results Pending at Discharge: VERÓNICA 7/14/2025    Electronically signed by Marshall Sharpe MD, 07/11/25, 11:10 CDT.    Time: 45 minutes.

## 2025-07-11 NOTE — PROGRESS NOTES
[facility]  Inpatient Progress Note      Date of Admission: 7/9/2025  Date of Service:  07/11/25    Reason for Follow Up: Fluid in body in the GI tract    Subjective     Subjective:   No new complaints  Denies abdominal pain  Abdominal x-ray showed that the magnets have moved further down, currently in the right lower quadrant        Current Facility-Administered Medications:     acetaminophen (TYLENOL) tablet 650 mg, 650 mg, Oral, Q4H PRN **OR** acetaminophen (TYLENOL) suppository 650 mg, 650 mg, Rectal, Q4H PRN, Nelli Walker APRN    sennosides-docusate (PERICOLACE) 8.6-50 MG per tablet 2 tablet, 2 tablet, Oral, BID, 2 tablet at 07/11/25 0834 **AND** polyethylene glycol (MIRALAX) packet 17 g, 17 g, Oral, Daily PRN, 17 g at 07/09/25 0839 **AND** bisacodyl (DULCOLAX) EC tablet 5 mg, 5 mg, Oral, Daily PRN **AND** bisacodyl (DULCOLAX) suppository 10 mg, 10 mg, Rectal, Daily PRN, Nelli Walker, APRN    Calcium Replacement - Follow Nurse / BPA Driven Protocol, , Not Applicable, PRN, Nelli Walker APRN    enoxaparin sodium (LOVENOX) syringe 40 mg, 40 mg, Subcutaneous, Q24H, Nelli Walker APRN, 40 mg at 07/09/25 0840    FLUoxetine (PROzac) capsule 20 mg, 20 mg, Oral, Daily, Dev Emanuel PA-C, 20 mg at 07/11/25 0834    hydrOXYzine (ATARAX) tablet 25 mg, 25 mg, Oral, TID PRN, Dev Emanuel PA-C, 25 mg at 07/10/25 2100    lamoTRIgine (LaMICtal) tablet 100 mg, 100 mg, Oral, BID, Dev Emanuel PA-C, 100 mg at 07/11/25 0834    Magnesium Standard Dose Replacement - Follow Nurse / BPA Driven Protocol, , Not Applicable, PRN, Nelli Walker, APRN    Midazolam HCl (PF) (VERSED) injection 5 mg, 5 mg, Intravenous, Once, Jay Kohli Jr., MD    nitroglycerin (NITROSTAT) SL tablet 0.4 mg, 0.4 mg, Sublingual, Q5 Min PRN, Nelli Walker APRN    ondansetron (ZOFRAN) injection 4 mg, 4 mg, Intravenous, Q6H PRN, Nelli Walker, TELMA    pantoprazole (PROTONIX) EC tablet 40 mg, 40  mg, Oral, Q AM, Nelli Walker, APRN, 40 mg at 07/11/25 0640    Phosphorus Replacement - Follow Nurse / BPA Driven Protocol, , Not Applicable, PRN, Nelli Walker D, APRN    polyethylene glycol (MIRALAX) packet 17 g, 17 g, Oral, TID, Catherine Taylor MD, 17 g at 07/11/25 0834    Potassium Replacement - Follow Nurse / BPA Driven Protocol, , Not Applicable, PRN, Max Walkerya D, APRN    QUEtiapine (SEROquel) tablet 100 mg, 100 mg, Oral, QAM, Max Walkerya D, APRN, 100 mg at 07/11/25 0640    QUEtiapine (SEROquel) tablet 200 mg, 200 mg, Oral, Nightly, Max Walkerya D, APRN, 200 mg at 07/10/25 2100    QUEtiapine (SEROquel) tablet 25 mg, 25 mg, Oral, Daily PRN, Max Walkerya D, APRN    sodium chloride 0.9 % flush 10 mL, 10 mL, Intravenous, Q12H, Max Walkerya D, APRN, 10 mL at 07/11/25 0835    sodium chloride 0.9 % flush 10 mL, 10 mL, Intravenous, PRN, Max Walkerya D, APRN    sodium chloride 0.9 % infusion 40 mL, 40 mL, Intravenous, PRN, Aaron Nelli D, APRN    terazosin (HYTRIN) capsule 1 mg, 1 mg, Oral, Nightly, Aaron, Nelli D, APRN, 1 mg at 07/10/25 2106    terazosin (HYTRIN) capsule 2 mg, 2 mg, Oral, Nightly, Aaron, Nelli D, APRN, 2 mg at 07/10/25 2100    Review of Systems     As in history of present illness from body in the GI tract  1 magnet        Objective     Vital Signs  Temp:  [97.8 °F (36.6 °C)-98.2 °F (36.8 °C)] 97.9 °F (36.6 °C)  Heart Rate:  [] 101  Resp:  [14-18] 16  BP: (104-122)/(67-73) 104/67  Body mass index is 35.25 kg/m².    Intake/Output Summary (Last 24 hours) at 7/11/2025 1022  Last data filed at 7/11/2025 0615  Gross per 24 hour   Intake 700 ml   Output --   Net 700 ml     No intake/output data recorded.       Physical Exam:   General: patient awake, alert and cooperative   Eyes: Normal lids and lashes, no scleral icterus   Neck: supple, normal ROM   Skin: warm and dry, not jaundiced   Cardiovascular: regular rhythm and rate, no murmurs auscultated   Pulm: clear to  auscultation bilaterally, regular and unlabored   Abdomen: soft, nontender, nondistended; normal bowel sounds   Rectal: deferred   Extremities: no rash or edema          Results Review:    I have reviewed all of the patients current test results  Results from last 7 days   Lab Units 07/10/25  0057 07/09/25  0758 07/09/25  0234   WBC 10*3/mm3 6.67 10.04 13.97*   HEMOGLOBIN g/dL 12.3 13.6 13.8   HEMATOCRIT % 37.4 40.1 42.0   PLATELETS 10*3/mm3 174 183 217       Results from last 7 days   Lab Units 07/10/25  0057 07/09/25  0200   SODIUM mmol/L 138 138   POTASSIUM mmol/L 3.6 4.0   CHLORIDE mmol/L 104 102   CO2 mmol/L 21.0* 20.0*   BUN mg/dL 8.7 8.5   CREATININE mg/dL 0.64 0.64   CALCIUM mg/dL 9.2 9.7   BILIRUBIN mg/dL  --  0.2   ALK PHOS U/L  --  78   ALT (SGPT) U/L  --  11   AST (SGOT) U/L  --  15   GLUCOSE mg/dL 119* 103*       Results from last 7 days   Lab Units 07/09/25  0139   INR  1.03       Lab Results   Lab Value Date/Time    LIPASE 17 06/01/2025 0252    LIPASE 16 11/05/2024 1957    LIPASE 15 07/08/2024 2350       Radiology:    Imaging Results (Last 24 Hours)       Procedure Component Value Units Date/Time    XR Abdomen KUB [[id number]] Collected: 07/11/25 0702     Updated: 07/11/25 0707    Narrative:      EXAMINATION: XR ABDOMEN KUB- 7/11/2025 7:02 AM     HISTORY: ingestion of magnets; R56.9-Unspecified convulsions;  T18.9XXA-Foreign body of alimentary tract, part unspecified, initial  encounter; J96.01-Acute respiratory failure with hypoxia;  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     Images are stored in PACS per institutional protocols and regulatory  requirements.     REPORT: Supine imaging of the abdomen was performed.     COMPARISON: KUB 7/10/2025 0753 hours.     The bowel gas pattern remains normal. The ingested metallic foreign  body, magnet projects over the right lower quadrant slightly inferior to  the iliac crest level, this could be within the small bowel or proximal  colon.  Cholecystectomy clips are present. The osseous structures are  unremarkable. The lung bases are clear.       Impression:      Interval movement of the ingested metallic foreign body  slightly more inferiorly in the right lower abdomen, this could be  within the small bowel or proximal colon.     This report was signed and finalized on 7/11/2025 7:04 AM by Dr. Bradley Mike MD.                 Assessment & Plan       Autism    Pseudoseizure    Foreign body alimentary tract      Impression/Plan    Foreign body in the GI tract  1 magnet  Being treated conservatively  Follow-up abdominal x-ray today showed that the magnet have moved further down, currently in the right lower quadrant  Currently patient asymptomatic okay for discharge from a GI standpoint, a repeat abdominal x-ray on Monday as an outpatient  Care discussed with the patient and with the attending      Electronically signed by Catherine Taylor MD, 07/10/25, 10:53 AM CDT.     DISCLAIMER:    This physician works through a [employer] as an inpatient consultant gastroenterologist only and has no outpatient clinic for patient follow up.  Any results not available at time of inpatient discharge and/or GI clinic follow up should be managed by the hospitalist team, PCP, or outpatient gastroenterologist.    Catherine Taylor MD  07/11/25  10:22 CDT

## 2025-07-11 NOTE — PLAN OF CARE
Admitted to unit 7/9 for acute resp failure and foreign object ingestion  To 4c 7/9  GI consult; KUB 7/11; miralax TID, pericolace 2 tabs bid; mult bm throughout the day per pt  Lovenox  No seizures seen; 02 sats >95 on RA  Problem: Adult Inpatient Plan of Care  Goal: Plan of Care Review  Outcome: Progressing     Problem: Seizure, Active Management  Goal: Absence of Seizure/Seizure-Related Injury  Outcome: Progressing   Goal Outcome Evaluation:

## 2025-08-27 ENCOUNTER — APPOINTMENT (OUTPATIENT)
Dept: NEUROLOGY | Facility: HOSPITAL | Age: 30
End: 2025-08-27
Payer: MEDICAID

## 2025-08-27 ENCOUNTER — HOSPITAL ENCOUNTER (INPATIENT)
Facility: HOSPITAL | Age: 30
LOS: 1 days | Discharge: HOME | End: 2025-08-28
Attending: INTERNAL MEDICINE | Admitting: INTERNAL MEDICINE
Payer: MEDICAID

## 2025-08-27 ENCOUNTER — APPOINTMENT (OUTPATIENT)
Dept: GENERAL RADIOLOGY | Facility: HOSPITAL | Age: 30
End: 2025-08-27
Payer: MEDICAID

## 2025-08-27 ENCOUNTER — APPOINTMENT (OUTPATIENT)
Dept: OTHER | Facility: HOSPITAL | Age: 30
End: 2025-08-27
Payer: MEDICAID

## 2025-08-27 LAB
ALBUMIN SERPL-MCNC: 4 G/DL (ref 3.5–5.2)
ALBUMIN/GLOB SERPL: 1.4 G/DL
ALP SERPL-CCNC: 80 U/L (ref 39–117)
ALT SERPL W P-5'-P-CCNC: 17 U/L (ref 1–33)
ANION GAP SERPL CALCULATED.3IONS-SCNC: 13 MMOL/L (ref 5–15)
AST SERPL-CCNC: 26 U/L (ref 1–32)
BASOPHILS # BLD AUTO: 0.04 10*3/MM3 (ref 0–0.2)
BASOPHILS NFR BLD AUTO: 0.4 % (ref 0–1.5)
BILIRUB SERPL-MCNC: 0.5 MG/DL (ref 0–1.2)
BUN SERPL-MCNC: 6.2 MG/DL (ref 6–20)
BUN/CREAT SERPL: 9.5 (ref 7–25)
CALCIUM SPEC-SCNC: 8.9 MG/DL (ref 8.6–10.5)
CHLORIDE SERPL-SCNC: 103 MMOL/L (ref 98–107)
CO2 SERPL-SCNC: 21 MMOL/L (ref 22–29)
CREAT SERPL-MCNC: 0.65 MG/DL (ref 0.57–1)
D-LACTATE SERPL-SCNC: 1.1 MMOL/L (ref 0.5–2)
DEPRECATED RDW RBC AUTO: 46.7 FL (ref 37–54)
EGFRCR SERPLBLD CKD-EPI 2021: 123.2 ML/MIN/1.73
EOSINOPHIL # BLD AUTO: 0.09 10*3/MM3 (ref 0–0.4)
EOSINOPHIL NFR BLD AUTO: 0.9 % (ref 0.3–6.2)
ERYTHROCYTE [DISTWIDTH] IN BLOOD BY AUTOMATED COUNT: 14.1 % (ref 12.3–15.4)
GLOBULIN UR ELPH-MCNC: 2.8 GM/DL
GLUCOSE SERPL-MCNC: 93 MG/DL (ref 65–99)
HCT VFR BLD AUTO: 44.3 % (ref 34–46.6)
HGB BLD-MCNC: 14.6 G/DL (ref 12–15.9)
IMM GRANULOCYTES # BLD AUTO: 0.03 10*3/MM3 (ref 0–0.05)
IMM GRANULOCYTES NFR BLD AUTO: 0.3 % (ref 0–0.5)
LYMPHOCYTES # BLD AUTO: 1.43 10*3/MM3 (ref 0.7–3.1)
LYMPHOCYTES NFR BLD AUTO: 14.4 % (ref 19.6–45.3)
MAGNESIUM SERPL-MCNC: 2.1 MG/DL (ref 1.6–2.6)
MCH RBC QN AUTO: 29.8 PG (ref 26.6–33)
MCHC RBC AUTO-ENTMCNC: 33 G/DL (ref 31.5–35.7)
MCV RBC AUTO: 90.4 FL (ref 79–97)
MONOCYTES # BLD AUTO: 0.55 10*3/MM3 (ref 0.1–0.9)
MONOCYTES NFR BLD AUTO: 5.5 % (ref 5–12)
NEUTROPHILS NFR BLD AUTO: 7.77 10*3/MM3 (ref 1.7–7)
NEUTROPHILS NFR BLD AUTO: 78.5 % (ref 42.7–76)
NRBC BLD AUTO-RTO: 0 /100 WBC (ref 0–0.2)
PHOSPHATE SERPL-MCNC: 2.6 MG/DL (ref 2.5–4.5)
PLATELET # BLD AUTO: 175 10*3/MM3 (ref 140–450)
PMV BLD AUTO: 12.6 FL (ref 6–12)
POTASSIUM SERPL-SCNC: 4.2 MMOL/L (ref 3.5–5.2)
PROT SERPL-MCNC: 6.8 G/DL (ref 6–8.5)
RBC # BLD AUTO: 4.9 10*6/MM3 (ref 3.77–5.28)
SODIUM SERPL-SCNC: 137 MMOL/L (ref 136–145)
WBC NRBC COR # BLD AUTO: 9.91 10*3/MM3 (ref 3.4–10.8)

## 2025-08-27 PROCEDURE — 94799 UNLISTED PULMONARY SVC/PX: CPT

## 2025-08-27 PROCEDURE — 83605 ASSAY OF LACTIC ACID: CPT | Performed by: PHYSICIAN ASSISTANT

## 2025-08-27 PROCEDURE — 94761 N-INVAS EAR/PLS OXIMETRY MLT: CPT

## 2025-08-27 PROCEDURE — 80053 COMPREHEN METABOLIC PANEL: CPT | Performed by: PHYSICIAN ASSISTANT

## 2025-08-27 PROCEDURE — 83735 ASSAY OF MAGNESIUM: CPT | Performed by: PHYSICIAN ASSISTANT

## 2025-08-27 PROCEDURE — 95714 VEEG EA 12-26 HR UNMNTR: CPT

## 2025-08-27 PROCEDURE — 94002 VENT MGMT INPAT INIT DAY: CPT

## 2025-08-27 PROCEDURE — 85025 COMPLETE CBC W/AUTO DIFF WBC: CPT | Performed by: PHYSICIAN ASSISTANT

## 2025-08-27 PROCEDURE — 95819 EEG AWAKE AND ASLEEP: CPT

## 2025-08-27 PROCEDURE — 84100 ASSAY OF PHOSPHORUS: CPT | Performed by: PHYSICIAN ASSISTANT

## 2025-08-27 RX ORDER — DIPHENHYDRAMINE HYDROCHLORIDE 50 MG/ML
50 INJECTION, SOLUTION INTRAMUSCULAR; INTRAVENOUS ONCE
Status: DISCONTINUED | OUTPATIENT
Start: 2025-08-27 | End: 2025-08-28 | Stop reason: HOSPADM

## 2025-08-27 RX ORDER — SODIUM CHLORIDE 0.9 % (FLUSH) 0.9 %
10 SYRINGE (ML) INJECTION EVERY 12 HOURS SCHEDULED
Status: DISCONTINUED | OUTPATIENT
Start: 2025-08-27 | End: 2025-08-28 | Stop reason: HOSPADM

## 2025-08-27 RX ORDER — BISACODYL 10 MG
10 SUPPOSITORY, RECTAL RECTAL DAILY PRN
Status: DISCONTINUED | OUTPATIENT
Start: 2025-08-27 | End: 2025-08-28 | Stop reason: HOSPADM

## 2025-08-27 RX ORDER — HALOPERIDOL 5 MG/ML
5 INJECTION INTRAMUSCULAR ONCE
Status: DISCONTINUED | OUTPATIENT
Start: 2025-08-27 | End: 2025-08-28 | Stop reason: HOSPADM

## 2025-08-27 RX ORDER — SODIUM CHLORIDE 9 MG/ML
40 INJECTION, SOLUTION INTRAVENOUS AS NEEDED
Status: DISCONTINUED | OUTPATIENT
Start: 2025-08-27 | End: 2025-08-28 | Stop reason: HOSPADM

## 2025-08-27 RX ORDER — CHLORHEXIDINE GLUCONATE 500 MG/1
1 CLOTH TOPICAL ONCE
Status: COMPLETED | OUTPATIENT
Start: 2025-08-27 | End: 2025-08-27

## 2025-08-27 RX ORDER — POLYETHYLENE GLYCOL 3350 17 G/17G
17 POWDER, FOR SOLUTION ORAL DAILY PRN
Status: DISCONTINUED | OUTPATIENT
Start: 2025-08-27 | End: 2025-08-28 | Stop reason: HOSPADM

## 2025-08-27 RX ORDER — AMOXICILLIN 250 MG
2 CAPSULE ORAL 2 TIMES DAILY
Status: DISCONTINUED | OUTPATIENT
Start: 2025-08-27 | End: 2025-08-28 | Stop reason: HOSPADM

## 2025-08-27 RX ORDER — SODIUM CHLORIDE 0.9 % (FLUSH) 0.9 %
10 SYRINGE (ML) INJECTION AS NEEDED
Status: DISCONTINUED | OUTPATIENT
Start: 2025-08-27 | End: 2025-08-28 | Stop reason: HOSPADM

## 2025-08-27 RX ORDER — NITROGLYCERIN 0.4 MG/1
0.4 TABLET SUBLINGUAL
Status: DISCONTINUED | OUTPATIENT
Start: 2025-08-27 | End: 2025-08-28 | Stop reason: HOSPADM

## 2025-08-27 RX ORDER — DIAZEPAM 10 MG/2ML
2.5 INJECTION, SOLUTION INTRAMUSCULAR; INTRAVENOUS ONCE
Status: DISCONTINUED | OUTPATIENT
Start: 2025-08-27 | End: 2025-08-28 | Stop reason: HOSPADM

## 2025-08-27 RX ORDER — BISACODYL 5 MG/1
5 TABLET, DELAYED RELEASE ORAL DAILY PRN
Status: DISCONTINUED | OUTPATIENT
Start: 2025-08-27 | End: 2025-08-28 | Stop reason: HOSPADM

## 2025-08-27 RX ORDER — CHLORHEXIDINE GLUCONATE 500 MG/1
1 CLOTH TOPICAL EVERY 24 HOURS
Status: DISCONTINUED | OUTPATIENT
Start: 2025-08-28 | End: 2025-08-28 | Stop reason: HOSPADM

## 2025-08-27 RX ADMIN — Medication 1 APPLICATION: at 18:00

## 2025-08-27 RX ADMIN — CHLORHEXIDINE GLUCONATE 1 APPLICATION: 500 CLOTH TOPICAL at 18:00

## 2025-08-28 ENCOUNTER — READMISSION MANAGEMENT (OUTPATIENT)
Dept: CALL CENTER | Facility: HOSPITAL | Age: 30
End: 2025-08-28
Payer: MEDICAID

## 2025-08-28 VITALS
TEMPERATURE: 98.7 F | BODY MASS INDEX: 33.68 KG/M2 | WEIGHT: 202.16 LBS | DIASTOLIC BLOOD PRESSURE: 60 MMHG | HEART RATE: 107 BPM | OXYGEN SATURATION: 95 % | HEIGHT: 65 IN | SYSTOLIC BLOOD PRESSURE: 96 MMHG | RESPIRATION RATE: 21 BRPM

## 2025-08-28 RX ADMIN — Medication 10 ML: at 00:02

## (undated) DEVICE — CUFF,BP,DISP,1 TUBE,ADULT,HP: Brand: MEDLINE

## (undated) DEVICE — THE CHANNEL CLEANING BRUSH IS A NYLON FLEXI BRUSH ATTACHED TO A FLEXIBLE PLASTIC SHEATH DESIGNED TO SAFELY REMOVE DEBRIS FROM FLEXIBLE ENDOSCOPES.

## (undated) DEVICE — TUBE ET DIA7MM ORAL NSL CUF MURPHY EYE HI LO RADPQ LN DISP

## (undated) DEVICE — ARGYLE YANKAUER BULB TIP WITH VENT: Brand: ARGYLE

## (undated) DEVICE — ANESTHESIA CIRCUIT ADULT-LF: Brand: MEDLINE INDUSTRIES, INC.

## (undated) DEVICE — ENDO KIT,LOURDES HOSPITAL: Brand: MEDLINE INDUSTRIES, INC.

## (undated) DEVICE — LARYNGOSCOPE BLDE MAC HNDL M SZ 35 ST CURAPLEX CURAVIEW LED

## (undated) DEVICE — YANKAUER,BULB TIP WITH VENT: Brand: ARGYLE

## (undated) DEVICE — DEFENDO AIR WATER SUCTION AND BIOPSY VALVE KIT FOR  OLYMPUS: Brand: DEFENDO AIR/WATER/SUCTION AND BIOPSY VALVE

## (undated) DEVICE — CONMED SCOPE SAVER BITE BLOCK, 20X27 MM: Brand: SCOPE SAVER

## (undated) DEVICE — Device: Brand: DEFENDO AIR/WATER/SUCTION AND BIOPSY VALVE

## (undated) DEVICE — BLADE LARYNGOSCOPE MACINTOSH 3.5 FIBER OPTIC HNDL CURAVIEW

## (undated) DEVICE — SENSR O2 OXIMAX FNGR A/ 18IN NONSTR

## (undated) DEVICE — THE DISPOSABLE ROTH NET FOREIGN BODY STANDARD RETRIEVAL DEVICE IS USED IN THE ENDOSCOPIC RETRIEVAL OF FOREIGN BODY, FOOD BOLUS AND EXCISED TISSUE SUCH AS POLYPS.: Brand: ROTH NET

## (undated) DEVICE — NAKAO SPIDER-NET RETRIEVAL DEVICE 230 X 2.5 X 5.5 CM: Brand: NAKAO

## (undated) DEVICE — TUBE ENDOTRACH LASR CUF ORL MURPHY 7MM

## (undated) DEVICE — TUBE ET 7MM NSL ORAL BASIC CUF INTMED MURPHY EYE RADPQ MRK

## (undated) DEVICE — FORCEPS GRASPING PEDIATRIC RAT TOOTH